# Patient Record
Sex: MALE | Race: BLACK OR AFRICAN AMERICAN | Employment: FULL TIME | URBAN - METROPOLITAN AREA
[De-identification: names, ages, dates, MRNs, and addresses within clinical notes are randomized per-mention and may not be internally consistent; named-entity substitution may affect disease eponyms.]

---

## 2017-02-08 ENCOUNTER — APPOINTMENT (OUTPATIENT)
Dept: GENERAL RADIOLOGY | Age: 62
DRG: 291 | End: 2017-02-08
Attending: EMERGENCY MEDICINE
Payer: COMMERCIAL

## 2017-02-08 ENCOUNTER — HOSPITAL ENCOUNTER (INPATIENT)
Age: 62
LOS: 6 days | Discharge: HOME | DRG: 291 | End: 2017-02-14
Attending: EMERGENCY MEDICINE | Admitting: HOSPITALIST
Payer: COMMERCIAL

## 2017-02-08 DIAGNOSIS — N17.9 AKI (ACUTE KIDNEY INJURY) (HCC): ICD-10-CM

## 2017-02-08 DIAGNOSIS — I50.43 ACUTE ON CHRONIC COMBINED SYSTOLIC AND DIASTOLIC CONGESTIVE HEART FAILURE (HCC): Primary | ICD-10-CM

## 2017-02-08 DIAGNOSIS — R77.8 ELEVATED TROPONIN: ICD-10-CM

## 2017-02-08 PROBLEM — I50.9 CONGESTIVE HEART FAILURE (CHF) (HCC): Status: ACTIVE | Noted: 2017-02-08

## 2017-02-08 LAB
ALBUMIN SERPL BCP-MCNC: 3.4 G/DL (ref 3.5–5)
ALBUMIN/GLOB SERPL: 0.8 {RATIO} (ref 1.1–2.2)
ALP SERPL-CCNC: 129 U/L (ref 45–117)
ALT SERPL-CCNC: 23 U/L (ref 12–78)
ANION GAP BLD CALC-SCNC: 7 MMOL/L (ref 5–15)
AST SERPL W P-5'-P-CCNC: 24 U/L (ref 15–37)
BASOPHILS # BLD AUTO: 0 K/UL (ref 0–0.1)
BASOPHILS # BLD: 1 % (ref 0–1)
BILIRUB SERPL-MCNC: 1.4 MG/DL (ref 0.2–1)
BNP SERPL-MCNC: 1466 PG/ML (ref 0–125)
BUN SERPL-MCNC: 38 MG/DL (ref 6–20)
BUN/CREAT SERPL: 22 (ref 12–20)
CALCIUM SERPL-MCNC: 8.7 MG/DL (ref 8.5–10.1)
CHLORIDE SERPL-SCNC: 104 MMOL/L (ref 97–108)
CK SERPL-CCNC: 124 U/L (ref 39–308)
CO2 SERPL-SCNC: 27 MMOL/L (ref 21–32)
CREAT SERPL-MCNC: 1.73 MG/DL (ref 0.7–1.3)
EOSINOPHIL # BLD: 0.1 K/UL (ref 0–0.4)
EOSINOPHIL NFR BLD: 2 % (ref 0–7)
ERYTHROCYTE [DISTWIDTH] IN BLOOD BY AUTOMATED COUNT: 18.1 % (ref 11.5–14.5)
GLOBULIN SER CALC-MCNC: 4.5 G/DL (ref 2–4)
GLUCOSE BLD STRIP.AUTO-MCNC: 238 MG/DL (ref 65–100)
GLUCOSE SERPL-MCNC: 217 MG/DL (ref 65–100)
HCT VFR BLD AUTO: 32.3 % (ref 36.6–50.3)
HGB BLD-MCNC: 10 G/DL (ref 12.1–17)
INR PPP: 3.7 (ref 0.9–1.1)
LYMPHOCYTES # BLD AUTO: 24 % (ref 12–49)
LYMPHOCYTES # BLD: 1.1 K/UL (ref 0.8–3.5)
MAGNESIUM SERPL-MCNC: 1.9 MG/DL (ref 1.6–2.4)
MCH RBC QN AUTO: 26 PG (ref 26–34)
MCHC RBC AUTO-ENTMCNC: 31 G/DL (ref 30–36.5)
MCV RBC AUTO: 84.1 FL (ref 80–99)
MONOCYTES # BLD: 0.3 K/UL (ref 0–1)
MONOCYTES NFR BLD AUTO: 8 % (ref 5–13)
NEUTS SEG # BLD: 2.9 K/UL (ref 1.8–8)
NEUTS SEG NFR BLD AUTO: 65 % (ref 32–75)
PLATELET # BLD AUTO: 192 K/UL (ref 150–400)
POTASSIUM SERPL-SCNC: 4.7 MMOL/L (ref 3.5–5.1)
PROT SERPL-MCNC: 7.9 G/DL (ref 6.4–8.2)
PROTHROMBIN TIME: 38.4 SEC (ref 9–11.1)
RBC # BLD AUTO: 3.84 M/UL (ref 4.1–5.7)
SERVICE CMNT-IMP: ABNORMAL
SODIUM SERPL-SCNC: 138 MMOL/L (ref 136–145)
TROPONIN I SERPL-MCNC: 0.32 NG/ML
WBC # BLD AUTO: 4.4 K/UL (ref 4.1–11.1)

## 2017-02-08 PROCEDURE — 74011250637 HC RX REV CODE- 250/637: Performed by: HOSPITALIST

## 2017-02-08 PROCEDURE — 93005 ELECTROCARDIOGRAM TRACING: CPT

## 2017-02-08 PROCEDURE — 71020 XR CHEST PA LAT: CPT

## 2017-02-08 PROCEDURE — 85025 COMPLETE CBC W/AUTO DIFF WBC: CPT | Performed by: EMERGENCY MEDICINE

## 2017-02-08 PROCEDURE — 65660000000 HC RM CCU STEPDOWN

## 2017-02-08 PROCEDURE — 82962 GLUCOSE BLOOD TEST: CPT

## 2017-02-08 PROCEDURE — 84484 ASSAY OF TROPONIN QUANT: CPT | Performed by: EMERGENCY MEDICINE

## 2017-02-08 PROCEDURE — 74011636637 HC RX REV CODE- 636/637: Performed by: HOSPITALIST

## 2017-02-08 PROCEDURE — 36415 COLL VENOUS BLD VENIPUNCTURE: CPT | Performed by: EMERGENCY MEDICINE

## 2017-02-08 PROCEDURE — 85610 PROTHROMBIN TIME: CPT | Performed by: EMERGENCY MEDICINE

## 2017-02-08 PROCEDURE — 99285 EMERGENCY DEPT VISIT HI MDM: CPT

## 2017-02-08 PROCEDURE — 80053 COMPREHEN METABOLIC PANEL: CPT | Performed by: EMERGENCY MEDICINE

## 2017-02-08 PROCEDURE — 83735 ASSAY OF MAGNESIUM: CPT | Performed by: EMERGENCY MEDICINE

## 2017-02-08 PROCEDURE — 74011250636 HC RX REV CODE- 250/636: Performed by: HOSPITALIST

## 2017-02-08 PROCEDURE — 82550 ASSAY OF CK (CPK): CPT | Performed by: EMERGENCY MEDICINE

## 2017-02-08 PROCEDURE — 83880 ASSAY OF NATRIURETIC PEPTIDE: CPT | Performed by: EMERGENCY MEDICINE

## 2017-02-08 RX ORDER — SODIUM CHLORIDE 0.9 % (FLUSH) 0.9 %
5-10 SYRINGE (ML) INJECTION AS NEEDED
Status: DISCONTINUED | OUTPATIENT
Start: 2017-02-08 | End: 2017-02-14 | Stop reason: HOSPADM

## 2017-02-08 RX ORDER — CALCIUM CARBONATE 200(500)MG
200 TABLET,CHEWABLE ORAL DAILY
Status: DISCONTINUED | OUTPATIENT
Start: 2017-02-09 | End: 2017-02-14 | Stop reason: HOSPADM

## 2017-02-08 RX ORDER — FUROSEMIDE 10 MG/ML
40 INJECTION INTRAMUSCULAR; INTRAVENOUS EVERY 12 HOURS
Status: DISCONTINUED | OUTPATIENT
Start: 2017-02-09 | End: 2017-02-13

## 2017-02-08 RX ORDER — ACETAMINOPHEN 325 MG/1
650 TABLET ORAL
Status: DISCONTINUED | OUTPATIENT
Start: 2017-02-08 | End: 2017-02-14 | Stop reason: HOSPADM

## 2017-02-08 RX ORDER — SODIUM CHLORIDE 0.9 % (FLUSH) 0.9 %
5-10 SYRINGE (ML) INJECTION EVERY 8 HOURS
Status: DISCONTINUED | OUTPATIENT
Start: 2017-02-08 | End: 2017-02-14 | Stop reason: HOSPADM

## 2017-02-08 RX ORDER — CARVEDILOL 12.5 MG/1
25 TABLET ORAL 2 TIMES DAILY WITH MEALS
Status: DISCONTINUED | OUTPATIENT
Start: 2017-02-09 | End: 2017-02-14 | Stop reason: HOSPADM

## 2017-02-08 RX ORDER — WARFARIN SODIUM 5 MG/1
5 TABLET ORAL
Status: DISCONTINUED | OUTPATIENT
Start: 2017-02-08 | End: 2017-02-08

## 2017-02-08 RX ORDER — FUROSEMIDE 10 MG/ML
40 INJECTION INTRAMUSCULAR; INTRAVENOUS ONCE
Status: COMPLETED | OUTPATIENT
Start: 2017-02-08 | End: 2017-02-08

## 2017-02-08 RX ORDER — DEXTROSE 50 % IN WATER (D50W) INTRAVENOUS SYRINGE
12.5-25 AS NEEDED
Status: DISCONTINUED | OUTPATIENT
Start: 2017-02-08 | End: 2017-02-14 | Stop reason: HOSPADM

## 2017-02-08 RX ORDER — GLIMEPIRIDE 1 MG/1
4 TABLET ORAL
Status: DISCONTINUED | OUTPATIENT
Start: 2017-02-09 | End: 2017-02-08 | Stop reason: SDUPTHER

## 2017-02-08 RX ORDER — ENOXAPARIN SODIUM 100 MG/ML
40 INJECTION SUBCUTANEOUS EVERY 24 HOURS
Status: DISCONTINUED | OUTPATIENT
Start: 2017-02-08 | End: 2017-02-09

## 2017-02-08 RX ORDER — ONDANSETRON 2 MG/ML
4 INJECTION INTRAMUSCULAR; INTRAVENOUS
Status: DISCONTINUED | OUTPATIENT
Start: 2017-02-08 | End: 2017-02-14 | Stop reason: HOSPADM

## 2017-02-08 RX ORDER — VALSARTAN 80 MG/1
80 TABLET ORAL DAILY
Status: DISCONTINUED | OUTPATIENT
Start: 2017-02-09 | End: 2017-02-08

## 2017-02-08 RX ORDER — POTASSIUM CHLORIDE 20 MEQ/1
20 TABLET, EXTENDED RELEASE ORAL 2 TIMES DAILY
Status: DISCONTINUED | OUTPATIENT
Start: 2017-02-09 | End: 2017-02-14 | Stop reason: HOSPADM

## 2017-02-08 RX ORDER — LANOLIN ALCOHOL/MO/W.PET/CERES
200 CREAM (GRAM) TOPICAL DAILY
Status: DISCONTINUED | OUTPATIENT
Start: 2017-02-09 | End: 2017-02-14 | Stop reason: HOSPADM

## 2017-02-08 RX ORDER — INSULIN LISPRO 100 [IU]/ML
INJECTION, SOLUTION INTRAVENOUS; SUBCUTANEOUS
Status: DISCONTINUED | OUTPATIENT
Start: 2017-02-08 | End: 2017-02-14 | Stop reason: HOSPADM

## 2017-02-08 RX ORDER — MAGNESIUM SULFATE 100 %
4 CRYSTALS MISCELLANEOUS AS NEEDED
Status: DISCONTINUED | OUTPATIENT
Start: 2017-02-08 | End: 2017-02-14 | Stop reason: HOSPADM

## 2017-02-08 RX ORDER — PRAVASTATIN SODIUM 40 MG/1
40 TABLET ORAL
Status: DISCONTINUED | OUTPATIENT
Start: 2017-02-08 | End: 2017-02-14 | Stop reason: HOSPADM

## 2017-02-08 RX ORDER — SPIRONOLACTONE 25 MG/1
25 TABLET ORAL DAILY
Status: DISCONTINUED | OUTPATIENT
Start: 2017-02-09 | End: 2017-02-09

## 2017-02-08 RX ORDER — GLIMEPIRIDE 4 MG/1
4 TABLET ORAL
Status: DISCONTINUED | OUTPATIENT
Start: 2017-02-09 | End: 2017-02-14 | Stop reason: HOSPADM

## 2017-02-08 RX ORDER — AMIODARONE HYDROCHLORIDE 200 MG/1
200 TABLET ORAL DAILY
Status: DISCONTINUED | OUTPATIENT
Start: 2017-02-09 | End: 2017-02-10 | Stop reason: ALTCHOICE

## 2017-02-08 RX ADMIN — FUROSEMIDE 40 MG: 10 INJECTION, SOLUTION INTRAMUSCULAR; INTRAVENOUS at 22:35

## 2017-02-08 RX ADMIN — INSULIN LISPRO 2 UNITS: 100 INJECTION, SOLUTION INTRAVENOUS; SUBCUTANEOUS at 22:45

## 2017-02-08 RX ADMIN — PRAVASTATIN SODIUM 40 MG: 40 TABLET ORAL at 22:36

## 2017-02-08 NOTE — IP AVS SNAPSHOT
Current Discharge Medication List  
  
Take these medications at their scheduled times Dose & Instructions Dispensing Information Comments Morning Noon Evening Bedtime  
 carvedilol 25 mg tablet Commonly known as:  Monda Lovings Your next dose is: Today, Tomorrow Other:  ____________ Dose:  25 mg Take 25 mg by mouth two (2) times daily (with meals). Refills:  0  
     
   
   
   
  
 furosemide 80 mg tablet Commonly known as:  LASIX Your next dose is: Today, Tomorrow Other:  ____________ Dose:  80 mg Take 1 Tab by mouth two (2) times a day. Quantity:  60 Tab Refills:  1  
     
   
   
   
  
 glimepiride 4 mg tablet Commonly known as:  AMARYL Your next dose is: Today, Tomorrow Other:  ____________ Dose:  4 mg Take 4 mg by mouth every morning. Refills:  0 HumuLIN N 100 unit/mL injection Generic drug:  insulin NPH Your next dose is: Today, Tomorrow Other:  ____________ Dose:  20 Units 20 Units by SubCUTAneous route once. Once in am  
 Refills:  0  
     
   
   
   
  
 * KLOR-CON M20 20 mEq tablet Generic drug:  potassium chloride Your next dose is: Today, Tomorrow Other:  ____________ Dose:  20 mEq Take 20 mEq by mouth two (2) times a day. Refills:  0  
     
   
   
   
  
 * potassium chloride SR 20 mEq tablet Commonly known as:  K-TAB Your next dose is: Today, Tomorrow Other:  ____________ Dose:  20 mEq Take 1 Tab by mouth daily. Quantity:  30 Tab Refills:  1 MAG-SR 64 mg tablet Generic drug:  magnesium chloride Your next dose is: Today, Tomorrow Other:  ____________ Dose:  64 mg Take 64 mg by mouth two (2) times a day. Refills:  0  
     
   
   
   
  
 sAXagliptin 2.5 mg tablet Commonly known as:  ONGLYZA  
   
 Your next dose is: Today, Tomorrow Other:  ____________ Dose:  2.5 mg Take 1 Tab by mouth daily. Quantity:  30 Tab Refills:  1  
     
   
   
   
  
 simvastatin 20 mg tablet Commonly known as:  ZOCOR Your next dose is: Today, Tomorrow Other:  ____________ Dose:  20 mg Take 20 mg by mouth nightly. Refills:  0  
     
   
   
   
  
 warfarin 5 mg tablet Commonly known as:  COUMADIN Your next dose is: Today, Tomorrow Other:  ____________ Dose:  7.5 mg Take 1.5 Tabs by mouth daily. Quantity:  45 Tab Refills:  0  
     
   
   
   
  
 * Notice: This list has 2 medication(s) that are the same as other medications prescribed for you. Read the directions carefully, and ask your doctor or other care provider to review them with you. Where to Get Your Medications Information about where to get these medications is not yet available ! Ask your nurse or doctor about these medications  
  furosemide 80 mg tablet  
 potassium chloride SR 20 mEq tablet sAXagliptin 2.5 mg tablet  
 warfarin 5 mg tablet

## 2017-02-08 NOTE — IP AVS SNAPSHOT
[address] [facility] 
[phone] Patient: Milagro Shaw MRN: [id number] You are allergic to the following No active allergies Recent Documentation Height Weight BMI Smoking Status 1.905 m 149.2 kg 41.11 kg/m2 Former Smoker Emergency Contacts Name Discharge Info Relation Home Work Mobile Poonam Myers DISCHARGE CAREGIVER [3] Spouse [3] [phone] About your hospitalization You were admitted on:  February 8, 2017 You last received care in the:  [facility] 2 CARDIOPULMONARY CARE You were discharged on:  February 14, 2017 Unit phone number:  [phone] Why you were hospitalized Your primary diagnosis was:  Not on File Your diagnoses also included:  Congestive Heart Failure (Chf) ([facility]) Providers Seen During Your Hospitalizations Provider Role Specialty Primary office phone Corrina Esparza MD Attending Provider Emergency Medicine [phone] Sue Coronado MD Attending Provider Internal Medicine [phone] Cristobal Mendiola MD Attending Provider Internal Medicine [phone] Kaushal Mckeon MD Attending Provider Internal Medicine [phone] Your Primary Care Physician (PCP) Primary Care Physician Office Phone Office Fax [facility], [address] [phone] Follow-up Information Follow up With Details Comments Contact Info Lyly Sorto MD   [address] [facility] [address] Lyly Sorto MD 
[facility] [phone] Current Discharge Medication List  
  
START taking these medications Dose & Instructions Dispensing Information Comments Morning Noon Evening Bedtime sAXagliptin 2.5 mg tablet Commonly known as:  ONGLYZA Your next dose is: Today, Tomorrow Other:  _________ Dose:  2.5 mg Take 1 Tab by mouth daily. Quantity:  30 Tab Refills:  1 CONTINUE these medications which have CHANGED Dose & Instructions Dispensing Information Comments Morning Noon Evening Bedtime  
 furosemide 80 mg tablet Commonly known as:  LASIX What changed:   
- medication strength 
- how much to take - when to take this Your next dose is: Today, Tomorrow Other:  _________ Dose:  80 mg Take 1 Tab by mouth two (2) times a day. Quantity:  60 Tab Refills:  1  
     
   
   
   
  
 * KLOR-CON M20 20 mEq tablet Generic drug:  potassium chloride What changed:  Another medication with the same name was added. Make sure you understand how and when to take each. Your next dose is: Today, Tomorrow Other:  _________ Dose:  20 mEq Take 20 mEq by mouth two (2) times a day. Refills:  0  
     
   
   
   
  
 * potassium chloride SR 20 mEq tablet Commonly known as:  K-TAB What changed: You were already taking a medication with the same name, and this prescription was added. Make sure you understand how and when to take each. Your next dose is: Today, Tomorrow Other:  _________ Dose:  20 mEq Take 1 Tab by mouth daily. Quantity:  30 Tab Refills:  1  
     
   
   
   
  
 warfarin 5 mg tablet Commonly known as:  COUMADIN What changed:  how much to take Your next dose is: Today, Tomorrow Other:  _________ Dose:  7.5 mg Take 1.5 Tabs by mouth daily. Quantity:  45 Tab Refills:  0  
     
   
   
   
  
 * Notice: This list has 2 medication(s) that are the same as other medications prescribed for you. Read the directions carefully, and ask your doctor or other care provider to review them with you. CONTINUE these medications which have NOT CHANGED Dose & Instructions Dispensing Information Comments Morning Noon Evening Bedtime  
 carvedilol 25 mg tablet Commonly known as:  Erika Locket Your next dose is: Today, Tomorrow Other:  _________ Dose:  25 mg Take 25 mg by mouth two (2) times daily (with meals). Refills:  0  
     
   
   
   
  
 glimepiride 4 mg tablet Commonly known as:  AMARYL Your next dose is: Today, Tomorrow Other:  _________ Dose:  4 mg Take 4 mg by mouth every morning. Refills:  0 HumuLIN N 100 unit/mL injection Generic drug:  insulin NPH Your next dose is: Today, Tomorrow Other:  _________ Dose:  20 Units 20 Units by SubCUTAneous route once. Once in am  
 Refills:  0 MAG-SR 64 mg tablet Generic drug:  magnesium chloride Your next dose is: Today, Tomorrow Other:  _________ Dose:  64 mg Take 64 mg by mouth two (2) times a day. Refills:  0  
     
   
   
   
  
 simvastatin 20 mg tablet Commonly known as:  ZOCOR Your next dose is: Today, Tomorrow Other:  _________ Dose:  20 mg Take 20 mg by mouth nightly. Refills:  0 STOP taking these medications   
 amiodarone 200 mg tablet Commonly known as:  CORDARONE  
   
  
 DIOVAN 80 mg tablet Generic drug:  valsartan sAXagliptin-metFORMIN 2.5-1,000 mg Tm24  
   
  
 spironolactone 25 mg tablet Commonly known as:  ALDACTONE  
   
  
 VIAGRA 50 mg tablet Generic drug:  sildenafil citrate Where to Get Your Medications Information on where to get these meds will be given to you by the nurse or doctor. ! Ask your nurse or doctor about these medications  
  furosemide 80 mg tablet  
 potassium chloride SR 20 mEq tablet sAXagliptin 2.5 mg tablet  
 warfarin 5 mg tablet Discharge Instructions [employer] Activation Thank you for requesting access to [employer]  Please follow the instructions below to securely access and download your online medical record. [employer] allows you to send messages to your doctor, view your test results, renew your prescriptions, schedule appointments, and more. How Do I Sign Up? 1. In your internet browser, go to [email or web address] 
2. Click on the First Time User? Click Here link in the Sign In box. You will be redirect to the New Member Sign Up page. 3. Enter your [employer] Access Code exactly as it appears below. You will not need to use this code after youve completed the sign-up process. If you do not sign up before the expiration date, you must request a new code. [employer]olar Access Code: [email or web address] Expires: 2017  6:14 PM (This is the date your [employer] access code will ) 4. Enter the last four digits of your Social Security Number (xxxx) and Date of Birth (mm/dd/yyyy) as indicated and click Submit. You will be taken to the next sign-up page. 5. Create a [employer]osolar ID. This will be your [employer]olar login ID and cannot be changed, so think of one that is secure and easy to remember. 6. Create a [employer] password. You can change your password at any time. 7. Enter your Password Reset Question and Answer. This can be used at a later time if you forget your password. 8. Enter your e-mail address. You will receive e-mail notification when new information is available in [address] 9. Click Sign Up. You can now view and download portions of your medical record. 10. Click the Download Summary menu link to download a portable copy of your medical information. Additional Information If you have questions, please visit the Frequently Asked Questions section of the [employer] website at [email or web address] Remember, [employer] is NOT to be used for urgent needs. For medical emergencies, dial 911. Discharge Instructions Attachments/References HEART FAILURE ZONES: GENERAL INFO (ENGLISH) HEART FAILURE: AVOIDING TRIGGERS (ENGLISH) WARFARIN: LONG-TERM USE (ENGLISH) Discharge Orders None [employer] [facility] [facility] Announcement We are excited to announce that we are making your provider's discharge notes available to you in Byliner. You will see these notes when they are completed and signed by the physician that discharged you from your recent hospital stay. If you have any questions or concerns about any information you see in Byliner, please call the Health Information Department where you were seen or reach out to your Primary Care Provider for more information about your plan of care. Introducing [facility]! Sadiq Law introduces Byliner patient portal. Now you can access parts of your medical record, email your doctor's office, and request medication refills online. 1. In your internet browser, go to [email or web address] 2. Click on the First Time User? Click Here link in the Sign In box. You will see the New Member Sign Up page. 3. Enter your Byliner Access Code exactly as it appears below. You will not need to use this code after youve completed the sign-up process. If you do not sign up before the expiration date, you must request a new code. · Byliner Access Code: [email or web address] Expires: 5/9/2017  6:14 PM 
 
4. Enter the last four digits of your Social Security Number (xxxx) and Date of Birth (mm/dd/yyyy) as indicated and click Submit. You will be taken to the next sign-up page. 5. Create a Byliner ID. This will be your Byliner login ID and cannot be changed, so think of one that is secure and easy to remember. 6. Create a Byliner password. You can change your password at any time. 7. Enter your Password Reset Question and Answer. This can be used at a later time if you forget your password. 8. Enter your e-mail address. You will receive e-mail notification when new information is available in [address] 9. Click Sign Up. You can now view and download portions of your medical record. 10. Click the Download Summary menu link to download a portable copy of your medical information. If you have questions, please visit the Frequently Asked Questions section of the [employer] website. Remember, [employer] is NOT to be used for urgent needs. For medical emergencies, dial 911. Now available from your iPhone and Android! General Information Please provide this summary of care documentation to your next provider. Patient Signature:  ____________________________________________________________ Date:  ____________________________________________________________  
  
Ammy Her Provider Signature:  ____________________________________________________________ Date:  ____________________________________________________________ More Information Learning About Heart Failure Zones What are heart failure zones? Heart failure zones give you an easy way to see changes in your heart failure symptoms. They also tell you when you need to get help. Check every day to see which zone you are in. Green zone. You are doing well. This is where you want to be. · Your weight is stable. This means it is not going up or down. · You breathe easily. · You are sleeping well. You are able to lie flat without shortness of breath. · You can do your usual activities. Yellow zone. Be careful. Your symptoms are changing. Call your doctor. · You have new or increased shortness of breath. · You are dizzy or lightheaded, or you feel like you may faint. · You have sudden weight gain, such as 3 pounds or more in 2 to 3 days. · You have increased swelling in your legs, ankles, or feet. · You are so tired or weak that you cannot do your usual activities. · You are not sleeping well. Shortness of breath wakes you up at night. You need extra pillows.  
Your doctor's name: ____________________________________________________________ Your doctor's contact information: _________________________________________________ Red zone. This is an emergency. Call 911. You have symptoms of sudden heart failure, such as: 
· You have severe trouble breathing. · You cough up pink, foamy mucus. · You have a new irregular or fast heartbeat. You have symptoms of a heart attack. These may include: · Chest pain or pressure, or a strange feeling in the chest. 
· Sweating. · Shortness of breath. · Nausea or vomiting. · Pain, pressure, or a strange feeling in the back, neck, jaw, or upper belly or in one or both shoulders or arms. · Lightheadedness or sudden weakness. · A fast or irregular heartbeat. If you have symptoms of a heart attack: After you call 911, the  may tell you to chew 1 adult-strength or 2 to 4 low-dose aspirin. Wait for an ambulance. Do not try to drive yourself. Follow-up care is a key part of your treatment and safety. Be sure to make and go to all appointments, and call your doctor if you are having problems. It's also a good idea to know your test results and keep a list of the medicines you take. Where can you learn more? Go to http://meaghan-julius.info/. Enter T174 in the search box to learn more about \"Learning About Heart Failure Zones. \" Current as of: January 27, 2016 Content Version: 11.1 © 7512-9296 AccurIC. Care instructions adapted under license by [employer] (which disclaims liability or warranty for this information). If you have questions about a medical condition or this instruction, always ask your healthcare professional. [place] [address] any warranty or liability for your use of this information. Avoiding Triggers With Heart Failure: Care Instructions Your Care Instructions Triggers are anything that make your heart failure flare up.  A flare-up is also called \"sudden heart failure\" or \"acute heart failure. \" When you have a flare-up, fluid builds up in your lungs, and you have problems breathing. You might need to go to the hospital. By watching for changes in your condition and avoiding triggers, you can prevent heart failure flare-ups. Follow-up care is a key part of your treatment and safety. Be sure to make and go to all appointments, and call your doctor if you are having problems. It's also a good idea to know your test results and keep a list of the medicines you take. How can you care for yourself at home? Watch for changes in your weight and condition · Weigh yourself without clothing at the same time each day. Record your weight. Call your doctor if you gain 3 pounds or more in 2 to 3 days. A sudden weight gain may mean that your heart failure is getting worse. · Keep a daily record of your symptoms. Write down any changes in how you feel, such as new shortness of breath, cough, or problems eating. Also record if your ankles are more swollen than usual and if you have to urinate in the night more often. Note anything that you ate or did that could have triggered these changes. Limit sodium Sodium causes your body to hold on to water, making it harder for your heart to pump. People get most of their sodium from processed foods. Fast food and restaurant meals also tend to be very high in sodium. · Your doctor may suggest that you limit sodium to 2,000 milligrams (mg) a day or less. That is less than 1 teaspoon of salt a day, including all the salt you eat in cooking or in packaged foods. · Read food labels on cans and food packages. They tell you how much sodium you get in one serving. Check the serving size. If you eat more than one serving, you are getting more sodium.  
· Be aware that sodium can come in forms other than salt, including monosodium glutamate (MSG), sodium citrate, and sodium bicarbonate (baking soda). MSG is often added to Asian food. You can sometimes ask for food without MSG or salt. · Slowly reducing salt will help you adjust to the taste. Take the salt shaker off the table. · Flavor your food with garlic, lemon juice, onion, vinegar, herbs, and spices instead of salt. Do not use soy sauce, steak sauce, onion salt, garlic salt, mustard, or ketchup on your food, unless it is labeled \"low-sodium\" or \"low-salt. \" 
· Make your own salad dressings, sauces, and ketchup without adding salt. · Use fresh or frozen ingredients, instead of canned ones, whenever you can. Choose low-sodium canned goods. · Eat less processed food and food from restaurants, including fast food. Exercise as directed Moderate, regular exercise is very good for your heart. It improves your blood flow and helps control your weight. But too much exercise can stress your heart and cause a heart failure flare-up. · Check with your doctor before you start an exercise program. 
· Walking is an easy way to get exercise. Start out slowly. Gradually increase the length and pace of your walk. Swimming, riding a bike, and using a treadmill are also good forms of exercise. · When you exercise, watch for signs that your heart is working too hard. You are pushing yourself too hard if you cannot talk while you are exercising. If you become short of breath or dizzy or have chest pain, stop, sit down, and rest. 
· Do not exercise when you do not feel well. Take medicines correctly · Take your medicines exactly as prescribed. Call your doctor if you think you are having a problem with your medicine. · Make a list of all the medicines you take. Include those prescribed to you by other doctors and any over-the-counter medicines, vitamins, or supplements you take. Take this list with you when you go to any doctor. · Take your medicines at the same time every day.  It may help you to post a list of all the medicines you take every day and what time of day you take them. · Make taking your medicine as simple as you can. Plan times to take your medicines when you are doing other things, such as eating a meal or getting ready for bed. This will make it easier to remember to take your medicines. · Get organized. Use helpful tools, such as daily or weekly pill containers. When should you call for help? Call 911 if you have symptoms of sudden heart failure such as: 
· You have severe trouble breathing. · You cough up pink, foamy mucus. · You have a new irregular or rapid heartbeat. Call your doctor now or seek immediate medical care if: 
· You have new or increased shortness of breath. · You are dizzy or lightheaded, or you feel like you may faint. · You have sudden weight gain, such as 3 pounds or more in 2 to 3 days. · You have increased swelling in your legs, ankles, or feet. · You are suddenly so tired or weak that you cannot do your usual activities. Watch closely for changes in your health, and be sure to contact your doctor if you develop new symptoms. Where can you learn more? Go to http://meaghan-julius.info/. Enter R106 in the search box to learn more about \"Avoiding Triggers With Heart Failure: Care Instructions. \" Current as of: April 27, 2016 Content Version: 11.1 © 8687-0123 Curiosityville. Care instructions adapted under license by [employer] (which disclaims liability or warranty for this information). If you have questions about a medical condition or this instruction, always ask your healthcare professional. [place] [address] any warranty or liability for your use of this information. Taking Warfarin Safely: Care Instructions Your Care Instructions Warfarin is a medicine that you take to prevent blood clots. It is often called a blood thinner.  Doctors give warfarin (such as Coumadin) to reduce the risk of blood clots. You may be at risk for blood clots if you have atrial fibrillation or deep vein thrombosis. Some other health problems may also put you at risk. Warfarin slows the amount of time it takes for your blood to clot. It can cause bleeding problems. Even if you've been taking warfarin for a while, it's important to know how to take it safely. Foods and other medicines can affect the way warfarin works. Some can make warfarin work too well. This can cause bleeding problems. And some can make it work poorly, so that it does not prevent blood clots very well. You will need regular blood tests to check how long it takes for your blood to form a clot. This test is called a PT or prothrombin time test. The result of the test is called an INR level. Depending on the test results, your doctor or anticoagulation clinic may adjust your dose of warfarin. Follow-up care is a key part of your treatment and safety. Be sure to make and go to all appointments, and call your doctor if you are having problems. It's also a good idea to know your test results and keep a list of the medicines you take. How can you care for yourself at home? Take warfarin safely · Take your warfarin at the same time each day. · If you miss a dose of warfarin, don't take an extra dose to make up for it. Your doctor can tell you exactly what to do so you don't take too much or too little. · Wear medical alert jewelry that lets others know that you take warfarin. You can buy this at most drugstores. · Don't take warfarin if you are pregnant or planning to get pregnant. Talk to your doctor about how you can prevent getting pregnant while you are taking it. · Don't change your dose or stop taking warfarin unless your doctor tells you to. Effects of medicines and food on warfarin · Don't start or stop taking any medicines, vitamins, or natural remedies unless you first talk to your doctor.  Many medicines can affect how warfarin works. These include aspirin and other pain relievers, over-the-counter medicines, multivitamins, dietary supplements, and herbal products. · Tell all of your doctors and pharmacists that you take warfarin. Some prescription medicines can affect how warfarin works. · Keep the amount of vitamin K in your diet about the same from day to day. Do not suddenly eat a lot more or a lot less food that is rich in vitamin K than you usually do. Vitamin K affects how warfarin works and how your blood clots. Talk with your doctor before making big changes in your diet. Vitamin K is in many foods, such as: 
¨ Leafy greens, such as kale, cabbage, spinach, Swiss chard, and lettuce. ¨ Canola and soybean oils. ¨ Green vegetables, such as asparagus, broccoli, and Kaplan sprouts. ¨ Vegetable drinks, green tea leaves, and some dietary supplement drinks. · Avoid cranberry juice and other cranberry products. They can increase the effects of warfarin. · Limit your use of alcohol. Avoid bleeding by preventing falls and injuries · Wear slippers or shoes with nonskid soles. · Remove throw rugs and clutter. · Rearrange furniture and electrical cords to keep them out of walking paths. · Keep stairways, porches, and outside walkways well lit. Use night-lights in hallways and bathrooms. · Be extra careful when you work with sharp tools or knives. When should you call for help? Call 911 anytime you think you may need emergency care. For example, call if: 
· You have a sudden, severe headache that is different from past headaches. Call your doctor now or seek immediate medical care if: 
· You have any abnormal bleeding, such as: 
¨ Nosebleeds. ¨ Vaginal bleeding that is different (heavier, more frequent, at a different time of the month) than what you are used to. ¨ Bloody or black stools, or rectal bleeding. ¨ Bloody or pink urine.  
Watch closely for changes in your health, and be sure to contact your doctor if you have any problems. Where can you learn more? Go to http://meaghan-julius.info/. Enter K967 in the search box to learn more about \"Taking Warfarin Safely: Care Instructions. \" Current as of: January 27, 2016 Content Version: 11.1 © 7223-1870 AddMyBest, JobSync. Care instructions adapted under license by [employer] (which disclaims liability or warranty for this information). If you have questions about a medical condition or this instruction, always ask your healthcare professional. [address] any warranty or liability for your use of this information.

## 2017-02-09 LAB
ANION GAP BLD CALC-SCNC: 8 MMOL/L (ref 5–15)
ATRIAL RATE: 70 BPM
BASOPHILS # BLD AUTO: 0 K/UL (ref 0–0.1)
BASOPHILS # BLD: 0 % (ref 0–1)
BUN SERPL-MCNC: 36 MG/DL (ref 6–20)
BUN/CREAT SERPL: 23 (ref 12–20)
CALCIUM SERPL-MCNC: 8.9 MG/DL (ref 8.5–10.1)
CALCULATED P AXIS, ECG09: -5 DEGREES
CALCULATED R AXIS, ECG10: -61 DEGREES
CALCULATED T AXIS, ECG11: 152 DEGREES
CHLORIDE SERPL-SCNC: 104 MMOL/L (ref 97–108)
CO2 SERPL-SCNC: 26 MMOL/L (ref 21–32)
CREAT SERPL-MCNC: 1.54 MG/DL (ref 0.7–1.3)
DIAGNOSIS, 93000: NORMAL
EOSINOPHIL # BLD: 0.1 K/UL (ref 0–0.4)
EOSINOPHIL NFR BLD: 2 % (ref 0–7)
ERYTHROCYTE [DISTWIDTH] IN BLOOD BY AUTOMATED COUNT: 17.8 % (ref 11.5–14.5)
EST. AVERAGE GLUCOSE BLD GHB EST-MCNC: 235 MG/DL
GLUCOSE BLD STRIP.AUTO-MCNC: 123 MG/DL (ref 65–100)
GLUCOSE BLD STRIP.AUTO-MCNC: 191 MG/DL (ref 65–100)
GLUCOSE BLD STRIP.AUTO-MCNC: 219 MG/DL (ref 65–100)
GLUCOSE BLD STRIP.AUTO-MCNC: 230 MG/DL (ref 65–100)
GLUCOSE SERPL-MCNC: 215 MG/DL (ref 65–100)
HBA1C MFR BLD: 9.8 % (ref 4.2–6.3)
HCT VFR BLD AUTO: 29.9 % (ref 36.6–50.3)
HGB BLD-MCNC: 9.4 G/DL (ref 12.1–17)
INR PPP: 3.4 (ref 0.9–1.1)
LYMPHOCYTES # BLD AUTO: 24 % (ref 12–49)
LYMPHOCYTES # BLD: 1 K/UL (ref 0.8–3.5)
MCH RBC QN AUTO: 26.3 PG (ref 26–34)
MCHC RBC AUTO-ENTMCNC: 31.4 G/DL (ref 30–36.5)
MCV RBC AUTO: 83.8 FL (ref 80–99)
MONOCYTES # BLD: 0.4 K/UL (ref 0–1)
MONOCYTES NFR BLD AUTO: 9 % (ref 5–13)
NEUTS SEG # BLD: 2.7 K/UL (ref 1.8–8)
NEUTS SEG NFR BLD AUTO: 65 % (ref 32–75)
P-R INTERVAL, ECG05: 80 MS
PLATELET # BLD AUTO: 164 K/UL (ref 150–400)
POTASSIUM SERPL-SCNC: 4.4 MMOL/L (ref 3.5–5.1)
PROTHROMBIN TIME: 36.1 SEC (ref 9–11.1)
Q-T INTERVAL, ECG07: 364 MS
QRS DURATION, ECG06: 84 MS
QTC CALCULATION (BEZET), ECG08: 393 MS
RBC # BLD AUTO: 3.57 M/UL (ref 4.1–5.7)
SERVICE CMNT-IMP: ABNORMAL
SODIUM SERPL-SCNC: 138 MMOL/L (ref 136–145)
TROPONIN I SERPL-MCNC: 0.34 NG/ML
VENTRICULAR RATE, ECG03: 70 BPM
WBC # BLD AUTO: 4.1 K/UL (ref 4.1–11.1)

## 2017-02-09 PROCEDURE — 65660000000 HC RM CCU STEPDOWN

## 2017-02-09 PROCEDURE — 74011250636 HC RX REV CODE- 250/636

## 2017-02-09 PROCEDURE — 74011250636 HC RX REV CODE- 250/636: Performed by: HOSPITALIST

## 2017-02-09 PROCEDURE — 36415 COLL VENOUS BLD VENIPUNCTURE: CPT | Performed by: HOSPITALIST

## 2017-02-09 PROCEDURE — 83036 HEMOGLOBIN GLYCOSYLATED A1C: CPT | Performed by: HOSPITALIST

## 2017-02-09 PROCEDURE — 74011636637 HC RX REV CODE- 636/637: Performed by: INTERNAL MEDICINE

## 2017-02-09 PROCEDURE — 74011636637 HC RX REV CODE- 636/637: Performed by: HOSPITALIST

## 2017-02-09 PROCEDURE — 74011250637 HC RX REV CODE- 250/637: Performed by: INTERNAL MEDICINE

## 2017-02-09 PROCEDURE — C8929 TTE W OR WO FOL WCON,DOPPLER: HCPCS

## 2017-02-09 PROCEDURE — 74011250637 HC RX REV CODE- 250/637: Performed by: EMERGENCY MEDICINE

## 2017-02-09 PROCEDURE — 74011250637 HC RX REV CODE- 250/637: Performed by: HOSPITALIST

## 2017-02-09 PROCEDURE — 85025 COMPLETE CBC W/AUTO DIFF WBC: CPT | Performed by: HOSPITALIST

## 2017-02-09 PROCEDURE — 80048 BASIC METABOLIC PNL TOTAL CA: CPT | Performed by: HOSPITALIST

## 2017-02-09 PROCEDURE — 85610 PROTHROMBIN TIME: CPT | Performed by: HOSPITALIST

## 2017-02-09 PROCEDURE — 84484 ASSAY OF TROPONIN QUANT: CPT | Performed by: HOSPITALIST

## 2017-02-09 PROCEDURE — 82962 GLUCOSE BLOOD TEST: CPT

## 2017-02-09 RX ORDER — INSULIN GLARGINE 100 [IU]/ML
10 INJECTION, SOLUTION SUBCUTANEOUS
Status: DISCONTINUED | OUTPATIENT
Start: 2017-02-09 | End: 2017-02-11

## 2017-02-09 RX ORDER — SODIUM CHLORIDE 0.9 % (FLUSH) 0.9 %
SYRINGE (ML) INJECTION
Status: DISCONTINUED
Start: 2017-02-09 | End: 2017-02-14 | Stop reason: HOSPADM

## 2017-02-09 RX ORDER — WARFARIN SODIUM 5 MG/1
5 TABLET ORAL
Status: COMPLETED | OUTPATIENT
Start: 2017-02-09 | End: 2017-02-09

## 2017-02-09 RX ADMIN — Medication 10 ML: at 21:56

## 2017-02-09 RX ADMIN — WARFARIN SODIUM 5 MG: 5 TABLET ORAL at 17:00

## 2017-02-09 RX ADMIN — INSULIN LISPRO 3 UNITS: 100 INJECTION, SOLUTION INTRAVENOUS; SUBCUTANEOUS at 13:25

## 2017-02-09 RX ADMIN — PRAVASTATIN SODIUM 40 MG: 40 TABLET ORAL at 21:55

## 2017-02-09 RX ADMIN — INSULIN LISPRO 2 UNITS: 100 INJECTION, SOLUTION INTRAVENOUS; SUBCUTANEOUS at 09:26

## 2017-02-09 RX ADMIN — POTASSIUM CHLORIDE 20 MEQ: 20 TABLET, EXTENDED RELEASE ORAL at 09:27

## 2017-02-09 RX ADMIN — Medication 10 ML: at 15:46

## 2017-02-09 RX ADMIN — FUROSEMIDE 40 MG: 10 INJECTION, SOLUTION INTRAMUSCULAR; INTRAVENOUS at 09:27

## 2017-02-09 RX ADMIN — GLIMEPIRIDE 4 MG: 4 TABLET ORAL at 11:01

## 2017-02-09 RX ADMIN — AMIODARONE HYDROCHLORIDE 200 MG: 200 TABLET ORAL at 09:27

## 2017-02-09 RX ADMIN — FUROSEMIDE 40 MG: 10 INJECTION, SOLUTION INTRAMUSCULAR; INTRAVENOUS at 20:26

## 2017-02-09 RX ADMIN — Medication 10 ML: at 06:01

## 2017-02-09 RX ADMIN — INSULIN GLARGINE 10 UNITS: 100 INJECTION, SOLUTION SUBCUTANEOUS at 23:37

## 2017-02-09 RX ADMIN — CALCIUM CARBONATE (ANTACID) CHEW TAB 500 MG 200 MG: 500 CHEW TAB at 09:27

## 2017-02-09 RX ADMIN — PERFLUTREN 2 ML: 6.52 INJECTION, SUSPENSION INTRAVENOUS at 09:14

## 2017-02-09 RX ADMIN — Medication 200 MG: at 09:27

## 2017-02-09 RX ADMIN — INSULIN LISPRO 2 UNITS: 100 INJECTION, SOLUTION INTRAVENOUS; SUBCUTANEOUS at 21:55

## 2017-02-09 RX ADMIN — CARVEDILOL 25 MG: 12.5 TABLET, FILM COATED ORAL at 16:59

## 2017-02-09 RX ADMIN — POTASSIUM CHLORIDE 20 MEQ: 20 TABLET, EXTENDED RELEASE ORAL at 17:00

## 2017-02-09 RX ADMIN — CARVEDILOL 25 MG: 12.5 TABLET, FILM COATED ORAL at 09:27

## 2017-02-09 NOTE — DIABETES MGMT
[facility]    Elevated A1C Visit Note    Recommendations/ Comments: If appropriate, please consider resuming pt home med Glimepiride 4mg qam.     Met with pt for A1c >9%, per pt he stated that he was previously on insulin but his PCP switched him to just oral DM meds about a year ago. He stated he recently began going to the gym with his wife. Pt checking BG approximately 3x/week, encouraged to check daily at rotating times. Discussed well balanced meals and using plate method as guide. Patient is a 57 y.o. male with known history of Type 2 Diabetes on Onglyza-Metformin 2.5-1000mg and Glimepiride 4mg qam at home. A1c:   Lab Results   Component Value Date/Time    Hemoglobin A1c 9.8 02/09/2017 05:42 AM    Hemoglobin A1c 9.2 03/03/2009 09:40 PM    Hemoglobin A1c, External 9.0 03/02/2016       Recent Glucose Results:   Lab Results   Component Value Date/Time     (H) 02/09/2017 05:43 AM     (H) 02/08/2017 07:28 PM    GLUCPOC 191 (H) 02/09/2017 07:43 AM    GLUCPOC 238 (H) 02/08/2017 10:34 PM        Lab Results   Component Value Date/Time    Creatinine 1.54 02/09/2017 05:43 AM       Active Orders   Diet    DIET CARDIAC Regular; 2 GM NA (House Low NA); Consistent Carb 1800kcal          Assessed and instructed patient on the following:   ·  interpretation of lab results, blood sugar goals, exercise, SMBG skills, nutrition and referred to Diabetes Educator    Provided patient with the following: [x]          Diabetes Self-Care Guide               []          Insulin education materials               []          Diabetes survival skills handout               []          BG guidelines for post-op patients               []          \"Decreasing  the Cost of Diabetes Care\"               [x]          Outpatient DTC contact number              Will continue to follow as needed. Thank you.     Sharri Rodriguez, [facility] [place]  Office: [phone]

## 2017-02-09 NOTE — ED NOTES
TRANSFER - OUT REPORT:    Verbal report given to LORI Wilder on Celi Harris  being transferred to [phone] for routine progression of care       Report consisted of patients Situation, Background, Assessment and   Recommendations(SBAR). Information from the following report(s) SBAR, ED Summary, [facility] and Recent Results was reviewed with the receiving nurse. Lines:   Peripheral IV 06/21/16 Left Hand (Active)       Peripheral IV 02/08/17 Right Antecubital (Active)   Site Assessment Clean, dry, & intact 2/8/2017 11:02 PM   Phlebitis Assessment 0 2/8/2017 11:02 PM   Infiltration Assessment 0 2/8/2017 11:02 PM   Dressing Status Clean, dry, & intact 2/8/2017 11:02 PM   Dressing Type Transparent 2/8/2017 11:02 PM   Hub Color/Line Status Pink 2/8/2017 11:02 PM        Opportunity for questions and clarification was provided.       Patient transported with:  Kwaab

## 2017-02-09 NOTE — H&P
[facility]  Admission History and Physical      NAME:  Edith Faulkner   :   1955   MRN:  [id number]     PCP:  Edson Gaston MD     Date/Time:  2017           Assessment/Plan:         My assessment of this patient's clinical condition and my plan of care is as follows:      Given the patient's current clinical presentation, I have a high level of concern for decompensation if discharged from the ED. Complex decision making was performed which includes reviewing the patient's available past medical records, laboratory results, and Xray films.  I have also directly communicated my plan and discussed this case with the involved ED physician.      Assessment / Plan:    Acute on chronic Congestive heart failure exacerbation: POA  Chronic nonischemic dilated cardiomyopathy EF 20%  Non compliance is concern  20 pound weight gain and generalized anasarca    Elevated troponin: has chronic elevation, likely leak from HF  -tele admit, cycle troponin  -IV lasix BID, BB, spironolactone, statin, hold ARB for acute change in renal function  -strict I and O  -daily weight  -cardiology consult  -updated echo  -monitor lytes  -po potassium supplementation    H/O Afib: POA  Currently paced rhythm  Resume coumadin and amiodarone  pharmacy to dose cooumadin  On BB    Acute renal failure: POA  Hold ARB, metformin, watch renal function closely on IV diuretics  If continue to get worse, might need nephrology consult    Diabetes Mellitus: POA  HbA1c, ISS, Hold metformin because of elevated creatinine  Resume Amaryl, will start loss dose Lantus, BS running high in 200's    HLD: POA  On statin    Morbid Obesity  Concern for sleep apnea: need sleep study as outpt      DVT Prophylaxis: lovenox  GI Prophylaxis: not indicated  Code status: Full  Surrogate Decision Maker: wife  Baseline: independent, lives with wife                Subjective:     CHIEF COMPLAINT: weight gain and SOB    HISTORY OF PRESENT ILLNESS:     Mr. Charline Jiang is a 57 y.o. morbidly obese male with Chronic nonischemic dilated cardiomyopathy EF 39%, Chronic systolic congestive heart failure class, H/o  atrial fibrillation, On chronic warfarin, Iatrogenic left bundle branch block with predominant RV pacing, 64-70%, Hypertension, Hyperlipidemia and diabetes presents to [address] [facility] ED C/O  Worsening exertional shortness of breath and around 20 pounds weight gain in last 3 weeks. Says he was seen by Dr. Elena Grossman two week ago and no medications were changed. Pt says he is complaint with his medications and diet , at same time admitted he might had missed 1-2 doses of diuretics. Says his ET has decreased to few steps and he has baseline orthopnea of 3 pillows that hs not changed. In ER work up shows elevated creatinine, slightly elevated troponin. Cardiology was consulted and asked us to admit patient for renal failure. Off note, pt's weight a year ago was 333 lbs and now 351 lbs. Past Medical History   Diagnosis Date    A-fib [facility])     Arrhythmia      atrial fibrillation 2013    Diabetes [facility])     Endocrine disease      diabetes    Hypertension     Irregular heart beat         Past Surgical History   Procedure Laterality Date    Pr cardiac surg procedure unlist       defib placed in left chest       Social History   Substance Use Topics    Smoking status: Former Smoker     Quit date: 8/2/1993    Smokeless tobacco: Not on file    Alcohol use No        Family/social hx: CAD and DM, works as youth counselor    No Known Allergies     Prior to Admission medications    Medication Sig Start Date End Date Taking? Authorizing Provider   saxagliptin-metFORMIN 2.5-1,000 mg TM24 Take  by mouth. Historical Provider   amiodarone (CORDARONE) 200 mg tablet Take 200 mg by mouth daily. Historical Provider   magnesium chloride (MAG-SR) 64 mg tablet Take 64 mg by mouth two (2) times a day.     Historical Provider   sildenafil citrate (VIAGRA) 50 mg tablet Take 50 mg by mouth as needed. Historical Provider   spironolactone (ALDACTONE) 25 mg tablet Take 25 mg by mouth daily. Leia Chris MD   potassium chloride (KLOR-CON M20) 20 mEq tablet Take 20 mEq by mouth two (2) times a day. Leia Chris MD   carvedilol (COREG) 25 mg tablet Take 25 mg by mouth two (2) times daily (with meals). Leia Chris MD   furosemide (LASIX) 40 mg tablet Take 40 mg by mouth daily. Leia Chris MD   warfarin (COUMADIN) 5 mg tablet Take 5 mg by mouth daily. Leia Chris MD   simvastatin (ZOCOR) 20 mg tablet Take 20 mg by mouth nightly. Leia Chris MD   glimepiride (AMARYL) 4 mg tablet Take 4 mg by mouth every morning. Leia Chris MD   valsartan (DIOVAN) 80 mg tablet Take 80 mg by mouth daily. Leia Chris MD   insulin NPH (HUMULIN N) 100 unit/mL injection 20 Units by SubCUTAneous route once.  Once in am     Leia Chris MD         Review of Systems:    Constitutional: weight gain  Eyes: negative for irritation, redness and icterus   Ears, nose, mouth, throat, and face: negative for ear drainage, earaches, nasal congestion, sore mouth and sore throat   Respiratory: negative for cough, sputum, hemoptysis, pleurisy/chest pain, asthma, wheezing or dyspnea on exertion   Cardiovascular: as per hpi  Gastrointestinal: negative for nausea, vomiting, diarrhea, constipation and abdominal pain   Genitourinary:negative for frequency and dysuria   Hematologic/lymphatic: negative for easy bruising, bleeding, lymphadenopathy, petechiae and coughing up blood   Musculoskeletal:negative for myalgias, arthralgias and muscle weakness   Neurological: negative for headaches and dizziness   Endocrine: Denies heat or cold intolerance       Objective:      VITALS:    Vital signs reviewed; most recent are:    Visit Vitals    /84    Pulse 76    Temp 98.2 °F (36.8 °C)    Resp 17    Ht 6' 3\" (1.905 m)    Wt (!) 159.5 kg (351 lb 10.1 oz)    SpO2 99%    BMI 43.95 kg/m2     SpO2 Readings from Last 6 Encounters:   02/08/17 99%   06/21/16 100%   08/02/13 97%   10/16/10 100%        No intake or output data in the 24 hours ending 02/08/17 2148         Exam:     Physical Exam:    Gen:  Morbidly obese, mild SOB while talking  HEENT:  Pink conjunctivae, PERRL, hearing intact to voice, moist mucous membranes  Neck:  Supple, without masses, thyroid non-tender  Resp:  No accessory muscle use, clear breath sounds without wheezes rales or rhonchi  Card:  No murmurs, normal S1, S2 without thrills, bruits or peripheral edema  Abd:  Obese distended abd, umbilical hernia, lower abdominal wall pitting edema, BS+  Lymph:  No cervical adenopathy  Musc:  No cyanosis or clubbing  Skin:  2+ Pitting edema  Neuro:  Cranial nerves 3-12 are grossly intact  Psych:  Alert with good insight. Oriented to person, place, and time       Labs:    Recent Labs      02/08/17 1928   WBC  4.4   HGB  10.0*   HCT  32.3*   PLT  192     Recent Labs      02/08/17 1928   NA  138   K  4.7   CL  104   CO2  27   GLU  217*   BUN  38*   CREA  1.73*   CA  8.7   MG  1.9   ALB  3.4*   SGOT  24   ALT  23     No components found for: GLPOC  No results for input(s): PH, PCO2, PO2, HCO3, FIO2 in the last 72 hours. No results for input(s): INR in the last 72 hours.     No lab exists for component: INREXT      Total time spent with patient: [phone] [facility] discussed with: Patient    Discussed:  Care Plan    Prophylaxis:  Lovenox    Probable Disposition:  Home w/Family           ___________________________________________________    Attending Physician: Bryson Douglass MD

## 2017-02-09 NOTE — PROGRESS NOTES
Interdisciplinary team rounds were held 2/9/2017 with the following team members:Care Management, Nursing, Nutrition, Pharmacy, Physical Therapy and Physician and the patient. Plan of care discussed. See clinical pathway and/or care plan for interventions and desired outcomes.     Cards consult; echo pending; PT/OT; diuresing

## 2017-02-09 NOTE — ED NOTES
Assumed care of pt from triage. Pt presents to ED with chief complaint of SOB upon exertion. Pt reports his stomach has been \"filled with fluid\" for the past month now. Pt presents to ED with distended abdomen that is non-tender upon palpation. Pt is A&O x 4. Pt denies any other symptoms at this time. Pt resting comfortably on the stretcher in a position of comfort. Pt in no acute distress at this time. Call bell within reach. Side rails x 2. Cardiac monitor x 3. Stretcher locked in the lowest position. Pt aware of plan to await for MD/PA-C/NP assessment, and pt/family verbalizes understanding. Will continue to monitor.

## 2017-02-09 NOTE — PROGRESS NOTES
Hospitalist Progress Note    NAME: Sang Garcia   :  1955   MRN:  [id number]     Hospitalist: Jonathan Arredondo MD       Assessment / Plan:  Acute on chronic non-ischemic systolic heart failure, poa, EF 20%, s/p BiV ICD  -20 pound weight gain and SOB, pro-BNP 1400  -cardiology following  -lasix 40 mg IV BID  -stopped aldactone for now, restart when IV diuresis stopped  -coreg 25 mg BID  -f/u echo  -I/O monitoring, daily weight  Wt Readings from Last 3 Encounters:   17 157.5 kg (347 lb 3.6 oz)   16 138.3 kg (305 lb)   16 151.4 kg (333 lb 11.2 oz)     Elevated troponin  -0.32 -->0.34  -no other signs of ischemia, likely from renal function    Atrial fibrillation  -betablocker  -warfarin per pharmamcy    LAQUITA, poa  -Cr 1.73 --> 1.54 today decreasing with diuresis  -hold ACE, hold spironolactone    DM2   -hba1c 9.8  -hold metformin  -continue amaryl  -started lantus 10 units today    HLD - statin  Morbid obesity, possible LEAH - needs sleep study    Code status: Full  Prophylaxis: Lovenox  Recommended Disposition: Home w/Family     Subjective:     Chief Complaint: weight gain, anasarca      Patient feeling well, denies any SOB, is urinating a lot since IV lasix started  Denies any chest pain currently    Review of Systems:  Symptom Y/N Comments  Symptom Y/N Comments   Fever/Chills n   Chest Pain n    Poor Appetite n   Edema y    Cough n   Abdominal Pain     Sputum    Joint Pain     SOB/GROVES n   Pruritis/Rash     Nausea/vomit n   Tolerating PT/OT     Diarrhea n   Tolerating Diet y    Constipation    Other       Could NOT obtain due to:      Objective:     VITALS:   Last 24hrs VS reviewed since prior progress note.  Most recent are:  Patient Vitals for the past 24 hrs:   Temp Pulse Resp BP SpO2   17 1544 97.5 °F (36.4 °C) 70 20 117/74 98 %   17 1102 97.5 °F (36.4 °C) 72 20 118/70 99 %   17 0724 97.6 °F (36.4 °C) 74 18 126/79 98 %   17 0328 97.7 °F (36.5 °C) 72 18 121/73 97 % 02/08/17 2322 97.3 °F (36.3 °C) 82 18 131/81 100 %   02/08/17 2300 - 74 18 135/85 96 %   02/08/17 2245 - 72 20 (!) 130/95 98 %   02/08/17 2235 - 74 - 118/79 -   02/08/17 2228 - 72 18 118/79 96 %   02/08/17 2200 - 71 16 - 100 %   02/08/17 2130 - 76 17 - 99 %   02/08/17 2115 - 74 17 137/84 98 %   02/08/17 2100 - 72 16 142/79 96 %   02/08/17 2045 - 72 18 132/78 96 %   02/08/17 2030 - 71 15 136/77 98 %   02/08/17 2015 - 72 19 141/79 95 %   02/08/17 2000 - 71 19 135/75 97 %   02/08/17 1945 - 70 16 126/76 98 %   02/08/17 1929 - 72 20 132/77 98 %   02/08/17 1758 98.2 °F (36.8 °C) 75 18 142/83 95 %       Intake/Output Summary (Last 24 hours) at 02/09/17 1621  Last data filed at 02/09/17 1434   Gross per 24 hour   Intake              600 ml   Output             2725 ml   Net            -2125 ml        PHYSICAL EXAM:  General: Alert, cooperative, no acute distress    EENT:  EOMI. Anicteric sclerae. Mucous membranes moist  Resp:  CTA bilaterally, no wheezing or rales. No accessory muscle use  CV:  Regular rhythm, pitting edema  GI:  Soft,+ distended, non tender. +Bowel sounds  Neurologic:  Alert and oriented X 3, normal speech  Psych:   Good insight, not anxious nor agitated  Skin:  No rashes, no jaundice    Reviewed most current lab test results and cultures  YES  Reviewed most current radiology test results   YES  Review and summation of old records today   YES  Reviewed patient's current orders and MAR    YES  PMH/SH reviewed - no change compared to H&P  ________________________________________________________________________  Care Plan discussed with:    Comments   Patient x    Family  x    RN     Care Manager     Consultant                        Multidiciplinary team rounds were held today with , nursing, pharmacist and clinical coordinator. Patient's plan of care was discussed; medications were reviewed and discharge planning was addressed. ________________________________________________________________________  Total NON critical care TIME:  15   Minutes  ________________________________________________________________________  Tristen Davila MD     Procedures: see electronic medical records for all procedures/Xrays and details which were not copied into this note but were reviewed prior to creation of Plan. LABS:  I reviewed today's most current labs and imaging studies.   Pertinent labs include:  Recent Labs      02/09/17 0543  02/08/17 1928   WBC  4.1  4.4   HGB  9.4*  10.0*   HCT  29.9*  32.3*   PLT  164  192     Recent Labs      02/09/17 0543  02/08/17 2231  02/08/17 1928   NA  138   --   138   K  4.4   --   4.7   CL  104   --   104   CO2  26   --   27   GLU  215*   --   217*   BUN  36*   --   38*   CREA  1.54*   --   1.73*   CA  8.9   --   8.7   MG   --    --   1.9   ALB   --    --   3.4*   TBILI   --    --   1.4*   SGOT   --    --   24   ALT   --    --   23   INR  3.4*  3.7*   --        Signed: Tristen Davila MD

## 2017-02-09 NOTE — PROGRESS NOTES
Pharmacy Initial Dosing of Warfarin    Pharmacy consulted to dose warfarin for this  57 y.o. male ordered warfarin for A.fib    Goal INR 2-3      INR (0.9-1.1) > 5 discuss with MD:   Recent Labs      02/08/17   1928   HGB  10.0*   PLT  192   ALB  3.4*   SGOT  24   ALT  23   TBILI  1.4*       Impression/Plan: on home therapy of warfarin for A.fib     Pharmacy will follow daily and adjust the dose as appropriate.     Thanks for the consult    GREGORY Grullon

## 2017-02-09 NOTE — PROGRESS NOTES
Physical Therapy Note    Orders acknowledged. Chart reviewed. Patient is currently off of the floor for testing. Will defer PT evaluation and follow back later as able and appropriate.     Thank you,  Tana Manning, PT, DPT

## 2017-02-09 NOTE — CARDIO/PULMONARY
C/P Rehab. Note:    Chitra Larson is a 57 y.o. male presents with dyspnea/anasarca.     As noted in H&P: \"57 y.o. morbidly obese male with Chronic nonischemic dilated cardiomyopathy EF 78%, Chronic systolic congestive heart failure class, H/o atrial fibrillation, On chronic warfarin, Iatrogenic left bundle branch block with predominant RV pacing, 64-70%, Hypertension, Hyperlipidemia and diabetes presents to [facility] ED C/O Worsening exertional shortness of breath and around 20 pounds weight gain in last 3 weeks    Met with patient. Reviewed role of Cardiac Rehab Nurse. Patient  given printed teaching materials on CHF and low NA diet. Instruction given on s/s of CHF, checking weight every am and calling MD if weight is up 2-3 lbs in a day or 5 lbs in a week, fluid/Na restrictions, s/s of worsening CHF and when to call MD. Reviewed activity as tolerated with frequent rest periods as needed, taking medications as prescribed, and the importance of follow up visits with physician. Pt verbalized understanding and had no further questions at this time. In addition patient given our contact number and told to call us if interested in attending Outpatient Cardiac rehab.  After discussing with his MD.

## 2017-02-09 NOTE — PROGRESS NOTES
Pharmacy Daily Dosing of Warfarin - Consult for pharmacy to dose    Indication: afib    Goal INR (2-3, 2.5-3.5, 3-4): 2-3    Average Daily Warfarin Dose: 7.5 mg daily, except 10 mg on thurs, sat. Concurrent Anticoagulants/Antiplatelets none yet  Major Interacting Medications (Dose/Frequency): none yet    INR (0.9-1.1) > 5 or Platelets (< 00A): discuss with MD:    Recent Labs      02/09/17   0543  02/08/17   2231  02/08/17   1928   INR  3.4*  3.7*   --    HGB  9.4*   --   10.0*   PLT  164   --   192       Impression/Plan: INR trending down, will order 50% reduced dose of 5 mg tonight as opposed to HOLDing dose, thus, avoiding sub therapeutic INR, he DID NOT receive a dose yesterday     Pharmacy will follow daily and adjust the dose as appropriate.     Thanks for the consult  Farrukh Parry PHARMD       [facility] Dosing and Monitoring Guidelines

## 2017-02-09 NOTE — PROGRESS NOTES
Primary Nurse Belinda Aj and Chris Sumner RN performed a dual skin assessment on this patient and skin was intact.   Gamal Scale:  23

## 2017-02-09 NOTE — PROGRESS NOTES
2322:  Admitted patient to room 2248 from the ED. Assessment completed and documented. Denies pain. No current needs. Wife at bedside. On RA and O2 Sats in the high 90s. Patient had an uneventful shift.

## 2017-02-09 NOTE — PROGRESS NOTES
Physical Therapy Note    Orders acknowledged. Chart reviewed. Spoke with nursing who reports that patient is up ad cirilo and likely without acute PT needs. Patient received sitting on EOB with VSS. Patient reporting baseline functional mobility and without concerns regarding discharging home with wife. Patient performed transfers and ambulated around room independently while PT in room. Patient was left in the bathroom with nursing informed. Patient is currently at his functional baseline and does not require acute PT at this time. Will complete orders. Please re-consult if patient with decline from baseline functional mobility.     Thank you,  Mehran Lua, PT, DPT

## 2017-02-09 NOTE — CONSULTS
Cardiology Consult Note    CC: Dyspnea   Rigoberto Galvez MD  Reason for consult:  CHF (acute on chronic sys/diast)  Requesting MD:  Dr. Posadas President. Admit Date: 2/8/2017   Today's Date: 2/8/2017   Cardiologist:  Dr Patricia Brown. Cardiac Assessment/Plan:   CM: Long h/o non-ischemic CM with severe LV dysfunction. Admitted with acute on chronic sys/diast CHF and worse renal fxn. Rec: cont diuresis; cont home doses coreg; D/Cd aldactone with increased Cr. He should get back on ARB when IV diuresis is done. Check sats with ambulation. Indeterminate troponin: Nl Ck; no angina; likely reflects renal insufficiency: follow. HTN: Stable    Chronic Afib: Cont rate control/anticoagulation with coumadin; INR sl supra-therapeutic. ICD: primary prevention BiV ICD in place; no shocks. Renal function: worse Cr POA (1.7, now 1.5). ____________________________________________________________________  Nataliya Grewal is a 57 y.o. male presents with dyspnea/anasarca. As noted in H&P: \"57 y.o. morbidly obese male with Chronic nonischemic dilated cardiomyopathy EF 15%, Chronic systolic congestive heart failure class, H/o  atrial fibrillation, On chronic warfarin, Iatrogenic left bundle branch block with predominant RV pacing, 64-70%, Hypertension, Hyperlipidemia and diabetes presents to ED [facility] C/O  Worsening exertional shortness of breath and around 20 pounds weight gain in last 3 weeks. Says he was seen by Dr. Patricia Brown two week ago and no medications were changed. Pt says he is complaint with his medications and diet , at same time admitted he might had missed 1-2 doses of diuretics. Says his ET has decreased to few steps and he has baseline orthopnea of 3 pillows that hs not changed. In ER work up shows elevated creatinine, slightly elevated troponin. Cardiology was consulted and asked us to admit patient for renal failure. Off note, pt's weight a year ago was 333 lbs and now 351 lbs. \"    ______________________________________________________________________    The patient reports no chest pain/pressure; No PND, orthopnea, palpitations, pre-syncope, syncope. No current complaints. Gradually worse GROVES/LE edema since last OV below, to class III+. ECG: afib with CVR; V pacing. CXR: Unremarkable. Labs: Nl CK; indeterminate troponin. Tele: afib with CVR; V pacing. Echo 2/9/17: Ef 20%; Decreased RV Fxn/RVE. Mild-mod MR; Mild TR; AoScl. No AS. With diuresis overnight, he feels dramatically better. __________________________________________________________________________________________________  Notable prior cardiac history:    @ OV 1/9/17:        The patient is a 57year old male followed by Dr David Green. 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 worse last 2 mos. 10# weight gain. intially felt good after BiV upgrade. has sleep apnea per the wife; has never had a formal sleep study. He has a nonischemic cardiomyopathy with chronic a fib and an AICD. No AICD shocks  July 2015 echo showed LVEF 20%. Much more sob. He sleeps on 3 pillows    He has morbid obesity, diabetes, hypertension, hyperlipidemia and ED. No bleeding. IMPRESSION AND PLAN  01. Dilated cardiomyopathy:  His ejection fraction is 35% or less. The patient has an ICD in place. On coreg, spironolactone and ARB  The patient is tolerating medical therapy but is more sob and is gaining edema. 120mg daily of lasix. Repeat echocardiogram to re-assess EF (last was 20%). Check TSH    Weight loss!!!! (wt was 351). Sleep study ordered due to concern for underlying untreated LEAH. ECG done today   02. Chronic atrial fibrillation:  The patient is therapeutic on chronic anticoagulation. Rate controlled. We will continue the current therapy. No bleeding. Continue Rx   03.  Chronic systolic (congestive) heart failure:  is tolerating the current beta-blocker dose. He appears to be volume overloaded. He has moderate to severe exertional symptoms. (NYHA III)   04. Presence of automatic (implantable) cardiac defibrillator:  He will continue to be followed in device clinic. 7/16 -- 91% AFib    11/16 -- > 99% AFib    May be cause of increased SOB as well; increased AF burden. 05. Personal history of nicotine dependence:  He remains abstinent from tobacco use. 06. Long term (current) use of anticoagulants: This condition is stable. No bleeding. Continue Rx   07. Body mass index (BMI) 40.0-44.9, adult:  The patient was instructed on AHA diet and regular exercise. 08. Fatigue   09.  Sleep apnea:  Pending sleep study     TSH was normal.    1/9/17 MEDICATION LIST  Medication Sig Description   Amaryl 4 mg Tab Take 1 tablet by mouth every morning   carvedilol 25 mg tablet take 1 tablet (25MG)  by oral route 2 times every day with food   Diovan 160 mg tablet take 1 tablet by oral route  every day   furosemide 40 mg tablet Take 3 tablet by mouth every morning   Kombiglyze XR 2.5 mg-1,000 mg tablet,extended release take 1 tablet by oral route  every day with the evening meal   Mag-Delay 64 mg tablet,extended release take 1 tablet bid   multivitamin tablet take 1 tablet by oral route  every day with food   simvastatin 20 mg tablet take 1 tablet (20MG)  by oral route  every day in the evening   spironolactone 25 mg tablet TAKE 1 TABLET BY MOUTH EVERY MORNING   Viagra 50 mg tablet take 1 tablet (50MG)  by oral route  every day as needed approximately 1 hour before sexual activity   warfarin 5 mg tablet take 1&1/2 tab qpm except take 2 tabs on Thur & Sat  OR AS DIRECTED       ______________________________________________________________________________  CARDIAC HISTORY  CHF/CM:  1 Nonischemic CM [EF initially 15% improved to 45%]     ARRHYTHMIA:  1 DDD ICD implant, [employer] - 11/9/2009   2 PAF RISK FACTORS:  1 Diabetes   2 Hypertension   3 Dyslipidemia   4 Family History of CAD [Less than 61years of age]       CARDIOVASCULAR PROCEDURES  ECHO/MUGA:  Echo (EF 0.45, RVSP 26mmHg, trace MR) - 8/18/2011   Echo (EF: .20, Dilated left ventricle with poor LV systolic function. Ejection fraction 20%  Dilated right ventricle with mildly reduced right ventricular systolic function and an estimated Right ventricular systolic pressure of 37 mm Hg. No significant valvular disease   No pericardial effusion Dilated left atrium - 7/21/2015   ELECTROPHYSIOLOGY:  EKG (Atrial Fib, w/ controlled VR; intraventricular conduction delay w/ some nonspecific ST-T changes) - 8/8/2011   Devices (Dual Chamber ICD STAINS. [id number]), ICD) - 11/9/2009   EKG (Sinus Rhythm, IVCD NSSTT changes) - 4/30/2012   EKG (Sinus [place], First Degree AVB, 58/min,left axis, nsstt changes) - 7/31/2012   Devices (Dual Chamber ICD STAINS. [id number]), Implanted in 2009. Afib recurrence in 9/2012, has been there since. RVR noted including in early May 2013 transiently in VF zone x3.) - 5/7/2013   EKG (Atrial Fib, nsstt changes   pvcs) - 5/24/2013   EKG (Atrial Fib, 68/min  poor r progression  NSSTT changes) - 7/15/2013   Cardioversion (Successful cardioversion of atrial fibrillation to sinus rhythm using implantable cardioverter-defibrillator with a 36 joule shock. ) - 8/2/2013   EKG (Sinus Gus, LAD, PRWP, NSSTTWC) - 4/29/2014   EKG (Atrial Fib, V paced at times) - 5/13/2015   EKG (Atrial Fib, V paced at times) - 7/14/2015   EKG (Atrial Fib, V paced) - 2/4/2016   EKG (Atrial Fib, BiV paced) - 1/9/2017   Devices (Bi-Ventricular ICD STAINS. [id number])) - 6/21/2016       ALLERGIES/INTOLERANCES:    Ingredient Reaction Medication Name Comment   NO KNOWN DRUG ALLERGIES  NO KNOWN DRUG ALLERGIES        PROBLEM LIST:  Problem Description Chronic   Diabetes mellitus without complication Y   Obesity Y   Benign essential HTN Y   A fib Y   Chronic systolic heart failure Y   Automatic implantable cardiac defibrillator in situ Y   CHF Y     Family History  (Detailed)  Relationship Family Member Name  Age at Death Condition Onset Age Cause of Death   Brother  N  Coronary Artery Bypass Graft  N   Mother  N  Pacemaker  N     SOCIAL HISTORY  (Detailed)  Tobacco use reviewed. Preferred language is [place]. EDUCATION/EMPLOYMENT/OCCUPATION  Employment History Status Retired Restrictions   [employer]IST full-time                                                 MARITAL STATUS/FAMILY/SOCIAL SUPPORT  Currently . Smoking status: Former smoker. SMOKING STATUS  Use Status Type Smoking Status Usage Per Day Years Used Total Pack Years   yes  Former smoker          ______________________________________________________________________  Patient Active Problem List    Diagnosis Date Noted    Ventral hernia without obstruction or gangrene 2016    Irregular heart beat     Dilated cardiomyopathy ([facility] 2013    Atrial fibrillation ([facility] 2013    S/P implantation of automatic cardioverter/defibrillator (AICD) 2013        Past Medical History   Diagnosis Date    [facility])     Arrhythmia      atrial fibrillation     Diabetes ([facility] [facility]     Endocrine disease      diabetes    Hypertension     Irregular heart beat       Past Surgical History   Procedure Laterality Date    Pr cardiac surg procedure unlist       defib placed in left chest     No Known Allergies   Family History   Problem Relation Age of Onset    Heart Disease Mother     Diabetes Father     Heart Disease Father       Social History     Social History    Marital status:      Spouse name: N/A    Number of children: N/A    Years of education: N/A     Occupational History    Not on file.      Social History Main Topics    Smoking status: Former Smoker     Quit date: 1993    Smokeless tobacco: Not on file    Alcohol use No    Drug use: No    Sexual activity: Not on file     Other Topics Concern    Not on file     Social History Narrative     No current facility-administered medications for this encounter. Current Outpatient Prescriptions   Medication Sig    saxagliptin-metFORMIN 2.5-1,000 mg TM24 Take  by mouth.  amiodarone (CORDARONE) 200 mg tablet Take 200 mg by mouth daily.  magnesium chloride (MAG-SR) 64 mg tablet Take 64 mg by mouth two (2) times a day.  sildenafil citrate (VIAGRA) 50 mg tablet Take 50 mg by mouth as needed.  spironolactone (ALDACTONE) 25 mg tablet Take 25 mg by mouth daily.  potassium chloride (KLOR-CON M20) 20 mEq tablet Take 20 mEq by mouth two (2) times a day.  carvedilol (COREG) 25 mg tablet Take 25 mg by mouth two (2) times daily (with meals).  furosemide (LASIX) 40 mg tablet Take 40 mg by mouth daily.  warfarin (COUMADIN) 5 mg tablet Take 5 mg by mouth daily.  simvastatin (ZOCOR) 20 mg tablet Take 20 mg by mouth nightly.  glimepiride (AMARYL) 4 mg tablet Take 4 mg by mouth every morning.  valsartan (DIOVAN) 80 mg tablet Take 80 mg by mouth daily.  insulin NPH (HUMULIN N) 100 unit/mL injection 20 Units by SubCUTAneous route once. Once in am           Prior to Admission Medications:  Prior to Admission medications    Medication Sig Start Date End Date Taking? Authorizing Provider   saxagliptin-metFORMIN 2.5-1,000 mg TM24 Take  by mouth. Historical Provider   amiodarone (CORDARONE) 200 mg tablet Take 200 mg by mouth daily. Historical Provider   magnesium chloride (MAG-SR) 64 mg tablet Take 64 mg by mouth two (2) times a day. Historical Provider   sildenafil citrate (VIAGRA) 50 mg tablet Take 50 mg by mouth as needed. Historical Provider   spironolactone (ALDACTONE) 25 mg tablet Take 25 mg by mouth daily. Leia Chris MD   potassium chloride (KLOR-CON M20) 20 mEq tablet Take 20 mEq by mouth two (2) times a day.     Leia Chris MD   carvedilol (COREG) 25 mg tablet Take 25 mg by mouth two (2) times daily (with meals). Leia Chris MD   furosemide (LASIX) 40 mg tablet Take 40 mg by mouth daily. Leia Chris MD   warfarin (COUMADIN) 5 mg tablet Take 5 mg by mouth daily. Leia Chris MD   simvastatin (ZOCOR) 20 mg tablet Take 20 mg by mouth nightly. Leia Chris MD   glimepiride (AMARYL) 4 mg tablet Take 4 mg by mouth every morning. Leia Chris MD   valsartan (DIOVAN) 80 mg tablet Take 80 mg by mouth daily. Leia Chris MD   insulin NPH (HUMULIN N) 100 unit/mL injection 20 Units by SubCUTAneous route once. Once in am     Leia Chris MD        Review of Symptoms: As noted in H&P:  \"Review of Systems:     Constitutional: weight gain  Eyes: negative for irritation, redness and icterus   Ears, nose, mouth, throat, and face: negative for ear drainage, earaches, nasal congestion, sore mouth and sore throat   Respiratory: negative for cough, sputum, hemoptysis, pleurisy/chest pain, asthma, wheezing or dyspnea on exertion   Cardiovascular: as per hpi  Gastrointestinal: negative for nausea, vomiting, diarrhea, constipation and abdominal pain   Genitourinary:negative for frequency and dysuria   Hematologic/lymphatic: negative for easy bruising, bleeding, lymphadenopathy, petechiae and coughing up blood   Musculoskeletal:negative for myalgias, arthralgias and muscle weakness   Neurological: negative for headaches and dizziness   Endocrine: Denies heat or cold intolerance\".      24 hr VS reviewed, overall VSSAF  Temp (24hrs), Av.2 °F (36.8 °C), Min:98.2 °F (36.8 °C), Max:98.2 °F (36.8 °C)    Patient Vitals for the past 8 hrs:   Pulse   17 1929 72   17 1758 75    Patient Vitals for the past 8 hrs:   Resp   17 192 20   17 1758 18    Patient Vitals for the past 8 hrs:   BP   17 132/77   17 1758 142/83        No intake or output data in the 24 hours ending 17 2041      Physical Exam (complete single organ system exam)    Cons: The patient is no distress. Appears stated age. HEENT: Normal conjunctivae and palate. No xanthelasma. Neck: Flat JVP without appreciable HJR. Resp: Normal respiratory effort with clear lungs bilaterally. CV: Regular rate and rhythm. PMI not palpated. Normal S1,S2  No gallop or rubs appreciated. 1/6 MR murmur appreciated. Intact carotid upstroke bilaterally without appreciated bruits. Abdominal aorta not palpated; no abdominal bruit noted. Decreased pedal pulses. 1-2+ peripheral edema. GI: No abd mass noted, soft; no organomegaly noted. Bowel sounds present. Muscular:  No significant kyphosis. Strength WNL for age. Ext: No cyanosis, clubbing, or stigmata of peripheral embolization. Derm: No ulcers or stasis dermatitis of lower extremities. Neuro: Alert and oriented x 3;  Grossly non-focal. Normal mood and affect. Labs:   Recent Results (from the past 24 hour(s))   EKG, 12 LEAD, INITIAL    Collection Time: 02/08/17  6:03 PM   Result Value Ref Range    Ventricular Rate 70 BPM    Atrial Rate 70 BPM    P-R Interval 80 ms    QRS Duration 84 ms    Q-T Interval 364 ms    QTC Calculation (Bezet) 393 ms    Calculated P Axis -5 degrees    Calculated R Axis -61 degrees    Calculated T Axis 152 degrees    Diagnosis       Electronic ventricular pacemaker  When compared with ECG of 02-AUG-2013 10:31,  Electronic ventricular pacemaker has replaced Electronic atrial pacemaker     CBC WITH AUTOMATED DIFF    Collection Time: 02/08/17  7:28 PM   Result Value Ref Range    WBC 4.4 4.1 - 11.1 K/uL    RBC 3.84 (L) 4.10 - 5.70 M/uL    HGB 10.0 (L) 12.1 - 17.0 g/dL    HCT 32.3 (L) 36.6 - 50.3 %    MCV 84.1 80.0 - 99.0 FL    MCH 26.0 26.0 - 34.0 PG    MCHC 31.0 30.0 - 36.5 g/dL    RDW 18.1 (H) 11.5 - 14.5 %    PLATELET 409 943 - 049 K/uL    NEUTROPHILS 65 32 - 75 %    LYMPHOCYTES 24 12 - 49 %    MONOCYTES 8 5 - 13 %    EOSINOPHILS 2 0 - 7 %    BASOPHILS 1 0 - 1 %    ABS.  NEUTROPHILS 2.9 1.8 - 8.0 K/UL    ABS. LYMPHOCYTES 1.1 0.8 - 3.5 K/UL    ABS. MONOCYTES 0.3 0.0 - 1.0 K/UL    ABS. EOSINOPHILS 0.1 0.0 - 0.4 K/UL    ABS. BASOPHILS 0.0 0.0 - 0.1 K/UL   METABOLIC PANEL, COMPREHENSIVE    Collection Time: 02/08/17  7:28 PM   Result Value Ref Range    Sodium 138 136 - 145 mmol/L    Potassium 4.7 3.5 - 5.1 mmol/L    Chloride 104 97 - 108 mmol/L    CO2 27 21 - 32 mmol/L    Anion gap 7 5 - 15 mmol/L    Glucose 217 (H) 65 - 100 mg/dL    BUN 38 (H) 6 - 20 MG/DL    Creatinine 1.73 (H) 0.70 - 1.30 MG/DL    BUN/Creatinine ratio 22 (H) 12 - 20      GFR est AA 49 (L) >60 ml/min/1.73m2    GFR est non-AA 40 (L) >60 ml/min/1.73m2    Calcium 8.7 8.5 - 10.1 MG/DL    Bilirubin, total 1.4 (H) 0.2 - 1.0 MG/DL    ALT (SGPT) 23 12 - 78 U/L    AST (SGOT) 24 15 - 37 U/L    Alk.  phosphatase 129 (H) 45 - 117 U/L    Protein, total 7.9 6.4 - 8.2 g/dL    Albumin 3.4 (L) 3.5 - 5.0 g/dL    Globulin 4.5 (H) 2.0 - 4.0 g/dL    A-G Ratio 0.8 (L) 1.1 - 2.2     CK W/ REFLX CKMB    Collection Time: 02/08/17  7:28 PM   Result Value Ref Range     39 - 308 U/L   TROPONIN I    Collection Time: 02/08/17  7:28 PM   Result Value Ref Range    Troponin-I, Qt. 0.32 (H) <0.05 ng/mL   MAGNESIUM    Collection Time: 02/08/17  7:28 PM   Result Value Ref Range    Magnesium 1.9 1.6 - 2.4 mg/dL   PRO-BNP    Collection Time: 02/08/17  7:28 PM   Result Value Ref Range    NT pro-BNP 1466 (H) 0 - 125 PG/ML     Recent Labs      02/08/17   1928   TROIQ  0.32*       Doris Redding, MD

## 2017-02-09 NOTE — PROGRESS NOTES
OT note:       Orders acknowledged. Chart reviewed. Spoke with nursing who reports that patient is up ad cirilo and likely without acute OT needs. Patient received sitting on EOB with VSS. Patient reporting baseline functional mobility and without concerns regarding discharging home with wife. Patient is currently at his functional baseline and does not require acute OT at this time. Will complete orders.  Please re-consult if patient with decline from baseline functional mobility.

## 2017-02-09 NOTE — ED PROVIDER NOTES
HPI Comments: Xavier Andersen is a 57 y.o. Male former tobacco user who has a h/o heart failure (EF 20%), a-fib, hypertension and diabetes presents to ED [facility] ED ambulatory with cc worsening shortness of breath and associated symptoms of a dry hacking cough and BLE swelling x 2 weeks. The patient further reports that his shortness of breath is exacerbated by ambulation. The patient reports that he is prescribed 60mg lasix daily for his h/o heart failure. He states that he is compliant with this medication and has only missed 1-2 doses within the past few weeks. He also reports that he regularly takes coumadin for a h/o chronic a-fib, the patient is compliant with this medication as well. He notes that he used to see Dr. Marcellus Gomez (cardiology) but recently began seeing Dr. Branson Goldberg. The patient denies all other symptoms including any chest pain, abd pain, nausea, vomiting or diarrhea. PCP: Toby Abarca MD    There are no other complaints changes or physical findings at this time  Written by DILMA Cotter as dictated by Ramses Lloyd MD.    The history is provided by the patient. Past Medical History:   Diagnosis Date    A-fib [facility])     Arrhythmia      atrial fibrillation 2013    Diabetes [facility])     Endocrine disease      diabetes    Hypertension     Irregular heart beat        Past Surgical History:   Procedure Laterality Date    Pr cardiac surg procedure unlist       defib placed in left chest         Family History:   Problem Relation Age of Onset    Heart Disease Mother     Diabetes Father     Heart Disease Father        Social History     Social History    Marital status:      Spouse name: N/A    Number of children: N/A    Years of education: N/A     Occupational History    Not on file.      Social History Main Topics    Smoking status: Former Smoker     Quit date: 8/2/1993    Smokeless tobacco: Not on file    Alcohol use No    Drug use: No    Sexual activity: Not on file     Other Topics Concern    Not on file     Social History Narrative         ALLERGIES: Review of patient's allergies indicates no known allergies. Review of Systems   Constitutional: Negative for chills, fatigue and fever. HENT: Negative for congestion, rhinorrhea and sore throat. Eyes: Negative for pain, discharge and visual disturbance. Respiratory: Positive for cough and shortness of breath. Negative for chest tightness and wheezing. Cardiovascular: Positive for leg swelling. Negative for chest pain and palpitations. Gastrointestinal: Negative for abdominal pain, constipation, diarrhea, nausea and vomiting. Genitourinary: Negative for dysuria, frequency and hematuria. Musculoskeletal: Negative for arthralgias, back pain and myalgias. Skin: Negative for rash. Neurological: Negative for dizziness, weakness, light-headedness and headaches. Psychiatric/Behavioral: Negative. All other systems reviewed and are negative. Patient Vitals for the past 12 hrs:   Temp Pulse Resp BP SpO2   02/08/17 2130 - 76 17 - 99 %   02/08/17 2115 - 74 17 137/84 98 %   02/08/17 2100 - 72 16 142/79 96 %   02/08/17 2045 - 72 18 132/78 96 %   02/08/17 2030 - 71 15 136/77 98 %   02/08/17 2015 - 72 19 141/79 95 %   02/08/17 2000 - 71 19 135/75 97 %   02/08/17 1945 - 70 16 126/76 98 %   02/08/17 1929 - 72 20 132/77 98 %   02/08/17 1758 98.2 °F (36.8 °C) 75 18 142/83 95 %       Physical Exam   Constitutional: He is oriented to person, place, and time. He appears well-developed and well-nourished. No distress. HENT:   Head: Normocephalic and atraumatic. Eyes: EOM are normal. Right eye exhibits no discharge. Left eye exhibits no discharge. No scleral icterus. Neck: Normal range of motion. Neck supple. No tracheal deviation present. Cardiovascular: Normal rate, regular rhythm, normal heart sounds and intact distal pulses. Exam reveals no gallop and no friction rub.     No murmur heard.  Pulmonary/Chest: Effort normal and breath sounds normal. No respiratory distress. He has no wheezes. He has no rales. Abdominal: Soft. He exhibits no distension. There is no tenderness. Musculoskeletal: Normal range of motion. He exhibits edema (2+ BLE). Lymphadenopathy:     He has no cervical adenopathy. Neurological: He is alert and oriented to person, place, and time. Skin: Skin is warm and dry. No rash noted. Psychiatric: He has a normal mood and affect. MDM  Number of Diagnoses or Management Options  Acute on chronic combined systolic and diastolic congestive heart failure ([address]):   LAQUITA (acute kidney injury) [facility]):   Elevated troponin:   Diagnosis management comments:     Differential includes heart failure, pulmonary edema, pleural effusion, ACS, LAQUITA/ARF         Amount and/or Complexity of Data Reviewed  Clinical lab tests: ordered and reviewed  Tests in the radiology section of CPT®: ordered and reviewed  Tests in the medicine section of CPT®: ordered and reviewed  Review and summarize past medical records: yes  Discuss the patient with other providers: yes (Cardiology, Hospitalist)  Independent visualization of images, tracings, or specimens: yes    Patient Progress  Patient progress: stable    ED Course       Procedures    EKG interpretation: (Preliminary) 1803  Rhythm: Paced rythm . Rate (approx.): 70; Axis: normal; P wave: normal; QRS interval: normal ; ST/T wave: non-specific changes; Consult Note:  8:15 PM  Breezy Starks MD spoke with Dr. Maikel Mosqueda  Specialty: Cardiology  Discussed pt's hx, disposition, and available diagnostic and imaging results. Reviewed care plans. Consultant agrees with plans as outlined. Recommends hospitalist admission, and he will evaluate the patient tomorrow.   Written by Angeles Diaz ED Scribe, as dictated by Breezy Starks MD.    CONSULT NOTE:   8:32 PM  Breezy Starks MD spoke with Dr. Ryan Rod,   Specialty: Hospitalist  Discussed pt's hx, disposition, and available diagnostic and imaging results. Reviewed care plans. Consultant will evaluate pt for admission. Written by DILMA Heard, as dictated by Gavino Bernal MD.    LABORATORY TESTS:  Recent Results (from the past 12 hour(s))   EKG, 12 LEAD, INITIAL    Collection Time: 02/08/17  6:03 PM   Result Value Ref Range    Ventricular Rate 70 BPM    Atrial Rate 70 BPM    P-R Interval 80 ms    QRS Duration 84 ms    Q-T Interval 364 ms    QTC Calculation (Bezet) 393 ms    Calculated P Axis -5 degrees    Calculated R Axis -61 degrees    Calculated T Axis 152 degrees    Diagnosis       Electronic ventricular pacemaker  When compared with ECG of 02-AUG-2013 10:31,  Electronic ventricular pacemaker has replaced Electronic atrial pacemaker     CBC WITH AUTOMATED DIFF    Collection Time: 02/08/17  7:28 PM   Result Value Ref Range    WBC 4.4 4.1 - 11.1 K/uL    RBC 3.84 (L) 4.10 - 5.70 M/uL    HGB 10.0 (L) 12.1 - 17.0 g/dL    HCT 32.3 (L) 36.6 - 50.3 %    MCV 84.1 80.0 - 99.0 FL    MCH 26.0 26.0 - 34.0 PG    MCHC 31.0 30.0 - 36.5 g/dL    RDW 18.1 (H) 11.5 - 14.5 %    PLATELET 838 340 - 415 K/uL    NEUTROPHILS 65 32 - 75 %    LYMPHOCYTES 24 12 - 49 %    MONOCYTES 8 5 - 13 %    EOSINOPHILS 2 0 - 7 %    BASOPHILS 1 0 - 1 %    ABS. NEUTROPHILS 2.9 1.8 - 8.0 K/UL    ABS. LYMPHOCYTES 1.1 0.8 - 3.5 K/UL    ABS. MONOCYTES 0.3 0.0 - 1.0 K/UL    ABS. EOSINOPHILS 0.1 0.0 - 0.4 K/UL    ABS.  BASOPHILS 0.0 0.0 - 0.1 K/UL   METABOLIC PANEL, COMPREHENSIVE    Collection Time: 02/08/17  7:28 PM   Result Value Ref Range    Sodium 138 136 - 145 mmol/L    Potassium 4.7 3.5 - 5.1 mmol/L    Chloride 104 97 - 108 mmol/L    CO2 27 21 - 32 mmol/L    Anion gap 7 5 - 15 mmol/L    Glucose 217 (H) 65 - 100 mg/dL    BUN 38 (H) 6 - 20 MG/DL    Creatinine 1.73 (H) 0.70 - 1.30 MG/DL    BUN/Creatinine ratio 22 (H) 12 - 20      GFR est AA 49 (L) >60 ml/min/1.73m2    GFR est non-AA 40 (L) >60 ml/min/1.73m2    Calcium 8.7 8.5 - 10.1 MG/DL    Bilirubin, total 1.4 (H) 0.2 - 1.0 MG/DL    ALT (SGPT) 23 12 - 78 U/L    AST (SGOT) 24 15 - 37 U/L    Alk. phosphatase 129 (H) 45 - 117 U/L    Protein, total 7.9 6.4 - 8.2 g/dL    Albumin 3.4 (L) 3.5 - 5.0 g/dL    Globulin 4.5 (H) 2.0 - 4.0 g/dL    A-G Ratio 0.8 (L) 1.1 - 2.2     CK W/ REFLX CKMB    Collection Time: 02/08/17  7:28 PM   Result Value Ref Range     39 - 308 U/L   TROPONIN I    Collection Time: 02/08/17  7:28 PM   Result Value Ref Range    Troponin-I, Qt. 0.32 (H) <0.05 ng/mL   MAGNESIUM    Collection Time: 02/08/17  7:28 PM   Result Value Ref Range    Magnesium 1.9 1.6 - 2.4 mg/dL   PRO-BNP    Collection Time: 02/08/17  7:28 PM   Result Value Ref Range    NT pro-BNP 1466 (H) 0 - 125 PG/ML       IMAGING RESULTS:  CXR Results  (Last 48 hours)               02/08/17 1917  XR CHEST PA LAT Final result    Impression:  IMPRESSION: No acute process           Narrative:  INDICATION:  sob        COMPARISON: 6/21/2016       FINDINGS: PA and lateral views of the chest demonstrate a stable   cardiomediastinal silhouette and clear lungs bilaterally. There is chronic   marked cardiomegaly. There is a left-sided AICD device. The visualized osseous   structures are unremarkable. MEDICATIONS GIVEN:  Medications   amiodarone (CORDARONE) tablet 200 mg (not administered)   carvedilol (COREG) tablet 25 mg (not administered)   glimepiride (AMARYL) tablet 4 mg (not administered)   magnesium chloride (SLOW MAG) tablet 71.5 mg (not administered)   potassium chloride (K-DUR, KLOR-CON) SR tablet 20 mEq (not administered)   . PHARMACY TO SUBSTITUTE PER PROTOCOL (not administered)   pravastatin (PRAVACHOL) tablet 40 mg (not administered)   spironolactone (ALDACTONE) tablet 25 mg (not administered)   valsartan (DIOVAN) tablet 80 mg (not administered)   warfarin (COUMADIN) tablet 5 mg (not administered)   sodium chloride (NS) flush 5-10 mL (not administered) sodium chloride (NS) flush 5-10 mL (not administered)   enoxaparin (LOVENOX) injection 40 mg (not administered)   insulin lispro (HUMALOG) injection (not administered)   glucose chewable tablet 16 g (not administered)   dextrose (D50W) injection syrg 12.5-25 g (not administered)   glucagon (GLUCAGEN) injection 1 mg (not administered)   furosemide (LASIX) injection 40 mg (not administered)   WARFARIN INFORMATION NOTE (COUMADIN) (not administered)       IMPRESSION:  1. Acute on chronic combined systolic and diastolic congestive heart failure ([facility] [address]    2. LAQUITA (acute kidney injury) ([facility] [address]    3. Elevated troponin        PLAN:  1. Admit    8:32 PM  Patient is being admitted to the hospital by Dr. Mireya Maya. The results of their tests and reasons for their admission have been discussed with the patient and/or available family. They convey agreement and understanding for the need to be admitted and for their admission diagnosis. This note is prepared by Betty Sims acting as Scribe for Devan Renee MD.    Devan Renee MD: The Scribe's documentation has been prepared under my direction and personally reviewed by me in its entirety. I confirm that the note above accurately reflects all work, treatment, procedures, and medical decision making performed by me.

## 2017-02-09 NOTE — ED NOTES
Pt being transported upstairs with ED tech. Hospitalist gave verbal orders that pt can be transported without cardiac monitor. Pt in stable condition. Pt's vital signs stable.

## 2017-02-10 LAB
ANION GAP BLD CALC-SCNC: 7 MMOL/L (ref 5–15)
BUN SERPL-MCNC: 35 MG/DL (ref 6–20)
BUN/CREAT SERPL: 24 (ref 12–20)
CALCIUM SERPL-MCNC: 8.8 MG/DL (ref 8.5–10.1)
CHLORIDE SERPL-SCNC: 103 MMOL/L (ref 97–108)
CO2 SERPL-SCNC: 28 MMOL/L (ref 21–32)
CREAT SERPL-MCNC: 1.45 MG/DL (ref 0.7–1.3)
GLUCOSE BLD STRIP.AUTO-MCNC: 125 MG/DL (ref 65–100)
GLUCOSE BLD STRIP.AUTO-MCNC: 173 MG/DL (ref 65–100)
GLUCOSE BLD STRIP.AUTO-MCNC: 204 MG/DL (ref 65–100)
GLUCOSE BLD STRIP.AUTO-MCNC: 223 MG/DL (ref 65–100)
GLUCOSE SERPL-MCNC: 149 MG/DL (ref 65–100)
INR PPP: 3 (ref 0.9–1.1)
POTASSIUM SERPL-SCNC: 4.3 MMOL/L (ref 3.5–5.1)
PROTHROMBIN TIME: 30.8 SEC (ref 9–11.1)
SERVICE CMNT-IMP: ABNORMAL
SODIUM SERPL-SCNC: 138 MMOL/L (ref 136–145)

## 2017-02-10 PROCEDURE — 74011250637 HC RX REV CODE- 250/637: Performed by: INTERNAL MEDICINE

## 2017-02-10 PROCEDURE — 74011250637 HC RX REV CODE- 250/637: Performed by: HOSPITALIST

## 2017-02-10 PROCEDURE — 74011636637 HC RX REV CODE- 636/637: Performed by: HOSPITALIST

## 2017-02-10 PROCEDURE — 74011250636 HC RX REV CODE- 250/636: Performed by: HOSPITALIST

## 2017-02-10 PROCEDURE — 80048 BASIC METABOLIC PNL TOTAL CA: CPT | Performed by: INTERNAL MEDICINE

## 2017-02-10 PROCEDURE — 36415 COLL VENOUS BLD VENIPUNCTURE: CPT | Performed by: INTERNAL MEDICINE

## 2017-02-10 PROCEDURE — 65660000000 HC RM CCU STEPDOWN

## 2017-02-10 PROCEDURE — 74011250637 HC RX REV CODE- 250/637: Performed by: EMERGENCY MEDICINE

## 2017-02-10 PROCEDURE — 74011636637 HC RX REV CODE- 636/637: Performed by: INTERNAL MEDICINE

## 2017-02-10 PROCEDURE — 82962 GLUCOSE BLOOD TEST: CPT

## 2017-02-10 PROCEDURE — 85610 PROTHROMBIN TIME: CPT | Performed by: INTERNAL MEDICINE

## 2017-02-10 RX ORDER — WARFARIN SODIUM 5 MG/1
5 TABLET ORAL
Status: COMPLETED | OUTPATIENT
Start: 2017-02-10 | End: 2017-02-10

## 2017-02-10 RX ADMIN — AMIODARONE HYDROCHLORIDE 200 MG: 200 TABLET ORAL at 10:30

## 2017-02-10 RX ADMIN — INSULIN LISPRO 2 UNITS: 100 INJECTION, SOLUTION INTRAVENOUS; SUBCUTANEOUS at 18:19

## 2017-02-10 RX ADMIN — CARVEDILOL 25 MG: 12.5 TABLET, FILM COATED ORAL at 18:19

## 2017-02-10 RX ADMIN — INSULIN LISPRO 3 UNITS: 100 INJECTION, SOLUTION INTRAVENOUS; SUBCUTANEOUS at 12:51

## 2017-02-10 RX ADMIN — FUROSEMIDE 40 MG: 10 INJECTION, SOLUTION INTRAMUSCULAR; INTRAVENOUS at 20:29

## 2017-02-10 RX ADMIN — FUROSEMIDE 40 MG: 10 INJECTION, SOLUTION INTRAMUSCULAR; INTRAVENOUS at 10:30

## 2017-02-10 RX ADMIN — PRAVASTATIN SODIUM 40 MG: 40 TABLET ORAL at 21:31

## 2017-02-10 RX ADMIN — CARVEDILOL 25 MG: 12.5 TABLET, FILM COATED ORAL at 10:30

## 2017-02-10 RX ADMIN — INSULIN LISPRO 2 UNITS: 100 INJECTION, SOLUTION INTRAVENOUS; SUBCUTANEOUS at 21:31

## 2017-02-10 RX ADMIN — WARFARIN SODIUM 5 MG: 5 TABLET ORAL at 18:19

## 2017-02-10 RX ADMIN — GLIMEPIRIDE 4 MG: 4 TABLET ORAL at 12:51

## 2017-02-10 RX ADMIN — Medication 10 ML: at 22:48

## 2017-02-10 RX ADMIN — INSULIN GLARGINE 10 UNITS: 100 INJECTION, SOLUTION SUBCUTANEOUS at 22:48

## 2017-02-10 RX ADMIN — POTASSIUM CHLORIDE 20 MEQ: 20 TABLET, EXTENDED RELEASE ORAL at 18:19

## 2017-02-10 RX ADMIN — Medication 200 MG: at 10:30

## 2017-02-10 RX ADMIN — Medication 10 ML: at 05:11

## 2017-02-10 RX ADMIN — Medication 10 ML: at 12:52

## 2017-02-10 RX ADMIN — CALCIUM CARBONATE (ANTACID) CHEW TAB 500 MG 200 MG: 500 CHEW TAB at 10:30

## 2017-02-10 RX ADMIN — POTASSIUM CHLORIDE 20 MEQ: 20 TABLET, EXTENDED RELEASE ORAL at 10:30

## 2017-02-10 NOTE — CARDIO/PULMONARY
C/P Rehab. Note:     Chart reviewed- 58 y.o. male presents with dyspnea/anasarca.      As noted in H&P: \"57 y.o. morbidly obese male with Chronic nonischemic dilated cardiomyopathy EF 84%, Chronic systolic congestive heart failure class, H/o atrial fibrillation, On chronic warfarin, Iatrogenic left bundle branch block with predominant RV pacing, 64-70%, Hypertension, Hyperlipidemia and diabetes presents to ED [facility] ED C/O Worsening exertional shortness of breath and around 20 pounds weight gain in last 3 weeks. Met with pt sitting up in chair talking on phone- put phone aside for a few minutes for teaching. States he and his wife grocery shop together,he has a scale and verbally told me he was to call MD if weight gain over 2 pounds- suggested over 3 pounds is the correct amount which to call physician. Reminded to elevate legs when sitting for periods of time     This was a follow-up visit to answer questions and reinforce prior teaching re: CHF, S&Ss, medication management, Low NA diet, daily weights, when to call the doctor and balancing rest/activity      All questions answered. Understanding verbalized.

## 2017-02-10 NOTE — PROGRESS NOTES
Hospitalist Progress Note    NAME: Matheus Portillo   :  1955   MRN:  [id number]     Hospitalist: Rolando Forde MD       Assessment / Plan:  Acute on chronic non-ischemic systolic heart failure, poa, EF 20%, s/p BiV ICD  -cardiology following  -negative 1.8 L in last day  -continue lasix 40 mg IV BID  -stopped aldactone for now, restart when IV diuresis stopped  -coreg 25 mg BID  -echo  showing EF 20% diffuse hypokinesis  -I/O monitoring, daily weight  -weight 155 today    Elevated troponin  -0.32 -->0.34  -no other signs of ischemia, likely from renal function    Atrial fibrillation  -betablocker  -warfarin per pharmamcy    LAQUITA, poa  -Cr 1.73 --> 1.54-->1.45 today decreasing with diuresis  -hold ACE, hold spironolactone    DM2   -hba1c 9.8  -hold metformin  -continue amaryl  -started lantus 10 units today, blood sugar improved 125 fasting    HLD - statin  Morbid obesity, possible LEAH - needs sleep study    Code status: Full  Prophylaxis: Lovenox  Recommended Disposition: Home w/Family     Subjective:     Chief Complaint: weight gain, anasarca      Sitting up in chair having blood drawn  Is feeling okay  Continues to have significant diuresis  Hip pain is not bothering him    Review of Systems:  Symptom Y/N Comments  Symptom Y/N Comments   Fever/Chills    Chest Pain     Poor Appetite    Edema     Cough    Abdominal Pain     Sputum    Joint Pain     SOB/GROVES    Pruritis/Rash     Nausea/vomit    Tolerating PT/OT     Diarrhea    Tolerating Diet     Constipation    Other       Could NOT obtain due to:      Objective:     VITALS:   Last 24hrs VS reviewed since prior progress note.  Most recent are:  Patient Vitals for the past 24 hrs:   Temp Pulse Resp BP SpO2   02/10/17 1147 97.5 °F (36.4 °C) 60 18 126/74 97 %   02/10/17 0751 97.3 °F (36.3 °C) 69 18 126/74 96 %   02/10/17 0407 97.4 °F (36.3 °C) 70 18 118/68 94 %   17 2342 97.7 °F (36.5 °C) 70 18 125/78 100 %   17 97.7 °F (36.5 °C) 81 20 106/50 97 %   02/09/17 1544 97.5 °F (36.4 °C) 70 20 117/74 98 %       Intake/Output Summary (Last 24 hours) at 02/10/17 1359  Last data filed at 02/10/17 1040   Gross per 24 hour   Intake              200 ml   Output             1600 ml   Net            -1400 ml        PHYSICAL EXAM:  General: Alert, cooperative, no acute distress    EENT:  EOMI. Anicteric sclerae. Mucous membranes moist  Resp:  CTA bilaterally, no wheezing or rales. No accessory muscle use  CV:  Regular rhythm, unchanged pitting edema in LE  GI:  Soft,+ distended, non tender. +Bowel sounds  Neurologic:  Alert and oriented X 3, normal speech  Psych:   Good insight, not anxious nor agitated  Skin:  No rashes, no jaundice    Reviewed most current lab test results and cultures  YES  Reviewed most current radiology test results   YES  Review and summation of old records today   NO  Reviewed patient's current orders and MAR    YES  PMH/ reviewed - no change compared to H&P  ________________________________________________________________________  Care Plan discussed with:    Comments   Patient x    Family      RN     Care Manager     Consultant                        Multidiciplinary team rounds were held today with , nursing, pharmacist and clinical coordinator. Patient's plan of care was discussed; medications were reviewed and discharge planning was addressed. ________________________________________________________________________  Total NON critical care TIME:  15   Minutes  ________________________________________________________________________  Quentin Pruitt MD     Procedures: see electronic medical records for all procedures/Xrays and details which were not copied into this note but were reviewed prior to creation of Plan. LABS:  I reviewed today's most current labs and imaging studies.   Pertinent labs include:  Recent Labs      02/09/17   0543  02/08/17 1928   WBC  4.1  4.4   HGB  9.4*  10.0*   HCT  29.9*  32.3*   [facility]      02/10/17   1236  02/10/17   0429  02/09/17   0543  02/08/17   2231  02/08/17 1928   NA   --   138  138   --   138   K   --   4.3  4.4   --   4.7   CL   --   103  104   --   104   CO2   --   28  26   --   27   GLU   --   149*  215*   --   217*   BUN   --   35*  36*   --   38*   CREA   --   1.45*  1.54*   --   1.73*   CA   --   8.8  8.9   --   8.7   MG   --    --    --    --   1.9   ALB   --    --    --    --   3.4*   TBILI   --    --    --    --   1.4*   SGOT   --    --    --    --   24   ALT   --    --    --    --   23   INR  3.0*   --   3.4*  3.7*   --        Signed: Hal Bo MD

## 2017-02-10 NOTE — PROGRESS NOTES
Progress Note      2/10/2017 11:08 AM  NAME: Augie Whitaker   MRN:  [id number]   Admit Diagnosis: Congestive heart failure (CHF) ([facility] [address]      Problem List:     1. 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 Acute kidney injury  3. Indeterminate troponin elevation  4. Hypertension  5. Diabetes   6. Hyperlipidemia  7. Nonischemic dilated cardiomyopathy w/ last EF 20%  8. Chronic atrial fibrillation  9. Status post BiV ICD implant  10. Sleep apnea, undiagnosed/untreated  11. Obesity with recent weight gain     Assessment/Plan:     Renal function slightly improved this morning.  -1800cc yesterday. 1. Continue with IV diuresis -- he has a significant amount of fluid to remove and should remain through the wknd for diuresis. If it slows, I would increase to BID IV lasix for a day or so this wknd. 2. Continue amiodarone 200mg daily  3. Continue coreg 25mg BID  4. Continue pravastatin  5. Holding aldactone and valsartan -- will need to resume ARB when renal fxn stable  6. Continue coumadin w/ daily INRs    Thank you for this consult and allowing me to take part in this patients care. Please call with questions. [x]       High complexity decision making was performed in this patient at high risk for decompensation with multiple organ involvement. Subjective:     Augie Whitaker denies chest pain, dyspnea. Feels better. Discussed with RN events overnight. Review of Systems:    Symptom Y/N Comments  Symptom Y/N Comments   Fever/Chills N   Chest Pain N    Poor Appetite N   Edema N    Cough N   Abdominal Pain N    Sputum N   Joint Pain N    SOB/GROVES N   Pruritis/Rash N    Nausea/vomit N   Tolerating PT/OT Y    Diarrhea N   Tolerating Diet Y    Constipation N   Other       Could NOT obtain due to:      Objective:      Physical Exam:    Last 24hrs VS reviewed since prior progress note.  Most recent are:    Visit Vitals    /74 (BP 1 Location: Left arm, BP Patient Position: Sitting)    Pulse 69    Temp 97.3 °F (36.3 °C)    Resp 18    Ht 6' 3\" (1.905 m)    Wt 155.2 kg (342 lb 2.5 oz)    SpO2 96%    BMI 42.77 kg/m2       Intake/Output Summary (Last 24 hours) at 02/10/17 1108  Last data filed at 02/10/17 1040   Gross per 24 hour   Intake              200 ml   Output             2725 ml   Net            -2525 ml        General Appearance: Well developed, well nourished, alert & oriented x 3,    no acute distress. Ears/Nose/Mouth/Throat: Hearing grossly normal.  Neck: Supple. Chest: Lungs clear to auscultation bilaterally. Cardiovascular: Irregular rate and rhythm, S1S2 normal, no murmur. Abdomen: Soft, non-tender, bowel sounds are active. Extremities: 2-3+ edema bilaterally. Skin: Warm and dry. []         Post-cath site without hematoma, bruit, tenderness, or thrill. Distal pulses intact. PMH/SH reviewed - no change compared to H&P    Data Review    Telemetry:  BiV paced    EKG:   []  No new EKG for review    Lab Data Personally Reviewed:    Recent Labs      02/09/17   0543  02/08/17 1928   WBC  4.1  4.4   HGB  9.4*  10.0*   HCT  29.9*  32.3*   PLT  164  192     Recent Labs      02/09/17   0543  02/08/17   2231   INR  3.4*  3.7*   PTP  36.1*  38.4*      Recent Labs      02/10/17   0429  02/09/17   0543  02/08/17 1928   NA  138  138  138   K  4.3  4.4  4.7   CL  103  104  104   CO2  28  26  27   BUN  35*  36*  38*   CREA  1.45*  1.54*  1.73*   GLU  149*  215*  217*   CA  8.8  8.9  8.7   MG   --    --   1.9     Recent Labs      02/09/17   0543  02/08/17 1928   TROIQ  0.34*  0.32*     Lab Results   Component Value Date/Time    Cholesterol, total 92 03/03/2009 09:40 PM    HDL Cholesterol 44 03/03/2009 09:40 PM    LDL, calculated 38.4 03/03/2009 09:40 PM    Triglyceride 48 03/03/2009 09:40 PM    CHOL/HDL Ratio 2.1 03/03/2009 09:40 PM       Recent Labs      02/08/17 1928   SGOT  24   AP  129*   TP  7.9   ALB  3.4*   GLOB  4.5*     No results for input(s): PH, PCO2, PO2 in the last 72 hours.     Medications Personally Reviewed:    Current Facility-Administered Medications   Medication Dose Route Frequency    sodium chloride (NS) 0.9 % flush        insulin glargine (LANTUS) injection 10 Units  10 Units SubCUTAneous QHS    amiodarone (CORDARONE) tablet 200 mg  200 mg Oral DAILY    carvedilol (COREG) tablet 25 mg  25 mg Oral BID WITH MEALS    potassium chloride (K-DUR, KLOR-CON) SR tablet 20 mEq  20 mEq Oral BID    pravastatin (PRAVACHOL) tablet 40 mg  40 mg Oral QHS    sodium chloride (NS) flush 5-10 mL  5-10 mL IntraVENous Q8H    sodium chloride (NS) flush 5-10 mL  5-10 mL IntraVENous PRN    insulin lispro (HUMALOG) injection   SubCUTAneous AC&HS    glucose chewable tablet 16 g  4 Tab Oral PRN    dextrose (D50W) injection syrg 12.5-25 g  12.5-25 g IntraVENous PRN    glucagon (GLUCAGEN) injection 1 mg  1 mg IntraMUSCular PRN    WARFARIN INFORMATION NOTE (COUMADIN)   Other QPM    glimepiride (AMARYL) tablet 4 mg  4 mg Oral 7am    calcium carbonate (TUMS) chewable tablet 200 mg [elemental]  200 mg Oral DAILY    And    magnesium oxide (MAG-OX) tablet 200 mg  200 mg Oral DAILY    acetaminophen (TYLENOL) tablet 650 mg  650 mg Oral Q6H PRN    ondansetron (ZOFRAN) injection 4 mg  4 mg IntraVENous Q6H PRN    furosemide (LASIX) injection 40 mg  40 mg IntraVENous Q12H         Francine Parra MD

## 2017-02-10 NOTE — PROGRESS NOTES
Pharmacy Daily Dosing of Warfarin - Consult for pharmacy to dose    Indication: afib    Goal INR (2-3, 2.5-3.5, 3-4): 2-3    Average Daily Warfarin Dose: 7.5 mg daily, except 10 mg on thurs, sat. Concurrent Anticoagulants/Antiplatelets none yet  Major Interacting Medications (Dose/Frequency): amiodarone 200 mg daily for a couple doses- now d/c'ed    INR (0.9-1.1) > 5 or Platelets (< 71N): discuss with MD:    Date:           INR:            dose:  2/8/17          3.7              HOLD  2/9/17          3.4              5 mg  2/10/17        3                 5 mg    Recent Labs      02/10/17   1236  02/09/17   0543  02/08/17   2231  02/08/17   1928   INR  3.0*  3.4*  3.7*   --    HGB   --   9.4*   --   10.0*   PLT   --   164   --   192     Impression/Plan: INR trending down, now safe and therapeutic after slightly supra therapeutic on admission, per patient he HAS NOT been taking amiodarone for about a year, the amiodarone was listed on PTA med list and re- ordered on admission, I have called Dr. Valeria Sanchez [phone] office to inform him, for now- I will treat amiodarone as a new start and dose warfarin on the conservative side     Pharmacy will follow daily and adjust the dose as appropriate.     Thanks for the consult  BERNIE Worthingtonfarin Dosing and Monitoring Guidelines

## 2017-02-10 NOTE — PROGRESS NOTES
[address] SHIFT NURSING NOTE    Bedside and Verbal shift change report given to  (oncoming nurse) by Steve Melissa (offgoing nurse). Report included the following information Kardex. SHIFT SUMMARY: 2015 Received from PCU awake A&O X4 oriented to PCU routine CM reads paced vital stable         Admission Date 2/8/2017   Admission Diagnosis Congestive heart failure (CHF) ([address]   Consults IP CONSULT TO CARDIOLOGY        Consults   [] PT   [] OT   [] Speech   [] Palliative      [] Hospice    [] Case Management   [x] None   Cardiac Monitoring   [x] Yes   [] No     Antibiotics   [] Yes   [x] No   GI Prophylaxis  (Ex: Protonix, Pepcid, etc,.)   [] Yes   [x] No          DVT Prophylaxis   SCDs:             Ajith stockings:         [] Medication (Ex: Lovenox, Eliquis,  Heparin, etc..)   [] Contraindicated   [x] None       Urinary Catheter             LDAs               Peripheral IV 06/21/16 Left Hand (Active)       Peripheral IV 02/08/17 Right Antecubital (Active)   Site Assessment Clean, dry, & intact 2/9/2017  8:37 PM   Phlebitis Assessment 0 2/9/2017  8:37 PM   Infiltration Assessment 0 2/9/2017  8:37 PM   Dressing Status Clean, dry, & intact 2/9/2017  8:37 PM   Dressing Type Tape;Transparent 2/9/2017  8:37 PM   Hub Color/Line Status Pink;Capped;Flushed;Patent 2/9/2017  8:37 PM                      I/Os   Intake/Output Summary (Last 24 hours) at 02/09/17 2229  Last data filed at 02/09/17 1434   Gross per 24 hour   Intake              600 ml   Output             2725 ml   Net            -2125 ml         Activity Level Activity Level: Up ad cirilo     Activity Assistance: No assistance needed   Diet Active Orders   Diet    DIET CARDIAC Regular; 2 GM NA (House Low NA); Consistent Carb 1800kcal      Purposeful Rounding every 1-2 hour?    [x] Yes    Gina Score  Total Score: 1   Bed Alarm (If score 3 or >)   [x] Yes    [] Refused (See signed refusal form in chart)   Gamal Score  Gamal Score: 23       Gamal Score (if score 14 or less) [] PMT consult   [] Nutrition consult   [] Wound Care consult      []  Specialty bed         Influenza Vaccine Received Flu Vaccine for Current Season (usually Sept-March): Yes               Needs prior to discharge:   Home O2 required:    [] Yes   [x] No     If yes, how much O2 required?     Other:    Last Bowel Movement Date: 02/06/17        POST-OP SURGICAL VATS   [] Yes   [] No     Incentive Spirometer:   [] Yes   [] Refused   Coughing and deep breathing:     [] Yes   [] Refused   Oral care:     [] Yes   [] Refused   Understanding (patient education):     [] Yes   [] Refused   Getting out of bed Number times ambulated in hallway past shift:    Number of times OOB to chair past shift:     Head of bed elevation:     [] Yes   [] Refused      Readmission Risk Assessment Tool Score Low Risk            12       Total Score        3 Relationship with PCP    2 Patient Living Status    4 More than 1 Admission in calendar year    3 Charlson Comorbidity Score        Criteria that do not apply:    Patient Length of Stay > 5    Patient Insurance is Medicare, Medicaid or Self Pay       Expected Length of Stay 4d 14h   Actual Length of Stay 1

## 2017-02-10 NOTE — PROGRESS NOTES
0715  Report received from Roger Aceves RN. SBAR, Kardex, Procedure Summary, Intake/Output, MAR, Accordion and Recent Results were discussed.     Edgar Montes     1900  Report given to Roger Aceves RN

## 2017-02-10 NOTE — PROGRESS NOTES
Cardiopulmonary Care Interdisciplinary rounds were held today to discuss patient plan of care and outcomes. The following members were present: PT, NP/Physician, Pharmacy, Nursing, Nutritionist and Case Management.       Plan of Care: Continue current treatment plan

## 2017-02-11 LAB
ANION GAP BLD CALC-SCNC: 9 MMOL/L (ref 5–15)
BUN SERPL-MCNC: 31 MG/DL (ref 6–20)
BUN/CREAT SERPL: 23 (ref 12–20)
CALCIUM SERPL-MCNC: 8.6 MG/DL (ref 8.5–10.1)
CHLORIDE SERPL-SCNC: 103 MMOL/L (ref 97–108)
CO2 SERPL-SCNC: 27 MMOL/L (ref 21–32)
CREAT SERPL-MCNC: 1.33 MG/DL (ref 0.7–1.3)
GLUCOSE BLD STRIP.AUTO-MCNC: 116 MG/DL (ref 65–100)
GLUCOSE BLD STRIP.AUTO-MCNC: 176 MG/DL (ref 65–100)
GLUCOSE BLD STRIP.AUTO-MCNC: 184 MG/DL (ref 65–100)
GLUCOSE BLD STRIP.AUTO-MCNC: 68 MG/DL (ref 65–100)
GLUCOSE BLD STRIP.AUTO-MCNC: 92 MG/DL (ref 65–100)
GLUCOSE SERPL-MCNC: 77 MG/DL (ref 65–100)
INR PPP: 3 (ref 0.9–1.1)
POTASSIUM SERPL-SCNC: 4.5 MMOL/L (ref 3.5–5.1)
PROTHROMBIN TIME: 31 SEC (ref 9–11.1)
SERVICE CMNT-IMP: ABNORMAL
SERVICE CMNT-IMP: NORMAL
SERVICE CMNT-IMP: NORMAL
SODIUM SERPL-SCNC: 139 MMOL/L (ref 136–145)

## 2017-02-11 PROCEDURE — 85610 PROTHROMBIN TIME: CPT | Performed by: INTERNAL MEDICINE

## 2017-02-11 PROCEDURE — 74011250637 HC RX REV CODE- 250/637: Performed by: EMERGENCY MEDICINE

## 2017-02-11 PROCEDURE — 74011250637 HC RX REV CODE- 250/637: Performed by: INTERNAL MEDICINE

## 2017-02-11 PROCEDURE — 74011250637 HC RX REV CODE- 250/637: Performed by: HOSPITALIST

## 2017-02-11 PROCEDURE — 36415 COLL VENOUS BLD VENIPUNCTURE: CPT | Performed by: INTERNAL MEDICINE

## 2017-02-11 PROCEDURE — 80048 BASIC METABOLIC PNL TOTAL CA: CPT | Performed by: INTERNAL MEDICINE

## 2017-02-11 PROCEDURE — 74011636637 HC RX REV CODE- 636/637: Performed by: HOSPITALIST

## 2017-02-11 PROCEDURE — 82962 GLUCOSE BLOOD TEST: CPT

## 2017-02-11 PROCEDURE — 74011250636 HC RX REV CODE- 250/636: Performed by: HOSPITALIST

## 2017-02-11 PROCEDURE — 65660000000 HC RM CCU STEPDOWN

## 2017-02-11 RX ADMIN — Medication 10 ML: at 20:48

## 2017-02-11 RX ADMIN — POTASSIUM CHLORIDE 20 MEQ: 20 TABLET, EXTENDED RELEASE ORAL at 10:14

## 2017-02-11 RX ADMIN — CARVEDILOL 25 MG: 12.5 TABLET, FILM COATED ORAL at 17:15

## 2017-02-11 RX ADMIN — CALCIUM CARBONATE (ANTACID) CHEW TAB 500 MG 200 MG: 500 CHEW TAB at 10:11

## 2017-02-11 RX ADMIN — FUROSEMIDE 40 MG: 10 INJECTION, SOLUTION INTRAMUSCULAR; INTRAVENOUS at 20:46

## 2017-02-11 RX ADMIN — Medication 10 ML: at 04:41

## 2017-02-11 RX ADMIN — Medication 10 ML: at 10:19

## 2017-02-11 RX ADMIN — Medication 200 MG: at 10:11

## 2017-02-11 RX ADMIN — GLIMEPIRIDE 4 MG: 4 TABLET ORAL at 10:13

## 2017-02-11 RX ADMIN — Medication 10 ML: at 16:07

## 2017-02-11 RX ADMIN — FUROSEMIDE 40 MG: 10 INJECTION, SOLUTION INTRAMUSCULAR; INTRAVENOUS at 10:13

## 2017-02-11 RX ADMIN — Medication 10 ML: at 10:14

## 2017-02-11 RX ADMIN — INSULIN LISPRO 1 UNITS: 100 INJECTION, SOLUTION INTRAVENOUS; SUBCUTANEOUS at 12:13

## 2017-02-11 RX ADMIN — PRAVASTATIN SODIUM 40 MG: 40 TABLET ORAL at 20:47

## 2017-02-11 RX ADMIN — WARFARIN SODIUM 3 MG: 2 TABLET ORAL at 17:15

## 2017-02-11 RX ADMIN — POTASSIUM CHLORIDE 20 MEQ: 20 TABLET, EXTENDED RELEASE ORAL at 17:15

## 2017-02-11 RX ADMIN — CARVEDILOL 25 MG: 12.5 TABLET, FILM COATED ORAL at 10:13

## 2017-02-11 NOTE — PROGRESS NOTES
Report received from [facility]. SBAR were discussed. Louis Vaca RN   7596 patient sitting up in chair. No complaints. Weights show steady weight loss. Patient still has edema in LE with evidence of old injuries to legs, noted scars.  Lungs diminished throughout  0931 patient was hypoglycemic 69 asymptomatic, treated with OJ

## 2017-02-11 NOTE — PROGRESS NOTES
Hospitalist Progress Note    NAME: Dary Rocha   :  1955   MRN:  [id number]     Hospitalist: Azeb Willams MD       Assessment / Plan:  Acute on chronic non-ischemic systolic heart failure, poa, EF 20%, s/p BiV ICD  -cardiology following  -negative 3.4 L in last day  -continue lasix 40 mg IV BID  -stopped aldactone for now, restart when IV diuresis stopped  -coreg 25 mg BID  -echo  showing EF 20% diffuse hypokinesis  -I/O monitoring, daily weight  -weight 155-->153 today    Elevated troponin  -0.32 -->0.34  -no other signs of ischemia, likely from renal function    Atrial fibrillation  -betablocker  -warfarin per pharmamcy    LAQUITA, poa  -Cr 1.73 --> 1.54-->1.45--1.33 today decreasing with diuresis  -hold ACE, hold spironolactone    DM2   -had hypoglycemic episodes, stopped lantus  -hba1c 9.8  -hold metformin  -continue amaryl      HLD - statin  Morbid obesity, possible LEAH - needs sleep study    Code status: Full  Prophylaxis: Lovenox  Recommended Disposition: Home w/Family     Subjective:     Chief Complaint: weight gain, anasarca      Sitting up in chair feeling well  Continues to have good UOP  Feel edema is less    Review of Systems:  Symptom Y/N Comments  Symptom Y/N Comments   Fever/Chills    Chest Pain     Poor Appetite    Edema     Cough    Abdominal Pain     Sputum    Joint Pain     SOB/GROVES    Pruritis/Rash     Nausea/vomit    Tolerating PT/OT     Diarrhea    Tolerating Diet     Constipation    Other       Could NOT obtain due to:      Objective:     VITALS:   Last 24hrs VS reviewed since prior progress note.  Most recent are:  Patient Vitals for the past 24 hrs:   Temp Pulse Resp BP SpO2   17 1537 97.4 °F (36.3 °C) 72 18 120/73 98 %   17 1105 97.3 °F (36.3 °C) 75 18 122/79 97 %   17 0835 97.6 °F (36.4 °C) 72 16 102/60 98 %   17 0422 97.4 °F (36.3 °C) 71 18 112/88 96 %   02/10/17 2227 98.5 °F (36.9 °C) 72 16 116/73 98 %   02/10/17 1854 96.1 °F (35.6 °C) 71 18 128/79 97 %       Intake/Output Summary (Last 24 hours) at 02/11/17 1545  Last data filed at 02/11/17 1318   Gross per 24 hour   Intake              680 ml   Output             2700 ml   Net            -2020 ml        PHYSICAL EXAM:  General: Alert, cooperative, no acute distress    EENT:  EOMI. Anicteric sclerae. Mucous membranes moist  Resp:  CTA bilaterally, no wheezing or rales. No accessory muscle use  CV:  Regular rhythm, pitting edema improved in lower ext  GI:  Soft,+ distended, non tender. +Bowel sounds  Neurologic:  Alert and oriented X 3, normal speech  Psych:   Good insight, not anxious nor agitated  Skin:  No rashes, no jaundice    Reviewed most current lab test results and cultures  YES  Reviewed most current radiology test results   YES  Review and summation of old records today   NO  Reviewed patient's current orders and MAR    YES  PMH/ reviewed - no change compared to H&P  ________________________________________________________________________  Care Plan discussed with:    Comments   Patient x    Family      RN x    Care Manager     Consultant                        Multidiciplinary team rounds were held today with , nursing, pharmacist and clinical coordinator. Patient's plan of care was discussed; medications were reviewed and discharge planning was addressed. ________________________________________________________________________  Total NON critical care TIME:  15   Minutes  ________________________________________________________________________  Jonathan Arredondo MD     Procedures: see electronic medical records for all procedures/Xrays and details which were not copied into this note but were reviewed prior to creation of Plan. LABS:  I reviewed today's most current labs and imaging studies.   Pertinent labs include:  Recent Labs      02/09/17   0543  02/08/17 1928   WBC  4.1  4.4   HGB  9.4*  10.0*   HCT  29.9*  32.3*   PLT  164  192     Recent Labs      02/11/17   0515 02/10/17   1236  02/10/17   0429  02/09/17   0543   02/08/17   1928   NA  139   --   138  138   --   138   K  4.5   --   4.3  4.4   --   4.7   CL  103   --   103  104   --   104   CO2  27   --   28  26   --   27   GLU  77   --   149*  215*   --   217*   BUN  31*   --   35*  36*   --   38*   CREA  1.33*   --   1.45*  1.54*   --   1.73*   CA  8.6   --   8.8  8.9   --   8.7   MG   --    --    --    --    --   1.9   ALB   --    --    --    --    --   3.4*   TBILI   --    --    --    --    --   1.4*   SGOT   --    --    --    --    --   24   ALT   --    --    --    --    --   23   INR  3.0*  3.0*   --   3.4*   < >   --     < > = values in this interval not displayed.        Signed: Tristen Davila MD

## 2017-02-11 NOTE — PROGRESS NOTES
[address] SHIFT NURSING NOTE    Bedside shift change report given to isela (oncoming nurse) by [address]  (offgoing nurse). Report included the following information SBAR. SHIFT SUMMARY: patient up in chair with friends most of the day. No complaints. Intake and output and weight done. 4:13 pm encouraged patient to elevate legs due to the edema. We discussed his HA1C and he understands it was elevated when he was admitted        Admission Date 2/8/2017   Admission Diagnosis Congestive heart failure (CHF) ([facility] [address]   Consults IP CONSULT TO CARDIOLOGY        Consults   [] PT   [] OT   [] Speech   [] Palliative      [] Hospice    [] Case Management   [] None   Cardiac Monitoring   [x] Yes   [] No     Antibiotics   [] Yes   [] No   GI Prophylaxis  (Ex: Protonix, Pepcid, etc,.)   [] Yes   [] No          DVT Prophylaxis   SCDs:             Ajith stockings:         [] Medication (Ex: Lovenox, Eliquis,  Heparin, etc..)   [] Contraindicated   [] None       Urinary Catheter             LDAs               Peripheral IV 06/21/16 Left Hand (Active)       Peripheral IV 02/08/17 Right Antecubital (Active)   Site Assessment Clean;Dry 2/11/2017  8:28 AM   Phlebitis Assessment 0 2/11/2017  8:28 AM   Infiltration Assessment 0 2/11/2017  8:28 AM   Dressing Status Clean, dry, & intact 2/11/2017  3:25 AM   Dressing Type Tape;Transparent 2/11/2017  3:25 AM   Hub Color/Line Status Pink 2/11/2017  8:28 AM                      I/Os   Intake/Output Summary (Last 24 hours) at 02/11/17 1509  Last data filed at 02/11/17 1318   Gross per 24 hour   Intake              680 ml   Output             3800 ml   Net            -3120 ml         Activity Level Activity Level: Up ad cirilo     Activity Assistance: No assistance needed   Diet Active Orders   Diet    DIET CARDIAC Regular; 2 GM NA (House Low NA); Consistent Carb 1800kcal      Purposeful Rounding every 1-2 hour?    [] Yes    Gina Score  Total Score: 1   Bed Alarm (If score 3 or >)   [] Yes    [] Refused (See signed refusal form in chart)   Gamal Score  Gamal Score: 21       Gamal Score (if score 14 or less)   [] PMT consult   [] Nutrition consult   [] Wound Care consult      []  Specialty bed         Influenza Vaccine Received Flu Vaccine for Current Season (usually Sept-March): Yes               Needs prior to discharge:   Home O2 required:    [] Yes   [] No     If yes, how much O2 required?     Other:    Last Bowel Movement Date: 02/10/17        POST-OP SURGICAL VATS   [] Yes   [] No     Incentive Spirometer:   [] Yes   [] Refused   Coughing and deep breathing:     [] Yes   [] Refused   Oral care:     [] Yes   [] Refused   Understanding (patient education):     [] Yes   [] Refused   Getting out of bed Number times ambulated in hallway past shift:    Number of times OOB to chair past shift:     Head of bed elevation:     [] Yes   [] Refused      Readmission Risk Assessment Tool Score Low Risk            12       Total Score        3 Relationship with PCP    2 Patient Living Status    4 More than 1 Admission in calendar year    3 Charlson Comorbidity Score        Criteria that do not apply:    Patient Length of Stay > 5    Patient Insurance is Medicare, Medicaid or Self Pay       Expected Length of Stay 4d 14h   Actual Length of Stay 3

## 2017-02-11 NOTE — PROGRESS NOTES
Pharmacy Daily Dosing of Warfarin - Consult for pharmacy to dose    Indication: afib    Goal INR (2-3, 2.5-3.5, 3-4): 2-3    Home Dose: 7.5 mg daily, except 10 mg on thurs, sat. Concurrent Anticoagulants/Antiplatelets none yet  Major Interacting Medications (Dose/Frequency): amiodarone d/lloyd 2/10  INR (0.9-1.1) > 5 or Platelets (< 28Q): discuss with MD:    Date:           INR:            dose:  2/8/17          3.7              HOLD  2/9/17          3.4              5 mg  2/10/17        3                 5 mg  2/11/17        3     Recent Labs      02/11/17   0515  02/10/17   1236  02/09/17   0543   02/08/17   1928   INR  3.0*  3.0*  3.4*   < >   --    HGB   --    --   9.4*   --   10.0*   PLT   --    --   164   --   192    < > = values in this interval not displayed. Impression/Plan: Will order warfarin 3 mg for this evening. Pharmacy will follow daily and adjust the dose as appropriate.     Thanks for the consult  GREGORY Ledesma Dosing and Monitoring Guidelines

## 2017-02-11 NOTE — PROGRESS NOTES
[address] SHIFT NURSING NOTE    Bedside and Verbal shift change report given to [place]  (oncoming nurse) by Michele Hart  (offgoing nurse). Report included the following information Kardex. SHIFT SUMMARY:         Admission Date 2/8/2017   Admission Diagnosis Congestive heart failure (CHF) ([address]   Consults IP CONSULT TO CARDIOLOGY        Consults   [] PT   [] OT   [] Speech   [] Palliative      [] Hospice    [] Case Management   [] None   Cardiac Monitoring   [x] Yes   [] No     Antibiotics   [] Yes   [x] No   GI Prophylaxis  (Ex: Protonix, Pepcid, etc,.)   [] Yes   [x] No          DVT Prophylaxis   SCDs:             Ajith stockings:         [x] Medication (Ex: Lovenox, Eliquis,  Heparin, etc..)   [] Contraindicated   [] None       Urinary Catheter             LDAs               Peripheral IV 06/21/16 Left Hand (Active)       Peripheral IV 02/08/17 Right Antecubital (Active)   Site Assessment Clean, dry, & intact 2/10/2017  7:27 PM   Phlebitis Assessment 0 2/10/2017  7:27 PM   Infiltration Assessment 0 2/10/2017  7:27 PM   Dressing Status Clean, dry, & intact 2/10/2017  7:27 PM   Dressing Type Tape;Transparent 2/10/2017  7:27 PM   Hub Color/Line Status Pink;Capped;Flushed;Patent 2/10/2017  7:27 PM                      I/Os   Intake/Output Summary (Last 24 hours) at 02/10/17 2050  Last data filed at 02/10/17 1822   Gross per 24 hour   Intake              200 ml   Output             2600 ml   Net            -2400 ml         Activity Level Activity Level: Up ad cirilo     Activity Assistance: No assistance needed   Diet Active Orders   Diet    DIET CARDIAC Regular; 2 GM NA (House Low NA); Consistent Carb 1800kcal      Purposeful Rounding every 1-2 hour?    [x] Yes    Gina Score  Total Score: 1   Bed Alarm (If score 3 or >)   [] Yes    [] Refused (See signed refusal form in chart)   Gamal Score  Gamal Score: 23       Gamal Score (if score 14 or less)   [] PMT consult   [] Nutrition consult   [] Wound Care consult      [] Specialty bed         Influenza Vaccine Received Flu Vaccine for Current Season (usually Sept-March): Yes               Needs prior to discharge:   Home O2 required:    [] Yes   [x] No     If yes, how much O2 required?     Other:    Last Bowel Movement Date: 02/10/17        POST-OP SURGICAL VATS   [] Yes   [] No     Incentive Spirometer:   [] Yes   [] Refused   Coughing and deep breathing:     [] Yes   [] Refused   Oral care:     [] Yes   [] Refused   Understanding (patient education):     [] Yes   [] Refused   Getting out of bed Number times ambulated in hallway past shift:    Number of times OOB to chair past shift:     Head of bed elevation:     [] Yes   [] Refused      Readmission Risk Assessment Tool Score Low Risk            12       Total Score        3 Relationship with PCP    2 Patient Living Status    4 More than 1 Admission in calendar year    3 Charlson Comorbidity Score        Criteria that do not apply:    Patient Length of Stay > 5    Patient Insurance is Medicare, Medicaid or Self Pay       Expected Length of Stay 4d 14h   Actual Length of Stay 2

## 2017-02-12 LAB
ANION GAP BLD CALC-SCNC: 10 MMOL/L (ref 5–15)
BUN SERPL-MCNC: 33 MG/DL (ref 6–20)
BUN/CREAT SERPL: 22 (ref 12–20)
CALCIUM SERPL-MCNC: 8.7 MG/DL (ref 8.5–10.1)
CHLORIDE SERPL-SCNC: 100 MMOL/L (ref 97–108)
CO2 SERPL-SCNC: 29 MMOL/L (ref 21–32)
CREAT SERPL-MCNC: 1.49 MG/DL (ref 0.7–1.3)
GLUCOSE BLD STRIP.AUTO-MCNC: 126 MG/DL (ref 65–100)
GLUCOSE BLD STRIP.AUTO-MCNC: 147 MG/DL (ref 65–100)
GLUCOSE BLD STRIP.AUTO-MCNC: 190 MG/DL (ref 65–100)
GLUCOSE BLD STRIP.AUTO-MCNC: 198 MG/DL (ref 65–100)
GLUCOSE SERPL-MCNC: 139 MG/DL (ref 65–100)
INR PPP: 2.4 (ref 0.9–1.1)
POTASSIUM SERPL-SCNC: 4.4 MMOL/L (ref 3.5–5.1)
PROTHROMBIN TIME: 25.2 SEC (ref 9–11.1)
SERVICE CMNT-IMP: ABNORMAL
SODIUM SERPL-SCNC: 139 MMOL/L (ref 136–145)

## 2017-02-12 PROCEDURE — 74011636637 HC RX REV CODE- 636/637: Performed by: HOSPITALIST

## 2017-02-12 PROCEDURE — 74011250637 HC RX REV CODE- 250/637: Performed by: HOSPITALIST

## 2017-02-12 PROCEDURE — 85610 PROTHROMBIN TIME: CPT | Performed by: INTERNAL MEDICINE

## 2017-02-12 PROCEDURE — 65660000000 HC RM CCU STEPDOWN

## 2017-02-12 PROCEDURE — 74011250636 HC RX REV CODE- 250/636: Performed by: HOSPITALIST

## 2017-02-12 PROCEDURE — 74011250637 HC RX REV CODE- 250/637: Performed by: INTERNAL MEDICINE

## 2017-02-12 PROCEDURE — 36415 COLL VENOUS BLD VENIPUNCTURE: CPT | Performed by: INTERNAL MEDICINE

## 2017-02-12 PROCEDURE — 74011250637 HC RX REV CODE- 250/637: Performed by: EMERGENCY MEDICINE

## 2017-02-12 PROCEDURE — 80048 BASIC METABOLIC PNL TOTAL CA: CPT | Performed by: INTERNAL MEDICINE

## 2017-02-12 PROCEDURE — 82962 GLUCOSE BLOOD TEST: CPT

## 2017-02-12 RX ADMIN — Medication 10 ML: at 18:17

## 2017-02-12 RX ADMIN — FUROSEMIDE 40 MG: 10 INJECTION, SOLUTION INTRAMUSCULAR; INTRAVENOUS at 08:44

## 2017-02-12 RX ADMIN — WARFARIN SODIUM 6 MG: 5 TABLET ORAL at 18:16

## 2017-02-12 RX ADMIN — POTASSIUM CHLORIDE 20 MEQ: 20 TABLET, EXTENDED RELEASE ORAL at 18:16

## 2017-02-12 RX ADMIN — Medication 200 MG: at 08:44

## 2017-02-12 RX ADMIN — Medication 10 ML: at 21:24

## 2017-02-12 RX ADMIN — INSULIN LISPRO 2 UNITS: 100 INJECTION, SOLUTION INTRAVENOUS; SUBCUTANEOUS at 12:44

## 2017-02-12 RX ADMIN — FUROSEMIDE 40 MG: 10 INJECTION, SOLUTION INTRAMUSCULAR; INTRAVENOUS at 21:23

## 2017-02-12 RX ADMIN — POTASSIUM CHLORIDE 20 MEQ: 20 TABLET, EXTENDED RELEASE ORAL at 08:44

## 2017-02-12 RX ADMIN — Medication 10 ML: at 06:22

## 2017-02-12 RX ADMIN — CALCIUM CARBONATE (ANTACID) CHEW TAB 500 MG 200 MG: 500 CHEW TAB at 08:44

## 2017-02-12 RX ADMIN — CARVEDILOL 25 MG: 12.5 TABLET, FILM COATED ORAL at 12:44

## 2017-02-12 RX ADMIN — PRAVASTATIN SODIUM 40 MG: 40 TABLET ORAL at 21:23

## 2017-02-12 RX ADMIN — CARVEDILOL 25 MG: 12.5 TABLET, FILM COATED ORAL at 18:16

## 2017-02-12 RX ADMIN — GLIMEPIRIDE 4 MG: 4 TABLET ORAL at 08:44

## 2017-02-12 RX ADMIN — INSULIN LISPRO 2 UNITS: 100 INJECTION, SOLUTION INTRAVENOUS; SUBCUTANEOUS at 18:17

## 2017-02-12 NOTE — PROGRESS NOTES
Progress Note      2/12/2017   NAME: Sourav Díaz   MRN:  [id number]   Admit Diagnosis: Congestive heart failure (CHF) ([facility] [address]      Problem List:     1. 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 Acute kidney injury, improving  3. Indeterminate troponin elevation consistent with heart failure  4. Hypertensive heart disease with CHF and CKD stage 1-4  5. Diabetes mellitus type 2 without mention of complication  6. Hyperlipidemia  7. Nonischemic dilated cardiomyopathy EF 20% with mild-mod MR on echo here  8. Chronic atrial fibrillation  9. Status post SJM BIV-ICD implant in 2016  10. Sleep apnea, undiagnosed/untreated  11. Obesity with recent weight gain       Assessment/Plan:     1. Continue with IV diuresis  2. He was taken off amiodarone in the past due to ineffectiveness--I reiterated the risk:benefit of using the drug, he remains off  3. Continue coreg 25mg BID  4. Continue pravastatin  5. Holding aldactone and valsartan -- will need to resume ARB when renal fxn stable  6. Continue coumadin w/ daily INRs  7. No BIV-ICD program changes today           [x]       High complexity decision making was performed in this patient at high risk for decompensation with multiple organ involvement. Subjective:     Sourav Díaz denies chest pain, dyspnea. Feels better. Says he probably hasn't been watching salt as closely as he should. Discussed with RN events overnight. Review of Systems:    Symptom Y/N Comments  Symptom Y/N Comments   Fever/Chills N   Chest Pain N    Poor Appetite N   Edema N    Cough N   Abdominal Pain N    Sputum N   Joint Pain N    SOB/GROVES N   Pruritis/Rash N    Nausea/vomit N   Tolerating PT/OT Y    Diarrhea N   Tolerating Diet Y    Constipation N   Other       Could NOT obtain due to:      Objective:      Physical Exam:    Last 24hrs VS reviewed since prior progress note.  Most recent are:    Visit Vitals    /60    Pulse 76    Temp 98.1 °F (36.7 °C)    Resp 18    Ht 6' 3\" (1.905 m)    Wt 151.4 kg (333 lb 12.4 oz)    SpO2 96%    BMI 41.72 kg/m2       Intake/Output Summary (Last 24 hours) at 02/12/17 1744  Last data filed at 02/12/17 1425   Gross per 24 hour   Intake              560 ml   Output             1700 ml   Net            -1140 ml        General Appearance: Well developed, well nourished, alert & oriented x 3,    no acute distress. Ears/Nose/Mouth/Throat: Hearing grossly normal.  Neck: Supple. Chest: Lungs clear to auscultation bilaterally. Cardiovascular: Irregular rate and rhythm, S1S2 normal, no murmur. Abdomen: Soft, non-tender, bowel sounds are active. Extremities: 2+ edema bilaterally. Skin: Warm and dry. []         Post-cath site without hematoma, bruit, tenderness, or thrill. Distal pulses intact. PMH/ reviewed - no change compared to H&P    Data Review    Telemetry:  Afib, BiV paced    EKG:   []  No new EKG for review    Lab Data Personally Reviewed:    No results for input(s): WBC, HGB, HCT, PLT, HGBEXT, HCTEXT, PLTEXT, HGBEXT, HCTEXT, PLTEXT in the last 72 hours. Recent Labs      02/12/17   0337  02/11/17   0515  02/10/17   1236   INR  2.4*  3.0*  3.0*   PTP  25.2*  31.0*  30.8*      Recent Labs      02/12/17   0337  02/11/17   0515  02/10/17   0429   NA  139  139  138   K  4.4  4.5  4.3   CL  100  103  103   CO2  29  27  28   BUN  33*  31*  35*   CREA  1.49*  1.33*  1.45*   GLU  139*  77  149*   CA  8.7  8.6  8.8     No results for input(s): CPK, CKNDX, TROIQ in the last 72 hours. No lab exists for component: CPKMB  Lab Results   Component Value Date/Time    Cholesterol, total 92 03/03/2009 09:40 PM    HDL Cholesterol 44 03/03/2009 09:40 PM    LDL, calculated 38.4 03/03/2009 09:40 PM    Triglyceride 48 03/03/2009 09:40 PM    CHOL/HDL Ratio 2.1 03/03/2009 09:40 PM       No results for input(s): SGOT, GPT, AP, TBIL, TP, ALB, GLOB, GGT, AML, LPSE in the last 72 hours.     No lab exists for component: AMYP, HLPSE  No results for input(s): PH, PCO2, PO2 in the last 72 hours.     Medications Personally Reviewed:    Current Facility-Administered Medications   Medication Dose Route Frequency    warfarin (COUMADIN) tablet 6 mg  6 mg Oral ONCE    sodium chloride (NS) 0.9 % flush        carvedilol (COREG) tablet 25 mg  25 mg Oral BID WITH MEALS    potassium chloride (K-DUR, KLOR-CON) SR tablet 20 mEq  20 mEq Oral BID    pravastatin (PRAVACHOL) tablet 40 mg  40 mg Oral QHS    sodium chloride (NS) flush 5-10 mL  5-10 mL IntraVENous Q8H    sodium chloride (NS) flush 5-10 mL  5-10 mL IntraVENous PRN    insulin lispro (HUMALOG) injection   SubCUTAneous AC&HS    glucose chewable tablet 16 g  4 Tab Oral PRN    dextrose (D50W) injection syrg 12.5-25 g  12.5-25 g IntraVENous PRN    glucagon (GLUCAGEN) injection 1 mg  1 mg IntraMUSCular PRN    WARFARIN INFORMATION NOTE (COUMADIN)   Other QPM    glimepiride (AMARYL) tablet 4 mg  4 mg Oral 7am    calcium carbonate (TUMS) chewable tablet 200 mg [elemental]  200 mg Oral DAILY    And    magnesium oxide (MAG-OX) tablet 200 mg  200 mg Oral DAILY    acetaminophen (TYLENOL) tablet 650 mg  650 mg Oral Q6H PRN    ondansetron (ZOFRAN) injection 4 mg  4 mg IntraVENous Q6H PRN    furosemide (LASIX) injection 40 mg  40 mg IntraVENous Q12H         Elsa Sherman MD

## 2017-02-12 NOTE — PROGRESS NOTES
Pharmacy Daily Dosing of Warfarin - Consult for pharmacy to dose    Indication: afib    Goal INR (2-3, 2.5-3.5, 3-4): 2-3    Home Dose: 7.5 mg daily, except 10 mg on thurs, sat. Concurrent Anticoagulants/Antiplatelets none   Major Interacting Medications (Dose/Frequency): amiodarone d/lloyd 2/10  INR (0.9-1.1) > 5 or Platelets (< 02A): discuss with MD: n/a    Date:           INR:            dose:  2/8/17          3.7              HOLD  2/9/17          3.4              5 mg  2/10/17        3                 5 mg  2/11/17        3                 3 mg  2/12/17        2.4              6 mg    Recent Labs      02/12/17   0337  02/11/17   0515  02/10/17   1236   INR  2.4*  3.0*  3.0*     Impression/Plan: With amiodarone discontinued, will increase dose to 6 mg for this evenings dose. Pharmacy will follow daily and adjust the dose as appropriate.     Thanks for the consult  GREGORY Suarez Dosing and Monitoring Guidelines

## 2017-02-12 NOTE — PROGRESS NOTES
Progress Note      2/11/2017   NAME: Carlee Young   MRN:  [id number]   Admit Diagnosis: Congestive heart failure (CHF) ([facility] [address]      Problem List:     1. 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 Acute kidney injury, improving  3. Indeterminate troponin elevation consistent with heart failure  4. Hypertensive heart disease with CHF and CKD stage 1-4  5. Diabetes mellitus type 2 without mention of complication  6. Hyperlipidemia  7. Nonischemic dilated cardiomyopathy EF 20% with mild-mod MR on echo here  8. Chronic atrial fibrillation  9. Status post [place] BIV-ICD implant in 2016  10. Sleep apnea, undiagnosed/untreated  11. Obesity with recent weight gain       Assessment/Plan:     1. Continue with IV diuresis  2. Continue amiodarone 200mg daily  3. Continue coreg 25mg BID  4. Continue pravastatin  5. Holding aldactone and valsartan -- will need to resume ARB when renal fxn stable  6. Continue coumadin w/ daily INRs  7. No BIV-ICD program changes today           [x]       High complexity decision making was performed in this patient at high risk for decompensation with multiple organ involvement. Subjective:     Carlee Young denies chest pain, dyspnea. Feels better. Says he probably hasn't been watching salt as closely as he should. Discussed with RN events overnight. Review of Systems:    Symptom Y/N Comments  Symptom Y/N Comments   Fever/Chills N   Chest Pain N    Poor Appetite N   Edema N    Cough N   Abdominal Pain N    Sputum N   Joint Pain N    SOB/GROVES N   Pruritis/Rash N    Nausea/vomit N   Tolerating PT/OT Y    Diarrhea N   Tolerating Diet Y    Constipation N   Other       Could NOT obtain due to:      Objective:      Physical Exam:    Last 24hrs VS reviewed since prior progress note.  Most recent are:    Visit Vitals    /71 (BP 1 Location: Left arm, BP Patient Position: Sitting)    Pulse 69    Temp 96.2 °F (35.7 °C)    Resp 18    Ht 6' 3\" (1.905 m)    Wt 153.1 kg (337 lb 8.4 oz)    SpO2 93%    BMI 42.19 kg/m2       Intake/Output Summary (Last 24 hours) at 02/11/17 2033  Last data filed at 02/11/17 1717   Gross per 24 hour   Intake              680 ml   Output             3400 ml   Net            -2720 ml        General Appearance: Well developed, well nourished, alert & oriented x 3,    no acute distress. Ears/Nose/Mouth/Throat: Hearing grossly normal.  Neck: Supple. Chest: Lungs clear to auscultation bilaterally. Cardiovascular: Irregular rate and rhythm, S1S2 normal, no murmur. Abdomen: Soft, non-tender, bowel sounds are active. Extremities: 2+ edema bilaterally. Skin: Warm and dry. []         Post-cath site without hematoma, bruit, tenderness, or thrill. Distal pulses intact. PMH/SH reviewed - no change compared to H&P    Data Review    Telemetry: BiV paced    EKG:   []  No new EKG for review    Lab Data Personally Reviewed:    Recent Labs      02/09/17   0543   WBC  4.1   HGB  9.4*   HCT  29.9*   PLT  164     Recent Labs      02/11/17   0515  02/10/17   1236  02/09/17   0543   INR  3.0*  3.0*  3.4*   PTP  31.0*  30.8*  36.1*      Recent Labs      02/11/17   0515  02/10/17   0429  02/09/17   0543   NA  139  138  138   K  4.5  4.3  4.4   CL  103  103  104   CO2  27  28  26   BUN  31*  35*  36*   CREA  1.33*  1.45*  1.54*   GLU  77  149*  215*   CA  8.6  8.8  8.9     Recent Labs      02/09/17   0543   TROIQ  0.34*     Lab Results   Component Value Date/Time    Cholesterol, total 92 03/03/2009 09:40 PM    HDL Cholesterol 44 03/03/2009 09:40 PM    LDL, calculated 38.4 03/03/2009 09:40 PM    Triglyceride 48 03/03/2009 09:40 PM    CHOL/HDL Ratio 2.1 03/03/2009 09:40 PM       No results for input(s): SGOT, GPT, AP, TBIL, TP, ALB, GLOB, GGT, AML, LPSE in the last 72 hours. No lab exists for component: AMYP, HLPSE  No results for input(s): PH, PCO2, PO2 in the last 72 hours.     Medications Personally Reviewed:    Current Facility-Administered Medications Medication Dose Route Frequency    sodium chloride (NS) 0.9 % flush        carvedilol (COREG) tablet 25 mg  25 mg Oral BID WITH MEALS    potassium chloride (K-DUR, KLOR-CON) SR tablet 20 mEq  20 mEq Oral BID    pravastatin (PRAVACHOL) tablet 40 mg  40 mg Oral QHS    sodium chloride (NS) flush 5-10 mL  5-10 mL IntraVENous Q8H    sodium chloride (NS) flush 5-10 mL  5-10 mL IntraVENous PRN    insulin lispro (HUMALOG) injection   SubCUTAneous AC&HS    glucose chewable tablet 16 g  4 Tab Oral PRN    dextrose (D50W) injection syrg 12.5-25 g  12.5-25 g IntraVENous PRN    glucagon (GLUCAGEN) injection 1 mg  1 mg IntraMUSCular PRN    WARFARIN INFORMATION NOTE (COUMADIN)   Other QPM    glimepiride (AMARYL) tablet 4 mg  4 mg Oral 7am    calcium carbonate (TUMS) chewable tablet 200 mg [elemental]  200 mg Oral DAILY    And    magnesium oxide (MAG-OX) tablet 200 mg  200 mg Oral DAILY    acetaminophen (TYLENOL) tablet 650 mg  650 mg Oral Q6H PRN    ondansetron (ZOFRAN) injection 4 mg  4 mg IntraVENous Q6H PRN    furosemide (LASIX) injection 40 mg  40 mg IntraVENous Q12H         Emelina Baker MD

## 2017-02-12 NOTE — PROGRESS NOTES
0720    Report received from Chantal Mohamud, [facility]. SBAR, Kardex, ED Summary, Procedure Summary, Intake/Output, MAR, Accordion, Recent Results, Med Rec Status and Cardiac Rhythm paced were discussed. [facility]SING [place]    Bedside shift change report given to Radha Zheng (oncoming nurse) by Dimitri Velazquez (offgoing nurse). Report included the following information SBAR, Kardex, ED Summary, Procedure Summary, Intake/Output, MAR, Accordion, Recent Results, Med Rec Status and Cardiac Rhythm paced. SHIFT SUMMARY:         Admission Date 2/8/2017   Admission Diagnosis Congestive heart failure (CHF) ([address]   Consults IP CONSULT TO CARDIOLOGY        Consults   [x] PT   [x] OT   [] Speech   [] Palliative      [] Hospice    [] Case Management   [] None   Cardiac Monitoring   [x] Yes   [] No     Antibiotics   [] Yes   [x] No   GI Prophylaxis  (Ex: Protonix, Pepcid, etc,.)   [x] Yes   [x] No          DVT Prophylaxis   SCDs:             Ajith stockings:         [x] Medication (Ex: Lovenox, Eliquis,  Heparin, etc..)   [] Contraindicated   [] None       Urinary Catheter             LDAs               Peripheral IV 06/21/16 Left Hand (Active)       Peripheral IV 02/08/17 Right Antecubital (Active)   Site Assessment Clean, dry, & intact 2/12/2017  4:00 PM   Phlebitis Assessment 0 2/12/2017  4:00 PM   Infiltration Assessment 0 2/12/2017  4:00 PM   Dressing Status Clean, dry, & intact 2/12/2017  4:00 PM   Dressing Type Tape;Transparent 2/12/2017  4:00 PM   Hub Color/Line Status Pink;Flushed 2/12/2017  4:00 PM                      I/Os   Intake/Output Summary (Last 24 hours) at 02/12/17 1825  Last data filed at 02/12/17 1823   Gross per 24 hour   Intake              560 ml   Output             4175 ml   Net            -3615 ml         Activity Level Activity Level: Up ad cirilo     Activity Assistance: No assistance needed   Diet Active Orders   Diet    DIET CARDIAC Regular; 2 GM NA (House Low NA);  Consistent Carb 1800kcal Purposeful Rounding every 1-2 hour? [x] Yes    Gina Score  Total Score: 1   Bed Alarm (If score 3 or >)   [] Yes    [] Refused (See signed refusal form in chart)   Gamal Score  Gamal Score: 21       Gamal Score (if score 14 or less)   [] PMT consult   [] Nutrition consult   [] Wound Care consult      []  Specialty bed         Influenza Vaccine Received Flu Vaccine for Current Season (usually Sept-March): Yes               Needs prior to discharge:   Home O2 required:    [] Yes   [x] No     If yes, how much O2 required?     Other:    Last Bowel Movement Date: 02/11/17        POST-OP SURGICAL VATS   [] Yes   [x] No     Incentive Spirometer:   [] Yes   [] Refused   Coughing and deep breathing:     [] Yes   [] Refused   Oral care:     [] Yes   [] Refused   Understanding (patient education):     [] Yes   [] Refused   Getting out of bed Number times ambulated in hallway past shift:    Number of times OOB to chair past shift:     Head of bed elevation:     [] Yes   [] Refused      Readmission Risk Assessment Tool Score Low Risk            12       Total Score        3 Relationship with PCP    2 Patient Living Status    4 More than 1 Admission in calendar year    3 Charlson Comorbidity Score        Criteria that do not apply:    Patient Length of Stay > 5    Patient Insurance is Medicare, Medicaid or Self Pay       Expected Length of Stay 4d 14h   Actual Length of Stay 4

## 2017-02-12 NOTE — PROGRESS NOTES
Hospitalist Progress Note    NAME: Xavier Andersen   :  1955   MRN:  [id number]     Hospitalist: Di Belcher MD       Assessment / Plan:  Acute on chronic non-ischemic systolic heart failure, poa, EF 20%, s/p BiV ICD  -cardiology following  -I/O negative again 3.3 L in last day  -lasix per cardiology, may consider switching to high dose PO  -stopped aldactone for now, restart when IV diuresis stopped  -coreg 25 mg BID  -echo  showing EF 20% diffuse hypokinesis  -I/O monitoring, daily weight  -weight 155-->153-->151 today    Elevated troponin  -0.32 -->0.34  -no other signs of ischemia, likely from renal function    Atrial fibrillation  -betablocker  -warfarin per pharmamcy    LAQUITA, poa  -Cr 1.73 --> 1.54-->1.45--1.33-->1.49, stable today  -hold ACE, hold spironolactone    DM2   -had hypoglycemic episodes, stopped lantus  -hba1c 9.8  -hold metformin  -continue amaryl      HLD - statin  Morbid obesity, possible LEAH - needs sleep study    Code status: Full  Prophylaxis: Lovenox  Recommended Disposition: Home w/Family     Subjective:     Chief Complaint: weight gain, anasarca    Feeling good today, continues to have good UOP  Legs feel lighter    Review of Systems:  Symptom Y/N Comments  Symptom Y/N Comments   Fever/Chills    Chest Pain     Poor Appetite    Edema     Cough    Abdominal Pain     Sputum    Joint Pain     SOB/GROVES    Pruritis/Rash     Nausea/vomit    Tolerating PT/OT     Diarrhea    Tolerating Diet     Constipation    Other       Could NOT obtain due to:      Objective:     VITALS:   Last 24hrs VS reviewed since prior progress note.  Most recent are:  Patient Vitals for the past 24 hrs:   Temp Pulse Resp BP SpO2   17 1151 97.8 °F (36.6 °C) 70 18 124/69 96 %   17 0715 97.7 °F (36.5 °C) 70 16 98/79 93 %   17 0322 97.5 °F (36.4 °C) 70 18 119/67 95 %   17 2222 97.7 °F (36.5 °C) 70 18 126/87 97 %   17 1851 96.2 °F (35.7 °C) 69 18 120/71 93 %   17 1537 97.4 °F (36.3 °C) 72 18 120/73 98 %       Intake/Output Summary (Last 24 hours) at 02/12/17 1440  Last data filed at 02/12/17 1425   Gross per 24 hour   Intake              560 ml   Output             2700 ml   Net            -2140 ml        PHYSICAL EXAM:  General: Alert, cooperative, no acute distress    EENT:  EOMI. Anicteric sclerae. Mucous membranes moist  Resp:  CTA bilaterally, no wheezing or rales. No accessory muscle use  CV:  Regular rhythm, pitting edema, showing more wrinkling of skin improving  GI:  Soft,+ distended, non tender. +Bowel sounds  Neurologic:  Alert and oriented X 3, normal speech  Psych:   Good insight, not anxious nor agitated  Skin:  No rashes, no jaundice    Reviewed most current lab test results and cultures  YES  Reviewed most current radiology test results   YES  Review and summation of old records today   NO  Reviewed patient's current orders and MAR    YES  PMH/ reviewed - no change compared to H&P  ________________________________________________________________________  Care Plan discussed with:    Comments   Patient x    Family      RN     Care Manager     Consultant                        Multidiciplinary team rounds were held today with , nursing, pharmacist and clinical coordinator. Patient's plan of care was discussed; medications were reviewed and discharge planning was addressed. ________________________________________________________________________  Total NON critical care TIME:  15   Minutes  ________________________________________________________________________  Nomi Orozco MD     Procedures: see electronic medical records for all procedures/Xrays and details which were not copied into this note but were reviewed prior to creation of Plan. LABS:  I reviewed today's most current labs and imaging studies. Pertinent labs include:  No results for input(s): WBC, HGB, HCT, PLT, HGBEXT, HCTEXT, PLTEXT, HGBEXT, HCTEXT, PLTEXT in the last 72 hours.   Recent Labs      02/12/17   0337  02/11/17   0515  02/10/17   1236  02/10/17   0429   NA  139  139   --   138   K  4.4  4.5   --   4.3   CL  100  103   --   103   CO2  29  27   --   28   GLU  139*  77   --   149*   BUN  33*  31*   --   35*   CREA  1.49*  1.33*   --   1.45*   CA  8.7  8.6   --   8.8   INR  2.4*  3.0*  3.0*   --        Signed: Azeb Willams MD

## 2017-02-12 NOTE — PROGRESS NOTES
[facility] SHIFT NURSING NOTE    Bedside and Verbal shift change report given to Radha Yancey  (oncoming nurse) by Aleksandra Rosario  (offgoing nurse). Report included the following information Kardex. SHIFT SUMMARY:         Admission Date 2/8/2017   Admission Diagnosis Congestive heart failure (CHF) ([address]   Consults IP CONSULT TO CARDIOLOGY        Consults   [] PT   [] OT   [] Speech   [] Palliative      [] Hospice    [] Case Management   [] None   Cardiac Monitoring   [x] Yes   [] No     Antibiotics   [] Yes   [x] No   GI Prophylaxis  (Ex: Protonix, Pepcid, etc,.)   [] Yes   [] No          DVT Prophylaxis   SCDs:             Ajith stockings:         [x] Medication (Ex: Lovenox, Eliquis,  Heparin, etc..)   [] Contraindicated   [] None       Urinary Catheter             LDAs               Peripheral IV 06/21/16 Left Hand (Active)       Peripheral IV 02/08/17 Right Antecubital (Active)   Site Assessment Clean, dry, & intact 2/12/2017  3:00 AM   Phlebitis Assessment 0 2/12/2017  3:00 AM   Infiltration Assessment 0 2/12/2017  3:00 AM   Dressing Status Clean, dry, & intact 2/12/2017  3:00 AM   Dressing Type Tape;Transparent 2/12/2017  3:00 AM   Hub Color/Line Status Pink;Capped;Flushed;Patent 2/12/2017  3:00 AM                      I/Os   Intake/Output Summary (Last 24 hours) at 02/12/17 5789  Last data filed at 02/12/17 0325   Gross per 24 hour   Intake              480 ml   Output             3300 ml   Net            -2820 ml         Activity Level Activity Level: Up ad cirilo     Activity Assistance: No assistance needed   Diet Active Orders   Diet    DIET CARDIAC Regular; 2 GM NA (House Low NA); Consistent Carb 1800kcal      Purposeful Rounding every 1-2 hour?    [] Yes    Gina Score  Total Score: 1   Bed Alarm (If score 3 or >)   [] Yes    [] Refused (See signed refusal form in chart)   Gamal Score  Gamal Score: 21       Gamal Score (if score 14 or less)   [] PMT consult   [] Nutrition consult   [] Wound Care consult      [] Specialty bed         Influenza Vaccine Received Flu Vaccine for Current Season (usually Sept-March): Yes               Needs prior to discharge:   Home O2 required:    [] Yes   [x] No     If yes, how much O2 required?     Other:    Last Bowel Movement Date: 02/10/17        POST-OP SURGICAL VATS   [] Yes   [] No     Incentive Spirometer:   [] Yes   [] Refused   Coughing and deep breathing:     [] Yes   [] Refused   Oral care:     [] Yes   [] Refused   Understanding (patient education):     [] Yes   [] Refused   Getting out of bed Number times ambulated in hallway past shift:    Number of times OOB to chair past shift:     Head of bed elevation:     [] Yes   [] Refused      Readmission Risk Assessment Tool Score Low Risk            12       Total Score        3 Relationship with PCP    2 Patient Living Status    4 More than 1 Admission in calendar year    3 Charlson Comorbidity Score        Criteria that do not apply:    Patient Length of Stay > 5    Patient Insurance is Medicare, Medicaid or Self Pay       Expected Length of Stay 4d 14h   Actual Length of Stay 4

## 2017-02-13 LAB
ANION GAP BLD CALC-SCNC: 8 MMOL/L (ref 5–15)
BUN SERPL-MCNC: 33 MG/DL (ref 6–20)
BUN/CREAT SERPL: 22 (ref 12–20)
CALCIUM SERPL-MCNC: 9 MG/DL (ref 8.5–10.1)
CHLORIDE SERPL-SCNC: 100 MMOL/L (ref 97–108)
CO2 SERPL-SCNC: 29 MMOL/L (ref 21–32)
CREAT SERPL-MCNC: 1.52 MG/DL (ref 0.7–1.3)
GLUCOSE BLD STRIP.AUTO-MCNC: 170 MG/DL (ref 65–100)
GLUCOSE BLD STRIP.AUTO-MCNC: 174 MG/DL (ref 65–100)
GLUCOSE BLD STRIP.AUTO-MCNC: 188 MG/DL (ref 65–100)
GLUCOSE BLD STRIP.AUTO-MCNC: 92 MG/DL (ref 65–100)
GLUCOSE SERPL-MCNC: 111 MG/DL (ref 65–100)
INR PPP: 2 (ref 0.9–1.1)
POTASSIUM SERPL-SCNC: 4.4 MMOL/L (ref 3.5–5.1)
PROTHROMBIN TIME: 21.1 SEC (ref 9–11.1)
SERVICE CMNT-IMP: ABNORMAL
SERVICE CMNT-IMP: NORMAL
SODIUM SERPL-SCNC: 137 MMOL/L (ref 136–145)

## 2017-02-13 PROCEDURE — 80048 BASIC METABOLIC PNL TOTAL CA: CPT | Performed by: INTERNAL MEDICINE

## 2017-02-13 PROCEDURE — 74011250637 HC RX REV CODE- 250/637: Performed by: HOSPITALIST

## 2017-02-13 PROCEDURE — 36415 COLL VENOUS BLD VENIPUNCTURE: CPT | Performed by: INTERNAL MEDICINE

## 2017-02-13 PROCEDURE — 85610 PROTHROMBIN TIME: CPT | Performed by: INTERNAL MEDICINE

## 2017-02-13 PROCEDURE — 74011636637 HC RX REV CODE- 636/637: Performed by: HOSPITALIST

## 2017-02-13 PROCEDURE — 74011250637 HC RX REV CODE- 250/637: Performed by: INTERNAL MEDICINE

## 2017-02-13 PROCEDURE — 74011250637 HC RX REV CODE- 250/637: Performed by: EMERGENCY MEDICINE

## 2017-02-13 PROCEDURE — 82962 GLUCOSE BLOOD TEST: CPT

## 2017-02-13 PROCEDURE — 65660000000 HC RM CCU STEPDOWN

## 2017-02-13 RX ORDER — FUROSEMIDE 80 MG/1
80 TABLET ORAL 2 TIMES DAILY
Status: DISCONTINUED | OUTPATIENT
Start: 2017-02-13 | End: 2017-02-14 | Stop reason: HOSPADM

## 2017-02-13 RX ADMIN — INSULIN LISPRO 2 UNITS: 100 INJECTION, SOLUTION INTRAVENOUS; SUBCUTANEOUS at 17:27

## 2017-02-13 RX ADMIN — FUROSEMIDE 80 MG: 80 TABLET ORAL at 12:42

## 2017-02-13 RX ADMIN — POTASSIUM CHLORIDE 20 MEQ: 20 TABLET, EXTENDED RELEASE ORAL at 10:58

## 2017-02-13 RX ADMIN — Medication 200 MG: at 10:58

## 2017-02-13 RX ADMIN — FUROSEMIDE 80 MG: 80 TABLET ORAL at 17:28

## 2017-02-13 RX ADMIN — WARFARIN SODIUM 7 MG: 5 TABLET ORAL at 17:27

## 2017-02-13 RX ADMIN — CALCIUM CARBONATE (ANTACID) CHEW TAB 500 MG 200 MG: 500 CHEW TAB at 10:58

## 2017-02-13 RX ADMIN — CARVEDILOL 25 MG: 12.5 TABLET, FILM COATED ORAL at 17:28

## 2017-02-13 RX ADMIN — GLIMEPIRIDE 4 MG: 4 TABLET ORAL at 10:59

## 2017-02-13 RX ADMIN — Medication 10 ML: at 03:57

## 2017-02-13 RX ADMIN — PRAVASTATIN SODIUM 40 MG: 40 TABLET ORAL at 21:19

## 2017-02-13 RX ADMIN — Medication 10 ML: at 17:28

## 2017-02-13 RX ADMIN — POTASSIUM CHLORIDE 20 MEQ: 20 TABLET, EXTENDED RELEASE ORAL at 17:28

## 2017-02-13 RX ADMIN — Medication 10 ML: at 21:20

## 2017-02-13 RX ADMIN — CARVEDILOL 25 MG: 12.5 TABLET, FILM COATED ORAL at 10:58

## 2017-02-13 RX ADMIN — INSULIN LISPRO 2 UNITS: 100 INJECTION, SOLUTION INTRAVENOUS; SUBCUTANEOUS at 12:41

## 2017-02-13 NOTE — PROGRESS NOTES
Cardiopulmonary Care Interdisciplinary rounds were held today to discuss patient plan of care and outcomes. The following members were present: PT, NP/Physician, Pharmacy, Nursing, Nutritionist and Case Management.       Plan of Care: Continue current treatment plan  Poss d/c today

## 2017-02-13 NOTE — PROGRESS NOTES
[address] SHIFT NURSING NOTE    Bedside and Verbal shift change report given to Benjamin Chandra (oncoming nurse) by Laina Hancock (offgoing nurse). Report included the following information SBAR, Kardex, ED Summary, Procedure Summary, Intake/Output, MAR, Recent Results and Cardiac Rhythm Paced. SHIFT SUMMARY:   Uneventful shift.

## 2017-02-13 NOTE — PROGRESS NOTES
Report received from Dimitri Velazquez, [address] [place] [address]. SBAR, Kardex, Procedure Summary, Intake/Output, MAR and Recent Results were discussed.     Mahogany Givens

## 2017-02-13 NOTE — PROGRESS NOTES
Hospitalist Progress Note    NAME: Donna Miller   :  1955   MRN:  [id number]     Hospitalist: Quentin Pruitt MD       Assessment / Plan:  Acute on chronic non-ischemic systolic heart failure, poa, EF 20%, s/p BiV ICD  -cardiology following  -I/O negative again 3.2 L in last day  -switch to PO lasix 80 mg BID and monitor response  -restart aldactone when ok with cardiology  -coreg 25 mg BID  -echo  showing EF 20% diffuse hypokinesis  -I/O monitoring, daily weight  -weight down again to 149 kg    Elevated troponin  -0.32 -->0.34  -no other signs of ischemia, likely from renal function    Atrial fibrillation  -betablocker  -warfarin per pharmamcy    LAQUITA, poa  -Cr seems to be stable at 1.5 today  -hold ACE, hold spironolactone    DM2   -had hypoglycemic episodes, stopped lantus, fast BS 92 today  -hba1c 9.8  -hold metformin  -continue amaryl      HLD - statin  Morbid obesity, possible LEAH - needs sleep study    Code status: Full  Prophylaxis: Lovenox  Recommended Disposition: Home w/Family     Subjective:     Chief Complaint: weight gain, anasarca    Doing well, still with good diuresis  Feels much lighter  Denies any chest pain  Wants to go home soon    Review of Systems:  Symptom Y/N Comments  Symptom Y/N Comments   Fever/Chills    Chest Pain     Poor Appetite    Edema     Cough    Abdominal Pain     Sputum    Joint Pain     SOB/GROVES    Pruritis/Rash     Nausea/vomit    Tolerating PT/OT     Diarrhea    Tolerating Diet     Constipation    Other       Could NOT obtain due to:      Objective:     VITALS:   Last 24hrs VS reviewed since prior progress note.  Most recent are:  Patient Vitals for the past 24 hrs:   Temp Pulse Resp BP SpO2   17 1118 98.1 °F (36.7 °C) 70 19 126/72 98 %   17 0838 97.9 °F (36.6 °C) 70 19 122/69 97 %   17 0358 97.9 °F (36.6 °C) 72 19 127/66 95 %   17 2321 97.6 °F (36.4 °C) 70 18 116/68 97 %   17 1953 98.1 °F (36.7 °C) 70 18 119/70 100 %   17 1510 98.1 °F (36.7 °C) 76 18 111/60 96 %       Intake/Output Summary (Last 24 hours) at 02/13/17 1357  Last data filed at 02/13/17 0358   Gross per 24 hour   Intake              560 ml   Output             3175 ml   Net            -2615 ml        PHYSICAL EXAM:  General: Alert, cooperative, no acute distress    EENT:  EOMI. Anicteric sclerae. Mucous membranes moist  Resp:  CTA bilaterally, no wheezing or rales. No accessory muscle use  CV:  Regular rhythm, pitting edema much improved in LE  GI:  Soft,+ distended, non tender. +Bowel sounds  Neurologic:  Alert and oriented X 3, normal speech  Psych:   Good insight, not anxious nor agitated  Skin:  No rashes, no jaundice    Reviewed most current lab test results and cultures  YES  Reviewed most current radiology test results   YES  Review and summation of old records today   NO  Reviewed patient's current orders and MAR    YES  PMH/ reviewed - no change compared to H&P  ________________________________________________________________________  Care Plan discussed with:    Comments   Patient x    Family      RN     Care Manager     Consultant                        Multidiciplinary team rounds were held today with , nursing, pharmacist and clinical coordinator. Patient's plan of care was discussed; medications were reviewed and discharge planning was addressed. ________________________________________________________________________  Total NON critical care TIME:  15   Minutes  ________________________________________________________________________  Cristobal Mendiola MD     Procedures: see electronic medical records for all procedures/Xrays and details which were not copied into this note but were reviewed prior to creation of Plan. LABS:  I reviewed today's most current labs and imaging studies. Pertinent labs include:  No results for input(s): WBC, HGB, HCT, PLT, HGBEXT, HCTEXT, PLTEXT, HGBEXT, HCTEXT, PLTEXT in the last 72 hours.   Recent Labs 02/13/17   0403  02/12/17   0337  02/11/17   0515   NA  137  139  139   K  4.4  4.4  4.5   CL  100  100  103   CO2  29  29  27   GLU  111*  139*  77   BUN  33*  33*  31*   CREA  1.52*  1.49*  1.33*   CA  9.0  8.7  8.6   INR  2.0*  2.4*  3.0*       Signed: Debbie Curtis MD

## 2017-02-13 NOTE — PROGRESS NOTES
Progress Note      2/13/2017   NAME: Alfredo White   MRN:  [id number]   Admit Diagnosis: Congestive heart failure (CHF) ([facility] [address]      Problem List:     1. 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 Acute kidney injury, improving  3. Indeterminate troponin elevation consistent with heart failure  4. Hypertensive heart disease with CHF and CKD stage 1-4  5. Diabetes mellitus type 2 without mention of complication  6. Hyperlipidemia  7. Nonischemic dilated cardiomyopathy EF 20% with mild-mod MR on echo here  8. Chronic atrial fibrillation  9. Status post SJM BIV-ICD implant in 2016  10. Sleep apnea, undiagnosed/untreated  11. Obesity with recent weight gain       Assessment/Plan:     1. Has done well with IV diuresis -- down 21 lbs!!! Start oral lasix today at a dose of 80mg BID. 2. He was taken off amiodarone in the past due to ineffectiveness--we reiterated the risk:benefit of using the drug, he remains off  3. Continue coreg 25mg BID  4. Continue pravastatin  5. Holding aldactone and valsartan -- will need to resume ARB when renal fxn stable  6. Continue coumadin w/ daily INRs  7. No BIV-ICD program changes today           [x]       High complexity decision making was performed in this patient at high risk for decompensation with multiple organ involvement. Subjective:     Alfredo White denies chest pain, dyspnea. Feels good. Says he probably hasn't been watching salt as closely as he should. Discussed with RN events overnight. Review of Systems:    Symptom Y/N Comments  Symptom Y/N Comments   Fever/Chills N   Chest Pain N    Poor Appetite N   Edema N    Cough N   Abdominal Pain N    Sputum N   Joint Pain N    SOB/GROVES N   Pruritis/Rash N    Nausea/vomit N   Tolerating PT/OT Y    Diarrhea N   Tolerating Diet Y    Constipation N   Other       Could NOT obtain due to:      Objective:      Physical Exam:    Last 24hrs VS reviewed since prior progress note.  Most recent are:    Visit Vitals    /66 (BP 1 Location: Left arm, BP Patient Position: At rest)    Pulse 72    Temp 97.9 °F (36.6 °C)    Resp 19    Ht 6' 3\" (1.905 m)    Wt 149.4 kg (329 lb 4.8 oz)    SpO2 95%    BMI 41.16 kg/m2       Intake/Output Summary (Last 24 hours) at 02/13/17 0825  Last data filed at 02/13/17 0358   Gross per 24 hour   Intake              800 ml   Output             4075 ml   Net            -3275 ml        General Appearance: Well developed, well nourished, alert & oriented x 3,    no acute distress. Ears/Nose/Mouth/Throat: Hearing grossly normal.  Neck: Supple. Chest: Lungs clear to auscultation bilaterally. Cardiovascular: Irregular rate and rhythm, S1S2 normal, no murmur. Abdomen: Soft, non-tender, bowel sounds are active. Extremities: 2+ edema bilaterally. Skin: Warm and dry. []         Post-cath site without hematoma, bruit, tenderness, or thrill. Distal pulses intact. PMH/SH reviewed - no change compared to H&P    Data Review    Telemetry:  Afib, BiV paced    EKG:   []  No new EKG for review    Lab Data Personally Reviewed:    No results for input(s): WBC, HGB, HCT, PLT, HGBEXT, HCTEXT, PLTEXT, HGBEXT, HCTEXT, PLTEXT in the last 72 hours. Recent Labs      02/13/17   0403  02/12/17   0337  02/11/17   0515   INR  2.0*  2.4*  3.0*   PTP  21.1*  25.2*  31.0*      Recent Labs      02/13/17   0403  02/12/17   0337  02/11/17   0515   NA  137  139  139   K  4.4  4.4  4.5   CL  100  100  103   CO2  29  29  27   BUN  33*  33*  31*   CREA  1.52*  1.49*  1.33*   GLU  111*  139*  77   CA  9.0  8.7  8.6     No results for input(s): CPK, CKNDX, TROIQ in the last 72 hours.     No lab exists for component: CPKMB  Lab Results   Component Value Date/Time    Cholesterol, total 92 03/03/2009 09:40 PM    HDL Cholesterol 44 03/03/2009 09:40 PM    LDL, calculated 38.4 03/03/2009 09:40 PM    Triglyceride 48 03/03/2009 09:40 PM    CHOL/HDL Ratio 2.1 03/03/2009 09:40 PM       No results for input(s): SGOT, GPT, AP, TBIL, TP, ALB, GLOB, GGT, AML, LPSE in the last 72 hours. No lab exists for component: AMYP, HLPSE  No results for input(s): PH, PCO2, PO2 in the last 72 hours.     Medications Personally Reviewed:    Current Facility-Administered Medications   Medication Dose Route Frequency    furosemide (LASIX) tablet 80 mg  80 mg Oral BID    sodium chloride (NS) 0.9 % flush        carvedilol (COREG) tablet 25 mg  25 mg Oral BID WITH MEALS    potassium chloride (K-DUR, KLOR-CON) SR tablet 20 mEq  20 mEq Oral BID    pravastatin (PRAVACHOL) tablet 40 mg  40 mg Oral QHS    sodium chloride (NS) flush 5-10 mL  5-10 mL IntraVENous Q8H    sodium chloride (NS) flush 5-10 mL  5-10 mL IntraVENous PRN    insulin lispro (HUMALOG) injection   SubCUTAneous AC&HS    glucose chewable tablet 16 g  4 Tab Oral PRN    dextrose (D50W) injection syrg 12.5-25 g  12.5-25 g IntraVENous PRN    glucagon (GLUCAGEN) injection 1 mg  1 mg IntraMUSCular PRN    WARFARIN INFORMATION NOTE (COUMADIN)   Other QPM    glimepiride (AMARYL) tablet 4 mg  4 mg Oral 7am    calcium carbonate (TUMS) chewable tablet 200 mg [elemental]  200 mg Oral DAILY    And    magnesium oxide (MAG-OX) tablet 200 mg  200 mg Oral DAILY    acetaminophen (TYLENOL) tablet 650 mg  650 mg Oral Q6H PRN    ondansetron (ZOFRAN) injection 4 mg  4 mg IntraVENous Q6H PRN         Charles Avila MD

## 2017-02-13 NOTE — PROGRESS NOTES
Pharmacy Daily Dosing of Warfarin - Consult for pharmacy to dose     Indication: afib     Goal INR: 2-3     Home Dose: 7.5 mg daily, except 10 mg on th, sat. Concurrent Anticoagulants/Antiplatelets none   Major Interacting Medications (Dose/Frequency): amiodarone d/lloyd 2/10     Date     INR      dose  17    3.7      HOLD  17    3.4      5 mg  2/10/17  3.0      5 mg  17  3.0      3 mg  17  2.4      6 mg    Average Daily Dose since  admission:  3.8   Estimated Creatinine Clearance: 78.8 mL/min (based on Cr of 1.52). Estimated Creatinine Clearance (using IBW): 60.2 mL/min  Recent Labs      17   0403  17   0337  17   0515   CREA  1.52*  1.49*  1.33*   BUN  33*  33*  31*   NA  137  139  139   K  4.4  4.4  4.5   CA  9.0  8.7  8.6   INR  2.0*  2.4*  3.0*     Temp (24hrs), Av °F (36.7 °C), Min:97.6 °F (36.4 °C), Max:98.1 °F (36.7 °C)    Impression/Plan: Amiodarone discontinued 2/10. Due to long half life of Amiodarone, pt may still have residual increased Warfarin sensitivity. However, considering 0.4 fall in INR, will give reduce PTA dosing slightly. Dose:  7mg      Pharmacy will follow daily and adjust the dose as appropriate.     Thank you,  Abdirashid Rutledge, [facility]

## 2017-02-13 NOTE — PROGRESS NOTES
Patient ID:  Patient: Zoey Roger  MRN: [id number]  Age: 57 y.o.  : 1955  Gender: male    Device interrogation/reprogramming:  Acute on chronic systolic CHF, BIV pacing optimization    The SJM model 3365-40Q implanted 2016 was interrogated revealing adequate battery, stable sensing, capture thresholds and lead impedances. This is a BIV-ICD. He is currently in atrial fibrillation which is chronic. RA  Afib 1.4, -, 350  (implanted )  RV  4.7, 0.875, 310, 34 (implanted )  LV -, 0.75 (D1-M2), 680 (implanted  with current generator)    Programmed to DDD , there is 62% true BIV pacing. There is some triggered pacing otherwise. He is in chronic mode switch (100% of the time in Afib). Rare tachycardia episodes were noted, longest 12 sec, most recent 1/15/2017, consistent with nonsustained VT. No therapies given. Impression:  Normal device function as programmed, but should be programmed to ventricular only mode. Will try to maximize true BIV pacing via reprogramming. Recommendations and Programming changes:  1. Base rate increased from 60 to 70 bpm.  2.  DDD mode changed to VVIR mode. 3.  V pace refractory period shortened to 220 from 250 ms.  4.  BIV pacing optimization performed with QuickOpt and LV programmed first 35 ms ahead of RV.

## 2017-02-13 NOTE — CARDIO/PULMONARY
C/P Rehab. Note:     Emerson Cooney is a 57 y.o. male presents with dyspnea/anasarca.      As noted in H&P: \"57 y.o. morbidly obese male with Chronic nonischemic dilated cardiomyopathy EF 17%, Chronic systolic congestive heart failure class, H/o atrial fibrillation, On chronic warfarin, Iatrogenic left bundle branch block with predominant RV pacing, 64-70%, Hypertension, Hyperlipidemia and diabetes presents to [facility] ED C/O Worsening exertional shortness of breath and around 20 pounds weight gain in last 3 weeks     Follow up with patient on printed teaching materials provided earlier in admission on CHF and low NA diet. Instruction given on s/s of CHF, checking weight every am and calling MD if weight is up 2-3 lbs in a day or 5 lbs in a week, fluid/Na restrictions, s/s of worsening CHF and when to call MD. Reviewed activity as tolerated with frequent rest periods as needed, taking medications as prescribed, and the importance of follow up visits with physician. Provided patient with print materials on \"salty six\" and \"spice your life\" to support low sodium diet teaching. Spoke at length on strategies to reduce sodium and practice [facility] food prep and seasoning.     Pt verbalized understanding and had no further questions at this time.      Patient has CP Rehab information, he expects to be discharged in next day or so.

## 2017-02-14 VITALS
BODY MASS INDEX: 39.17 KG/M2 | RESPIRATION RATE: 16 BRPM | OXYGEN SATURATION: 96 % | TEMPERATURE: 97.6 F | HEIGHT: 75 IN | WEIGHT: 315 LBS | HEART RATE: 70 BPM | SYSTOLIC BLOOD PRESSURE: 114 MMHG | DIASTOLIC BLOOD PRESSURE: 64 MMHG

## 2017-02-14 LAB
ANION GAP BLD CALC-SCNC: 8 MMOL/L (ref 5–15)
BUN SERPL-MCNC: 33 MG/DL (ref 6–20)
BUN/CREAT SERPL: 21 (ref 12–20)
CALCIUM SERPL-MCNC: 8.7 MG/DL (ref 8.5–10.1)
CHLORIDE SERPL-SCNC: 100 MMOL/L (ref 97–108)
CO2 SERPL-SCNC: 30 MMOL/L (ref 21–32)
CREAT SERPL-MCNC: 1.56 MG/DL (ref 0.7–1.3)
GLUCOSE BLD STRIP.AUTO-MCNC: 166 MG/DL (ref 65–100)
GLUCOSE BLD STRIP.AUTO-MCNC: 95 MG/DL (ref 65–100)
GLUCOSE SERPL-MCNC: 118 MG/DL (ref 65–100)
INR PPP: 2 (ref 0.9–1.1)
POTASSIUM SERPL-SCNC: 4.2 MMOL/L (ref 3.5–5.1)
PROTHROMBIN TIME: 20.8 SEC (ref 9–11.1)
SERVICE CMNT-IMP: ABNORMAL
SERVICE CMNT-IMP: NORMAL
SODIUM SERPL-SCNC: 138 MMOL/L (ref 136–145)

## 2017-02-14 PROCEDURE — 74011250637 HC RX REV CODE- 250/637: Performed by: HOSPITALIST

## 2017-02-14 PROCEDURE — 80048 BASIC METABOLIC PNL TOTAL CA: CPT | Performed by: INTERNAL MEDICINE

## 2017-02-14 PROCEDURE — 85610 PROTHROMBIN TIME: CPT | Performed by: INTERNAL MEDICINE

## 2017-02-14 PROCEDURE — 74011250637 HC RX REV CODE- 250/637: Performed by: INTERNAL MEDICINE

## 2017-02-14 PROCEDURE — 74011250637 HC RX REV CODE- 250/637: Performed by: EMERGENCY MEDICINE

## 2017-02-14 PROCEDURE — 36415 COLL VENOUS BLD VENIPUNCTURE: CPT | Performed by: INTERNAL MEDICINE

## 2017-02-14 PROCEDURE — 74011636637 HC RX REV CODE- 636/637: Performed by: HOSPITALIST

## 2017-02-14 PROCEDURE — 82962 GLUCOSE BLOOD TEST: CPT

## 2017-02-14 RX ORDER — FUROSEMIDE 80 MG/1
80 TABLET ORAL 2 TIMES DAILY
Qty: 60 TAB | Refills: 1 | Status: SHIPPED | OUTPATIENT
Start: 2017-02-14

## 2017-02-14 RX ORDER — WARFARIN 7.5 MG/1
7.5 TABLET ORAL ONCE
Status: DISCONTINUED | OUTPATIENT
Start: 2017-02-14 | End: 2017-02-14 | Stop reason: HOSPADM

## 2017-02-14 RX ORDER — WARFARIN SODIUM 5 MG/1
7.5 TABLET ORAL DAILY
Qty: 45 TAB | Refills: 0 | Status: SHIPPED | OUTPATIENT
Start: 2017-02-14 | End: 2019-01-19

## 2017-02-14 RX ORDER — POTASSIUM CHLORIDE 1500 MG/1
20 TABLET, FILM COATED, EXTENDED RELEASE ORAL DAILY
Qty: 30 TAB | Refills: 1 | Status: SHIPPED | OUTPATIENT
Start: 2017-02-14

## 2017-02-14 RX ADMIN — Medication 200 MG: at 09:27

## 2017-02-14 RX ADMIN — GLIMEPIRIDE 4 MG: 4 TABLET ORAL at 09:43

## 2017-02-14 RX ADMIN — CALCIUM CARBONATE (ANTACID) CHEW TAB 500 MG 200 MG: 500 CHEW TAB at 09:28

## 2017-02-14 RX ADMIN — INSULIN LISPRO 2 UNITS: 100 INJECTION, SOLUTION INTRAVENOUS; SUBCUTANEOUS at 13:12

## 2017-02-14 RX ADMIN — FUROSEMIDE 80 MG: 80 TABLET ORAL at 09:28

## 2017-02-14 RX ADMIN — Medication 10 ML: at 13:12

## 2017-02-14 RX ADMIN — Medication 10 ML: at 02:53

## 2017-02-14 RX ADMIN — CARVEDILOL 25 MG: 12.5 TABLET, FILM COATED ORAL at 09:27

## 2017-02-14 RX ADMIN — POTASSIUM CHLORIDE 20 MEQ: 20 TABLET, EXTENDED RELEASE ORAL at 09:27

## 2017-02-14 NOTE — PROGRESS NOTES
Report received from Aneesh Jacobs [facility]. SBAR were discussed.     Anam Miguel RN

## 2017-02-14 NOTE — PROGRESS NOTES
[facility] SHIFT NURSING NOTE    Bedside shift change report given to Georgia (oncoming nurse) by Selena Oliva (offgoing nurse). Report included the following information SBAR, Kardex, Intake/Output, MAR and Recent Results. SHIFT SUMMARY:         Admission Date 2/8/2017   Admission Diagnosis Congestive heart failure (CHF) ([facility] [address]   Consults IP CONSULT TO CARDIOLOGY        Consults   [] PT   [] OT   [] Speech   [] Palliative      [] Hospice    [x] Case Management   [] None   Cardiac Monitoring   [x] Yes   [] No     Antibiotics   [] Yes   [x] No   GI Prophylaxis  (Ex: Protonix, Pepcid, etc,.)   [] Yes   [x] No          DVT Prophylaxis   SCDs:             Ajith stockings:         [x] Medication (Ex: Lovenox, Eliquis,  Heparin, etc..)   [] Contraindicated   [] None       Urinary Catheter             LDAs               Peripheral IV 06/21/16 Left Hand (Active)       Peripheral IV 02/08/17 Right Antecubital (Active)   Site Assessment Clean, dry, & intact 2/13/2017  7:48 PM   Phlebitis Assessment 0 2/13/2017  7:48 PM   Infiltration Assessment 0 2/13/2017  7:48 PM   Dressing Status Clean, dry, & intact 2/13/2017  7:48 PM   Dressing Type Transparent 2/13/2017  7:48 PM   Hub Color/Line Status Pink;Capped 2/13/2017  7:48 PM                      I/Os   Intake/Output Summary (Last 24 hours) at 02/13/17 1951  Last data filed at 02/13/17 1948   Gross per 24 hour   Intake              240 ml   Output             1600 ml   Net            -1360 ml         Activity Level Activity Level: Up ad cirilo     Activity Assistance: No assistance needed   Diet Active Orders   Diet    DIET CARDIAC Regular; 2 GM NA (House Low NA); Consistent Carb 1800kcal      Purposeful Rounding every 1-2 hour?    [x] Yes    Gina Score  Total Score: 1   Bed Alarm (If score 3 or >)   [] Yes    [] Refused (See signed refusal form in chart)   Gamal Score  Gamal Score: 22       Gamal Score (if score 14 or less)   [] PMT consult   [] Nutrition consult   [] Wound Care consult      []  Specialty bed         Influenza Vaccine Received Flu Vaccine for Current Season (usually Sept-March): Yes               Needs prior to discharge:   Home O2 required:    [] Yes   [x] No     If yes, how much O2 required?     Other:    Last Bowel Movement Date: 02/12/17        POST-OP SURGICAL VATS   [] Yes   [x] No     Incentive Spirometer:   [] Yes   [] Refused   Coughing and deep breathing:     [] Yes   [] Refused   Oral care:     [] Yes   [] Refused   Understanding (patient education):     [] Yes   [] Refused   Getting out of bed Number times ambulated in hallway past shift:    Number of times OOB to chair past shift:     Head of bed elevation:     [] Yes   [] Refused      Readmission Risk Assessment Tool Score Medium Risk            15       Total Score        3 Relationship with PCP    2 Patient Living Status    3 Patient Length of Stay > 5    4 More than 1 Admission in calendar year    3 Charlson Comorbidity Score        Criteria that do not apply:    Patient Insurance is Medicare, Medicaid or Self Pay       Expected Length of Stay 4d 14h   Actual Length of Stay 5

## 2017-02-14 NOTE — CARDIO/PULMONARY
C/P Rehab. Note:      Nataliya Grewal is a 57 y.o. male presents with dyspnea/anasarca.      As noted in H&P: \"57 y.o. morbidly obese male with Chronic nonischemic dilated cardiomyopathy EF 97%, Chronic systolic congestive heart failure class, H/o atrial fibrillation, On chronic warfarin, Iatrogenic left bundle branch block with predominant RV pacing, 64-70%, Hypertension, Hyperlipidemia and diabetes presents to [facility] ED C/O Worsening exertional shortness of breath and around 20 pounds weight gain in last 3 weeks      Follow up with patient on printed teaching materials provided earlier in admission on CHF and low NA diet. Instruction given on s/s of CHF, checking weight every am and calling MD if weight is up 2-3 lbs in a day or 5 lbs in a week, fluid/Na restrictions, s/s of worsening CHF and when to call MD. Reviewed activity as tolerated with frequent rest periods as needed, taking medications as prescribed, and the importance of follow up visits with physician. Also discussed on \"salty six\" and \"spice your life\" to support low sodium diet teaching. Reviewed cooking strategies and food prep utilizing sam/limes and herbs.      Pt verbalized understanding and had no further questions at this time. Patient expects to be discharged today.

## 2017-02-14 NOTE — DISCHARGE SUMMARY
Hospitalist Discharge Summary     Patient ID:  Da Yi  [id number]  57 y.o.  1955    PCP on record: Bolivar [place] MD    Admit date: 2/8/2017  Discharge date and time: 2/14/2017      DISCHARGE DIAGNOSIS:  Acute on chronic systolic heart failure  Diabetes  ckd  Hyperlipidemia  Morbid obesity        CONSULTATIONS:  IP CONSULT TO CARDIOLOGY    Excerpted HPI from H&P of Ny Clinton MD:  Mr. Jeannie Barragan is a 57 y.o. morbidly obese male with Chronic nonischemic dilated cardiomyopathy EF 62%, Chronic systolic congestive heart failure class, H/o  atrial fibrillation, On chronic warfarin, Iatrogenic left bundle branch block with predominant RV pacing, 64-70%, Hypertension, Hyperlipidemia and diabetes presents to ED [facility] ED C/O  Worsening exertional shortness of breath and around 20 pounds weight gain in last 3 weeks. Says he was seen by Dr. Viridiana Lundberg two week ago and no medications were changed. Pt says he is complaint with his medications and diet , at same time admitted he might had missed 1-2 doses of diuretics. Says his ET has decreased to few steps and he has baseline orthopnea of 3 pillows that hs not changed. In ER work up shows elevated creatinine, slightly elevated troponin. Cardiology was consulted and asked us to admit patient for renal failure. Off note, pt's weight a year ago was 333 lbs and now 351 lbs.       ______________________________________________________________________  DISCHARGE SUMMARY/HOSPITAL COURSE:  for full details see H&P, daily progress notes, labs, consult notes.      Acute on chronic non-ischemic systolic heart failure, poa, EF 20%, s/p BiV ICD  Elevated troponin  afib      -cardiology foseen   improved with iv diuresis, down 21 lbs  -switch to PO lasix 80 mg BID , plan to discharge, with close follow up on cr,   -restart aldactone and valsartan as out patient, once cr stable  Follow up with cardiology in one week  -coreg 25 mg BID  -echo 2/9 showing EF 20% diffuse hypokinesis  Cont coumadin  He was taken off the amiodarone in the past due to ineffectiveness--- after discussions with cardiology  Plan to be off the amiodaone    With high dose lasix, given prescription for potassium chloride 20mg daily ( he was getting in hospital)  Needs to check BMP in one week  Explained to watch for dehydration as well       Probable CKD  Monitor on diuresis     DM2   PTA on saxagliptin- metformin, and amaryl  Not on insulin at home  With kidney function-- we will stop metformin, will cont low dose saxagliptin, and cont amaryl       HLD - statin  Morbid obesity, possible LEAH - needs sleep study        _______________________________________________________________________  Patient seen and examined by me on discharge day. Pertinent Findings:  Gen:    Not in distress  Chest: Clear lungs  CVS:   Regular rhythm. No edema  Abd:  Soft, not distended, not tender  Neuro:  Alert, and oriented  _______________________________________________________________________  DISCHARGE MEDICATIONS:   Current Discharge Medication List      START taking these medications    Details   sAXagliptin (ONGLYZA) 2.5 mg tablet Take 1 Tab by mouth daily. Qty: 30 Tab, Refills: 1         CONTINUE these medications which have CHANGED    Details   furosemide (LASIX) 80 mg tablet Take 1 Tab by mouth two (2) times a day. Qty: 60 Tab, Refills: 1      warfarin (COUMADIN) 5 mg tablet Take 1.5 Tabs by mouth daily. Qty: 45 Tab, Refills: 0         CONTINUE these medications which have NOT CHANGED    Details   magnesium chloride (MAG-SR) 64 mg tablet Take 64 mg by mouth two (2) times a day. potassium chloride (KLOR-CON M20) 20 mEq tablet Take 20 mEq by mouth two (2) times a day. carvedilol (COREG) 25 mg tablet Take 25 mg by mouth two (2) times daily (with meals). simvastatin (ZOCOR) 20 mg tablet Take 20 mg by mouth nightly. insulin NPH (HUMULIN N) 100 unit/mL injection 20 Units by SubCUTAneous route once. Once in am       glimepiride (AMARYL) 4 mg tablet Take 4 mg by mouth every morning. STOP taking these medications       saxagliptin-metFORMIN 2.5-1,000 mg TM24 Comments:   Reason for Stopping:         amiodarone (CORDARONE) 200 mg tablet Comments:   Reason for Stopping:         sildenafil citrate (VIAGRA) 50 mg tablet Comments:   Reason for Stopping:         spironolactone (ALDACTONE) 25 mg tablet Comments:   Reason for Stopping:         valsartan (DIOVAN) 80 mg tablet Comments:   Reason for Stopping:               My Recommended Diet, Activity, Wound Care, and follow-up labs are listed in the patient's Discharge Insturctions which I have personally completed and reviewed.     _______________________________________________________________________  DISPOSITION:    Home with Family: y   Home with HH/PT/OT/RN:    SNF/LTC:    BANDAR:    OTHER:        Condition at Discharge:  Stable  _______________________________________________________________________  Follow up with:   PCP : Edson Gaston MD  Follow-up Information     Follow up With Details Comments Abbi Newman MD   [address] [place] [address]  [phone]                Total time in minutes spent coordinating this discharge (includes going over instructions, follow-up, prescriptions, and preparing report for sign off to her PCP) :  35 minutes    Signed:  Rose Marie Spear MD

## 2017-02-14 NOTE — ROUTINE PROCESS
Patient to make appt with primary care doctor. He has an appt with Dr. Akosua Garcia. He understands medications for home and follow up.  Patient is discharged

## 2017-02-14 NOTE — PROGRESS NOTES
Pharmacist Discharge Medication Reconciliation    Discharging Provider: Dr. Mayo Roger PMH:   Past Medical History   Diagnosis Date    A-fib [facility])     Arrhythmia      atrial fibrillation 2013    Diabetes [facility])     Endocrine disease      diabetes    Hypertension     Irregular heart beat      Chief Complaint for this Admission:   Chief Complaint   Patient presents with    Shortness of Breath     x 1 week    Ankle swelling     bilateral leg swelling x 2 weeks     Allergies: Review of patient's allergies indicates no known allergies. Discharge Medications:   Current Discharge Medication List        CONTINUE these medications which have CHANGED    Details   furosemide (LASIX) 80 mg tablet Take 1 Tab by mouth two (2) times a day. Qty: 60 Tab, Refills: 1           CONTINUE these medications which have NOT CHANGED    Details   saxagliptin-metFORMIN 2.5-1,000 mg TM24 Take  by mouth.      magnesium chloride (MAG-SR) 64 mg tablet Take 64 mg by mouth two (2) times a day. potassium chloride (KLOR-CON M20) 20 mEq tablet Take 20 mEq by mouth two (2) times a day. carvedilol (COREG) 25 mg tablet Take 25 mg by mouth two (2) times daily (with meals). warfarin (COUMADIN) 5 mg tablet Take 5 mg by mouth daily. simvastatin (ZOCOR) 20 mg tablet Take 20 mg by mouth nightly. glimepiride (AMARYL) 4 mg tablet Take 4 mg by mouth every morning. STOP taking these medications       amiodarone (CORDARONE) 200 mg tablet Comments:   Reason for Stopping:         sildenafil citrate (VIAGRA) 50 mg tablet Comments:   Reason for Stopping:         spironolactone (ALDACTONE) 25 mg tablet Comments:   Reason for Stopping:         valsartan (DIOVAN) 80 mg tablet Comments:   Reason for Stopping:         insulin NPH (HUMULIN N) 100 unit/mL injection Comments:   Reason for Stopping:               The patient's chart, MAR and AVS were reviewed by Keila Brumfield RPH.     (Remove the following comments before filing to note)  Recommendations/Clarifications: Recommended discontinue Metformin and continue only Saxagliptin 2.5mg from PTA med list due to SCr 1.56. Recommended continue Warfarin 7.5mg daily (rather than 7.5mg daily and 10mg QThSa as PTA) until pt returns to PCP since Amiodarone was just discontinued 2/10.     Reviewed recommendations with Dr. Elizabeth Gomez who amended AVS.  Notified nurse, English, of amendments to discharge med list.    Time spent: 25 min    Thank you,  Jesus Lezama, [facility]

## 2017-02-14 NOTE — PROGRESS NOTES
Pharmacy Daily Dosing of Warfarin - Consult for pharmacy to dose     Indication: afib     Goal INR: 2-3     Home Dose: 7.5 mg daily, except 10 mg on thurs + sat. Concurrent Anticoagulants/Antiplatelets none   Major Interacting Medications (Dose/Frequency): amiodarone d/lloyd 2/10     Date INR dose          3.7    HOLD          3.4     5 mg  2/10      3.0     5 mg        3.0     3 mg         2.4    6 mg         2.0    7mg     Average Daily Dose since  admission: 4.3  Estimated Creatinine Clearance: 76.7 mL/min (based on Cr of 1.56). Estimated Creatinine Clearance (using IBW): 58.7 mL/min  Recent Labs      17   0304  17   0403  17   0337   CREA  1.56*  1.52*  1.49*   BUN  33*  33*  33*   NA  138  137  139   K  4.2  4.4  4.4   CA  8.7  9.0  8.7   INR  2.0*  2.0*  2.4*     Temp (24hrs), Av.9 °F (36.6 °C), Min:97.7 °F (36.5 °C), Max:98.2 °F (36.8 °C)    Impression/Plan: Amiodarone discontinued 2/10. INR stable. Will resume PTA dose for today. Dose: 7.5mg      Pharmacy will follow daily and adjust the dose as appropriate.   Thank you,  Celia Garcia, [facility]

## 2017-02-15 NOTE — PROGRESS NOTES
Progress Note      2/14/2017   NAME: Chiki Pichardo   MRN:  [id number]   Admit Diagnosis: Congestive heart failure (CHF) ([facility] [address]      Problem List:     1. 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 Acute kidney injury, improving  3. Indeterminate troponin elevation consistent with heart failure  4. Hypertensive heart disease with CHF and CKD stage 1-4  5. Diabetes mellitus type 2 without mention of complication  6. Hyperlipidemia  7. Nonischemic dilated cardiomyopathy EF 20% with mild-mod MR on echo here  8. Chronic atrial fibrillation  9. Status post SJM BIV-ICD implant in 2016  10. Sleep apnea, undiagnosed/untreated  11. Obesity with recent weight gain       Assessment/Plan:     1. Continue with lasix 80mg BID  2. He was taken off amiodarone in the past due to ineffectiveness--we reiterated the risk:benefit of using the drug, he remains off  3. Continue coreg 25mg BID  4. Continue pravastatin  5. Holding aldactone and valsartan -- will need to resume ARB when renal fxn stable -- currently renal function still down, but improved. sCr 1.5. Will hold off on resuming these meds until seen in the office. He has an appt with me on 2/22 at 1500. He will need labs checked that day as well. 6. Continue coumadin w/ daily INRs  7. BiV-ICD changed to VVIR.    8. I think it is reasonable to go home today -- we discussed the options given his renal function (may be new baseline?) -- his preference is to go home. [x]       High complexity decision making was performed in this patient at high risk for decompensation with multiple organ involvement. Subjective:     Chiki Pichardo denies chest pain, dyspnea. Feels much better. Says he probably hasn't been watching salt as closely as he should. Discussed with RN events overnight.      Review of Systems:    Symptom Y/N Comments  Symptom Y/N Comments   Fever/Chills N   Chest Pain N    Poor Appetite N   Edema N    Cough N   Abdominal Pain N    Sputum N   Joint Pain N    SOB/GROVES N   Pruritis/Rash N    Nausea/vomit N   Tolerating PT/OT Y    Diarrhea N   Tolerating Diet Y    Constipation N   Other       Could NOT obtain due to:      Objective:      Physical Exam:    Last 24hrs VS reviewed since prior progress note. Most recent are:    Visit Vitals    /64 (BP 1 Location: Left arm, BP Patient Position: At rest;Sitting)    Pulse 70    Temp 97.6 °F (36.4 °C)    Resp 16    Ht 6' 3\" (1.905 m)    Wt 149.2 kg (328 lb 14.8 oz)    SpO2 96%    BMI 41.11 kg/m2       Intake/Output Summary (Last 24 hours) at 02/14/17 2017  Last data filed at 02/14/17 7866   Gross per 24 hour   Intake              940 ml   Output             2050 ml   Net            -1110 ml        General Appearance: Well developed, well nourished, alert & oriented x 3,    no acute distress. Ears/Nose/Mouth/Throat: Hearing grossly normal.  Neck: Supple. Chest: Lungs clear to auscultation bilaterally. Cardiovascular: Irregular rate and rhythm, S1S2 normal, no murmur. Abdomen: Soft, non-tender, bowel sounds are active. Extremities: 2+ edema bilaterally. Skin: Warm and dry. []         Post-cath site without hematoma, bruit, tenderness, or thrill. Distal pulses intact. PMH/SH reviewed - no change compared to H&P    Data Review    Telemetry:  Afib, BiV paced    EKG:   []  No new EKG for review    Lab Data Personally Reviewed:    No results for input(s): WBC, HGB, HCT, PLT, HGBEXT, HCTEXT, PLTEXT, HGBEXT, HCTEXT, PLTEXT in the last 72 hours. Recent Labs      02/14/17 0304 02/13/17 0403  02/12/17 0337   INR  2.0*  2.0*  2.4*   PTP  20.8*  21.1*  25.2*      Recent Labs      02/14/17 0304 02/13/17 0403 02/12/17 0337   NA  138  137  139   K  4.2  4.4  4.4   CL  100  100  100   CO2  30  29  29   BUN  33*  33*  33*   CREA  1.56*  1.52*  1.49*   GLU  118*  111*  139*   CA  8.7  9.0  8.7     No results for input(s): CPK, CKNDX, TROIQ in the last 72 hours.     No lab exists for component: CPKMB  Lab Results   Component Value Date/Time    Cholesterol, total 92 03/03/2009 09:40 PM    HDL Cholesterol 44 03/03/2009 09:40 PM    LDL, calculated 38.4 03/03/2009 09:40 PM    Triglyceride 48 03/03/2009 09:40 PM    CHOL/HDL Ratio 2.1 03/03/2009 09:40 PM       No results for input(s): SGOT, GPT, AP, TBIL, TP, ALB, GLOB, GGT, AML, LPSE in the last 72 hours. No lab exists for component: AMYP, HLPSE  No results for input(s): PH, PCO2, PO2 in the last 72 hours. Medications Personally Reviewed:    No current facility-administered medications for this encounter. Current Outpatient Prescriptions   Medication Sig    furosemide (LASIX) 80 mg tablet Take 1 Tab by mouth two (2) times a day.  warfarin (COUMADIN) 5 mg tablet Take 1.5 Tabs by mouth daily.  sAXagliptin (ONGLYZA) 2.5 mg tablet Take 1 Tab by mouth daily.  potassium chloride SR (K-TAB) 20 mEq tablet Take 1 Tab by mouth daily.  magnesium chloride (MAG-SR) 64 mg tablet Take 64 mg by mouth two (2) times a day.  potassium chloride (KLOR-CON M20) 20 mEq tablet Take 20 mEq by mouth two (2) times a day.  carvedilol (COREG) 25 mg tablet Take 25 mg by mouth two (2) times daily (with meals).  simvastatin (ZOCOR) 20 mg tablet Take 20 mg by mouth nightly.  insulin NPH (HUMULIN N) 100 unit/mL injection 20 Units by SubCUTAneous route once. Once in am     glimepiride (AMARYL) 4 mg tablet Take 4 mg by mouth every morning.          Narinder Srivastava MD

## 2017-03-20 ENCOUNTER — HOSPITAL ENCOUNTER (EMERGENCY)
Age: 62
Discharge: HOME | End: 2017-03-20
Attending: EMERGENCY MEDICINE | Admitting: EMERGENCY MEDICINE
Payer: COMMERCIAL

## 2017-03-20 ENCOUNTER — APPOINTMENT (OUTPATIENT)
Dept: GENERAL RADIOLOGY | Age: 62
End: 2017-03-20
Attending: PHYSICIAN ASSISTANT
Payer: COMMERCIAL

## 2017-03-20 VITALS
BODY MASS INDEX: 29.47 KG/M2 | DIASTOLIC BLOOD PRESSURE: 76 MMHG | OXYGEN SATURATION: 97 % | RESPIRATION RATE: 20 BRPM | HEART RATE: 70 BPM | WEIGHT: 237 LBS | SYSTOLIC BLOOD PRESSURE: 151 MMHG | HEIGHT: 75 IN | TEMPERATURE: 97.5 F

## 2017-03-20 DIAGNOSIS — M54.41 ACUTE RIGHT-SIDED LOW BACK PAIN WITH RIGHT-SIDED SCIATICA: Primary | ICD-10-CM

## 2017-03-20 PROCEDURE — 96372 THER/PROPH/DIAG INJ SC/IM: CPT

## 2017-03-20 PROCEDURE — 74011250636 HC RX REV CODE- 250/636: Performed by: PHYSICIAN ASSISTANT

## 2017-03-20 PROCEDURE — 99283 EMERGENCY DEPT VISIT LOW MDM: CPT

## 2017-03-20 PROCEDURE — 72100 X-RAY EXAM L-S SPINE 2/3 VWS: CPT

## 2017-03-20 PROCEDURE — 74011250637 HC RX REV CODE- 250/637: Performed by: PHYSICIAN ASSISTANT

## 2017-03-20 RX ORDER — DIAZEPAM 5 MG/1
10 TABLET ORAL
Status: COMPLETED | OUTPATIENT
Start: 2017-03-20 | End: 2017-03-20

## 2017-03-20 RX ORDER — CYCLOBENZAPRINE HCL 10 MG
10 TABLET ORAL
Qty: 20 TAB | Refills: 0 | Status: SHIPPED | OUTPATIENT
Start: 2017-03-20 | End: 2019-01-19

## 2017-03-20 RX ORDER — OXYCODONE AND ACETAMINOPHEN 7.5; 325 MG/1; MG/1
1 TABLET ORAL
Qty: 20 TAB | Refills: 0 | Status: SHIPPED | OUTPATIENT
Start: 2017-03-20 | End: 2019-01-19

## 2017-03-20 RX ORDER — HYDROMORPHONE HYDROCHLORIDE 1 MG/ML
1 INJECTION, SOLUTION INTRAMUSCULAR; INTRAVENOUS; SUBCUTANEOUS
Status: COMPLETED | OUTPATIENT
Start: 2017-03-20 | End: 2017-03-20

## 2017-03-20 RX ADMIN — DIAZEPAM 10 MG: 5 TABLET ORAL at 15:30

## 2017-03-20 RX ADMIN — HYDROMORPHONE HYDROCHLORIDE 1 MG: 1 INJECTION, SOLUTION INTRAMUSCULAR; INTRAVENOUS; SUBCUTANEOUS at 15:31

## 2017-03-20 NOTE — ED PROVIDER NOTES
HPI Comments:   Isacc Avila is a 57 y.o. male with a hx of DM, A-fib, defibrillator, and HTN presenting to the ED C/O right lower back pain which started 2 days ago. He denies any known injury and has been unable to ambulate due to the pain. Pt has been attempting to use a cane to ambulate but the pain is too severe to stand. He has been taking Tylenol with no relief. Pt is currently on Warfarin. Patient denies any other symptoms or complaints. PCP: Jeff Francois MD    There are no other complaints, changes or physical findings at this time. Written by DILMA Yarbrough, as dictated by Shaina Brooks      The history is provided by the patient. No  was used. Past Medical History:   Diagnosis Date    A-fib [facility])     Arrhythmia     atrial fibrillation 2013    Diabetes [facility])     Endocrine disease     diabetes    Hypertension     Irregular heart beat        Past Surgical History:   Procedure Laterality Date    CARDIAC SURG PROCEDURE UNLIST      defib placed in left chest         Family History:   Problem Relation Age of Onset    Heart Disease Mother     Diabetes Father     Heart Disease Father        Social History     Social History    Marital status:      Spouse name: N/A    Number of children: N/A    Years of education: N/A     Occupational History    Not on file. Social History Main Topics    Smoking status: Former Smoker     Quit date: 8/2/1993    Smokeless tobacco: Not on file    Alcohol use No    Drug use: No    Sexual activity: Not on file     Other Topics Concern    Not on file     Social History Narrative         ALLERGIES: Review of patient's allergies indicates no known allergies. Review of Systems   Constitutional: Negative for chills and fever. HENT: Negative for ear pain and sore throat. Eyes: Negative for pain, redness and visual disturbance. Respiratory: Negative for chest tightness, shortness of breath and wheezing. Cardiovascular: Negative for chest pain and palpitations. Gastrointestinal: Negative for abdominal pain, diarrhea, nausea and vomiting. Genitourinary: Negative for dysuria, frequency and urgency. Musculoskeletal: Positive for back pain (right lower). Negative for arthralgias, myalgias and neck pain. Skin: Negative for rash and wound. Neurological: Negative for dizziness, weakness, numbness and headaches. Hematological: Negative for adenopathy. Psychiatric/Behavioral: Negative for dysphoric mood. The patient is not nervous/anxious. Vitals:    03/20/17 1227   BP: 151/76   Pulse: 70   Resp: 20   Temp: 97.5 °F (36.4 °C)   SpO2: 97%   Weight: 107.5 kg (237 lb)   Height: 6' 3\" (1.905 m)            Physical Exam   Constitutional: He is oriented to person, place, and time. He appears well-developed and well-nourished. No distress. HENT:   Head: Normocephalic and atraumatic. Right Ear: External ear normal.   Left Ear: External ear normal.   Nose: Nose normal.   Mouth/Throat: Uvula is midline. No trismus in the jaw. Eyes: Conjunctivae and EOM are normal. Pupils are equal, round, and reactive to light. Neck: Normal range of motion and full passive range of motion without pain. Cardiovascular: Normal rate, regular rhythm and normal heart sounds. Pulmonary/Chest: Effort normal and breath sounds normal. No respiratory distress. Abdominal: Soft. There is no tenderness. Soft, reducible ventral hernia   Musculoskeletal:   RIGHT LOWER BACK:  Independently moves from laying to sitting to standing. No bruising, redness or swelling. No step off. Diffuse muscular discomfort. No CVA tenderness    Neurological: He is alert and oriented to person, place, and time. Negative seated SLR  Patellar DTR are 2+ and symmetric. Normal sensation along all LE dermatomes. Symmetric bulk and tone of LE muscle groups. Strength is full and symmetric of all LE muscle groups. Skin: No rash noted. Psychiatric: He has a normal mood and affect. His speech is normal.   Nursing note and vitals reviewed. MDM  Number of Diagnoses or Management Options  Diagnosis management comments: DDx: compression fx, strain, lumbar radiculopathy       Amount and/or Complexity of Data Reviewed  Tests in the radiology section of CPT®: ordered and reviewed    Patient Progress  Patient progress: stable    Procedures    IMAGING RESULTS:  XR SPINE LUMB 2 OR 3 V   Final Result   INDICATION: Right lower back pain radiating into right leg. No injury      EXAM: 3 views lumbar spine. No comparisons.     FINDINGS: Alignment is normal. There is degenerative spurring at multiple disc  levels. Mild disc height loss L4-L5. . No bony destructive lesions or fractures.     IMPRESSION  IMPRESSION:  1. Mild degenerative changes most significant at L4-L5  Signed by      Signed Date/Time    Phone Pager     Charito Ruiz 3/20/2017 15:59 [phone]           MEDICATIONS GIVEN:  Medications   HYDROmorphone (PF) (DILAUDID) injection 1 mg (1 mg IntraMUSCular Given 3/20/17 1531)   diazePAM (VALIUM) tablet 10 mg (10 mg Oral Given 3/20/17 1530)       IMPRESSION:  1. Acute right-sided low back pain with right-sided sciatica        PLAN:  1. Current Discharge Medication List      START taking these medications    Details   oxyCODONE-acetaminophen (PERCOCET) 7.5-325 mg per tablet Take 1 Tab by mouth every four (4) hours as needed for Pain. Max Daily Amount: 6 Tabs. Qty: 20 Tab, Refills: 0      cyclobenzaprine (FLEXERIL) 10 mg tablet Take 1 Tab by mouth three (3) times daily as needed for Muscle Spasm(s). Qty: 20 Tab, Refills: 0           2.    Follow-up Information     Follow up With Details Comments 7304 Higinio Graves MD Schedule an appointment as soon as possible for a visit As needed [address] Trish Sport MD [address] [facility]      Emily Horta MD Schedule an appointment as soon as possible for a visit ORTHO/SPINE: as needed if symptoms persist [facility]  [address]  [place]  [phone]          Return to ED if worse     Discharge Note:  4:10 PM  The patient is ready for discharge. The patient's signs, symptoms, diagnosis, and discharge instruction have been discussed and the patient has conveyed their understanding. The patient is to follow up as recommended or return to the ER should their symptoms worsen. Plan has been discussed and the patient is in agreement. Written by Remy Sepulveda, ED Scribe, as dictated by Roxanne Jackson. Attestation: This note is prepared by Remy Sepulveda, acting as Scribe for Roxanne Jackson. XIN Avitia: The scribe's documentation has been prepared under my direction and personally reviewed by me in its entirety. I confirm that the note above accurately reflects all work, treatment, procedures, and medical decision making performed by me.

## 2017-03-20 NOTE — ED NOTES
ALEX Cerda has reviewed discharge instructions with the patient. The patient verbalized understanding. Patient taken to car in wheelchair, appears in NAD.

## 2017-03-20 NOTE — LETTER
[address] 
[facility] EMERGENCY DEPT 
[address] [place] [address] [address] [id number] [phone] Work/School Note Date: 3/20/2017 To Whom It May concern: 
 
Wilner Luna was seen and treated today in the emergency room by the following provider(s): 
Attending Provider: Herminio Hoover. Doris Mane MD 
Physician Assistant: ALEX Malik. Wilner Luna may return to work on [id number]. Sincerely, ALEX Malik

## 2017-03-20 NOTE — DISCHARGE INSTRUCTIONS
Thank you for allowing us to provide you with care today. We hope we addressed all of your concerns and needs. We strive to provide excellent quality care in the Emergency Department. Please rate us as excellent, as anything less than excellent does not meet our expectations. If you feel that you have not received excellent quality care or timely care, please ask to speak to the nurse manager. Please choose us in the future for your continued health care needs. The exam and treatment you received in the Emergency Department were for an urgent problem and are not intended as complete care. It is important that you follow-up with a doctor, nurse practitioner, or  [id number] assistant to: (1) confirm your diagnosis, (2) re-evaluation of changes in your illness and treatment, and (3) for ongoing care. If your symptoms become worse or you do not improve as expected and you are unable to reach your usual health care provider, you should return to the Emergency Department. We are available 24 hours a day. Take this sheet with you when you go to your follow-up visit. If you have any problem arranging the follow-up visit, contact the Emergency Department immediately. Make an appointment with your Primary Care doctor for follow up of this visit. Return to the ER if you are unable to be seen in the time recommended on your discharge instructions.

## 2017-03-20 NOTE — ED TRIAGE NOTES
Pt. Presents to ED today for complaints of back pain that radiated down his leg. Pt. States \" I think it is my sciatic nerve. \" Pt. Placed in argueta bed at this time.

## 2017-08-01 ENCOUNTER — HOSPITAL ENCOUNTER (OUTPATIENT)
Dept: GENERAL RADIOLOGY | Age: 62
Discharge: HOME | End: 2017-08-01
Payer: COMMERCIAL

## 2017-08-01 DIAGNOSIS — I50.22 CHRONIC SYSTOLIC HEART FAILURE (HCC): ICD-10-CM

## 2017-08-01 PROCEDURE — 71020 XR CHEST PA LAT: CPT

## 2017-08-04 ENCOUNTER — HOSPITAL ENCOUNTER (OUTPATIENT)
Dept: CARDIAC CATH/INVASIVE PROCEDURES | Age: 62
Discharge: HOME | End: 2017-08-04
Attending: INTERNAL MEDICINE | Admitting: INTERNAL MEDICINE
Payer: COMMERCIAL

## 2017-08-04 VITALS
HEART RATE: 71 BPM | OXYGEN SATURATION: 96 % | TEMPERATURE: 97.8 F | WEIGHT: 285 LBS | DIASTOLIC BLOOD PRESSURE: 77 MMHG | RESPIRATION RATE: 16 BRPM | SYSTOLIC BLOOD PRESSURE: 141 MMHG | HEIGHT: 75 IN | BODY MASS INDEX: 35.43 KG/M2

## 2017-08-04 PROBLEM — I47.20 VENTRICULAR TACHYCARDIA: Status: ACTIVE | Noted: 2017-08-04

## 2017-08-04 LAB
GLUCOSE BLD STRIP.AUTO-MCNC: 330 MG/DL (ref 65–100)
GLUCOSE BLD STRIP.AUTO-MCNC: 332 MG/DL (ref 65–100)
SERVICE CMNT-IMP: ABNORMAL
SERVICE CMNT-IMP: ABNORMAL

## 2017-08-04 PROCEDURE — C1894 INTRO/SHEATH, NON-LASER: HCPCS

## 2017-08-04 PROCEDURE — 77030008543 HC TBNG MON PRSS MRTM -A

## 2017-08-04 PROCEDURE — 77030019698 HC SYR ANGI MDLON MRTM -A

## 2017-08-04 PROCEDURE — 74011250637 HC RX REV CODE- 250/637: Performed by: INTERNAL MEDICINE

## 2017-08-04 PROCEDURE — 74011636320 HC RX REV CODE- 636/320

## 2017-08-04 PROCEDURE — 74011250636 HC RX REV CODE- 250/636: Performed by: INTERNAL MEDICINE

## 2017-08-04 PROCEDURE — 77030010221 HC SPLNT WR POS TELE -B

## 2017-08-04 PROCEDURE — 74011000250 HC RX REV CODE- 250

## 2017-08-04 PROCEDURE — 74011250636 HC RX REV CODE- 250/636

## 2017-08-04 PROCEDURE — 77030004549 HC CATH ANGI DX PRF MRTM -A

## 2017-08-04 PROCEDURE — 99153 MOD SED SAME PHYS/QHP EA: CPT

## 2017-08-04 PROCEDURE — C1769 GUIDE WIRE: HCPCS

## 2017-08-04 PROCEDURE — 82962 GLUCOSE BLOOD TEST: CPT

## 2017-08-04 PROCEDURE — 77030019569 HC BND COMPR RAD TERU -B

## 2017-08-04 RX ORDER — MIDAZOLAM HYDROCHLORIDE 1 MG/ML
.5-2 INJECTION, SOLUTION INTRAMUSCULAR; INTRAVENOUS
Status: DISCONTINUED | OUTPATIENT
Start: 2017-08-04 | End: 2017-08-04

## 2017-08-04 RX ORDER — IODIXANOL 320 MG/ML
0-200 INJECTION, SOLUTION INTRAVASCULAR
Status: DISCONTINUED | OUTPATIENT
Start: 2017-08-04 | End: 2017-08-04

## 2017-08-04 RX ORDER — HEPARIN SODIUM 200 [USP'U]/100ML
500 INJECTION, SOLUTION INTRAVENOUS ONCE
Status: COMPLETED | OUTPATIENT
Start: 2017-08-04 | End: 2017-08-04

## 2017-08-04 RX ORDER — HEPARIN SODIUM 200 [USP'U]/100ML
INJECTION, SOLUTION INTRAVENOUS
Status: COMPLETED
Start: 2017-08-04 | End: 2017-08-04

## 2017-08-04 RX ORDER — HEPARIN SODIUM 1000 [USP'U]/ML
INJECTION, SOLUTION INTRAVENOUS; SUBCUTANEOUS
Status: COMPLETED
Start: 2017-08-04 | End: 2017-08-04

## 2017-08-04 RX ORDER — VERAPAMIL HYDROCHLORIDE 2.5 MG/ML
2.5 INJECTION, SOLUTION INTRAVENOUS ONCE
Status: COMPLETED | OUTPATIENT
Start: 2017-08-04 | End: 2017-08-04

## 2017-08-04 RX ORDER — FENTANYL CITRATE 50 UG/ML
INJECTION, SOLUTION INTRAMUSCULAR; INTRAVENOUS
Status: COMPLETED
Start: 2017-08-04 | End: 2017-08-04

## 2017-08-04 RX ORDER — VERAPAMIL HYDROCHLORIDE 2.5 MG/ML
INJECTION, SOLUTION INTRAVENOUS
Status: COMPLETED
Start: 2017-08-04 | End: 2017-08-04

## 2017-08-04 RX ORDER — HEPARIN SODIUM 1000 [USP'U]/ML
1000-10000 INJECTION, SOLUTION INTRAVENOUS; SUBCUTANEOUS ONCE
Status: COMPLETED | OUTPATIENT
Start: 2017-08-04 | End: 2017-08-04

## 2017-08-04 RX ORDER — CARVEDILOL 12.5 MG/1
25 TABLET ORAL 2 TIMES DAILY WITH MEALS
Status: DISCONTINUED | OUTPATIENT
Start: 2017-08-04 | End: 2017-08-04 | Stop reason: HOSPADM

## 2017-08-04 RX ORDER — CARVEDILOL 25 MG/1
25 TABLET ORAL 2 TIMES DAILY WITH MEALS
Qty: 30 TAB | Refills: 5 | Status: SHIPPED | OUTPATIENT
Start: 2017-08-04 | End: 2019-01-19

## 2017-08-04 RX ORDER — FENTANYL CITRATE 50 UG/ML
25-50 INJECTION, SOLUTION INTRAMUSCULAR; INTRAVENOUS
Status: DISCONTINUED | OUTPATIENT
Start: 2017-08-04 | End: 2017-08-04

## 2017-08-04 RX ORDER — IODIXANOL 320 MG/ML
INJECTION, SOLUTION INTRAVASCULAR
Status: COMPLETED
Start: 2017-08-04 | End: 2017-08-04

## 2017-08-04 RX ORDER — MIDAZOLAM HYDROCHLORIDE 1 MG/ML
INJECTION, SOLUTION INTRAMUSCULAR; INTRAVENOUS
Status: COMPLETED
Start: 2017-08-04 | End: 2017-08-04

## 2017-08-04 RX ORDER — LIDOCAINE HYDROCHLORIDE 10 MG/ML
1-30 INJECTION, SOLUTION EPIDURAL; INFILTRATION; INTRACAUDAL; PERINEURAL
Status: DISCONTINUED | OUTPATIENT
Start: 2017-08-04 | End: 2017-08-04

## 2017-08-04 RX ORDER — LIDOCAINE HYDROCHLORIDE 10 MG/ML
INJECTION, SOLUTION EPIDURAL; INFILTRATION; INTRACAUDAL; PERINEURAL
Status: COMPLETED
Start: 2017-08-04 | End: 2017-08-04

## 2017-08-04 RX ORDER — GUAIFENESIN 100 MG/5ML
81 LIQUID (ML) ORAL
Status: COMPLETED | OUTPATIENT
Start: 2017-08-04 | End: 2017-08-04

## 2017-08-04 RX ADMIN — FENTANYL CITRATE 25 MCG: 50 INJECTION, SOLUTION INTRAMUSCULAR; INTRAVENOUS at 08:41

## 2017-08-04 RX ADMIN — IODIXANOL 20 ML: 320 INJECTION, SOLUTION INTRAVASCULAR at 09:05

## 2017-08-04 RX ADMIN — HEPARIN SODIUM 5000 UNITS: 1000 INJECTION, SOLUTION INTRAVENOUS; SUBCUTANEOUS at 09:00

## 2017-08-04 RX ADMIN — VERAPAMIL HYDROCHLORIDE 2.5 MG: 2.5 INJECTION, SOLUTION INTRAVENOUS at 09:01

## 2017-08-04 RX ADMIN — HEPARIN SODIUM 1000 UNITS: 200 INJECTION, SOLUTION INTRAVENOUS at 08:56

## 2017-08-04 RX ADMIN — LIDOCAINE HYDROCHLORIDE 1 ML: 10 INJECTION, SOLUTION EPIDURAL; INFILTRATION; INTRACAUDAL; PERINEURAL at 08:59

## 2017-08-04 RX ADMIN — ASPIRIN 81 MG 81 MG: 81 TABLET ORAL at 07:42

## 2017-08-04 RX ADMIN — IODIXANOL 15 ML: 320 INJECTION, SOLUTION INTRAVASCULAR at 09:10

## 2017-08-04 RX ADMIN — MIDAZOLAM HYDROCHLORIDE 1 MG: 1 INJECTION, SOLUTION INTRAMUSCULAR; INTRAVENOUS at 08:56

## 2017-08-04 RX ADMIN — NITROGLYCERIN 200 MCG: 5 INJECTION, SOLUTION INTRAVENOUS at 09:01

## 2017-08-04 RX ADMIN — HEPARIN SODIUM 1000 UNITS: 200 INJECTION, SOLUTION INTRAVENOUS at 08:57

## 2017-08-04 RX ADMIN — MIDAZOLAM HYDROCHLORIDE 2 MG: 1 INJECTION, SOLUTION INTRAMUSCULAR; INTRAVENOUS at 08:41

## 2017-08-04 RX ADMIN — VERAPAMIL HYDROCHLORIDE 2.5 MG: 2.5 INJECTION INTRAVENOUS at 09:01

## 2017-08-04 RX ADMIN — CARVEDILOL 25 MG: 12.5 TABLET, FILM COATED ORAL at 07:42

## 2017-08-04 RX ADMIN — MIDAZOLAM HYDROCHLORIDE 2 MG: 1 INJECTION INTRAMUSCULAR; INTRAVENOUS at 08:41

## 2017-08-04 NOTE — PROCEDURES
Cardiac Cathetherization Note     PreOp Diagnosis:    []       Chest Pain   []       STEMI   []       Unstable Angina   []       NSTEMI   [x]       Cardiomyopathy/Heart Failure   [x]       Abnormal Stress Test   []       Valve Disease   []       Pre-Operative Evaluation   [x]       Other:  Ventricular Tachycardia    Findings/PostOp Diagnosis:   1. No angiographic epicardial coronary disease  2. Borderline elevated LVEDP     Recommendations:   1. Continue DAPT uninterrupted for 12 months and ASA 81mg indefinitely thereafter   2. Routine post procedure & access site care   3. Continue aggressive medical management and risk factor modification     I have explained the nature of cardiac catheterization and possible percutaneous coronary intervention including risks and benefits of the procedure with the patient which include at least a 1:1000 risk for diagnostic procedure and 1/100 risk for percutaneous intervention. Risks include but are not limited to risk of heart attack, stroke, vascular trauma requiring surgical repair or transfusion, abnormal heart rhythm requiring defibrillation or pacemaker, need for intraaortic balloon pump support, renal dysfunction requiring dialysis, exacerbated gastrointestinal bleeding, allergic response to medications requiring ventilatory support, emergent cardiac surgery and even death. They also understand the need for medical compliance - particularly if stenting is required - mandating continued daily consumption of aspirin and plavix or other antiplatelet therapy. Differences between medicated stent versus bare metal stent reviewed with patient. The patient expresses an understanding and verbally consents. They also understand plans for either radial or femoral access - with unique risks to both vascular beds including arterial occlusion, vascular trauma, hematoma and need for vascular surgery.  I have answered all of their questions regarding the procedure and they are willing to proceed. Procedures: LHC, Cors, Cineflouroscopy     Indication:  As above    Procedure status: [x]  Elective  [] Urgent  [] Emergent    Operators:  Abdelrahman Kruger DO    Assistants:     Access:   [x]  RIGHT Radial  []  LEFT Radial  [] RCFA  []  LCFA  []  RCFV  []  LCFV    Catheters: 6Fr JR4, JL3.5     Closure: [x]  TR Band  []  Angioseal  []  Perclose  []  Manual Compression    Tubes/Drains:  [x] No tubes or drains remain from this procedure  [] Other:     Estimated Blood Loss: Minimal     Specimens: None     Sedation: Moderate conscious sedation with IV fentany & versed, local anesthesia with 1% lidocaine. This was performed by non-anesthesia personnel and I provided direct supervision to a trained independent observer. Time under moderate sedation: 22  Min    Patient age:  57 y.o. Contrast:   35  cc  []  Isovue   [x]  Visipaque    Complications:  [x] None  [] Other:     Patient Condition at the end of the procedure:  [x] Stable  [] Other:      Hemodynamics: Ao: 120/77/94  LV: 120/15    Cors:     Dominance: [x] Right  [] Left  [] Mixed    LM: Large caliber vessel without significant stenosis    LAD: Large caliber vessel that wraps around the apex without significant stenosis. D1: Moderate caliber vessel without significant stenosis. D2: Moderate caliber vessel without significant stenosis. LCX: Large caliber vessel without significant stenosis. OM1: Moderate caliber vessel without significant stenosis. OM2: Large caliber vessel without significant stenosis. RCA: Large caliber, dominant vessel without significant stenosis. PDA: Moderate caliber vessel without significant stenosis. PLB: Moderate caliber vessel without significant stenosis.        LV angiography: N/A  EF:   Wall motion:   MR:    Regino Roque III, DO

## 2017-08-04 NOTE — DISCHARGE INSTRUCTIONS
[address] [facility], [address]   [phone]  [address]    [email or web address]    Patient Discharge Instructions    Katarzyna Parker / [id number] : 1955    Admitted 2017 Discharged: 2017       · It is important that you take the medication exactly as they are prescribed. · Keep your medication in the bottles provided by the pharmacist and keep a list of the medication names, dosages, and times to be taken in your wallet. · Do not take other medications without consulting your doctor. BRING ALL OF YOUR MEDICINES TO YOUR OFFICE VISIT with Tsering Jones III, DO. Follow-up with Tsering Jones III, DO in 2 weeks. Cardiac Catheterization  Discharge Instructions    Transradial Catheterization Discharge Instructions (WRIST)    Discharge instructions: Your radial artery in your wrist was used for your cardiac catheterization. This site may be slightly bruised and sore following your procedure. Expect mild tingling or the hand and tenderness at the puncture site for up to 3 days. Excess movement of the wrist used should be avoided for the next 24-48 hours. 1. No lifting over 2 pounds (approximately a ½ gallon of milk) with this arm for 24 hours. 2. Keep the site of the procedure covered with a bandage for 24 hours. 3. You may shower the day after your procedure. Do not take a tub bath or submerge the puncture site in water for 48 hours. 4. No heavy impact activity/lifting > 30 pounds for 1 week. If bleeding of the wrist occurs at home:   If the site on your wrist where you had the catheterization procedure begins to bleed, do not panic. 1. Place 1 or 2 fingers over the puncture site and hold pressure to stop the bleeding. You may be able to feel your pulse as you hold pressure. 2. Lift your fingers after 5 minutes to see if the bleeding has stopped. 3. Once the bleeding has stopped, gently wipe the wrist area clean and cover with a bandage. If the bleeding from your wrist does not stop after 15 minutes, or if there is a large amount of bleeding or spurting, call 911 immediately (do not drive yourself to the hospital). Other concerns: The site may be slightly bruised and sore following your procedure. Should any of the following occur, contact your physician immediately:   1. Any cool or coldness of the arm, discoloration over a large area, ongoing numbness or any abnormal sensations , moderate to severe pain or swelling in the arm. 2. Redness, soreness, swelling, chills or fever, or colored drainage at the procedure site within 3-7 days after your procedure. If you have any further questions or concerns regarding your procedure please call the Cardiac Cath Lab office at [phone]. During regular business hours ask to speak to Dr. Dinora Laek. During non-business hours the answering service will answer. Ask to speak to the physician on call for [facility].     Transfemoral Catheterization Discharge Instructions Familia Howard)     Do not drive, operate any machinery, or sign any legal documents for 24 hours after your procedure. You must have someone to drive you home.  You may take a shower 24 hours after your cardiac catheterization. Be sure to get the dressing wet and then remove it; gently wash the area with warm soapy water. Pat dry and leave open to air. To help prevent infections, be sure to keep the cath site clean and dry. No lotions, creams, powders, ointments, etc. in the cath site for approximately 1 week.  Do not take a tub bath, get in a hot tub or swimming pool for approximately 5 days or until the cath site is completely healed.  No strenuous activity or heavy lifting over 10 lbs. for 7 days.  Drink plenty of fluids for 24-48 hours after your cath to flush the contrast dye from your kidneys. No alcoholic beverages for 24 hours.   You may resume your previous diet (low fat, low cholesterol) after your cath.  After your cath, some bruising or discomfort is common during the healing process. Tylenol, 1-2 tablets every 6 hours as needed, is recommended if you experience any discomfort. If you experience any signs or symptoms of infection such as fever, chills, or poorly healing incision, persistent tenderness or swelling in the groin, redness and/or warmth to the touch, numbness, significant tingling or pain at the groin site or affected extremity, rash, drainage from the cath site, or if the leg feels tight or swollen, call your physician right away.  If bleeding at the cath site occurs, take a clean gauze pad and apply direct pressure to the groin just above the puncture site. Call 911 immediately, and continue to apply direct pressure until an ambulance gets to your location.  You may return to work  2  days after your cardiac cath if no groin bleeding. Information obtained by :  I understand that if any problems occur once I am at home I am to contact my physician. I understand and acknowledge receipt of the instructions indicated above. R.N.'s Signature                                                                  Date/Time                                                                                                                                              Patient or Representative Signature                                                          Date/Time      Yovani England III, DO             [address] [facility] [address] [facility]    [phone]  [facility] [address]    [email or web address]

## 2017-08-04 NOTE — IP AVS SNAPSHOT
[address] [facility] 
[phone] Patient: Baron Graff MRN: [id number] DD/3/2340 You are allergic to the following No active allergies Recent Documentation Height Weight BMI Smoking Status 1.905 m 129.3 kg 35.62 kg/m2 Former Smoker Emergency Contacts Name Discharge Info Relation Home Work Mobile Poonam Myers DISCHARGE CAREGIVER [3] Spouse [3] [phone] About your hospitalization You were admitted on:  2017 You last received care in the:  hospitals 2 [facility] You were discharged on:  2017 Unit phone number:  [phone] Why you were hospitalized Your primary diagnosis was:  Not on File Your diagnoses also included:  Ventricular Tachycardia (Hcc) Providers Seen During Your Hospitalizations Provider Role Specialty Primary office phone Aris Sharma DO Attending Provider Cardiology [phone] Your Primary Care Physician (PCP) Primary Care Physician Office Phone Office Fax Celestina Postl, [address] [place] [address] Drive [phone] Follow-up Information Follow up With Details Comments Contact Info Jasmin Scott NP Schedule an appointment as soon as possible for a visit  [address] [facility] 
[phone] Adamaris Lucas MD   [address] [facility] [address] Adamaris Lucas MD 
[facility] 7 [address] [phone] Current Discharge Medication List  
  
CONTINUE these medications which have CHANGED Dose & Instructions Dispensing Information Comments Morning Noon Evening Bedtime * carvedilol 25 mg tablet Commonly known as:  Osito Loyola What changed:  Another medication with the same name was added. Make sure you understand how and when to take each. Your last dose was: Your next dose is: Dose:  25 mg Take 25 mg by mouth two (2) times daily (with meals). Refills:  0  
     
   
   
   
  
 * carvedilol 25 mg tablet Commonly known as:  Cesar Thompson What changed: You were already taking a medication with the same name, and this prescription was added. Make sure you understand how and when to take each. Your last dose was: Your next dose is:    
   
   
 Dose:  25 mg Take 1 Tab by mouth two (2) times daily (with meals). Quantity:  30 Tab Refills:  5  
     
   
   
   
  
 potassium chloride SR 20 mEq tablet Commonly known as:  K-TAB What changed:  Another medication with the same name was removed. Continue taking this medication, and follow the directions you see here. Your last dose was: Your next dose is:    
   
   
 Dose:  20 mEq Take 1 Tab by mouth daily. Quantity:  30 Tab Refills:  1  
     
   
   
   
  
 * Notice: This list has 2 medication(s) that are the same as other medications prescribed for you. Read the directions carefully, and ask your doctor or other care provider to review them with you. CONTINUE these medications which have NOT CHANGED Dose & Instructions Dispensing Information Comments Morning Noon Evening Bedtime  
 cyclobenzaprine 10 mg tablet Commonly known as:  FLEXERIL Your last dose was: Your next dose is:    
   
   
 Dose:  10 mg Take 1 Tab by mouth three (3) times daily as needed for Muscle Spasm(s). Quantity:  20 Tab Refills:  0  
     
   
   
   
  
 furosemide 80 mg tablet Commonly known as:  LASIX Your last dose was: Your next dose is:    
   
   
 Dose:  80 mg Take 1 Tab by mouth two (2) times a day. Quantity:  60 Tab Refills:  1  
     
   
   
   
  
 glimepiride 4 mg tablet Commonly known as:  AMARYL Your last dose was: Your next dose is:    
   
   
 Dose:  4 mg Take 4 mg by mouth every morning. Refills:  0 HumuLIN N 100 unit/mL injection Generic drug:  insulin NPH Your last dose was: Your next dose is:    
   
   
 Dose:  20 Units 20 Units by SubCUTAneous route once. Once in am  
 Refills:  0 MAG-SR 64 mg tablet Generic drug:  magnesium chloride Your last dose was: Your next dose is:    
   
   
 Dose:  64 mg Take 64 mg by mouth two (2) times a day. Refills:  0  
     
   
   
   
  
 oxyCODONE-acetaminophen 7.5-325 mg per tablet Commonly known as:  PERCOCET Your last dose was: Your next dose is:    
   
   
 Dose:  1 Tab Take 1 Tab by mouth every four (4) hours as needed for Pain. Max Daily Amount: 6 Tabs. Quantity:  20 Tab Refills:  0  
     
   
   
   
  
 sAXagliptin 2.5 mg tablet Commonly known as:  ONGLYZA Your last dose was: Your next dose is:    
   
   
 Dose:  2.5 mg Take 1 Tab by mouth daily. Quantity:  30 Tab Refills:  1  
     
   
   
   
  
 simvastatin 20 mg tablet Commonly known as:  ZOCOR Your last dose was: Your next dose is:    
   
   
 Dose:  20 mg Take 20 mg by mouth nightly. Refills:  0  
     
   
   
   
  
 warfarin 5 mg tablet Commonly known as:  COUMADIN Your last dose was: Your next dose is:    
   
   
 Dose:  7.5 mg Take 1.5 Tabs by mouth daily. Quantity:  45 Tab Refills:  0 Where to Get Your Medications Information on where to get these meds will be given to you by the nurse or doctor. ! Ask your nurse or doctor about these medications  
  carvedilol 25 mg tablet Discharge Instructions [address], [address]   [phone] [address]    [email or web address] Patient Discharge Instructions Kasi Guerrero / [id number] : 1955 Admitted 2017 Discharged: 2017 · It is important that you take the medication exactly as they are prescribed. · Keep your medication in the bottles provided by the pharmacist and keep a list of the medication names, dosages, and times to be taken in your wallet. · Do not take other medications without consulting your doctor. BRING ALL OF YOUR MEDICINES TO YOUR OFFICE VISIT with Rose Marie Friedman III, DO. Follow-up with Rose Marie Friedman III, DO in 2 weeks. Cardiac Catheterization  Discharge Instructions Transradial Catheterization Discharge Instructions (WRIST) Discharge instructions: Your radial artery in your wrist was used for your cardiac catheterization. This site may be slightly bruised and sore following your procedure. Expect mild tingling or the hand and tenderness at the puncture site for up to 3 days. Excess movement of the wrist used should be avoided for the next 24-48 hours. 1. No lifting over 2 pounds (approximately a ½ gallon of milk) with this arm for 24 hours. 2. Keep the site of the procedure covered with a bandage for 24 hours. 3. You may shower the day after your procedure. Do not take a tub bath or submerge the puncture site in water for 48 hours. 4. No heavy impact activity/lifting > 30 pounds for 1 week. If bleeding of the wrist occurs at home: If the site on your wrist where you had the catheterization procedure begins to bleed, do not panic. 1. Place 1 or 2 fingers over the puncture site and hold pressure to stop the bleeding. You may be able to feel your pulse as you hold pressure. 2. Lift your fingers after 5 minutes to see if the bleeding has stopped. 3. Once the bleeding has stopped, gently wipe the wrist area clean and cover with a bandage. If the bleeding from your wrist does not stop after 15 minutes, or if there is a large amount of bleeding or spurting, call 911 immediately (do not drive yourself to the hospital). Other concerns: The site may be slightly bruised and sore following your procedure. Should any of the following occur, contact your physician immediately: 1. Any cool or coldness of the arm, discoloration over a large area, ongoing numbness or any abnormal sensations , moderate to severe pain or swelling in the arm. 2. Redness, soreness, swelling, chills or fever, or colored drainage at the procedure site within 3-7 days after your procedure. If you have any further questions or concerns regarding your procedure please call the Cardiac Cath Lab office at [phone]. During regular business hours ask to speak to Dr. Spike Dang. During non-business hours the answering service will answer. Ask to speak to the physician on call for [facility].  
 
Transfemoral Catheterization Discharge Instructions (GROIN) ? Do not drive, operate any machinery, or sign any legal documents for 24 hours after your procedure. You must have someone to drive you home. ? You may take a shower 24 hours after your cardiac catheterization. Be sure to get the dressing wet and then remove it; gently wash the area with warm soapy water. Pat dry and leave open to air. To help prevent infections, be sure to keep the cath site clean and dry. No lotions, creams, powders, ointments, etc. in the cath site for approximately 1 week. ? Do not take a tub bath, get in a hot tub or swimming pool for approximately 5 days or until the cath site is completely healed. ? No strenuous activity or heavy lifting over 10 lbs. for 7 days. ? Drink plenty of fluids for 24-48 hours after your cath to flush the contrast dye from your kidneys. No alcoholic beverages for 24 hours. You may resume your previous diet (low fat, low cholesterol) after your cath. ? After your cath, some bruising or discomfort is common during the healing process.   Tylenol, 1-2 tablets every 6 hours as needed, is recommended if you experience any discomfort. If you experience any signs or symptoms of infection such as fever, chills, or poorly healing incision, persistent tenderness or swelling in the groin, redness and/or warmth to the touch, numbness, significant tingling or pain at the groin site or affected extremity, rash, drainage from the cath site, or if the leg feels tight or swollen, call your physician right away. ? If bleeding at the cath site occurs, take a clean gauze pad and apply direct pressure to the groin just above the puncture site. Call 911 immediately, and continue to apply direct pressure until an ambulance gets to your location. ? You may return to work  2  days after your cardiac cath if no groin bleeding. Information obtained by : 
I understand that if any problems occur once I am at home I am to contact my physician. I understand and acknowledge receipt of the instructions indicated above. R.N.'s Signature                                                                  Date/Time Patient or Representative Signature                                                          Date/Time [facility], [address] [facility] [address] [place] [address] [address] [phone] [address]    [email or web address] Discharge Orders None Introducing hospitals & HEALTH SERVICES! Julian Zurita introduces [employer] patient portal. Now you can access parts of your medical record, email your doctor's office, and request medication refills online. 1. In your internet browser, go to [email or web address] 2. Click on the First Time User? Click Here link in the Sign In box.  You will see the New Member Sign Up page. 3. Enter your [employer] Access Code exactly as it appears below. You will not need to use this code after youve completed the sign-up process. If you do not sign up before the expiration date, you must request a new code. · [employer] Access Code: [email or web address] Expires: 10/30/2017  2:50 PM 
 
4. Enter the last four digits of your Social Security Number (xxxx) and Date of Birth (mm/dd/yyyy) as indicated and click Submit. You will be taken to the next sign-up page. 5. Create a [employer] ID. This will be your [employer] login ID and cannot be changed, so think of one that is secure and easy to remember. 6. Create a [employer] password. You can change your password at any time. 7. Enter your Password Reset Question and Answer. This can be used at a later time if you forget your password. 8. Enter your e-mail address. You will receive e-mail notification when new information is available in [address] 9. Click Sign Up. You can now view and download portions of your medical record. 10. Click the Download Summary menu link to download a portable copy of your medical information. If you have questions, please visit the Frequently Asked Questions section of the [employer] website. Remember, [employer] is NOT to be used for urgent needs. For medical emergencies, dial 911. Now available from your iPhone and Android! General Information Please provide this summary of care documentation to your next provider. Patient Signature:  ____________________________________________________________ Date:  ____________________________________________________________  
  
Arline Morales Provider Signature:  ____________________________________________________________ Date:  ____________________________________________________________

## 2019-01-19 ENCOUNTER — HOSPITAL ENCOUNTER (EMERGENCY)
Age: 64
Discharge: HOME | End: 2019-01-19
Attending: EMERGENCY MEDICINE
Payer: COMMERCIAL

## 2019-01-19 VITALS
WEIGHT: 273 LBS | HEART RATE: 74 BPM | TEMPERATURE: 98.3 F | OXYGEN SATURATION: 98 % | RESPIRATION RATE: 20 BRPM | DIASTOLIC BLOOD PRESSURE: 61 MMHG | SYSTOLIC BLOOD PRESSURE: 122 MMHG | BODY MASS INDEX: 33.94 KG/M2 | HEIGHT: 75 IN

## 2019-01-19 DIAGNOSIS — Z45.02 AICD DISCHARGE: Primary | ICD-10-CM

## 2019-01-19 DIAGNOSIS — I48.91 ATRIAL FIBRILLATION WITH RVR (HCC): ICD-10-CM

## 2019-01-19 LAB
ALBUMIN SERPL-MCNC: 2.9 G/DL (ref 3.5–5)
ALBUMIN/GLOB SERPL: 0.6 {RATIO} (ref 1.1–2.2)
ALP SERPL-CCNC: 70 U/L (ref 45–117)
ALT SERPL-CCNC: 14 U/L (ref 12–78)
ANION GAP SERPL CALC-SCNC: 7 MMOL/L (ref 5–15)
AST SERPL-CCNC: 31 U/L (ref 15–37)
ATRIAL RATE: 61 BPM
BASOPHILS # BLD: 0 K/UL (ref 0–0.1)
BASOPHILS NFR BLD: 0 % (ref 0–1)
BILIRUB SERPL-MCNC: 0.9 MG/DL (ref 0.2–1)
BUN SERPL-MCNC: 16 MG/DL (ref 6–20)
BUN/CREAT SERPL: 13 (ref 12–20)
CALCIUM SERPL-MCNC: 8.5 MG/DL (ref 8.5–10.1)
CALCULATED R AXIS, ECG10: -44 DEGREES
CALCULATED T AXIS, ECG11: 60 DEGREES
CHLORIDE SERPL-SCNC: 99 MMOL/L (ref 97–108)
CK SERPL-CCNC: 103 U/L (ref 39–308)
CO2 SERPL-SCNC: 29 MMOL/L (ref 21–32)
COMMENT, HOLDF: NORMAL
CREAT SERPL-MCNC: 1.28 MG/DL (ref 0.7–1.3)
DIAGNOSIS, 93000: NORMAL
DIFFERENTIAL METHOD BLD: ABNORMAL
EOSINOPHIL # BLD: 0.1 K/UL (ref 0–0.4)
EOSINOPHIL NFR BLD: 2 % (ref 0–7)
ERYTHROCYTE [DISTWIDTH] IN BLOOD BY AUTOMATED COUNT: 16.1 % (ref 11.5–14.5)
GLOBULIN SER CALC-MCNC: 4.5 G/DL (ref 2–4)
GLUCOSE SERPL-MCNC: 132 MG/DL (ref 65–100)
HCT VFR BLD AUTO: 35 % (ref 36.6–50.3)
HGB BLD-MCNC: 10.9 G/DL (ref 12.1–17)
IMM GRANULOCYTES # BLD AUTO: 0 K/UL (ref 0–0.04)
IMM GRANULOCYTES NFR BLD AUTO: 0 % (ref 0–0.5)
LYMPHOCYTES # BLD: 1.5 K/UL (ref 0.8–3.5)
LYMPHOCYTES NFR BLD: 22 % (ref 12–49)
MCH RBC QN AUTO: 28.7 PG (ref 26–34)
MCHC RBC AUTO-ENTMCNC: 31.1 G/DL (ref 30–36.5)
MCV RBC AUTO: 92.1 FL (ref 80–99)
MONOCYTES # BLD: 0.8 K/UL (ref 0–1)
MONOCYTES NFR BLD: 11 % (ref 5–13)
NEUTS SEG # BLD: 4.5 K/UL (ref 1.8–8)
NEUTS SEG NFR BLD: 65 % (ref 32–75)
NRBC # BLD: 0 K/UL (ref 0–0.01)
NRBC BLD-RTO: 0 PER 100 WBC
PLATELET # BLD AUTO: 247 K/UL (ref 150–400)
PMV BLD AUTO: 10.5 FL (ref 8.9–12.9)
POTASSIUM SERPL-SCNC: 4.5 MMOL/L (ref 3.5–5.1)
PROT SERPL-MCNC: 7.4 G/DL (ref 6.4–8.2)
Q-T INTERVAL, ECG07: 392 MS
QRS DURATION, ECG06: 110 MS
QTC CALCULATION (BEZET), ECG08: 455 MS
RBC # BLD AUTO: 3.8 M/UL (ref 4.1–5.7)
SAMPLES BEING HELD,HOLD: NORMAL
SODIUM SERPL-SCNC: 135 MMOL/L (ref 136–145)
TROPONIN I SERPL-MCNC: 0.51 NG/ML
VENTRICULAR RATE, ECG03: 81 BPM
WBC # BLD AUTO: 6.9 K/UL (ref 4.1–11.1)

## 2019-01-19 PROCEDURE — 80053 COMPREHEN METABOLIC PANEL: CPT

## 2019-01-19 PROCEDURE — 36415 COLL VENOUS BLD VENIPUNCTURE: CPT

## 2019-01-19 PROCEDURE — 93005 ELECTROCARDIOGRAM TRACING: CPT

## 2019-01-19 PROCEDURE — 99285 EMERGENCY DEPT VISIT HI MDM: CPT

## 2019-01-19 PROCEDURE — 84484 ASSAY OF TROPONIN QUANT: CPT

## 2019-01-19 PROCEDURE — 85025 COMPLETE CBC W/AUTO DIFF WBC: CPT

## 2019-01-19 PROCEDURE — 82550 ASSAY OF CK (CPK): CPT

## 2019-01-19 NOTE — ED PROVIDER NOTES
EMERGENCY DEPARTMENT HISTORY AND PHYSICAL EXAM 
 
 
Date: 1/19/2019 Patient Name: Erick Edmonds History of Presenting Illness Chief Complaint Patient presents with  AICD problem Arrived by EMS, pt reports going to bathroom, and upon return. ICD fired x3. Has [facility] Per EMS HR  in Afib. Placed 20g RAC. On blood thinner-eliquis. Cards- [place]. History Provided By: Patient and EMS 
 
HPI: Erick Edmonds, 58 y.o. male with PMHx significant for CHF, DM, HTN, combined sys and diastolic heart failure, non-ischemic dilated cardiomyopathy, presents via EMS to the ED for evaluation after his defibrillator had delivered 3 shocks PTA today. He denies any associated symptoms. Pt states he had gotten up to use the restroom then went to lay down, turn on his R side, and experienced the shocks. He denied any prodromal symptoms of palpitations, CP, or SOB. He reports his [facility] device was placed by Dr. Rm Dunbar but follows Hakeem Clarke. Denise Slade DO, [facility]. His initial device was placed in x 9 years ago, but had it replaced x 4 years ago. Pt denies any SOB, nausea, or vomiting. There are no other complaints, changes, or physical findings at this time. PCP: Ru Mccollum MD  
Cardiology: Hakeem Clarke. Denise Slade DO, [facility] No current facility-administered medications on file prior to encounter. Current Outpatient Medications on File Prior to Encounter Medication Sig Dispense Refill  nebivolol HCl (BYSTOLIC PO) Take  by mouth daily.  dulaglutide (TRULICITY) 1.5 YT/2.7 mL sub-q pen 1.5 mg by SubCUTAneous route every seven (7) days.  apixaban (ELIQUIS PO) Take  by mouth.  furosemide (LASIX) 80 mg tablet Take 1 Tab by mouth two (2) times a day. 60 Tab 1  potassium chloride SR (K-TAB) 20 mEq tablet Take 1 Tab by mouth daily. 30 Tab 1  
 magnesium chloride (MAG-SR) 64 mg tablet Take 64 mg by mouth two (2) times a day.  simvastatin (ZOCOR) 20 mg tablet Take 20 mg by mouth nightly.  glimepiride (AMARYL) 4 mg tablet Take 4 mg by mouth every morning.  sAXagliptin (ONGLYZA) 2.5 mg tablet Take 1 Tab by mouth daily. 30 Tab 1 Past History Past Medical History: 
Past Medical History:  
Diagnosis Date  A-fib ([facility]  Arrhythmia   
 atrial fibrillation 2013  Diabetes ([facility]  Endocrine disease   
 diabetes  Hypertension  Irregular heart beat Past Surgical History: 
Past Surgical History:  
Procedure Laterality Date  CARDIAC SURG PROCEDURE UNLIST    
 defib placed in left chest  
 
 
Family History: 
Family History Problem Relation Age of Onset  Heart Disease Mother  Diabetes Father  Heart Disease Father Social History: 
Social History Tobacco Use  Smoking status: Former Smoker Last attempt to quit: 1993 Years since quittin.4 Substance Use Topics  Alcohol use: No  
 Drug use: No  
 
 
Allergies: 
No Known Allergies Review of Systems Review of Systems Constitutional: Negative for chills, fatigue and fever. HENT: Negative for congestion and rhinorrhea. Eyes: Negative for visual disturbance. Respiratory: Negative for cough, shortness of breath and wheezing. Cardiovascular: Negative for chest pain and palpitations. Gastrointestinal: Negative for abdominal distention, abdominal pain, constipation, diarrhea, nausea and vomiting. Endocrine: Negative. Genitourinary: Negative for difficulty urinating and dysuria. Musculoskeletal: Negative. Skin: Negative for rash. Neurological: Negative for dizziness, weakness and light-headedness. Psychiatric/Behavioral: Negative for suicidal ideas. Physical Exam  
Physical Exam  
Constitutional: He is oriented to person, place, and time. He appears well-developed and well-nourished. No distress. HENT:  
Head: Normocephalic and atraumatic. Mouth/Throat: Oropharynx is clear and moist.  
Eyes: Conjunctivae and EOM are normal.  
Neck: Neck supple. No JVD present. No tracheal deviation present. Cardiovascular: Normal rate, regular rhythm and intact distal pulses. Exam reveals no gallop and no friction rub. No murmur heard. Pulmonary/Chest: Effort normal and breath sounds normal. No stridor. No respiratory distress. He has no wheezes. Abdominal: Soft. Bowel sounds are normal. He exhibits no distension and no mass. There is no tenderness. There is no guarding. Musculoskeletal: Normal range of motion. He exhibits no edema or tenderness. No deformity Neurological: He is alert and oriented to person, place, and time. He has normal strength. No focal deficits Skin: Skin is warm, dry and intact. No rash noted. Psychiatric: He has a normal mood and affect. His behavior is normal. Judgment and thought content normal.  
Nursing note and vitals reviewed. Diagnostic Study Results Labs - Recent Results (from the past 12 hour(s)) EKG, 12 LEAD, INITIAL Collection Time: 01/19/19  6:28 AM  
Result Value Ref Range Ventricular Rate 81 BPM  
 Atrial Rate 61 BPM  
 QRS Duration 110 ms  
 Q-T Interval 392 ms QTC Calculation (Bezet) 455 ms Calculated R Axis -44 degrees Calculated T Axis 60 degrees Diagnosis Ventricular-paced rhythm with premature ventricular or aberrantly conducted  
complexes Biventricular pacemaker detected When compared with ECG of 08-FEB-2017 18:03, 
Vent. rate has increased BY  11 BPM 
  
CBC WITH AUTOMATED DIFF Collection Time: 01/19/19  6:40 AM  
Result Value Ref Range WBC 6.9 4.1 - 11.1 K/uL  
 RBC 3.80 (L) 4.10 - 5.70 M/uL  
 HGB 10.9 (L) 12.1 - 17.0 g/dL HCT 35.0 (L) 36.6 - 50.3 % MCV 92.1 80.0 - 99.0 FL  
 MCH 28.7 26.0 - 34.0 PG  
 MCHC 31.1 30.0 - 36.5 g/dL  
 RDW 16.1 (H) 11.5 - 14.5 % PLATELET 324 540 - 614 K/uL MPV 10.5 8.9 - 12.9 FL  
 NRBC 0.0 0  WBC ABSOLUTE NRBC 0.00 0.00 - 0.01 K/uL NEUTROPHILS 65 32 - 75 % LYMPHOCYTES 22 12 - 49 % MONOCYTES 11 5 - 13 % EOSINOPHILS 2 0 - 7 % BASOPHILS 0 0 - 1 % IMMATURE GRANULOCYTES 0 0.0 - 0.5 % ABS. NEUTROPHILS 4.5 1.8 - 8.0 K/UL  
 ABS. LYMPHOCYTES 1.5 0.8 - 3.5 K/UL  
 ABS. MONOCYTES 0.8 0.0 - 1.0 K/UL  
 ABS. EOSINOPHILS 0.1 0.0 - 0.4 K/UL  
 ABS. BASOPHILS 0.0 0.0 - 0.1 K/UL  
 ABS. IMM. GRANS. 0.0 0.00 - 0.04 K/UL  
 DF AUTOMATED METABOLIC PANEL, COMPREHENSIVE Collection Time: 01/19/19  6:40 AM  
Result Value Ref Range Sodium 135 (L) 136 - 145 mmol/L Potassium 4.5 3.5 - 5.1 mmol/L Chloride 99 97 - 108 mmol/L  
 CO2 29 21 - 32 mmol/L Anion gap 7 5 - 15 mmol/L Glucose 132 (H) 65 - 100 mg/dL BUN 16 6 - 20 MG/DL Creatinine 1.28 0.70 - 1.30 MG/DL  
 BUN/Creatinine ratio 13 12 - 20 GFR est AA >60 >60 ml/min/1.73m2 GFR est non-AA 57 (L) >60 ml/min/1.73m2 Calcium 8.5 8.5 - 10.1 MG/DL Bilirubin, total 0.9 0.2 - 1.0 MG/DL  
 ALT (SGPT) 14 12 - 78 U/L  
 AST (SGOT) 31 15 - 37 U/L Alk. phosphatase 70 45 - 117 U/L Protein, total 7.4 6.4 - 8.2 g/dL Albumin 2.9 (L) 3.5 - 5.0 g/dL Globulin 4.5 (H) 2.0 - 4.0 g/dL A-G Ratio 0.6 (L) 1.1 - 2.2    
TROPONIN I Collection Time: 01/19/19  6:40 AM  
Result Value Ref Range Troponin-I, Qt. 0.51 (H) <0.05 ng/mL CK Collection Time: 01/19/19  6:40 AM  
Result Value Ref Range  39 - 308 U/L  
SAMPLES BEING HELD Collection Time: 01/19/19  6:40 AM  
Result Value Ref Range SAMPLES BEING HELD BLUE RED COMMENT Add-on orders for these samples will be processed based on acceptable specimen integrity and analyte stability, which may vary by analyte. Radiologic Studies - No orders to display Medical Decision Making I am the first provider for this patient.  
 
I reviewed the vital signs, available nursing notes, past medical history, past surgical history, family history and social history. Vital Signs-Reviewed the patient's vital signs. Patient Vitals for the past 12 hrs: 
 Temp Pulse Resp BP SpO2  
01/19/19 0854  74 20  98 % 01/19/19 0845  75 17 122/61 97 % 01/19/19 0830  76 18 119/62 97 % 01/19/19 0827  73 19  97 % 01/19/19 0815  74 17 119/60 97 % 01/19/19 0811  75 25 115/75 97 % 01/19/19 0800  74 20 124/69 96 % 01/19/19 0754  76 17  94 % 01/19/19 0745  77 18 133/83 95 % 01/19/19 0730  81 20 125/68 96 % 01/19/19 0715  76 17 126/68 97 % 01/19/19 0633 98.3 °F (36.8 °C) 78 16 148/72 97 % 01/19/19 0632  82 17  96 % Pulse Oximetry Analysis - 99% on RA Cardiac Monitor:  
Rate: 81 bpm 
Rhythm: Normal Sinus Rhythm EKG interpretation: (Preliminary): 8074 Rhythm: paced; and regular . Rate (approx.): 81. 
Written by Elie Fletcher ED Scribe, as dictated by Benjamin Padilla DO. Records Reviewed: Nursing Notes, Old Medical Records, Previous electrocardiograms, Ambulance Run Sheet, Previous Radiology Studies and Previous Laboratory Studies Provider Notes (Medical Decision Making):  
Pt s/p AICD firing. Is now asymptomatic and in no acute distress. Will check labs to evaluate for electrolyte abnormality. Will get AICD interrogated then consult cardiology. ED Course:  
Initial assessment performed. The patients presenting problems have been discussed, and they are in agreement with the care plan formulated and outlined with them. I have encouraged them to ask questions as they arise throughout their visit. PROGRESS NOTE: 
8:01 AM 
[facility] had called. They state the pt had 4 episodes of afib with RVR and needed to be rate controlled, to which he is now. PROGRESS NOTE: 
8:07 AM 
RN informs that the pt's troponin is 0.51. CONSULT NOTE: 
8:24 AM 
Benjamin Padilla DO spoke with Ruchi Palafox. Olga Parsons DO, [facility], Specialty: Cardiology Discussed patient's hx, disposition, and available diagnostic and imaging results. Reviewed care plans. Consultant agrees with plans as outlined. Consultant states he will come evaluate the pt. PROGRESS NOTE: 
8:47 AM 
Rahel Mccarthy. Devin Card[facility]za DO, [facility] He recommends increasing the pt's nebivolol to 20 mg daily and have the pt call the office Monday to schedule an appointment preferably with Edenilson Christina MD. Critical Care Time:  
0 Disposition: 
DISCHARGE NOTE 
8:55 AM 
The patient has been re-evaluated and is ready for discharge. Reviewed available results with patient. Counseled patient on diagnosis and care plan. Patient has expressed understanding, and all questions have been answered. Patient agrees with plan and agrees to follow up as recommended, or return to the ED if their symptoms worsen. Discharge instructions have been provided and explained to the patient, along with reasons to return to the ED. PLAN: 
1. Discharge Discharge Medication List as of 1/19/2019  8:51 AM  
  
 
2. Follow-up Information Follow up With Specialties Details Why Contact Info Danna Burrell DO Cardiology Schedule an appointment as soon as possible for a visit in 2 days  [address] [facility] 
[phone] Donovan Desir MD Cardiology Schedule an appointment as soon as possible for a visit in 2 days  [address] [facility] 
[phone] [facility] EMERGENCY DEPT Emergency Medicine  As needed, If symptoms worsen [address] [facility] 
[phone] Return to ED if worse Diagnosis Clinical Impression: 1. AICD discharge 2. Atrial fibrillation with RVR ([address] Attestations: This note is prepared by Pricila King, acting as Scribe for Bibi Hidalgo DO.  
 
Bibi Hidalgo DO: The scribe's documentation has been prepared under my direction and personally reviewed by me in its entirety. I confirm that the note above accurately reflects all work, treatment, procedures, and medical decision making performed by me. This note will not be viewable in [address]

## 2019-01-19 NOTE — ED NOTES
I have reviewed discharge instructions with the patient. The patient verbalized understanding. Pt leaving alert oriented and ambulatory with family.

## 2019-01-19 NOTE — DISCHARGE INSTRUCTIONS
Patient Education        Atrial Fibrillation: Care Instructions    INCREASE YOUR BYSTOLIC TO 20 MG DAILY. Your Care Instructions    Atrial fibrillation is an irregular and often fast heartbeat. Treating this condition is important for several reasons. It can cause blood clots, which can travel from your heart to your brain and cause a stroke. If you have a fast heartbeat, you may feel lightheaded, dizzy, and weak. An irregular heartbeat can also increase your risk for heart failure. Atrial fibrillation is often the result of another heart condition, such as high blood pressure or coronary artery disease. Making changes to improve your heart condition will help you stay healthy and active. Follow-up care is a key part of your treatment and safety. Be sure to make and go to all appointments, and call your doctor if you are having problems. It's also a good idea to know your test results and keep a list of the medicines you take. How can you care for yourself at home? Medicines    · Take your medicines exactly as prescribed. Call your doctor if you think you are having a problem with your medicine. You will get more details on the specific medicines your doctor prescribes.     · If your doctor has given you a blood thinner to prevent a stroke, be sure you get instructions about how to take your medicine safely. Blood thinners can cause serious bleeding problems.     · Do not take any vitamins, over-the-counter drugs, or herbal products without talking to your doctor first.    Lifestyle changes    · Do not smoke. Smoking can increase your chance of a stroke and heart attack. If you need help quitting, talk to your doctor about stop-smoking programs and medicines. These can increase your chances of quitting for good.     · Eat a heart-healthy diet.     · Stay at a healthy weight. Lose weight if you need to.     · Limit alcohol to 2 drinks a day for men and 1 drink a day for women.  Too much alcohol can cause health problems.     · Avoid colds and flu. Get a pneumococcal vaccine shot. If you have had one before, ask your doctor whether you need another dose. Get a flu shot every year. If you must be around people with colds or flu, wash your hands often. Activity    · If your doctor recommends it, get more exercise. Walking is a good choice. Bit by bit, increase the amount you walk every day. Try for at least 30 minutes on most days of the week. You also may want to swim, bike, or do other activities. Your doctor may suggest that you join a cardiac rehabilitation program so that you can have help increasing your physical activity safely.     · Start light exercise if your doctor says it is okay. Even a small amount will help you get stronger, have more energy, and manage stress. Walking is an easy way to get exercise. Start out by walking a little more than you did in the hospital. Gradually increase the amount you walk.     · When you exercise, watch for signs that your heart is working too hard. You are pushing too hard if you cannot talk while you are exercising. If you become short of breath or dizzy or have chest pain, sit down and rest immediately.     · Check your pulse regularly. Place two fingers on the artery at the palm side of your wrist, in line with your thumb. If your heartbeat seems uneven or fast, talk to your doctor. When should you call for help? Call 911 anytime you think you may need emergency care. For example, call if:    · You have symptoms of a heart attack. These may include:  ? Chest pain or pressure, or a strange feeling in the chest.  ? Sweating. ? Shortness of breath. ? Nausea or vomiting. ? Pain, pressure, or a strange feeling in the back, neck, jaw, or upper belly or in one or both shoulders or arms. ? Lightheadedness or sudden weakness. ? A fast or irregular heartbeat. After you call 911, the  may tell you to chew 1 adult-strength or 2 to 4 low-dose aspirin.  Wait for an ambulance. Do not try to drive yourself.     · You have symptoms of a stroke. These may include:  ? Sudden numbness, tingling, weakness, or loss of movement in your face, arm, or leg, especially on only one side of your body. ? Sudden vision changes. ? Sudden trouble speaking. ? Sudden confusion or trouble understanding simple statements. ? Sudden problems with walking or balance. ? A sudden, severe headache that is different from past headaches.     · You passed out (lost consciousness).    Call your doctor now or seek immediate medical care if:    · You have new or increased shortness of breath.     · You feel dizzy or lightheaded, or you feel like you may faint.     · Your heart rate becomes irregular.     · You can feel your heart flutter in your chest or skip heartbeats. Tell your doctor if these symptoms are new or worse.    Watch closely for changes in your health, and be sure to contact your doctor if you have any problems. Where can you learn more? Go to http://meaghan-julius.info/. Enter U020 in the search box to learn more about \"Atrial Fibrillation: Care Instructions. \"  Current as of: July 22, 2018  Content Version: 11.9  © 7489-9965 [employer]. Care instructions adapted under license by [employer] (which disclaims liability or warranty for this information). If you have questions about a medical condition or this instruction, always ask your healthcare professional. [place] [address] any warranty or liability for your use of this information.

## 2020-01-01 ENCOUNTER — APPOINTMENT (OUTPATIENT)
Dept: GENERAL RADIOLOGY | Age: 65
DRG: 870 | End: 2020-01-01
Attending: INTERNAL MEDICINE
Payer: MEDICARE

## 2020-01-01 ENCOUNTER — APPOINTMENT (OUTPATIENT)
Dept: GENERAL RADIOLOGY | Age: 65
DRG: 870 | End: 2020-01-01
Attending: EMERGENCY MEDICINE
Payer: MEDICARE

## 2020-01-01 ENCOUNTER — APPOINTMENT (OUTPATIENT)
Dept: GENERAL RADIOLOGY | Age: 65
DRG: 870 | End: 2020-01-01
Attending: ANESTHESIOLOGY
Payer: MEDICARE

## 2020-01-01 ENCOUNTER — APPOINTMENT (OUTPATIENT)
Dept: ULTRASOUND IMAGING | Age: 65
DRG: 870 | End: 2020-01-01
Attending: INTERNAL MEDICINE
Payer: MEDICARE

## 2020-01-01 ENCOUNTER — APPOINTMENT (OUTPATIENT)
Dept: NON INVASIVE DIAGNOSTICS | Age: 65
DRG: 870 | End: 2020-01-01
Attending: INTERNAL MEDICINE
Payer: MEDICARE

## 2020-01-01 ENCOUNTER — HOSPITAL ENCOUNTER (INPATIENT)
Age: 65
LOS: 24 days | DRG: 870 | End: 2020-06-08
Attending: EMERGENCY MEDICINE | Admitting: INTERNAL MEDICINE
Payer: MEDICARE

## 2020-01-01 ENCOUNTER — APPOINTMENT (OUTPATIENT)
Dept: CT IMAGING | Age: 65
DRG: 870 | End: 2020-01-01
Attending: EMERGENCY MEDICINE
Payer: MEDICARE

## 2020-01-01 ENCOUNTER — APPOINTMENT (OUTPATIENT)
Dept: INTERVENTIONAL RADIOLOGY/VASCULAR | Age: 65
DRG: 870 | End: 2020-01-01
Attending: INTERNAL MEDICINE
Payer: MEDICARE

## 2020-01-01 VITALS
WEIGHT: 233.25 LBS | BODY MASS INDEX: 29 KG/M2 | DIASTOLIC BLOOD PRESSURE: 26 MMHG | OXYGEN SATURATION: 65 % | SYSTOLIC BLOOD PRESSURE: 52 MMHG | HEIGHT: 75 IN | TEMPERATURE: 97.6 F

## 2020-01-01 DIAGNOSIS — A41.9 SEPSIS, DUE TO UNSPECIFIED ORGANISM, UNSPECIFIED WHETHER ACUTE ORGAN DYSFUNCTION PRESENT (HCC): Primary | ICD-10-CM

## 2020-01-01 DIAGNOSIS — R65.21 SEPTIC SHOCK (HCC): ICD-10-CM

## 2020-01-01 DIAGNOSIS — R53.1 WEAKNESS GENERALIZED: ICD-10-CM

## 2020-01-01 DIAGNOSIS — A41.9 SEPTIC SHOCK (HCC): ICD-10-CM

## 2020-01-01 DIAGNOSIS — U07.1 COVID-19: ICD-10-CM

## 2020-01-01 DIAGNOSIS — N17.9 ACUTE RENAL FAILURE, UNSPECIFIED ACUTE RENAL FAILURE TYPE (HCC): ICD-10-CM

## 2020-01-01 LAB
ABO + RH BLD: NORMAL
ACTIN IGG SERPL-ACNC: 25 UNITS (ref 0–19)
ALBUMIN SERPL-MCNC: 1.6 G/DL (ref 3.5–5)
ALBUMIN SERPL-MCNC: 1.7 G/DL (ref 3.5–5)
ALBUMIN SERPL-MCNC: 1.8 G/DL (ref 3.5–5)
ALBUMIN SERPL-MCNC: 1.9 G/DL (ref 3.5–5)
ALBUMIN SERPL-MCNC: 2 G/DL (ref 3.5–5)
ALBUMIN SERPL-MCNC: 2.1 G/DL (ref 3.5–5)
ALBUMIN SERPL-MCNC: 2.2 G/DL (ref 3.5–5)
ALBUMIN SERPL-MCNC: 2.4 G/DL (ref 3.5–5)
ALBUMIN SERPL-MCNC: 2.5 G/DL (ref 3.5–5)
ALBUMIN SERPL-MCNC: 2.6 G/DL (ref 3.5–5)
ALBUMIN SERPL-MCNC: 2.6 G/DL (ref 3.5–5)
ALBUMIN SERPL-MCNC: 2.7 G/DL (ref 3.5–5)
ALBUMIN SERPL-MCNC: 2.7 G/DL (ref 3.5–5)
ALBUMIN SERPL-MCNC: 2.8 G/DL (ref 3.5–5)
ALBUMIN SERPL-MCNC: 3 G/DL (ref 3.5–5)
ALBUMIN SERPL-MCNC: 3.1 G/DL (ref 3.5–5)
ALBUMIN/GLOB SERPL: 0.4 {RATIO} (ref 1.1–2.2)
ALBUMIN/GLOB SERPL: 0.5 {RATIO} (ref 1.1–2.2)
ALBUMIN/GLOB SERPL: 0.6 {RATIO} (ref 1.1–2.2)
ALBUMIN/GLOB SERPL: 0.6 {RATIO} (ref 1.1–2.2)
ALBUMIN/GLOB SERPL: 0.7 {RATIO} (ref 1.1–2.2)
ALBUMIN/GLOB SERPL: 0.8 {RATIO} (ref 1.1–2.2)
ALBUMIN/GLOB SERPL: 0.8 {RATIO} (ref 1.1–2.2)
ALP SERPL-CCNC: 104 U/L (ref 45–117)
ALP SERPL-CCNC: 115 U/L (ref 45–117)
ALP SERPL-CCNC: 120 U/L (ref 45–117)
ALP SERPL-CCNC: 121 U/L (ref 45–117)
ALP SERPL-CCNC: 144 U/L (ref 45–117)
ALP SERPL-CCNC: 160 U/L (ref 45–117)
ALP SERPL-CCNC: 219 U/L (ref 45–117)
ALP SERPL-CCNC: 219 U/L (ref 45–117)
ALP SERPL-CCNC: 246 U/L (ref 45–117)
ALP SERPL-CCNC: 247 U/L (ref 45–117)
ALP SERPL-CCNC: 271 U/L (ref 45–117)
ALP SERPL-CCNC: 282 U/L (ref 45–117)
ALP SERPL-CCNC: 286 U/L (ref 45–117)
ALP SERPL-CCNC: 293 U/L (ref 45–117)
ALP SERPL-CCNC: 293 U/L (ref 45–117)
ALP SERPL-CCNC: 311 U/L (ref 45–117)
ALP SERPL-CCNC: 81 U/L (ref 45–117)
ALP SERPL-CCNC: 92 U/L (ref 45–117)
ALP SERPL-CCNC: 93 U/L (ref 45–117)
ALT SERPL-CCNC: 103 U/L (ref 12–78)
ALT SERPL-CCNC: 1049 U/L (ref 12–78)
ALT SERPL-CCNC: 120 U/L (ref 12–78)
ALT SERPL-CCNC: 126 U/L (ref 12–78)
ALT SERPL-CCNC: 128 U/L (ref 12–78)
ALT SERPL-CCNC: 144 U/L (ref 12–78)
ALT SERPL-CCNC: 16 U/L (ref 12–78)
ALT SERPL-CCNC: 166 U/L (ref 12–78)
ALT SERPL-CCNC: 18 U/L (ref 12–78)
ALT SERPL-CCNC: 1823 U/L (ref 12–78)
ALT SERPL-CCNC: 1905 U/L (ref 12–78)
ALT SERPL-CCNC: 20 U/L (ref 12–78)
ALT SERPL-CCNC: 201 U/L (ref 12–78)
ALT SERPL-CCNC: 238 U/L (ref 12–78)
ALT SERPL-CCNC: 358 U/L (ref 12–78)
ALT SERPL-CCNC: 496 U/L (ref 12–78)
ALT SERPL-CCNC: 63 U/L (ref 12–78)
ALT SERPL-CCNC: 631 U/L (ref 12–78)
ALT SERPL-CCNC: 643 U/L (ref 12–78)
AMMONIA PLAS-SCNC: <10 UMOL/L
AMORPH CRY URNS QL MICRO: ABNORMAL
ANA SER QL: NEGATIVE
ANION GAP SERPL CALC-SCNC: 10 MMOL/L (ref 5–15)
ANION GAP SERPL CALC-SCNC: 11 MMOL/L (ref 5–15)
ANION GAP SERPL CALC-SCNC: 11 MMOL/L (ref 5–15)
ANION GAP SERPL CALC-SCNC: 12 MMOL/L (ref 5–15)
ANION GAP SERPL CALC-SCNC: 12 MMOL/L (ref 5–15)
ANION GAP SERPL CALC-SCNC: 13 MMOL/L (ref 5–15)
ANION GAP SERPL CALC-SCNC: 15 MMOL/L (ref 5–15)
ANION GAP SERPL CALC-SCNC: 15 MMOL/L (ref 5–15)
ANION GAP SERPL CALC-SCNC: 17 MMOL/L (ref 5–15)
ANION GAP SERPL CALC-SCNC: 19 MMOL/L (ref 5–15)
ANION GAP SERPL CALC-SCNC: 20 MMOL/L (ref 5–15)
ANION GAP SERPL CALC-SCNC: 20 MMOL/L (ref 5–15)
ANION GAP SERPL CALC-SCNC: 21 MMOL/L (ref 5–15)
ANION GAP SERPL CALC-SCNC: 23 MMOL/L (ref 5–15)
ANION GAP SERPL CALC-SCNC: 24 MMOL/L (ref 5–15)
ANION GAP SERPL CALC-SCNC: 24 MMOL/L (ref 5–15)
ANION GAP SERPL CALC-SCNC: 3 MMOL/L (ref 5–15)
ANION GAP SERPL CALC-SCNC: 3 MMOL/L (ref 5–15)
ANION GAP SERPL CALC-SCNC: 4 MMOL/L (ref 5–15)
ANION GAP SERPL CALC-SCNC: 4 MMOL/L (ref 5–15)
ANION GAP SERPL CALC-SCNC: 5 MMOL/L (ref 5–15)
ANION GAP SERPL CALC-SCNC: 6 MMOL/L (ref 5–15)
ANION GAP SERPL CALC-SCNC: 7 MMOL/L (ref 5–15)
ANION GAP SERPL CALC-SCNC: 8 MMOL/L (ref 5–15)
ANION GAP SERPL CALC-SCNC: 8 MMOL/L (ref 5–15)
ANION GAP SERPL CALC-SCNC: 9 MMOL/L (ref 5–15)
ANION GAP SERPL CALC-SCNC: 9 MMOL/L (ref 5–15)
APPEARANCE UR: ABNORMAL
APTT PPP: 123.5 SEC (ref 22.1–32)
APTT PPP: 39 SEC (ref 22.1–32)
APTT PPP: 39.5 SEC (ref 22.1–32)
APTT PPP: 40.7 SEC (ref 22.1–32)
APTT PPP: 41.6 SEC (ref 22.1–32)
APTT PPP: 42 SEC (ref 22.1–32)
APTT PPP: 44.7 SEC (ref 22.1–32)
APTT PPP: 47.1 SEC (ref 22.1–32)
APTT PPP: 47.8 SEC (ref 22.1–32)
APTT PPP: 48.7 SEC (ref 22.1–32)
APTT PPP: 49.5 SEC (ref 22.1–32)
APTT PPP: 50.4 SEC (ref 22.1–32)
APTT PPP: 50.8 SEC (ref 22.1–32)
APTT PPP: 53.8 SEC (ref 22.1–32)
APTT PPP: 53.8 SEC (ref 22.1–32)
APTT PPP: 55 SEC (ref 22.1–32)
APTT PPP: 55.9 SEC (ref 22.1–32)
APTT PPP: 56.3 SEC (ref 22.1–32)
APTT PPP: 57.2 SEC (ref 22.1–32)
APTT PPP: 57.5 SEC (ref 22.1–32)
APTT PPP: 57.5 SEC (ref 22.1–32)
APTT PPP: 57.8 SEC (ref 22.1–32)
APTT PPP: 57.8 SEC (ref 22.1–32)
APTT PPP: 59 SEC (ref 22.1–32)
APTT PPP: 59 SEC (ref 22.1–32)
APTT PPP: 59.8 SEC (ref 22.1–32)
APTT PPP: 59.9 SEC (ref 22.1–32)
APTT PPP: 60.3 SEC (ref 22.1–32)
APTT PPP: 60.4 SEC (ref 22.1–32)
APTT PPP: 60.6 SEC (ref 22.1–32)
APTT PPP: 61.2 SEC (ref 22.1–32)
APTT PPP: 61.2 SEC (ref 22.1–32)
APTT PPP: 62.5 SEC (ref 22.1–32)
APTT PPP: 62.6 SEC (ref 22.1–32)
APTT PPP: 63 SEC (ref 22.1–32)
APTT PPP: 63.5 SEC (ref 22.1–32)
APTT PPP: 63.5 SEC (ref 22.1–32)
APTT PPP: 64 SEC (ref 22.1–32)
APTT PPP: 64.8 SEC (ref 22.1–32)
APTT PPP: 64.9 SEC (ref 22.1–32)
APTT PPP: 65.2 SEC (ref 22.1–32)
APTT PPP: 65.3 SEC (ref 22.1–32)
APTT PPP: 66.2 SEC (ref 22.1–32)
APTT PPP: 66.8 SEC (ref 22.1–32)
APTT PPP: 67.2 SEC (ref 22.1–32)
APTT PPP: 70 SEC (ref 22.1–32)
APTT PPP: 70 SEC (ref 22.1–32)
APTT PPP: 70.1 SEC (ref 22.1–32)
APTT PPP: 72.8 SEC (ref 22.1–32)
APTT PPP: 73.1 SEC (ref 22.1–32)
APTT PPP: 74.7 SEC (ref 22.1–32)
APTT PPP: 76.7 SEC (ref 22.1–32)
APTT PPP: 79.7 SEC (ref 22.1–32)
APTT PPP: 79.8 SEC (ref 22.1–32)
APTT PPP: 81 SEC (ref 22.1–32)
APTT PPP: 81.4 SEC (ref 22.1–32)
APTT PPP: 82.6 SEC (ref 22.1–32)
APTT PPP: 82.6 SEC (ref 22.1–32)
APTT PPP: 83.9 SEC (ref 22.1–32)
APTT PPP: 84.9 SEC (ref 22.1–32)
APTT PPP: 87.9 SEC (ref 22.1–32)
APTT PPP: >130 SEC (ref 22.1–32)
ARTERIAL PATENCY WRIST A: ABNORMAL
ARTERIAL PATENCY WRIST A: YES
AST SERPL-CCNC: 101 U/L (ref 15–37)
AST SERPL-CCNC: 102 U/L (ref 15–37)
AST SERPL-CCNC: 104 U/L (ref 15–37)
AST SERPL-CCNC: 111 U/L (ref 15–37)
AST SERPL-CCNC: 1121 U/L (ref 15–37)
AST SERPL-CCNC: 162 U/L (ref 15–37)
AST SERPL-CCNC: 178 U/L (ref 15–37)
AST SERPL-CCNC: 208 U/L (ref 15–37)
AST SERPL-CCNC: 278 U/L (ref 15–37)
AST SERPL-CCNC: 37 U/L (ref 15–37)
AST SERPL-CCNC: 37 U/L (ref 15–37)
AST SERPL-CCNC: 41 U/L (ref 15–37)
AST SERPL-CCNC: 74 U/L (ref 15–37)
AST SERPL-CCNC: 84 U/L (ref 15–37)
AST SERPL-CCNC: 84 U/L (ref 15–37)
AST SERPL-CCNC: 88 U/L (ref 15–37)
AST SERPL-CCNC: >2000 U/L (ref 15–37)
ATRIAL RATE: 227 BPM
AV VELOCITY RATIO: 0.57
B PERT DNA SPEC QL NAA+PROBE: NOT DETECTED
BACTERIA SPEC CULT: ABNORMAL
BACTERIA SPEC CULT: NORMAL
BACTERIA URNS QL MICRO: NEGATIVE /HPF
BASE DEFICIT BLD-SCNC: 1 MMOL/L
BASE DEFICIT BLD-SCNC: 1 MMOL/L
BASE DEFICIT BLD-SCNC: 11 MMOL/L
BASE DEFICIT BLD-SCNC: 18 MMOL/L
BASE DEFICIT BLD-SCNC: 19 MMOL/L
BASE DEFICIT BLD-SCNC: 19 MMOL/L
BASE DEFICIT BLD-SCNC: 2 MMOL/L
BASE DEFICIT BLD-SCNC: 2 MMOL/L
BASE DEFICIT BLD-SCNC: 3 MMOL/L
BASE DEFICIT BLD-SCNC: 4 MMOL/L
BASE DEFICIT BLD-SCNC: 4 MMOL/L
BASE DEFICIT BLD-SCNC: 6 MMOL/L
BASE EXCESS BLD CALC-SCNC: 0 MMOL/L
BASE EXCESS BLD CALC-SCNC: 0 MMOL/L
BASE EXCESS BLD CALC-SCNC: 1 MMOL/L
BASOPHILS # BLD: 0 K/UL (ref 0–0.1)
BASOPHILS # BLD: 0.2 K/UL (ref 0–0.1)
BASOPHILS # BLD: 0.3 K/UL (ref 0–0.1)
BASOPHILS NFR BLD: 0 % (ref 0–1)
BASOPHILS NFR BLD: 1 % (ref 0–1)
BASOPHILS NFR BLD: 1 % (ref 0–1)
BDY SITE: ABNORMAL
BILIRUB DIRECT SERPL-MCNC: 11 MG/DL (ref 0–0.2)
BILIRUB DIRECT SERPL-MCNC: 6.5 MG/DL (ref 0–0.2)
BILIRUB DIRECT SERPL-MCNC: 7.9 MG/DL (ref 0–0.2)
BILIRUB DIRECT SERPL-MCNC: 9 MG/DL (ref 0–0.2)
BILIRUB INDIRECT SERPL-MCNC: 2.8 MG/DL (ref 0–1.1)
BILIRUB SERPL-MCNC: 0.5 MG/DL (ref 0.2–1)
BILIRUB SERPL-MCNC: 0.9 MG/DL (ref 0.2–1)
BILIRUB SERPL-MCNC: 0.9 MG/DL (ref 0.2–1)
BILIRUB SERPL-MCNC: 1 MG/DL (ref 0.2–1)
BILIRUB SERPL-MCNC: 1.1 MG/DL (ref 0.2–1)
BILIRUB SERPL-MCNC: 1.1 MG/DL (ref 0.2–1)
BILIRUB SERPL-MCNC: 1.8 MG/DL (ref 0.2–1)
BILIRUB SERPL-MCNC: 10.2 MG/DL (ref 0.2–1)
BILIRUB SERPL-MCNC: 11.4 MG/DL (ref 0.2–1)
BILIRUB SERPL-MCNC: 12.3 MG/DL (ref 0.2–1)
BILIRUB SERPL-MCNC: 12.3 MG/DL (ref 0.2–1)
BILIRUB SERPL-MCNC: 12.8 MG/DL (ref 0.2–1)
BILIRUB SERPL-MCNC: 13 MG/DL (ref 0.2–1)
BILIRUB SERPL-MCNC: 13.8 MG/DL (ref 0.2–1)
BILIRUB SERPL-MCNC: 13.8 MG/DL (ref 0.2–1)
BILIRUB SERPL-MCNC: 14 MG/DL (ref 0.2–1)
BILIRUB SERPL-MCNC: 2.5 MG/DL (ref 0.2–1)
BILIRUB SERPL-MCNC: 6.8 MG/DL (ref 0.2–1)
BILIRUB SERPL-MCNC: 8.1 MG/DL (ref 0.2–1)
BILIRUB SERPL-MCNC: 9.2 MG/DL (ref 0.2–1)
BILIRUB UR QL: NEGATIVE
BLOOD GROUP ANTIBODIES SERPL: NORMAL
BNP SERPL-MCNC: ABNORMAL PG/ML
BORDETELLA PARAPERTUSSIS PCR, BORPAR: NOT DETECTED
BUN SERPL-MCNC: 34 MG/DL (ref 6–20)
BUN SERPL-MCNC: 35 MG/DL (ref 6–20)
BUN SERPL-MCNC: 36 MG/DL (ref 6–20)
BUN SERPL-MCNC: 37 MG/DL (ref 6–20)
BUN SERPL-MCNC: 38 MG/DL (ref 6–20)
BUN SERPL-MCNC: 38 MG/DL (ref 6–20)
BUN SERPL-MCNC: 39 MG/DL (ref 6–20)
BUN SERPL-MCNC: 40 MG/DL (ref 6–20)
BUN SERPL-MCNC: 40 MG/DL (ref 6–20)
BUN SERPL-MCNC: 41 MG/DL (ref 6–20)
BUN SERPL-MCNC: 42 MG/DL (ref 6–20)
BUN SERPL-MCNC: 43 MG/DL (ref 6–20)
BUN SERPL-MCNC: 43 MG/DL (ref 6–20)
BUN SERPL-MCNC: 46 MG/DL (ref 6–20)
BUN SERPL-MCNC: 49 MG/DL (ref 6–20)
BUN SERPL-MCNC: 50 MG/DL (ref 6–20)
BUN SERPL-MCNC: 51 MG/DL (ref 6–20)
BUN SERPL-MCNC: 54 MG/DL (ref 6–20)
BUN SERPL-MCNC: 56 MG/DL (ref 6–20)
BUN SERPL-MCNC: 56 MG/DL (ref 6–20)
BUN SERPL-MCNC: 58 MG/DL (ref 6–20)
BUN SERPL-MCNC: 58 MG/DL (ref 6–20)
BUN SERPL-MCNC: 60 MG/DL (ref 6–20)
BUN SERPL-MCNC: 67 MG/DL (ref 6–20)
BUN SERPL-MCNC: 69 MG/DL (ref 6–20)
BUN SERPL-MCNC: 73 MG/DL (ref 6–20)
BUN SERPL-MCNC: 82 MG/DL (ref 6–20)
BUN SERPL-MCNC: 84 MG/DL (ref 6–20)
BUN SERPL-MCNC: 87 MG/DL (ref 6–20)
BUN SERPL-MCNC: 88 MG/DL (ref 6–20)
BUN SERPL-MCNC: 90 MG/DL (ref 6–20)
BUN SERPL-MCNC: 90 MG/DL (ref 6–20)
BUN SERPL-MCNC: 92 MG/DL (ref 6–20)
BUN SERPL-MCNC: 98 MG/DL (ref 6–20)
BUN SERPL-MCNC: 99 MG/DL (ref 6–20)
BUN/CREAT SERPL: 10 (ref 12–20)
BUN/CREAT SERPL: 11 (ref 12–20)
BUN/CREAT SERPL: 12 (ref 12–20)
BUN/CREAT SERPL: 13 (ref 12–20)
BUN/CREAT SERPL: 14 (ref 12–20)
BUN/CREAT SERPL: 15 (ref 12–20)
BUN/CREAT SERPL: 15 (ref 12–20)
BUN/CREAT SERPL: 17 (ref 12–20)
BUN/CREAT SERPL: 18 (ref 12–20)
BUN/CREAT SERPL: 19 (ref 12–20)
BUN/CREAT SERPL: 19 (ref 12–20)
BUN/CREAT SERPL: 20 (ref 12–20)
BUN/CREAT SERPL: 21 (ref 12–20)
BUN/CREAT SERPL: 22 (ref 12–20)
BUN/CREAT SERPL: 22 (ref 12–20)
BUN/CREAT SERPL: 23 (ref 12–20)
BUN/CREAT SERPL: 23 (ref 12–20)
BUN/CREAT SERPL: 24 (ref 12–20)
BUN/CREAT SERPL: 25 (ref 12–20)
BUN/CREAT SERPL: 25 (ref 12–20)
BUN/CREAT SERPL: 26 (ref 12–20)
BUN/CREAT SERPL: 26 (ref 12–20)
BUN/CREAT SERPL: 27 (ref 12–20)
BUN/CREAT SERPL: 27 (ref 12–20)
BUN/CREAT SERPL: 28 (ref 12–20)
BUN/CREAT SERPL: 28 (ref 12–20)
C PNEUM DNA SPEC QL NAA+PROBE: NOT DETECTED
CA-I BLD-SCNC: 0.62 MMOL/L (ref 1.12–1.32)
CA-I BLD-SCNC: 0.64 MMOL/L (ref 1.12–1.32)
CA-I BLD-SCNC: 0.71 MMOL/L (ref 1.12–1.32)
CA-I BLD-SCNC: 0.9 MMOL/L (ref 1.12–1.32)
CA-I BLD-SCNC: 0.91 MMOL/L (ref 1.12–1.32)
CA-I BLD-SCNC: 0.98 MMOL/L (ref 1.12–1.32)
CA-I BLD-SCNC: 1.01 MMOL/L (ref 1.12–1.32)
CA-I BLD-SCNC: 1.09 MMOL/L (ref 1.12–1.32)
CA-I BLD-SCNC: 1.09 MMOL/L (ref 1.12–1.32)
CA-I BLD-SCNC: 1.1 MMOL/L (ref 1.12–1.32)
CA-I BLD-SCNC: 1.11 MMOL/L (ref 1.12–1.32)
CA-I BLD-SCNC: 1.11 MMOL/L (ref 1.12–1.32)
CA-I BLD-SCNC: 1.14 MMOL/L (ref 1.12–1.32)
CA-I BLD-SCNC: 1.15 MMOL/L (ref 1.12–1.32)
CA-I BLD-SCNC: 1.16 MMOL/L (ref 1.12–1.32)
CALCIUM SERPL-MCNC: 5.4 MG/DL (ref 8.5–10.1)
CALCIUM SERPL-MCNC: 5.5 MG/DL (ref 8.5–10.1)
CALCIUM SERPL-MCNC: 6.1 MG/DL (ref 8.5–10.1)
CALCIUM SERPL-MCNC: 6.5 MG/DL (ref 8.5–10.1)
CALCIUM SERPL-MCNC: 6.8 MG/DL (ref 8.5–10.1)
CALCIUM SERPL-MCNC: 7 MG/DL (ref 8.5–10.1)
CALCIUM SERPL-MCNC: 7 MG/DL (ref 8.5–10.1)
CALCIUM SERPL-MCNC: 7.2 MG/DL (ref 8.5–10.1)
CALCIUM SERPL-MCNC: 7.2 MG/DL (ref 8.5–10.1)
CALCIUM SERPL-MCNC: 7.4 MG/DL (ref 8.5–10.1)
CALCIUM SERPL-MCNC: 7.4 MG/DL (ref 8.5–10.1)
CALCIUM SERPL-MCNC: 7.5 MG/DL (ref 8.5–10.1)
CALCIUM SERPL-MCNC: 7.6 MG/DL (ref 8.5–10.1)
CALCIUM SERPL-MCNC: 7.7 MG/DL (ref 8.5–10.1)
CALCIUM SERPL-MCNC: 7.8 MG/DL (ref 8.5–10.1)
CALCIUM SERPL-MCNC: 7.9 MG/DL (ref 8.5–10.1)
CALCIUM SERPL-MCNC: 7.9 MG/DL (ref 8.5–10.1)
CALCIUM SERPL-MCNC: 8 MG/DL (ref 8.5–10.1)
CALCIUM SERPL-MCNC: 8.2 MG/DL (ref 8.5–10.1)
CALCIUM SERPL-MCNC: 8.2 MG/DL (ref 8.5–10.1)
CALCIUM SERPL-MCNC: 8.4 MG/DL (ref 8.5–10.1)
CALCIUM SERPL-MCNC: 8.7 MG/DL (ref 8.5–10.1)
CALCIUM SERPL-MCNC: 8.8 MG/DL (ref 8.5–10.1)
CALCIUM SERPL-MCNC: 8.8 MG/DL (ref 8.5–10.1)
CALCIUM SERPL-MCNC: 8.9 MG/DL (ref 8.5–10.1)
CALCIUM SERPL-MCNC: 8.9 MG/DL (ref 8.5–10.1)
CALCIUM SERPL-MCNC: 9 MG/DL (ref 8.5–10.1)
CALCIUM SERPL-MCNC: 9.1 MG/DL (ref 8.5–10.1)
CALCIUM SERPL-MCNC: 9.4 MG/DL (ref 8.5–10.1)
CALCULATED R AXIS, ECG10: -48 DEGREES
CALCULATED T AXIS, ECG11: 77 DEGREES
CHLORIDE SERPL-SCNC: 100 MMOL/L (ref 97–108)
CHLORIDE SERPL-SCNC: 101 MMOL/L (ref 97–108)
CHLORIDE SERPL-SCNC: 102 MMOL/L (ref 97–108)
CHLORIDE SERPL-SCNC: 103 MMOL/L (ref 97–108)
CHLORIDE SERPL-SCNC: 103 MMOL/L (ref 97–108)
CHLORIDE SERPL-SCNC: 104 MMOL/L (ref 97–108)
CHLORIDE SERPL-SCNC: 105 MMOL/L (ref 97–108)
CHLORIDE SERPL-SCNC: 106 MMOL/L (ref 97–108)
CHLORIDE SERPL-SCNC: 106 MMOL/L (ref 97–108)
CHLORIDE SERPL-SCNC: 89 MMOL/L (ref 97–108)
CHLORIDE SERPL-SCNC: 90 MMOL/L (ref 97–108)
CHLORIDE SERPL-SCNC: 91 MMOL/L (ref 97–108)
CHLORIDE SERPL-SCNC: 92 MMOL/L (ref 97–108)
CHLORIDE SERPL-SCNC: 93 MMOL/L (ref 97–108)
CHLORIDE SERPL-SCNC: 94 MMOL/L (ref 97–108)
CHLORIDE SERPL-SCNC: 95 MMOL/L (ref 97–108)
CHLORIDE SERPL-SCNC: 95 MMOL/L (ref 97–108)
CHLORIDE SERPL-SCNC: 96 MMOL/L (ref 97–108)
CHLORIDE SERPL-SCNC: 98 MMOL/L (ref 97–108)
CHLORIDE SERPL-SCNC: 98 MMOL/L (ref 97–108)
CHLORIDE SERPL-SCNC: 99 MMOL/L (ref 97–108)
CK SERPL-CCNC: 351 U/L (ref 39–308)
CO2 SERPL-SCNC: 17 MMOL/L (ref 21–32)
CO2 SERPL-SCNC: 17 MMOL/L (ref 21–32)
CO2 SERPL-SCNC: 21 MMOL/L (ref 21–32)
CO2 SERPL-SCNC: 21 MMOL/L (ref 21–32)
CO2 SERPL-SCNC: 22 MMOL/L (ref 21–32)
CO2 SERPL-SCNC: 23 MMOL/L (ref 21–32)
CO2 SERPL-SCNC: 24 MMOL/L (ref 21–32)
CO2 SERPL-SCNC: 25 MMOL/L (ref 21–32)
CO2 SERPL-SCNC: 26 MMOL/L (ref 21–32)
CO2 SERPL-SCNC: 27 MMOL/L (ref 21–32)
CO2 SERPL-SCNC: 28 MMOL/L (ref 21–32)
CO2 SERPL-SCNC: 28 MMOL/L (ref 21–32)
CO2 SERPL-SCNC: 29 MMOL/L (ref 21–32)
CO2 SERPL-SCNC: 32 MMOL/L (ref 21–32)
COLOR UR: ABNORMAL
COMMENT, HOLDF: NORMAL
CORTIS AM PEAK SERPL-MCNC: 65.4 UG/DL (ref 4.3–22.45)
CREAT SERPL-MCNC: 1.43 MG/DL (ref 0.7–1.3)
CREAT SERPL-MCNC: 1.46 MG/DL (ref 0.7–1.3)
CREAT SERPL-MCNC: 1.47 MG/DL (ref 0.7–1.3)
CREAT SERPL-MCNC: 1.51 MG/DL (ref 0.7–1.3)
CREAT SERPL-MCNC: 1.53 MG/DL (ref 0.7–1.3)
CREAT SERPL-MCNC: 1.53 MG/DL (ref 0.7–1.3)
CREAT SERPL-MCNC: 1.55 MG/DL (ref 0.7–1.3)
CREAT SERPL-MCNC: 1.55 MG/DL (ref 0.7–1.3)
CREAT SERPL-MCNC: 1.89 MG/DL (ref 0.7–1.3)
CREAT SERPL-MCNC: 1.91 MG/DL (ref 0.7–1.3)
CREAT SERPL-MCNC: 1.92 MG/DL (ref 0.7–1.3)
CREAT SERPL-MCNC: 1.95 MG/DL (ref 0.7–1.3)
CREAT SERPL-MCNC: 1.97 MG/DL (ref 0.7–1.3)
CREAT SERPL-MCNC: 1.99 MG/DL (ref 0.7–1.3)
CREAT SERPL-MCNC: 2.04 MG/DL (ref 0.7–1.3)
CREAT SERPL-MCNC: 2.07 MG/DL (ref 0.7–1.3)
CREAT SERPL-MCNC: 2.2 MG/DL (ref 0.7–1.3)
CREAT SERPL-MCNC: 2.27 MG/DL (ref 0.7–1.3)
CREAT SERPL-MCNC: 2.28 MG/DL (ref 0.7–1.3)
CREAT SERPL-MCNC: 2.36 MG/DL (ref 0.7–1.3)
CREAT SERPL-MCNC: 2.39 MG/DL (ref 0.7–1.3)
CREAT SERPL-MCNC: 2.44 MG/DL (ref 0.7–1.3)
CREAT SERPL-MCNC: 2.58 MG/DL (ref 0.7–1.3)
CREAT SERPL-MCNC: 2.76 MG/DL (ref 0.7–1.3)
CREAT SERPL-MCNC: 2.78 MG/DL (ref 0.7–1.3)
CREAT SERPL-MCNC: 3.14 MG/DL (ref 0.7–1.3)
CREAT SERPL-MCNC: 3.24 MG/DL (ref 0.7–1.3)
CREAT SERPL-MCNC: 3.38 MG/DL (ref 0.7–1.3)
CREAT SERPL-MCNC: 3.76 MG/DL (ref 0.7–1.3)
CREAT SERPL-MCNC: 3.8 MG/DL (ref 0.7–1.3)
CREAT SERPL-MCNC: 4.21 MG/DL (ref 0.7–1.3)
CREAT SERPL-MCNC: 4.23 MG/DL (ref 0.7–1.3)
CREAT SERPL-MCNC: 4.47 MG/DL (ref 0.7–1.3)
CREAT SERPL-MCNC: 4.73 MG/DL (ref 0.7–1.3)
CREAT SERPL-MCNC: 4.87 MG/DL (ref 0.7–1.3)
CREAT SERPL-MCNC: 5.71 MG/DL (ref 0.7–1.3)
CREAT SERPL-MCNC: 5.77 MG/DL (ref 0.7–1.3)
CREAT SERPL-MCNC: 7.4 MG/DL (ref 0.7–1.3)
CREAT SERPL-MCNC: 7.64 MG/DL (ref 0.7–1.3)
CREAT SERPL-MCNC: 7.81 MG/DL (ref 0.7–1.3)
CREAT SERPL-MCNC: 7.89 MG/DL (ref 0.7–1.3)
CREAT SERPL-MCNC: 7.91 MG/DL (ref 0.7–1.3)
CREAT SERPL-MCNC: 7.94 MG/DL (ref 0.7–1.3)
CREAT SERPL-MCNC: 7.99 MG/DL (ref 0.7–1.3)
CREAT SERPL-MCNC: 8.32 MG/DL (ref 0.7–1.3)
CREAT SERPL-MCNC: 8.58 MG/DL (ref 0.7–1.3)
CREAT UR-MCNC: 295 MG/DL
CRP SERPL-MCNC: 12.1 MG/DL (ref 0–0.6)
CRP SERPL-MCNC: 18 MG/DL (ref 0–0.6)
CRP SERPL-MCNC: 18.5 MG/DL (ref 0–0.6)
CRP SERPL-MCNC: 3.38 MG/DL (ref 0–0.6)
CRP SERPL-MCNC: 3.72 MG/DL (ref 0–0.6)
CRP SERPL-MCNC: 3.86 MG/DL (ref 0–0.6)
CRP SERPL-MCNC: 3.97 MG/DL (ref 0–0.6)
CRP SERPL-MCNC: 4.23 MG/DL (ref 0–0.6)
CRP SERPL-MCNC: 4.36 MG/DL (ref 0–0.6)
CRP SERPL-MCNC: 4.79 MG/DL (ref 0–0.6)
CRP SERPL-MCNC: 4.81 MG/DL (ref 0–0.6)
CRP SERPL-MCNC: 42.3 MG/DL (ref 0–0.6)
CRP SERPL-MCNC: 46.3 MG/DL (ref 0–0.6)
CRP SERPL-MCNC: 5.09 MG/DL (ref 0–0.6)
CRP SERPL-MCNC: 6.18 MG/DL (ref 0–0.6)
D DIMER PPP FEU-MCNC: 1.31 MG/L FEU (ref 0–0.65)
D DIMER PPP FEU-MCNC: 1.62 MG/L FEU (ref 0–0.65)
D DIMER PPP FEU-MCNC: 1.99 MG/L FEU (ref 0–0.65)
D DIMER PPP FEU-MCNC: 10.04 MG/L FEU (ref 0–0.65)
D DIMER PPP FEU-MCNC: 10.75 MG/L FEU (ref 0–0.65)
D DIMER PPP FEU-MCNC: 16.16 MG/L FEU (ref 0–0.65)
D DIMER PPP FEU-MCNC: 16.19 MG/L FEU (ref 0–0.65)
D DIMER PPP FEU-MCNC: 16.35 MG/L FEU (ref 0–0.65)
D DIMER PPP FEU-MCNC: 17.8 MG/L FEU (ref 0–0.65)
D DIMER PPP FEU-MCNC: 18.8 MG/L FEU (ref 0–0.65)
D DIMER PPP FEU-MCNC: 2.56 MG/L FEU (ref 0–0.65)
D DIMER PPP FEU-MCNC: 2.77 MG/L FEU (ref 0–0.65)
D DIMER PPP FEU-MCNC: 2.85 MG/L FEU (ref 0–0.65)
D DIMER PPP FEU-MCNC: 20.57 MG/L FEU (ref 0–0.65)
D DIMER PPP FEU-MCNC: 3.32 MG/L FEU (ref 0–0.65)
D DIMER PPP FEU-MCNC: 3.62 MG/L FEU (ref 0–0.65)
D DIMER PPP FEU-MCNC: 3.74 MG/L FEU (ref 0–0.65)
D DIMER PPP FEU-MCNC: 3.94 MG/L FEU (ref 0–0.65)
D DIMER PPP FEU-MCNC: 3.97 MG/L FEU (ref 0–0.65)
D DIMER PPP FEU-MCNC: 4.13 MG/L FEU (ref 0–0.65)
D DIMER PPP FEU-MCNC: 4.4 MG/L FEU (ref 0–0.65)
D DIMER PPP FEU-MCNC: 4.92 MG/L FEU (ref 0–0.65)
D DIMER PPP FEU-MCNC: 5.13 MG/L FEU (ref 0–0.65)
D DIMER PPP FEU-MCNC: 5.97 MG/L FEU (ref 0–0.65)
D DIMER PPP FEU-MCNC: 9.91 MG/L FEU (ref 0–0.65)
DATE LAST DOSE: ABNORMAL
DIAGNOSIS, 93000: NORMAL
DIFFERENTIAL METHOD BLD: ABNORMAL
ECHO AO ROOT DIAM: 3.01 CM
ECHO AO ROOT DIAM: 3.35 CM
ECHO AV AREA PEAK VELOCITY: 1.9 CM2
ECHO AV AREA/BSA PEAK VELOCITY: 0.8 CM2/M2
ECHO AV CUSP MM: 2.01 CM
ECHO AV PEAK GRADIENT: 8.1 MMHG
ECHO AV PEAK VELOCITY: 142.3 CM/S
ECHO LA AREA 4C: 20.1 CM2
ECHO LA MAJOR AXIS: 3.95 CM
ECHO LA TO AORTIC ROOT RATIO: 1.31
ECHO LA VOL 2C: 92.53 ML (ref 18–58)
ECHO LA VOL 4C: 48.41 ML (ref 18–58)
ECHO LA VOL BP: 70.85 ML (ref 18–58)
ECHO LA VOL/BSA BIPLANE: 28.51 ML/M2 (ref 16–28)
ECHO LA VOLUME INDEX A2C: 37.23 ML/M2 (ref 16–28)
ECHO LA VOLUME INDEX A4C: 19.48 ML/M2 (ref 16–28)
ECHO LV EDV TEICHHOLZ: 0.63 ML
ECHO LV ESV TEICHHOLZ: 0.44 ML
ECHO LV INTERNAL DIMENSION DIASTOLIC: 5.03 CM (ref 4.2–5.9)
ECHO LV INTERNAL DIMENSION SYSTOLIC: 4.33 CM
ECHO LV IVSD: 1.44 CM (ref 0.6–1)
ECHO LV IVSS: 2.3 CM
ECHO LV MASS 2D: 364.3 G (ref 88–224)
ECHO LV MASS INDEX 2D: 146.6 G/M2 (ref 49–115)
ECHO LV POSTERIOR WALL DIASTOLIC: 1.41 CM (ref 0.6–1)
ECHO LV POSTERIOR WALL SYSTOLIC: 1.11 CM
ECHO LVOT DIAM: 2.07 CM
ECHO LVOT PEAK GRADIENT: 2.6 MMHG
ECHO LVOT PEAK VELOCITY: 81 CM/S
ECHO MV AREA PHT: 5 CM2
ECHO MV E DECELERATION TIME (DT): 166.3 MS
ECHO MV E VELOCITY: 73.45 CM/S
ECHO MV PRESSURE HALF TIME (PHT): 44.3 MS
ECHO MV REGURGITANT PEAK GRADIENT: 86.4 MMHG
ECHO MV REGURGITANT PEAK VELOCITY: 464.87 CM/S
ECHO PV MAX VELOCITY: 91.35 CM/S
ECHO PV PEAK GRADIENT: 3.3 MMHG
ECHO RA AREA 4C: 25.2 CM2
ECHO RV INTERNAL DIMENSION: 4 CM
ECHO TV REGURGITANT MAX VELOCITY: 299.65 CM/S
ECHO TV REGURGITANT PEAK GRADIENT: 35.9 MMHG
EOSINOPHIL # BLD: 0 K/UL (ref 0–0.4)
EOSINOPHIL # BLD: 0.2 K/UL (ref 0–0.4)
EOSINOPHIL # BLD: 0.3 K/UL (ref 0–0.4)
EOSINOPHIL NFR BLD: 0 % (ref 0–7)
EOSINOPHIL NFR BLD: 1 % (ref 0–7)
EPITH CASTS URNS QL MICRO: ABNORMAL /LPF
ERYTHROCYTE [DISTWIDTH] IN BLOOD BY AUTOMATED COUNT: 15.7 % (ref 11.5–14.5)
ERYTHROCYTE [DISTWIDTH] IN BLOOD BY AUTOMATED COUNT: 16 % (ref 11.5–14.5)
ERYTHROCYTE [DISTWIDTH] IN BLOOD BY AUTOMATED COUNT: 16.1 % (ref 11.5–14.5)
ERYTHROCYTE [DISTWIDTH] IN BLOOD BY AUTOMATED COUNT: 16.4 % (ref 11.5–14.5)
ERYTHROCYTE [DISTWIDTH] IN BLOOD BY AUTOMATED COUNT: 16.9 % (ref 11.5–14.5)
ERYTHROCYTE [DISTWIDTH] IN BLOOD BY AUTOMATED COUNT: 17.2 % (ref 11.5–14.5)
ERYTHROCYTE [DISTWIDTH] IN BLOOD BY AUTOMATED COUNT: 17.5 % (ref 11.5–14.5)
ERYTHROCYTE [DISTWIDTH] IN BLOOD BY AUTOMATED COUNT: 18.2 % (ref 11.5–14.5)
ERYTHROCYTE [DISTWIDTH] IN BLOOD BY AUTOMATED COUNT: 19.9 % (ref 11.5–14.5)
ERYTHROCYTE [DISTWIDTH] IN BLOOD BY AUTOMATED COUNT: 21.3 % (ref 11.5–14.5)
ERYTHROCYTE [DISTWIDTH] IN BLOOD BY AUTOMATED COUNT: 22.1 % (ref 11.5–14.5)
ERYTHROCYTE [DISTWIDTH] IN BLOOD BY AUTOMATED COUNT: 23.7 % (ref 11.5–14.5)
ERYTHROCYTE [DISTWIDTH] IN BLOOD BY AUTOMATED COUNT: 24.9 % (ref 11.5–14.5)
ERYTHROCYTE [DISTWIDTH] IN BLOOD BY AUTOMATED COUNT: 25.4 % (ref 11.5–14.5)
ERYTHROCYTE [DISTWIDTH] IN BLOOD BY AUTOMATED COUNT: 28 % (ref 11.5–14.5)
ERYTHROCYTE [DISTWIDTH] IN BLOOD BY AUTOMATED COUNT: 29 % (ref 11.5–14.5)
ERYTHROCYTE [DISTWIDTH] IN BLOOD BY AUTOMATED COUNT: 30.2 % (ref 11.5–14.5)
ERYTHROCYTE [DISTWIDTH] IN BLOOD BY AUTOMATED COUNT: 30.8 % (ref 11.5–14.5)
ERYTHROCYTE [DISTWIDTH] IN BLOOD BY AUTOMATED COUNT: 32.3 % (ref 11.5–14.5)
ERYTHROCYTE [DISTWIDTH] IN BLOOD BY AUTOMATED COUNT: ABNORMAL % (ref 11.5–14.5)
ERYTHROCYTE [DISTWIDTH] IN BLOOD BY AUTOMATED COUNT: ABNORMAL % (ref 11.5–14.5)
FERRITIN SERPL-MCNC: 1050 NG/ML (ref 26–388)
FERRITIN SERPL-MCNC: 1095 NG/ML (ref 26–388)
FERRITIN SERPL-MCNC: 1139 NG/ML (ref 26–388)
FERRITIN SERPL-MCNC: 1331 NG/ML (ref 26–388)
FERRITIN SERPL-MCNC: 1337 NG/ML (ref 26–388)
FERRITIN SERPL-MCNC: 1381 NG/ML (ref 26–388)
FERRITIN SERPL-MCNC: 1509 NG/ML (ref 26–388)
FERRITIN SERPL-MCNC: 1531 NG/ML (ref 26–388)
FERRITIN SERPL-MCNC: 1606 NG/ML (ref 26–388)
FERRITIN SERPL-MCNC: 1682 NG/ML (ref 26–388)
FERRITIN SERPL-MCNC: 1751 NG/ML (ref 26–388)
FERRITIN SERPL-MCNC: 577 NG/ML (ref 26–388)
FERRITIN SERPL-MCNC: 909 NG/ML (ref 26–388)
FERRITIN SERPL-MCNC: 975 NG/ML (ref 26–388)
FERRITIN SERPL-MCNC: 988 NG/ML (ref 26–388)
FERRITIN SERPL-MCNC: ABNORMAL NG/ML (ref 26–388)
FERRITIN SERPL-MCNC: ABNORMAL NG/ML (ref 26–388)
FIBRINOGEN PPP-MCNC: 172 MG/DL (ref 200–475)
FIBRINOGEN PPP-MCNC: 184 MG/DL (ref 200–475)
FIBRINOGEN PPP-MCNC: 189 MG/DL (ref 200–475)
FIBRINOGEN PPP-MCNC: 212 MG/DL (ref 200–475)
FIBRINOGEN PPP-MCNC: 224 MG/DL (ref 200–475)
FIBRINOGEN PPP-MCNC: 233 MG/DL (ref 200–475)
FIBRINOGEN PPP-MCNC: 238 MG/DL (ref 200–475)
FIBRINOGEN PPP-MCNC: 286 MG/DL (ref 200–475)
FIBRINOGEN PPP-MCNC: 334 MG/DL (ref 200–475)
FIBRINOGEN PPP-MCNC: 476 MG/DL (ref 200–475)
FIBRINOGEN PPP-MCNC: 582 MG/DL (ref 200–475)
FIBRINOGEN PPP-MCNC: >800 MG/DL (ref 200–475)
FLUAV H1 2009 PAND RNA SPEC QL NAA+PROBE: NOT DETECTED
FLUAV H1 RNA SPEC QL NAA+PROBE: NOT DETECTED
FLUAV H3 RNA SPEC QL NAA+PROBE: NOT DETECTED
FLUAV SUBTYP SPEC NAA+PROBE: NOT DETECTED
FLUBV RNA SPEC QL NAA+PROBE: NOT DETECTED
GAS FLOW.O2 O2 DELIVERY SYS: ABNORMAL L/MIN
GAS FLOW.O2 SETTING OXYMISER: 14 BPM
GAS FLOW.O2 SETTING OXYMISER: 14 BPM
GAS FLOW.O2 SETTING OXYMISER: 15 L/M
GAS FLOW.O2 SETTING OXYMISER: 16 BPM
GLOBULIN SER CALC-MCNC: 3.2 G/DL (ref 2–4)
GLOBULIN SER CALC-MCNC: 3.3 G/DL (ref 2–4)
GLOBULIN SER CALC-MCNC: 3.4 G/DL (ref 2–4)
GLOBULIN SER CALC-MCNC: 3.8 G/DL (ref 2–4)
GLOBULIN SER CALC-MCNC: 3.8 G/DL (ref 2–4)
GLOBULIN SER CALC-MCNC: 4.1 G/DL (ref 2–4)
GLOBULIN SER CALC-MCNC: 4.2 G/DL (ref 2–4)
GLOBULIN SER CALC-MCNC: 4.3 G/DL (ref 2–4)
GLOBULIN SER CALC-MCNC: 4.4 G/DL (ref 2–4)
GLOBULIN SER CALC-MCNC: 4.6 G/DL (ref 2–4)
GLOBULIN SER CALC-MCNC: 4.6 G/DL (ref 2–4)
GLOBULIN SER CALC-MCNC: 4.7 G/DL (ref 2–4)
GLOBULIN SER CALC-MCNC: 4.7 G/DL (ref 2–4)
GLOBULIN SER CALC-MCNC: 4.9 G/DL (ref 2–4)
GLOBULIN SER CALC-MCNC: 5 G/DL (ref 2–4)
GLOBULIN SER CALC-MCNC: 5 G/DL (ref 2–4)
GLOBULIN SER CALC-MCNC: 5.2 G/DL (ref 2–4)
GLOBULIN SER CALC-MCNC: 5.3 G/DL (ref 2–4)
GLOBULIN SER CALC-MCNC: 5.3 G/DL (ref 2–4)
GLUCOSE BLD STRIP.AUTO-MCNC: 110 MG/DL (ref 65–100)
GLUCOSE BLD STRIP.AUTO-MCNC: 112 MG/DL (ref 65–100)
GLUCOSE BLD STRIP.AUTO-MCNC: 113 MG/DL (ref 65–100)
GLUCOSE BLD STRIP.AUTO-MCNC: 116 MG/DL (ref 65–100)
GLUCOSE BLD STRIP.AUTO-MCNC: 116 MG/DL (ref 65–100)
GLUCOSE BLD STRIP.AUTO-MCNC: 120 MG/DL (ref 65–100)
GLUCOSE BLD STRIP.AUTO-MCNC: 132 MG/DL (ref 65–100)
GLUCOSE BLD STRIP.AUTO-MCNC: 137 MG/DL (ref 65–100)
GLUCOSE BLD STRIP.AUTO-MCNC: 138 MG/DL (ref 65–100)
GLUCOSE BLD STRIP.AUTO-MCNC: 139 MG/DL (ref 65–100)
GLUCOSE BLD STRIP.AUTO-MCNC: 139 MG/DL (ref 65–100)
GLUCOSE BLD STRIP.AUTO-MCNC: 140 MG/DL (ref 65–100)
GLUCOSE BLD STRIP.AUTO-MCNC: 141 MG/DL (ref 65–100)
GLUCOSE BLD STRIP.AUTO-MCNC: 141 MG/DL (ref 65–100)
GLUCOSE BLD STRIP.AUTO-MCNC: 142 MG/DL (ref 65–100)
GLUCOSE BLD STRIP.AUTO-MCNC: 149 MG/DL (ref 65–100)
GLUCOSE BLD STRIP.AUTO-MCNC: 152 MG/DL (ref 65–100)
GLUCOSE BLD STRIP.AUTO-MCNC: 152 MG/DL (ref 65–100)
GLUCOSE BLD STRIP.AUTO-MCNC: 158 MG/DL (ref 65–100)
GLUCOSE BLD STRIP.AUTO-MCNC: 158 MG/DL (ref 65–100)
GLUCOSE BLD STRIP.AUTO-MCNC: 160 MG/DL (ref 65–100)
GLUCOSE BLD STRIP.AUTO-MCNC: 163 MG/DL (ref 65–100)
GLUCOSE BLD STRIP.AUTO-MCNC: 165 MG/DL (ref 65–100)
GLUCOSE BLD STRIP.AUTO-MCNC: 167 MG/DL (ref 65–100)
GLUCOSE BLD STRIP.AUTO-MCNC: 167 MG/DL (ref 65–100)
GLUCOSE BLD STRIP.AUTO-MCNC: 168 MG/DL (ref 65–100)
GLUCOSE BLD STRIP.AUTO-MCNC: 168 MG/DL (ref 65–100)
GLUCOSE BLD STRIP.AUTO-MCNC: 170 MG/DL (ref 65–100)
GLUCOSE BLD STRIP.AUTO-MCNC: 170 MG/DL (ref 65–100)
GLUCOSE BLD STRIP.AUTO-MCNC: 171 MG/DL (ref 65–100)
GLUCOSE BLD STRIP.AUTO-MCNC: 173 MG/DL (ref 65–100)
GLUCOSE BLD STRIP.AUTO-MCNC: 173 MG/DL (ref 65–100)
GLUCOSE BLD STRIP.AUTO-MCNC: 176 MG/DL (ref 65–100)
GLUCOSE BLD STRIP.AUTO-MCNC: 177 MG/DL (ref 65–100)
GLUCOSE BLD STRIP.AUTO-MCNC: 178 MG/DL (ref 65–100)
GLUCOSE BLD STRIP.AUTO-MCNC: 181 MG/DL (ref 65–100)
GLUCOSE BLD STRIP.AUTO-MCNC: 184 MG/DL (ref 65–100)
GLUCOSE BLD STRIP.AUTO-MCNC: 185 MG/DL (ref 65–100)
GLUCOSE BLD STRIP.AUTO-MCNC: 189 MG/DL (ref 65–100)
GLUCOSE BLD STRIP.AUTO-MCNC: 189 MG/DL (ref 65–100)
GLUCOSE BLD STRIP.AUTO-MCNC: 194 MG/DL (ref 65–100)
GLUCOSE BLD STRIP.AUTO-MCNC: 194 MG/DL (ref 65–100)
GLUCOSE BLD STRIP.AUTO-MCNC: 195 MG/DL (ref 65–100)
GLUCOSE BLD STRIP.AUTO-MCNC: 195 MG/DL (ref 65–100)
GLUCOSE BLD STRIP.AUTO-MCNC: 196 MG/DL (ref 65–100)
GLUCOSE BLD STRIP.AUTO-MCNC: 198 MG/DL (ref 65–100)
GLUCOSE BLD STRIP.AUTO-MCNC: 204 MG/DL (ref 65–100)
GLUCOSE BLD STRIP.AUTO-MCNC: 209 MG/DL (ref 65–100)
GLUCOSE BLD STRIP.AUTO-MCNC: 213 MG/DL (ref 65–100)
GLUCOSE BLD STRIP.AUTO-MCNC: 217 MG/DL (ref 65–100)
GLUCOSE BLD STRIP.AUTO-MCNC: 224 MG/DL (ref 65–100)
GLUCOSE BLD STRIP.AUTO-MCNC: 224 MG/DL (ref 65–100)
GLUCOSE BLD STRIP.AUTO-MCNC: 226 MG/DL (ref 65–100)
GLUCOSE BLD STRIP.AUTO-MCNC: 227 MG/DL (ref 65–100)
GLUCOSE BLD STRIP.AUTO-MCNC: 228 MG/DL (ref 65–100)
GLUCOSE BLD STRIP.AUTO-MCNC: 230 MG/DL (ref 65–100)
GLUCOSE BLD STRIP.AUTO-MCNC: 233 MG/DL (ref 65–100)
GLUCOSE BLD STRIP.AUTO-MCNC: 235 MG/DL (ref 65–100)
GLUCOSE BLD STRIP.AUTO-MCNC: 237 MG/DL (ref 65–100)
GLUCOSE BLD STRIP.AUTO-MCNC: 240 MG/DL (ref 65–100)
GLUCOSE BLD STRIP.AUTO-MCNC: 244 MG/DL (ref 65–100)
GLUCOSE BLD STRIP.AUTO-MCNC: 245 MG/DL (ref 65–100)
GLUCOSE BLD STRIP.AUTO-MCNC: 246 MG/DL (ref 65–100)
GLUCOSE BLD STRIP.AUTO-MCNC: 247 MG/DL (ref 65–100)
GLUCOSE BLD STRIP.AUTO-MCNC: 250 MG/DL (ref 65–100)
GLUCOSE BLD STRIP.AUTO-MCNC: 254 MG/DL (ref 65–100)
GLUCOSE BLD STRIP.AUTO-MCNC: 254 MG/DL (ref 65–100)
GLUCOSE BLD STRIP.AUTO-MCNC: 258 MG/DL (ref 65–100)
GLUCOSE BLD STRIP.AUTO-MCNC: 267 MG/DL (ref 65–100)
GLUCOSE BLD STRIP.AUTO-MCNC: 269 MG/DL (ref 65–100)
GLUCOSE BLD STRIP.AUTO-MCNC: 276 MG/DL (ref 65–100)
GLUCOSE BLD STRIP.AUTO-MCNC: 287 MG/DL (ref 65–100)
GLUCOSE BLD STRIP.AUTO-MCNC: 301 MG/DL (ref 65–100)
GLUCOSE BLD STRIP.AUTO-MCNC: 301 MG/DL (ref 65–100)
GLUCOSE BLD STRIP.AUTO-MCNC: 304 MG/DL (ref 65–100)
GLUCOSE BLD STRIP.AUTO-MCNC: 309 MG/DL (ref 65–100)
GLUCOSE BLD STRIP.AUTO-MCNC: 311 MG/DL (ref 65–100)
GLUCOSE BLD STRIP.AUTO-MCNC: 313 MG/DL (ref 65–100)
GLUCOSE BLD STRIP.AUTO-MCNC: 313 MG/DL (ref 65–100)
GLUCOSE BLD STRIP.AUTO-MCNC: 350 MG/DL (ref 65–100)
GLUCOSE BLD STRIP.AUTO-MCNC: 364 MG/DL (ref 65–100)
GLUCOSE BLD STRIP.AUTO-MCNC: 63 MG/DL (ref 65–100)
GLUCOSE BLD STRIP.AUTO-MCNC: 89 MG/DL (ref 65–100)
GLUCOSE BLD STRIP.AUTO-MCNC: 97 MG/DL (ref 65–100)
GLUCOSE BLD STRIP.AUTO-MCNC: 98 MG/DL (ref 65–100)
GLUCOSE BLD STRIP.AUTO-MCNC: 99 MG/DL (ref 65–100)
GLUCOSE BLD STRIP.AUTO-MCNC: NORMAL MG/DL (ref 65–100)
GLUCOSE SERPL-MCNC: 107 MG/DL (ref 65–100)
GLUCOSE SERPL-MCNC: 120 MG/DL (ref 65–100)
GLUCOSE SERPL-MCNC: 120 MG/DL (ref 65–100)
GLUCOSE SERPL-MCNC: 121 MG/DL (ref 65–100)
GLUCOSE SERPL-MCNC: 125 MG/DL (ref 65–100)
GLUCOSE SERPL-MCNC: 126 MG/DL (ref 65–100)
GLUCOSE SERPL-MCNC: 132 MG/DL (ref 65–100)
GLUCOSE SERPL-MCNC: 144 MG/DL (ref 65–100)
GLUCOSE SERPL-MCNC: 154 MG/DL (ref 65–100)
GLUCOSE SERPL-MCNC: 155 MG/DL (ref 65–100)
GLUCOSE SERPL-MCNC: 156 MG/DL (ref 65–100)
GLUCOSE SERPL-MCNC: 158 MG/DL (ref 65–100)
GLUCOSE SERPL-MCNC: 159 MG/DL (ref 65–100)
GLUCOSE SERPL-MCNC: 159 MG/DL (ref 65–100)
GLUCOSE SERPL-MCNC: 160 MG/DL (ref 65–100)
GLUCOSE SERPL-MCNC: 161 MG/DL (ref 65–100)
GLUCOSE SERPL-MCNC: 165 MG/DL (ref 65–100)
GLUCOSE SERPL-MCNC: 166 MG/DL (ref 65–100)
GLUCOSE SERPL-MCNC: 169 MG/DL (ref 65–100)
GLUCOSE SERPL-MCNC: 178 MG/DL (ref 65–100)
GLUCOSE SERPL-MCNC: 186 MG/DL (ref 65–100)
GLUCOSE SERPL-MCNC: 188 MG/DL (ref 65–100)
GLUCOSE SERPL-MCNC: 189 MG/DL (ref 65–100)
GLUCOSE SERPL-MCNC: 192 MG/DL (ref 65–100)
GLUCOSE SERPL-MCNC: 197 MG/DL (ref 65–100)
GLUCOSE SERPL-MCNC: 198 MG/DL (ref 65–100)
GLUCOSE SERPL-MCNC: 200 MG/DL (ref 65–100)
GLUCOSE SERPL-MCNC: 206 MG/DL (ref 65–100)
GLUCOSE SERPL-MCNC: 209 MG/DL (ref 65–100)
GLUCOSE SERPL-MCNC: 212 MG/DL (ref 65–100)
GLUCOSE SERPL-MCNC: 217 MG/DL (ref 65–100)
GLUCOSE SERPL-MCNC: 220 MG/DL (ref 65–100)
GLUCOSE SERPL-MCNC: 221 MG/DL (ref 65–100)
GLUCOSE SERPL-MCNC: 241 MG/DL (ref 65–100)
GLUCOSE SERPL-MCNC: 244 MG/DL (ref 65–100)
GLUCOSE SERPL-MCNC: 247 MG/DL (ref 65–100)
GLUCOSE SERPL-MCNC: 248 MG/DL (ref 65–100)
GLUCOSE SERPL-MCNC: 251 MG/DL (ref 65–100)
GLUCOSE SERPL-MCNC: 252 MG/DL (ref 65–100)
GLUCOSE SERPL-MCNC: 256 MG/DL (ref 65–100)
GLUCOSE SERPL-MCNC: 269 MG/DL (ref 65–100)
GLUCOSE SERPL-MCNC: 57 MG/DL (ref 65–100)
GLUCOSE SERPL-MCNC: 58 MG/DL (ref 65–100)
GLUCOSE SERPL-MCNC: 65 MG/DL (ref 65–100)
GLUCOSE SERPL-MCNC: 76 MG/DL (ref 65–100)
GLUCOSE SERPL-MCNC: 87 MG/DL (ref 65–100)
GLUCOSE SERPL-MCNC: 90 MG/DL (ref 65–100)
GLUCOSE SERPL-MCNC: 98 MG/DL (ref 65–100)
GLUCOSE UR STRIP.AUTO-MCNC: >1000 MG/DL
HADV DNA SPEC QL NAA+PROBE: NOT DETECTED
HAV IGM SER QL: NONREACTIVE
HBV CORE IGM SER QL: NONREACTIVE
HBV SURFACE AB SER QL: REACTIVE
HBV SURFACE AB SER-ACNC: 24.25 MIU/ML
HBV SURFACE AG SER QL: <0.1 INDEX
HBV SURFACE AG SER QL: <0.1 INDEX
HBV SURFACE AG SER QL: NEGATIVE
HBV SURFACE AG SER QL: NEGATIVE
HCO3 BLD-SCNC: 10.3 MMOL/L (ref 22–26)
HCO3 BLD-SCNC: 12 MMOL/L (ref 22–26)
HCO3 BLD-SCNC: 12.7 MMOL/L (ref 22–26)
HCO3 BLD-SCNC: 14.7 MMOL/L (ref 22–26)
HCO3 BLD-SCNC: 20.7 MMOL/L (ref 22–26)
HCO3 BLD-SCNC: 20.8 MMOL/L (ref 22–26)
HCO3 BLD-SCNC: 21.5 MMOL/L (ref 22–26)
HCO3 BLD-SCNC: 21.6 MMOL/L (ref 22–26)
HCO3 BLD-SCNC: 21.9 MMOL/L (ref 22–26)
HCO3 BLD-SCNC: 22.5 MMOL/L (ref 22–26)
HCO3 BLD-SCNC: 22.9 MMOL/L (ref 22–26)
HCO3 BLD-SCNC: 23.3 MMOL/L (ref 22–26)
HCO3 BLD-SCNC: 23.7 MMOL/L (ref 22–26)
HCO3 BLD-SCNC: 24.4 MMOL/L (ref 22–26)
HCO3 BLD-SCNC: 24.5 MMOL/L (ref 22–26)
HCO3 BLD-SCNC: 25.1 MMOL/L (ref 22–26)
HCO3 BLD-SCNC: 25.5 MMOL/L (ref 22–26)
HCOV 229E RNA SPEC QL NAA+PROBE: NOT DETECTED
HCOV HKU1 RNA SPEC QL NAA+PROBE: NOT DETECTED
HCOV NL63 RNA SPEC QL NAA+PROBE: NOT DETECTED
HCOV OC43 RNA SPEC QL NAA+PROBE: NOT DETECTED
HCT VFR BLD AUTO: 22.4 % (ref 36.6–50.3)
HCT VFR BLD AUTO: 24.4 % (ref 36.6–50.3)
HCT VFR BLD AUTO: 25.1 % (ref 36.6–50.3)
HCT VFR BLD AUTO: 25.6 % (ref 36.6–50.3)
HCT VFR BLD AUTO: 27.6 % (ref 36.6–50.3)
HCT VFR BLD AUTO: 27.8 % (ref 36.6–50.3)
HCT VFR BLD AUTO: 28.4 % (ref 36.6–50.3)
HCT VFR BLD AUTO: 28.5 % (ref 36.6–50.3)
HCT VFR BLD AUTO: 28.8 % (ref 36.6–50.3)
HCT VFR BLD AUTO: 29.2 % (ref 36.6–50.3)
HCT VFR BLD AUTO: 29.4 % (ref 36.6–50.3)
HCT VFR BLD AUTO: 29.6 % (ref 36.6–50.3)
HCT VFR BLD AUTO: 29.9 % (ref 36.6–50.3)
HCT VFR BLD AUTO: 30.1 % (ref 36.6–50.3)
HCT VFR BLD AUTO: 30.6 % (ref 36.6–50.3)
HCT VFR BLD AUTO: 30.8 % (ref 36.6–50.3)
HCT VFR BLD AUTO: 31.5 % (ref 36.6–50.3)
HCT VFR BLD AUTO: 31.7 % (ref 36.6–50.3)
HCT VFR BLD AUTO: 35.7 % (ref 36.6–50.3)
HCT VFR BLD AUTO: 36.4 % (ref 36.6–50.3)
HCT VFR BLD AUTO: 36.6 % (ref 36.6–50.3)
HCT VFR BLD AUTO: 37 % (ref 36.6–50.3)
HCT VFR BLD AUTO: 38.1 % (ref 36.6–50.3)
HCT VFR BLD AUTO: 38.4 % (ref 36.6–50.3)
HCV AB SERPL QL IA: NONREACTIVE
HCV COMMENT,HCGAC: NORMAL
HGB BLD-MCNC: 10 G/DL (ref 12.1–17)
HGB BLD-MCNC: 10.1 G/DL (ref 12.1–17)
HGB BLD-MCNC: 10.4 G/DL (ref 12.1–17)
HGB BLD-MCNC: 10.8 G/DL (ref 12.1–17)
HGB BLD-MCNC: 11.3 G/DL (ref 12.1–17)
HGB BLD-MCNC: 11.9 G/DL (ref 12.1–17)
HGB BLD-MCNC: 11.9 G/DL (ref 12.1–17)
HGB BLD-MCNC: 12.1 G/DL (ref 12.1–17)
HGB BLD-MCNC: 12.3 G/DL (ref 12.1–17)
HGB BLD-MCNC: 12.3 G/DL (ref 12.1–17)
HGB BLD-MCNC: 7.6 G/DL (ref 12.1–17)
HGB BLD-MCNC: 8.1 G/DL (ref 12.1–17)
HGB BLD-MCNC: 8.5 G/DL (ref 12.1–17)
HGB BLD-MCNC: 8.9 G/DL (ref 12.1–17)
HGB BLD-MCNC: 9.2 G/DL (ref 12.1–17)
HGB BLD-MCNC: 9.3 G/DL (ref 12.1–17)
HGB BLD-MCNC: 9.4 G/DL (ref 12.1–17)
HGB BLD-MCNC: 9.5 G/DL (ref 12.1–17)
HGB BLD-MCNC: 9.5 G/DL (ref 12.1–17)
HGB BLD-MCNC: 9.6 G/DL (ref 12.1–17)
HGB BLD-MCNC: 9.7 G/DL (ref 12.1–17)
HGB BLD-MCNC: 9.7 G/DL (ref 12.1–17)
HGB BLD-MCNC: 9.9 G/DL (ref 12.1–17)
HGB BLD-MCNC: 9.9 G/DL (ref 12.1–17)
HGB UR QL STRIP: NEGATIVE
HMPV RNA SPEC QL NAA+PROBE: NOT DETECTED
HPIV1 RNA SPEC QL NAA+PROBE: NOT DETECTED
HPIV2 RNA SPEC QL NAA+PROBE: NOT DETECTED
HPIV3 RNA SPEC QL NAA+PROBE: NOT DETECTED
HPIV4 RNA SPEC QL NAA+PROBE: NOT DETECTED
IGG SERPL-MCNC: 1410 MG/DL (ref 700–1600)
IL6 SERPL-MCNC: 2226 PG/ML (ref 0–15.5)
IMM GRANULOCYTES # BLD AUTO: 0 K/UL (ref 0–0.04)
IMM GRANULOCYTES # BLD AUTO: 0.2 K/UL (ref 0–0.04)
IMM GRANULOCYTES # BLD AUTO: 0.3 K/UL (ref 0–0.04)
IMM GRANULOCYTES # BLD AUTO: 0.4 K/UL (ref 0–0.04)
IMM GRANULOCYTES # BLD AUTO: 1.4 K/UL (ref 0–0.04)
IMM GRANULOCYTES # BLD AUTO: 1.7 K/UL (ref 0–0.04)
IMM GRANULOCYTES NFR BLD AUTO: 0 % (ref 0–0.5)
IMM GRANULOCYTES NFR BLD AUTO: 2 % (ref 0–0.5)
IMM GRANULOCYTES NFR BLD AUTO: 6 % (ref 0–0.5)
IMM GRANULOCYTES NFR BLD AUTO: 7 % (ref 0–0.5)
INR PPP: 1.2 (ref 0.9–1.1)
INR PPP: 1.3 (ref 0.9–1.1)
INR PPP: 1.4 (ref 0.9–1.1)
INR PPP: 1.5 (ref 0.9–1.1)
INR PPP: 1.6 (ref 0.9–1.1)
INR PPP: 1.7 (ref 0.9–1.1)
INR PPP: 1.7 (ref 0.9–1.1)
INR PPP: 1.8 (ref 0.9–1.1)
INR PPP: 1.9 (ref 0.9–1.1)
INR PPP: 2.4 (ref 0.9–1.1)
INR PPP: 2.4 (ref 0.9–1.1)
INR PPP: 3.1 (ref 0.9–1.1)
INR PPP: 4.2 (ref 0.9–1.1)
KETONES UR QL STRIP.AUTO: ABNORMAL MG/DL
LACTATE SERPL-SCNC: 2.1 MMOL/L (ref 0.4–2)
LACTATE SERPL-SCNC: 2.2 MMOL/L (ref 0.4–2)
LACTATE SERPL-SCNC: 2.2 MMOL/L (ref 0.4–2)
LACTATE SERPL-SCNC: 2.3 MMOL/L (ref 0.4–2)
LACTATE SERPL-SCNC: 2.6 MMOL/L (ref 0.4–2)
LACTATE SERPL-SCNC: 5 MMOL/L (ref 0.4–2)
LACTATE SERPL-SCNC: 6.1 MMOL/L (ref 0.4–2)
LACTATE SERPL-SCNC: 8.6 MMOL/L (ref 0.4–2)
LACTATE SERPL-SCNC: 8.9 MMOL/L (ref 0.4–2)
LDH SERPL L TO P-CCNC: 310 U/L (ref 85–241)
LDH SERPL L TO P-CCNC: 355 U/L (ref 85–241)
LEUKOCYTE ESTERASE UR QL STRIP.AUTO: NEGATIVE
LVFS 2D: 13.88 %
LVSV (TEICH): 13.95 ML
LYMPHOCYTES # BLD: 0.2 K/UL (ref 0.8–3.5)
LYMPHOCYTES # BLD: 0.2 K/UL (ref 0.8–3.5)
LYMPHOCYTES # BLD: 0.3 K/UL (ref 0.8–3.5)
LYMPHOCYTES # BLD: 0.3 K/UL (ref 0.8–3.5)
LYMPHOCYTES # BLD: 0.4 K/UL (ref 0.8–3.5)
LYMPHOCYTES # BLD: 0.5 K/UL (ref 0.8–3.5)
LYMPHOCYTES # BLD: 0.6 K/UL (ref 0.8–3.5)
LYMPHOCYTES # BLD: 0.7 K/UL (ref 0.8–3.5)
LYMPHOCYTES # BLD: 0.7 K/UL (ref 0.8–3.5)
LYMPHOCYTES # BLD: 0.8 K/UL (ref 0.8–3.5)
LYMPHOCYTES # BLD: 0.9 K/UL (ref 0.8–3.5)
LYMPHOCYTES # BLD: 1 K/UL (ref 0.8–3.5)
LYMPHOCYTES # BLD: 1 K/UL (ref 0.8–3.5)
LYMPHOCYTES # BLD: 1.1 K/UL (ref 0.8–3.5)
LYMPHOCYTES # BLD: 2.5 K/UL (ref 0.8–3.5)
LYMPHOCYTES NFR BLD: 1 % (ref 12–49)
LYMPHOCYTES NFR BLD: 1 % (ref 12–49)
LYMPHOCYTES NFR BLD: 17 % (ref 12–49)
LYMPHOCYTES NFR BLD: 2 % (ref 12–49)
LYMPHOCYTES NFR BLD: 3 % (ref 12–49)
LYMPHOCYTES NFR BLD: 4 % (ref 12–49)
LYMPHOCYTES NFR BLD: 5 % (ref 12–49)
LYMPHOCYTES NFR BLD: 9 % (ref 12–49)
M PNEUMO DNA SPEC QL NAA+PROBE: NOT DETECTED
MAGNESIUM SERPL-MCNC: 1.6 MG/DL (ref 1.6–2.4)
MAGNESIUM SERPL-MCNC: 1.7 MG/DL (ref 1.6–2.4)
MAGNESIUM SERPL-MCNC: 1.7 MG/DL (ref 1.6–2.4)
MAGNESIUM SERPL-MCNC: 2 MG/DL (ref 1.6–2.4)
MAGNESIUM SERPL-MCNC: 2 MG/DL (ref 1.6–2.4)
MAGNESIUM SERPL-MCNC: 2.1 MG/DL (ref 1.6–2.4)
MAGNESIUM SERPL-MCNC: 2.2 MG/DL (ref 1.6–2.4)
MAGNESIUM SERPL-MCNC: 2.3 MG/DL (ref 1.6–2.4)
MAGNESIUM SERPL-MCNC: 2.4 MG/DL (ref 1.6–2.4)
MAGNESIUM SERPL-MCNC: 2.5 MG/DL (ref 1.6–2.4)
MAGNESIUM SERPL-MCNC: 2.6 MG/DL (ref 1.6–2.4)
MAGNESIUM SERPL-MCNC: 2.7 MG/DL (ref 1.6–2.4)
MAGNESIUM SERPL-MCNC: 2.8 MG/DL (ref 1.6–2.4)
MAGNESIUM SERPL-MCNC: 2.9 MG/DL (ref 1.6–2.4)
MAGNESIUM SERPL-MCNC: 3.2 MG/DL (ref 1.6–2.4)
MCH RBC QN AUTO: 27.5 PG (ref 26–34)
MCH RBC QN AUTO: 27.6 PG (ref 26–34)
MCH RBC QN AUTO: 27.6 PG (ref 26–34)
MCH RBC QN AUTO: 27.9 PG (ref 26–34)
MCH RBC QN AUTO: 28 PG (ref 26–34)
MCH RBC QN AUTO: 28.1 PG (ref 26–34)
MCH RBC QN AUTO: 28.5 PG (ref 26–34)
MCH RBC QN AUTO: 28.6 PG (ref 26–34)
MCH RBC QN AUTO: 28.9 PG (ref 26–34)
MCH RBC QN AUTO: 29.1 PG (ref 26–34)
MCH RBC QN AUTO: 29.1 PG (ref 26–34)
MCH RBC QN AUTO: 29.2 PG (ref 26–34)
MCH RBC QN AUTO: 29.4 PG (ref 26–34)
MCH RBC QN AUTO: 29.7 PG (ref 26–34)
MCHC RBC AUTO-ENTMCNC: 30.8 G/DL (ref 30–36.5)
MCHC RBC AUTO-ENTMCNC: 31.7 G/DL (ref 30–36.5)
MCHC RBC AUTO-ENTMCNC: 31.9 G/DL (ref 30–36.5)
MCHC RBC AUTO-ENTMCNC: 32 G/DL (ref 30–36.5)
MCHC RBC AUTO-ENTMCNC: 32.1 G/DL (ref 30–36.5)
MCHC RBC AUTO-ENTMCNC: 32.3 G/DL (ref 30–36.5)
MCHC RBC AUTO-ENTMCNC: 32.3 G/DL (ref 30–36.5)
MCHC RBC AUTO-ENTMCNC: 32.5 G/DL (ref 30–36.5)
MCHC RBC AUTO-ENTMCNC: 32.7 G/DL (ref 30–36.5)
MCHC RBC AUTO-ENTMCNC: 32.9 G/DL (ref 30–36.5)
MCHC RBC AUTO-ENTMCNC: 32.9 G/DL (ref 30–36.5)
MCHC RBC AUTO-ENTMCNC: 33 G/DL (ref 30–36.5)
MCHC RBC AUTO-ENTMCNC: 33.1 G/DL (ref 30–36.5)
MCHC RBC AUTO-ENTMCNC: 33.2 G/DL (ref 30–36.5)
MCHC RBC AUTO-ENTMCNC: 33.9 G/DL (ref 30–36.5)
MCHC RBC AUTO-ENTMCNC: 33.9 G/DL (ref 30–36.5)
MCHC RBC AUTO-ENTMCNC: 34.1 G/DL (ref 30–36.5)
MCHC RBC AUTO-ENTMCNC: 34.2 G/DL (ref 30–36.5)
MCHC RBC AUTO-ENTMCNC: 34.3 G/DL (ref 30–36.5)
MCHC RBC AUTO-ENTMCNC: 34.7 G/DL (ref 30–36.5)
MCHC RBC AUTO-ENTMCNC: 34.8 G/DL (ref 30–36.5)
MCHC RBC AUTO-ENTMCNC: 34.8 G/DL (ref 30–36.5)
MCV RBC AUTO: 80.8 FL (ref 80–99)
MCV RBC AUTO: 81.6 FL (ref 80–99)
MCV RBC AUTO: 82.3 FL (ref 80–99)
MCV RBC AUTO: 82.4 FL (ref 80–99)
MCV RBC AUTO: 82.6 FL (ref 80–99)
MCV RBC AUTO: 83.5 FL (ref 80–99)
MCV RBC AUTO: 84.3 FL (ref 80–99)
MCV RBC AUTO: 84.5 FL (ref 80–99)
MCV RBC AUTO: 84.5 FL (ref 80–99)
MCV RBC AUTO: 84.8 FL (ref 80–99)
MCV RBC AUTO: 85.2 FL (ref 80–99)
MCV RBC AUTO: 85.2 FL (ref 80–99)
MCV RBC AUTO: 85.6 FL (ref 80–99)
MCV RBC AUTO: 85.6 FL (ref 80–99)
MCV RBC AUTO: 86.3 FL (ref 80–99)
MCV RBC AUTO: 86.6 FL (ref 80–99)
MCV RBC AUTO: 87.3 FL (ref 80–99)
MCV RBC AUTO: 87.9 FL (ref 80–99)
MCV RBC AUTO: 88.1 FL (ref 80–99)
MCV RBC AUTO: 88.4 FL (ref 80–99)
MCV RBC AUTO: 89.3 FL (ref 80–99)
MCV RBC AUTO: 89.7 FL (ref 80–99)
MCV RBC AUTO: 90 FL (ref 80–99)
MCV RBC AUTO: 94.5 FL (ref 80–99)
METAMYELOCYTES NFR BLD MANUAL: 1 %
METAMYELOCYTES NFR BLD MANUAL: 3 %
METAMYELOCYTES NFR BLD MANUAL: 4 %
METAMYELOCYTES NFR BLD MANUAL: 5 %
MITOCHONDRIA M2 IGG SER-ACNC: <20 UNITS (ref 0–20)
MONOCYTES # BLD: 0 K/UL (ref 0–1)
MONOCYTES # BLD: 0.2 K/UL (ref 0–1)
MONOCYTES # BLD: 0.3 K/UL (ref 0–1)
MONOCYTES # BLD: 0.3 K/UL (ref 0–1)
MONOCYTES # BLD: 0.4 K/UL (ref 0–1)
MONOCYTES # BLD: 0.4 K/UL (ref 0–1)
MONOCYTES # BLD: 0.5 K/UL (ref 0–1)
MONOCYTES # BLD: 0.6 K/UL (ref 0–1)
MONOCYTES # BLD: 0.7 K/UL (ref 0–1)
MONOCYTES # BLD: 0.7 K/UL (ref 0–1)
MONOCYTES # BLD: 0.8 K/UL (ref 0–1)
MONOCYTES # BLD: 0.8 K/UL (ref 0–1)
MONOCYTES # BLD: 0.9 K/UL (ref 0–1)
MONOCYTES # BLD: 0.9 K/UL (ref 0–1)
MONOCYTES # BLD: 1 K/UL (ref 0–1)
MONOCYTES # BLD: 1.1 K/UL (ref 0–1)
MONOCYTES # BLD: 1.1 K/UL (ref 0–1)
MONOCYTES # BLD: 1.2 K/UL (ref 0–1)
MONOCYTES # BLD: 1.8 K/UL (ref 0–1)
MONOCYTES NFR BLD: 0 % (ref 5–13)
MONOCYTES NFR BLD: 1 % (ref 5–13)
MONOCYTES NFR BLD: 19 % (ref 5–13)
MONOCYTES NFR BLD: 2 % (ref 5–13)
MONOCYTES NFR BLD: 3 % (ref 5–13)
MONOCYTES NFR BLD: 4 % (ref 5–13)
MONOCYTES NFR BLD: 5 % (ref 5–13)
MONOCYTES NFR BLD: 6 % (ref 5–13)
MONOCYTES NFR BLD: 7 % (ref 5–13)
MONOCYTES NFR BLD: 7 % (ref 5–13)
MV DEC SLOPE: 4.42
MYELOCYTES NFR BLD MANUAL: 2 %
MYELOCYTES NFR BLD MANUAL: 3 %
NEUTS BAND NFR BLD MANUAL: 1 %
NEUTS BAND NFR BLD MANUAL: 11 %
NEUTS BAND NFR BLD MANUAL: 12 %
NEUTS BAND NFR BLD MANUAL: 19 %
NEUTS BAND NFR BLD MANUAL: 2 %
NEUTS BAND NFR BLD MANUAL: 3 %
NEUTS BAND NFR BLD MANUAL: 4 %
NEUTS BAND NFR BLD MANUAL: 5 %
NEUTS BAND NFR BLD MANUAL: 7 %
NEUTS BAND NFR BLD MANUAL: 9 %
NEUTS SEG # BLD: 1.9 K/UL (ref 1.8–8)
NEUTS SEG # BLD: 10.3 K/UL (ref 1.8–8)
NEUTS SEG # BLD: 13.4 K/UL (ref 1.8–8)
NEUTS SEG # BLD: 13.4 K/UL (ref 1.8–8)
NEUTS SEG # BLD: 15.5 K/UL (ref 1.8–8)
NEUTS SEG # BLD: 18.6 K/UL (ref 1.8–8)
NEUTS SEG # BLD: 19.2 K/UL (ref 1.8–8)
NEUTS SEG # BLD: 19.5 K/UL (ref 1.8–8)
NEUTS SEG # BLD: 19.6 K/UL (ref 1.8–8)
NEUTS SEG # BLD: 19.7 K/UL (ref 1.8–8)
NEUTS SEG # BLD: 21 K/UL (ref 1.8–8)
NEUTS SEG # BLD: 22.1 K/UL (ref 1.8–8)
NEUTS SEG # BLD: 23.4 K/UL (ref 1.8–8)
NEUTS SEG # BLD: 24.3 K/UL (ref 1.8–8)
NEUTS SEG # BLD: 24.5 K/UL (ref 1.8–8)
NEUTS SEG # BLD: 24.5 K/UL (ref 1.8–8)
NEUTS SEG # BLD: 24.7 K/UL (ref 1.8–8)
NEUTS SEG # BLD: 26.8 K/UL (ref 1.8–8)
NEUTS SEG # BLD: 28.2 K/UL (ref 1.8–8)
NEUTS SEG # BLD: 28.7 K/UL (ref 1.8–8)
NEUTS SEG # BLD: 7.9 K/UL (ref 1.8–8)
NEUTS SEG # BLD: 9 K/UL (ref 1.8–8)
NEUTS SEG NFR BLD: 46 % (ref 32–75)
NEUTS SEG NFR BLD: 74 % (ref 32–75)
NEUTS SEG NFR BLD: 82 % (ref 32–75)
NEUTS SEG NFR BLD: 83 % (ref 32–75)
NEUTS SEG NFR BLD: 84 % (ref 32–75)
NEUTS SEG NFR BLD: 86 % (ref 32–75)
NEUTS SEG NFR BLD: 88 % (ref 32–75)
NEUTS SEG NFR BLD: 89 % (ref 32–75)
NEUTS SEG NFR BLD: 90 % (ref 32–75)
NEUTS SEG NFR BLD: 91 % (ref 32–75)
NEUTS SEG NFR BLD: 92 % (ref 32–75)
NEUTS SEG NFR BLD: 93 % (ref 32–75)
NEUTS SEG NFR BLD: 93 % (ref 32–75)
NEUTS SEG NFR BLD: 94 % (ref 32–75)
NEUTS SEG NFR BLD: 94 % (ref 32–75)
NEUTS SEG NFR BLD: 95 % (ref 32–75)
NITRITE UR QL STRIP.AUTO: NEGATIVE
NRBC # BLD: 0 K/UL (ref 0–0.01)
NRBC # BLD: 0.03 K/UL (ref 0–0.01)
NRBC # BLD: 0.04 K/UL (ref 0–0.01)
NRBC # BLD: 0.04 K/UL (ref 0–0.01)
NRBC # BLD: 0.05 K/UL (ref 0–0.01)
NRBC # BLD: 0.06 K/UL (ref 0–0.01)
NRBC # BLD: 0.08 K/UL (ref 0–0.01)
NRBC # BLD: 0.1 K/UL (ref 0–0.01)
NRBC # BLD: 0.21 K/UL (ref 0–0.01)
NRBC # BLD: 0.21 K/UL (ref 0–0.01)
NRBC # BLD: 0.26 K/UL (ref 0–0.01)
NRBC # BLD: 0.48 K/UL (ref 0–0.01)
NRBC # BLD: 0.48 K/UL (ref 0–0.01)
NRBC # BLD: 0.87 K/UL (ref 0–0.01)
NRBC # BLD: 1.1 K/UL (ref 0–0.01)
NRBC # BLD: 1.55 K/UL (ref 0–0.01)
NRBC # BLD: 1.58 K/UL (ref 0–0.01)
NRBC # BLD: 2.54 K/UL (ref 0–0.01)
NRBC # BLD: 3.4 K/UL (ref 0–0.01)
NRBC # BLD: 4 K/UL (ref 0–0.01)
NRBC BLD-RTO: 0 PER 100 WBC
NRBC BLD-RTO: 0.1 PER 100 WBC
NRBC BLD-RTO: 0.2 PER 100 WBC
NRBC BLD-RTO: 0.3 PER 100 WBC
NRBC BLD-RTO: 0.4 PER 100 WBC
NRBC BLD-RTO: 0.4 PER 100 WBC
NRBC BLD-RTO: 0.5 PER 100 WBC
NRBC BLD-RTO: 0.6 PER 100 WBC
NRBC BLD-RTO: 0.9 PER 100 WBC
NRBC BLD-RTO: 1 PER 100 WBC
NRBC BLD-RTO: 11.2 PER 100 WBC
NRBC BLD-RTO: 122.7 PER 100 WBC
NRBC BLD-RTO: 16.1 PER 100 WBC
NRBC BLD-RTO: 2.1 PER 100 WBC
NRBC BLD-RTO: 2.4 PER 100 WBC
NRBC BLD-RTO: 2.7 PER 100 WBC
NRBC BLD-RTO: 3.6 PER 100 WBC
NRBC BLD-RTO: 31.3 PER 100 WBC
NRBC BLD-RTO: 4 PER 100 WBC
NRBC BLD-RTO: 5.6 PER 100 WBC
O2/TOTAL GAS SETTING VFR VENT: 0.3 %
O2/TOTAL GAS SETTING VFR VENT: 100 %
O2/TOTAL GAS SETTING VFR VENT: 30 %
O2/TOTAL GAS SETTING VFR VENT: 40 %
O2/TOTAL GAS SETTING VFR VENT: 50 %
O2/TOTAL GAS SETTING VFR VENT: 50 %
PCO2 BLD: 28.6 MMHG (ref 35–45)
PCO2 BLD: 30.5 MMHG (ref 35–45)
PCO2 BLD: 33.8 MMHG (ref 35–45)
PCO2 BLD: 35.1 MMHG (ref 35–45)
PCO2 BLD: 36.6 MMHG (ref 35–45)
PCO2 BLD: 36.8 MMHG (ref 35–45)
PCO2 BLD: 38.6 MMHG (ref 35–45)
PCO2 BLD: 38.7 MMHG (ref 35–45)
PCO2 BLD: 38.9 MMHG (ref 35–45)
PCO2 BLD: 40 MMHG (ref 35–45)
PCO2 BLD: 40.1 MMHG (ref 35–45)
PCO2 BLD: 40.4 MMHG (ref 35–45)
PCO2 BLD: 43.3 MMHG (ref 35–45)
PCO2 BLD: 45.8 MMHG (ref 35–45)
PCO2 BLD: 51.4 MMHG (ref 35–45)
PEEP RESPIRATORY: 6 CMH2O
PH BLD: 7 [PH] (ref 7.35–7.45)
PH BLD: 7.1 [PH] (ref 7.35–7.45)
PH BLD: 7.13 [PH] (ref 7.35–7.45)
PH BLD: 7.26 [PH] (ref 7.35–7.45)
PH BLD: 7.32 [PH] (ref 7.35–7.45)
PH BLD: 7.34 [PH] (ref 7.35–7.45)
PH BLD: 7.36 [PH] (ref 7.35–7.45)
PH BLD: 7.36 [PH] (ref 7.35–7.45)
PH BLD: 7.38 [PH] (ref 7.35–7.45)
PH BLD: 7.38 [PH] (ref 7.35–7.45)
PH BLD: 7.39 [PH] (ref 7.35–7.45)
PH BLD: 7.39 [PH] (ref 7.35–7.45)
PH BLD: 7.4 [PH] (ref 7.35–7.45)
PH BLD: 7.4 [PH] (ref 7.35–7.45)
PH BLD: 7.41 [PH] (ref 7.35–7.45)
PH BLD: 7.41 [PH] (ref 7.35–7.45)
PH BLD: 7.42 [PH] (ref 7.35–7.45)
PH UR STRIP: 5 [PH] (ref 5–8)
PHOSPHATE SERPL-MCNC: 2.1 MG/DL (ref 2.6–4.7)
PHOSPHATE SERPL-MCNC: 2.6 MG/DL (ref 2.6–4.7)
PHOSPHATE SERPL-MCNC: 2.7 MG/DL (ref 2.6–4.7)
PHOSPHATE SERPL-MCNC: 2.8 MG/DL (ref 2.6–4.7)
PHOSPHATE SERPL-MCNC: 2.8 MG/DL (ref 2.6–4.7)
PHOSPHATE SERPL-MCNC: 2.9 MG/DL (ref 2.6–4.7)
PHOSPHATE SERPL-MCNC: 2.9 MG/DL (ref 2.6–4.7)
PHOSPHATE SERPL-MCNC: 3 MG/DL (ref 2.6–4.7)
PHOSPHATE SERPL-MCNC: 3 MG/DL (ref 2.6–4.7)
PHOSPHATE SERPL-MCNC: 3.1 MG/DL (ref 2.6–4.7)
PHOSPHATE SERPL-MCNC: 3.2 MG/DL (ref 2.6–4.7)
PHOSPHATE SERPL-MCNC: 3.3 MG/DL (ref 2.6–4.7)
PHOSPHATE SERPL-MCNC: 3.4 MG/DL (ref 2.6–4.7)
PHOSPHATE SERPL-MCNC: 3.6 MG/DL (ref 2.6–4.7)
PHOSPHATE SERPL-MCNC: 3.9 MG/DL (ref 2.6–4.7)
PHOSPHATE SERPL-MCNC: 3.9 MG/DL (ref 2.6–4.7)
PHOSPHATE SERPL-MCNC: 4.1 MG/DL (ref 2.6–4.7)
PHOSPHATE SERPL-MCNC: 4.2 MG/DL (ref 2.6–4.7)
PHOSPHATE SERPL-MCNC: 4.2 MG/DL (ref 2.6–4.7)
PHOSPHATE SERPL-MCNC: 4.4 MG/DL (ref 2.6–4.7)
PHOSPHATE SERPL-MCNC: 4.7 MG/DL (ref 2.6–4.7)
PHOSPHATE SERPL-MCNC: 4.8 MG/DL (ref 2.6–4.7)
PHOSPHATE SERPL-MCNC: 4.8 MG/DL (ref 2.6–4.7)
PHOSPHATE SERPL-MCNC: 5 MG/DL (ref 2.6–4.7)
PHOSPHATE SERPL-MCNC: 6.6 MG/DL (ref 2.6–4.7)
PHOSPHATE SERPL-MCNC: 6.7 MG/DL (ref 2.6–4.7)
PHOSPHATE SERPL-MCNC: 6.9 MG/DL (ref 2.6–4.7)
PHOSPHATE SERPL-MCNC: 7.3 MG/DL (ref 2.6–4.7)
PHOSPHATE SERPL-MCNC: 7.4 MG/DL (ref 2.6–4.7)
PIP ISTAT,IPIP: 28
PLATELET # BLD AUTO: 106 K/UL (ref 150–400)
PLATELET # BLD AUTO: 110 K/UL (ref 150–400)
PLATELET # BLD AUTO: 114 K/UL (ref 150–400)
PLATELET # BLD AUTO: 120 K/UL (ref 150–400)
PLATELET # BLD AUTO: 133 K/UL (ref 150–400)
PLATELET # BLD AUTO: 140 K/UL (ref 150–400)
PLATELET # BLD AUTO: 143 K/UL (ref 150–400)
PLATELET # BLD AUTO: 148 K/UL (ref 150–400)
PLATELET # BLD AUTO: 161 K/UL (ref 150–400)
PLATELET # BLD AUTO: 161 K/UL (ref 150–400)
PLATELET # BLD AUTO: 163 K/UL (ref 150–400)
PLATELET # BLD AUTO: 165 K/UL (ref 150–400)
PLATELET # BLD AUTO: 165 K/UL (ref 150–400)
PLATELET # BLD AUTO: 180 K/UL (ref 150–400)
PLATELET # BLD AUTO: 181 K/UL (ref 150–400)
PLATELET # BLD AUTO: 183 K/UL (ref 150–400)
PLATELET # BLD AUTO: 185 K/UL (ref 150–400)
PLATELET # BLD AUTO: 190 K/UL (ref 150–400)
PLATELET # BLD AUTO: 192 K/UL (ref 150–400)
PLATELET # BLD AUTO: 195 K/UL (ref 150–400)
PLATELET # BLD AUTO: 196 K/UL (ref 150–400)
PLATELET # BLD AUTO: 210 K/UL (ref 150–400)
PLATELET # BLD AUTO: 230 K/UL (ref 150–400)
PLATELET # BLD AUTO: 98 K/UL (ref 150–400)
PLATELET COMMENTS,PCOM: ABNORMAL
PMV BLD AUTO: 10.6 FL (ref 8.9–12.9)
PMV BLD AUTO: 10.7 FL (ref 8.9–12.9)
PMV BLD AUTO: 10.9 FL (ref 8.9–12.9)
PMV BLD AUTO: 11 FL (ref 8.9–12.9)
PMV BLD AUTO: 11.1 FL (ref 8.9–12.9)
PMV BLD AUTO: 11.5 FL (ref 8.9–12.9)
PMV BLD AUTO: 11.5 FL (ref 8.9–12.9)
PMV BLD AUTO: 11.6 FL (ref 8.9–12.9)
PMV BLD AUTO: 11.9 FL (ref 8.9–12.9)
PMV BLD AUTO: 11.9 FL (ref 8.9–12.9)
PMV BLD AUTO: 12 FL (ref 8.9–12.9)
PMV BLD AUTO: 12.1 FL (ref 8.9–12.9)
PMV BLD AUTO: 12.1 FL (ref 8.9–12.9)
PMV BLD AUTO: 12.2 FL (ref 8.9–12.9)
PMV BLD AUTO: 12.3 FL (ref 8.9–12.9)
PMV BLD AUTO: 12.4 FL (ref 8.9–12.9)
PMV BLD AUTO: 12.5 FL (ref 8.9–12.9)
PMV BLD AUTO: 12.7 FL (ref 8.9–12.9)
PMV BLD AUTO: 12.7 FL (ref 8.9–12.9)
PMV BLD AUTO: 12.9 FL (ref 8.9–12.9)
PMV BLD AUTO: 13 FL (ref 8.9–12.9)
PMV BLD AUTO: 13.2 FL (ref 8.9–12.9)
PO2 BLD: 102 MMHG (ref 80–100)
PO2 BLD: 106 MMHG (ref 80–100)
PO2 BLD: 116 MMHG (ref 80–100)
PO2 BLD: 120 MMHG (ref 80–100)
PO2 BLD: 128 MMHG (ref 80–100)
PO2 BLD: 133 MMHG (ref 80–100)
PO2 BLD: 136 MMHG (ref 80–100)
PO2 BLD: 148 MMHG (ref 80–100)
PO2 BLD: 155 MMHG (ref 80–100)
PO2 BLD: 160 MMHG (ref 80–100)
PO2 BLD: 166 MMHG (ref 80–100)
PO2 BLD: 171 MMHG (ref 80–100)
PO2 BLD: 355 MMHG (ref 80–100)
PO2 BLD: 391 MMHG (ref 80–100)
PO2 BLD: 56 MMHG (ref 80–100)
PO2 BLD: 84 MMHG (ref 80–100)
PO2 BLD: 87 MMHG (ref 80–100)
POTASSIUM SERPL-SCNC: 3.3 MMOL/L (ref 3.5–5.1)
POTASSIUM SERPL-SCNC: 3.3 MMOL/L (ref 3.5–5.1)
POTASSIUM SERPL-SCNC: 3.4 MMOL/L (ref 3.5–5.1)
POTASSIUM SERPL-SCNC: 3.5 MMOL/L (ref 3.5–5.1)
POTASSIUM SERPL-SCNC: 3.6 MMOL/L (ref 3.5–5.1)
POTASSIUM SERPL-SCNC: 3.7 MMOL/L (ref 3.5–5.1)
POTASSIUM SERPL-SCNC: 3.8 MMOL/L (ref 3.5–5.1)
POTASSIUM SERPL-SCNC: 3.9 MMOL/L (ref 3.5–5.1)
POTASSIUM SERPL-SCNC: 4 MMOL/L (ref 3.5–5.1)
POTASSIUM SERPL-SCNC: 4.1 MMOL/L (ref 3.5–5.1)
POTASSIUM SERPL-SCNC: 4.2 MMOL/L (ref 3.5–5.1)
POTASSIUM SERPL-SCNC: 4.2 MMOL/L (ref 3.5–5.1)
POTASSIUM SERPL-SCNC: 4.4 MMOL/L (ref 3.5–5.1)
POTASSIUM SERPL-SCNC: 4.5 MMOL/L (ref 3.5–5.1)
POTASSIUM SERPL-SCNC: 4.6 MMOL/L (ref 3.5–5.1)
POTASSIUM SERPL-SCNC: 4.7 MMOL/L (ref 3.5–5.1)
POTASSIUM SERPL-SCNC: 4.8 MMOL/L (ref 3.5–5.1)
POTASSIUM SERPL-SCNC: 5.3 MMOL/L (ref 3.5–5.1)
PROCALCITONIN SERPL-MCNC: 203.42 NG/ML
PROCALCITONIN SERPL-MCNC: 27.15 NG/ML
PROCALCITONIN SERPL-MCNC: 32.01 NG/ML
PROCALCITONIN SERPL-MCNC: 55.12 NG/ML
PROCALCITONIN SERPL-MCNC: 57.01 NG/ML
PROCALCITONIN SERPL-MCNC: 83.73 NG/ML
PROMYELOCYTES NFR BLD MANUAL: 1 %
PROT SERPL-MCNC: 5.6 G/DL (ref 6.4–8.2)
PROT SERPL-MCNC: 5.6 G/DL (ref 6.4–8.2)
PROT SERPL-MCNC: 5.7 G/DL (ref 6.4–8.2)
PROT SERPL-MCNC: 5.9 G/DL (ref 6.4–8.2)
PROT SERPL-MCNC: 5.9 G/DL (ref 6.4–8.2)
PROT SERPL-MCNC: 6 G/DL (ref 6.4–8.2)
PROT SERPL-MCNC: 6.2 G/DL (ref 6.4–8.2)
PROT SERPL-MCNC: 6.4 G/DL (ref 6.4–8.2)
PROT SERPL-MCNC: 6.4 G/DL (ref 6.4–8.2)
PROT SERPL-MCNC: 6.5 G/DL (ref 6.4–8.2)
PROT SERPL-MCNC: 6.5 G/DL (ref 6.4–8.2)
PROT SERPL-MCNC: 6.6 G/DL (ref 6.4–8.2)
PROT SERPL-MCNC: 6.8 G/DL (ref 6.4–8.2)
PROT SERPL-MCNC: 6.9 G/DL (ref 6.4–8.2)
PROT SERPL-MCNC: 7.1 G/DL (ref 6.4–8.2)
PROT SERPL-MCNC: 7.2 G/DL (ref 6.4–8.2)
PROT SERPL-MCNC: 7.7 G/DL (ref 6.4–8.2)
PROT SERPL-MCNC: 7.7 G/DL (ref 6.4–8.2)
PROT SERPL-MCNC: 8.1 G/DL (ref 6.4–8.2)
PROT UR STRIP-MCNC: 30 MG/DL
PROTHROMBIN TIME: 12.6 SEC (ref 9–11.1)
PROTHROMBIN TIME: 13.5 SEC (ref 9–11.1)
PROTHROMBIN TIME: 14 SEC (ref 9–11.1)
PROTHROMBIN TIME: 15.3 SEC (ref 9–11.1)
PROTHROMBIN TIME: 15.7 SEC (ref 9–11.1)
PROTHROMBIN TIME: 15.9 SEC (ref 9–11.1)
PROTHROMBIN TIME: 16 SEC (ref 9–11.1)
PROTHROMBIN TIME: 16.6 SEC (ref 9–11.1)
PROTHROMBIN TIME: 17.1 SEC (ref 9–11.1)
PROTHROMBIN TIME: 18.2 SEC (ref 9–11.1)
PROTHROMBIN TIME: 18.6 SEC (ref 9–11.1)
PROTHROMBIN TIME: 23.3 SEC (ref 9–11.1)
PROTHROMBIN TIME: 23.6 SEC (ref 9–11.1)
PROTHROMBIN TIME: 29.3 SEC (ref 9–11.1)
PROTHROMBIN TIME: 39.4 SEC (ref 9–11.1)
Q-T INTERVAL, ECG07: 328 MS
QRS DURATION, ECG06: 112 MS
QTC CALCULATION (BEZET), ECG08: 423 MS
RBC # BLD AUTO: 2.63 M/UL (ref 4.1–5.7)
RBC # BLD AUTO: 2.77 M/UL (ref 4.1–5.7)
RBC # BLD AUTO: 2.97 M/UL (ref 4.1–5.7)
RBC # BLD AUTO: 3.1 M/UL (ref 4.1–5.7)
RBC # BLD AUTO: 3.11 M/UL (ref 4.1–5.7)
RBC # BLD AUTO: 3.2 M/UL (ref 4.1–5.7)
RBC # BLD AUTO: 3.26 M/UL (ref 4.1–5.7)
RBC # BLD AUTO: 3.27 M/UL (ref 4.1–5.7)
RBC # BLD AUTO: 3.34 M/UL (ref 4.1–5.7)
RBC # BLD AUTO: 3.39 M/UL (ref 4.1–5.7)
RBC # BLD AUTO: 3.4 M/UL (ref 4.1–5.7)
RBC # BLD AUTO: 3.46 M/UL (ref 4.1–5.7)
RBC # BLD AUTO: 3.48 M/UL (ref 4.1–5.7)
RBC # BLD AUTO: 3.55 M/UL (ref 4.1–5.7)
RBC # BLD AUTO: 3.63 M/UL (ref 4.1–5.7)
RBC # BLD AUTO: 3.66 M/UL (ref 4.1–5.7)
RBC # BLD AUTO: 3.7 M/UL (ref 4.1–5.7)
RBC # BLD AUTO: 3.86 M/UL (ref 4.1–5.7)
RBC # BLD AUTO: 4.04 M/UL (ref 4.1–5.7)
RBC # BLD AUTO: 4.24 M/UL (ref 4.1–5.7)
RBC # BLD AUTO: 4.27 M/UL (ref 4.1–5.7)
RBC # BLD AUTO: 4.32 M/UL (ref 4.1–5.7)
RBC # BLD AUTO: 4.37 M/UL (ref 4.1–5.7)
RBC # BLD AUTO: 4.45 M/UL (ref 4.1–5.7)
RBC #/AREA URNS HPF: ABNORMAL /HPF (ref 0–5)
RBC MORPH BLD: ABNORMAL
REPORTED DOSE,DOSE: ABNORMAL UNITS
REPORTED DOSE/TIME,TMG: ABNORMAL
RSV RNA SPEC QL NAA+PROBE: NOT DETECTED
RV+EV RNA SPEC QL NAA+PROBE: NOT DETECTED
SAMPLES BEING HELD,HOLD: NORMAL
SAO2 % BLD: 100 % (ref 92–97)
SAO2 % BLD: 100 % (ref 92–97)
SAO2 % BLD: 71 % (ref 92–97)
SAO2 % BLD: 92 % (ref 92–97)
SAO2 % BLD: 96 % (ref 92–97)
SAO2 % BLD: 98 % (ref 92–97)
SAO2 % BLD: 98 % (ref 92–97)
SAO2 % BLD: 99 % (ref 92–97)
SARS-COV-2, COV2: DETECTED
SARS-COV-2, COV2: NOT DETECTED
SARS-COV-2, COV2: NOT DETECTED
SERVICE CMNT-IMP: ABNORMAL
SERVICE CMNT-IMP: NORMAL
SODIUM SERPL-SCNC: 129 MMOL/L (ref 136–145)
SODIUM SERPL-SCNC: 130 MMOL/L (ref 136–145)
SODIUM SERPL-SCNC: 132 MMOL/L (ref 136–145)
SODIUM SERPL-SCNC: 133 MMOL/L (ref 136–145)
SODIUM SERPL-SCNC: 134 MMOL/L (ref 136–145)
SODIUM SERPL-SCNC: 135 MMOL/L (ref 136–145)
SODIUM SERPL-SCNC: 136 MMOL/L (ref 136–145)
SODIUM SERPL-SCNC: 137 MMOL/L (ref 136–145)
SODIUM SERPL-SCNC: 138 MMOL/L (ref 136–145)
SODIUM SERPL-SCNC: 138 MMOL/L (ref 136–145)
SODIUM SERPL-SCNC: 140 MMOL/L (ref 136–145)
SODIUM SERPL-SCNC: 140 MMOL/L (ref 136–145)
SODIUM UR-SCNC: 15 MMOL/L
SOURCE, COVRS: ABNORMAL
SP GR UR REFRACTOMETRY: 1.02 (ref 1–1.03)
SP1: NORMAL
SP2: NORMAL
SP3: NORMAL
SPECIMEN EXP DATE BLD: NORMAL
SPECIMEN SOURCE, FCOV2M: ABNORMAL
SPECIMEN SOURCE, FCOV2M: NORMAL
SPECIMEN SOURCE, FCOV2M: NORMAL
SPECIMEN TYPE: ABNORMAL
THERAPEUTIC RANGE,PTTT: ABNORMAL SECS (ref 58–77)
TOTAL RESP. RATE, ITRR: 16
TOTAL RESP. RATE, ITRR: 19
TOTAL RESP. RATE, ITRR: 20
TOTAL RESP. RATE, ITRR: 21
TOTAL RESP. RATE, ITRR: 22
TOTAL RESP. RATE, ITRR: 24
TOTAL RESP. RATE, ITRR: 28
TRIGL SERPL-MCNC: <15 MG/DL (ref ?–150)
TROPONIN I SERPL-MCNC: 1.25 NG/ML
TROPONIN I SERPL-MCNC: 1.3 NG/ML
TROPONIN I SERPL-MCNC: 1.5 NG/ML
TROPONIN I SERPL-MCNC: 2.24 NG/ML
TROPONIN I SERPL-MCNC: 2.36 NG/ML
UROBILINOGEN UR QL STRIP.AUTO: 0.2 EU/DL (ref 0.2–1)
VANCOMYCIN SERPL-MCNC: 15.6 UG/ML
VANCOMYCIN TROUGH SERPL-MCNC: 19.9 UG/ML (ref 5–10)
VANCOMYCIN TROUGH SERPL-MCNC: 21.3 UG/ML (ref 5–10)
VANCOMYCIN TROUGH SERPL-MCNC: 22.7 UG/ML (ref 5–10)
VENTILATION MODE VENT: ABNORMAL
VENTRICULAR RATE, ECG03: 100 BPM
VOLUME CONTROL IVLC: YES
VOLUME CONTROL IVLC: YES
VT SETTING VENT: 450 ML
VT SETTING VENT: 450 ML
VT SETTING VENT: 500 ML
WBC # BLD AUTO: 10.8 K/UL (ref 4.1–11.1)
WBC # BLD AUTO: 14.2 K/UL (ref 4.1–11.1)
WBC # BLD AUTO: 15 K/UL (ref 4.1–11.1)
WBC # BLD AUTO: 17.2 K/UL (ref 4.1–11.1)
WBC # BLD AUTO: 20 K/UL (ref 4.1–11.1)
WBC # BLD AUTO: 20.4 K/UL (ref 4.1–11.1)
WBC # BLD AUTO: 20.5 K/UL (ref 4.1–11.1)
WBC # BLD AUTO: 20.6 K/UL (ref 4.1–11.1)
WBC # BLD AUTO: 22.9 K/UL (ref 4.1–11.1)
WBC # BLD AUTO: 23.5 K/UL (ref 4.1–11.1)
WBC # BLD AUTO: 24.4 K/UL (ref 4.1–11.1)
WBC # BLD AUTO: 26.1 K/UL (ref 4.1–11.1)
WBC # BLD AUTO: 26.1 K/UL (ref 4.1–11.1)
WBC # BLD AUTO: 27.2 K/UL (ref 4.1–11.1)
WBC # BLD AUTO: 27.5 K/UL (ref 4.1–11.1)
WBC # BLD AUTO: 27.9 K/UL (ref 4.1–11.1)
WBC # BLD AUTO: 29.2 K/UL (ref 4.1–11.1)
WBC # BLD AUTO: 3.3 K/UL (ref 4.1–11.1)
WBC # BLD AUTO: 30 K/UL (ref 4.1–11.1)
WBC # BLD AUTO: 30.5 K/UL (ref 4.1–11.1)
WBC # BLD AUTO: 8.1 K/UL (ref 4.1–11.1)
WBC # BLD AUTO: 9.3 K/UL (ref 4.1–11.1)
WBC # BLD AUTO: 9.7 K/UL (ref 4.1–11.1)
WBC # BLD AUTO: 9.8 K/UL (ref 4.1–11.1)
WBC MORPH BLD: ABNORMAL
WBC URNS QL MICRO: ABNORMAL /HPF (ref 0–4)

## 2020-01-01 PROCEDURE — 94003 VENT MGMT INPAT SUBQ DAY: CPT

## 2020-01-01 PROCEDURE — 82728 ASSAY OF FERRITIN: CPT

## 2020-01-01 PROCEDURE — 74011000258 HC RX REV CODE- 258: Performed by: INTERNAL MEDICINE

## 2020-01-01 PROCEDURE — 71045 X-RAY EXAM CHEST 1 VIEW: CPT

## 2020-01-01 PROCEDURE — 80069 RENAL FUNCTION PANEL: CPT

## 2020-01-01 PROCEDURE — 85379 FIBRIN DEGRADATION QUANT: CPT

## 2020-01-01 PROCEDURE — 82803 BLOOD GASES ANY COMBINATION: CPT

## 2020-01-01 PROCEDURE — 90945 DIALYSIS ONE EVALUATION: CPT

## 2020-01-01 PROCEDURE — 74011250636 HC RX REV CODE- 250/636: Performed by: INTERNAL MEDICINE

## 2020-01-01 PROCEDURE — 74011250637 HC RX REV CODE- 250/637: Performed by: INTERNAL MEDICINE

## 2020-01-01 PROCEDURE — 85025 COMPLETE CBC W/AUTO DIFF WBC: CPT

## 2020-01-01 PROCEDURE — 83735 ASSAY OF MAGNESIUM: CPT

## 2020-01-01 PROCEDURE — 85730 THROMBOPLASTIN TIME PARTIAL: CPT

## 2020-01-01 PROCEDURE — 77030013797 HC KT TRNSDUC PRSSR EDWD -A

## 2020-01-01 PROCEDURE — P9047 ALBUMIN (HUMAN), 25%, 50ML: HCPCS | Performed by: INTERNAL MEDICINE

## 2020-01-01 PROCEDURE — 86140 C-REACTIVE PROTEIN: CPT

## 2020-01-01 PROCEDURE — 82962 GLUCOSE BLOOD TEST: CPT

## 2020-01-01 PROCEDURE — 80053 COMPREHEN METABOLIC PANEL: CPT

## 2020-01-01 PROCEDURE — 87635 SARS-COV-2 COVID-19 AMP PRB: CPT

## 2020-01-01 PROCEDURE — 74011000250 HC RX REV CODE- 250: Performed by: INTERNAL MEDICINE

## 2020-01-01 PROCEDURE — 85610 PROTHROMBIN TIME: CPT

## 2020-01-01 PROCEDURE — 87077 CULTURE AEROBIC IDENTIFY: CPT

## 2020-01-01 PROCEDURE — 74011636637 HC RX REV CODE- 636/637: Performed by: INTERNAL MEDICINE

## 2020-01-01 PROCEDURE — 36600 WITHDRAWAL OF ARTERIAL BLOOD: CPT

## 2020-01-01 PROCEDURE — 76937 US GUIDE VASCULAR ACCESS: CPT

## 2020-01-01 PROCEDURE — 65610000006 HC RM INTENSIVE CARE

## 2020-01-01 PROCEDURE — 84100 ASSAY OF PHOSPHORUS: CPT

## 2020-01-01 PROCEDURE — 87040 BLOOD CULTURE FOR BACTERIA: CPT

## 2020-01-01 PROCEDURE — 83605 ASSAY OF LACTIC ACID: CPT

## 2020-01-01 PROCEDURE — 51798 US URINE CAPACITY MEASURE: CPT

## 2020-01-01 PROCEDURE — 77010033678 HC OXYGEN DAILY

## 2020-01-01 PROCEDURE — 93308 TTE F-UP OR LMTD: CPT

## 2020-01-01 PROCEDURE — 83516 IMMUNOASSAY NONANTIBODY: CPT

## 2020-01-01 PROCEDURE — 83615 LACTATE (LD) (LDH) ENZYME: CPT

## 2020-01-01 PROCEDURE — 36415 COLL VENOUS BLD VENIPUNCTURE: CPT

## 2020-01-01 PROCEDURE — 74011000250 HC RX REV CODE- 250

## 2020-01-01 PROCEDURE — 74011000250 HC RX REV CODE- 250: Performed by: RADIOLOGY

## 2020-01-01 PROCEDURE — 85384 FIBRINOGEN ACTIVITY: CPT

## 2020-01-01 PROCEDURE — 76705 ECHO EXAM OF ABDOMEN: CPT

## 2020-01-01 PROCEDURE — 86900 BLOOD TYPING SEROLOGIC ABO: CPT

## 2020-01-01 PROCEDURE — 36592 COLLECT BLOOD FROM PICC: CPT

## 2020-01-01 PROCEDURE — 93306 TTE W/DOPPLER COMPLETE: CPT

## 2020-01-01 PROCEDURE — 84145 PROCALCITONIN (PCT): CPT

## 2020-01-01 PROCEDURE — 74011250636 HC RX REV CODE- 250/636: Performed by: EMERGENCY MEDICINE

## 2020-01-01 PROCEDURE — 80074 ACUTE HEPATITIS PANEL: CPT

## 2020-01-01 PROCEDURE — 85027 COMPLETE CBC AUTOMATED: CPT

## 2020-01-01 PROCEDURE — 74011636637 HC RX REV CODE- 636/637

## 2020-01-01 PROCEDURE — 82550 ASSAY OF CK (CPK): CPT

## 2020-01-01 PROCEDURE — 74018 RADEX ABDOMEN 1 VIEW: CPT

## 2020-01-01 PROCEDURE — 75810000455 HC PLCMT CENT VENOUS CATH LVL 2 5182

## 2020-01-01 PROCEDURE — 87340 HEPATITIS B SURFACE AG IA: CPT

## 2020-01-01 PROCEDURE — C9113 INJ PANTOPRAZOLE SODIUM, VIA: HCPCS | Performed by: INTERNAL MEDICINE

## 2020-01-01 PROCEDURE — 81001 URINALYSIS AUTO W/SCOPE: CPT

## 2020-01-01 PROCEDURE — 84484 ASSAY OF TROPONIN QUANT: CPT

## 2020-01-01 PROCEDURE — 84300 ASSAY OF URINE SODIUM: CPT

## 2020-01-01 PROCEDURE — 74011000258 HC RX REV CODE- 258: Performed by: EMERGENCY MEDICINE

## 2020-01-01 PROCEDURE — 87186 SC STD MICRODIL/AGAR DIL: CPT

## 2020-01-01 PROCEDURE — 80076 HEPATIC FUNCTION PANEL: CPT

## 2020-01-01 PROCEDURE — 76700 US EXAM ABDOM COMPLETE: CPT

## 2020-01-01 PROCEDURE — 84478 ASSAY OF TRIGLYCERIDES: CPT

## 2020-01-01 PROCEDURE — 70450 CT HEAD/BRAIN W/O DYE: CPT

## 2020-01-01 PROCEDURE — 82140 ASSAY OF AMMONIA: CPT

## 2020-01-01 PROCEDURE — 82784 ASSAY IGA/IGD/IGG/IGM EACH: CPT

## 2020-01-01 PROCEDURE — 65270000029 HC RM PRIVATE

## 2020-01-01 PROCEDURE — 0BH17EZ INSERTION OF ENDOTRACHEAL AIRWAY INTO TRACHEA, VIA NATURAL OR ARTIFICIAL OPENING: ICD-10-PCS | Performed by: ANESTHESIOLOGY

## 2020-01-01 PROCEDURE — 77030019563 HC DEV ATTCH FEED HOLL -A

## 2020-01-01 PROCEDURE — 86038 ANTINUCLEAR ANTIBODIES: CPT

## 2020-01-01 PROCEDURE — 82570 ASSAY OF URINE CREATININE: CPT

## 2020-01-01 PROCEDURE — 74011250636 HC RX REV CODE- 250/636: Performed by: HOSPITALIST

## 2020-01-01 PROCEDURE — 80202 ASSAY OF VANCOMYCIN: CPT

## 2020-01-01 PROCEDURE — 31500 INSERT EMERGENCY AIRWAY: CPT

## 2020-01-01 PROCEDURE — 74011250637 HC RX REV CODE- 250/637: Performed by: EMERGENCY MEDICINE

## 2020-01-01 PROCEDURE — 74176 CT ABD & PELVIS W/O CONTRAST: CPT

## 2020-01-01 PROCEDURE — 87070 CULTURE OTHR SPECIMN AEROBIC: CPT

## 2020-01-01 PROCEDURE — 74011250637 HC RX REV CODE- 250/637: Performed by: HOSPITALIST

## 2020-01-01 PROCEDURE — 77030008683 HC TU ET CUF COVD -A

## 2020-01-01 PROCEDURE — 77030021678 HC GLIDESCP STAT DISP VERT -B

## 2020-01-01 PROCEDURE — 0100U RESPIRATORY PANEL,PCR,NASOPHARYNGEAL: CPT

## 2020-01-01 PROCEDURE — 86706 HEP B SURFACE ANTIBODY: CPT

## 2020-01-01 PROCEDURE — 74011250636 HC RX REV CODE- 250/636

## 2020-01-01 PROCEDURE — 51701 INSERT BLADDER CATHETER: CPT

## 2020-01-01 PROCEDURE — 82248 BILIRUBIN DIRECT: CPT

## 2020-01-01 PROCEDURE — 83880 ASSAY OF NATRIURETIC PEPTIDE: CPT

## 2020-01-01 PROCEDURE — 99285 EMERGENCY DEPT VISIT HI MDM: CPT

## 2020-01-01 PROCEDURE — 94002 VENT MGMT INPAT INIT DAY: CPT

## 2020-01-01 PROCEDURE — 82533 TOTAL CORTISOL: CPT

## 2020-01-01 PROCEDURE — 0BH17EZ INSERTION OF ENDOTRACHEAL AIRWAY INTO TRACHEA, VIA NATURAL OR ARTIFICIAL OPENING: ICD-10-PCS | Performed by: INTERNAL MEDICINE

## 2020-01-01 PROCEDURE — 5A1955Z RESPIRATORY VENTILATION, GREATER THAN 96 CONSECUTIVE HOURS: ICD-10-PCS | Performed by: INTERNAL MEDICINE

## 2020-01-01 PROCEDURE — 96360 HYDRATION IV INFUSION INIT: CPT

## 2020-01-01 PROCEDURE — 96361 HYDRATE IV INFUSION ADD-ON: CPT

## 2020-01-01 PROCEDURE — 93005 ELECTROCARDIOGRAM TRACING: CPT

## 2020-01-01 PROCEDURE — 82947 ASSAY GLUCOSE BLOOD QUANT: CPT

## 2020-01-01 PROCEDURE — 77030008771 HC TU NG SALEM SUMP -A

## 2020-01-01 PROCEDURE — C1752 CATH,HEMODIALYSIS,SHORT-TERM: HCPCS

## 2020-01-01 PROCEDURE — 80048 BASIC METABOLIC PNL TOTAL CA: CPT

## 2020-01-01 PROCEDURE — 74011250636 HC RX REV CODE- 250/636: Performed by: RADIOLOGY

## 2020-01-01 PROCEDURE — 87086 URINE CULTURE/COLONY COUNT: CPT

## 2020-01-01 PROCEDURE — 83520 IMMUNOASSAY QUANT NOS NONAB: CPT

## 2020-01-01 PROCEDURE — 77030018798 HC PMP KT ENTRL FED COVD -A

## 2020-01-01 PROCEDURE — C1892 INTRO/SHEATH,FIXED,PEEL-AWAY: HCPCS

## 2020-01-01 PROCEDURE — C1769 GUIDE WIRE: HCPCS

## 2020-01-01 PROCEDURE — 74011000250 HC RX REV CODE- 250: Performed by: EMERGENCY MEDICINE

## 2020-01-01 PROCEDURE — 77030040361 HC SLV COMPR DVT MDII -B

## 2020-01-01 RX ORDER — HYDROCORTISONE SODIUM SUCCINATE 100 MG/2ML
100 INJECTION, POWDER, FOR SOLUTION INTRAMUSCULAR; INTRAVENOUS EVERY 6 HOURS
Status: DISCONTINUED | OUTPATIENT
Start: 2020-01-01 | End: 2020-01-01

## 2020-01-01 RX ORDER — SODIUM BICARBONATE 1 MEQ/ML
150 SYRINGE (ML) INTRAVENOUS ONCE
Status: DISCONTINUED | OUTPATIENT
Start: 2020-01-01 | End: 2020-01-01

## 2020-01-01 RX ORDER — HEPARIN SODIUM 200 [USP'U]/100ML
200 INJECTION, SOLUTION INTRAVENOUS ONCE
Status: COMPLETED | OUTPATIENT
Start: 2020-01-01 | End: 2020-01-01

## 2020-01-01 RX ORDER — MIDODRINE HYDROCHLORIDE 5 MG/1
10 TABLET ORAL
Status: DISCONTINUED | OUTPATIENT
Start: 2020-01-01 | End: 2020-06-09 | Stop reason: HOSPADM

## 2020-01-01 RX ORDER — PROPOFOL 10 MG/ML
0-50 VIAL (ML) INTRAVENOUS
Status: DISCONTINUED | OUTPATIENT
Start: 2020-01-01 | End: 2020-06-09 | Stop reason: HOSPADM

## 2020-01-01 RX ORDER — INSULIN GLARGINE 100 [IU]/ML
40 INJECTION, SOLUTION SUBCUTANEOUS DAILY
Status: DISCONTINUED | OUTPATIENT
Start: 2020-01-01 | End: 2020-01-01

## 2020-01-01 RX ORDER — POLYETHYLENE GLYCOL 3350 17 G/17G
17 POWDER, FOR SOLUTION ORAL DAILY
Status: DISCONTINUED | OUTPATIENT
Start: 2020-01-01 | End: 2020-01-01

## 2020-01-01 RX ORDER — SODIUM CHLORIDE 9 MG/ML
250 INJECTION, SOLUTION INTRAVENOUS AS NEEDED
Status: ACTIVE | OUTPATIENT
Start: 2020-01-01 | End: 2020-01-01

## 2020-01-01 RX ORDER — ONDANSETRON 2 MG/ML
4 INJECTION INTRAMUSCULAR; INTRAVENOUS
Status: DISCONTINUED | OUTPATIENT
Start: 2020-01-01 | End: 2020-06-09 | Stop reason: HOSPADM

## 2020-01-01 RX ORDER — HEPARIN SODIUM 10000 [USP'U]/100ML
8-25 INJECTION, SOLUTION INTRAVENOUS
Status: DISCONTINUED | OUTPATIENT
Start: 2020-01-01 | End: 2020-06-09 | Stop reason: HOSPADM

## 2020-01-01 RX ORDER — MAGNESIUM SULFATE 100 %
4 CRYSTALS MISCELLANEOUS AS NEEDED
Status: DISCONTINUED | OUTPATIENT
Start: 2020-01-01 | End: 2020-06-09 | Stop reason: HOSPADM

## 2020-01-01 RX ORDER — CEFEPIME HYDROCHLORIDE 1 G/50ML
1 INJECTION, SOLUTION INTRAVENOUS EVERY 24 HOURS
Status: DISCONTINUED | OUTPATIENT
Start: 2020-01-01 | End: 2020-01-01

## 2020-01-01 RX ORDER — DOBUTAMINE HYDROCHLORIDE 200 MG/100ML
INJECTION INTRAVENOUS
Status: COMPLETED
Start: 2020-01-01 | End: 2020-01-01

## 2020-01-01 RX ORDER — SODIUM BICARBONATE 84 MG/ML
INJECTION, SOLUTION INTRAVENOUS
Status: COMPLETED
Start: 2020-01-01 | End: 2020-01-01

## 2020-01-01 RX ORDER — SODIUM BICARBONATE 1 MEQ/ML
100 SYRINGE (ML) INTRAVENOUS ONCE
Status: COMPLETED | OUTPATIENT
Start: 2020-01-01 | End: 2020-01-01

## 2020-01-01 RX ORDER — LANOLIN ALCOHOL/MO/W.PET/CERES
6 CREAM (GRAM) TOPICAL
Status: DISCONTINUED | OUTPATIENT
Start: 2020-01-01 | End: 2020-06-09 | Stop reason: HOSPADM

## 2020-01-01 RX ORDER — SODIUM BICARBONATE 84 MG/ML
100 INJECTION, SOLUTION INTRAVENOUS ONCE
Status: COMPLETED | OUTPATIENT
Start: 2020-01-01 | End: 2020-01-01

## 2020-01-01 RX ORDER — DOBUTAMINE HYDROCHLORIDE 200 MG/100ML
0-15 INJECTION INTRAVENOUS
Status: DISCONTINUED | OUTPATIENT
Start: 2020-01-01 | End: 2020-06-09 | Stop reason: HOSPADM

## 2020-01-01 RX ORDER — ACETAMINOPHEN 500 MG
1000 TABLET ORAL ONCE
Status: COMPLETED | OUTPATIENT
Start: 2020-01-01 | End: 2020-01-01

## 2020-01-01 RX ORDER — ONDANSETRON 2 MG/ML
2 INJECTION INTRAMUSCULAR; INTRAVENOUS ONCE
Status: COMPLETED | OUTPATIENT
Start: 2020-01-01 | End: 2020-01-01

## 2020-01-01 RX ORDER — SODIUM BICARBONATE 1 MEQ/ML
SYRINGE (ML) INTRAVENOUS
Status: COMPLETED
Start: 2020-01-01 | End: 2020-01-01

## 2020-01-01 RX ORDER — CALCIUM GLUCONATE 94 MG/ML
1 INJECTION, SOLUTION INTRAVENOUS ONCE
Status: COMPLETED | OUTPATIENT
Start: 2020-01-01 | End: 2020-01-01

## 2020-01-01 RX ORDER — ONDANSETRON 2 MG/ML
1 INJECTION INTRAMUSCULAR; INTRAVENOUS ONCE
Status: COMPLETED | OUTPATIENT
Start: 2020-01-01 | End: 2020-01-01

## 2020-01-01 RX ORDER — VANCOMYCIN 2 GRAM/500 ML IN 0.9 % SODIUM CHLORIDE INTRAVENOUS
2000 ONCE
Status: COMPLETED | OUTPATIENT
Start: 2020-01-01 | End: 2020-01-01

## 2020-01-01 RX ORDER — HYDROCORTISONE SODIUM SUCCINATE 100 MG/2ML
50 INJECTION, POWDER, FOR SOLUTION INTRAMUSCULAR; INTRAVENOUS EVERY 6 HOURS
Status: DISCONTINUED | OUTPATIENT
Start: 2020-01-01 | End: 2020-01-01

## 2020-01-01 RX ORDER — DOBUTAMINE HYDROCHLORIDE 200 MG/100ML
0-10 INJECTION INTRAVENOUS
Status: DISCONTINUED | OUTPATIENT
Start: 2020-01-01 | End: 2020-01-01

## 2020-01-01 RX ORDER — DEXTROSE MONOHYDRATE AND SODIUM CHLORIDE 5; .9 G/100ML; G/100ML
50 INJECTION, SOLUTION INTRAVENOUS CONTINUOUS
Status: DISCONTINUED | OUTPATIENT
Start: 2020-01-01 | End: 2020-01-01

## 2020-01-01 RX ORDER — HEPARIN SODIUM 1000 [USP'U]/ML
3000 INJECTION, SOLUTION INTRAVENOUS; SUBCUTANEOUS ONCE
Status: COMPLETED | OUTPATIENT
Start: 2020-01-01 | End: 2020-01-01

## 2020-01-01 RX ORDER — ZINC SULFATE 50(220)MG
1 CAPSULE ORAL DAILY
Status: COMPLETED | OUTPATIENT
Start: 2020-01-01 | End: 2020-01-01

## 2020-01-01 RX ORDER — HYDROCORTISONE SODIUM SUCCINATE 100 MG/2ML
50 INJECTION, POWDER, FOR SOLUTION INTRAMUSCULAR; INTRAVENOUS EVERY 8 HOURS
Status: DISCONTINUED | OUTPATIENT
Start: 2020-01-01 | End: 2020-01-01

## 2020-01-01 RX ORDER — ENOXAPARIN SODIUM 100 MG/ML
100 INJECTION SUBCUTANEOUS ONCE
Status: DISPENSED | OUTPATIENT
Start: 2020-01-01 | End: 2020-01-01

## 2020-01-01 RX ORDER — ALBUMIN HUMAN 250 G/1000ML
12.5 SOLUTION INTRAVENOUS EVERY 6 HOURS
Status: DISCONTINUED | OUTPATIENT
Start: 2020-01-01 | End: 2020-01-01

## 2020-01-01 RX ORDER — INSULIN LISPRO 100 [IU]/ML
10 INJECTION, SOLUTION INTRAVENOUS; SUBCUTANEOUS ONCE
Status: COMPLETED | OUTPATIENT
Start: 2020-01-01 | End: 2020-01-01

## 2020-01-01 RX ORDER — POTASSIUM CHLORIDE 14.9 MG/ML
10 INJECTION INTRAVENOUS ONCE
Status: COMPLETED | OUTPATIENT
Start: 2020-01-01 | End: 2020-01-01

## 2020-01-01 RX ORDER — PANTOPRAZOLE SODIUM 40 MG/1
40 TABLET, DELAYED RELEASE ORAL
Status: DISCONTINUED | OUTPATIENT
Start: 2020-01-01 | End: 2020-01-01

## 2020-01-01 RX ORDER — ACETAMINOPHEN 325 MG/1
650 TABLET ORAL
Status: DISCONTINUED | OUTPATIENT
Start: 2020-01-01 | End: 2020-06-09 | Stop reason: HOSPADM

## 2020-01-01 RX ORDER — POTASSIUM CHLORIDE 29.8 MG/ML
20 INJECTION INTRAVENOUS AS NEEDED
Status: DISCONTINUED | OUTPATIENT
Start: 2020-01-01 | End: 2020-06-09 | Stop reason: HOSPADM

## 2020-01-01 RX ORDER — PHENYLEPHRINE HCL IN 0.9% NACL 100MG/250
10-300 PLASTIC BAG, INJECTION (ML) INTRAVENOUS
Status: DISCONTINUED | OUTPATIENT
Start: 2020-01-01 | End: 2020-06-09 | Stop reason: HOSPADM

## 2020-01-01 RX ORDER — INSULIN GLARGINE 100 [IU]/ML
30 INJECTION, SOLUTION SUBCUTANEOUS DAILY
Status: DISCONTINUED | OUTPATIENT
Start: 2020-01-01 | End: 2020-01-01

## 2020-01-01 RX ORDER — ENOXAPARIN SODIUM 100 MG/ML
100 INJECTION SUBCUTANEOUS EVERY 24 HOURS
Status: DISCONTINUED | OUTPATIENT
Start: 2020-01-01 | End: 2020-01-01

## 2020-01-01 RX ORDER — SODIUM BICARBONATE 84 MG/ML
150 INJECTION, SOLUTION INTRAVENOUS ONCE
Status: COMPLETED | OUTPATIENT
Start: 2020-01-01 | End: 2020-01-01

## 2020-01-01 RX ORDER — PROPOFOL 10 MG/ML
0-50 VIAL (ML) INTRAVENOUS
Status: DISCONTINUED | OUTPATIENT
Start: 2020-01-01 | End: 2020-01-01

## 2020-01-01 RX ORDER — BALSAM PERU/CASTOR OIL
OINTMENT (GRAM) TOPICAL 3 TIMES DAILY
Status: DISCONTINUED | OUTPATIENT
Start: 2020-01-01 | End: 2020-06-09 | Stop reason: HOSPADM

## 2020-01-01 RX ORDER — SODIUM BICARBONATE 1 MEQ/ML
50 SYRINGE (ML) INTRAVENOUS ONCE
Status: COMPLETED | OUTPATIENT
Start: 2020-01-01 | End: 2020-01-01

## 2020-01-01 RX ORDER — MIDODRINE HYDROCHLORIDE 5 MG/1
10 TABLET ORAL
Status: DISCONTINUED | OUTPATIENT
Start: 2020-01-01 | End: 2020-01-01

## 2020-01-01 RX ORDER — PHENYLEPHRINE HCL IN 0.9% NACL 100MG/250
10-100 PLASTIC BAG, INJECTION (ML) INTRAVENOUS
Status: DISCONTINUED | OUTPATIENT
Start: 2020-01-01 | End: 2020-01-01

## 2020-01-01 RX ORDER — INSULIN GLARGINE 100 [IU]/ML
25 INJECTION, SOLUTION SUBCUTANEOUS DAILY
Status: DISCONTINUED | OUTPATIENT
Start: 2020-01-01 | End: 2020-01-01

## 2020-01-01 RX ORDER — SODIUM BICARBONATE 84 MG/ML
INJECTION, SOLUTION INTRAVENOUS
Status: DISPENSED
Start: 2020-01-01 | End: 2020-01-01

## 2020-01-01 RX ORDER — VANCOMYCIN/0.9 % SOD CHLORIDE 1.5G/250ML
1500 PLASTIC BAG, INJECTION (ML) INTRAVENOUS EVERY 24 HOURS
Status: DISCONTINUED | OUTPATIENT
Start: 2020-01-01 | End: 2020-01-01

## 2020-01-01 RX ORDER — ALBUMIN HUMAN 250 G/1000ML
25 SOLUTION INTRAVENOUS EVERY 6 HOURS
Status: DISCONTINUED | OUTPATIENT
Start: 2020-01-01 | End: 2020-06-09 | Stop reason: HOSPADM

## 2020-01-01 RX ORDER — MIDODRINE HYDROCHLORIDE 5 MG/1
5 TABLET ORAL
Status: DISCONTINUED | OUTPATIENT
Start: 2020-01-01 | End: 2020-01-01

## 2020-01-01 RX ORDER — SODIUM CHLORIDE 9 MG/ML
125 INJECTION, SOLUTION INTRAVENOUS CONTINUOUS
Status: DISCONTINUED | OUTPATIENT
Start: 2020-01-01 | End: 2020-01-01

## 2020-01-01 RX ORDER — SODIUM CHLORIDE 0.9 % (FLUSH) 0.9 %
5-10 SYRINGE (ML) INJECTION AS NEEDED
Status: DISCONTINUED | OUTPATIENT
Start: 2020-01-01 | End: 2020-06-09 | Stop reason: HOSPADM

## 2020-01-01 RX ORDER — LORAZEPAM 2 MG/ML
2 INJECTION INTRAMUSCULAR
Status: DISCONTINUED | OUTPATIENT
Start: 2020-01-01 | End: 2020-06-09 | Stop reason: HOSPADM

## 2020-01-01 RX ORDER — SODIUM CHLORIDE 9 MG/ML
100 INJECTION, SOLUTION INTRAVENOUS CONTINUOUS
Status: DISCONTINUED | OUTPATIENT
Start: 2020-01-01 | End: 2020-01-01

## 2020-01-01 RX ORDER — SODIUM BICARBONATE IN D5W 150/1000ML
PLASTIC BAG, INJECTION (ML) INTRAVENOUS CONTINUOUS
Status: DISCONTINUED | OUTPATIENT
Start: 2020-01-01 | End: 2020-01-01

## 2020-01-01 RX ORDER — DEXTROSE 50 % IN WATER (D50W) INTRAVENOUS SYRINGE
12.5-25 AS NEEDED
Status: DISCONTINUED | OUTPATIENT
Start: 2020-01-01 | End: 2020-06-09 | Stop reason: HOSPADM

## 2020-01-01 RX ORDER — CLINDAMYCIN PHOSPHATE 900 MG/50ML
900 INJECTION INTRAVENOUS EVERY 8 HOURS
Status: COMPLETED | OUTPATIENT
Start: 2020-01-01 | End: 2020-01-01

## 2020-01-01 RX ORDER — INSULIN GLARGINE 100 [IU]/ML
46 INJECTION, SOLUTION SUBCUTANEOUS DAILY
Status: DISCONTINUED | OUTPATIENT
Start: 2020-01-01 | End: 2020-01-01

## 2020-01-01 RX ORDER — PHENYLEPHRINE HCL IN 0.9% NACL 100MG/250
10-300 PLASTIC BAG, INJECTION (ML) INTRAVENOUS
Status: DISCONTINUED | OUTPATIENT
Start: 2020-01-01 | End: 2020-01-01

## 2020-01-01 RX ORDER — SODIUM BICARBONATE 1 MEQ/ML
100 SYRINGE (ML) INTRAVENOUS ONCE
Status: DISCONTINUED | OUTPATIENT
Start: 2020-01-01 | End: 2020-01-01

## 2020-01-01 RX ORDER — LIDOCAINE HYDROCHLORIDE 20 MG/ML
20 INJECTION, SOLUTION INFILTRATION; PERINEURAL ONCE
Status: COMPLETED | OUTPATIENT
Start: 2020-01-01 | End: 2020-01-01

## 2020-01-01 RX ORDER — NOREPINEPHRINE BITARTRATE/D5W 8 MG/250ML
2-100 PLASTIC BAG, INJECTION (ML) INTRAVENOUS
Status: DISCONTINUED | OUTPATIENT
Start: 2020-01-01 | End: 2020-01-01

## 2020-01-01 RX ORDER — ALBUMIN HUMAN 250 G/1000ML
25 SOLUTION INTRAVENOUS ONCE
Status: COMPLETED | OUTPATIENT
Start: 2020-01-01 | End: 2020-01-01

## 2020-01-01 RX ORDER — HYDROCORTISONE SODIUM SUCCINATE 100 MG/2ML
50 INJECTION, POWDER, FOR SOLUTION INTRAMUSCULAR; INTRAVENOUS EVERY 6 HOURS
Status: DISCONTINUED | OUTPATIENT
Start: 2020-01-01 | End: 2020-06-09 | Stop reason: HOSPADM

## 2020-01-01 RX ORDER — PREDNISONE 10 MG/1
10 TABLET ORAL
Status: DISCONTINUED | OUTPATIENT
Start: 2020-01-01 | End: 2020-01-01

## 2020-01-01 RX ORDER — SCOLOPAMINE TRANSDERMAL SYSTEM 1 MG/1
1 PATCH, EXTENDED RELEASE TRANSDERMAL
Status: DISCONTINUED | OUTPATIENT
Start: 2020-01-01 | End: 2020-01-01

## 2020-01-01 RX ORDER — ALBUMIN HUMAN 250 G/1000ML
25 SOLUTION INTRAVENOUS EVERY 6 HOURS
Status: DISCONTINUED | OUTPATIENT
Start: 2020-01-01 | End: 2020-01-01

## 2020-01-01 RX ORDER — VANCOMYCIN HYDROCHLORIDE
1250 EVERY 24 HOURS
Status: DISCONTINUED | OUTPATIENT
Start: 2020-01-01 | End: 2020-01-01

## 2020-01-01 RX ORDER — DOBUTAMINE HYDROCHLORIDE 200 MG/100ML
3 INJECTION INTRAVENOUS CONTINUOUS
Status: DISCONTINUED | OUTPATIENT
Start: 2020-01-01 | End: 2020-01-01 | Stop reason: ALTCHOICE

## 2020-01-01 RX ORDER — VANCOMYCIN/0.9 % SOD CHLORIDE 1.5G/250ML
1500 PLASTIC BAG, INJECTION (ML) INTRAVENOUS EVERY 24 HOURS
Status: COMPLETED | OUTPATIENT
Start: 2020-01-01 | End: 2020-01-01

## 2020-01-01 RX ORDER — HEPARIN SODIUM 5000 [USP'U]/ML
4000 INJECTION, SOLUTION INTRAVENOUS; SUBCUTANEOUS AS NEEDED
Status: DISCONTINUED | OUTPATIENT
Start: 2020-01-01 | End: 2020-01-01 | Stop reason: ALTCHOICE

## 2020-01-01 RX ORDER — GUAIFENESIN 100 MG/5ML
162 LIQUID (ML) ORAL
Status: COMPLETED | OUTPATIENT
Start: 2020-01-01 | End: 2020-01-01

## 2020-01-01 RX ORDER — ACETAMINOPHEN 325 MG/1
650 TABLET ORAL
Status: DISCONTINUED | OUTPATIENT
Start: 2020-01-01 | End: 2020-01-01 | Stop reason: SDUPTHER

## 2020-01-01 RX ORDER — SODIUM CHLORIDE 0.9 % (FLUSH) 0.9 %
5-40 SYRINGE (ML) INJECTION EVERY 8 HOURS
Status: DISCONTINUED | OUTPATIENT
Start: 2020-01-01 | End: 2020-06-09 | Stop reason: HOSPADM

## 2020-01-01 RX ORDER — SODIUM BICARBONATE IN D5W 150/1000ML
PLASTIC BAG, INJECTION (ML) INTRAVENOUS CONTINUOUS
Status: DISCONTINUED | OUTPATIENT
Start: 2020-01-01 | End: 2020-06-09 | Stop reason: HOSPADM

## 2020-01-01 RX ORDER — SODIUM CHLORIDE 450 MG/100ML
125 INJECTION, SOLUTION INTRAVENOUS CONTINUOUS
Status: DISCONTINUED | OUTPATIENT
Start: 2020-01-01 | End: 2020-01-01

## 2020-01-01 RX ORDER — INSULIN GLARGINE 100 [IU]/ML
INJECTION, SOLUTION SUBCUTANEOUS
Status: COMPLETED
Start: 2020-01-01 | End: 2020-01-01

## 2020-01-01 RX ORDER — ACETAMINOPHEN 650 MG/1
650 SUPPOSITORY RECTAL
Status: DISCONTINUED | OUTPATIENT
Start: 2020-01-01 | End: 2020-06-09 | Stop reason: HOSPADM

## 2020-01-01 RX ORDER — MIDODRINE HYDROCHLORIDE 5 MG/1
10 TABLET ORAL EVERY 6 HOURS
Status: DISCONTINUED | OUTPATIENT
Start: 2020-01-01 | End: 2020-01-01

## 2020-01-01 RX ORDER — INSULIN LISPRO 100 [IU]/ML
INJECTION, SOLUTION INTRAVENOUS; SUBCUTANEOUS EVERY 6 HOURS
Status: DISCONTINUED | OUTPATIENT
Start: 2020-01-01 | End: 2020-06-09 | Stop reason: HOSPADM

## 2020-01-01 RX ORDER — HEPARIN SODIUM 10000 [USP'U]/100ML
18-36 INJECTION, SOLUTION INTRAVENOUS
Status: DISCONTINUED | OUTPATIENT
Start: 2020-01-01 | End: 2020-01-01

## 2020-01-01 RX ORDER — ENOXAPARIN SODIUM 100 MG/ML
40 INJECTION SUBCUTANEOUS EVERY 24 HOURS
Status: DISCONTINUED | OUTPATIENT
Start: 2020-01-01 | End: 2020-01-01

## 2020-01-01 RX ORDER — INSULIN LISPRO 100 [IU]/ML
10 INJECTION, SOLUTION INTRAVENOUS; SUBCUTANEOUS ONCE
Status: CANCELLED | OUTPATIENT
Start: 2020-01-01 | End: 2020-01-01

## 2020-01-01 RX ORDER — HYDROCORTISONE SODIUM SUCCINATE 100 MG/2ML
25 INJECTION, POWDER, FOR SOLUTION INTRAMUSCULAR; INTRAVENOUS EVERY 6 HOURS
Status: DISCONTINUED | OUTPATIENT
Start: 2020-01-01 | End: 2020-01-01

## 2020-01-01 RX ORDER — HYDROCORTISONE SODIUM SUCCINATE 100 MG/2ML
25 INJECTION, POWDER, FOR SOLUTION INTRAMUSCULAR; INTRAVENOUS EVERY 8 HOURS
Status: DISCONTINUED | OUTPATIENT
Start: 2020-01-01 | End: 2020-01-01

## 2020-01-01 RX ORDER — HEPARIN 100 UNIT/ML
500 SYRINGE INTRAVENOUS ONCE
Status: COMPLETED | OUTPATIENT
Start: 2020-01-01 | End: 2020-01-01

## 2020-01-01 RX ORDER — ATORVASTATIN CALCIUM 10 MG/1
10 TABLET, FILM COATED ORAL
Status: CANCELLED | OUTPATIENT
Start: 2020-01-01

## 2020-01-01 RX ORDER — CALCIUM CHLORIDE INJECTION 100 MG/ML
2 INJECTION, SOLUTION INTRAVENOUS AS NEEDED
Status: DISCONTINUED | OUTPATIENT
Start: 2020-01-01 | End: 2020-06-09 | Stop reason: HOSPADM

## 2020-01-01 RX ORDER — POLYETHYLENE GLYCOL 3350 17 G/17G
17 POWDER, FOR SOLUTION ORAL DAILY PRN
Status: DISCONTINUED | OUTPATIENT
Start: 2020-01-01 | End: 2020-06-09 | Stop reason: HOSPADM

## 2020-01-01 RX ORDER — HEPARIN SODIUM 5000 [USP'U]/ML
2000 INJECTION, SOLUTION INTRAVENOUS; SUBCUTANEOUS AS NEEDED
Status: DISCONTINUED | OUTPATIENT
Start: 2020-01-01 | End: 2020-01-01 | Stop reason: ALTCHOICE

## 2020-01-01 RX ORDER — SODIUM CHLORIDE 0.9 % (FLUSH) 0.9 %
5-40 SYRINGE (ML) INJECTION AS NEEDED
Status: DISCONTINUED | OUTPATIENT
Start: 2020-01-01 | End: 2020-06-09 | Stop reason: HOSPADM

## 2020-01-01 RX ORDER — EPINEPHRINE 0.1 MG/ML
INJECTION INTRACARDIAC; INTRAVENOUS
Status: COMPLETED | OUTPATIENT
Start: 2020-01-01 | End: 2020-01-01

## 2020-01-01 RX ORDER — CHLORHEXIDINE GLUCONATE 0.12 MG/ML
15 RINSE ORAL EVERY 12 HOURS
Status: DISCONTINUED | OUTPATIENT
Start: 2020-01-01 | End: 2020-06-09 | Stop reason: HOSPADM

## 2020-01-01 RX ADMIN — CEFEPIME 1 G: 1 INJECTION, POWDER, FOR SOLUTION INTRAMUSCULAR; INTRAVENOUS at 00:18

## 2020-01-01 RX ADMIN — SODIUM BICARBONATE 50 MEQ: 84 INJECTION, SOLUTION INTRAVENOUS at 13:44

## 2020-01-01 RX ADMIN — VANCOMYCIN HYDROCHLORIDE 1000 MG: 1 INJECTION, POWDER, LYOPHILIZED, FOR SOLUTION INTRAVENOUS at 18:34

## 2020-01-01 RX ADMIN — MINERAL OIL AND WHITE PETROLATUM: 150; 830 OINTMENT OPHTHALMIC at 20:36

## 2020-01-01 RX ADMIN — INSULIN LISPRO 7 UNITS: 100 INJECTION, SOLUTION INTRAVENOUS; SUBCUTANEOUS at 23:31

## 2020-01-01 RX ADMIN — AZITHROMYCIN MONOHYDRATE 500 MG: 500 INJECTION, POWDER, LYOPHILIZED, FOR SOLUTION INTRAVENOUS at 01:01

## 2020-01-01 RX ADMIN — SODIUM CHLORIDE 500 ML: 900 INJECTION, SOLUTION INTRAVENOUS at 12:00

## 2020-01-01 RX ADMIN — CALCIUM CHLORIDE, MAGNESIUM CHLORIDE, DEXTROSE MONOHYDRATE, LACTIC ACID, SODIUM CHLORIDE, SODIUM BICARBONATE AND POTASSIUM CHLORIDE 2000 ML/HR: 5.15; 2.03; 22; 5.4; 6.46; 3.09; .157 INJECTION INTRAVENOUS at 18:21

## 2020-01-01 RX ADMIN — DOBUTAMINE IN DEXTROSE 10 MCG/KG/MIN: 200 INJECTION, SOLUTION INTRAVENOUS at 13:50

## 2020-01-01 RX ADMIN — HEPARIN SODIUM 2000 UNITS: 5000 INJECTION INTRAVENOUS; SUBCUTANEOUS at 01:30

## 2020-01-01 RX ADMIN — NOREPINEPHRINE BITARTRATE 20 MCG/MIN: 1 INJECTION, SOLUTION, CONCENTRATE INTRAVENOUS at 04:00

## 2020-01-01 RX ADMIN — ASCORBIC ACID 5 G: 500 INJECTION, SOLUTION INTRAMUSCULAR; INTRAVENOUS; SUBCUTANEOUS at 05:36

## 2020-01-01 RX ADMIN — FAMOTIDINE 20 MG: 10 INJECTION INTRAVENOUS at 08:44

## 2020-01-01 RX ADMIN — Medication 10 ML: at 20:42

## 2020-01-01 RX ADMIN — CHLORHEXIDINE GLUCONATE 15 ML: 0.12 RINSE ORAL at 22:13

## 2020-01-01 RX ADMIN — PHENYLEPHRINE HYDROCHLORIDE 300 MCG/MIN: 10 INJECTION INTRAVENOUS at 21:00

## 2020-01-01 RX ADMIN — ZINC SULFATE 220 MG (50 MG) CAPSULE 1 CAPSULE: CAPSULE at 08:23

## 2020-01-01 RX ADMIN — CALCIUM CHLORIDE, MAGNESIUM CHLORIDE, DEXTROSE MONOHYDRATE, LACTIC ACID, SODIUM CHLORIDE, SODIUM BICARBONATE AND POTASSIUM CHLORIDE 2000 ML/HR: 3.68; 3.05; 22; 5.4; 6.46; 3.09; .314 INJECTION INTRAVENOUS at 09:04

## 2020-01-01 RX ADMIN — SODIUM CHLORIDE 40 MG: 9 INJECTION, SOLUTION INTRAMUSCULAR; INTRAVENOUS; SUBCUTANEOUS at 08:27

## 2020-01-01 RX ADMIN — DOBUTAMINE IN DEXTROSE 5 MCG/KG/MIN: 200 INJECTION, SOLUTION INTRAVENOUS at 16:54

## 2020-01-01 RX ADMIN — PROPOFOL 20 MCG/KG/MIN: 10 INJECTION, EMULSION INTRAVENOUS at 05:51

## 2020-01-01 RX ADMIN — CLINDAMYCIN IN 5 PERCENT DEXTROSE 900 MG: 18 INJECTION, SOLUTION INTRAVENOUS at 19:08

## 2020-01-01 RX ADMIN — NOREPINEPHRINE BITARTRATE 60 MCG/MIN: 1 INJECTION, SOLUTION, CONCENTRATE INTRAVENOUS at 01:38

## 2020-01-01 RX ADMIN — INSULIN LISPRO 10 UNITS: 100 INJECTION, SOLUTION INTRAVENOUS; SUBCUTANEOUS at 12:23

## 2020-01-01 RX ADMIN — ASCORBIC ACID 5 G: 500 INJECTION, SOLUTION INTRAMUSCULAR; INTRAVENOUS; SUBCUTANEOUS at 23:16

## 2020-01-01 RX ADMIN — PREDNISONE 10 MG: 10 TABLET ORAL at 08:24

## 2020-01-01 RX ADMIN — Medication 30 ML: at 15:00

## 2020-01-01 RX ADMIN — CHLORHEXIDINE GLUCONATE 15 ML: 0.12 RINSE ORAL at 08:51

## 2020-01-01 RX ADMIN — CASTOR OIL AND BALSAM, PERU: 788; 87 OINTMENT TOPICAL at 12:00

## 2020-01-01 RX ADMIN — INSULIN GLARGINE 25 UNITS: 100 INJECTION, SOLUTION SUBCUTANEOUS at 08:23

## 2020-01-01 RX ADMIN — Medication 10 ML: at 14:25

## 2020-01-01 RX ADMIN — HYDROCORTISONE SODIUM SUCCINATE 100 MG: 100 INJECTION, POWDER, FOR SOLUTION INTRAMUSCULAR; INTRAVENOUS at 05:31

## 2020-01-01 RX ADMIN — Medication 10 ML: at 05:54

## 2020-01-01 RX ADMIN — MIDODRINE HYDROCHLORIDE 10 MG: 5 TABLET ORAL at 20:07

## 2020-01-01 RX ADMIN — INSULIN LISPRO 2 UNITS: 100 INJECTION, SOLUTION INTRAVENOUS; SUBCUTANEOUS at 23:22

## 2020-01-01 RX ADMIN — ASCORBIC ACID 5 G: 500 INJECTION, SOLUTION INTRAMUSCULAR; INTRAVENOUS; SUBCUTANEOUS at 05:06

## 2020-01-01 RX ADMIN — PREDNISONE 10 MG: 10 TABLET ORAL at 12:08

## 2020-01-01 RX ADMIN — CHLORHEXIDINE GLUCONATE 15 ML: 0.12 RINSE ORAL at 20:26

## 2020-01-01 RX ADMIN — AZITHROMYCIN MONOHYDRATE 500 MG: 500 INJECTION, POWDER, LYOPHILIZED, FOR SOLUTION INTRAVENOUS at 01:36

## 2020-01-01 RX ADMIN — Medication 1 AMPULE: at 08:27

## 2020-01-01 RX ADMIN — NOREPINEPHRINE BITARTRATE 200 MCG/MIN: 1 INJECTION, SOLUTION, CONCENTRATE INTRAVENOUS at 01:36

## 2020-01-01 RX ADMIN — PHENYLEPHRINE HYDROCHLORIDE 300 MCG/MIN: 10 INJECTION INTRAVENOUS at 05:46

## 2020-01-01 RX ADMIN — CALCIUM CHLORIDE, MAGNESIUM CHLORIDE, DEXTROSE MONOHYDRATE, LACTIC ACID, SODIUM CHLORIDE, SODIUM BICARBONATE AND POTASSIUM CHLORIDE 2000 ML/HR: 5.15; 2.03; 22; 5.4; 6.46; 3.09; .157 INJECTION INTRAVENOUS at 10:25

## 2020-01-01 RX ADMIN — CALCIUM CHLORIDE, MAGNESIUM CHLORIDE, DEXTROSE MONOHYDRATE, LACTIC ACID, SODIUM CHLORIDE, SODIUM BICARBONATE AND POTASSIUM CHLORIDE 2000 ML/HR: 3.68; 3.05; 22; 5.4; 6.46; 3.09; .314 INJECTION INTRAVENOUS at 00:53

## 2020-01-01 RX ADMIN — HYDROCORTISONE SODIUM SUCCINATE 100 MG: 100 INJECTION, POWDER, FOR SOLUTION INTRAMUSCULAR; INTRAVENOUS at 18:00

## 2020-01-01 RX ADMIN — Medication 1 AMPULE: at 09:45

## 2020-01-01 RX ADMIN — NOREPINEPHRINE BITARTRATE 24 MCG/MIN: 1 INJECTION, SOLUTION, CONCENTRATE INTRAVENOUS at 21:59

## 2020-01-01 RX ADMIN — CHLORHEXIDINE GLUCONATE 15 ML: 0.12 RINSE ORAL at 20:28

## 2020-01-01 RX ADMIN — Medication 10 ML: at 22:00

## 2020-01-01 RX ADMIN — MINERAL OIL AND WHITE PETROLATUM: 150; 830 OINTMENT OPHTHALMIC at 08:14

## 2020-01-01 RX ADMIN — CALCIUM CHLORIDE, MAGNESIUM CHLORIDE, DEXTROSE MONOHYDRATE, LACTIC ACID, SODIUM CHLORIDE, SODIUM BICARBONATE AND POTASSIUM CHLORIDE 2000 ML/HR: 3.68; 3.05; 22; 5.4; 6.46; 3.09; .314 INJECTION INTRAVENOUS at 21:04

## 2020-01-01 RX ADMIN — SODIUM CHLORIDE 0.04 UNITS/MIN: 9 INJECTION, SOLUTION INTRAVENOUS at 19:39

## 2020-01-01 RX ADMIN — INSULIN GLARGINE 46 UNITS: 100 INJECTION, SOLUTION SUBCUTANEOUS at 10:07

## 2020-01-01 RX ADMIN — CALCIUM CHLORIDE, MAGNESIUM CHLORIDE, DEXTROSE MONOHYDRATE, LACTIC ACID, SODIUM CHLORIDE, SODIUM BICARBONATE AND POTASSIUM CHLORIDE 2000 ML/HR: 3.68; 3.05; 22; 5.4; 6.46; 3.09; .314 INJECTION INTRAVENOUS at 08:27

## 2020-01-01 RX ADMIN — CEFEPIME 1 G: 1 INJECTION, POWDER, FOR SOLUTION INTRAMUSCULAR; INTRAVENOUS at 05:06

## 2020-01-01 RX ADMIN — Medication 10 ML: at 13:08

## 2020-01-01 RX ADMIN — SODIUM CHLORIDE 125 ML/HR: 450 INJECTION, SOLUTION INTRAVENOUS at 20:25

## 2020-01-01 RX ADMIN — Medication 10 ML: at 14:27

## 2020-01-01 RX ADMIN — PHENYLEPHRINE HYDROCHLORIDE 300 MCG/MIN: 10 INJECTION INTRAVENOUS at 18:59

## 2020-01-01 RX ADMIN — HYDROCORTISONE SODIUM SUCCINATE 50 MG: 100 INJECTION, POWDER, FOR SOLUTION INTRAMUSCULAR; INTRAVENOUS at 06:00

## 2020-01-01 RX ADMIN — Medication 10 ML: at 15:11

## 2020-01-01 RX ADMIN — MIDODRINE HYDROCHLORIDE 10 MG: 5 TABLET ORAL at 11:35

## 2020-01-01 RX ADMIN — DEXTROSE MONOHYDRATE AND SODIUM CHLORIDE 100 ML/HR: 5; .9 INJECTION, SOLUTION INTRAVENOUS at 06:35

## 2020-01-01 RX ADMIN — POTASSIUM CHLORIDE 20 MEQ: 29.8 INJECTION, SOLUTION INTRAVENOUS at 07:02

## 2020-01-01 RX ADMIN — PHENYLEPHRINE HYDROCHLORIDE 160 MCG/MIN: 10 INJECTION INTRAVENOUS at 23:21

## 2020-01-01 RX ADMIN — EPOETIN ALFA-EPBX 10000 UNITS: 10000 INJECTION, SOLUTION INTRAVENOUS; SUBCUTANEOUS at 22:03

## 2020-01-01 RX ADMIN — DEXTROSE MONOHYDRATE AND SODIUM CHLORIDE 100 ML/HR: 5; .9 INJECTION, SOLUTION INTRAVENOUS at 02:21

## 2020-01-01 RX ADMIN — CASTOR OIL AND BALSAM, PERU: 788; 87 OINTMENT TOPICAL at 08:28

## 2020-01-01 RX ADMIN — CALCIUM CHLORIDE, MAGNESIUM CHLORIDE, DEXTROSE MONOHYDRATE, LACTIC ACID, SODIUM CHLORIDE, SODIUM BICARBONATE AND POTASSIUM CHLORIDE 2000 ML/HR: 3.68; 3.05; 22; 5.4; 6.46; 3.09; .314 INJECTION INTRAVENOUS at 16:37

## 2020-01-01 RX ADMIN — INSULIN LISPRO 2 UNITS: 100 INJECTION, SOLUTION INTRAVENOUS; SUBCUTANEOUS at 12:00

## 2020-01-01 RX ADMIN — HYDROCORTISONE SODIUM SUCCINATE 100 MG: 100 INJECTION, POWDER, FOR SOLUTION INTRAMUSCULAR; INTRAVENOUS at 23:27

## 2020-01-01 RX ADMIN — INSULIN LISPRO 3 UNITS: 100 INJECTION, SOLUTION INTRAVENOUS; SUBCUTANEOUS at 05:28

## 2020-01-01 RX ADMIN — HYDROCORTISONE SODIUM SUCCINATE 50 MG: 100 INJECTION, POWDER, FOR SOLUTION INTRAMUSCULAR; INTRAVENOUS at 21:35

## 2020-01-01 RX ADMIN — HEPARIN SODIUM IN SODIUM CHLORIDE: 200 INJECTION INTRAVENOUS at 17:04

## 2020-01-01 RX ADMIN — PHENYLEPHRINE HYDROCHLORIDE 300 MCG/MIN: 10 INJECTION INTRAVENOUS at 00:22

## 2020-01-01 RX ADMIN — CEFEPIME HYDROCHLORIDE 1 G: 1 INJECTION, POWDER, FOR SOLUTION INTRAMUSCULAR; INTRAVENOUS at 11:40

## 2020-01-01 RX ADMIN — CEFEPIME 1 G: 1 INJECTION, POWDER, FOR SOLUTION INTRAMUSCULAR; INTRAVENOUS at 13:11

## 2020-01-01 RX ADMIN — NOREPINEPHRINE BITARTRATE 200 MCG/MIN: 1 INJECTION, SOLUTION, CONCENTRATE INTRAVENOUS at 07:31

## 2020-01-01 RX ADMIN — HYDROCORTISONE SODIUM SUCCINATE 50 MG: 100 INJECTION, POWDER, FOR SOLUTION INTRAMUSCULAR; INTRAVENOUS at 05:25

## 2020-01-01 RX ADMIN — CEFEPIME 1 G: 1 INJECTION, POWDER, FOR SOLUTION INTRAMUSCULAR; INTRAVENOUS at 20:26

## 2020-01-01 RX ADMIN — CASTOR OIL AND BALSAM, PERU: 788; 87 OINTMENT TOPICAL at 18:18

## 2020-01-01 RX ADMIN — PHENYLEPHRINE HYDROCHLORIDE 300 MCG/MIN: 10 INJECTION INTRAVENOUS at 15:46

## 2020-01-01 RX ADMIN — INSULIN GLARGINE 25 UNITS: 100 INJECTION, SOLUTION SUBCUTANEOUS at 09:12

## 2020-01-01 RX ADMIN — INSULIN LISPRO 2 UNITS: 100 INJECTION, SOLUTION INTRAVENOUS; SUBCUTANEOUS at 18:35

## 2020-01-01 RX ADMIN — SODIUM CHLORIDE 500 ML: 900 INJECTION, SOLUTION INTRAVENOUS at 09:15

## 2020-01-01 RX ADMIN — SODIUM CHLORIDE 500 ML: 900 INJECTION, SOLUTION INTRAVENOUS at 11:00

## 2020-01-01 RX ADMIN — Medication 1 AMPULE: at 20:55

## 2020-01-01 RX ADMIN — HYDROCORTISONE SODIUM SUCCINATE 50 MG: 100 INJECTION, POWDER, FOR SOLUTION INTRAMUSCULAR; INTRAVENOUS at 00:00

## 2020-01-01 RX ADMIN — MINERAL OIL AND WHITE PETROLATUM: 150; 830 OINTMENT OPHTHALMIC at 08:19

## 2020-01-01 RX ADMIN — ZINC SULFATE 220 MG (50 MG) CAPSULE 1 CAPSULE: CAPSULE at 08:43

## 2020-01-01 RX ADMIN — ASCORBIC ACID 5 G: 500 INJECTION, SOLUTION INTRAMUSCULAR; INTRAVENOUS; SUBCUTANEOUS at 17:10

## 2020-01-01 RX ADMIN — MIDODRINE HYDROCHLORIDE 5 MG: 5 TABLET ORAL at 08:04

## 2020-01-01 RX ADMIN — NOREPINEPHRINE BITARTRATE 200 MCG/MIN: 1 INJECTION, SOLUTION, CONCENTRATE INTRAVENOUS at 18:23

## 2020-01-01 RX ADMIN — CLINDAMYCIN IN 5 PERCENT DEXTROSE 900 MG: 18 INJECTION, SOLUTION INTRAVENOUS at 02:05

## 2020-01-01 RX ADMIN — PHENYLEPHRINE HYDROCHLORIDE 300 MCG/MIN: 10 INJECTION INTRAVENOUS at 10:51

## 2020-01-01 RX ADMIN — INSULIN GLARGINE 46 UNITS: 100 INJECTION, SOLUTION SUBCUTANEOUS at 11:06

## 2020-01-01 RX ADMIN — SODIUM CHLORIDE 2 G: 9 INJECTION, SOLUTION INTRAVENOUS at 08:23

## 2020-01-01 RX ADMIN — DOBUTAMINE IN DEXTROSE 10 MCG/KG/MIN: 200 INJECTION, SOLUTION INTRAVENOUS at 20:58

## 2020-01-01 RX ADMIN — Medication 10 ML: at 05:37

## 2020-01-01 RX ADMIN — CHLORHEXIDINE GLUCONATE 15 ML: 0.12 RINSE ORAL at 08:05

## 2020-01-01 RX ADMIN — PROPOFOL 10 MCG/KG/MIN: 10 INJECTION, EMULSION INTRAVENOUS at 06:23

## 2020-01-01 RX ADMIN — DEXTROSE MONOHYDRATE AND SODIUM CHLORIDE 100 ML/HR: 5; .9 INJECTION, SOLUTION INTRAVENOUS at 14:12

## 2020-01-01 RX ADMIN — ALBUMIN (HUMAN) 25 G: 0.25 INJECTION, SOLUTION INTRAVENOUS at 23:26

## 2020-01-01 RX ADMIN — PROPOFOL 20 MCG/KG/MIN: 10 INJECTION, EMULSION INTRAVENOUS at 18:18

## 2020-01-01 RX ADMIN — PHENYLEPHRINE HYDROCHLORIDE 300 MCG/MIN: 10 INJECTION INTRAVENOUS at 23:15

## 2020-01-01 RX ADMIN — NOREPINEPHRINE BITARTRATE 100 MCG/MIN: 1 INJECTION, SOLUTION, CONCENTRATE INTRAVENOUS at 01:13

## 2020-01-01 RX ADMIN — CALCIUM CHLORIDE, MAGNESIUM CHLORIDE, DEXTROSE MONOHYDRATE, LACTIC ACID, SODIUM CHLORIDE, SODIUM BICARBONATE AND POTASSIUM CHLORIDE 2000 ML/HR: 3.68; 3.05; 22; 5.4; 6.46; 3.09; .314 INJECTION INTRAVENOUS at 16:43

## 2020-01-01 RX ADMIN — CALCIUM CHLORIDE, MAGNESIUM CHLORIDE, DEXTROSE MONOHYDRATE, LACTIC ACID, SODIUM CHLORIDE, SODIUM BICARBONATE AND POTASSIUM CHLORIDE 2000 ML/HR: 3.68; 3.05; 22; 5.4; 6.46; 3.09; .314 INJECTION INTRAVENOUS at 14:28

## 2020-01-01 RX ADMIN — Medication 20 ML: at 21:26

## 2020-01-01 RX ADMIN — VANCOMYCIN HYDROCHLORIDE 1000 MG: 1 INJECTION, POWDER, LYOPHILIZED, FOR SOLUTION INTRAVENOUS at 18:22

## 2020-01-01 RX ADMIN — CALCIUM CHLORIDE, MAGNESIUM CHLORIDE, DEXTROSE MONOHYDRATE, LACTIC ACID, SODIUM CHLORIDE, SODIUM BICARBONATE AND POTASSIUM CHLORIDE 2000 ML/HR: 5.15; 2.03; 22; 5.4; 6.46; 3.09; .157 INJECTION INTRAVENOUS at 13:20

## 2020-01-01 RX ADMIN — HYDROCORTISONE SODIUM SUCCINATE 25 MG: 100 INJECTION, POWDER, FOR SOLUTION INTRAMUSCULAR; INTRAVENOUS at 05:55

## 2020-01-01 RX ADMIN — SODIUM CHLORIDE 500 ML: 900 INJECTION, SOLUTION INTRAVENOUS at 18:28

## 2020-01-01 RX ADMIN — NOREPINEPHRINE BITARTRATE 20 MCG/MIN: 1 INJECTION, SOLUTION, CONCENTRATE INTRAVENOUS at 09:42

## 2020-01-01 RX ADMIN — VANCOMYCIN HYDROCHLORIDE 2000 MG: 10 INJECTION, POWDER, LYOPHILIZED, FOR SOLUTION INTRAVENOUS at 12:33

## 2020-01-01 RX ADMIN — CALCIUM CHLORIDE, MAGNESIUM CHLORIDE, DEXTROSE MONOHYDRATE, LACTIC ACID, SODIUM CHLORIDE, SODIUM BICARBONATE AND POTASSIUM CHLORIDE 2000 ML/HR: 3.68; 3.05; 22; 5.4; 6.46; 3.09; .314 INJECTION INTRAVENOUS at 22:13

## 2020-01-01 RX ADMIN — CALCIUM CHLORIDE, MAGNESIUM CHLORIDE, DEXTROSE MONOHYDRATE, LACTIC ACID, SODIUM CHLORIDE, SODIUM BICARBONATE AND POTASSIUM CHLORIDE 2000 ML/HR: 3.68; 3.05; 22; 5.4; 6.46; 3.09; .314 INJECTION INTRAVENOUS at 14:39

## 2020-01-01 RX ADMIN — POLYETHYLENE GLYCOL 3350 17 G: 17 POWDER, FOR SOLUTION ORAL at 17:03

## 2020-01-01 RX ADMIN — SODIUM CHLORIDE 40 MG: 9 INJECTION, SOLUTION INTRAMUSCULAR; INTRAVENOUS; SUBCUTANEOUS at 20:29

## 2020-01-01 RX ADMIN — CALCIUM CHLORIDE, MAGNESIUM CHLORIDE, DEXTROSE MONOHYDRATE, LACTIC ACID, SODIUM CHLORIDE, SODIUM BICARBONATE AND POTASSIUM CHLORIDE 2000 ML/HR: 3.68; 3.05; 22; 5.4; 6.46; 3.09; .314 INJECTION INTRAVENOUS at 13:54

## 2020-01-01 RX ADMIN — Medication 10 ML: at 05:26

## 2020-01-01 RX ADMIN — CHLORHEXIDINE GLUCONATE 15 ML: 0.12 RINSE ORAL at 20:41

## 2020-01-01 RX ADMIN — CHLORHEXIDINE GLUCONATE 15 ML: 0.12 RINSE ORAL at 08:13

## 2020-01-01 RX ADMIN — INSULIN LISPRO 5 UNITS: 100 INJECTION, SOLUTION INTRAVENOUS; SUBCUTANEOUS at 00:25

## 2020-01-01 RX ADMIN — CALCIUM CHLORIDE, MAGNESIUM CHLORIDE, DEXTROSE MONOHYDRATE, LACTIC ACID, SODIUM CHLORIDE, SODIUM BICARBONATE AND POTASSIUM CHLORIDE 2000 ML/HR: 5.15; 2.03; 22; 5.4; 6.46; 3.09; .157 INJECTION INTRAVENOUS at 14:27

## 2020-01-01 RX ADMIN — DEXTROSE MONOHYDRATE AND SODIUM CHLORIDE 100 ML/HR: 5; .9 INJECTION, SOLUTION INTRAVENOUS at 08:33

## 2020-01-01 RX ADMIN — CALCIUM CHLORIDE, MAGNESIUM CHLORIDE, DEXTROSE MONOHYDRATE, LACTIC ACID, SODIUM CHLORIDE, SODIUM BICARBONATE AND POTASSIUM CHLORIDE 2000 ML/HR: 3.68; 3.05; 22; 5.4; 6.46; 3.09; .314 INJECTION INTRAVENOUS at 10:25

## 2020-01-01 RX ADMIN — CALCIUM CHLORIDE, MAGNESIUM CHLORIDE, DEXTROSE MONOHYDRATE, LACTIC ACID, SODIUM CHLORIDE, SODIUM BICARBONATE AND POTASSIUM CHLORIDE 2000 ML/HR: 3.68; 3.05; 22; 5.4; 6.46; 3.09; .314 INJECTION INTRAVENOUS at 22:14

## 2020-01-01 RX ADMIN — SODIUM CHLORIDE 0.04 UNITS/MIN: 9 INJECTION, SOLUTION INTRAVENOUS at 09:23

## 2020-01-01 RX ADMIN — CALCIUM CHLORIDE, MAGNESIUM CHLORIDE, DEXTROSE MONOHYDRATE, LACTIC ACID, SODIUM CHLORIDE, SODIUM BICARBONATE AND POTASSIUM CHLORIDE 2000 ML/HR: 3.68; 3.05; 22; 5.4; 6.46; 3.09; .314 INJECTION INTRAVENOUS at 14:31

## 2020-01-01 RX ADMIN — HEPARIN SODIUM 9 UNITS/KG/HR: 10000 INJECTION, SOLUTION INTRAVENOUS at 15:45

## 2020-01-01 RX ADMIN — HEPARIN SODIUM 9 UNITS/KG/HR: 10000 INJECTION, SOLUTION INTRAVENOUS at 19:22

## 2020-01-01 RX ADMIN — Medication 1 AMPULE: at 21:04

## 2020-01-01 RX ADMIN — LANSOPRAZOLE 30 MG: KIT at 09:28

## 2020-01-01 RX ADMIN — INSULIN LISPRO 3 UNITS: 100 INJECTION, SOLUTION INTRAVENOUS; SUBCUTANEOUS at 05:54

## 2020-01-01 RX ADMIN — INSULIN LISPRO 2 UNITS: 100 INJECTION, SOLUTION INTRAVENOUS; SUBCUTANEOUS at 23:47

## 2020-01-01 RX ADMIN — CALCIUM CHLORIDE, MAGNESIUM CHLORIDE, DEXTROSE MONOHYDRATE, LACTIC ACID, SODIUM CHLORIDE, SODIUM BICARBONATE AND POTASSIUM CHLORIDE 2000 ML/HR: 5.15; 2.03; 22; 5.4; 6.46; 3.09; .157 INJECTION INTRAVENOUS at 15:10

## 2020-01-01 RX ADMIN — Medication 10 ML: at 05:36

## 2020-01-01 RX ADMIN — INSULIN LISPRO 3 UNITS: 100 INJECTION, SOLUTION INTRAVENOUS; SUBCUTANEOUS at 18:22

## 2020-01-01 RX ADMIN — CALCIUM CHLORIDE, MAGNESIUM CHLORIDE, DEXTROSE MONOHYDRATE, LACTIC ACID, SODIUM CHLORIDE, SODIUM BICARBONATE AND POTASSIUM CHLORIDE 2000 ML/HR: 3.68; 3.05; 22; 5.4; 6.46; 3.09; .314 INJECTION INTRAVENOUS at 21:57

## 2020-01-01 RX ADMIN — CEFEPIME HYDROCHLORIDE 1 G: 1 INJECTION, POWDER, FOR SOLUTION INTRAMUSCULAR; INTRAVENOUS at 12:14

## 2020-01-01 RX ADMIN — INSULIN LISPRO 3 UNITS: 100 INJECTION, SOLUTION INTRAVENOUS; SUBCUTANEOUS at 05:37

## 2020-01-01 RX ADMIN — DOBUTAMINE IN DEXTROSE 5 MCG/KG/MIN: 200 INJECTION, SOLUTION INTRAVENOUS at 05:23

## 2020-01-01 RX ADMIN — CEFEPIME 1 G: 1 INJECTION, POWDER, FOR SOLUTION INTRAMUSCULAR; INTRAVENOUS at 21:24

## 2020-01-01 RX ADMIN — CALCIUM CHLORIDE, MAGNESIUM CHLORIDE, DEXTROSE MONOHYDRATE, LACTIC ACID, SODIUM CHLORIDE, SODIUM BICARBONATE AND POTASSIUM CHLORIDE 2000 ML/HR: 3.68; 3.05; 22; 5.4; 6.46; 3.09; .314 INJECTION INTRAVENOUS at 11:38

## 2020-01-01 RX ADMIN — Medication 10 ML: at 13:45

## 2020-01-01 RX ADMIN — INSULIN LISPRO 7 UNITS: 100 INJECTION, SOLUTION INTRAVENOUS; SUBCUTANEOUS at 05:33

## 2020-01-01 RX ADMIN — CALCIUM CHLORIDE, MAGNESIUM CHLORIDE, DEXTROSE MONOHYDRATE, LACTIC ACID, SODIUM CHLORIDE, SODIUM BICARBONATE AND POTASSIUM CHLORIDE 2000 ML/HR: 3.68; 3.05; 22; 5.4; 6.46; 3.09; .314 INJECTION INTRAVENOUS at 23:08

## 2020-01-01 RX ADMIN — Medication: at 09:45

## 2020-01-01 RX ADMIN — CALCIUM CHLORIDE, MAGNESIUM CHLORIDE, DEXTROSE MONOHYDRATE, LACTIC ACID, SODIUM CHLORIDE, SODIUM BICARBONATE AND POTASSIUM CHLORIDE 2000 ML/HR: 3.68; 3.05; 22; 5.4; 6.46; 3.09; .314 INJECTION INTRAVENOUS at 04:00

## 2020-01-01 RX ADMIN — CEFEPIME 1 G: 1 INJECTION, POWDER, FOR SOLUTION INTRAMUSCULAR; INTRAVENOUS at 13:02

## 2020-01-01 RX ADMIN — CALCIUM CHLORIDE, MAGNESIUM CHLORIDE, DEXTROSE MONOHYDRATE, LACTIC ACID, SODIUM CHLORIDE, SODIUM BICARBONATE AND POTASSIUM CHLORIDE 2000 ML/HR: 3.68; 3.05; 22; 5.4; 6.46; 3.09; .314 INJECTION INTRAVENOUS at 00:22

## 2020-01-01 RX ADMIN — INSULIN GLARGINE 30 UNITS: 100 INJECTION, SOLUTION SUBCUTANEOUS at 11:24

## 2020-01-01 RX ADMIN — Medication 1 AMPULE: at 08:51

## 2020-01-01 RX ADMIN — NOREPINEPHRINE BITARTRATE 28 MCG/MIN: 1 INJECTION, SOLUTION, CONCENTRATE INTRAVENOUS at 09:22

## 2020-01-01 RX ADMIN — CHLORHEXIDINE GLUCONATE 15 ML: 0.12 RINSE ORAL at 08:18

## 2020-01-01 RX ADMIN — HEPARIN SODIUM 8 UNITS/KG/HR: 10000 INJECTION, SOLUTION INTRAVENOUS at 00:00

## 2020-01-01 RX ADMIN — DEXTROSE MONOHYDRATE AND SODIUM CHLORIDE 100 ML/HR: 5; .9 INJECTION, SOLUTION INTRAVENOUS at 08:47

## 2020-01-01 RX ADMIN — SODIUM BICARBONATE 100 MEQ: 84 INJECTION, SOLUTION INTRAVENOUS at 14:00

## 2020-01-01 RX ADMIN — SODIUM BICARBONATE: 84 INJECTION, SOLUTION INTRAVENOUS at 14:07

## 2020-01-01 RX ADMIN — CALCIUM CHLORIDE, MAGNESIUM CHLORIDE, DEXTROSE MONOHYDRATE, LACTIC ACID, SODIUM CHLORIDE, SODIUM BICARBONATE AND POTASSIUM CHLORIDE 2000 ML/HR: 3.68; 3.05; 22; 5.4; 6.46; 3.09; .314 INJECTION INTRAVENOUS at 02:44

## 2020-01-01 RX ADMIN — EPINEPHRINE 10 MCG/MIN: 1 INJECTION INTRAMUSCULAR; INTRAVENOUS; SUBCUTANEOUS at 10:58

## 2020-01-01 RX ADMIN — SODIUM CHLORIDE 40 MG: 9 INJECTION, SOLUTION INTRAMUSCULAR; INTRAVENOUS; SUBCUTANEOUS at 08:01

## 2020-01-01 RX ADMIN — INSULIN LISPRO 2 UNITS: 100 INJECTION, SOLUTION INTRAVENOUS; SUBCUTANEOUS at 00:00

## 2020-01-01 RX ADMIN — CASTOR OIL AND BALSAM, PERU: 788; 87 OINTMENT TOPICAL at 00:19

## 2020-01-01 RX ADMIN — Medication 1 AMPULE: at 08:06

## 2020-01-01 RX ADMIN — CASTOR OIL AND BALSAM, PERU: 788; 87 OINTMENT TOPICAL at 08:13

## 2020-01-01 RX ADMIN — SODIUM CHLORIDE, SODIUM LACTATE, POTASSIUM CHLORIDE, AND CALCIUM CHLORIDE 1000 ML: 600; 310; 30; 20 INJECTION, SOLUTION INTRAVENOUS at 16:27

## 2020-01-01 RX ADMIN — HYDROCORTISONE SODIUM SUCCINATE 50 MG: 100 INJECTION, POWDER, FOR SOLUTION INTRAMUSCULAR; INTRAVENOUS at 11:38

## 2020-01-01 RX ADMIN — CEFEPIME 1 G: 1 INJECTION, POWDER, FOR SOLUTION INTRAMUSCULAR; INTRAVENOUS at 20:47

## 2020-01-01 RX ADMIN — SODIUM CHLORIDE 0.04 UNITS/MIN: 9 INJECTION, SOLUTION INTRAVENOUS at 03:00

## 2020-01-01 RX ADMIN — CLINDAMYCIN IN 5 PERCENT DEXTROSE 900 MG: 18 INJECTION, SOLUTION INTRAVENOUS at 19:33

## 2020-01-01 RX ADMIN — NOREPINEPHRINE BITARTRATE 100 MCG/MIN: 1 INJECTION, SOLUTION, CONCENTRATE INTRAVENOUS at 03:13

## 2020-01-01 RX ADMIN — SODIUM CHLORIDE 125 ML/HR: 450 INJECTION, SOLUTION INTRAVENOUS at 06:23

## 2020-01-01 RX ADMIN — Medication 1 AMPULE: at 08:05

## 2020-01-01 RX ADMIN — Medication 1 AMPULE: at 20:11

## 2020-01-01 RX ADMIN — CALCIUM CHLORIDE, MAGNESIUM CHLORIDE, DEXTROSE MONOHYDRATE, LACTIC ACID, SODIUM CHLORIDE, SODIUM BICARBONATE AND POTASSIUM CHLORIDE 2000 ML/HR: 3.68; 3.05; 22; 5.4; 6.46; 3.09; .314 INJECTION INTRAVENOUS at 11:07

## 2020-01-01 RX ADMIN — DEXTROSE MONOHYDRATE 25 G: 500 INJECTION PARENTERAL at 18:01

## 2020-01-01 RX ADMIN — SODIUM CHLORIDE 0.04 UNITS/MIN: 9 INJECTION, SOLUTION INTRAVENOUS at 00:31

## 2020-01-01 RX ADMIN — MIDODRINE HYDROCHLORIDE 10 MG: 5 TABLET ORAL at 05:57

## 2020-01-01 RX ADMIN — PROPOFOL 20 MCG/KG/MIN: 10 INJECTION, EMULSION INTRAVENOUS at 18:22

## 2020-01-01 RX ADMIN — CHLORHEXIDINE GLUCONATE 15 ML: 0.12 RINSE ORAL at 20:21

## 2020-01-01 RX ADMIN — PROPOFOL 20 MCG/KG/MIN: 10 INJECTION, EMULSION INTRAVENOUS at 23:29

## 2020-01-01 RX ADMIN — NOREPINEPHRINE BITARTRATE 95 MCG/MIN: 1 INJECTION, SOLUTION, CONCENTRATE INTRAVENOUS at 09:24

## 2020-01-01 RX ADMIN — CASTOR OIL AND BALSAM, PERU: 788; 87 OINTMENT TOPICAL at 23:06

## 2020-01-01 RX ADMIN — CALCIUM CHLORIDE, MAGNESIUM CHLORIDE, DEXTROSE MONOHYDRATE, LACTIC ACID, SODIUM CHLORIDE, SODIUM BICARBONATE AND POTASSIUM CHLORIDE 2000 ML/HR: 3.68; 3.05; 22; 5.4; 6.46; 3.09; .314 INJECTION INTRAVENOUS at 15:48

## 2020-01-01 RX ADMIN — CASTOR OIL AND BALSAM, PERU: 788; 87 OINTMENT TOPICAL at 15:13

## 2020-01-01 RX ADMIN — Medication 1 AMPULE: at 20:36

## 2020-01-01 RX ADMIN — HYDROCORTISONE SODIUM SUCCINATE 100 MG: 100 INJECTION, POWDER, FOR SOLUTION INTRAMUSCULAR; INTRAVENOUS at 12:00

## 2020-01-01 RX ADMIN — CLINDAMYCIN IN 5 PERCENT DEXTROSE 900 MG: 18 INJECTION, SOLUTION INTRAVENOUS at 18:51

## 2020-01-01 RX ADMIN — CEFEPIME HYDROCHLORIDE 1 G: 1 INJECTION, POWDER, FOR SOLUTION INTRAMUSCULAR; INTRAVENOUS at 11:28

## 2020-01-01 RX ADMIN — SODIUM BICARBONATE: 84 INJECTION, SOLUTION INTRAVENOUS at 13:03

## 2020-01-01 RX ADMIN — Medication 1 AMPULE: at 09:00

## 2020-01-01 RX ADMIN — CALCIUM CHLORIDE, MAGNESIUM CHLORIDE, DEXTROSE MONOHYDRATE, LACTIC ACID, SODIUM CHLORIDE, SODIUM BICARBONATE AND POTASSIUM CHLORIDE 2000 ML/HR: 5.15; 2.03; 22; 5.4; 6.46; 3.09; .157 INJECTION INTRAVENOUS at 04:04

## 2020-01-01 RX ADMIN — CALCIUM CHLORIDE 1 G: 100 INJECTION, SOLUTION INTRAVENOUS at 13:00

## 2020-01-01 RX ADMIN — DOBUTAMINE IN DEXTROSE 3 MCG/KG/MIN: 200 INJECTION, SOLUTION INTRAVENOUS at 10:24

## 2020-01-01 RX ADMIN — NOREPINEPHRINE BITARTRATE 50 MCG/MIN: 1 INJECTION, SOLUTION, CONCENTRATE INTRAVENOUS at 20:33

## 2020-01-01 RX ADMIN — SODIUM CHLORIDE 40 MG: 9 INJECTION, SOLUTION INTRAMUSCULAR; INTRAVENOUS; SUBCUTANEOUS at 20:36

## 2020-01-01 RX ADMIN — HYDROCORTISONE SODIUM SUCCINATE 100 MG: 100 INJECTION, POWDER, FOR SOLUTION INTRAMUSCULAR; INTRAVENOUS at 18:02

## 2020-01-01 RX ADMIN — Medication 1 AMPULE: at 20:46

## 2020-01-01 RX ADMIN — SODIUM CHLORIDE 0.04 UNITS/MIN: 9 INJECTION, SOLUTION INTRAVENOUS at 18:17

## 2020-01-01 RX ADMIN — CHLORHEXIDINE GLUCONATE 15 ML: 0.12 RINSE ORAL at 08:23

## 2020-01-01 RX ADMIN — CALCIUM CHLORIDE, MAGNESIUM CHLORIDE, DEXTROSE MONOHYDRATE, LACTIC ACID, SODIUM CHLORIDE, SODIUM BICARBONATE AND POTASSIUM CHLORIDE 2000 ML/HR: 3.68; 3.05; 22; 5.4; 6.46; 3.09; .314 INJECTION INTRAVENOUS at 00:09

## 2020-01-01 RX ADMIN — HYDROCORTISONE SODIUM SUCCINATE 25 MG: 100 INJECTION, POWDER, FOR SOLUTION INTRAMUSCULAR; INTRAVENOUS at 13:09

## 2020-01-01 RX ADMIN — HYDROCORTISONE SODIUM SUCCINATE 25 MG: 100 INJECTION, POWDER, FOR SOLUTION INTRAMUSCULAR; INTRAVENOUS at 13:03

## 2020-01-01 RX ADMIN — CEFTRIAXONE 1 G: 1 INJECTION, POWDER, FOR SOLUTION INTRAMUSCULAR; INTRAVENOUS at 00:16

## 2020-01-01 RX ADMIN — PROPOFOL 15 MCG/KG/MIN: 10 INJECTION, EMULSION INTRAVENOUS at 11:07

## 2020-01-01 RX ADMIN — EPOETIN ALFA-EPBX 10000 UNITS: 10000 INJECTION, SOLUTION INTRAVENOUS; SUBCUTANEOUS at 22:02

## 2020-01-01 RX ADMIN — NOREPINEPHRINE BITARTRATE 31 MCG/MIN: 1 INJECTION, SOLUTION, CONCENTRATE INTRAVENOUS at 05:09

## 2020-01-01 RX ADMIN — CEFTRIAXONE 1 G: 1 INJECTION, POWDER, FOR SOLUTION INTRAMUSCULAR; INTRAVENOUS at 01:30

## 2020-01-01 RX ADMIN — MIDODRINE HYDROCHLORIDE 10 MG: 5 TABLET ORAL at 17:26

## 2020-01-01 RX ADMIN — CALCIUM CHLORIDE, MAGNESIUM CHLORIDE, DEXTROSE MONOHYDRATE, LACTIC ACID, SODIUM CHLORIDE, SODIUM BICARBONATE AND POTASSIUM CHLORIDE 2000 ML/HR: 3.68; 3.05; 22; 5.4; 6.46; 3.09; .314 INJECTION INTRAVENOUS at 15:11

## 2020-01-01 RX ADMIN — CALCIUM CHLORIDE, MAGNESIUM CHLORIDE, DEXTROSE MONOHYDRATE, LACTIC ACID, SODIUM CHLORIDE, SODIUM BICARBONATE AND POTASSIUM CHLORIDE 2000 ML/HR: 3.68; 3.05; 22; 5.4; 6.46; 3.09; .314 INJECTION INTRAVENOUS at 16:39

## 2020-01-01 RX ADMIN — CHLORHEXIDINE GLUCONATE 15 ML: 0.12 RINSE ORAL at 20:45

## 2020-01-01 RX ADMIN — CALCIUM CHLORIDE, MAGNESIUM CHLORIDE, DEXTROSE MONOHYDRATE, LACTIC ACID, SODIUM CHLORIDE, SODIUM BICARBONATE AND POTASSIUM CHLORIDE 2000 ML/HR: 3.68; 3.05; 22; 5.4; 6.46; 3.09; .314 INJECTION INTRAVENOUS at 20:28

## 2020-01-01 RX ADMIN — CHLORHEXIDINE GLUCONATE 15 ML: 0.12 RINSE ORAL at 20:00

## 2020-01-01 RX ADMIN — CALCIUM GLUCONATE 1 G: 98 INJECTION, SOLUTION INTRAVENOUS at 06:52

## 2020-01-01 RX ADMIN — CALCIUM CHLORIDE, MAGNESIUM CHLORIDE, DEXTROSE MONOHYDRATE, LACTIC ACID, SODIUM CHLORIDE, SODIUM BICARBONATE AND POTASSIUM CHLORIDE 2000 ML/HR: 3.68; 3.05; 22; 5.4; 6.46; 3.09; .314 INJECTION INTRAVENOUS at 01:03

## 2020-01-01 RX ADMIN — CASTOR OIL AND BALSAM, PERU: 788; 87 OINTMENT TOPICAL at 10:03

## 2020-01-01 RX ADMIN — CLINDAMYCIN IN 5 PERCENT DEXTROSE 900 MG: 18 INJECTION, SOLUTION INTRAVENOUS at 11:16

## 2020-01-01 RX ADMIN — INSULIN LISPRO 3 UNITS: 100 INJECTION, SOLUTION INTRAVENOUS; SUBCUTANEOUS at 06:47

## 2020-01-01 RX ADMIN — SODIUM BICARBONATE 150 MEQ/1,000 ML IN DEXTROSE 5 % INTRAVENOUS: SOLUTION at 15:02

## 2020-01-01 RX ADMIN — INSULIN LISPRO 2 UNITS: 100 INJECTION, SOLUTION INTRAVENOUS; SUBCUTANEOUS at 13:32

## 2020-01-01 RX ADMIN — CALCIUM CHLORIDE, MAGNESIUM CHLORIDE, DEXTROSE MONOHYDRATE, LACTIC ACID, SODIUM CHLORIDE, SODIUM BICARBONATE AND POTASSIUM CHLORIDE 2000 ML/HR: 5.15; 2.03; 22; 5.4; 6.46; 3.09; .157 INJECTION INTRAVENOUS at 19:00

## 2020-01-01 RX ADMIN — NOREPINEPHRINE BITARTRATE 200 MCG/MIN: 1 INJECTION, SOLUTION, CONCENTRATE INTRAVENOUS at 04:43

## 2020-01-01 RX ADMIN — DEXTROSE MONOHYDRATE 25 G: 500 INJECTION PARENTERAL at 04:38

## 2020-01-01 RX ADMIN — PHENYLEPHRINE HYDROCHLORIDE 290 MCG/MIN: 10 INJECTION INTRAVENOUS at 11:35

## 2020-01-01 RX ADMIN — Medication: at 03:37

## 2020-01-01 RX ADMIN — CALCIUM CHLORIDE, MAGNESIUM CHLORIDE, DEXTROSE MONOHYDRATE, LACTIC ACID, SODIUM CHLORIDE, SODIUM BICARBONATE AND POTASSIUM CHLORIDE 2000 ML/HR: 3.68; 3.05; 22; 5.4; 6.46; 3.09; .314 INJECTION INTRAVENOUS at 17:03

## 2020-01-01 RX ADMIN — PHENYLEPHRINE HYDROCHLORIDE 300 MCG/MIN: 10 INJECTION INTRAVENOUS at 16:34

## 2020-01-01 RX ADMIN — ALBUMIN (HUMAN) 12.5 G: 0.25 INJECTION, SOLUTION INTRAVENOUS at 13:15

## 2020-01-01 RX ADMIN — ACETAMINOPHEN 650 MG: 325 TABLET, FILM COATED ORAL at 08:04

## 2020-01-01 RX ADMIN — Medication 1 AMPULE: at 22:10

## 2020-01-01 RX ADMIN — CALCIUM CHLORIDE, MAGNESIUM CHLORIDE, DEXTROSE MONOHYDRATE, LACTIC ACID, SODIUM CHLORIDE, SODIUM BICARBONATE AND POTASSIUM CHLORIDE 2000 ML/HR: 3.68; 3.05; 22; 5.4; 6.46; 3.09; .314 INJECTION INTRAVENOUS at 01:19

## 2020-01-01 RX ADMIN — HYDROCORTISONE SODIUM SUCCINATE 50 MG: 100 INJECTION, POWDER, FOR SOLUTION INTRAMUSCULAR; INTRAVENOUS at 05:54

## 2020-01-01 RX ADMIN — Medication 1 AMPULE: at 08:10

## 2020-01-01 RX ADMIN — HEPARIN SODIUM 9 UNITS/KG/HR: 10000 INJECTION, SOLUTION INTRAVENOUS at 09:00

## 2020-01-01 RX ADMIN — Medication 10 ML: at 05:07

## 2020-01-01 RX ADMIN — CALCIUM CHLORIDE, MAGNESIUM CHLORIDE, DEXTROSE MONOHYDRATE, LACTIC ACID, SODIUM CHLORIDE, SODIUM BICARBONATE AND POTASSIUM CHLORIDE 2000 ML/HR: 3.68; 3.05; 22; 5.4; 6.46; 3.09; .314 INJECTION INTRAVENOUS at 12:05

## 2020-01-01 RX ADMIN — Medication 10 ML: at 05:09

## 2020-01-01 RX ADMIN — PROPOFOL 15 MCG/KG/MIN: 10 INJECTION, EMULSION INTRAVENOUS at 06:59

## 2020-01-01 RX ADMIN — INSULIN LISPRO 2 UNITS: 100 INJECTION, SOLUTION INTRAVENOUS; SUBCUTANEOUS at 06:43

## 2020-01-01 RX ADMIN — Medication: at 03:00

## 2020-01-01 RX ADMIN — HYDROCORTISONE SODIUM SUCCINATE 100 MG: 100 INJECTION, POWDER, FOR SOLUTION INTRAMUSCULAR; INTRAVENOUS at 11:39

## 2020-01-01 RX ADMIN — ASCORBIC ACID 5 G: 500 INJECTION, SOLUTION INTRAMUSCULAR; INTRAVENOUS; SUBCUTANEOUS at 06:00

## 2020-01-01 RX ADMIN — FAMOTIDINE 20 MG: 10 INJECTION INTRAVENOUS at 09:36

## 2020-01-01 RX ADMIN — CALCIUM CHLORIDE, MAGNESIUM CHLORIDE, DEXTROSE MONOHYDRATE, LACTIC ACID, SODIUM CHLORIDE, SODIUM BICARBONATE AND POTASSIUM CHLORIDE 2000 ML/HR: 5.15; 2.03; 22; 5.4; 6.46; 3.09; .157 INJECTION INTRAVENOUS at 04:32

## 2020-01-01 RX ADMIN — DOBUTAMINE IN DEXTROSE 10 MCG/KG/MIN: 200 INJECTION, SOLUTION INTRAVENOUS at 08:53

## 2020-01-01 RX ADMIN — INSULIN LISPRO 2 UNITS: 100 INJECTION, SOLUTION INTRAVENOUS; SUBCUTANEOUS at 11:21

## 2020-01-01 RX ADMIN — NOREPINEPHRINE BITARTRATE 140 MCG/MIN: 1 INJECTION, SOLUTION, CONCENTRATE INTRAVENOUS at 02:00

## 2020-01-01 RX ADMIN — INSULIN GLARGINE 25 UNITS: 100 INJECTION, SOLUTION SUBCUTANEOUS at 15:27

## 2020-01-01 RX ADMIN — CALCIUM CHLORIDE, MAGNESIUM CHLORIDE, DEXTROSE MONOHYDRATE, LACTIC ACID, SODIUM CHLORIDE, SODIUM BICARBONATE AND POTASSIUM CHLORIDE 2000 ML/HR: 3.68; 3.05; 22; 5.4; 6.46; 3.09; .314 INJECTION INTRAVENOUS at 04:24

## 2020-01-01 RX ADMIN — HYDROCORTISONE SODIUM SUCCINATE 50 MG: 100 INJECTION, POWDER, FOR SOLUTION INTRAMUSCULAR; INTRAVENOUS at 05:56

## 2020-01-01 RX ADMIN — CALCIUM CHLORIDE, MAGNESIUM CHLORIDE, DEXTROSE MONOHYDRATE, LACTIC ACID, SODIUM CHLORIDE, SODIUM BICARBONATE AND POTASSIUM CHLORIDE 2000 ML/HR: 3.68; 3.05; 22; 5.4; 6.46; 3.09; .314 INJECTION INTRAVENOUS at 10:49

## 2020-01-01 RX ADMIN — HYDROCORTISONE SODIUM SUCCINATE 100 MG: 100 INJECTION, POWDER, FOR SOLUTION INTRAMUSCULAR; INTRAVENOUS at 18:06

## 2020-01-01 RX ADMIN — Medication 10 ML: at 06:00

## 2020-01-01 RX ADMIN — Medication 1 AMPULE: at 20:21

## 2020-01-01 RX ADMIN — Medication 10 ML: at 05:22

## 2020-01-01 RX ADMIN — Medication 1 AMPULE: at 08:14

## 2020-01-01 RX ADMIN — SODIUM CHLORIDE 100 ML/HR: 900 INJECTION, SOLUTION INTRAVENOUS at 02:57

## 2020-01-01 RX ADMIN — HYDROCORTISONE SODIUM SUCCINATE 100 MG: 100 INJECTION, POWDER, FOR SOLUTION INTRAMUSCULAR; INTRAVENOUS at 23:31

## 2020-01-01 RX ADMIN — ASCORBIC ACID 5 G: 500 INJECTION, SOLUTION INTRAMUSCULAR; INTRAVENOUS; SUBCUTANEOUS at 05:10

## 2020-01-01 RX ADMIN — PHENYLEPHRINE HYDROCHLORIDE 300 MCG/MIN: 10 INJECTION INTRAVENOUS at 17:36

## 2020-01-01 RX ADMIN — CALCIUM CHLORIDE, MAGNESIUM CHLORIDE, DEXTROSE MONOHYDRATE, LACTIC ACID, SODIUM CHLORIDE, SODIUM BICARBONATE AND POTASSIUM CHLORIDE 2000 ML/HR: 5.15; 2.03; 22; 5.4; 6.46; 3.09; .157 INJECTION INTRAVENOUS at 18:02

## 2020-01-01 RX ADMIN — DEXTROSE MONOHYDRATE 25 G: 500 INJECTION PARENTERAL at 13:10

## 2020-01-01 RX ADMIN — INSULIN LISPRO 7 UNITS: 100 INJECTION, SOLUTION INTRAVENOUS; SUBCUTANEOUS at 18:06

## 2020-01-01 RX ADMIN — ALBUMIN (HUMAN) 25 G: 0.25 INJECTION, SOLUTION INTRAVENOUS at 05:12

## 2020-01-01 RX ADMIN — Medication 10 ML: at 05:01

## 2020-01-01 RX ADMIN — HEPARIN SODIUM 11 UNITS/KG/HR: 10000 INJECTION, SOLUTION INTRAVENOUS at 18:39

## 2020-01-01 RX ADMIN — SODIUM BICARBONATE 100 MEQ: 84 INJECTION INTRAVENOUS at 13:31

## 2020-01-01 RX ADMIN — CASTOR OIL AND BALSAM, PERU: 788; 87 OINTMENT TOPICAL at 21:27

## 2020-01-01 RX ADMIN — CALCIUM CHLORIDE, MAGNESIUM CHLORIDE, DEXTROSE MONOHYDRATE, LACTIC ACID, SODIUM CHLORIDE, SODIUM BICARBONATE AND POTASSIUM CHLORIDE 2000 ML/HR: 3.68; 3.05; 22; 5.4; 6.46; 3.09; .314 INJECTION INTRAVENOUS at 17:52

## 2020-01-01 RX ADMIN — ASCORBIC ACID 5 G: 500 INJECTION, SOLUTION INTRAMUSCULAR; INTRAVENOUS; SUBCUTANEOUS at 18:42

## 2020-01-01 RX ADMIN — HYDROCORTISONE SODIUM SUCCINATE 100 MG: 100 INJECTION, POWDER, FOR SOLUTION INTRAMUSCULAR; INTRAVENOUS at 05:01

## 2020-01-01 RX ADMIN — INSULIN LISPRO 5 UNITS: 100 INJECTION, SOLUTION INTRAVENOUS; SUBCUTANEOUS at 18:09

## 2020-01-01 RX ADMIN — CALCIUM CHLORIDE, MAGNESIUM CHLORIDE, DEXTROSE MONOHYDRATE, LACTIC ACID, SODIUM CHLORIDE, SODIUM BICARBONATE AND POTASSIUM CHLORIDE 2000 ML/HR: 3.68; 3.05; 22; 5.4; 6.46; 3.09; .314 INJECTION INTRAVENOUS at 21:10

## 2020-01-01 RX ADMIN — VANCOMYCIN HYDROCHLORIDE 1250 MG: 10 INJECTION, POWDER, LYOPHILIZED, FOR SOLUTION INTRAVENOUS at 16:50

## 2020-01-01 RX ADMIN — HEPARIN SODIUM 11 UNITS/KG/HR: 10000 INJECTION, SOLUTION INTRAVENOUS at 18:02

## 2020-01-01 RX ADMIN — HEPARIN SODIUM 11 UNITS/KG/HR: 10000 INJECTION, SOLUTION INTRAVENOUS at 21:34

## 2020-01-01 RX ADMIN — SODIUM CHLORIDE 0.04 UNITS/MIN: 9 INJECTION, SOLUTION INTRAVENOUS at 14:26

## 2020-01-01 RX ADMIN — CASTOR OIL AND BALSAM, PERU: 788; 87 OINTMENT TOPICAL at 18:20

## 2020-01-01 RX ADMIN — EPINEPHRINE 10 MCG/MIN: 1 INJECTION INTRAMUSCULAR; INTRAVENOUS; SUBCUTANEOUS at 03:00

## 2020-01-01 RX ADMIN — CALCIUM CHLORIDE, MAGNESIUM CHLORIDE, DEXTROSE MONOHYDRATE, LACTIC ACID, SODIUM CHLORIDE, SODIUM BICARBONATE AND POTASSIUM CHLORIDE 2000 ML/HR: 3.68; 3.05; 22; 5.4; 6.46; 3.09; .314 INJECTION INTRAVENOUS at 18:28

## 2020-01-01 RX ADMIN — HYDROCORTISONE SODIUM SUCCINATE 50 MG: 100 INJECTION, POWDER, FOR SOLUTION INTRAMUSCULAR; INTRAVENOUS at 23:39

## 2020-01-01 RX ADMIN — CALCIUM CHLORIDE, MAGNESIUM CHLORIDE, DEXTROSE MONOHYDRATE, LACTIC ACID, SODIUM CHLORIDE, SODIUM BICARBONATE AND POTASSIUM CHLORIDE 2000 ML/HR: 5.15; 2.03; 22; 5.4; 6.46; 3.09; .157 INJECTION INTRAVENOUS at 05:11

## 2020-01-01 RX ADMIN — SODIUM CHLORIDE 40 MG: 9 INJECTION, SOLUTION INTRAMUSCULAR; INTRAVENOUS; SUBCUTANEOUS at 20:21

## 2020-01-01 RX ADMIN — Medication 10 ML: at 05:38

## 2020-01-01 RX ADMIN — EPINEPHRINE 1 MG: 0.1 INJECTION, SOLUTION ENDOTRACHEAL; INTRACARDIAC; INTRAVENOUS at 16:51

## 2020-01-01 RX ADMIN — ACETAMINOPHEN 650 MG: 325 TABLET, FILM COATED ORAL at 14:36

## 2020-01-01 RX ADMIN — CALCIUM CHLORIDE, MAGNESIUM CHLORIDE, DEXTROSE MONOHYDRATE, LACTIC ACID, SODIUM CHLORIDE, SODIUM BICARBONATE AND POTASSIUM CHLORIDE 2000 ML/HR: 3.68; 3.05; 22; 5.4; 6.46; 3.09; .314 INJECTION INTRAVENOUS at 15:31

## 2020-01-01 RX ADMIN — ALBUMIN (HUMAN) 12.5 G: 0.25 INJECTION, SOLUTION INTRAVENOUS at 23:32

## 2020-01-01 RX ADMIN — VANCOMYCIN HYDROCHLORIDE 1000 MG: 1 INJECTION, POWDER, LYOPHILIZED, FOR SOLUTION INTRAVENOUS at 17:37

## 2020-01-01 RX ADMIN — NOREPINEPHRINE BITARTRATE 24 MCG/MIN: 1 INJECTION, SOLUTION, CONCENTRATE INTRAVENOUS at 13:14

## 2020-01-01 RX ADMIN — INSULIN GLARGINE 40 UNITS: 100 INJECTION, SOLUTION SUBCUTANEOUS at 13:32

## 2020-01-01 RX ADMIN — CHLORHEXIDINE GLUCONATE 15 ML: 0.12 RINSE ORAL at 21:30

## 2020-01-01 RX ADMIN — MIDODRINE HYDROCHLORIDE 5 MG: 5 TABLET ORAL at 23:29

## 2020-01-01 RX ADMIN — SODIUM CHLORIDE, PRESERVATIVE FREE 500 UNITS: 5 INJECTION INTRAVENOUS at 17:04

## 2020-01-01 RX ADMIN — INSULIN LISPRO 2 UNITS: 100 INJECTION, SOLUTION INTRAVENOUS; SUBCUTANEOUS at 13:12

## 2020-01-01 RX ADMIN — NOREPINEPHRINE BITARTRATE 57 MCG/MIN: 1 INJECTION, SOLUTION, CONCENTRATE INTRAVENOUS at 09:03

## 2020-01-01 RX ADMIN — PHENYLEPHRINE HYDROCHLORIDE 300 MCG/MIN: 10 INJECTION INTRAVENOUS at 05:04

## 2020-01-01 RX ADMIN — CALCIUM CHLORIDE, MAGNESIUM CHLORIDE, DEXTROSE MONOHYDRATE, LACTIC ACID, SODIUM CHLORIDE, SODIUM BICARBONATE AND POTASSIUM CHLORIDE 2000 ML/HR: 3.68; 3.05; 22; 5.4; 6.46; 3.09; .314 INJECTION INTRAVENOUS at 10:53

## 2020-01-01 RX ADMIN — CHLORHEXIDINE GLUCONATE 15 ML: 0.12 RINSE ORAL at 21:11

## 2020-01-01 RX ADMIN — Medication 10 ML: at 13:12

## 2020-01-01 RX ADMIN — SODIUM CHLORIDE 500 ML: 900 INJECTION, SOLUTION INTRAVENOUS at 12:15

## 2020-01-01 RX ADMIN — Medication 1 AMPULE: at 09:22

## 2020-01-01 RX ADMIN — ALBUMIN (HUMAN) 12.5 G: 0.25 INJECTION, SOLUTION INTRAVENOUS at 11:39

## 2020-01-01 RX ADMIN — CALCIUM CHLORIDE, MAGNESIUM CHLORIDE, DEXTROSE MONOHYDRATE, LACTIC ACID, SODIUM CHLORIDE, SODIUM BICARBONATE AND POTASSIUM CHLORIDE 2000 ML/HR: 3.68; 3.05; 22; 5.4; 6.46; 3.09; .314 INJECTION INTRAVENOUS at 04:40

## 2020-01-01 RX ADMIN — CLINDAMYCIN IN 5 PERCENT DEXTROSE 900 MG: 18 INJECTION, SOLUTION INTRAVENOUS at 02:51

## 2020-01-01 RX ADMIN — ACETAMINOPHEN 650 MG: 325 TABLET, FILM COATED ORAL at 20:37

## 2020-01-01 RX ADMIN — CLINDAMYCIN IN 5 PERCENT DEXTROSE 900 MG: 18 INJECTION, SOLUTION INTRAVENOUS at 11:45

## 2020-01-01 RX ADMIN — CALCIUM CHLORIDE, MAGNESIUM CHLORIDE, DEXTROSE MONOHYDRATE, LACTIC ACID, SODIUM CHLORIDE, SODIUM BICARBONATE AND POTASSIUM CHLORIDE 2000 ML/HR: 3.68; 3.05; 22; 5.4; 6.46; 3.09; .314 INJECTION INTRAVENOUS at 07:01

## 2020-01-01 RX ADMIN — SODIUM CHLORIDE 40 MG: 9 INJECTION, SOLUTION INTRAMUSCULAR; INTRAVENOUS; SUBCUTANEOUS at 22:02

## 2020-01-01 RX ADMIN — Medication 10 ML: at 13:06

## 2020-01-01 RX ADMIN — CHLORHEXIDINE GLUCONATE 15 ML: 0.12 RINSE ORAL at 20:09

## 2020-01-01 RX ADMIN — SODIUM CHLORIDE 125 ML/HR: 450 INJECTION, SOLUTION INTRAVENOUS at 14:26

## 2020-01-01 RX ADMIN — CLINDAMYCIN IN 5 PERCENT DEXTROSE 900 MG: 18 INJECTION, SOLUTION INTRAVENOUS at 12:11

## 2020-01-01 RX ADMIN — SODIUM CHLORIDE 0.04 UNITS/MIN: 9 INJECTION, SOLUTION INTRAVENOUS at 00:54

## 2020-01-01 RX ADMIN — ALBUMIN (HUMAN) 25 G: 0.25 INJECTION, SOLUTION INTRAVENOUS at 16:21

## 2020-01-01 RX ADMIN — SODIUM CHLORIDE 0.04 UNITS/MIN: 9 INJECTION, SOLUTION INTRAVENOUS at 03:05

## 2020-01-01 RX ADMIN — CHLORHEXIDINE GLUCONATE 15 ML: 0.12 RINSE ORAL at 08:09

## 2020-01-01 RX ADMIN — PHENYLEPHRINE HYDROCHLORIDE 170 MCG/MIN: 10 INJECTION INTRAVENOUS at 23:10

## 2020-01-01 RX ADMIN — HYDROCORTISONE SODIUM SUCCINATE 25 MG: 100 INJECTION, POWDER, FOR SOLUTION INTRAMUSCULAR; INTRAVENOUS at 05:46

## 2020-01-01 RX ADMIN — HEPARIN SODIUM 12 UNITS/KG/HR: 10000 INJECTION, SOLUTION INTRAVENOUS at 06:58

## 2020-01-01 RX ADMIN — INSULIN LISPRO 2 UNITS: 100 INJECTION, SOLUTION INTRAVENOUS; SUBCUTANEOUS at 12:36

## 2020-01-01 RX ADMIN — CALCIUM CHLORIDE, MAGNESIUM CHLORIDE, DEXTROSE MONOHYDRATE, LACTIC ACID, SODIUM CHLORIDE, SODIUM BICARBONATE AND POTASSIUM CHLORIDE 2000 ML/HR: 5.15; 2.03; 22; 5.4; 6.46; 3.09; .157 INJECTION INTRAVENOUS at 00:28

## 2020-01-01 RX ADMIN — CEFEPIME 1 G: 1 INJECTION, POWDER, FOR SOLUTION INTRAMUSCULAR; INTRAVENOUS at 04:20

## 2020-01-01 RX ADMIN — Medication 10 ML: at 14:03

## 2020-01-01 RX ADMIN — CASTOR OIL AND BALSAM, PERU: 788; 87 OINTMENT TOPICAL at 21:36

## 2020-01-01 RX ADMIN — PROPOFOL 20 MCG/KG/MIN: 10 INJECTION, EMULSION INTRAVENOUS at 12:15

## 2020-01-01 RX ADMIN — CEFEPIME 1 G: 1 INJECTION, POWDER, FOR SOLUTION INTRAMUSCULAR; INTRAVENOUS at 20:55

## 2020-01-01 RX ADMIN — SODIUM CHLORIDE 0.04 UNITS/MIN: 9 INJECTION, SOLUTION INTRAVENOUS at 11:12

## 2020-01-01 RX ADMIN — INSULIN LISPRO 7 UNITS: 100 INJECTION, SOLUTION INTRAVENOUS; SUBCUTANEOUS at 00:24

## 2020-01-01 RX ADMIN — CLINDAMYCIN IN 5 PERCENT DEXTROSE 900 MG: 18 INJECTION, SOLUTION INTRAVENOUS at 11:10

## 2020-01-01 RX ADMIN — SODIUM BICARBONATE 150 MEQ: 84 INJECTION, SOLUTION INTRAVENOUS at 04:20

## 2020-01-01 RX ADMIN — SODIUM CHLORIDE 500 ML: 900 INJECTION, SOLUTION INTRAVENOUS at 20:20

## 2020-01-01 RX ADMIN — CALCIUM CHLORIDE, MAGNESIUM CHLORIDE, DEXTROSE MONOHYDRATE, LACTIC ACID, SODIUM CHLORIDE, SODIUM BICARBONATE AND POTASSIUM CHLORIDE 2000 ML/HR: 3.68; 3.05; 22; 5.4; 6.46; 3.09; .314 INJECTION INTRAVENOUS at 08:10

## 2020-01-01 RX ADMIN — CALCIUM CHLORIDE, MAGNESIUM CHLORIDE, DEXTROSE MONOHYDRATE, LACTIC ACID, SODIUM CHLORIDE, SODIUM BICARBONATE AND POTASSIUM CHLORIDE 2000 ML/HR: 3.68; 3.05; 22; 5.4; 6.46; 3.09; .314 INJECTION INTRAVENOUS at 13:03

## 2020-01-01 RX ADMIN — HYDROCORTISONE SODIUM SUCCINATE 25 MG: 100 INJECTION, POWDER, FOR SOLUTION INTRAMUSCULAR; INTRAVENOUS at 22:03

## 2020-01-01 RX ADMIN — INSULIN LISPRO 3 UNITS: 100 INJECTION, SOLUTION INTRAVENOUS; SUBCUTANEOUS at 11:32

## 2020-01-01 RX ADMIN — FAMOTIDINE 20 MG: 10 INJECTION INTRAVENOUS at 08:10

## 2020-01-01 RX ADMIN — INSULIN GLARGINE 25 UNITS: 100 INJECTION, SOLUTION SUBCUTANEOUS at 11:20

## 2020-01-01 RX ADMIN — CEFEPIME 1 G: 1 INJECTION, POWDER, FOR SOLUTION INTRAMUSCULAR; INTRAVENOUS at 12:27

## 2020-01-01 RX ADMIN — INSULIN LISPRO 5 UNITS: 100 INJECTION, SOLUTION INTRAVENOUS; SUBCUTANEOUS at 11:58

## 2020-01-01 RX ADMIN — CALCIUM CHLORIDE, MAGNESIUM CHLORIDE, DEXTROSE MONOHYDRATE, LACTIC ACID, SODIUM CHLORIDE, SODIUM BICARBONATE AND POTASSIUM CHLORIDE 2000 ML/HR: 3.68; 3.05; 22; 5.4; 6.46; 3.09; .314 INJECTION INTRAVENOUS at 08:05

## 2020-01-01 RX ADMIN — NOREPINEPHRINE BITARTRATE 200 MCG/MIN: 1 INJECTION, SOLUTION, CONCENTRATE INTRAVENOUS at 15:01

## 2020-01-01 RX ADMIN — INSULIN LISPRO 2 UNITS: 100 INJECTION, SOLUTION INTRAVENOUS; SUBCUTANEOUS at 18:00

## 2020-01-01 RX ADMIN — CALCIUM CHLORIDE, MAGNESIUM CHLORIDE, DEXTROSE MONOHYDRATE, LACTIC ACID, SODIUM CHLORIDE, SODIUM BICARBONATE AND POTASSIUM CHLORIDE 2000 ML/HR: 3.68; 3.05; 22; 5.4; 6.46; 3.09; .314 INJECTION INTRAVENOUS at 05:09

## 2020-01-01 RX ADMIN — Medication 10 ML: at 05:13

## 2020-01-01 RX ADMIN — ASCORBIC ACID 5 G: 500 INJECTION, SOLUTION INTRAMUSCULAR; INTRAVENOUS; SUBCUTANEOUS at 17:38

## 2020-01-01 RX ADMIN — Medication 1 AMPULE: at 20:28

## 2020-01-01 RX ADMIN — SODIUM BICARBONATE 50 MEQ: 84 INJECTION, SOLUTION INTRAVENOUS at 11:00

## 2020-01-01 RX ADMIN — ASCORBIC ACID 5 G: 500 INJECTION, SOLUTION INTRAMUSCULAR; INTRAVENOUS; SUBCUTANEOUS at 12:21

## 2020-01-01 RX ADMIN — MINERAL OIL AND WHITE PETROLATUM: 150; 830 OINTMENT OPHTHALMIC at 09:01

## 2020-01-01 RX ADMIN — EPINEPHRINE 1 MG: 0.1 INJECTION, SOLUTION ENDOTRACHEAL; INTRACARDIAC; INTRAVENOUS at 16:48

## 2020-01-01 RX ADMIN — HYDROCORTISONE SODIUM SUCCINATE 50 MG: 100 INJECTION, POWDER, FOR SOLUTION INTRAMUSCULAR; INTRAVENOUS at 18:20

## 2020-01-01 RX ADMIN — SODIUM CHLORIDE 0.04 UNITS/MIN: 9 INJECTION, SOLUTION INTRAVENOUS at 03:35

## 2020-01-01 RX ADMIN — ACETAMINOPHEN 650 MG: 650 SUPPOSITORY RECTAL at 14:49

## 2020-01-01 RX ADMIN — ASCORBIC ACID 5 G: 500 INJECTION, SOLUTION INTRAMUSCULAR; INTRAVENOUS; SUBCUTANEOUS at 00:50

## 2020-01-01 RX ADMIN — HEPARIN SODIUM 10 UNITS/KG/HR: 10000 INJECTION, SOLUTION INTRAVENOUS at 07:05

## 2020-01-01 RX ADMIN — FAMOTIDINE 20 MG: 10 INJECTION INTRAVENOUS at 08:07

## 2020-01-01 RX ADMIN — CALCIUM CHLORIDE, MAGNESIUM CHLORIDE, DEXTROSE MONOHYDRATE, LACTIC ACID, SODIUM CHLORIDE, SODIUM BICARBONATE AND POTASSIUM CHLORIDE 2000 ML/HR: 5.15; 2.03; 22; 5.4; 6.46; 3.09; .157 INJECTION INTRAVENOUS at 05:53

## 2020-01-01 RX ADMIN — DOBUTAMINE IN DEXTROSE 10 MCG/KG/MIN: 200 INJECTION, SOLUTION INTRAVENOUS at 17:57

## 2020-01-01 RX ADMIN — INSULIN LISPRO 2 UNITS: 100 INJECTION, SOLUTION INTRAVENOUS; SUBCUTANEOUS at 18:16

## 2020-01-01 RX ADMIN — NOREPINEPHRINE BITARTRATE 20 MCG/MIN: 1 INJECTION, SOLUTION, CONCENTRATE INTRAVENOUS at 01:00

## 2020-01-01 RX ADMIN — INSULIN GLARGINE 25 UNITS: 100 INJECTION, SOLUTION SUBCUTANEOUS at 08:06

## 2020-01-01 RX ADMIN — Medication 10 ML: at 06:44

## 2020-01-01 RX ADMIN — CEFEPIME 1 G: 1 INJECTION, POWDER, FOR SOLUTION INTRAMUSCULAR; INTRAVENOUS at 12:02

## 2020-01-01 RX ADMIN — NOREPINEPHRINE BITARTRATE 30 MCG/MIN: 1 INJECTION, SOLUTION, CONCENTRATE INTRAVENOUS at 18:57

## 2020-01-01 RX ADMIN — Medication 10 ML: at 00:19

## 2020-01-01 RX ADMIN — HYDROCORTISONE SODIUM SUCCINATE 25 MG: 100 INJECTION, POWDER, FOR SOLUTION INTRAMUSCULAR; INTRAVENOUS at 22:05

## 2020-01-01 RX ADMIN — INSULIN GLARGINE 40 UNITS: 100 INJECTION, SOLUTION SUBCUTANEOUS at 11:00

## 2020-01-01 RX ADMIN — NOREPINEPHRINE BITARTRATE 200 MCG/MIN: 1 INJECTION, SOLUTION, CONCENTRATE INTRAVENOUS at 22:55

## 2020-01-01 RX ADMIN — NOREPINEPHRINE BITARTRATE 35 MCG/MIN: 1 INJECTION, SOLUTION, CONCENTRATE INTRAVENOUS at 15:16

## 2020-01-01 RX ADMIN — CALCIUM CHLORIDE, MAGNESIUM CHLORIDE, DEXTROSE MONOHYDRATE, LACTIC ACID, SODIUM CHLORIDE, SODIUM BICARBONATE AND POTASSIUM CHLORIDE 2000 ML/HR: 5.15; 2.03; 22; 5.4; 6.46; 3.09; .157 INJECTION INTRAVENOUS at 13:22

## 2020-01-01 RX ADMIN — Medication 10 ML: at 21:37

## 2020-01-01 RX ADMIN — INSULIN LISPRO 2 UNITS: 100 INJECTION, SOLUTION INTRAVENOUS; SUBCUTANEOUS at 06:38

## 2020-01-01 RX ADMIN — ASPIRIN 81 MG 162 MG: 81 TABLET ORAL at 02:34

## 2020-01-01 RX ADMIN — INSULIN GLARGINE 40 UNITS: 100 INJECTION, SOLUTION SUBCUTANEOUS at 12:00

## 2020-01-01 RX ADMIN — CHLORHEXIDINE GLUCONATE 15 ML: 0.12 RINSE ORAL at 08:14

## 2020-01-01 RX ADMIN — EPINEPHRINE 10 MCG/MIN: 1 INJECTION INTRAMUSCULAR; INTRAVENOUS; SUBCUTANEOUS at 18:25

## 2020-01-01 RX ADMIN — LANSOPRAZOLE 30 MG: KIT at 06:59

## 2020-01-01 RX ADMIN — SODIUM CHLORIDE 0.04 UNITS/MIN: 9 INJECTION, SOLUTION INTRAVENOUS at 18:43

## 2020-01-01 RX ADMIN — Medication 40 ML: at 05:12

## 2020-01-01 RX ADMIN — POTASSIUM CHLORIDE 20 MEQ: 29.8 INJECTION, SOLUTION INTRAVENOUS at 07:00

## 2020-01-01 RX ADMIN — HYDROCORTISONE SODIUM SUCCINATE 100 MG: 100 INJECTION, POWDER, FOR SOLUTION INTRAMUSCULAR; INTRAVENOUS at 23:39

## 2020-01-01 RX ADMIN — SODIUM CHLORIDE 0.04 UNITS/MIN: 9 INJECTION, SOLUTION INTRAVENOUS at 08:41

## 2020-01-01 RX ADMIN — CALCIUM CHLORIDE, MAGNESIUM CHLORIDE, DEXTROSE MONOHYDRATE, LACTIC ACID, SODIUM CHLORIDE, SODIUM BICARBONATE AND POTASSIUM CHLORIDE 2000 ML/HR: 3.68; 3.05; 22; 5.4; 6.46; 3.09; .314 INJECTION INTRAVENOUS at 22:06

## 2020-01-01 RX ADMIN — CALCIUM CHLORIDE, MAGNESIUM CHLORIDE, DEXTROSE MONOHYDRATE, LACTIC ACID, SODIUM CHLORIDE, SODIUM BICARBONATE AND POTASSIUM CHLORIDE 2000 ML/HR: 3.68; 3.05; 22; 5.4; 6.46; 3.09; .314 INJECTION INTRAVENOUS at 00:31

## 2020-01-01 RX ADMIN — Medication 1 AMPULE: at 08:11

## 2020-01-01 RX ADMIN — CALCIUM CHLORIDE, MAGNESIUM CHLORIDE, DEXTROSE MONOHYDRATE, LACTIC ACID, SODIUM CHLORIDE, SODIUM BICARBONATE AND POTASSIUM CHLORIDE 2000 ML/HR: 5.15; 2.03; 22; 5.4; 6.46; 3.09; .157 INJECTION INTRAVENOUS at 00:09

## 2020-01-01 RX ADMIN — HEPARIN SODIUM 12 UNITS/KG/HR: 10000 INJECTION, SOLUTION INTRAVENOUS at 13:48

## 2020-01-01 RX ADMIN — SODIUM CHLORIDE 40 MG: 9 INJECTION, SOLUTION INTRAMUSCULAR; INTRAVENOUS; SUBCUTANEOUS at 20:47

## 2020-01-01 RX ADMIN — SODIUM CHLORIDE 0.04 UNITS/MIN: 9 INJECTION, SOLUTION INTRAVENOUS at 12:45

## 2020-01-01 RX ADMIN — HEPARIN SODIUM 8 UNITS/KG/HR: 10000 INJECTION, SOLUTION INTRAVENOUS at 17:47

## 2020-01-01 RX ADMIN — SODIUM BICARBONATE 150 MEQ/1,000 ML IN DEXTROSE 5 % INTRAVENOUS: SOLUTION at 14:26

## 2020-01-01 RX ADMIN — HYDROCORTISONE SODIUM SUCCINATE 50 MG: 100 INJECTION, POWDER, FOR SOLUTION INTRAMUSCULAR; INTRAVENOUS at 11:11

## 2020-01-01 RX ADMIN — INSULIN LISPRO 2 UNITS: 100 INJECTION, SOLUTION INTRAVENOUS; SUBCUTANEOUS at 12:23

## 2020-01-01 RX ADMIN — HYDROCORTISONE SODIUM SUCCINATE 25 MG: 100 INJECTION, POWDER, FOR SOLUTION INTRAMUSCULAR; INTRAVENOUS at 00:04

## 2020-01-01 RX ADMIN — CALCIUM CHLORIDE, MAGNESIUM CHLORIDE, DEXTROSE MONOHYDRATE, LACTIC ACID, SODIUM CHLORIDE, SODIUM BICARBONATE AND POTASSIUM CHLORIDE 2000 ML/HR: 3.68; 3.05; 22; 5.4; 6.46; 3.09; .314 INJECTION INTRAVENOUS at 06:35

## 2020-01-01 RX ADMIN — CALCIUM CHLORIDE, MAGNESIUM CHLORIDE, DEXTROSE MONOHYDRATE, LACTIC ACID, SODIUM CHLORIDE, SODIUM BICARBONATE AND POTASSIUM CHLORIDE 2000 ML/HR: 3.68; 3.05; 22; 5.4; 6.46; 3.09; .314 INJECTION INTRAVENOUS at 10:55

## 2020-01-01 RX ADMIN — INSULIN LISPRO 5 UNITS: 100 INJECTION, SOLUTION INTRAVENOUS; SUBCUTANEOUS at 08:45

## 2020-01-01 RX ADMIN — CALCIUM CHLORIDE, MAGNESIUM CHLORIDE, DEXTROSE MONOHYDRATE, LACTIC ACID, SODIUM CHLORIDE, SODIUM BICARBONATE AND POTASSIUM CHLORIDE 2000 ML/HR: 3.68; 3.05; 22; 5.4; 6.46; 3.09; .314 INJECTION INTRAVENOUS at 23:00

## 2020-01-01 RX ADMIN — CALCIUM CHLORIDE, MAGNESIUM CHLORIDE, DEXTROSE MONOHYDRATE, LACTIC ACID, SODIUM CHLORIDE, SODIUM BICARBONATE AND POTASSIUM CHLORIDE 2000 ML/HR: 3.68; 3.05; 22; 5.4; 6.46; 3.09; .314 INJECTION INTRAVENOUS at 12:34

## 2020-01-01 RX ADMIN — ASCORBIC ACID 5 G: 500 INJECTION, SOLUTION INTRAMUSCULAR; INTRAVENOUS; SUBCUTANEOUS at 00:23

## 2020-01-01 RX ADMIN — NOREPINEPHRINE BITARTRATE 60 MCG/MIN: 1 INJECTION, SOLUTION, CONCENTRATE INTRAVENOUS at 03:59

## 2020-01-01 RX ADMIN — CALCIUM CHLORIDE, MAGNESIUM CHLORIDE, DEXTROSE MONOHYDRATE, LACTIC ACID, SODIUM CHLORIDE, SODIUM BICARBONATE AND POTASSIUM CHLORIDE 2000 ML/HR: 5.15; 2.03; 22; 5.4; 6.46; 3.09; .157 INJECTION INTRAVENOUS at 15:54

## 2020-01-01 RX ADMIN — LANSOPRAZOLE 30 MG: KIT at 05:55

## 2020-01-01 RX ADMIN — Medication 1 AMPULE: at 08:09

## 2020-01-01 RX ADMIN — VASOPRESSIN 0.04 UNITS/MIN: 20 INJECTION INTRAVENOUS at 00:17

## 2020-01-01 RX ADMIN — SODIUM CHLORIDE 40 MG: 9 INJECTION, SOLUTION INTRAMUSCULAR; INTRAVENOUS; SUBCUTANEOUS at 10:24

## 2020-01-01 RX ADMIN — PROPOFOL 15 MCG/KG/MIN: 10 INJECTION, EMULSION INTRAVENOUS at 22:32

## 2020-01-01 RX ADMIN — NOREPINEPHRINE BITARTRATE 200 MCG/MIN: 1 INJECTION, SOLUTION, CONCENTRATE INTRAVENOUS at 15:22

## 2020-01-01 RX ADMIN — NOREPINEPHRINE BITARTRATE 27 MCG/MIN: 1 INJECTION, SOLUTION, CONCENTRATE INTRAVENOUS at 20:00

## 2020-01-01 RX ADMIN — NOREPINEPHRINE BITARTRATE 110 MCG/MIN: 1 INJECTION, SOLUTION, CONCENTRATE INTRAVENOUS at 09:16

## 2020-01-01 RX ADMIN — CALCIUM CHLORIDE, MAGNESIUM CHLORIDE, DEXTROSE MONOHYDRATE, LACTIC ACID, SODIUM CHLORIDE, SODIUM BICARBONATE AND POTASSIUM CHLORIDE 2000 ML/HR: 5.15; 2.03; 22; 5.4; 6.46; 3.09; .157 INJECTION INTRAVENOUS at 09:59

## 2020-01-01 RX ADMIN — INSULIN LISPRO 194 UNITS: 100 INJECTION, SOLUTION INTRAVENOUS; SUBCUTANEOUS at 23:39

## 2020-01-01 RX ADMIN — HUMAN INSULIN 10 UNITS: 100 INJECTION, SOLUTION SUBCUTANEOUS at 12:10

## 2020-01-01 RX ADMIN — VANCOMYCIN HYDROCHLORIDE 1000 MG: 1 INJECTION, POWDER, LYOPHILIZED, FOR SOLUTION INTRAVENOUS at 18:25

## 2020-01-01 RX ADMIN — NOREPINEPHRINE BITARTRATE 200 MCG/MIN: 1 INJECTION, SOLUTION, CONCENTRATE INTRAVENOUS at 20:28

## 2020-01-01 RX ADMIN — SODIUM CHLORIDE, SODIUM LACTATE, POTASSIUM CHLORIDE, AND CALCIUM CHLORIDE 1000 ML: 600; 310; 30; 20 INJECTION, SOLUTION INTRAVENOUS at 16:08

## 2020-01-01 RX ADMIN — Medication 1 AMPULE: at 09:37

## 2020-01-01 RX ADMIN — Medication 30 ML: at 06:59

## 2020-01-01 RX ADMIN — CHLORHEXIDINE GLUCONATE 15 ML: 0.12 RINSE ORAL at 21:13

## 2020-01-01 RX ADMIN — HEPARIN SODIUM 10 UNITS/KG/HR: 10000 INJECTION, SOLUTION INTRAVENOUS at 21:22

## 2020-01-01 RX ADMIN — Medication 1 AMPULE: at 09:28

## 2020-01-01 RX ADMIN — VANCOMYCIN HYDROCHLORIDE 1500 MG: 10 INJECTION, POWDER, LYOPHILIZED, FOR SOLUTION INTRAVENOUS at 12:58

## 2020-01-01 RX ADMIN — HYDROCORTISONE SODIUM SUCCINATE 50 MG: 100 INJECTION, POWDER, FOR SOLUTION INTRAMUSCULAR; INTRAVENOUS at 17:24

## 2020-01-01 RX ADMIN — Medication 10 ML: at 21:00

## 2020-01-01 RX ADMIN — Medication 1 AMPULE: at 08:18

## 2020-01-01 RX ADMIN — SODIUM CHLORIDE 0.04 UNITS/MIN: 9 INJECTION, SOLUTION INTRAVENOUS at 09:44

## 2020-01-01 RX ADMIN — INSULIN LISPRO 3 UNITS: 100 INJECTION, SOLUTION INTRAVENOUS; SUBCUTANEOUS at 00:15

## 2020-01-01 RX ADMIN — INSULIN LISPRO 2 UNITS: 100 INJECTION, SOLUTION INTRAVENOUS; SUBCUTANEOUS at 05:57

## 2020-01-01 RX ADMIN — CASTOR OIL AND BALSAM, PERU: 788; 87 OINTMENT TOPICAL at 21:20

## 2020-01-01 RX ADMIN — Medication 1 AMPULE: at 21:23

## 2020-01-01 RX ADMIN — CALCIUM CHLORIDE, MAGNESIUM CHLORIDE, DEXTROSE MONOHYDRATE, LACTIC ACID, SODIUM CHLORIDE, SODIUM BICARBONATE AND POTASSIUM CHLORIDE 2000 ML/HR: 3.68; 3.05; 22; 5.4; 6.46; 3.09; .314 INJECTION INTRAVENOUS at 11:08

## 2020-01-01 RX ADMIN — SODIUM CHLORIDE 125 ML/HR: 450 INJECTION, SOLUTION INTRAVENOUS at 04:37

## 2020-01-01 RX ADMIN — ONDANSETRON HYDROCHLORIDE 4 MG: 2 INJECTION, SOLUTION INTRAMUSCULAR; INTRAVENOUS at 01:37

## 2020-01-01 RX ADMIN — Medication 1 AMPULE: at 08:23

## 2020-01-01 RX ADMIN — SODIUM CHLORIDE 0.04 UNITS/MIN: 9 INJECTION, SOLUTION INTRAVENOUS at 15:55

## 2020-01-01 RX ADMIN — PROPOFOL 20 MCG/KG/MIN: 10 INJECTION, EMULSION INTRAVENOUS at 12:03

## 2020-01-01 RX ADMIN — HYDROCORTISONE SODIUM SUCCINATE 25 MG: 100 INJECTION, POWDER, FOR SOLUTION INTRAMUSCULAR; INTRAVENOUS at 13:32

## 2020-01-01 RX ADMIN — CALCIUM CHLORIDE, MAGNESIUM CHLORIDE, DEXTROSE MONOHYDRATE, LACTIC ACID, SODIUM CHLORIDE, SODIUM BICARBONATE AND POTASSIUM CHLORIDE 2000 ML/HR: 3.68; 3.05; 22; 5.4; 6.46; 3.09; .314 INJECTION INTRAVENOUS at 20:45

## 2020-01-01 RX ADMIN — CALCIUM CHLORIDE, MAGNESIUM CHLORIDE, DEXTROSE MONOHYDRATE, LACTIC ACID, SODIUM CHLORIDE, SODIUM BICARBONATE AND POTASSIUM CHLORIDE 2000 ML/HR: 3.68; 3.05; 22; 5.4; 6.46; 3.09; .314 INJECTION INTRAVENOUS at 12:25

## 2020-01-01 RX ADMIN — CALCIUM CHLORIDE, MAGNESIUM CHLORIDE, DEXTROSE MONOHYDRATE, LACTIC ACID, SODIUM CHLORIDE, SODIUM BICARBONATE AND POTASSIUM CHLORIDE 2000 ML/HR: 3.68; 3.05; 22; 5.4; 6.46; 3.09; .314 INJECTION INTRAVENOUS at 19:07

## 2020-01-01 RX ADMIN — CALCIUM CHLORIDE, MAGNESIUM CHLORIDE, DEXTROSE MONOHYDRATE, LACTIC ACID, SODIUM CHLORIDE, SODIUM BICARBONATE AND POTASSIUM CHLORIDE 2000 ML/HR: 3.68; 3.05; 22; 5.4; 6.46; 3.09; .314 INJECTION INTRAVENOUS at 16:26

## 2020-01-01 RX ADMIN — VANCOMYCIN HYDROCHLORIDE 1500 MG: 10 INJECTION, POWDER, LYOPHILIZED, FOR SOLUTION INTRAVENOUS at 15:04

## 2020-01-01 RX ADMIN — NOREPINEPHRINE BITARTRATE 25 MCG/MIN: 1 INJECTION, SOLUTION, CONCENTRATE INTRAVENOUS at 00:12

## 2020-01-01 RX ADMIN — ACETAMINOPHEN ORAL SOLUTION 650 MG: 650 SOLUTION ORAL at 21:32

## 2020-01-01 RX ADMIN — SODIUM CHLORIDE 1000 ML: 900 INJECTION, SOLUTION INTRAVENOUS at 22:13

## 2020-01-01 RX ADMIN — CLINDAMYCIN IN 5 PERCENT DEXTROSE 900 MG: 18 INJECTION, SOLUTION INTRAVENOUS at 03:02

## 2020-01-01 RX ADMIN — HYDROCORTISONE SODIUM SUCCINATE 50 MG: 100 INJECTION, POWDER, FOR SOLUTION INTRAMUSCULAR; INTRAVENOUS at 11:24

## 2020-01-01 RX ADMIN — Medication 40 ML: at 13:37

## 2020-01-01 RX ADMIN — CALCIUM CHLORIDE, MAGNESIUM CHLORIDE, DEXTROSE MONOHYDRATE, LACTIC ACID, SODIUM CHLORIDE, SODIUM BICARBONATE AND POTASSIUM CHLORIDE 2000 ML/HR: 3.68; 3.05; 22; 5.4; 6.46; 3.09; .314 INJECTION INTRAVENOUS at 12:01

## 2020-01-01 RX ADMIN — NOREPINEPHRINE BITARTRATE 100 MCG/MIN: 1 INJECTION, SOLUTION, CONCENTRATE INTRAVENOUS at 05:53

## 2020-01-01 RX ADMIN — Medication 1 AMPULE: at 08:13

## 2020-01-01 RX ADMIN — SODIUM CHLORIDE 0.04 UNITS/MIN: 9 INJECTION, SOLUTION INTRAVENOUS at 06:35

## 2020-01-01 RX ADMIN — DOBUTAMINE IN DEXTROSE 10 MCG/KG/MIN: 200 INJECTION, SOLUTION INTRAVENOUS at 02:00

## 2020-01-01 RX ADMIN — VASOPRESSIN 0.04 UNITS/MIN: 20 INJECTION INTRAVENOUS at 15:01

## 2020-01-01 RX ADMIN — INSULIN GLARGINE 25 UNITS: 100 INJECTION, SOLUTION SUBCUTANEOUS at 10:05

## 2020-01-01 RX ADMIN — HYDROCORTISONE SODIUM SUCCINATE 100 MG: 100 INJECTION, POWDER, FOR SOLUTION INTRAMUSCULAR; INTRAVENOUS at 11:46

## 2020-01-01 RX ADMIN — CALCIUM CHLORIDE, MAGNESIUM CHLORIDE, DEXTROSE MONOHYDRATE, LACTIC ACID, SODIUM CHLORIDE, SODIUM BICARBONATE AND POTASSIUM CHLORIDE 2000 ML/HR: 3.68; 3.05; 22; 5.4; 6.46; 3.09; .314 INJECTION INTRAVENOUS at 11:03

## 2020-01-01 RX ADMIN — HYDROCORTISONE SODIUM SUCCINATE 100 MG: 100 INJECTION, POWDER, FOR SOLUTION INTRAMUSCULAR; INTRAVENOUS at 11:33

## 2020-01-01 RX ADMIN — CASTOR OIL AND BALSAM, PERU: 788; 87 OINTMENT TOPICAL at 21:03

## 2020-01-01 RX ADMIN — INSULIN LISPRO 10 UNITS: 100 INJECTION, SOLUTION INTRAVENOUS; SUBCUTANEOUS at 01:15

## 2020-01-01 RX ADMIN — NOREPINEPHRINE BITARTRATE 95 MCG/MIN: 1 INJECTION, SOLUTION, CONCENTRATE INTRAVENOUS at 20:58

## 2020-01-01 RX ADMIN — MIDODRINE HYDROCHLORIDE 10 MG: 5 TABLET ORAL at 00:25

## 2020-01-01 RX ADMIN — CALCIUM CHLORIDE, MAGNESIUM CHLORIDE, DEXTROSE MONOHYDRATE, LACTIC ACID, SODIUM CHLORIDE, SODIUM BICARBONATE AND POTASSIUM CHLORIDE 2000 ML/HR: 3.68; 3.05; 22; 5.4; 6.46; 3.09; .314 INJECTION INTRAVENOUS at 08:08

## 2020-01-01 RX ADMIN — CALCIUM CHLORIDE, MAGNESIUM CHLORIDE, DEXTROSE MONOHYDRATE, LACTIC ACID, SODIUM CHLORIDE, SODIUM BICARBONATE AND POTASSIUM CHLORIDE 2000 ML/HR: 3.68; 3.05; 22; 5.4; 6.46; 3.09; .314 INJECTION INTRAVENOUS at 20:30

## 2020-01-01 RX ADMIN — Medication 1 AMPULE: at 21:10

## 2020-01-01 RX ADMIN — SODIUM BICARBONATE 100 MEQ: 84 INJECTION, SOLUTION INTRAVENOUS at 20:57

## 2020-01-01 RX ADMIN — Medication 30 ML: at 21:14

## 2020-01-01 RX ADMIN — CALCIUM CHLORIDE, MAGNESIUM CHLORIDE, DEXTROSE MONOHYDRATE, LACTIC ACID, SODIUM CHLORIDE, SODIUM BICARBONATE AND POTASSIUM CHLORIDE 2000 ML/HR: 5.15; 2.03; 22; 5.4; 6.46; 3.09; .157 INJECTION INTRAVENOUS at 19:35

## 2020-01-01 RX ADMIN — PROPOFOL 20 MCG/KG/MIN: 10 INJECTION, EMULSION INTRAVENOUS at 18:53

## 2020-01-01 RX ADMIN — DEXTROSE MONOHYDRATE AND SODIUM CHLORIDE 100 ML/HR: 5; .9 INJECTION, SOLUTION INTRAVENOUS at 19:22

## 2020-01-01 RX ADMIN — CHLORHEXIDINE GLUCONATE 15 ML: 0.12 RINSE ORAL at 08:28

## 2020-01-01 RX ADMIN — AZITHROMYCIN MONOHYDRATE 500 MG: 500 INJECTION, POWDER, LYOPHILIZED, FOR SOLUTION INTRAVENOUS at 02:00

## 2020-01-01 RX ADMIN — DOBUTAMINE IN DEXTROSE 10 MCG/KG/MIN: 200 INJECTION, SOLUTION INTRAVENOUS at 08:48

## 2020-01-01 RX ADMIN — Medication 10 ML: at 05:27

## 2020-01-01 RX ADMIN — Medication 1 AMPULE: at 21:27

## 2020-01-01 RX ADMIN — HYDROCORTISONE SODIUM SUCCINATE 25 MG: 100 INJECTION, POWDER, FOR SOLUTION INTRAMUSCULAR; INTRAVENOUS at 17:37

## 2020-01-01 RX ADMIN — NOREPINEPHRINE BITARTRATE 200 MCG/MIN: 1 INJECTION, SOLUTION, CONCENTRATE INTRAVENOUS at 11:00

## 2020-01-01 RX ADMIN — Medication 40 ML: at 14:07

## 2020-01-01 RX ADMIN — CEFEPIME 1 G: 1 INJECTION, POWDER, FOR SOLUTION INTRAMUSCULAR; INTRAVENOUS at 05:45

## 2020-01-01 RX ADMIN — Medication 10 ML: at 21:32

## 2020-01-01 RX ADMIN — SODIUM CHLORIDE 0.04 UNITS/MIN: 9 INJECTION, SOLUTION INTRAVENOUS at 03:45

## 2020-01-01 RX ADMIN — Medication 1 AMPULE: at 21:32

## 2020-01-01 RX ADMIN — SODIUM BICARBONATE 100 MEQ: 84 INJECTION INTRAVENOUS at 12:52

## 2020-01-01 RX ADMIN — INSULIN GLARGINE 25 UNITS: 100 INJECTION, SOLUTION SUBCUTANEOUS at 12:20

## 2020-01-01 RX ADMIN — INSULIN LISPRO 2 UNITS: 100 INJECTION, SOLUTION INTRAVENOUS; SUBCUTANEOUS at 06:00

## 2020-01-01 RX ADMIN — NOREPINEPHRINE BITARTRATE 100 MCG/MIN: 1 INJECTION, SOLUTION, CONCENTRATE INTRAVENOUS at 07:52

## 2020-01-01 RX ADMIN — FAMOTIDINE 20 MG: 10 INJECTION INTRAVENOUS at 09:12

## 2020-01-01 RX ADMIN — NOREPINEPHRINE BITARTRATE 70 MCG/MIN: 1 INJECTION, SOLUTION, CONCENTRATE INTRAVENOUS at 03:50

## 2020-01-01 RX ADMIN — CALCIUM CHLORIDE, MAGNESIUM CHLORIDE, DEXTROSE MONOHYDRATE, LACTIC ACID, SODIUM CHLORIDE, SODIUM BICARBONATE AND POTASSIUM CHLORIDE 2000 ML/HR: 5.15; 2.03; 22; 5.4; 6.46; 3.09; .157 INJECTION INTRAVENOUS at 09:20

## 2020-01-01 RX ADMIN — CEFEPIME 1 G: 1 INJECTION, POWDER, FOR SOLUTION INTRAMUSCULAR; INTRAVENOUS at 21:21

## 2020-01-01 RX ADMIN — SODIUM CHLORIDE 100 ML/HR: 900 INJECTION, SOLUTION INTRAVENOUS at 11:32

## 2020-01-01 RX ADMIN — PHENYLEPHRINE HYDROCHLORIDE 300 MCG/MIN: 10 INJECTION INTRAVENOUS at 05:11

## 2020-01-01 RX ADMIN — INSULIN LISPRO 3 UNITS: 100 INJECTION, SOLUTION INTRAVENOUS; SUBCUTANEOUS at 12:18

## 2020-01-01 RX ADMIN — PROPOFOL 20 MCG/KG/MIN: 10 INJECTION, EMULSION INTRAVENOUS at 06:24

## 2020-01-01 RX ADMIN — CEFEPIME 1 G: 1 INJECTION, POWDER, FOR SOLUTION INTRAMUSCULAR; INTRAVENOUS at 20:10

## 2020-01-01 RX ADMIN — CALCIUM CHLORIDE, MAGNESIUM CHLORIDE, DEXTROSE MONOHYDRATE, LACTIC ACID, SODIUM CHLORIDE, SODIUM BICARBONATE AND POTASSIUM CHLORIDE 2000 ML/HR: 3.68; 3.05; 22; 5.4; 6.46; 3.09; .314 INJECTION INTRAVENOUS at 17:20

## 2020-01-01 RX ADMIN — INSULIN LISPRO 3 UNITS: 100 INJECTION, SOLUTION INTRAVENOUS; SUBCUTANEOUS at 17:29

## 2020-01-01 RX ADMIN — SODIUM CHLORIDE 40 MG: 9 INJECTION, SOLUTION INTRAMUSCULAR; INTRAVENOUS; SUBCUTANEOUS at 08:04

## 2020-01-01 RX ADMIN — HYDROCORTISONE SODIUM SUCCINATE 50 MG: 100 INJECTION, POWDER, FOR SOLUTION INTRAMUSCULAR; INTRAVENOUS at 05:06

## 2020-01-01 RX ADMIN — ALBUMIN (HUMAN) 12.5 G: 0.25 INJECTION, SOLUTION INTRAVENOUS at 05:01

## 2020-01-01 RX ADMIN — CASTOR OIL AND BALSAM, PERU: 788; 87 OINTMENT TOPICAL at 16:21

## 2020-01-01 RX ADMIN — VANCOMYCIN HYDROCHLORIDE 2000 MG: 10 INJECTION, POWDER, LYOPHILIZED, FOR SOLUTION INTRAVENOUS at 12:08

## 2020-01-01 RX ADMIN — NOREPINEPHRINE BITARTRATE 200 MCG/MIN: 1 INJECTION, SOLUTION, CONCENTRATE INTRAVENOUS at 17:37

## 2020-01-01 RX ADMIN — INSULIN LISPRO 5 UNITS: 100 INJECTION, SOLUTION INTRAVENOUS; SUBCUTANEOUS at 05:36

## 2020-01-01 RX ADMIN — CALCIUM CHLORIDE, MAGNESIUM CHLORIDE, DEXTROSE MONOHYDRATE, LACTIC ACID, SODIUM CHLORIDE, SODIUM BICARBONATE AND POTASSIUM CHLORIDE 2000 ML/HR: 5.15; 2.03; 22; 5.4; 6.46; 3.09; .157 INJECTION INTRAVENOUS at 23:23

## 2020-01-01 RX ADMIN — VANCOMYCIN HYDROCHLORIDE 1250 MG: 10 INJECTION, POWDER, LYOPHILIZED, FOR SOLUTION INTRAVENOUS at 16:29

## 2020-01-01 RX ADMIN — CALCIUM CHLORIDE, MAGNESIUM CHLORIDE, DEXTROSE MONOHYDRATE, LACTIC ACID, SODIUM CHLORIDE, SODIUM BICARBONATE AND POTASSIUM CHLORIDE 2000 ML/HR: 3.68; 3.05; 22; 5.4; 6.46; 3.09; .314 INJECTION INTRAVENOUS at 16:55

## 2020-01-01 RX ADMIN — DOBUTAMINE IN DEXTROSE 5 MCG/KG/MIN: 200 INJECTION, SOLUTION INTRAVENOUS at 18:23

## 2020-01-01 RX ADMIN — Medication 10 ML: at 05:16

## 2020-01-01 RX ADMIN — Medication 30 ML: at 06:00

## 2020-01-01 RX ADMIN — DOBUTAMINE IN DEXTROSE 10 MCG/KG/MIN: 200 INJECTION, SOLUTION INTRAVENOUS at 05:51

## 2020-01-01 RX ADMIN — HYDROCORTISONE SODIUM SUCCINATE 50 MG: 100 INJECTION, POWDER, FOR SOLUTION INTRAMUSCULAR; INTRAVENOUS at 15:36

## 2020-01-01 RX ADMIN — CALCIUM CHLORIDE, MAGNESIUM CHLORIDE, DEXTROSE MONOHYDRATE, LACTIC ACID, SODIUM CHLORIDE, SODIUM BICARBONATE AND POTASSIUM CHLORIDE 2000 ML/HR: 3.68; 3.05; 22; 5.4; 6.46; 3.09; .314 INJECTION INTRAVENOUS at 16:58

## 2020-01-01 RX ADMIN — CALCIUM CHLORIDE, MAGNESIUM CHLORIDE, DEXTROSE MONOHYDRATE, LACTIC ACID, SODIUM CHLORIDE, SODIUM BICARBONATE AND POTASSIUM CHLORIDE 2000 ML/HR: 3.68; 3.05; 22; 5.4; 6.46; 3.09; .314 INJECTION INTRAVENOUS at 19:57

## 2020-01-01 RX ADMIN — HEPARIN SODIUM 9 UNITS/KG/HR: 10000 INJECTION, SOLUTION INTRAVENOUS at 15:46

## 2020-01-01 RX ADMIN — NOREPINEPHRINE BITARTRATE 200 MCG/MIN: 1 INJECTION, SOLUTION, CONCENTRATE INTRAVENOUS at 02:07

## 2020-01-01 RX ADMIN — INSULIN LISPRO 3 UNITS: 100 INJECTION, SOLUTION INTRAVENOUS; SUBCUTANEOUS at 13:04

## 2020-01-01 RX ADMIN — MINERAL OIL AND WHITE PETROLATUM: 150; 830 OINTMENT OPHTHALMIC at 20:28

## 2020-01-01 RX ADMIN — NOREPINEPHRINE BITARTRATE 130 MCG/MIN: 1 INJECTION, SOLUTION, CONCENTRATE INTRAVENOUS at 09:38

## 2020-01-01 RX ADMIN — HYDROCORTISONE SODIUM SUCCINATE 50 MG: 100 INJECTION, POWDER, FOR SOLUTION INTRAMUSCULAR; INTRAVENOUS at 23:05

## 2020-01-01 RX ADMIN — NOREPINEPHRINE BITARTRATE 60 MCG/MIN: 1 INJECTION, SOLUTION, CONCENTRATE INTRAVENOUS at 16:06

## 2020-01-01 RX ADMIN — Medication 10 ML: at 13:19

## 2020-01-01 RX ADMIN — CALCIUM CHLORIDE, MAGNESIUM CHLORIDE, DEXTROSE MONOHYDRATE, LACTIC ACID, SODIUM CHLORIDE, SODIUM BICARBONATE AND POTASSIUM CHLORIDE 2000 ML/HR: 3.68; 3.05; 22; 5.4; 6.46; 3.09; .314 INJECTION INTRAVENOUS at 21:48

## 2020-01-01 RX ADMIN — CLINDAMYCIN IN 5 PERCENT DEXTROSE 900 MG: 18 INJECTION, SOLUTION INTRAVENOUS at 11:30

## 2020-01-01 RX ADMIN — CALCIUM CHLORIDE, MAGNESIUM CHLORIDE, DEXTROSE MONOHYDRATE, LACTIC ACID, SODIUM CHLORIDE, SODIUM BICARBONATE AND POTASSIUM CHLORIDE 2000 ML/HR: 3.68; 3.05; 22; 5.4; 6.46; 3.09; .314 INJECTION INTRAVENOUS at 03:31

## 2020-01-01 RX ADMIN — SODIUM CHLORIDE 0.04 UNITS/MIN: 9 INJECTION, SOLUTION INTRAVENOUS at 21:50

## 2020-01-01 RX ADMIN — Medication 40 ML: at 13:04

## 2020-01-01 RX ADMIN — CLINDAMYCIN IN 5 PERCENT DEXTROSE 900 MG: 18 INJECTION, SOLUTION INTRAVENOUS at 18:56

## 2020-01-01 RX ADMIN — SODIUM CHLORIDE 500 ML: 900 INJECTION, SOLUTION INTRAVENOUS at 11:45

## 2020-01-01 RX ADMIN — SODIUM CHLORIDE 500 ML: 900 INJECTION, SOLUTION INTRAVENOUS at 10:25

## 2020-01-01 RX ADMIN — Medication 10 ML: at 05:06

## 2020-01-01 RX ADMIN — ZINC SULFATE 220 MG (50 MG) CAPSULE 1 CAPSULE: CAPSULE at 09:14

## 2020-01-01 RX ADMIN — CASTOR OIL AND BALSAM, PERU: 788; 87 OINTMENT TOPICAL at 16:59

## 2020-01-01 RX ADMIN — Medication 1 AMPULE: at 08:44

## 2020-01-01 RX ADMIN — Medication 10 ML: at 22:13

## 2020-01-01 RX ADMIN — CLINDAMYCIN IN 5 PERCENT DEXTROSE 900 MG: 18 INJECTION, SOLUTION INTRAVENOUS at 03:00

## 2020-01-01 RX ADMIN — HYDROCORTISONE SODIUM SUCCINATE 100 MG: 100 INJECTION, POWDER, FOR SOLUTION INTRAMUSCULAR; INTRAVENOUS at 00:25

## 2020-01-01 RX ADMIN — CALCIUM CHLORIDE, MAGNESIUM CHLORIDE, DEXTROSE MONOHYDRATE, LACTIC ACID, SODIUM CHLORIDE, SODIUM BICARBONATE AND POTASSIUM CHLORIDE 2000 ML/HR: 3.68; 3.05; 22; 5.4; 6.46; 3.09; .314 INJECTION INTRAVENOUS at 03:27

## 2020-01-01 RX ADMIN — CALCIUM CHLORIDE, MAGNESIUM CHLORIDE, DEXTROSE MONOHYDRATE, LACTIC ACID, SODIUM CHLORIDE, SODIUM BICARBONATE AND POTASSIUM CHLORIDE 2000 ML/HR: 5.15; 2.03; 22; 5.4; 6.46; 3.09; .157 INJECTION INTRAVENOUS at 23:29

## 2020-01-01 RX ADMIN — Medication 1 AMPULE: at 20:09

## 2020-01-01 RX ADMIN — INSULIN LISPRO 5 UNITS: 100 INJECTION, SOLUTION INTRAVENOUS; SUBCUTANEOUS at 11:26

## 2020-01-01 RX ADMIN — CALCIUM CHLORIDE, MAGNESIUM CHLORIDE, DEXTROSE MONOHYDRATE, LACTIC ACID, SODIUM CHLORIDE, SODIUM BICARBONATE AND POTASSIUM CHLORIDE 2000 ML/HR: 5.15; 2.03; 22; 5.4; 6.46; 3.09; .157 INJECTION INTRAVENOUS at 19:12

## 2020-01-01 RX ADMIN — NOREPINEPHRINE BITARTRATE 200 MCG/MIN: 1 INJECTION, SOLUTION, CONCENTRATE INTRAVENOUS at 04:16

## 2020-01-01 RX ADMIN — SODIUM CHLORIDE 0.04 UNITS/MIN: 9 INJECTION, SOLUTION INTRAVENOUS at 20:02

## 2020-01-01 RX ADMIN — CEFEPIME 1 G: 1 INJECTION, POWDER, FOR SOLUTION INTRAMUSCULAR; INTRAVENOUS at 21:04

## 2020-01-01 RX ADMIN — Medication 40 ML: at 13:16

## 2020-01-01 RX ADMIN — INSULIN LISPRO 2 UNITS: 100 INJECTION, SOLUTION INTRAVENOUS; SUBCUTANEOUS at 23:26

## 2020-01-01 RX ADMIN — PROPOFOL 20 MCG/KG/MIN: 10 INJECTION, EMULSION INTRAVENOUS at 13:07

## 2020-01-01 RX ADMIN — Medication 40 ML: at 12:03

## 2020-01-01 RX ADMIN — CALCIUM CHLORIDE, MAGNESIUM CHLORIDE, DEXTROSE MONOHYDRATE, LACTIC ACID, SODIUM CHLORIDE, SODIUM BICARBONATE AND POTASSIUM CHLORIDE 2000 ML/HR: 3.68; 3.05; 22; 5.4; 6.46; 3.09; .314 INJECTION INTRAVENOUS at 23:20

## 2020-01-01 RX ADMIN — CEFEPIME 1 G: 1 INJECTION, POWDER, FOR SOLUTION INTRAMUSCULAR; INTRAVENOUS at 04:12

## 2020-01-01 RX ADMIN — SODIUM CHLORIDE 0.04 UNITS/MIN: 9 INJECTION, SOLUTION INTRAVENOUS at 10:14

## 2020-01-01 RX ADMIN — CALCIUM CHLORIDE, MAGNESIUM CHLORIDE, DEXTROSE MONOHYDRATE, LACTIC ACID, SODIUM CHLORIDE, SODIUM BICARBONATE AND POTASSIUM CHLORIDE 2000 ML/HR: 3.68; 3.05; 22; 5.4; 6.46; 3.09; .314 INJECTION INTRAVENOUS at 21:47

## 2020-01-01 RX ADMIN — Medication 1 AMPULE: at 20:03

## 2020-01-01 RX ADMIN — MIDODRINE HYDROCHLORIDE 10 MG: 5 TABLET ORAL at 18:26

## 2020-01-01 RX ADMIN — EPINEPHRINE 10 MCG/MIN: 1 INJECTION INTRAMUSCULAR; INTRAVENOUS; SUBCUTANEOUS at 02:08

## 2020-01-01 RX ADMIN — NOREPINEPHRINE BITARTRATE 200 MCG/MIN: 1 INJECTION, SOLUTION, CONCENTRATE INTRAVENOUS at 13:31

## 2020-01-01 RX ADMIN — ENOXAPARIN SODIUM 100 MG: 100 INJECTION SUBCUTANEOUS at 12:29

## 2020-01-01 RX ADMIN — Medication 10 ML: at 21:02

## 2020-01-01 RX ADMIN — HYDROCORTISONE SODIUM SUCCINATE 50 MG: 100 INJECTION, POWDER, FOR SOLUTION INTRAMUSCULAR; INTRAVENOUS at 00:19

## 2020-01-01 RX ADMIN — CHLORHEXIDINE GLUCONATE 15 ML: 0.12 RINSE ORAL at 21:07

## 2020-01-01 RX ADMIN — Medication 1 AMPULE: at 03:43

## 2020-01-01 RX ADMIN — CALCIUM CHLORIDE, MAGNESIUM CHLORIDE, DEXTROSE MONOHYDRATE, LACTIC ACID, SODIUM CHLORIDE, SODIUM BICARBONATE AND POTASSIUM CHLORIDE 2000 ML/HR: 3.68; 3.05; 22; 5.4; 6.46; 3.09; .314 INJECTION INTRAVENOUS at 02:57

## 2020-01-01 RX ADMIN — MINERAL OIL AND WHITE PETROLATUM: 150; 830 OINTMENT OPHTHALMIC at 08:34

## 2020-01-01 RX ADMIN — SODIUM CHLORIDE 5 MCG/MIN: 9 INJECTION, SOLUTION INTRAVENOUS at 01:39

## 2020-01-01 RX ADMIN — CALCIUM CHLORIDE, MAGNESIUM CHLORIDE, DEXTROSE MONOHYDRATE, LACTIC ACID, SODIUM CHLORIDE, SODIUM BICARBONATE AND POTASSIUM CHLORIDE 2000 ML/HR: 3.68; 3.05; 22; 5.4; 6.46; 3.09; .314 INJECTION INTRAVENOUS at 11:24

## 2020-01-01 RX ADMIN — FAMOTIDINE 20 MG: 10 INJECTION INTRAVENOUS at 08:32

## 2020-01-01 RX ADMIN — Medication 1 AMPULE: at 08:08

## 2020-01-01 RX ADMIN — HYDROCORTISONE SODIUM SUCCINATE 100 MG: 100 INJECTION, POWDER, FOR SOLUTION INTRAMUSCULAR; INTRAVENOUS at 17:02

## 2020-01-01 RX ADMIN — Medication 1 AMPULE: at 20:26

## 2020-01-01 RX ADMIN — NOREPINEPHRINE BITARTRATE 95 MCG/MIN: 1 INJECTION, SOLUTION, CONCENTRATE INTRAVENOUS at 15:54

## 2020-01-01 RX ADMIN — EPINEPHRINE 10 MCG/MIN: 1 INJECTION INTRAMUSCULAR; INTRAVENOUS; SUBCUTANEOUS at 19:36

## 2020-01-01 RX ADMIN — ASCORBIC ACID 5 G: 500 INJECTION, SOLUTION INTRAMUSCULAR; INTRAVENOUS; SUBCUTANEOUS at 13:13

## 2020-01-01 RX ADMIN — HYDROCORTISONE SODIUM SUCCINATE 50 MG: 100 INJECTION, POWDER, FOR SOLUTION INTRAMUSCULAR; INTRAVENOUS at 23:08

## 2020-01-01 RX ADMIN — CALCIUM CHLORIDE, MAGNESIUM CHLORIDE, DEXTROSE MONOHYDRATE, LACTIC ACID, SODIUM CHLORIDE, SODIUM BICARBONATE AND POTASSIUM CHLORIDE 2000 ML/HR: 3.68; 3.05; 22; 5.4; 6.46; 3.09; .314 INJECTION INTRAVENOUS at 13:18

## 2020-01-01 RX ADMIN — SODIUM BICARBONATE 100 MEQ: 84 INJECTION, SOLUTION INTRAVENOUS at 08:36

## 2020-01-01 RX ADMIN — CALCIUM CHLORIDE, MAGNESIUM CHLORIDE, DEXTROSE MONOHYDRATE, LACTIC ACID, SODIUM CHLORIDE, SODIUM BICARBONATE AND POTASSIUM CHLORIDE 2000 ML/HR: 3.68; 3.05; 22; 5.4; 6.46; 3.09; .314 INJECTION INTRAVENOUS at 21:35

## 2020-01-01 RX ADMIN — ALBUMIN (HUMAN) 12.5 G: 0.25 INJECTION, SOLUTION INTRAVENOUS at 06:23

## 2020-01-01 RX ADMIN — PROPOFOL 20 MCG/KG/MIN: 10 INJECTION, EMULSION INTRAVENOUS at 06:45

## 2020-01-01 RX ADMIN — Medication 30 ML: at 22:07

## 2020-01-01 RX ADMIN — CLINDAMYCIN IN 5 PERCENT DEXTROSE 900 MG: 18 INJECTION, SOLUTION INTRAVENOUS at 11:00

## 2020-01-01 RX ADMIN — Medication 1 AMPULE: at 21:11

## 2020-01-01 RX ADMIN — CHLORHEXIDINE GLUCONATE 15 ML: 0.12 RINSE ORAL at 08:02

## 2020-01-01 RX ADMIN — SODIUM BICARBONATE 50 MEQ: 84 INJECTION, SOLUTION INTRAVENOUS at 10:41

## 2020-01-01 RX ADMIN — CALCIUM CHLORIDE, MAGNESIUM CHLORIDE, DEXTROSE MONOHYDRATE, LACTIC ACID, SODIUM CHLORIDE, SODIUM BICARBONATE AND POTASSIUM CHLORIDE 2000 ML/HR: 3.68; 3.05; 22; 5.4; 6.46; 3.09; .314 INJECTION INTRAVENOUS at 01:57

## 2020-01-01 RX ADMIN — NOREPINEPHRINE BITARTRATE 200 MCG/MIN: 1 INJECTION, SOLUTION, CONCENTRATE INTRAVENOUS at 12:31

## 2020-01-01 RX ADMIN — CASTOR OIL AND BALSAM, PERU: 788; 87 OINTMENT TOPICAL at 08:51

## 2020-01-01 RX ADMIN — CALCIUM CHLORIDE, MAGNESIUM CHLORIDE, DEXTROSE MONOHYDRATE, LACTIC ACID, SODIUM CHLORIDE, SODIUM BICARBONATE AND POTASSIUM CHLORIDE 2000 ML/HR: 3.68; 3.05; 22; 5.4; 6.46; 3.09; .314 INJECTION INTRAVENOUS at 07:00

## 2020-01-01 RX ADMIN — HEPARIN SODIUM 11 UNITS/KG/HR: 10000 INJECTION, SOLUTION INTRAVENOUS at 14:39

## 2020-01-01 RX ADMIN — HYDROCORTISONE SODIUM SUCCINATE 100 MG: 100 INJECTION, POWDER, FOR SOLUTION INTRAMUSCULAR; INTRAVENOUS at 06:46

## 2020-01-01 RX ADMIN — INSULIN LISPRO 7 UNITS: 100 INJECTION, SOLUTION INTRAVENOUS; SUBCUTANEOUS at 11:37

## 2020-01-01 RX ADMIN — CALCIUM CHLORIDE, MAGNESIUM CHLORIDE, DEXTROSE MONOHYDRATE, LACTIC ACID, SODIUM CHLORIDE, SODIUM BICARBONATE AND POTASSIUM CHLORIDE 2000 ML/HR: 3.68; 3.05; 22; 5.4; 6.46; 3.09; .314 INJECTION INTRAVENOUS at 18:11

## 2020-01-01 RX ADMIN — HYDROCORTISONE SODIUM SUCCINATE 25 MG: 100 INJECTION, POWDER, FOR SOLUTION INTRAMUSCULAR; INTRAVENOUS at 23:01

## 2020-01-01 RX ADMIN — Medication 10 ML: at 05:45

## 2020-01-01 RX ADMIN — Medication 10 ML: at 21:44

## 2020-01-01 RX ADMIN — CEFEPIME 1 G: 1 INJECTION, POWDER, FOR SOLUTION INTRAMUSCULAR; INTRAVENOUS at 05:00

## 2020-01-01 RX ADMIN — FAMOTIDINE 20 MG: 10 INJECTION INTRAVENOUS at 08:11

## 2020-01-01 RX ADMIN — HYDROCORTISONE SODIUM SUCCINATE 100 MG: 100 INJECTION, POWDER, FOR SOLUTION INTRAMUSCULAR; INTRAVENOUS at 18:11

## 2020-01-01 RX ADMIN — INSULIN GLARGINE 25 UNITS: 100 INJECTION, SOLUTION SUBCUTANEOUS at 13:12

## 2020-01-01 RX ADMIN — CALCIUM CHLORIDE, MAGNESIUM CHLORIDE, DEXTROSE MONOHYDRATE, LACTIC ACID, SODIUM CHLORIDE, SODIUM BICARBONATE AND POTASSIUM CHLORIDE 2000 ML/HR: 3.68; 3.05; 22; 5.4; 6.46; 3.09; .314 INJECTION INTRAVENOUS at 06:52

## 2020-01-01 RX ADMIN — CASTOR OIL AND BALSAM, PERU: 788; 87 OINTMENT TOPICAL at 12:02

## 2020-01-01 RX ADMIN — Medication 50 MCG/HR: at 23:34

## 2020-01-01 RX ADMIN — NOREPINEPHRINE BITARTRATE 50 MCG/MIN: 1 INJECTION, SOLUTION, CONCENTRATE INTRAVENOUS at 11:08

## 2020-01-01 RX ADMIN — SODIUM BICARBONATE 150 MEQ/1,000 ML IN DEXTROSE 5 % INTRAVENOUS: SOLUTION at 04:40

## 2020-01-01 RX ADMIN — ASCORBIC ACID 5 G: 500 INJECTION, SOLUTION INTRAMUSCULAR; INTRAVENOUS; SUBCUTANEOUS at 18:00

## 2020-01-01 RX ADMIN — EPINEPHRINE 2 MCG/MIN: 1 INJECTION INTRAMUSCULAR; INTRAVENOUS; SUBCUTANEOUS at 10:50

## 2020-01-01 RX ADMIN — SODIUM BICARBONATE 100 MEQ: 84 INJECTION, SOLUTION INTRAVENOUS at 10:00

## 2020-01-01 RX ADMIN — CEFEPIME HYDROCHLORIDE 1 G: 1 INJECTION, POWDER, FOR SOLUTION INTRAMUSCULAR; INTRAVENOUS at 12:02

## 2020-01-01 RX ADMIN — INSULIN LISPRO 2 UNITS: 100 INJECTION, SOLUTION INTRAVENOUS; SUBCUTANEOUS at 00:30

## 2020-01-01 RX ADMIN — CALCIUM CHLORIDE 1 G: 100 INJECTION INTRAVENOUS; INTRAVENTRICULAR at 09:49

## 2020-01-01 RX ADMIN — CALCIUM CHLORIDE, MAGNESIUM CHLORIDE, DEXTROSE MONOHYDRATE, LACTIC ACID, SODIUM CHLORIDE, SODIUM BICARBONATE AND POTASSIUM CHLORIDE 2000 ML/HR: 5.15; 2.03; 22; 5.4; 6.46; 3.09; .157 INJECTION INTRAVENOUS at 03:02

## 2020-01-01 RX ADMIN — PIPERACILLIN AND TAZOBACTAM 3.38 G: 3; .375 INJECTION, POWDER, LYOPHILIZED, FOR SOLUTION INTRAVENOUS at 11:31

## 2020-01-01 RX ADMIN — INSULIN GLARGINE 40 UNITS: 100 INJECTION, SOLUTION SUBCUTANEOUS at 11:03

## 2020-01-01 RX ADMIN — Medication 1 AMPULE: at 20:41

## 2020-01-01 RX ADMIN — EPINEPHRINE 10 MCG/MIN: 1 INJECTION INTRAMUSCULAR; INTRAVENOUS; SUBCUTANEOUS at 02:00

## 2020-01-01 RX ADMIN — SODIUM CHLORIDE 100 ML/HR: 900 INJECTION, SOLUTION INTRAVENOUS at 18:24

## 2020-01-01 RX ADMIN — MELATONIN 6 MG: at 21:36

## 2020-01-01 RX ADMIN — CALCIUM CHLORIDE, MAGNESIUM CHLORIDE, DEXTROSE MONOHYDRATE, LACTIC ACID, SODIUM CHLORIDE, SODIUM BICARBONATE AND POTASSIUM CHLORIDE 2000 ML/HR: 3.68; 3.05; 22; 5.4; 6.46; 3.09; .314 INJECTION INTRAVENOUS at 01:20

## 2020-01-01 RX ADMIN — CALCIUM CHLORIDE, MAGNESIUM CHLORIDE, DEXTROSE MONOHYDRATE, LACTIC ACID, SODIUM CHLORIDE, SODIUM BICARBONATE AND POTASSIUM CHLORIDE 2000 ML/HR: 3.68; 3.05; 22; 5.4; 6.46; 3.09; .314 INJECTION INTRAVENOUS at 02:21

## 2020-01-01 RX ADMIN — INSULIN GLARGINE 40 UNITS: 100 INJECTION, SOLUTION SUBCUTANEOUS at 12:36

## 2020-01-01 RX ADMIN — CALCIUM CHLORIDE, MAGNESIUM CHLORIDE, DEXTROSE MONOHYDRATE, LACTIC ACID, SODIUM CHLORIDE, SODIUM BICARBONATE AND POTASSIUM CHLORIDE 2000 ML/HR: 3.68; 3.05; 22; 5.4; 6.46; 3.09; .314 INJECTION INTRAVENOUS at 05:53

## 2020-01-01 RX ADMIN — CHLORHEXIDINE GLUCONATE 15 ML: 0.12 RINSE ORAL at 09:02

## 2020-01-01 RX ADMIN — CALCIUM CHLORIDE, MAGNESIUM CHLORIDE, DEXTROSE MONOHYDRATE, LACTIC ACID, SODIUM CHLORIDE, SODIUM BICARBONATE AND POTASSIUM CHLORIDE 2000 ML/HR: 3.68; 3.05; 22; 5.4; 6.46; 3.09; .314 INJECTION INTRAVENOUS at 05:40

## 2020-01-01 RX ADMIN — ACETAMINOPHEN 650 MG: 325 TABLET, FILM COATED ORAL at 08:08

## 2020-01-01 RX ADMIN — CALCIUM CHLORIDE, MAGNESIUM CHLORIDE, DEXTROSE MONOHYDRATE, LACTIC ACID, SODIUM CHLORIDE, SODIUM BICARBONATE AND POTASSIUM CHLORIDE 2000 ML/HR: 5.15; 2.03; 22; 5.4; 6.46; 3.09; .157 INJECTION INTRAVENOUS at 09:03

## 2020-01-01 RX ADMIN — HEPARIN SODIUM 9 UNITS/KG/HR: 10000 INJECTION, SOLUTION INTRAVENOUS at 14:31

## 2020-01-01 RX ADMIN — SODIUM CHLORIDE 125 ML/HR: 450 INJECTION, SOLUTION INTRAVENOUS at 21:44

## 2020-01-01 RX ADMIN — CALCIUM CHLORIDE, MAGNESIUM CHLORIDE, DEXTROSE MONOHYDRATE, LACTIC ACID, SODIUM CHLORIDE, SODIUM BICARBONATE AND POTASSIUM CHLORIDE 2000 ML/HR: 3.68; 3.05; 22; 5.4; 6.46; 3.09; .314 INJECTION INTRAVENOUS at 03:00

## 2020-01-01 RX ADMIN — PROPOFOL 5 MCG/KG/MIN: 10 INJECTION, EMULSION INTRAVENOUS at 17:07

## 2020-01-01 RX ADMIN — HYDROCORTISONE SODIUM SUCCINATE 50 MG: 100 INJECTION, POWDER, FOR SOLUTION INTRAMUSCULAR; INTRAVENOUS at 11:46

## 2020-01-01 RX ADMIN — ALBUMIN (HUMAN) 12.5 G: 0.25 INJECTION, SOLUTION INTRAVENOUS at 23:30

## 2020-01-01 RX ADMIN — MIDODRINE HYDROCHLORIDE 10 MG: 5 TABLET ORAL at 00:57

## 2020-01-01 RX ADMIN — DOBUTAMINE IN DEXTROSE 10 MCG/KG/MIN: 200 INJECTION, SOLUTION INTRAVENOUS at 04:37

## 2020-01-01 RX ADMIN — CLINDAMYCIN IN 5 PERCENT DEXTROSE 900 MG: 18 INJECTION, SOLUTION INTRAVENOUS at 20:00

## 2020-01-01 RX ADMIN — PROPOFOL 10 MCG/KG/MIN: 10 INJECTION, EMULSION INTRAVENOUS at 16:00

## 2020-01-01 RX ADMIN — LIDOCAINE HYDROCHLORIDE 10 ML: 20 INJECTION, SOLUTION INFILTRATION; PERINEURAL at 17:04

## 2020-01-01 RX ADMIN — CLINDAMYCIN IN 5 PERCENT DEXTROSE 900 MG: 18 INJECTION, SOLUTION INTRAVENOUS at 18:12

## 2020-01-01 RX ADMIN — CALCIUM CHLORIDE, MAGNESIUM CHLORIDE, DEXTROSE MONOHYDRATE, LACTIC ACID, SODIUM CHLORIDE, SODIUM BICARBONATE AND POTASSIUM CHLORIDE 2000 ML/HR: 3.68; 3.05; 22; 5.4; 6.46; 3.09; .314 INJECTION INTRAVENOUS at 03:03

## 2020-01-01 RX ADMIN — CALCIUM CHLORIDE 1 G: 100 INJECTION INTRAVENOUS; INTRAVENTRICULAR at 09:31

## 2020-01-01 RX ADMIN — CALCIUM CHLORIDE, MAGNESIUM CHLORIDE, DEXTROSE MONOHYDRATE, LACTIC ACID, SODIUM CHLORIDE, SODIUM BICARBONATE AND POTASSIUM CHLORIDE 2000 ML/HR: 3.68; 3.05; 22; 5.4; 6.46; 3.09; .314 INJECTION INTRAVENOUS at 13:29

## 2020-01-01 RX ADMIN — NOREPINEPHRINE BITARTRATE 40 MCG/MIN: 1 INJECTION, SOLUTION, CONCENTRATE INTRAVENOUS at 20:15

## 2020-01-01 RX ADMIN — Medication 10 ML: at 05:40

## 2020-01-01 RX ADMIN — HEPARIN SODIUM 11 UNITS/KG/HR: 10000 INJECTION, SOLUTION INTRAVENOUS at 19:16

## 2020-01-01 RX ADMIN — INSULIN LISPRO 2 UNITS: 100 INJECTION, SOLUTION INTRAVENOUS; SUBCUTANEOUS at 00:03

## 2020-01-01 RX ADMIN — SODIUM CHLORIDE 1000 ML: 900 INJECTION, SOLUTION INTRAVENOUS at 12:51

## 2020-01-01 RX ADMIN — CEFEPIME 1 G: 1 INJECTION, POWDER, FOR SOLUTION INTRAMUSCULAR; INTRAVENOUS at 04:01

## 2020-01-01 RX ADMIN — SODIUM CHLORIDE 0.04 UNITS/MIN: 9 INJECTION, SOLUTION INTRAVENOUS at 17:37

## 2020-01-01 RX ADMIN — POTASSIUM CHLORIDE 10 MEQ: 200 INJECTION, SOLUTION INTRAVENOUS at 11:07

## 2020-01-01 RX ADMIN — NOREPINEPHRINE BITARTRATE 100 MCG/MIN: 1 INJECTION, SOLUTION, CONCENTRATE INTRAVENOUS at 19:36

## 2020-01-01 RX ADMIN — CALCIUM CHLORIDE, MAGNESIUM CHLORIDE, DEXTROSE MONOHYDRATE, LACTIC ACID, SODIUM CHLORIDE, SODIUM BICARBONATE AND POTASSIUM CHLORIDE 2000 ML/HR: 5.15; 2.03; 22; 5.4; 6.46; 3.09; .157 INJECTION INTRAVENOUS at 08:13

## 2020-01-01 RX ADMIN — EPINEPHRINE 1 MG: 0.1 INJECTION, SOLUTION ENDOTRACHEAL; INTRACARDIAC; INTRAVENOUS at 16:54

## 2020-01-01 RX ADMIN — SODIUM CHLORIDE 500 ML: 900 INJECTION, SOLUTION INTRAVENOUS at 10:00

## 2020-01-01 RX ADMIN — INSULIN GLARGINE 25 UNITS: 100 INJECTION, SOLUTION SUBCUTANEOUS at 08:22

## 2020-01-01 RX ADMIN — NOREPINEPHRINE BITARTRATE 140 MCG/MIN: 1 INJECTION, SOLUTION, CONCENTRATE INTRAVENOUS at 21:42

## 2020-01-01 RX ADMIN — AMIODARONE HYDROCHLORIDE 1 MG/MIN: 50 INJECTION, SOLUTION INTRAVENOUS at 20:19

## 2020-01-01 RX ADMIN — Medication 40 ML: at 14:00

## 2020-01-01 RX ADMIN — SODIUM CHLORIDE 0.04 UNITS/MIN: 9 INJECTION, SOLUTION INTRAVENOUS at 12:12

## 2020-01-01 RX ADMIN — INSULIN LISPRO 2 UNITS: 100 INJECTION, SOLUTION INTRAVENOUS; SUBCUTANEOUS at 18:33

## 2020-01-01 RX ADMIN — SODIUM BICARBONATE 100 MEQ: 84 INJECTION, SOLUTION INTRAVENOUS at 20:26

## 2020-01-01 RX ADMIN — INSULIN LISPRO 10 UNITS: 100 INJECTION, SOLUTION INTRAVENOUS; SUBCUTANEOUS at 00:00

## 2020-01-01 RX ADMIN — Medication 10 ML: at 22:12

## 2020-01-01 RX ADMIN — Medication 10 ML: at 21:38

## 2020-01-01 RX ADMIN — HEPARIN SODIUM 10 UNITS/KG/HR: 10000 INJECTION, SOLUTION INTRAVENOUS at 06:08

## 2020-01-01 RX ADMIN — INSULIN GLARGINE 46 UNITS: 100 INJECTION, SOLUTION SUBCUTANEOUS at 13:01

## 2020-01-01 RX ADMIN — CALCIUM CHLORIDE 2 G: 100 INJECTION, SOLUTION INTRAVENOUS at 18:50

## 2020-01-01 RX ADMIN — SODIUM CHLORIDE 0.04 UNITS/MIN: 9 INJECTION, SOLUTION INTRAVENOUS at 05:21

## 2020-01-01 RX ADMIN — Medication: at 18:20

## 2020-01-01 RX ADMIN — SODIUM CHLORIDE 0.04 UNITS/MIN: 9 INJECTION, SOLUTION INTRAVENOUS at 21:28

## 2020-01-01 RX ADMIN — CALCIUM CHLORIDE, MAGNESIUM CHLORIDE, DEXTROSE MONOHYDRATE, LACTIC ACID, SODIUM CHLORIDE, SODIUM BICARBONATE AND POTASSIUM CHLORIDE 2000 ML/HR: 3.68; 3.05; 22; 5.4; 6.46; 3.09; .314 INJECTION INTRAVENOUS at 01:22

## 2020-01-01 RX ADMIN — MELATONIN 6 MG: at 21:13

## 2020-01-01 RX ADMIN — CALCIUM CHLORIDE 2 G: 100 INJECTION, SOLUTION INTRAVENOUS at 07:55

## 2020-01-01 RX ADMIN — HEPARIN SODIUM 7 UNITS/KG/HR: 10000 INJECTION, SOLUTION INTRAVENOUS at 18:35

## 2020-01-01 RX ADMIN — CEFEPIME 1 G: 1 INJECTION, POWDER, FOR SOLUTION INTRAMUSCULAR; INTRAVENOUS at 12:54

## 2020-01-01 RX ADMIN — NOREPINEPHRINE BITARTRATE 40 MCG/MIN: 1 INJECTION, SOLUTION, CONCENTRATE INTRAVENOUS at 06:46

## 2020-01-01 RX ADMIN — CEFEPIME 1 G: 1 INJECTION, POWDER, FOR SOLUTION INTRAMUSCULAR; INTRAVENOUS at 13:12

## 2020-01-01 RX ADMIN — Medication 40 ML: at 13:13

## 2020-01-01 RX ADMIN — Medication 100 MEQ: at 12:52

## 2020-01-01 RX ADMIN — INSULIN LISPRO 2 UNITS: 100 INJECTION, SOLUTION INTRAVENOUS; SUBCUTANEOUS at 11:53

## 2020-01-01 RX ADMIN — ASCORBIC ACID 5 G: 500 INJECTION, SOLUTION INTRAMUSCULAR; INTRAVENOUS; SUBCUTANEOUS at 11:51

## 2020-01-01 RX ADMIN — INSULIN LISPRO 5 UNITS: 100 INJECTION, SOLUTION INTRAVENOUS; SUBCUTANEOUS at 17:36

## 2020-01-01 RX ADMIN — DOBUTAMINE IN DEXTROSE 1.5 MCG/KG/MIN: 200 INJECTION, SOLUTION INTRAVENOUS at 13:32

## 2020-01-01 RX ADMIN — INSULIN LISPRO 2 UNITS: 100 INJECTION, SOLUTION INTRAVENOUS; SUBCUTANEOUS at 18:24

## 2020-01-01 RX ADMIN — Medication 10 ML: at 13:26

## 2020-01-01 RX ADMIN — ALBUMIN (HUMAN) 25 G: 0.25 INJECTION, SOLUTION INTRAVENOUS at 11:46

## 2020-01-01 RX ADMIN — CASTOR OIL AND BALSAM, PERU: 788; 87 OINTMENT TOPICAL at 22:00

## 2020-01-01 RX ADMIN — VANCOMYCIN HYDROCHLORIDE 1500 MG: 10 INJECTION, POWDER, LYOPHILIZED, FOR SOLUTION INTRAVENOUS at 15:28

## 2020-01-01 RX ADMIN — NOREPINEPHRINE BITARTRATE 200 MCG/MIN: 1 INJECTION, SOLUTION, CONCENTRATE INTRAVENOUS at 15:52

## 2020-01-01 RX ADMIN — CHLORHEXIDINE GLUCONATE 15 ML: 0.12 RINSE ORAL at 09:45

## 2020-01-01 RX ADMIN — INSULIN LISPRO 2 UNITS: 100 INJECTION, SOLUTION INTRAVENOUS; SUBCUTANEOUS at 05:24

## 2020-01-01 RX ADMIN — MIDODRINE HYDROCHLORIDE 10 MG: 5 TABLET ORAL at 12:08

## 2020-01-01 RX ADMIN — DOBUTAMINE IN DEXTROSE 10 MCG/KG/MIN: 200 INJECTION, SOLUTION INTRAVENOUS at 10:53

## 2020-01-01 RX ADMIN — Medication 40 ML: at 15:58

## 2020-01-01 RX ADMIN — Medication 10 ML: at 14:20

## 2020-01-01 RX ADMIN — NOREPINEPHRINE BITARTRATE 4 MCG/MIN: 1 INJECTION, SOLUTION, CONCENTRATE INTRAVENOUS at 00:42

## 2020-01-01 RX ADMIN — Medication 100 MEQ: at 13:31

## 2020-01-01 RX ADMIN — CALCIUM CHLORIDE, MAGNESIUM CHLORIDE, DEXTROSE MONOHYDRATE, LACTIC ACID, SODIUM CHLORIDE, SODIUM BICARBONATE AND POTASSIUM CHLORIDE 2000 ML/HR: 3.68; 3.05; 22; 5.4; 6.46; 3.09; .314 INJECTION INTRAVENOUS at 02:45

## 2020-01-01 RX ADMIN — NOREPINEPHRINE BITARTRATE 16 MCG/MIN: 1 INJECTION, SOLUTION, CONCENTRATE INTRAVENOUS at 05:48

## 2020-01-01 RX ADMIN — INSULIN GLARGINE 46 UNITS: 100 INJECTION, SOLUTION SUBCUTANEOUS at 11:59

## 2020-01-01 RX ADMIN — FAMOTIDINE 20 MG: 10 INJECTION INTRAVENOUS at 08:12

## 2020-01-01 RX ADMIN — MIDODRINE HYDROCHLORIDE 5 MG: 5 TABLET ORAL at 13:08

## 2020-01-01 RX ADMIN — INSULIN LISPRO 2 UNITS: 100 INJECTION, SOLUTION INTRAVENOUS; SUBCUTANEOUS at 18:57

## 2020-01-01 RX ADMIN — INSULIN LISPRO 7 UNITS: 100 INJECTION, SOLUTION INTRAVENOUS; SUBCUTANEOUS at 12:05

## 2020-01-01 RX ADMIN — PHENYLEPHRINE HYDROCHLORIDE 300 MCG/MIN: 10 INJECTION INTRAVENOUS at 14:39

## 2020-01-01 RX ADMIN — HYDROCORTISONE SODIUM SUCCINATE 25 MG: 100 INJECTION, POWDER, FOR SOLUTION INTRAMUSCULAR; INTRAVENOUS at 13:14

## 2020-01-01 RX ADMIN — Medication 1 AMPULE: at 08:50

## 2020-01-01 RX ADMIN — ZINC SULFATE 220 MG (50 MG) CAPSULE 1 CAPSULE: CAPSULE at 09:09

## 2020-01-01 RX ADMIN — INSULIN LISPRO 2 UNITS: 100 INJECTION, SOLUTION INTRAVENOUS; SUBCUTANEOUS at 18:22

## 2020-01-01 RX ADMIN — INSULIN LISPRO 2 UNITS: 100 INJECTION, SOLUTION INTRAVENOUS; SUBCUTANEOUS at 05:47

## 2020-01-01 RX ADMIN — CLINDAMYCIN IN 5 PERCENT DEXTROSE 900 MG: 18 INJECTION, SOLUTION INTRAVENOUS at 02:49

## 2020-01-01 RX ADMIN — NOREPINEPHRINE BITARTRATE 200 MCG/MIN: 1 INJECTION, SOLUTION, CONCENTRATE INTRAVENOUS at 12:59

## 2020-01-01 RX ADMIN — DEXTROSE MONOHYDRATE 25 G: 500 INJECTION PARENTERAL at 03:35

## 2020-01-01 RX ADMIN — NOREPINEPHRINE BITARTRATE 10 MCG/MIN: 1 INJECTION, SOLUTION, CONCENTRATE INTRAVENOUS at 17:32

## 2020-01-01 RX ADMIN — SODIUM CHLORIDE 40 MG: 9 INJECTION, SOLUTION INTRAMUSCULAR; INTRAVENOUS; SUBCUTANEOUS at 09:07

## 2020-01-01 RX ADMIN — CALCIUM CHLORIDE, MAGNESIUM CHLORIDE, DEXTROSE MONOHYDRATE, LACTIC ACID, SODIUM CHLORIDE, SODIUM BICARBONATE AND POTASSIUM CHLORIDE 2000 ML/HR: 3.68; 3.05; 22; 5.4; 6.46; 3.09; .314 INJECTION INTRAVENOUS at 05:41

## 2020-01-01 RX ADMIN — CASTOR OIL AND BALSAM, PERU: 788; 87 OINTMENT TOPICAL at 16:37

## 2020-01-01 RX ADMIN — Medication 10 ML: at 11:48

## 2020-01-01 RX ADMIN — DOBUTAMINE IN DEXTROSE 5 MCG/KG/MIN: 200 INJECTION, SOLUTION INTRAVENOUS at 09:10

## 2020-01-01 RX ADMIN — NOREPINEPHRINE BITARTRATE 200 MCG/MIN: 1 INJECTION, SOLUTION, CONCENTRATE INTRAVENOUS at 09:43

## 2020-01-01 RX ADMIN — CEFTRIAXONE 1 G: 1 INJECTION, POWDER, FOR SOLUTION INTRAMUSCULAR; INTRAVENOUS at 00:50

## 2020-01-01 RX ADMIN — NOREPINEPHRINE BITARTRATE 200 MCG/MIN: 1 INJECTION, SOLUTION, CONCENTRATE INTRAVENOUS at 22:57

## 2020-01-01 RX ADMIN — DOBUTAMINE IN DEXTROSE 10 MCG/KG/MIN: 200 INJECTION, SOLUTION INTRAVENOUS at 23:20

## 2020-01-01 RX ADMIN — CALCIUM CHLORIDE, MAGNESIUM CHLORIDE, DEXTROSE MONOHYDRATE, LACTIC ACID, SODIUM CHLORIDE, SODIUM BICARBONATE AND POTASSIUM CHLORIDE 2000 ML/HR: 3.68; 3.05; 22; 5.4; 6.46; 3.09; .314 INJECTION INTRAVENOUS at 07:55

## 2020-01-01 RX ADMIN — AZITHROMYCIN MONOHYDRATE 500 MG: 500 INJECTION, POWDER, LYOPHILIZED, FOR SOLUTION INTRAVENOUS at 01:50

## 2020-01-01 RX ADMIN — CEFEPIME 1 G: 1 INJECTION, POWDER, FOR SOLUTION INTRAMUSCULAR; INTRAVENOUS at 04:26

## 2020-01-01 RX ADMIN — INSULIN GLARGINE 40 UNITS: 100 INJECTION, SOLUTION SUBCUTANEOUS at 11:11

## 2020-01-01 RX ADMIN — NOREPINEPHRINE BITARTRATE 100 MCG/MIN: 1 INJECTION, SOLUTION, CONCENTRATE INTRAVENOUS at 14:12

## 2020-01-01 RX ADMIN — CALCIUM CHLORIDE, MAGNESIUM CHLORIDE, DEXTROSE MONOHYDRATE, LACTIC ACID, SODIUM CHLORIDE, SODIUM BICARBONATE AND POTASSIUM CHLORIDE 2000 ML/HR: 3.68; 3.05; 22; 5.4; 6.46; 3.09; .314 INJECTION INTRAVENOUS at 06:13

## 2020-01-01 RX ADMIN — CALCIUM CHLORIDE, MAGNESIUM CHLORIDE, DEXTROSE MONOHYDRATE, LACTIC ACID, SODIUM CHLORIDE, SODIUM BICARBONATE AND POTASSIUM CHLORIDE 2000 ML/HR: 3.68; 3.05; 22; 5.4; 6.46; 3.09; .314 INJECTION INTRAVENOUS at 07:42

## 2020-01-01 RX ADMIN — ALBUMIN (HUMAN) 12.5 G: 0.25 INJECTION, SOLUTION INTRAVENOUS at 17:22

## 2020-01-01 RX ADMIN — CEFEPIME 1 G: 1 INJECTION, POWDER, FOR SOLUTION INTRAMUSCULAR; INTRAVENOUS at 20:28

## 2020-01-01 RX ADMIN — ALBUMIN (HUMAN) 12.5 G: 0.25 INJECTION, SOLUTION INTRAVENOUS at 17:58

## 2020-01-01 RX ADMIN — INSULIN LISPRO 5 UNITS: 100 INJECTION, SOLUTION INTRAVENOUS; SUBCUTANEOUS at 23:32

## 2020-01-01 RX ADMIN — ALBUMIN (HUMAN) 12.5 G: 0.25 INJECTION, SOLUTION INTRAVENOUS at 18:00

## 2020-01-01 RX ADMIN — PROPOFOL 15 MCG/KG/MIN: 10 INJECTION, EMULSION INTRAVENOUS at 14:04

## 2020-01-01 RX ADMIN — MIDODRINE HYDROCHLORIDE 10 MG: 5 TABLET ORAL at 06:14

## 2020-01-01 RX ADMIN — INSULIN LISPRO 5 UNITS: 100 INJECTION, SOLUTION INTRAVENOUS; SUBCUTANEOUS at 11:07

## 2020-01-01 RX ADMIN — INSULIN LISPRO 3 UNITS: 100 INJECTION, SOLUTION INTRAVENOUS; SUBCUTANEOUS at 18:41

## 2020-01-01 RX ADMIN — CEFEPIME 1 G: 1 INJECTION, POWDER, FOR SOLUTION INTRAMUSCULAR; INTRAVENOUS at 04:16

## 2020-01-01 RX ADMIN — HEPARIN SODIUM 2000 UNITS: 5000 INJECTION, SOLUTION INTRAVENOUS; SUBCUTANEOUS at 00:48

## 2020-01-01 RX ADMIN — Medication 1 AMPULE: at 20:00

## 2020-01-01 RX ADMIN — CASTOR OIL AND BALSAM, PERU: 788; 87 OINTMENT TOPICAL at 21:12

## 2020-01-01 RX ADMIN — CALCIUM CHLORIDE, MAGNESIUM CHLORIDE, DEXTROSE MONOHYDRATE, LACTIC ACID, SODIUM CHLORIDE, SODIUM BICARBONATE AND POTASSIUM CHLORIDE 2000 ML/HR: 5.15; 2.03; 22; 5.4; 6.46; 3.09; .157 INJECTION INTRAVENOUS at 14:51

## 2020-01-01 RX ADMIN — CALCIUM CHLORIDE, MAGNESIUM CHLORIDE, DEXTROSE MONOHYDRATE, LACTIC ACID, SODIUM CHLORIDE, SODIUM BICARBONATE AND POTASSIUM CHLORIDE 2000 ML/HR: 3.68; 3.05; 22; 5.4; 6.46; 3.09; .314 INJECTION INTRAVENOUS at 18:58

## 2020-01-01 RX ADMIN — CHLORHEXIDINE GLUCONATE 15 ML: 0.12 RINSE ORAL at 20:11

## 2020-01-01 RX ADMIN — INSULIN GLARGINE 46 UNITS: 100 INJECTION, SOLUTION SUBCUTANEOUS at 11:33

## 2020-01-01 RX ADMIN — CEFEPIME 1 G: 1 INJECTION, POWDER, FOR SOLUTION INTRAMUSCULAR; INTRAVENOUS at 20:00

## 2020-01-01 RX ADMIN — ACETAMINOPHEN 1000 MG: 500 TABLET ORAL at 17:13

## 2020-01-01 RX ADMIN — MINERAL OIL AND WHITE PETROLATUM: 150; 830 OINTMENT OPHTHALMIC at 20:00

## 2020-01-01 RX ADMIN — Medication 10 ML: at 20:56

## 2020-01-01 RX ADMIN — CALCIUM CHLORIDE, MAGNESIUM CHLORIDE, DEXTROSE MONOHYDRATE, LACTIC ACID, SODIUM CHLORIDE, SODIUM BICARBONATE AND POTASSIUM CHLORIDE 2000 ML/HR: 3.68; 3.05; 22; 5.4; 6.46; 3.09; .314 INJECTION INTRAVENOUS at 11:47

## 2020-01-01 RX ADMIN — HYDROCORTISONE SODIUM SUCCINATE 50 MG: 100 INJECTION, POWDER, FOR SOLUTION INTRAMUSCULAR; INTRAVENOUS at 02:12

## 2020-01-01 RX ADMIN — DOBUTAMINE IN DEXTROSE 10 MCG/KG/MIN: 200 INJECTION, SOLUTION INTRAVENOUS at 16:26

## 2020-01-01 RX ADMIN — CEFEPIME 1 G: 1 INJECTION, POWDER, FOR SOLUTION INTRAMUSCULAR; INTRAVENOUS at 05:54

## 2020-01-01 RX ADMIN — CLINDAMYCIN IN 5 PERCENT DEXTROSE 900 MG: 18 INJECTION, SOLUTION INTRAVENOUS at 19:59

## 2020-01-01 RX ADMIN — SODIUM CHLORIDE 30 MCG/MIN: 9 INJECTION, SOLUTION INTRAVENOUS at 12:08

## 2020-01-01 RX ADMIN — HYDROCORTISONE SODIUM SUCCINATE 25 MG: 100 INJECTION, POWDER, FOR SOLUTION INTRAMUSCULAR; INTRAVENOUS at 18:35

## 2020-01-01 RX ADMIN — CLINDAMYCIN IN 5 PERCENT DEXTROSE 900 MG: 18 INJECTION, SOLUTION INTRAVENOUS at 11:11

## 2020-01-01 RX ADMIN — CALCIUM CHLORIDE, MAGNESIUM CHLORIDE, DEXTROSE MONOHYDRATE, LACTIC ACID, SODIUM CHLORIDE, SODIUM BICARBONATE AND POTASSIUM CHLORIDE 2000 ML/HR: 3.68; 3.05; 22; 5.4; 6.46; 3.09; .314 INJECTION INTRAVENOUS at 03:12

## 2020-01-01 RX ADMIN — PROPOFOL 20 MCG/KG/MIN: 10 INJECTION, EMULSION INTRAVENOUS at 01:31

## 2020-01-01 RX ADMIN — HEPARIN SODIUM 6 UNITS/KG/HR: 10000 INJECTION, SOLUTION INTRAVENOUS at 20:11

## 2020-01-01 RX ADMIN — Medication 10 ML: at 22:53

## 2020-01-01 RX ADMIN — Medication 10 ML: at 20:46

## 2020-01-01 RX ADMIN — HYDROCORTISONE SODIUM SUCCINATE 50 MG: 100 INJECTION, POWDER, FOR SOLUTION INTRAMUSCULAR; INTRAVENOUS at 05:36

## 2020-01-01 RX ADMIN — CLINDAMYCIN IN 5 PERCENT DEXTROSE 900 MG: 18 INJECTION, SOLUTION INTRAVENOUS at 02:13

## 2020-01-01 RX ADMIN — CALCIUM CHLORIDE, MAGNESIUM CHLORIDE, DEXTROSE MONOHYDRATE, LACTIC ACID, SODIUM CHLORIDE, SODIUM BICARBONATE AND POTASSIUM CHLORIDE 2000 ML/HR: 3.68; 3.05; 22; 5.4; 6.46; 3.09; .314 INJECTION INTRAVENOUS at 16:00

## 2020-01-01 RX ADMIN — EPINEPHRINE 1 MG: 0.1 INJECTION, SOLUTION ENDOTRACHEAL; INTRACARDIAC; INTRAVENOUS at 14:48

## 2020-01-01 RX ADMIN — MINERAL OIL AND WHITE PETROLATUM: 150; 830 OINTMENT OPHTHALMIC at 20:33

## 2020-01-01 RX ADMIN — CASTOR OIL AND BALSAM, PERU: 788; 87 OINTMENT TOPICAL at 08:35

## 2020-01-01 RX ADMIN — CEFEPIME 1 G: 1 INJECTION, POWDER, FOR SOLUTION INTRAMUSCULAR; INTRAVENOUS at 12:08

## 2020-01-01 RX ADMIN — HYDROCORTISONE SODIUM SUCCINATE 100 MG: 100 INJECTION, POWDER, FOR SOLUTION INTRAMUSCULAR; INTRAVENOUS at 13:00

## 2020-01-01 RX ADMIN — DOBUTAMINE IN DEXTROSE 3 MCG/KG/MIN: 200 INJECTION, SOLUTION INTRAVENOUS at 10:42

## 2020-01-01 RX ADMIN — HYDROCORTISONE SODIUM SUCCINATE 25 MG: 100 INJECTION, POWDER, FOR SOLUTION INTRAMUSCULAR; INTRAVENOUS at 05:06

## 2020-01-01 RX ADMIN — PHENYLEPHRINE HYDROCHLORIDE 300 MCG/MIN: 10 INJECTION INTRAVENOUS at 11:25

## 2020-01-01 RX ADMIN — NOREPINEPHRINE BITARTRATE 100 MCG/MIN: 1 INJECTION, SOLUTION, CONCENTRATE INTRAVENOUS at 11:32

## 2020-01-01 RX ADMIN — CALCIUM CHLORIDE, MAGNESIUM CHLORIDE, DEXTROSE MONOHYDRATE, LACTIC ACID, SODIUM CHLORIDE, SODIUM BICARBONATE AND POTASSIUM CHLORIDE 2000 ML/HR: 3.68; 3.05; 22; 5.4; 6.46; 3.09; .314 INJECTION INTRAVENOUS at 18:20

## 2020-01-01 RX ADMIN — CALCIUM CHLORIDE, MAGNESIUM CHLORIDE, DEXTROSE MONOHYDRATE, LACTIC ACID, SODIUM CHLORIDE, SODIUM BICARBONATE AND POTASSIUM CHLORIDE 2000 ML/HR: 5.15; 2.03; 22; 5.4; 6.46; 3.09; .157 INJECTION INTRAVENOUS at 14:04

## 2020-01-01 RX ADMIN — INSULIN GLARGINE 40 UNITS: 100 INJECTION, SOLUTION SUBCUTANEOUS at 10:46

## 2020-01-01 RX ADMIN — HYDROCORTISONE SODIUM SUCCINATE 100 MG: 100 INJECTION, POWDER, FOR SOLUTION INTRAMUSCULAR; INTRAVENOUS at 05:33

## 2020-01-01 RX ADMIN — HYDROCORTISONE SODIUM SUCCINATE 50 MG: 100 INJECTION, POWDER, FOR SOLUTION INTRAMUSCULAR; INTRAVENOUS at 14:07

## 2020-01-01 RX ADMIN — CALCIUM CHLORIDE, MAGNESIUM CHLORIDE, DEXTROSE MONOHYDRATE, LACTIC ACID, SODIUM CHLORIDE, SODIUM BICARBONATE AND POTASSIUM CHLORIDE 2000 ML/HR: 3.68; 3.05; 22; 5.4; 6.46; 3.09; .314 INJECTION INTRAVENOUS at 10:24

## 2020-01-01 RX ADMIN — ALBUMIN (HUMAN) 12.5 G: 0.25 INJECTION, SOLUTION INTRAVENOUS at 12:19

## 2020-01-01 RX ADMIN — INSULIN LISPRO 3 UNITS: 100 INJECTION, SOLUTION INTRAVENOUS; SUBCUTANEOUS at 18:50

## 2020-01-01 RX ADMIN — INSULIN LISPRO 7 UNITS: 100 INJECTION, SOLUTION INTRAVENOUS; SUBCUTANEOUS at 17:02

## 2020-01-01 RX ADMIN — CALCIUM CHLORIDE, MAGNESIUM CHLORIDE, DEXTROSE MONOHYDRATE, LACTIC ACID, SODIUM CHLORIDE, SODIUM BICARBONATE AND POTASSIUM CHLORIDE 2000 ML/HR: 3.68; 3.05; 22; 5.4; 6.46; 3.09; .314 INJECTION INTRAVENOUS at 19:59

## 2020-01-01 RX ADMIN — INSULIN LISPRO 3 UNITS: 100 INJECTION, SOLUTION INTRAVENOUS; SUBCUTANEOUS at 00:26

## 2020-01-01 RX ADMIN — ALBUMIN (HUMAN) 12.5 G: 0.25 INJECTION, SOLUTION INTRAVENOUS at 23:26

## 2020-01-01 RX ADMIN — VANCOMYCIN HYDROCHLORIDE 1250 MG: 10 INJECTION, POWDER, LYOPHILIZED, FOR SOLUTION INTRAVENOUS at 18:00

## 2020-01-01 RX ADMIN — MINERAL OIL AND WHITE PETROLATUM: 150; 830 OINTMENT OPHTHALMIC at 08:10

## 2020-01-01 RX ADMIN — CALCIUM CHLORIDE, MAGNESIUM CHLORIDE, DEXTROSE MONOHYDRATE, LACTIC ACID, SODIUM CHLORIDE, SODIUM BICARBONATE AND POTASSIUM CHLORIDE 2000 ML/HR: 3.68; 3.05; 22; 5.4; 6.46; 3.09; .314 INJECTION INTRAVENOUS at 16:01

## 2020-01-01 RX ADMIN — CASTOR OIL AND BALSAM, PERU: 788; 87 OINTMENT TOPICAL at 15:22

## 2020-01-01 RX ADMIN — NOREPINEPHRINE BITARTRATE 40 MCG/MIN: 1 INJECTION, SOLUTION, CONCENTRATE INTRAVENOUS at 11:14

## 2020-01-01 RX ADMIN — HYDROCORTISONE SODIUM SUCCINATE 100 MG: 100 INJECTION, POWDER, FOR SOLUTION INTRAMUSCULAR; INTRAVENOUS at 11:19

## 2020-01-01 RX ADMIN — PROPOFOL 15 MCG/KG/MIN: 10 INJECTION, EMULSION INTRAVENOUS at 17:54

## 2020-01-01 RX ADMIN — NOREPINEPHRINE BITARTRATE 65 MCG/MIN: 1 INJECTION, SOLUTION, CONCENTRATE INTRAVENOUS at 08:23

## 2020-01-01 RX ADMIN — HYDROCORTISONE SODIUM SUCCINATE 50 MG: 100 INJECTION, POWDER, FOR SOLUTION INTRAMUSCULAR; INTRAVENOUS at 14:09

## 2020-01-01 RX ADMIN — Medication 1 AMPULE: at 21:26

## 2020-01-01 RX ADMIN — HYDROCORTISONE SODIUM SUCCINATE 100 MG: 100 INJECTION, POWDER, FOR SOLUTION INTRAMUSCULAR; INTRAVENOUS at 06:23

## 2020-01-01 RX ADMIN — HYDROCORTISONE SODIUM SUCCINATE 100 MG: 100 INJECTION, POWDER, FOR SOLUTION INTRAMUSCULAR; INTRAVENOUS at 23:30

## 2020-01-01 RX ADMIN — FAMOTIDINE 20 MG: 10 INJECTION INTRAVENOUS at 08:09

## 2020-01-01 RX ADMIN — Medication 10 ML: at 21:26

## 2020-01-01 RX ADMIN — Medication 10 ML: at 20:33

## 2020-01-01 RX ADMIN — PHENYLEPHRINE HYDROCHLORIDE 30 MCG/MIN: 10 INJECTION INTRAVENOUS at 13:18

## 2020-01-01 RX ADMIN — ALBUMIN (HUMAN) 12.5 G: 0.25 INJECTION, SOLUTION INTRAVENOUS at 12:05

## 2020-01-01 RX ADMIN — CALCIUM CHLORIDE, MAGNESIUM CHLORIDE, DEXTROSE MONOHYDRATE, LACTIC ACID, SODIUM CHLORIDE, SODIUM BICARBONATE AND POTASSIUM CHLORIDE 2000 ML/HR: 3.68; 3.05; 22; 5.4; 6.46; 3.09; .314 INJECTION INTRAVENOUS at 21:58

## 2020-01-01 RX ADMIN — Medication 10 ML: at 21:39

## 2020-01-01 RX ADMIN — EPINEPHRINE 10 MCG/MIN: 1 INJECTION INTRAMUSCULAR; INTRAVENOUS; SUBCUTANEOUS at 17:36

## 2020-01-01 RX ADMIN — AMIODARONE HYDROCHLORIDE 0.5 MG/MIN: 50 INJECTION, SOLUTION INTRAVENOUS at 02:06

## 2020-01-01 RX ADMIN — INSULIN LISPRO 2 UNITS: 100 INJECTION, SOLUTION INTRAVENOUS; SUBCUTANEOUS at 05:58

## 2020-01-01 RX ADMIN — HYDROCORTISONE SODIUM SUCCINATE 50 MG: 100 INJECTION, POWDER, FOR SOLUTION INTRAMUSCULAR; INTRAVENOUS at 18:07

## 2020-01-01 RX ADMIN — PROPOFOL 10 MCG/KG/MIN: 10 INJECTION, EMULSION INTRAVENOUS at 23:00

## 2020-01-01 RX ADMIN — Medication 1 AMPULE: at 20:15

## 2020-01-01 RX ADMIN — HEPARIN SODIUM 12 UNITS/KG/HR: 10000 INJECTION, SOLUTION INTRAVENOUS at 01:21

## 2020-01-01 RX ADMIN — CALCIUM CHLORIDE, MAGNESIUM CHLORIDE, DEXTROSE MONOHYDRATE, LACTIC ACID, SODIUM CHLORIDE, SODIUM BICARBONATE AND POTASSIUM CHLORIDE 2000 ML/HR: 3.68; 3.05; 22; 5.4; 6.46; 3.09; .314 INJECTION INTRAVENOUS at 21:38

## 2020-01-01 RX ADMIN — CEFEPIME 1 G: 1 INJECTION, POWDER, FOR SOLUTION INTRAMUSCULAR; INTRAVENOUS at 14:09

## 2020-01-01 RX ADMIN — Medication 1 AMPULE: at 08:01

## 2020-01-01 RX ADMIN — INSULIN LISPRO 2 UNITS: 100 INJECTION, SOLUTION INTRAVENOUS; SUBCUTANEOUS at 23:01

## 2020-01-01 RX ADMIN — CHLORHEXIDINE GLUCONATE 15 ML: 0.12 RINSE ORAL at 08:37

## 2020-01-01 RX ADMIN — CASTOR OIL AND BALSAM, PERU: 788; 87 OINTMENT TOPICAL at 15:59

## 2020-01-01 RX ADMIN — EPINEPHRINE 10 MCG/MIN: 1 INJECTION INTRAMUSCULAR; INTRAVENOUS; SUBCUTANEOUS at 10:12

## 2020-01-01 RX ADMIN — CALCIUM CHLORIDE, MAGNESIUM CHLORIDE, DEXTROSE MONOHYDRATE, LACTIC ACID, SODIUM CHLORIDE, SODIUM BICARBONATE AND POTASSIUM CHLORIDE 2000 ML/HR: 3.68; 3.05; 22; 5.4; 6.46; 3.09; .314 INJECTION INTRAVENOUS at 01:52

## 2020-01-01 RX ADMIN — PHENYLEPHRINE HYDROCHLORIDE 100 MCG/MIN: 10 INJECTION INTRAVENOUS at 23:00

## 2020-01-01 RX ADMIN — CALCIUM CHLORIDE, MAGNESIUM CHLORIDE, DEXTROSE MONOHYDRATE, LACTIC ACID, SODIUM CHLORIDE, SODIUM BICARBONATE AND POTASSIUM CHLORIDE 2000 ML/HR: 3.68; 3.05; 22; 5.4; 6.46; 3.09; .314 INJECTION INTRAVENOUS at 21:51

## 2020-01-01 RX ADMIN — HEPARIN SODIUM 3000 UNITS: 1000 INJECTION INTRAVENOUS; SUBCUTANEOUS at 15:35

## 2020-01-01 RX ADMIN — Medication 1 AMPULE: at 08:22

## 2020-01-01 RX ADMIN — ENOXAPARIN SODIUM 40 MG: 40 INJECTION SUBCUTANEOUS at 12:06

## 2020-01-01 RX ADMIN — Medication 30 ML: at 05:57

## 2020-01-01 RX ADMIN — CHLORHEXIDINE GLUCONATE 15 ML: 0.12 RINSE ORAL at 21:26

## 2020-01-01 RX ADMIN — NOREPINEPHRINE BITARTRATE 40 MCG/MIN: 1 INJECTION, SOLUTION, CONCENTRATE INTRAVENOUS at 15:19

## 2020-01-01 RX ADMIN — CHLORHEXIDINE GLUCONATE 15 ML: 0.12 RINSE ORAL at 09:28

## 2020-01-01 RX ADMIN — CHLORHEXIDINE GLUCONATE 15 ML: 0.12 RINSE ORAL at 20:55

## 2020-01-01 RX ADMIN — INSULIN LISPRO 2 UNITS: 100 INJECTION, SOLUTION INTRAVENOUS; SUBCUTANEOUS at 17:25

## 2020-01-01 RX ADMIN — SODIUM BICARBONATE 100 MEQ: 84 INJECTION, SOLUTION INTRAVENOUS at 18:11

## 2020-01-01 RX ADMIN — CALCIUM CHLORIDE, MAGNESIUM CHLORIDE, DEXTROSE MONOHYDRATE, LACTIC ACID, SODIUM CHLORIDE, SODIUM BICARBONATE AND POTASSIUM CHLORIDE 2000 ML/HR: 3.68; 3.05; 22; 5.4; 6.46; 3.09; .314 INJECTION INTRAVENOUS at 17:04

## 2020-01-01 RX ADMIN — Medication 10 ML: at 13:55

## 2020-01-01 RX ADMIN — Medication 30 ML: at 22:14

## 2020-01-01 RX ADMIN — MIDODRINE HYDROCHLORIDE 10 MG: 5 TABLET ORAL at 11:34

## 2020-01-01 RX ADMIN — ASCORBIC ACID 5 G: 500 INJECTION, SOLUTION INTRAMUSCULAR; INTRAVENOUS; SUBCUTANEOUS at 00:16

## 2020-01-01 RX ADMIN — HYDROCORTISONE SODIUM SUCCINATE 25 MG: 100 INJECTION, POWDER, FOR SOLUTION INTRAMUSCULAR; INTRAVENOUS at 05:27

## 2020-01-01 RX ADMIN — CALCIUM CHLORIDE, MAGNESIUM CHLORIDE, DEXTROSE MONOHYDRATE, LACTIC ACID, SODIUM CHLORIDE, SODIUM BICARBONATE AND POTASSIUM CHLORIDE 2000 ML/HR: 3.68; 3.05; 22; 5.4; 6.46; 3.09; .314 INJECTION INTRAVENOUS at 19:44

## 2020-01-01 RX ADMIN — INSULIN LISPRO 3 UNITS: 100 INJECTION, SOLUTION INTRAVENOUS; SUBCUTANEOUS at 05:18

## 2020-01-01 RX ADMIN — VASOPRESSIN 0.04 UNITS/MIN: 20 INJECTION INTRAVENOUS at 06:11

## 2020-01-01 RX ADMIN — CALCIUM CHLORIDE, MAGNESIUM CHLORIDE, DEXTROSE MONOHYDRATE, LACTIC ACID, SODIUM CHLORIDE, SODIUM BICARBONATE AND POTASSIUM CHLORIDE 2000 ML/HR: 5.15; 2.03; 22; 5.4; 6.46; 3.09; .157 INJECTION INTRAVENOUS at 12:34

## 2020-01-01 RX ADMIN — CHLORHEXIDINE GLUCONATE 15 ML: 0.12 RINSE ORAL at 09:00

## 2020-01-01 RX ADMIN — SODIUM CHLORIDE 0.04 UNITS/MIN: 9 INJECTION, SOLUTION INTRAVENOUS at 15:53

## 2020-01-01 RX ADMIN — HYDROCORTISONE SODIUM SUCCINATE 50 MG: 100 INJECTION, POWDER, FOR SOLUTION INTRAMUSCULAR; INTRAVENOUS at 21:02

## 2020-01-01 RX ADMIN — HYDROCORTISONE SODIUM SUCCINATE 100 MG: 100 INJECTION, POWDER, FOR SOLUTION INTRAMUSCULAR; INTRAVENOUS at 00:23

## 2020-01-01 RX ADMIN — NOREPINEPHRINE BITARTRATE 200 MCG/MIN: 1 INJECTION, SOLUTION, CONCENTRATE INTRAVENOUS at 10:06

## 2020-01-01 RX ADMIN — Medication 10 ML: at 21:24

## 2020-01-01 RX ADMIN — CALCIUM CHLORIDE, MAGNESIUM CHLORIDE, DEXTROSE MONOHYDRATE, LACTIC ACID, SODIUM CHLORIDE, SODIUM BICARBONATE AND POTASSIUM CHLORIDE 2000 ML/HR: 3.68; 3.05; 22; 5.4; 6.46; 3.09; .314 INJECTION INTRAVENOUS at 22:55

## 2020-01-01 RX ADMIN — NOREPINEPHRINE BITARTRATE 200 MCG/MIN: 1 INJECTION, SOLUTION, CONCENTRATE INTRAVENOUS at 15:46

## 2020-01-01 RX ADMIN — HYDROCORTISONE SODIUM SUCCINATE 100 MG: 100 INJECTION, POWDER, FOR SOLUTION INTRAMUSCULAR; INTRAVENOUS at 05:16

## 2020-01-01 RX ADMIN — CHLORHEXIDINE GLUCONATE 15 ML: 0.12 RINSE ORAL at 08:11

## 2020-01-01 RX ADMIN — PREDNISONE 10 MG: 10 TABLET ORAL at 09:02

## 2020-01-01 RX ADMIN — FAMOTIDINE 20 MG: 10 INJECTION INTRAVENOUS at 12:07

## 2020-01-01 RX ADMIN — Medication 10 ML: at 05:19

## 2020-01-01 RX ADMIN — CALCIUM CHLORIDE, MAGNESIUM CHLORIDE, DEXTROSE MONOHYDRATE, LACTIC ACID, SODIUM CHLORIDE, SODIUM BICARBONATE AND POTASSIUM CHLORIDE 2000 ML/HR: 3.68; 3.05; 22; 5.4; 6.46; 3.09; .314 INJECTION INTRAVENOUS at 16:27

## 2020-01-01 RX ADMIN — CALCIUM CHLORIDE, MAGNESIUM CHLORIDE, DEXTROSE MONOHYDRATE, LACTIC ACID, SODIUM CHLORIDE, SODIUM BICARBONATE AND POTASSIUM CHLORIDE 2000 ML/HR: 3.68; 3.05; 22; 5.4; 6.46; 3.09; .314 INJECTION INTRAVENOUS at 09:26

## 2020-01-01 RX ADMIN — INSULIN LISPRO 3 UNITS: 100 INJECTION, SOLUTION INTRAVENOUS; SUBCUTANEOUS at 17:59

## 2020-01-01 RX ADMIN — CASTOR OIL AND BALSAM, PERU: 788; 87 OINTMENT TOPICAL at 09:03

## 2020-01-01 RX ADMIN — Medication: at 05:24

## 2020-01-01 RX ADMIN — CHLORHEXIDINE GLUCONATE 15 ML: 0.12 RINSE ORAL at 08:10

## 2020-01-01 RX ADMIN — SODIUM BICARBONATE: 84 INJECTION, SOLUTION INTRAVENOUS at 02:51

## 2020-01-01 RX ADMIN — Medication 10 ML: at 05:21

## 2020-01-01 RX ADMIN — ALBUMIN (HUMAN) 25 G: 0.25 INJECTION, SOLUTION INTRAVENOUS at 18:22

## 2020-01-01 RX ADMIN — CALCIUM CHLORIDE, MAGNESIUM CHLORIDE, DEXTROSE MONOHYDRATE, LACTIC ACID, SODIUM CHLORIDE, SODIUM BICARBONATE AND POTASSIUM CHLORIDE 2000 ML/HR: 3.68; 3.05; 22; 5.4; 6.46; 3.09; .314 INJECTION INTRAVENOUS at 13:53

## 2020-01-01 RX ADMIN — MINERAL OIL AND WHITE PETROLATUM: 150; 830 OINTMENT OPHTHALMIC at 20:50

## 2020-01-01 RX ADMIN — HYDROCORTISONE SODIUM SUCCINATE 50 MG: 100 INJECTION, POWDER, FOR SOLUTION INTRAMUSCULAR; INTRAVENOUS at 14:27

## 2020-01-01 RX ADMIN — INSULIN LISPRO 3 UNITS: 100 INJECTION, SOLUTION INTRAVENOUS; SUBCUTANEOUS at 01:17

## 2020-01-01 RX ADMIN — CALCIUM CHLORIDE, MAGNESIUM CHLORIDE, DEXTROSE MONOHYDRATE, LACTIC ACID, SODIUM CHLORIDE, SODIUM BICARBONATE AND POTASSIUM CHLORIDE 2000 ML/HR: 3.68; 3.05; 22; 5.4; 6.46; 3.09; .314 INJECTION INTRAVENOUS at 06:32

## 2020-01-01 RX ADMIN — HYDROCORTISONE SODIUM SUCCINATE 50 MG: 100 INJECTION, POWDER, FOR SOLUTION INTRAMUSCULAR; INTRAVENOUS at 05:12

## 2020-01-01 RX ADMIN — FAMOTIDINE 20 MG: 10 INJECTION INTRAVENOUS at 08:48

## 2020-01-01 RX ADMIN — ZINC SULFATE 220 MG (50 MG) CAPSULE 1 CAPSULE: CAPSULE at 12:08

## 2020-01-01 RX ADMIN — SODIUM CHLORIDE 0.04 UNITS/MIN: 9 INJECTION, SOLUTION INTRAVENOUS at 02:10

## 2020-01-01 RX ADMIN — SODIUM BICARBONATE: 84 INJECTION, SOLUTION INTRAVENOUS at 13:42

## 2020-01-01 RX ADMIN — HYDROCORTISONE SODIUM SUCCINATE 50 MG: 100 INJECTION, POWDER, FOR SOLUTION INTRAMUSCULAR; INTRAVENOUS at 05:19

## 2020-01-01 RX ADMIN — HUMAN INSULIN 10 UNITS: 100 INJECTION, SOLUTION SUBCUTANEOUS at 23:32

## 2020-01-01 RX ADMIN — PHENYLEPHRINE HYDROCHLORIDE 230 MCG/MIN: 10 INJECTION INTRAVENOUS at 18:49

## 2020-01-01 RX ADMIN — DEXTROSE MONOHYDRATE 12.5 G: 500 INJECTION PARENTERAL at 01:12

## 2020-01-01 RX ADMIN — VANCOMYCIN HYDROCHLORIDE 1000 MG: 1 INJECTION, POWDER, LYOPHILIZED, FOR SOLUTION INTRAVENOUS at 18:07

## 2020-01-01 RX ADMIN — HUMAN INSULIN 10 UNITS: 100 INJECTION, SOLUTION SUBCUTANEOUS at 18:09

## 2020-01-01 RX ADMIN — HYDROCORTISONE SODIUM SUCCINATE 100 MG: 100 INJECTION, POWDER, FOR SOLUTION INTRAMUSCULAR; INTRAVENOUS at 18:01

## 2020-01-01 RX ADMIN — Medication 10 ML: at 03:48

## 2020-01-01 RX ADMIN — HYDROCORTISONE SODIUM SUCCINATE 100 MG: 100 INJECTION, POWDER, FOR SOLUTION INTRAMUSCULAR; INTRAVENOUS at 00:27

## 2020-01-01 RX ADMIN — ACETAMINOPHEN 650 MG: 325 TABLET, FILM COATED ORAL at 07:55

## 2020-01-01 RX ADMIN — HUMAN INSULIN 10 UNITS: 100 INJECTION, SOLUTION SUBCUTANEOUS at 05:31

## 2020-01-01 RX ADMIN — Medication 10 ML: at 13:18

## 2020-01-01 RX ADMIN — HYDROCORTISONE SODIUM SUCCINATE 100 MG: 100 INJECTION, POWDER, FOR SOLUTION INTRAMUSCULAR; INTRAVENOUS at 13:57

## 2020-01-01 RX ADMIN — PROPOFOL 20 MCG/KG/MIN: 10 INJECTION, EMULSION INTRAVENOUS at 01:21

## 2020-01-01 RX ADMIN — CHLORHEXIDINE GLUCONATE 15 ML: 0.12 RINSE ORAL at 20:35

## 2020-01-01 RX ADMIN — INSULIN LISPRO 3 UNITS: 100 INJECTION, SOLUTION INTRAVENOUS; SUBCUTANEOUS at 12:22

## 2020-01-01 RX ADMIN — ALBUMIN (HUMAN) 25 G: 0.25 INJECTION, SOLUTION INTRAVENOUS at 11:31

## 2020-01-01 RX ADMIN — NOREPINEPHRINE BITARTRATE 12 MCG/MIN: 1 INJECTION, SOLUTION, CONCENTRATE INTRAVENOUS at 20:06

## 2020-01-01 RX ADMIN — NOREPINEPHRINE BITARTRATE 57 MCG/MIN: 1 INJECTION, SOLUTION, CONCENTRATE INTRAVENOUS at 01:49

## 2020-01-01 RX ADMIN — CALCIUM CHLORIDE, MAGNESIUM CHLORIDE, DEXTROSE MONOHYDRATE, LACTIC ACID, SODIUM CHLORIDE, SODIUM BICARBONATE AND POTASSIUM CHLORIDE 2000 ML/HR: 3.68; 3.05; 22; 5.4; 6.46; 3.09; .314 INJECTION INTRAVENOUS at 05:18

## 2020-01-01 RX ADMIN — VANCOMYCIN HYDROCHLORIDE 1000 MG: 1 INJECTION, POWDER, LYOPHILIZED, FOR SOLUTION INTRAVENOUS at 18:21

## 2020-01-01 RX ADMIN — CHLORHEXIDINE GLUCONATE 15 ML: 0.12 RINSE ORAL at 21:24

## 2020-01-01 RX ADMIN — MIDODRINE HYDROCHLORIDE 10 MG: 5 TABLET ORAL at 17:34

## 2020-01-01 RX ADMIN — CALCIUM CHLORIDE, MAGNESIUM CHLORIDE, DEXTROSE MONOHYDRATE, LACTIC ACID, SODIUM CHLORIDE, SODIUM BICARBONATE AND POTASSIUM CHLORIDE 2000 ML/HR: 3.68; 3.05; 22; 5.4; 6.46; 3.09; .314 INJECTION INTRAVENOUS at 00:43

## 2020-01-01 RX ADMIN — PROPOFOL 15 MCG/KG/MIN: 10 INJECTION, EMULSION INTRAVENOUS at 15:18

## 2020-01-01 RX ADMIN — CASTOR OIL AND BALSAM, PERU: 788; 87 OINTMENT TOPICAL at 08:05

## 2020-01-01 RX ADMIN — CEFEPIME 1 G: 1 INJECTION, POWDER, FOR SOLUTION INTRAMUSCULAR; INTRAVENOUS at 13:00

## 2020-01-01 RX ADMIN — NOREPINEPHRINE BITARTRATE 75 MCG/MIN: 1 INJECTION, SOLUTION, CONCENTRATE INTRAVENOUS at 17:59

## 2020-01-01 RX ADMIN — Medication: at 08:11

## 2020-01-01 RX ADMIN — INSULIN LISPRO 3 UNITS: 100 INJECTION, SOLUTION INTRAVENOUS; SUBCUTANEOUS at 12:08

## 2020-01-01 RX ADMIN — ALBUMIN (HUMAN) 12.5 G: 0.25 INJECTION, SOLUTION INTRAVENOUS at 05:32

## 2020-01-01 RX ADMIN — CALCIUM CHLORIDE, MAGNESIUM CHLORIDE, DEXTROSE MONOHYDRATE, LACTIC ACID, SODIUM CHLORIDE, SODIUM BICARBONATE AND POTASSIUM CHLORIDE 2000 ML/HR: 3.68; 3.05; 22; 5.4; 6.46; 3.09; .314 INJECTION INTRAVENOUS at 09:12

## 2020-01-01 RX ADMIN — DOBUTAMINE IN DEXTROSE 2.5 MCG/KG/MIN: 200 INJECTION, SOLUTION INTRAVENOUS at 06:52

## 2020-01-01 RX ADMIN — CALCIUM CHLORIDE, MAGNESIUM CHLORIDE, DEXTROSE MONOHYDRATE, LACTIC ACID, SODIUM CHLORIDE, SODIUM BICARBONATE AND POTASSIUM CHLORIDE 2000 ML/HR: 3.68; 3.05; 22; 5.4; 6.46; 3.09; .314 INJECTION INTRAVENOUS at 07:57

## 2020-01-01 RX ADMIN — ASCORBIC ACID 5 G: 500 INJECTION, SOLUTION INTRAMUSCULAR; INTRAVENOUS; SUBCUTANEOUS at 12:16

## 2020-01-01 RX ADMIN — CEFEPIME 1 G: 1 INJECTION, POWDER, FOR SOLUTION INTRAMUSCULAR; INTRAVENOUS at 13:14

## 2020-01-01 RX ADMIN — CALCIUM CHLORIDE, MAGNESIUM CHLORIDE, DEXTROSE MONOHYDRATE, LACTIC ACID, SODIUM CHLORIDE, SODIUM BICARBONATE AND POTASSIUM CHLORIDE 2000 ML/HR: 3.68; 3.05; 22; 5.4; 6.46; 3.09; .314 INJECTION INTRAVENOUS at 20:25

## 2020-05-15 PROBLEM — A41.9 SEPSIS (HCC): Status: ACTIVE | Noted: 2020-01-01

## 2020-05-15 NOTE — ED PROVIDER NOTES
EMERGENCY DEPARTMENT HISTORY AND PHYSICAL EXAM 
 
 
Date: 5/15/2020 Patient Name: Diane House History of Presenting Illness Chief Complaint Patient presents with  Fever  
  oral temp of 103.2; reports intermittent chills; denies any exposure to covid-19; denies SOB/cough/sore throat  Abnormal Lab Results  
  pt seen here yesterday and diagnosed with low magnesium; pt began having intermittent tremors today; states that he was unable to fill his magnesium prescription yesterday History Provided By: Patient and Patient's Wife HPI: Diane House, 71 y.o. male with PMHx significant for A. fib, CHF, diabetes, hypertension, status post pacemaker placement, who presents with a chief complaint of feeling weak since yesterday. Patient apparently was seen at some facility yesterday and diagnosed with low magnesium. Was discharged home but today was still feeling weak and generally unwell therefore came back to the emergency department for evaluation. Patient denies any cough or cold symptoms, abdominal pain, nausea, vomiting, diarrhea, urinary symptoms, neck pain or stiffness. Denies fever at home. Denies any known sick contacts. PCP: Libertad Whittaker MD 
 
There are no other complaints, changes, or physical findings at this time. Current Facility-Administered Medications Medication Dose Route Frequency Provider Last Rate Last Dose  sodium chloride (NS) flush 5-10 mL  5-10 mL IntraVENous PRN Rosendo March MD      
 NOREPINephrine (LEVOPHED) 8 mg in 5% dextrose 250mL (32 mcg/mL) infusion  2-100 mcg/min IntraVENous TITRATE Rosendo March MD 7.5 mL/hr at 05/16/20 0042 4 mcg/min at 05/16/20 1428 Current Outpatient Medications Medication Sig Dispense Refill  nebivolol HCl (BYSTOLIC PO) Take  by mouth daily.  dulaglutide (TRULICITY) 1.5 OQ/2.3 mL sub-q pen 1.5 mg by SubCUTAneous route every seven (7) days.  apixaban (ELIQUIS PO) Take  by mouth.  furosemide (LASIX) 80 mg tablet Take 1 Tab by mouth two (2) times a day. 60 Tab 1  
 sAXagliptin (ONGLYZA) 2.5 mg tablet Take 1 Tab by mouth daily. 30 Tab 1  potassium chloride SR (K-TAB) 20 mEq tablet Take 1 Tab by mouth daily. 30 Tab 1  
 magnesium chloride (MAG-SR) 64 mg tablet Take 64 mg by mouth two (2) times a day.  simvastatin (ZOCOR) 20 mg tablet Take 20 mg by mouth nightly.  glimepiride (AMARYL) 4 mg tablet Take 4 mg by mouth every morning. Past History Past Medical History: 
Past Medical History:  
Diagnosis Date  A-fib ([facility] [address]  Arrhythmia   
 atrial fibrillation   Diabetes ([facility] [address]  Endocrine disease   
 diabetes  Hypertension  Irregular heart beat Past Surgical History: 
Past Surgical History:  
Procedure Laterality Date  CARDIAC SURG PROCEDURE UNLIST    
 defib placed in left chest  
 
Family History: 
Family History Problem Relation Age of Onset  Heart Disease Mother  Diabetes Father  Heart Disease Father Social History: 
Social History Tobacco Use  Smoking status: Former Smoker Last attempt to quit: 1993 Years since quittin.8  Smokeless tobacco: Never Used Substance Use Topics  Alcohol use: No  
 Drug use: No  
 
Allergies: 
No Known Allergies Review of Systems Review of Systems Constitutional: Positive for fatigue. Negative for chills and fever. HENT: Negative for congestion, rhinorrhea and sore throat. Respiratory: Negative for cough and shortness of breath. Cardiovascular: Negative for chest pain. Gastrointestinal: Negative for abdominal pain, nausea and vomiting. Genitourinary: Negative for dysuria and urgency. Skin: Negative for rash. Neurological: Negative for dizziness, light-headedness and headaches. All other systems reviewed and are negative. Physical Exam  
Physical Exam 
Vitals signs and nursing note reviewed. Constitutional: General: He is not in acute distress. Appearance: He is well-developed. He is ill-appearing. HENT:  
   Head: Normocephalic and atraumatic. Eyes:  
   Conjunctiva/sclera: Conjunctivae normal.  
   Pupils: Pupils are equal, round, and reactive to light. Neck: Musculoskeletal: Normal range of motion. No neck rigidity. Cardiovascular:  
   Rate and Rhythm: Normal rate and regular rhythm. Pulmonary:  
   Effort: Pulmonary effort is normal. No respiratory distress. Breath sounds: Normal breath sounds. No stridor. Abdominal:  
   General: There is no distension. Palpations: Abdomen is soft. Tenderness: There is no abdominal tenderness. Musculoskeletal: Normal range of motion. Skin: 
   General: Skin is warm and dry. Neurological:  
   Mental Status: He is alert and oriented to person, place, and time. Diagnostic Study Results Labs - Recent Results (from the past 12 hour(s)) EKG, 12 LEAD, INITIAL Collection Time: 05/15/20  5:01 PM  
Result Value Ref Range Ventricular Rate 100 BPM  
 Atrial Rate 227 BPM  
 QRS Duration 112 ms  
 Q-T Interval 328 ms QTC Calculation (Bezet) 423 ms Calculated R Axis -48 degrees Calculated T Axis 77 degrees Diagnosis Ventricular-paced rhythm Biventricular pacemaker detected When compared with ECG of 19-JAN-2019 06:28, 
Vent. rate has increased BY  19 BPM 
  
LACTIC ACID Collection Time: 05/15/20  5:34 PM  
Result Value Ref Range Lactic acid 2.3 (HH) 0.4 - 2.0 MMOL/L  
URINALYSIS W/ RFLX MICROSCOPIC Collection Time: 05/15/20  5:34 PM  
Result Value Ref Range Color YELLOW/STRAW Appearance CLOUDY (A) CLEAR Specific gravity 1.025 1.003 - 1.030    
 pH (UA) 5.0 5.0 - 8.0 Protein 30 (A) NEG mg/dL Glucose >1,000 (A) NEG mg/dL Ketone TRACE (A) NEG mg/dL Bilirubin Negative NEG Blood Negative NEG Urobilinogen 0.2 0.2 - 1.0 EU/dL  Nitrites Negative NEG    
 Leukocyte Esterase Negative NEG    
 WBC 0-4 0 - 4 /hpf  
 RBC 0-5 0 - 5 /hpf Epithelial cells FEW FEW /lpf Bacteria Negative NEG /hpf Amorphous Crystals 2+ (A) NEG  
SAMPLES BEING HELD Collection Time: 05/15/20  5:34 PM  
Result Value Ref Range SAMPLES BEING HELD 1BL,1SST,1PST,2RD COMMENT Add-on orders for these samples will be processed based on acceptable specimen integrity and analyte stability, which may vary by analyte. MAGNESIUM Collection Time: 05/15/20  5:34 PM  
Result Value Ref Range Magnesium 1.7 1.6 - 2.4 mg/dL LD Collection Time: 05/15/20  5:34 PM  
Result Value Ref Range  (H) 85 - 241 U/L  
PROCALCITONIN Collection Time: 05/15/20  5:34 PM  
Result Value Ref Range Procalcitonin 27.15 ng/mL METABOLIC PANEL, COMPREHENSIVE Collection Time: 05/15/20  5:39 PM  
Result Value Ref Range Sodium 130 (L) 136 - 145 mmol/L Potassium 4.0 3.5 - 5.1 mmol/L Chloride 93 (L) 97 - 108 mmol/L  
 CO2 32 21 - 32 mmol/L Anion gap 5 5 - 15 mmol/L Glucose 189 (H) 65 - 100 mg/dL BUN 46 (H) 6 - 20 MG/DL Creatinine 3.24 (H) 0.70 - 1.30 MG/DL  
 BUN/Creatinine ratio 14 12 - 20 GFR est AA 23 (L) >60 ml/min/1.73m2 GFR est non-AA 19 (L) >60 ml/min/1.73m2 Calcium 9.4 8.5 - 10.1 MG/DL Bilirubin, total 1.0 0.2 - 1.0 MG/DL  
 ALT (SGPT) 16 12 - 78 U/L  
 AST (SGOT) 37 15 - 37 U/L Alk. phosphatase 81 45 - 117 U/L Protein, total 8.1 6.4 - 8.2 g/dL Albumin 2.8 (L) 3.5 - 5.0 g/dL Globulin 5.3 (H) 2.0 - 4.0 g/dL A-G Ratio 0.5 (L) 1.1 - 2.2    
CBC WITH AUTOMATED DIFF Collection Time: 05/15/20  5:39 PM  
Result Value Ref Range WBC 14.2 (H) 4.1 - 11.1 K/uL  
 RBC 4.27 4. 10 - 5.70 M/uL  
 HGB 11.9 (L) 12.1 - 17.0 g/dL HCT 36.4 (L) 36.6 - 50.3 % MCV 85.2 80.0 - 99.0 FL  
 MCH 27.9 26.0 - 34.0 PG  
 MCHC 32.7 30.0 - 36.5 g/dL  
 RDW 15.7 (H) 11.5 - 14.5 % PLATELET 710 749 - 881 K/uL  MPV 10.6 8.9 - 12.9 FL  
 NRBC 0.0 0  WBC ABSOLUTE NRBC 0.00 0.00 - 0.01 K/uL NEUTROPHILS 82 (H) 32 - 75 % BAND NEUTROPHILS 12 % LYMPHOCYTES 3 (L) 12 - 49 % MONOCYTES 3 (L) 5 - 13 % EOSINOPHILS 0 0 - 7 % BASOPHILS 0 0 - 1 % IMMATURE GRANULOCYTES 0 0.0 - 0.5 % ABS. NEUTROPHILS 13.4 (H) 1.8 - 8.0 K/UL  
 ABS. LYMPHOCYTES 0.4 (L) 0.8 - 3.5 K/UL  
 ABS. MONOCYTES 0.4 0.0 - 1.0 K/UL  
 ABS. EOSINOPHILS 0.0 0.0 - 0.4 K/UL  
 ABS. BASOPHILS 0.0 0.0 - 0.1 K/UL  
 ABS. IMM. GRANS. 0.0 0.00 - 0.04 K/UL  
 DF MANUAL    
 RBC COMMENTS ANISOCYTOSIS 
1+ WBC COMMENTS VACUOLATED POLYS    
LACTIC ACID Collection Time: 05/15/20  9:36 PM  
Result Value Ref Range Lactic acid 2.1 (HH) 0.4 - 2.0 MMOL/L  
SARS-COV-2 Collection Time: 05/15/20 10:14 PM  
Result Value Ref Range Specimen source Nasopharyngeal    
 SARS-CoV-2 PENDING   
 SARS-CoV-2 PENDING Specimen source Nasopharyngeal    
 COVID-19 rapid test PENDING   
 COVID-19 PENDING NEG  
D DIMER Collection Time: 05/15/20 11:55 PM  
Result Value Ref Range D-dimer 1.31 (H) 0.00 - 0.65 mg/L FEU Radiologic Studies -  
CT HEAD WO CONT Final Result IMPRESSION:   
No acute abnormality CT ABD PELV WO CONT Final Result IMPRESSION: No Acute Disease. XR CHEST PORT Final Result IMPRESSION: No Acute Disease. XR CHEST PORT    (Results Pending) Ct Head Wo Cont Result Date: 5/15/2020 IMPRESSION: No acute abnormality Ct Abd Pelv Wo Cont Result Date: 5/15/2020 IMPRESSION: No Acute Disease. Xr Chest [facility] Result Date: 5/15/2020 IMPRESSION: No Acute Disease. Medical Decision Making I am the first provider for this patient. I reviewed the vital signs, available nursing notes, past medical history, past surgical history, family history and social history. Vital Signs-Reviewed the patient's vital signs. Patient Vitals for the past 12 hrs: 
 Temp Pulse Resp BP SpO2 05/16/20 0036  84 27  96 % 05/16/20 0030  83 25 94/56   
05/15/20 2349  87 20 91/56 97 % 05/15/20 2345  90 23 121/63 90 % 05/15/20 2330  85 26 (!) 85/51 95 % 05/15/20 2318  91 25 96/55   
05/15/20 2245  86 27 99/60 96 % 05/15/20 2200  91 19 100/45 93 % 05/15/20 2139 99 °F (37.2 °C) 86 27  94 % 05/15/20 2136    95/50   
05/15/20 2030  90 20 (!) 87/51 96 % 05/15/20 2000  89 23 (!) 85/52 96 % 05/15/20 1957 99.2 °F (37.3 °C)      
05/15/20 1955  84 24 90/54 91 % 05/15/20 1800  97 (!) 32 104/53 94 % 05/15/20 1745 (!) 103.2 °F (39.6 °C) 100 (!) 32 102/51 95 % 05/15/20 1730  100 (!) 32 97/61 96 % 05/15/20 1645 (!) 103.2 °F (39.6 °C) 93 (!) 34 (!) 138/115 94 % Pulse Oximetry Analysis - 96% on ra Cardiac Monitor:  
Rate: 93 bpm 
Rhythm: Paced ED EKG interpretation: 
Rhythm: paced; and regular . Rate (approx.): 100; Other findings: borderline ekg. This EKG was interpreted by LINDA Shin MD,ED Provider. Records Reviewed: Nursing Notes and Old Medical Records Provider Notes (Medical Decision Making):  
Patient presents with weakness. Noted to be febrile on arrival and ill-appearing. Differential is broad and includes pneumonia, UTI, COVID-19. Will check basic lab work, chest x-ray, urinalysis. Will give gentle IV fluids given history of CHF with an EF of about 20%. ED Course:  
Initial assessment performed. The patients presenting problems have been discussed, and they are in agreement with the care plan formulated and outlined with them. I have encouraged them to ask questions as they arise throughout their visit. ED Course as of May 16 0051 Fri May 15, 2020  
5219 Per wife: weak and dizzy yesterday, seen at urgent care, low Mg. Seemed confused today. Lawyer Maki ED Course User Index Gracia Ariza MD  
 
 
Patient's blood pressure remained low despite IV fluids.   Patient not given full 30 cc/kg bolus at his request due to history of heart failure. Patient got CT scans of the head as well as abdomen given new renal failure the CTs of the abdomen were performed without contrast.  No source of infection apparent on imaging or lab work. Will swab for COVID-19 and send inflammatory markers. Patient feeling much better and appears much improved after fever broke. Procedure Note - Central Line Placement:  
12:42 AM 
Performed by: Dr. Raghu Rodas, supervised by Dr. Yesi Shelley Immediately prior to the procedure, the patient was reevaluated and found suitable for the planned procedure and any planned medications. Immediately prior to the procedure a time out was called to verify the correct patient, procedure, equipment, staff, and marking as appropriate. Area was cleansed with Chlorprep and anesthetized with 3mLs of 1% lidocaine. Prepped and draped in sterile fashion. Landmarks identified. 18 gauge needle with triple lumen catheter was inserted into pt's Right, Internal Jugular Vein with ultrasound guidance. Line sutured in place; sterile dressing applied. Position: Trendelenburg Number of attempts: 2 Estimated blood loss: minimal 
The procedure took 16-30 minutes, and pt tolerated well. Critical Care: CRITICAL CARE NOTE : 
12:47 AM 
IMPENDING DETERIORATION -Airway, Respiratory, Cardiovascular, CNS and Metabolic ASSOCIATED RISK FACTORS - Hypotension and Shock MANAGEMENT- Bedside Assessment INTERPRETATION -  Xrays, ECG and Blood Pressure INTERVENTIONS - hemodynamic mngmt and vascular control CASE REVIEW - Hospitalist 
TREATMENT RESPONSE -Improved PERFORMED BY - Self NOTES   : 
 
I have spent 40 minutes of critical care time involved in lab review, consultations with specialist, family decision- making, bedside attention and documentation. During this entire length of time I was immediately available to the patient . Luis De La Paz MD 
 
 
Disposition: Admission Note: 
Patient is being admitted to the hospital by Dr. Abdiel Espinoza, Service: Hospitalist.  The results of their tests and reasons for their admission have been discussed with them and available family. They convey agreement and understanding for the need to be admitted and for their admission diagnosis. Diagnosis Clinical Impression: 1. Sepsis, due to unspecified organism, unspecified whether acute organ dysfunction present ([facility] [address] This note will not be viewable in [address] Please note that this dictation was completed with [employer], the [employer] voice recognition software. Quite often unanticipated grammatical, syntax, homophones, and other interpretive errors are inadvertently transcribed by the computer software. Please disregard these errors. Please excuse any errors that have escaped final proofreading

## 2020-05-16 NOTE — ED NOTES
Central line in place, waiting for verification by xray. Started levo in peripheral IV until verification. Will switch levo to central line once verification is confirmed.

## 2020-05-16 NOTE — ED NOTES
Pt assisted back to bed. Bedside report given to Pati Ortiz RN. All questions welcomed and answered. Relinquished care of pt at this time.

## 2020-05-16 NOTE — ED NOTES
Pt assisted to bedside commode. Call bell within reach. Pt states he will push janiya bell when finished and will not stand without assistance.

## 2020-05-16 NOTE — H&P
Hospitalist Admission Note NAME: Mike Alejandra :  1955 MRN:  [id number] Date/Time:  5/15/2020 11:00 PM 
 
Patient PCP: Liliane Ibrahim MD 
______________________________________________________________________ Given the patient's current clinical presentation, I have a high level of concern for decompensation if discharged from the emergency department. Complex decision making was performed, which includes reviewing the patient's available past medical records, laboratory results, and x-ray films. My assessment of this patient's clinical condition and my plan of care is as follows. Assessment / Plan: 
Septic shock source unknown  Admit patient to ICU start patient on broad-spectrum antibiotic Rocephin and azithromycin  start patient on Levophed 
 gentle hydration with IV fluid Monitoring lactic acid Acute renal failure  IV fluid nephrology consultation Avoid nephrotoxic medication Elevated troponin rule out non-STEMI start patient on aspirin Cardiology consultation DMhold oral hypoglycemic Start patient/scale with insulin Atrial fibrillation continue Eliquis Hypertension hold antihypertensive medication Code Status: Full Surrogate Decision Maker: DVT Prophylaxis: Eliquis GI Prophylaxis: not indicated Subjective: CHIEF COMPLAINT: fever HISTORY OF PRESENT ILLNESS:    
71years old male from home with past medical history significant for hypertension: CHF, diabetes, hypertension presented to the hospital for evaluation of generalized weakness associated with fever started 2 days ago, patient denies any chest pain denies any productive cough denies any nausea vomiting any abdominal pain any sick contact, patient was recently diagnosed with low magnesium at another facility's and was prescribed oral magnesium and discharged home, blood work in ED was significant for elevated white blood cell count 14.2, chest x-ray show no acute abnormality. We were asked to admit for work up and evaluation of the above problems. Past Medical History:  
Diagnosis Date  A-fib ([facility] [address]  Arrhythmia   
 atrial fibrillation   Diabetes ([facility]  Endocrine disease   
 diabetes  Hypertension  Irregular heart beat Past Surgical History:  
Procedure Laterality Date  CARDIAC SURG PROCEDURE UNLIST    
 defib placed in left chest  
 
 
Social History Tobacco Use  Smoking status: Former Smoker Last attempt to quit: 1993 Years since quittin.8  Smokeless tobacco: Never Used Substance Use Topics  Alcohol use: No  
  
 
Family History Problem Relation Age of Onset  Heart Disease Mother  Diabetes Father  Heart Disease Father No Known Allergies Prior to Admission medications Medication Sig Start Date End Date Taking? Authorizing Provider  
nebivolol HCl (BYSTOLIC PO) Take  by mouth daily. Leia Chris MD  
dulaglutide (TRULICITY) 1.5 QI/1.8 mL sub-q pen 1.5 mg by SubCUTAneous route every seven (7) days. Leia Chris MD  
apixaban (ELIQUIS PO) Take  by mouth. Leia Chris MD  
furosemide (LASIX) 80 mg tablet Take 1 Tab by mouth two (2) times a day. 17   Mortimer Daubs, MD  
sAXagliptin (ONGLYZA) 2.5 mg tablet Take 1 Tab by mouth daily. 17   Mortimer Daubs, MD  
potassium chloride SR (K-TAB) 20 mEq tablet Take 1 Tab by mouth daily. 17   Mortimer Daubs, MD  
magnesium chloride (MAG-SR) 64 mg tablet Take 64 mg by mouth two (2) times a day. Provider, Nicola  
simvastatin (ZOCOR) 20 mg tablet Take 20 mg by mouth nightly. Leia Chris MD  
glimepiride (AMARYL) 4 mg tablet Take 4 mg by mouth every morning. Leia Chris MD  
 
 
REVIEW OF SYSTEMS:    
I am not able to complete the review of systems because: The patient is intubated and sedated The patient has altered mental status due to his acute medical problems The patient has baseline aphasia from prior stroke(s) The patient has baseline dementia and is not reliable historian The patient is in acute medical distress and unable to provide information Total of 12 systems reviewed as follows:   
   POSITIVE= underlined text  Negative = text not underlined General:  fever, chills, sweats, generalized weakness, weight loss/gain,  
   loss of appetite Eyes:    blurred vision, eye pain, loss of vision, double vision ENT:    rhinorrhea, pharyngitis Respiratory:   cough, sputum production, SOB, GROVES, wheezing, pleuritic pain  
Cardiology:   chest pain, palpitations, orthopnea, PND, edema, syncope Gastrointestinal:  abdominal pain , N/V, diarrhea, dysphagia, constipation, bleeding Genitourinary:  frequency, urgency, dysuria, hematuria, incontinence Muskuloskeletal :  arthralgia, myalgia, back pain Hematology:  easy bruising, nose or gum bleeding, lymphadenopathy Dermatological: rash, ulceration, pruritis, color change / jaundice Endocrine:   hot flashes or polydipsia Neurological:  headache, dizziness, confusion, focal weakness, paresthesia, Speech difficulties, memory loss, gait difficulty Psychological: Feelings of anxiety, depression, agitation Objective: VITALS:   
Visit Vitals /45 Pulse 91 Temp 99 °F (37.2 °C) Resp 19 Ht 6' 3\" (1.905 m) Wt 112.6 kg (248 lb 3.8 oz) SpO2 (!) 85% BMI 31.03 kg/m² PHYSICAL EXAM: 
 
 
_______________________________________________________________________ Care Plan discussed with: 
  Comments Patient y Family RN y   
Care Manager Consultant:     
_______________________________________________________________________ Expected  Disposition:  
Home with Family y HH/PT/OT/RN   
SNF/LTC   
BANDAR   
________________________________________________________________________ TOTAL TIME:  60  Minutes Critical Care Provided   35  Minutes non procedure based Comments  
 y Reviewed previous records  
>50% of visit spent in counseling and coordination of care y Discussion with patient and/or family and questions answered 
  
 
________________________________________________________________________ Signed: Radha Greer MD 
 
Procedures: see electronic medical records for all procedures/Xrays and details which were not copied into this note but were reviewed prior to creation of Plan. LAB DATA REVIEWED:   
Recent Results (from the past 24 hour(s)) EKG, 12 LEAD, INITIAL Collection Time: 05/15/20  5:01 PM  
Result Value Ref Range Ventricular Rate 100 BPM  
 Atrial Rate 227 BPM  
 QRS Duration 112 ms  
 Q-T Interval 328 ms QTC Calculation (Bezet) 423 ms Calculated R Axis -48 degrees Calculated T Axis 77 degrees Diagnosis Ventricular-paced rhythm Biventricular pacemaker detected When compared with ECG of 19-JAN-2019 06:28, 
Vent.  rate has increased BY  19 BPM 
  
LACTIC ACID  
 Collection Time: 05/15/20  5:34 PM  
Result Value Ref Range Lactic acid 2.3 (HH) 0.4 - 2.0 MMOL/L  
URINALYSIS W/ RFLX MICROSCOPIC Collection Time: 05/15/20  5:34 PM  
Result Value Ref Range Color YELLOW/STRAW Appearance CLOUDY (A) CLEAR Specific gravity 1.025 1.003 - 1.030    
 pH (UA) 5.0 5.0 - 8.0 Protein 30 (A) NEG mg/dL Glucose >1,000 (A) NEG mg/dL Ketone TRACE (A) NEG mg/dL Bilirubin Negative NEG Blood Negative NEG Urobilinogen 0.2 0.2 - 1.0 EU/dL Nitrites Negative NEG Leukocyte Esterase Negative NEG    
 WBC 0-4 0 - 4 /hpf  
 RBC 0-5 0 - 5 /hpf Epithelial cells FEW FEW /lpf Bacteria Negative NEG /hpf Amorphous Crystals 2+ (A) NEG  
SAMPLES BEING HELD Collection Time: 05/15/20  5:34 PM  
Result Value Ref Range SAMPLES BEING HELD 1BL,1SST,1PST,2RD COMMENT Add-on orders for these samples will be processed based on acceptable specimen integrity and analyte stability, which may vary by analyte. MAGNESIUM Collection Time: 05/15/20  5:34 PM  
Result Value Ref Range Magnesium 1.7 1.6 - 2.4 mg/dL LD Collection Time: 05/15/20  5:34 PM  
Result Value Ref Range  (H) 85 - 241 U/L  
PROCALCITONIN Collection Time: 05/15/20  5:34 PM  
Result Value Ref Range Procalcitonin 27.15 ng/mL METABOLIC PANEL, COMPREHENSIVE Collection Time: 05/15/20  5:39 PM  
Result Value Ref Range Sodium 130 (L) 136 - 145 mmol/L Potassium 4.0 3.5 - 5.1 mmol/L Chloride 93 (L) 97 - 108 mmol/L  
 CO2 32 21 - 32 mmol/L Anion gap 5 5 - 15 mmol/L Glucose 189 (H) 65 - 100 mg/dL BUN 46 (H) 6 - 20 MG/DL Creatinine 3.24 (H) 0.70 - 1.30 MG/DL  
 BUN/Creatinine ratio 14 12 - 20 GFR est AA 23 (L) >60 ml/min/1.73m2 GFR est non-AA 19 (L) >60 ml/min/1.73m2 Calcium 9.4 8.5 - 10.1 MG/DL Bilirubin, total 1.0 0.2 - 1.0 MG/DL  
 ALT (SGPT) 16 12 - 78 U/L  
 AST (SGOT) 37 15 - 37 U/L Alk.  phosphatase 81 45 - 117 U/L  
 Protein, total 8.1 6.4 - 8.2 g/dL Albumin 2.8 (L) 3.5 - 5.0 g/dL Globulin 5.3 (H) 2.0 - 4.0 g/dL A-G Ratio 0.5 (L) 1.1 - 2.2    
CBC WITH AUTOMATED DIFF Collection Time: 05/15/20  5:39 PM  
Result Value Ref Range WBC 14.2 (H) 4.1 - 11.1 K/uL  
 RBC 4.27 4. 10 - 5.70 M/uL  
 HGB 11.9 (L) 12.1 - 17.0 g/dL HCT 36.4 (L) 36.6 - 50.3 % MCV 85.2 80.0 - 99.0 FL  
 MCH 27.9 26.0 - 34.0 PG  
 MCHC 32.7 30.0 - 36.5 g/dL  
 RDW 15.7 (H) 11.5 - 14.5 % PLATELET 380 621 - 236 K/uL MPV 10.6 8.9 - 12.9 FL  
 NRBC 0.0 0  WBC ABSOLUTE NRBC 0.00 0.00 - 0.01 K/uL NEUTROPHILS 82 (H) 32 - 75 % BAND NEUTROPHILS 12 % LYMPHOCYTES 3 (L) 12 - 49 % MONOCYTES 3 (L) 5 - 13 % EOSINOPHILS 0 0 - 7 % BASOPHILS 0 0 - 1 % IMMATURE GRANULOCYTES 0 0.0 - 0.5 % ABS. NEUTROPHILS 13.4 (H) 1.8 - 8.0 K/UL  
 ABS. LYMPHOCYTES 0.4 (L) 0.8 - 3.5 K/UL  
 ABS. MONOCYTES 0.4 0.0 - 1.0 K/UL  
 ABS. EOSINOPHILS 0.0 0.0 - 0.4 K/UL  
 ABS. BASOPHILS 0.0 0.0 - 0.1 K/UL  
 ABS. IMM. GRANS. 0.0 0.00 - 0.04 K/UL  
 DF MANUAL    
 RBC COMMENTS ANISOCYTOSIS 
1+ WBC COMMENTS VACUOLATED POLYS    
LACTIC ACID Collection Time: 05/15/20  9:36 PM  
Result Value Ref Range Lactic acid 2.1 (HH) 0.4 - 2.0 MMOL/L  
SARS-COV-2 Collection Time: 05/15/20 10:14 PM  
Result Value Ref Range Specimen source Nasopharyngeal    
 SARS-CoV-2 PENDING   
 SARS-CoV-2 PENDING  Specimen source Nasopharyngeal    
 COVID-19 rapid test PENDING   
 COVID-19 PENDING NEG

## 2020-05-16 NOTE — ED NOTES
MD Sneha Dowling made aware of patient low BP readings. MD stated to continue to monitor and check BP again and after fluids are finished and keep him updated d/t patient might needing a central line.

## 2020-05-16 NOTE — CONSULTS
PULMONARY ASSOCIATES [facility] INTENSIVIST Consult Service Note Pulmonary, Critical Care, and Sleep Medicine Name: Rosanna Mendoza MRN: [id number] : 1955 Hospital: [facility] Date: 2020  Admission date: 5/15/2020 Hospital Day: 2 Subjective/Interval History:  
Seen earlier today on rounds. Pt is unstable and acutely ill in the CCU. Patient was re-evaluated multiple times with repeated discussions with CCU team throughout the day Patient Active Problem List  
Diagnosis Code  Dilated cardiomyopathy (HCC) I42.0  Atrial fibrillation (HCC) I48.91  
 S/P implantation of automatic cardioverter/defibrillator (AICD) Z95.810  
 Irregular heart beat I49.9  Ventral hernia without obstruction or gangrene K43.9  Congestive heart failure (CHF) ([facility]) I50.9  Ventricular tachycardia (HCC) I47.2  Sepsis ([facility] [address] A41.9 IMPRESSION:  
1. Acute Shock - Septic and or cardiogenic- source? WBC now > 20 K 2. COVID 19 suspect- labs just returned- POSITIVE 3. Hyponatremia 4. Chronic combined systolic/diastolic heart failure 5. Hypertensive heart disease with CHF and CKD 6. CKD 7. Chronic anticoagulation 8. Diabetes mellitus type 2  
9. Hyperlipidemia 10. Nonischemic dilated cardiomyopathy EF 20% with mild-mod MR on echo here 11. Chronic atrial fibrillation 12. Status post SJ BIV-ICD implant in 2016 
13. Sleep apnea, undiagnosed/untreated 14. Obesity with recent weight gain 15. Body mass index is 31.03 kg/m². - not a good prone candidate 16. Additional workup outlined below 17. Multiorgan dysfunction as outlined above: Pt has one or more acute or chronic illnesses with severe exacerbation with progression or side effects of treatment that poses a threat to life or bodily function 18.  Pt is unstable, unpredictable needing more CCU monitoring; at high risk of sudden decline and decompensation with life threatening consequenses and continued end organ dysfunction and failure 19. Pt is critically ill. Time spent with pt and staff actively rendering care, managing pt and coordinating care as stated below;  50 minutes, exclusive of any procedures RECOMMENDATIONS/PLAN:  
1. CCU monitoring 2. Droplet plus Isolation 3. Supplemental O2 prn for refractory hypoxia to keep sats > 93% 4. Cardiology and nephrology to see 5. Empiric abx 6. procalcitonin 7. IV vasopressors for circulatory shock refractory to fluids to maintain MAP > 65 8. CXR in AM 
9. Incentive spriometer 10. High flow nasal Cannula oxygen as salvage oxygen delivery device to provide high concentration of oxygen to overcome refractory hypoxia;  
11. NPO except meds while on high dose pressors- at risk for ischemic bowel if fed too quickly 12. Glucose monitoring and SSI 13. Replete electrolytes 14. Labs to follow inflammatory markers electrolytes, renal function and and blood counts 15. Bronchial hygiene with respiratory therapy techniques, bronchodilators 16. Pt needs IV fluids with additives and Drug therapy requiring intensive monitoring for toxicity 17. Prescription drug management with home med reconciliation reviewed 18. DVT, SUP prophylaxis 19. Will be available to assist in medical management while in the CCU pending disposition Subjective/Initial History:  
I have reviewed the flowsheet and previous days notes. Seen earlier today on rounds. I was asked by David Anne MD to see Bharat Mcintosh a 71 y.o.  male  in consultation for a chief complaint of shock. Excerpts from admission 5/15/2020 and consult notes reviewed as follows:  
 
\"Mr. Sharri Wick is a 57 y.o. morbidly obese male with Chronic nonischemic dilated cardiomyopathy EF 17%, Chronic systolic congestive heart failure class, H/o atrial fibrillation, On chronic warfarin, Iatrogenic left bundle branch block with predominant RV pacing, 64-70%, Hypertension, Hyperlipidemia and diabetes presents to [address] [facility] ED C/O Worsening exertional shortness of breath and around 20 pounds weight gain in last 3 weeks. \" 
 
Pt now in CCU. Poor historian. On room air. No fever. No cough. No diarrhea. Not very active. Saw PCP three weeks ago. Some edema. Chart notes [id number] [facility] admission in 2017 with decompensation. Patient PCP: Lety Malik MD 
PMH:  has a past medical history of A-fib ([facility] [address] Arrhythmia, Diabetes ([facility] [address] Endocrine disease, Hypertension, and Irregular heart beat. He also has no past medical history of Difficult intubation, Malignant hyperthermia due to anesthesia, Nausea & vomiting, or Pseudocholinesterase deficiency. PSH:   has a past surgical history that includes pr cardiac surg procedure unlist. FHX: family history includes Diabetes in his father; Heart Disease in his father and mother. SHX:  reports that he quit smoking about 26 years ago. He has never used smokeless tobacco. He reports that he does not drink alcohol or use drugs. ROS:A comprehensive review of systems was negative except for that written in the HPI. No Known Allergies MEDS:  
Current Facility-Administered Medications Medication  sodium chloride (NS) flush 5-40 mL  sodium chloride (NS) flush 5-40 mL  acetaminophen (TYLENOL) tablet 650 mg  
 cefTRIAXone (ROCEPHIN) 1 g in 0.9% sodium chloride (MBP/ADV) 50 mL  azithromycin (ZITHROMAX) 500 mg in 0.9% sodium chloride (MBP/ADV) 250 mL  ondansetron (ZOFRAN) injection 4 mg  alcohol 62% (NOZIN) nasal  1 Ampule  sodium chloride (NS) flush 5-10 mL  NOREPINephrine (LEVOPHED) 8 mg in 5% dextrose 250mL (32 mcg/mL) infusion Current Facility-Administered Medications:  
  sodium chloride (NS) flush 5-40 mL, 5-40 mL, IntraVENous, Q8H, Michelle Covington MD, 10 mL at 05/16/20 1015   sodium chloride (NS) flush 5-40 mL, 5-40 mL, IntraVENous, PRN, Sarah Dowling MD 
  acetaminophen (TYLENOL) tablet 650 mg, 650 mg, Oral, Q6H PRN, Sarah Dowling MD 
  cefTRIAXone (ROCEPHIN) 1 g in 0.9% sodium chloride (MBP/ADV) 50 mL, 1 g, IntraVENous, Q24H, Sarah Dowling MD, Stopped at 20 0200 
  azithromycin (ZITHROMAX) 500 mg in 0.9% sodium chloride (MBP/ADV) 250 mL, 500 mg, IntraVENous, Q24H, Sarah Dowling MD, Stopped at 20 9573   ondansetron (ZOFRAN) injection 4 mg, 4 mg, IntraVENous, Q8H PRN, Sarah Dowling MD, 4 mg at 20 0137 
  alcohol 62% (NOZIN) nasal  1 Ampule, 1 Ampule, Topical, Q12H, Sarah Dowling MD, 1 Ampule at 20 7640   sodium chloride (NS) flush 5-10 mL, 5-10 mL, IntraVENous, PRN, Ye March MD 
  NOREPINephrine (LEVOPHED) 8 mg in 5% dextrose 250mL (32 mcg/mL) infusion, 2-100 mcg/min, IntraVENous, TITRATE, Ye March MD, Last Rate: 75 mL/hr at 20 0646, 40 mcg/min at 20 6097 Objective:  
 
Vital Signs: Telemetry:    AFIB Intake/Output:  
Visit Vitals /59 Pulse 95 Temp 98.3 °F (36.8 °C) Resp 29 Ht 6' 3\" (1.905 m) Wt 112.6 kg (248 lb 3.8 oz) SpO2 92% BMI 31.03 kg/m² Temp (24hrs), Av.2 °F (37.9 °C), Min:98.1 °F (36.7 °C), Max:103.2 °F (39.6 °C) O2 Device: Room air Body mass index is 31.03 kg/m². Wt Readings from Last 4 Encounters:  
05/15/20 112.6 kg (248 lb 3.8 oz) 19 123.8 kg (273 lb) 17 129.3 kg (285 lb)  
17 107.5 kg (237 lb) Intake/Output Summary (Last 24 hours) at 2020 4323 Last data filed at 2020 0600 Gross per 24 hour Intake 1242.43 ml Output  Net 1242.43 ml Last shift:      No intake/output data recorded. Last 3 shifts:  1901 -  0700 In: 1242.4 [I.V.:1242.4] Out: - Hemodynamics:   
CO:   
CI:   
CVP:   
SVR:   PAP Systolic:   
PAP Diastolic:   
PVR:   
AK13:    
 
 Ventilator Settings:     
Mode Rate TV Press PEEP FiO2 PIP Min. Vent Physical Exam: 
 
General:  male; cooperative;  Room air HEENT: NCAT, poor dentition, lips and mucosa dry Eyes: anicteric; conjunctiva clear Neck: no nodes, no cuff leak, no accessory MM use. Chest: no deformity,  
Cardiac: IR regular; no murmur Lungs: distant breath sounds; No wheezes Abd: soft, NT, hypoactive BS Ext: no edema; no joint swelling; No clubbing : NO bright, Neuro: conversatn Psych- no agitation, oriented to person; unable to assess Skin: warm, dry, no cyanosis;  
Pulses: 1-2+ Bilateral pedal, radial 
Capillary: brisk; pale Labs: 
 
Recent Labs 05/16/20 
8507 05/15/20 
1739 WBC 20.6* 14.2* HGB 11.9* 11.9*  
 165 Recent Labs 05/16/20 
3113 05/16/20 
0120 05/15/20 
2136 05/15/20 
1739 05/15/20 
1734 *  --   --  130*  --   
K 4.6  --   --  4.0  --   
CL 94*  --   --  93*  --   
CO2 26  --   --  32  --   
*  --   --  189*  --   
BUN 54*  --   --  46*  --   
CREA 3.76*  --   --  3.24*  --   
CA 8.8  --   --  9.4  --   
MG  --   --   --   --  1.7 LAC 2.2* 2.6* 2.1*  --  2.3* ALB 2.5*  --   --  2.8*  --   
SGOT 37  --   --  37  --   
ALT 18  --   --  16  -- No results for input(s): PH, PCO2, PO2, HCO3, FIO2 in the last 72 hours. Recent Labs 05/16/20 
8386 05/16/20 
0120 TROIQ 1.30* 1.25* Lab Results Component Value Date/Time BNP 93 02/08/2010 03:55 AM  
  
Lab Results Component Value Date/Time Culture result:  02/07/2010 12:23 PM  
  MRSA NOT PRESENT Screening of patient nares for MRSA is for surveillance purposes and, if positive, to facilitate isolation considerations in high risk settings. It is not intended for automatic decolonization interventions per se as regimens are not sufficiently effective to warrant routine use.   
 Culture result: NO GROWTH 2 DAYS 02/06/2010 03:45 PM  
 Culture result:  02/06/2010 12:15 PM  
  STREPTOCOCCUS PNEUMONIAE GROWING IN ALL 4 BOTTLES DRAWN (SITES= LEFT AND RIGHT) )PENICILLIN SENSITIVE) GRAM NEGATIVE RODS GROWING IN 2 OF 4 BOTTLES DRAWN (ANAEROBIC BOTTLES) 
(THE GRAM NEGATIVE RODS SEEN UPON GRAM STAIN ARE MOST LIKELY LYSED STREPTOCOCCUS PNEUMONIAE. NO GRAM NEGATIVE RODS WERE ISOLATED.) Lab Results Component Value Date/Time  01/19/2019 06:40 AM  
  (H) 02/08/2010 03:55 AM  
 
 
Imaging: 
I have personally reviewed the patients radiographs and have reviewed the reports: CXR Results  (Last 48 hours) 05/16/20 0057  XR CHEST PORT Final result Impression:  IMPRESSION: Appropriate central line placement. Narrative:  EXAM: XR CHEST PORT INDICATION: verify central line placement COMPARISON: May 15 FINDINGS: A portable AP radiograph of the chest was obtained at 0049 hours. The  
right jugular line tip is in the region of the right atrium. Left subclavian  
leads are stable. The patient is on a cardiac monitor. The lungs are clear. The  
cardiac and mediastinal contours and pulmonary vascularity are normal.  The  
bones and soft tissues are grossly within normal limits. 05/15/20 1753  XR CHEST PORT Final result Impression:  IMPRESSION: No Acute Disease. Narrative:  EXAM: Portable CXR. 1745 hours. COMPARISON: 8/1/2017. INDICATION: Sepsis FINDINGS:  
The lungs are hypoexpanded without apparent infiltrate or edema. Heart is large,  
stable. There is unchanged biventricular AICD. There is no evident pneumothorax  
or pleural effusion. Results from [facility] Encounter encounter on 05/15/20 XR CHEST PORT Narrative EXAM: XR CHEST PORT INDICATION: verify central line placement COMPARISON: May 15 FINDINGS: A portable AP radiograph of the chest was obtained at 0049 hours.  The 
 right jugular line tip is in the region of the right atrium. Left subclavian 
leads are stable. The patient is on a cardiac monitor. The lungs are clear. The 
cardiac and mediastinal contours and pulmonary vascularity are normal.  The 
bones and soft tissues are grossly within normal limits. Impression IMPRESSION: Appropriate central line placement. XR CHEST PORT Narrative EXAM: Portable CXR. 1745 hours. COMPARISON: 8/1/2017. INDICATION: Sepsis FINDINGS: 
The lungs are hypoexpanded without apparent infiltrate or edema. Heart is large, 
stable. There is unchanged biventricular AICD. There is no evident pneumothorax 
or pleural effusion. Impression IMPRESSION: No Acute Disease. Results from Hospital Encounter encounter on 08/01/17 XR CHEST PA LAT Narrative INDICATION:  Congestive heart failure Frontal and lateral views of the chest show clear lungs. There is mild 
cardiomegaly. Pulmonary vessels are normal. The bony thorax is unremarkable. Cardiac pacer is present. No significant change from February 8, 2017 Impression IMPRESSION: 
Mild cardiomegaly. No acute abnormality. [facility] Results from [facility] Encounter encounter on 05/15/20 CT ABD PELV WO CONT Narrative INDICATION: renal failure EXAM: CT Abdomen and Pelvis without IV contrast. No oral contrast. 
CT dose reduction was achieved through use of a standardized protocol tailored 
for this examination and automatic exposure control for dose modulation. FINDINGS:  
No urinary tract stones are seen. There is no hydroureteronephrosis. The kidneys 
are normal in size. There is no perirenal fluid or ascites. Liver shows no apparent significant finding without contrast. Pancreas, adrenal 
glands, spleen and aorta show no significant enlargement. No inflammation is 
seen. There is no pneumoperitoneum or significant adenopathy. The bladder is unremarkable. The distal ureters are not dilated. There is no apparent pelvic mass. The appendix is normal. Bowels are not dilated. There is a 
fat-containing umbilical hernia. Impression IMPRESSION: No Acute Disease. During this entire length of time the patient's condition was unstable, unpredictable and critically ill in the CCU/ ICU. I was immediately available to the patient whose care required several interactions with nursing, multidisciplinary team members leading to multiple interventions with fluid resuscitation and medication adjustments to optimize respiratory support, hemodynamic treatment, medication changes based on repeat labs results, reviews, exams and assessments. The reason for providing this level of medical care was due to a critical illness that impaired one or more vital organ systems, such that there was a high probability of sudden or life threatening deterioration in the patient's condition. This care involved high complexity medical decision making to treat acute and unstable vital organ system failure, and to prevent further life threatening deterioration of the patients condition. I personally: · Reviewed the flowsheet and previous days notes · Reviewed and summarized records or history from previous days note or discussions with staff, family · Parenteral controlled substances - Reviewed/ Adjusted / Weaned / Started · High Risk Drug therapy requiring intensive monitoring for toxicity: eg steroids, pressors, antibiotics · Reviewed and/or ordered Clinical lab tests · Reviewed and/or ordered Radiology tests · Reviewed and/or ordered of Medicine tests · Independently visualized radiologic Images · Reviewed the patients ECG / Telemetry · discussed my assessment/management with : Consultants, Nursing for coordination of care Thank you for allowing us to participate in the care of this patient. We will follow along with you until they no longer require CCU services.  
 
Delphine Monroe MD

## 2020-05-16 NOTE — ED NOTES
MD Sena Parra made aware that patient BP is 85/55 at this time. MD to place transfer order to put patient on CCU. MD to talk with ED doctor about central line placement. Pt in no apparent distress at this time, resting comfortably in bed, with no complaints. Pt alert and oriented.

## 2020-05-16 NOTE — ED NOTES
TRANSFER - OUT REPORT: 
 
Verbal report given to Cary VARGAS RN(name) on Belinda Ortiz  being transferred to 2510 CCU(unit) for routine progression of care Report consisted of patients Situation, Background, Assessment and  
Recommendations(SBAR). Information from the following report(s) SBAR, Kardex, ED Summary, MAR, Recent Results and Cardiac Rhythm Paced- afib was reviewed with the receiving nurse. Lines:  
Triple Lumen 05/16/20 Right Internal jugular (Active) Site Assessment Clean, dry, & intact 5/16/2020 12:41 AM  
Dressing Status Clean, dry, & intact 5/16/2020 12:41 AM  
   
Peripheral IV 05/15/20 Left;Posterior; Lower Forearm (Active) Site Assessment Clean, dry, & intact 5/15/2020  6:11 PM  
Phlebitis Assessment 0 5/15/2020  6:11 PM  
Infiltration Assessment 0 5/15/2020  6:11 PM  
Dressing Status Clean, dry, & intact 5/15/2020  6:11 PM  
Dressing Type Transparent 5/15/2020  6:11 PM  
Hub Color/Line Status Flushed;Patent 5/15/2020  6:11 PM  
   
Peripheral IV 05/15/20 Left; Anterior; Upper Forearm (Active) Site Assessment Clean, dry, & intact 5/15/2020  6:22 PM  
Phlebitis Assessment 0 5/15/2020  6:22 PM  
Infiltration Assessment 0 5/15/2020  6:22 PM  
Dressing Status Clean, dry, & intact 5/15/2020  6:22 PM  
Dressing Type Transparent 5/15/2020  6:22 PM  
Hub Color/Line Status Flushed;Patent 5/15/2020  6:22 PM  
  
 
Opportunity for questions and clarification was provided. Patient transported with: 
 Monitor Registered Nurse

## 2020-05-16 NOTE — CONSULTS
Consultation Note NAME: Ander Hagan :  1955 MRN:  [id number] Date/Time:  2020 8:38 AM 
 
I have been asked to see this patient by Dr. Wily Smyth  for advice/opinion re: LAQUITA. Assessment :    Plan: 
LAQUITA Hypotension Sepsis DM Urine with 1+ protein. No blood. CT shows no renal lesion. Fair to poor UO. On pressor to maintain BP. I suspect LAQUITA is due to hypotension/sepsis. Continue with pressor for now. No need for Lebron. Will check FENA. No acute need for RRT but we'll need to watch his creatinine. Subjective: CHIEF COMPLAINT:  dypsnea HISTORY OF PRESENT ILLNESS:    
Akbar Jose is a 71 y.o.   male who has a history of DM admitted with weakness. Apparently he has had a fever several days PTA. No cough/CP. He came to the ER and his creatinine was elevated at 3.24. It is up to 3.76 today. Past Medical History:  
Diagnosis Date  A-fib ([facility] [address]  Arrhythmia   
 atrial fibrillation   Diabetes ([facility] [address]  Endocrine disease   
 diabetes  Hypertension  Irregular heart beat Past Surgical History:  
Procedure Laterality Date  CARDIAC SURG PROCEDURE UNLIST    
 defib placed in left chest  
 
Social History Tobacco Use  Smoking status: Former Smoker Last attempt to quit: 1993 Years since quittin.8  Smokeless tobacco: Never Used Substance Use Topics  Alcohol use: No  
  
Family History Problem Relation Age of Onset  Heart Disease Mother  Diabetes Father  Heart Disease Father No Known Allergies Prior to Admission medications Medication Sig Start Date End Date Taking? Authorizing Provider  
nebivolol HCl (BYSTOLIC PO) Take  by mouth daily. Leia Chris MD  
dulaglutide (TRULICITY) 1.5 AG/2.9 mL sub-q pen 1.5 mg by SubCUTAneous route every seven (7) days. Leia Chris MD  
apixaban (ELIQUIS PO) Take  by mouth.     Leia Chris MD  
 furosemide (LASIX) 80 mg tablet Take 1 Tab by mouth two (2) times a day. 2/14/17   Dina Ansari MD  
sAXagliptin (ONGLYZA) 2.5 mg tablet Take 1 Tab by mouth daily. 2/14/17   Dina Ansari MD  
potassium chloride SR (K-TAB) 20 mEq tablet Take 1 Tab by mouth daily. 2/14/17   Dina Ansari MD  
magnesium chloride (MAG-SR) 64 mg tablet Take 64 mg by mouth two (2) times a day. Provider, Nicola  
simvastatin (ZOCOR) 20 mg tablet Take 20 mg by mouth nightly. Leia Chris MD  
glimepiride (AMARYL) 4 mg tablet Take 4 mg by mouth every morning. Leia Chris MD  
 
REVIEW OF SYSTEMS:   
 []  Unable to obtain reliable ROS due to  [] mental status  [] sedated   [] intubated 
 [x] Total of 12 systems reviewed as follows: 
Constitutional: pos fever, negative chills, negative weight loss Eyes:   negative visual changes ENT:   negative sore throat, tongue or lip swelling Respiratory:  negative cough, negative dyspnea Cards:  negative for chest pain, palpitations, lower extremity edema GI:   negative for nausea, vomiting, diarrhea, and abdominal pain :  negative for frequency, dysuria Integument:  negative for rash and pruritus Heme:  negative for easy bruising and gum/nose bleeding Musculoskel: negative for myalgias,  back pain and muscle weakness Neuro:  negative for headaches, dizziness, vertigo Psych:  negative for feelings of anxiety, depression Travel?: none Objective: VITALS:   
Visit Vitals /59 Pulse 95 Temp 98.3 °F (36.8 °C) Resp 29 Ht 6' 3\" (1.905 m) Wt 112.6 kg (248 lb 3.8 oz) SpO2 92% BMI 31.03 kg/m² PHYSICAL EXAM: 
Gen:  []  WD []  WN  [] cachectic []  thin []  obese []  disheveled 
           []  ill apearing  []   Critical  [x]   Chronic    [x]  No acute distress HEENT:   [] NC/AT/PERRLA/EOMI 
  [] pink conjunctivae      [] pale conjunctivae PERRL  [] yes  [] no      [] moist mucosa    [] dry mucosa hearing intact to voice [] yes  [] No 
              
NECK:   supple [] yes  [] no        masses [] yes  [] No 
             thyroid  []  non tender  []  tender RESP:   [] CTA bilaterally/no wheezing/rhonchi/rales/crackles [] rhonchi bilaterally - no dullness  [] wheezing   [] rhonchi   [] crackles  
  use of accessory muscles [] yes [] no CARD:   []  regular rate and rhythm/No murmurs/rubs/gallops 
  murmur  [] yes ()  [] no      Rubs  [] yes  [] no       Gallops [] yes  [] no 
  Rate []  regular  []  irregular        carotid bruits  [] Right  []  Left LE edema [] yes  [] no           JVP  []  yes   []  no 
 
ABD:    [] soft/non distended/non tender/+bowel sounds/no HSM []  Rigid    tenderness [] yes [] no   Liver enlargement  []  yes []  no  
             Spleen enlargement  []  yes []  no     distended []  yes [] no  
  bowel sound  [] hypoactive   [] hyperactive LYMPH:    Neck []  yes []  no       Axillae []  yes []  no SKIN:   Rashes []  yes   []  no    Ulcers []  yes   []  no 
             [] tight to palpitation    skin turgor []  good  [] poor  [] decreased Cyanosis/clubbing []  yes []  no 
 
NEUR:   [] cranial nerves II-XII grossly intact    
  [] Cranial nerves deficit 
               []  facial droop    []  slurred speech   [] aphasic  
  [] Strength normal     []  weakness  []  LUE  []   RUE/ []  LLE  []   RLE 
  follows commands  []  yes []  no        
 
PSYCH:   insight [] poor [] good   Alert and Oriented to  [] person  [] place  []  time  
                 [] depressed [] anxious [] agitated  [] lethargic [] stuporous  [] sedated LAB DATA REVIEWED:   
Recent Labs 05/16/20 
2187 05/15/20 
1739 WBC 20.6* 14.2* HGB 11.9* 11.9*  
HCT 36.6 36.4*  
 165 Recent Labs 05/16/20 
3254 05/15/20 
1739 05/15/20 
1734 * 130*  --   
K 4.6 4.0  --   
CL 94* 93*  --   
CO2 26 32  --   
BUN 54* 46*  --   
CREA 3.76* 3.24*  --   
 * 189*  --   
CA 8.8 9.4  --   
MG  --   --  1.7 Recent Labs 05/16/20 
5934 05/15/20 
1739 SGOT 37 37 ALT 18 16 AP 92 81 TBILI 1.1* 1.0 ALB 2.5* 2.8*  
GLOB 5.2* 5.3* No results for input(s): INR, PTP, APTT, INREXT in the last 72 hours. Recent Labs 05/15/20 
1734 FERR 577* No results for input(s): PH, PCO2, PO2 in the last 72 hours. No results for input(s): CPK, CKMB in the last 72 hours. No lab exists for component: TROPONINI Lab Results Component Value Date/Time Glucose (POC) 332 (H) 08/04/2017 10:11 AM  
 Glucose (POC) 330 (H) 08/04/2017 07:35 AM  
 Glucose (POC) 166 (H) 02/14/2017 12:30 PM  
 Glucose (POC) 95 02/14/2017 07:58 AM  
 Glucose (POC) 170 (H) 02/13/2017 09:10 PM  
 
 
Procedures: see electronic medical records for all procedures/Xrays and details which were not copied into this note but were reviewed prior to creation of Plan.   
________________________________________________________________________ 
  
 
___________________________________________________ Consulting Physician: Otilio Hernández MD

## 2020-05-16 NOTE — PROGRESS NOTES
0750:  Bedside and Verbal shift change report given to FAYE Maza RN (oncoming nurse) by Jhon Quiroga RN (offgoing nurse). Report included the following information SBAR, Kardex, Intake/Output, MAR, Recent Results, Med Rec Status and Cardiac Rhythm Afib with occasional Aflutter and PVCs. 2155:  Nephrologist consult, Dr. Suzy Jacobson, is on the unit for this patient, as well as Dr. Betty White. 
 
1030:  Lab: Matteolaura Forrest called to advise this patient is COVID19 Positive. Signs on the door have been updated. Dr. Betty White notified face to face on the unit. 1125: The dressing on the R IJ was coming loose, so it was changed along with a new Biopatch. [id number]:  Placed a bright catheter in the patient, to monitor I/O more closely. 1400:  Patient made a BM. It is very soft and almost looks like Cdiff. 
 
1609: The patient's wife called to inquire about the status of this patient, as he was no longer answering the phone. I explained that he is very tired and needs rest, but that she can call any time day or night for updates. She was pleased about that. 1830:  CVP installed on this patient. Also the SCDs are on the patient. 1942:  Bedside and Verbal shift change report given to Adriana Mccloud (oncoming nurse) by Kalpesh Myers. Quinten Maza RN (offgoing nurse). Report included the following information SBAR, Kardex, Intake/Output, MAR, Med Rec Status and Cardiac Rhythm Afib.

## 2020-05-16 NOTE — ED NOTES
Bedside and Verbal shift change report given to LORI Downing (oncoming nurse) by Lisandro Chavarria RN (offgoing nurse). Report included the following information SBAR, ED Summary, MAR and Recent Results.

## 2020-05-16 NOTE — ED NOTES
[facility] Advised of patient's chief complaint, orders that are completed, any outstanding orders that still need to be completed, and the current treatment plan. All questions addressed prior to assumption of patient care at this time.

## 2020-05-17 NOTE — CONSULTS
Pt seen and examined. Full consult dictated Agree with supportive Rx as you are doing Could add dobutamine or milrinone to see if that helps hemodynamics Dr Patrice Sheikh will f/u tomm. I spoke to his wife.

## 2020-05-17 NOTE — CONSULTS
[facility] INTENSIVIST Consult Service Note Pulmonary, Critical Care, and Sleep Medicine Name: Ander Hagan MRN: [id number] : 1955 Hospital: [place] [address] Date: 2020  Admission date: 5/15/2020 Hospital Day: 3 Subjective/Interval History:  
Seen earlier today on rounds. Pt is unstable and acutely ill in the CCU. Patient was re-evaluated multiple times with repeated discussions with CCU team throughout the day  high fever, sweats overniht. IV levophed 95 mcg, IV vasopressin. D dimer 2.56; Cr 5.77; Ferritin yesterday 909. lactic acid higher, WBC now 59806. CXR reviewed and amazingly clear. . Too weak to verbalize. Called wife at home [phone] , Daughter Sunni. They are both well, they were tested at [facility] yesterday. Results pending. Explained medical management for COVID 19. He is critically ill. He is unpredictable. Family wants to defer to his heart doctor any decisions about code status. We dis discuss possiblerole of Convalescent plasma since Remdesivir is not available and would be reserved for respiratory failure pts on vent. Wife, daughter, son will review [email or web address] org and we will discuss process if they want to proceed. No guarantees offered, as it will take takes to get plasma and the transfusion is not risk free, 11:32 AM 
 
3:38 PM Called wife back. Wife and family has had a chance to review forms and handouts. · [facility]: Expanded Access to Convalescent Plasma for Treatment of Pts with COVID-19. ABO#[id number] Clinical Staff: Dr. Trey Shankar MD. Consent for blood and investigation has been obtained. Discussed the use of Convalescent Plasma collected from COVID 19 patients. These individuals have recovered from the illness and donated blood to study. Use does not guarantee that patients will improve or recover after transfusion.  They may actually get worse or have side effects. They agree to proceed with requesting convalescent plasma. Sent order for type and screen. Nursing aware and will get phone consent. I called Blood bank supervisor who will request matched unit from [employer]. Family aware that it may take days to acquire. IMPRESSION:  
1. Acute Shock - Septic and cardiogenic- source? On multiple pressors; near max IV levophed and IV vasopressin 2. Severe sepsis- high fever, sweats 3. COVID 23 POSITIVE with flu like illness- at very high risk for complications 4. Proinflammatory/ Prothrombotic 5. Alpha strep septicemia- elevated Procalcitonin > 80 
6. Hyponatremia 7. Chronic combined systolic/diastolic heart failure 8. Hypertensive heart disease with CHF and CKD 9. LAQUITA/CKD 10. Chronic anticoagulation at home- eliquis- stopped 11. Diabetes mellitus type 2 uncontrolled 12. Hyperlipidemia 13. 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 Chronic atrial fibrillation 15. Status post SJM BIV-ICD implant in 2016 
16. Sleep apnea, undiagnosed/untreated 17. Obesity with recent weight gain 18. Body mass index is 33.15 kg/m². - not a good prone candidate 19. Additional workup outlined below 20. Multiorgan dysfunction as outlined above: Pt has one or more acute or chronic illnesses with severe exacerbation with progression or side effects of treatment that poses a threat to life or bodily function 21. Pt is unstable, unpredictable needing more CCU monitoring; at high risk of sudden decline and decompensation with life threatening consequenses and continued end organ dysfunction and failure 22. Pt is critically ill. Time spent with pt and staff actively rendering care, managing pt and coordinating care as stated below;  80 minutes, exclusive of any procedures RECOMMENDATIONS/PLAN:  
1. CCU monitoring 2. Droplet plus Isolation 3. Supplemental O2 prn for refractory hypoxia to keep sats > 93% 4. Cardiology - family wants Dr. Adrien Zaldivar to call them. 5. Nephrology following 6. Hold eliquis but give LMWH now to minimize blood draws and exposure buthold if he needs procedures 7. Empiric abx 8. We will check on availablitiy of other treatments 9. Add lantus 10. Check A1c 
11. Follow cultures 12. IV vasopressors for circulatory shock refractory to fluids to maintain MAP > 65 
13. CXR in AM 
14. Incentive spriometer 15. High flow nasal Cannula oxygen as salvage oxygen delivery device to provide high concentration of oxygen to overcome refractory hypoxia;  
16. NPO except meds while on high dose pressors- at risk for ischemic bowel if fed too quickly 17. Glucose monitoring and SSI 18. Replete electrolytes 19. Labs to follow inflammatory markers electrolytes, renal function and and blood counts 20. Bronchial hygiene with respiratory therapy techniques, bronchodilators 21. Pt needs IV fluids with additives and Drug therapy requiring intensive monitoring for toxicity 22. Prescription drug management with home med reconciliation reviewed 23. DVT, SUP prophylaxis 24. Will be available to assist in medical management while in the CCU pending disposition Subjective/Initial History:  
I have reviewed the flowsheet and previous days notes. Seen earlier today on rounds. I was asked by Shirley Kenny MD to see Pelon Soriano a 71 y.o.  male  in consultation for a chief complaint of shock. Excerpts from admission 5/15/2020 and consult notes reviewed as follows:  
 
\"Mr. Shanice Hobbs is a 57 y.o. morbidly obese male with Chronic nonischemic dilated cardiomyopathy EF 10%, Chronic systolic congestive heart failure class, H/o atrial fibrillation, On chronic warfarin, Iatrogenic left bundle branch block with predominant RV pacing, 64-70%, Hypertension, Hyperlipidemia and diabetes presents to ED [facility] ED C/O Worsening exertional shortness of breath and around 20 pounds weight gain in last 3 weeks. \" 
 
Pt now in CCU. Poor historian. On room air. No fever. No cough. No diarrhea. Not very active. Saw PCP three weeks ago. Some edema. Chart notes ED [facility] admission in 2017 with decompensation. Patient PCP: Anju Alvarez MD 
PMH:  has a past medical history of A-fib ([facility] [address] Arrhythmia, Diabetes ([address] Endocrine disease, Hypertension, and Irregular heart beat. He also has no past medical history of Difficult intubation, Malignant hyperthermia due to anesthesia, Nausea & vomiting, or Pseudocholinesterase deficiency. PSH:   has a past surgical history that includes pr cardiac surg procedure unlist. FHX: family history includes Diabetes in his father; Heart Disease in his father and mother. SHX:  reports that he quit smoking about 26 years ago. He has never used smokeless tobacco. He reports that he does not drink alcohol or use drugs. ROS:A comprehensive review of systems was negative except for that written in the HPI. No Known Allergies MEDS:  
Current Facility-Administered Medications Medication  vasopressin (VASOSTRICT) 20 Units in 0.9% sodium chloride 100 mL infusion  sodium chloride (NS) flush 5-40 mL  sodium chloride (NS) flush 5-40 mL  acetaminophen (TYLENOL) tablet 650 mg  
 cefTRIAXone (ROCEPHIN) 1 g in 0.9% sodium chloride (MBP/ADV) 50 mL  azithromycin (ZITHROMAX) 500 mg in 0.9% sodium chloride (MBP/ADV) 250 mL  ondansetron (ZOFRAN) injection 4 mg  alcohol 62% (NOZIN) nasal  1 Ampule  insulin lispro (HUMALOG) injection  glucose chewable tablet 16 g  
 dextrose (D50W) injection syrg 12.5-25 g  
 glucagon (GLUCAGEN) injection 1 mg  enoxaparin (LOVENOX) injection 40 mg  
 acetaminophen (TYLENOL) tablet 650 mg Or  acetaminophen (TYLENOL) suppository 650 mg  
  zinc sulfate (ZINCATE) 220 (50) mg capsule 1 Cap  ascorbic acid (vitamin C) 5 g in dextrose 5% 50 mL IVPB  NOREPINephrine (LEVOPHED) 32 mg in 5% dextrose 250 mL infusion  influenza vaccine 2019-20 (6 mos+)(PF) (FLUARIX/FLULAVAL/FLUZONE QUAD) injection 0.5 mL  sodium chloride (NS) flush 5-10 mL Current Facility-Administered Medications:  
  vasopressin (VASOSTRICT) 20 Units in 0.9% sodium chloride 100 mL infusion, 0.04 Units/min, IntraVENous, CONTINUOUS, Rosa Elena HILLS MD, Last Rate: 12 mL/hr at 05/17/20 0923, 0.04 Units/min at 05/17/20 0108   sodium chloride (NS) flush 5-40 mL, 5-40 mL, IntraVENous, Q8H, Adam Watkins MD, 10 mL at 05/17/20 0537 
  sodium chloride (NS) flush 5-40 mL, 5-40 mL, IntraVENous, PRN, Adam Watkins MD 
  acetaminophen (TYLENOL) tablet 650 mg, 650 mg, Oral, Q6H PRN, Adam Watkins MD 
  cefTRIAXone (ROCEPHIN) 1 g in 0.9% sodium chloride (MBP/ADV) 50 mL, 1 g, IntraVENous, Q24H, Adam Watkins MD, Last Rate: 100 mL/hr at 05/17/20 0016, 1 g at 05/17/20 0016 
  azithromycin (ZITHROMAX) 500 mg in 0.9% sodium chloride (MBP/ADV) 250 mL, 500 mg, IntraVENous, Q24H, Adam Watkins MD, Last Rate: 250 mL/hr at 05/17/20 0101, 500 mg at 05/17/20 0101 
  ondansetron (ZOFRAN) injection 4 mg, 4 mg, IntraVENous, Q8H PRN, Adam Watkins MD, 4 mg at 05/16/20 0137 
  alcohol 62% (NOZIN) nasal  1 Ampule, 1 Ampule, Topical, Q12H, Adam Watkins MD, 1 Ampule at 05/17/20 0522 
  insulin lispro (HUMALOG) injection, , SubCUTAneous, Q6H, Rosa Elena HILLS MD, 5 Units at 05/17/20 0536 
  glucose chewable tablet 16 g, 4 Tab, Oral, PRN, Maria Guadalupe Wheeler MD 
  dextrose (D50W) injection syrg 12.5-25 g, 12.5-25 g, IntraVENous, PRN, Maria Guadalupe Wheeler MD 
  glucagon (GLUCAGEN) injection 1 mg, 1 mg, IntraMUSCular, PRN, Maria Guadalupe Wheeler MD 
  enoxaparin (LOVENOX) injection 40 mg, 40 mg, SubCUTAneous, Q24H, Rosa Elena HILLS MD, 40 mg at 05/16/20 1206   acetaminophen (TYLENOL) tablet 650 mg, 650 mg, Oral, Q6H PRN **OR** acetaminophen (TYLENOL) suppository 650 mg, 650 mg, Rectal, Q6H PRN, Erna Holguin MD 
  zinc sulfate (ZINCATE) 220 (50) mg capsule 1 Cap, 1 Cap, Oral, DAILY, Erna Holguin MD, 1 Cap at 20 2186   ascorbic acid (vitamin C) 5 g in dextrose 5% 50 mL IVPB, 5 g, IntraVENous, Q6H, Fidel HILLS MD, 5 g at 20 0536 
  NOREPINephrine (LEVOPHED) 32 mg in 5% dextrose 250 mL infusion, 2-100 mcg/min, IntraVENous, TITRATE, Amber Quiñonez DO, Last Rate: 44.5 mL/hr at 20 0924, 95 mcg/min at 20 3346   influenza vaccine - (6 mos+)(PF) (FLUARIX/FLULAVAL/FLUZONE QUAD) injection 0.5 mL, 0.5 mL, IntraMUSCular, PRIOR TO DISCHARGE, José Quiñonez DO 
  sodium chloride (NS) flush 5-10 mL, 5-10 mL, IntraVENous, PRN, Scooter March MD 
 
 
Objective:  
 
Vital Signs: Telemetry:    AFIB Intake/Output:  
Visit Vitals BP 97/69 Pulse 87 Temp 98.3 °F (36.8 °C) Resp 26 Ht 6' 3\" (1.905 m) Wt 120.3 kg (265 lb 3.4 oz) SpO2 97% BMI 33.15 kg/m² Temp (24hrs), Av.8 °F (37.1 °C), Min:98.2 °F (36.8 °C), Max:99.9 °F (37.7 °C) O2 Device: Nasal cannula O2 Flow Rate (L/min): 2 l/min Body mass index is 33.15 kg/m². Wt Readings from Last 4 Encounters:  
20 120.3 kg (265 lb 3.4 oz) 19 123.8 kg (273 lb) 17 129.3 kg (285 lb)  
17 107.5 kg (237 lb) Intake/Output Summary (Last 24 hours) at 2020 1045 Last data filed at 2020 7394 Gross per 24 hour Intake 2875.08 ml Output 185 ml Net 2690.08 ml Last shift:      No intake/output data recorded. Last 3 shifts: 05/15 1901 -  0700 In: 4117.5 [P.O.:100; I.V.:4017.5] Out: 185 [Urine:185] Hemodynamics:   
CO:   
CI:   
CVP: CVP (mmHg): 16 mmHg (20 1000) SVR:   PAP Systolic:   
PAP Diastolic:   
PVR:   
IJ62:    
 
Ventilator Settings: Mode Rate TV Press PEEP FiO2 PIP Min. Vent Physical Exam: 
 
General:  male; cooperative;  Room air HEENT: NCAT, poor dentition, lips and mucosa dry Eyes: anicteric; conjunctiva clear Neck: no nodes, no cuff leak, no accessory MM use. Chest: no deformity,  
Cardiac: IR regular; no murmur Lungs: distant breath sounds; No wheezes Abd: soft, NT, hypoactive BS Ext: no edema; no joint swelling; No clubbing : NO bright, Neuro: conversatn Psych- no agitation, oriented to person; unable to assess Skin: warm, dry, no cyanosis;  
Pulses: 1-2+ Bilateral pedal, radial 
Capillary: brisk; pale Labs: 
 
Recent Labs 05/17/20 
0406 05/16/20 
1212 05/16/20 
7573 WBC 29.2* 26.1* 20.6* HGB 12.3 12.1 11.9*  
 196 185 INR 1.3* 1.2*  --   
APTT 39.0* 40.7*  --   
 
Recent Labs 05/17/20 
0410 05/17/20 
0406 05/16/20 
2140 05/16/20 
1212 05/16/20 
7015  05/15/20 
1734 NA  --  130*  --  129* 130*   < >  --   
K  --  4.7  --  4.5 4.6   < >  --   
CL  --  93*  --  92* 94*   < >  --   
CO2  --  22  --  26 26   < >  --   
GLU  --  251*  --  269* 247*   < >  --   
BUN  --  69*  --  56* 54*   < >  --   
CREA  --  5.77*  --  4.47* 3.76*   < >  --   
CA  --  8.7  --  8.7 8.8   < >  --   
MG  --   --   --   --   --   --  1.7 LAC 5.0*  --  2.2*  --  2.2*   < > 2.3* ALB  --  2.2*  --  2.4* 2.5*   < >  --   
SGOT  --  178*  --  41* 37   < >  --   
ALT  --  63  --  20 18   < >  --   
 < > = values in this interval not displayed. No results for input(s): PH, PCO2, PO2, HCO3, FIO2 in the last 72 hours. Recent Labs 05/17/20 
0410 05/16/20 
2140 05/16/20 
1212 TROIQ 2.36* 2.24* 1.50* Lab Results Component Value Date/Time BNP 93 02/08/2010 03:55 AM  
  
Lab Results Component Value Date/Time  Culture result: NO GROWTH 2 DAYS 05/15/2020 06:11 PM  
 Culture result: No growth (<1,000 CFU/ML) 05/15/2020 05:45 PM  
 Culture result: (A) 05/15/2020 05:34 PM  
  ALPHA STREPTOCOCCUS, NOT S. PNEUMONIAE GROWING IN 1 OF 2 BOTTLES DRAWN (SITE = R ARM) Culture result: (A) 05/15/2020 05:34 PM  
  PRELIMINARY REPORT OF GRAM POSITIVE COCCI IN CHAINS GROWING IN 1 OF 2 BOTTLES DRAWN CALLED TO AND READ BACK BY Tim Villanueva RN [facility] AT 5661 ON 5/16/20. Pac[place] 1850 Culture result: REMAINING BOTTLE(S) HAS/HAVE NO GROWTH SO FAR 05/15/2020 05:34 PM  
  
Lab Results Component Value Date/Time  01/19/2019 06:40 AM  
  (H) 02/08/2010 03:55 AM  
 
 
Imaging: 
I have personally reviewed the patients radiographs and have reviewed the reports: CXR Results  (Last 48 hours) 05/17/20 0526  XR CHEST PORT Final result Impression:  IMPRESSION:  
No significant change. Narrative:  INDICATION: respiratory failure EXAMINATION:  AP CHEST, PORTABLE  
   
COMPARISON: 5/16/20 FINDINGS: Single AP portable view of the chest demonstrates no change in  
position of the lines and tubes. Cardiomegaly, unchanged. There is no new  
airspace disease. No pneumothorax or pulmonary edema. 05/16/20 0057  XR CHEST PORT Final result Impression:  IMPRESSION: Appropriate central line placement. Narrative:  EXAM: XR CHEST PORT INDICATION: verify central line placement COMPARISON: May 15 FINDINGS: A portable AP radiograph of the chest was obtained at 0049 hours. The  
right jugular line tip is in the region of the right atrium. Left subclavian  
leads are stable. The patient is on a cardiac monitor. The lungs are clear. The  
cardiac and mediastinal contours and pulmonary vascularity are normal.  The  
bones and soft tissues are grossly within normal limits. 05/15/20 1753  XR CHEST PORT Final result Impression:  IMPRESSION: No Acute Disease. Narrative:  EXAM: Portable CXR. 1745 hours. COMPARISON: 8/1/2017. INDICATION: Sepsis FINDINGS:  
 The lungs are hypoexpanded without apparent infiltrate or edema. Heart is large,  
stable. There is unchanged biventricular AICD. There is no evident pneumothorax  
or pleural effusion. Results from [facility] Encounter encounter on 05/15/20 XR CHEST PORT Narrative INDICATION: respiratory failure EXAMINATION:  AP CHEST, PORTABLE 
 
COMPARISON: 5/16/20 FINDINGS: Single AP portable view of the chest demonstrates no change in 
position of the lines and tubes. Cardiomegaly, unchanged. There is no new 
airspace disease. No pneumothorax or pulmonary edema. Impression IMPRESSION: 
No significant change. XR CHEST PORT Narrative EXAM: XR CHEST PORT INDICATION: verify central line placement COMPARISON: May 15 FINDINGS: A portable AP radiograph of the chest was obtained at 0049 hours. The 
right jugular line tip is in the region of the right atrium. Left subclavian 
leads are stable. The patient is on a cardiac monitor. The lungs are clear. The 
cardiac and mediastinal contours and pulmonary vascularity are normal.  The 
bones and soft tissues are grossly within normal limits. Impression IMPRESSION: Appropriate central line placement. XR CHEST PORT Narrative EXAM: Portable CXR. 1745 hours. COMPARISON: 8/1/2017. INDICATION: Sepsis FINDINGS: 
The lungs are hypoexpanded without apparent infiltrate or edema. Heart is large, 
stable. There is unchanged biventricular AICD. There is no evident pneumothorax 
or pleural effusion. Impression IMPRESSION: No Acute Disease. Results from [facility] Encounter encounter on 05/15/20 CT ABD PELV WO CONT Narrative INDICATION: renal failure EXAM: CT Abdomen and Pelvis without IV contrast. No oral contrast. 
CT dose reduction was achieved through use of a standardized protocol tailored 
for this examination and automatic exposure control for dose modulation.  
 
FINDINGS:  
 No urinary tract stones are seen. There is no hydroureteronephrosis. The kidneys 
are normal in size. There is no perirenal fluid or ascites. Liver shows no apparent significant finding without contrast. Pancreas, adrenal 
glands, spleen and aorta show no significant enlargement. No inflammation is 
seen. There is no pneumoperitoneum or significant adenopathy. The bladder is unremarkable. The distal ureters are not dilated. There is no 
apparent pelvic mass. The appendix is normal. Bowels are not dilated. There is a 
fat-containing umbilical hernia. Impression IMPRESSION: No Acute Disease. During this entire length of time the patient's condition was unstable, unpredictable and critically ill in the CCU/ ICU. I was immediately available to the patient whose care required several interactions with nursing, multidisciplinary team members leading to multiple interventions with fluid resuscitation and medication adjustments to optimize respiratory support, hemodynamic treatment, medication changes based on repeat labs results, reviews, exams and assessments. The reason for providing this level of medical care was due to a critical illness that impaired one or more vital organ systems, such that there was a high probability of sudden or life threatening deterioration in the patient's condition. This care involved high complexity medical decision making to treat acute and unstable vital organ system failure, and to prevent further life threatening deterioration of the patients condition. I personally: · Reviewed the flowsheet and previous days notes · Reviewed and summarized records or history from previous days note or discussions with staff, family · Parenteral controlled substances - Reviewed/ Adjusted / Weaned / Started · High Risk Drug therapy requiring intensive monitoring for toxicity: eg steroids, pressors, antibiotics · Reviewed and/or ordered Clinical lab tests · Reviewed and/or ordered Radiology tests · Reviewed and/or ordered of Medicine tests · Independently visualized radiologic Images · Reviewed the patients ECG / Telemetry · discussed my assessment/management with : Consultants, Nursing for coordination of care Thank you for allowing us to participate in the care of this patient. We will follow along with you until they no longer require CCU services.  
 
Humberto Mejias MD

## 2020-05-17 NOTE — PROGRESS NOTES
NAME: Diane House :  1955 MRN:  [id number] Assessment :    Plan: 
--LAQUITA Shock Sepsis DM 
COVID + 
 --SBP in the 90's on pressors. Poor UO. There is no acute need for RRT today but he is close. If his numbers continue to worsen, I suspect he will need CRRT in AM.  
 
 
Subjective: Chief Complaint:  Nurse reports he is doing OK. Eating OK. No N/V. Dyspnea about the same. No pain. Review of Systems: 10 point ROS negative except as above. Symptom Y/N Comments  Symptom Y/N Comments Fever/Chills    Chest Pain Poor Appetite    Edema Cough    Abdominal Pain Sputum    Joint Pain SOB/GROVES    Pruritis/Rash Nausea/vomit    Tolerating PT/OT Diarrhea    Tolerating Diet Constipation    Other Could not obtain due to:   
 
Objective: VITALS:  
Last 24hrs VS reviewed since prior progress note. Most recent are: 
Visit Vitals BP (!) 89/70 Pulse 85 Temp 98.3 °F (36.8 °C) Resp (!) 33 Ht 6' 3\" (1.905 m) Wt 120.3 kg (265 lb 3.4 oz) SpO2 94% BMI 33.15 kg/m² Intake/Output Summary (Last 24 hours) at 2020 0845 Last data filed at 2020 0700 Gross per 24 hour Intake 2875.08 ml Output 185 ml Net 2690.08 ml Telemetry Reviewed: PHYSICAL EXAM: 
General: NAD No edema Lab Data Reviewed: (see below) Medications Reviewed: (see below) PMH/SH reviewed - no change compared to H&P 
________________________________________________________________________ Care Plan discussed with: 
Patient Family RN Care Manager Consultant:     
 
  Comments >50% of visit spent in counseling and coordination of care    
 
________________________________________________________________________ Alva Perry MD  
 
Procedures: see electronic medical records for all procedures/Xrays and details which 
 were not copied into this note but were reviewed prior to creation of Plan. LABS: 
Recent Labs 05/17/20 
0406 05/16/20 
1212 WBC 29.2* 26.1* HGB 12.3 12.1 HCT 38.1 37.0  196 Recent Labs 05/17/20 
0406 05/16/20 
1212 05/16/20 
0578  05/15/20 
1734 * 129* 130*   < >  --   
K 4.7 4.5 4.6   < >  --   
CL 93* 92* 94*   < >  --   
CO2 22 26 26   < >  --   
BUN 69* 56* 54*   < >  --   
CREA 5.77* 4.47* 3.76*   < >  --   
* 269* 247*   < >  --   
CA 8.7 8.7 8.8   < >  --   
MG  --   --   --   --  1.7  
 < > = values in this interval not displayed. Recent Labs 05/17/20 
0406 05/16/20 1212 05/16/20 
8965 SGOT 178* 41* 37  93 92  
TP 7.2 7.7 7.7 ALB 2.2* 2.4* 2.5*  
GLOB 5.0* 5.3* 5.2* Recent Labs 05/17/20 
0406 05/16/20 
1212 INR 1.3* 1.2* PTP 13.5* 12.6* APTT 39.0* 40.7* Recent Labs 05/16/20 
1212 05/15/20 
1734 FERR 909* 577* No results found for: FOL, RBCF No results for input(s): PH, PCO2, PO2 in the last 72 hours. No results for input(s): CPK, CKMB in the last 72 hours. No lab exists for component: TROPONINI No components found for: [place] Lab Results Component Value Date/Time Color YELLOW/STRAW 05/15/2020 05:34 PM  
 Appearance CLOUDY (A) 05/15/2020 05:34 PM  
 Specific gravity 1.025 05/15/2020 05:34 PM  
 pH (UA) 5.0 05/15/2020 05:34 PM  
 Protein 30 (A) 05/15/2020 05:34 PM  
 Glucose >1,000 (A) 05/15/2020 05:34 PM  
 Ketone TRACE (A) 05/15/2020 05:34 PM  
 Bilirubin Negative 05/15/2020 05:34 PM  
 Urobilinogen 0.2 05/15/2020 05:34 PM  
 Nitrites Negative 05/15/2020 05:34 PM  
 Leukocyte Esterase Negative 05/15/2020 05:34 PM  
 Epithelial cells FEW 05/15/2020 05:34 PM  
 Bacteria Negative 05/15/2020 05:34 PM  
 WBC 0-4 05/15/2020 05:34 PM  
 RBC 0-5 05/15/2020 05:34 PM  
 
 
MEDICATIONS: 
Current Facility-Administered Medications Medication Dose Route Frequency  vasopressin (VASOSTRICT) 20 Units in 0.9% sodium chloride 100 mL infusion  0.04 Units/min IntraVENous CONTINUOUS  
 sodium chloride (NS) flush 5-40 mL  5-40 mL IntraVENous Q8H  
 sodium chloride (NS) flush 5-40 mL  5-40 mL IntraVENous PRN  
 acetaminophen (TYLENOL) tablet 650 mg  650 mg Oral Q6H PRN  
 cefTRIAXone (ROCEPHIN) 1 g in 0.9% sodium chloride (MBP/ADV) 50 mL  1 g IntraVENous Q24H  
 azithromycin (ZITHROMAX) 500 mg in 0.9% sodium chloride (MBP/ADV) 250 mL  500 mg IntraVENous Q24H  
 ondansetron (ZOFRAN) injection 4 mg  4 mg IntraVENous Q8H PRN  
 alcohol 62% (NOZIN) nasal  1 Ampule  1 Ampule Topical Q12H  
 insulin lispro (HUMALOG) injection   SubCUTAneous Q6H  
 glucose chewable tablet 16 g  4 Tab Oral PRN  
 dextrose (D50W) injection syrg 12.5-25 g  12.5-25 g IntraVENous PRN  
 glucagon (GLUCAGEN) injection 1 mg  1 mg IntraMUSCular PRN  
 enoxaparin (LOVENOX) injection 40 mg  40 mg SubCUTAneous Q24H  
 acetaminophen (TYLENOL) tablet 650 mg  650 mg Oral Q6H PRN Or  
 acetaminophen (TYLENOL) suppository 650 mg  650 mg Rectal Q6H PRN  zinc sulfate (ZINCATE) 220 (50) mg capsule 1 Cap  1 Cap Oral DAILY  ascorbic acid (vitamin C) 5 g in dextrose 5% 50 mL IVPB  5 g IntraVENous Q6H  
 NOREPINephrine (LEVOPHED) 32 mg in 5% dextrose 250 mL infusion  2-100 mcg/min IntraVENous TITRATE  influenza vaccine 2019-20 (6 mos+)(PF) (FLUARIX/FLULAVAL/FLUZONE QUAD) injection 0.5 mL  0.5 mL IntraMUSCular PRIOR TO DISCHARGE  sodium chloride (NS) flush 5-10 mL  5-10 mL IntraVENous PRN

## 2020-05-17 NOTE — PROGRESS NOTES
Hospitalist Progress Note NAME: Edi Mahmood :  1955 MRN:  [id number] Assessment / Plan: 
Septic shock 2/2 COVID? POA Hyponatremia start patient on broad-spectrum antibiotic Rocephin and azithromycin  cont pressor support and empiric abx  gentle hydration with IV fluid Monitoring lactic acid 
-s/p BIV-ICD 
-cont droplet + precautions 
  
Acute renal failure  IV fluid nephrology consultation appreciated Avoid nephrotoxic medication 
--likey 2/2 sepsis. Monitor Cr 
  
Elevated troponin rule out non-STEMI start patient on aspirin Cardiology consultation 
  
DMhold oral hypoglycemic Start patient/scale with insulin 
  
Atrial fibrillation continue Eliquis 
  
Hypertension hold antihypertensive medication 
  
Code Status: Full Surrogate Decision Maker: 
  
DVT Prophylaxis: Eliquis GI Prophylaxis: not indicated Subjective: Chief Complaint / Reason for Physician Visit 
unreponsive on pressor support Discussed with RN events overnight. Review of Systems: 
Symptom Y/N Comments  Symptom Y/N Comments Fever/Chills    Chest Pain Poor Appetite    Edema Cough    Abdominal Pain Sputum    Joint Pain SOB/GROVES    Pruritis/Rash Nausea/vomit    Tolerating PT/OT Diarrhea    Tolerating Diet Constipation    Other Could NOT obtain due to: AMS Objective: VITALS:  
Last 24hrs VS reviewed since prior progress note. Most recent are: 
Patient Vitals for the past 24 hrs: 
 Temp Pulse Resp BP SpO2  
20 0715  84 (!) 32 114/59 96 % 20 0700  89 20 98/63 98 % 20 0630  87 (!) 35 111/48   
20 0600  84 (!) 34 103/61   
20 0530  92 (!) 35 103/71   
20 0515  88 (!) 33 (!) 88/63 97 % 20 0500  90 (!) 33 (!) 88/64 95 % 20 0445  87 (!) 36 (!) 88/58 91 % 20 0435  87 18 (!) 78/47 91 % 20 0430  86 23 (!) 73/56 94 % 20 0427  89 27 (!) 78/57 93 % 05/17/20 0425  86 28 (!) 82/51 94 % 05/17/20 0420  88 20 (!) 81/52 93 % 05/17/20 0415  86 21 165/52 94 % 05/17/20 0400 98.3 °F (36.8 °C) 88 25 (!) 127/97 94 % 05/17/20 0330  85 (!) 34 (!) 81/53 94 % 05/17/20 0300  82 19 91/58   
05/17/20 0200  81 (!) 33 98/64   
05/17/20 0100  89 25 102/62 99 % 05/17/20 0030  89 (!) 31 (!) 88/55 96 % 05/17/20 0000 99.7 °F (37.6 °C) 93 26 103/50 96 % 05/16/20 2300  100 (!) 34 94/70 97 % 05/16/20 2200  91 (!) 35 107/66 98 % 05/16/20 2100  95 (!) 32 (!) 85/59 97 % 05/16/20 2000 99.9 °F (37.7 °C) 98 29 99/60 99 % 05/16/20 1900  96 (!) 32 97/58 94 % 05/16/20 1845  100 29 105/48 97 % 05/16/20 1830  (!) 103 22 (!) 87/63 100 % 05/16/20 1815  100 20 112/73 97 % 05/16/20 1800  98 (!) 37 121/60 99 % 05/16/20 1756     97 % 05/16/20 1745  (!) 105 (!) 38 110/62 97 % 05/16/20 1730  97 23 100/61 96 % 05/16/20 1715  94 26 106/67 96 % 05/16/20 1700  (!) 103 (!) 31 94/65 95 % 05/16/20 1645  96 25 (!) 81/54 90 % 05/16/20 1635  (!) 101 (!) 34 91/57 92 % 05/16/20 1630  100 27 (!) 79/60 95 % 05/16/20 1600 98.4 °F (36.9 °C) 98 26 100/71 98 % 05/16/20 1530  99 30 103/76 97 % 05/16/20 1518  96 (!) 31 97/51 97 % 05/16/20 1500  96 19 106/62 100 % 05/16/20 1430  98 21 95/72 (!) 78 % 05/16/20 1415  96 (!) 31 115/51 91 % 05/16/20 1300  (!) 102 29 109/79   
05/16/20 1230  100 28 107/66 93 % 05/16/20 1200 98.2 °F (36.8 °C) (!) 104 26 114/64   
05/16/20 1130  98 24 (!) 133/112   
05/16/20 0845  (!) 102 22 98/72 97 % 05/16/20 0841  99 28 112/65   
05/16/20 0830  (!) 102 26 (!) 137/109 97 % 05/16/20 0800 98.2 °F (36.8 °C) (!) 101 24 100/63 96 % Intake/Output Summary (Last 24 hours) at 5/17/2020 0740 Last data filed at 5/17/2020 0700 Gross per 24 hour Intake 2875.08 ml Output 185 ml Net 2690.08 ml PHYSICAL EXAM: 
GEN: AA male, NAD 
 
 Seen through ICU window to minimize exposure/transmission to/of COVID-19 Reviewed most current lab test results and cultures  YES Reviewed most current radiology test results   YES Review and summation of old records today    NO Reviewed patient's current orders and MAR    YES 
PMH/SH reviewed - no change compared to H&P 
________________________________________________________________________ Care Plan discussed with: 
  Comments Patient Family RN x Care Manager Consultant Multidiciplinary team rounds were held today with , nursing, pharmacist and clinical coordinator. Patient's plan of care was discussed; medications were reviewed and discharge planning was addressed. ________________________________________________________________________ Total NON critical care TIME:  25  Minutes Total CRITICAL CARE TIME Spent:   Minutes non procedure based Comments >50% of visit spent in counseling and coordination of care    
________________________________________________________________________ Huy Moses DO  
 
Procedures: see electronic medical records for all procedures/Xrays and details which were not copied into this note but were reviewed prior to creation of Plan. LABS: 
I reviewed today's most current labs and imaging studies. Pertinent labs include: 
Recent Labs 05/17/20 
0406 05/16/20 
1212 05/16/20 
8685 WBC 29.2* 26.1* 20.6* HGB 12.3 12.1 11.9*  
HCT 38.1 37.0 36.6  196 185 Recent Labs 05/17/20 
0406 05/16/20 
1212 05/16/20 
4666  05/15/20 
1734 * 129* 130*   < >  --   
K 4.7 4.5 4.6   < >  --   
CL 93* 92* 94*   < >  --   
CO2 22 26 26   < >  --   
* 269* 247*   < >  --   
BUN 69* 56* 54*   < >  --   
CREA 5.77* 4.47* 3.76*   < >  --   
CA 8.7 8.7 8.8   < >  --   
MG  --   --   --   --  1.7 ALB 2.2* 2.4* 2.5*   < >  --   
TBILI 0.9 0.9 1.1*   < >  --   
 SGOT 178* 41* 37   < >  --   
ALT 63 20 18   < >  --   
INR 1.3* 1.2*  --   --   --   
 < > = values in this interval not displayed.   
 
 
Signed: Mckenna Gambino, DO

## 2020-05-17 NOTE — PROGRESS NOTES
0730:  Bedside and Verbal shift change report given to FAYE Snyder RN (oncoming nurse) by Jefferson Bustamante RN (offgoing nurse). Report included the following information SBAR, Kardex, Intake/Output, MAR, Recent Results, Med Rec Status, Cardiac Rhythm Afib with occasional PVCs and Alarm Parameters . 0825:  Dr. Nguyen Lopez on the unit, evaluated the patient. He wants to hold off starting dialysis at this time. 4612: When administering the patient a capsule I used apple sauce to help him swallow. He coughed a few times, but didn't seem to asperate, and the capsule went down. At one point the patient fell asleep with applesauce in his mouth, and I had to wake him and tell him to swallow. 1000:  Mrs Sarah Degroot updated on the condition of her . 1125:  Paged for the on call Cardiologist Dr. Faby Delvalle. 1210:  Dr. Faby Delvalle on the unit and updated on the patient's condition. He will be back to evaluate the patient later. 1530: Advised Dr. Gabby Og the family of this patient is consenting for convalescent plasma treatment. He will fax the specialized convalescent plasma consent form to the unit for a telephone consent to be obtained with, from the patient's wife. 1923:  Bedside and Verbal shift change report given to Delmar Alas (oncoming nurse) by Angella Mcintosh (offgoing nurse). Report included the following information SBAR, Kardex, Intake/Output, MAR, Recent Results, Med Rec Status and Cardiac Rhythm Afib, with occasional PVCs and A flutter.

## 2020-05-18 PROBLEM — R53.1 WEAKNESS GENERALIZED: Status: ACTIVE | Noted: 2020-01-01

## 2020-05-18 PROBLEM — R65.21 SEPTIC SHOCK (HCC): Status: ACTIVE | Noted: 2020-01-01

## 2020-05-18 PROBLEM — A41.9 SEPTIC SHOCK (HCC): Status: ACTIVE | Noted: 2020-01-01

## 2020-05-18 PROBLEM — N17.0 ACUTE RENAL FAILURE WITH TUBULAR NECROSIS (HCC): Status: ACTIVE | Noted: 2020-01-01

## 2020-05-18 NOTE — CONSULTS
[facility] INTENSIVIST Consult Service Note Pulmonary, Critical Care, and Sleep Medicine Name: Kori Jackson MRN: [id number] : 1955 Hospital: [facility] Date: 2020  Admission date: 5/15/2020 Hospital Day: 4 Subjective/Interval History:  
Seen earlier today on rounds. Pt is unstable and acutely ill in the CCU. Patient was re-evaluated multiple times with repeated discussions with CCU team throughout the day : Continues to rapidly decompensate. This morning is largely unresponsive with escalating pressor requirements and worsening multiorgan failure. Multiple phone conversations were had with his wife and children regarding code status and goals of care. They ultimately decided that they wanted to continue aggressive measures including intubation and dialysis; intubation subsequently performed by Dr. Zain Bello.  high fever, sweats overniht. IV levophed 95 mcg, IV vasopressin. D dimer 2.56; Cr 5.77; Ferritin yesterday 909. lactic acid higher, WBC now 67622. CXR reviewed and amazingly clear. . Too weak to verbalize. Called wife at home [phone] , Daughter Don Jacobs. They are both well, they were tested at [facility] yesterday. Results pending. Explained medical management for COVID 19. He is critically ill. He is unpredictable. Family wants to defer to his heart doctor any decisions about code status. We dis discuss possiblerole of Convalescent plasma since Remdesivir is not available and would be reserved for respiratory failure pts on vent. Wife, daughter, son will review [email or web address] org and we will discuss process if they want to proceed. No guarantees offered, as it will take takes to get plasma and the transfusion is not risk free, 11:32 AM 
 
3:38 PM Called wife back. Wife and family has had a chance to review forms and handouts.  
 
· [facility]: Expanded Access to Convalescent Plasma for Treatment of Pts with COVID-19. [facility]#[id number] Clinical Staff: Dr. Kaley Abad MD. Consent for blood and investigation has been obtained. Discussed the use of Convalescent Plasma collected from COVID 19 patients. These individuals have recovered from the illness and donated blood to study. Use does not guarantee that patients will improve or recover after transfusion. They may actually get worse or have side effects. They agree to proceed with requesting convalescent plasma. Sent order for type and screen. Nursing aware and will get phone consent. I called Blood bank supervisor who will request matched unit from [employer]. Family aware that it may take days to acquire. IMPRESSION:  
1. Acute Shock - Septic and cardiogenic- source? On multiple pressors; near max IV levophed and IV vasopressin 2. Severe sepsis- high fever, sweats 3. COVID 23 POSITIVE with flu like illness- at very high risk for complications 4. Proinflammatory/ Prothrombotic 5. Alpha strep septicemia- elevated Procalcitonin > 80 
6. Hyponatremia 7. Chronic combined systolic/diastolic heart failure 8. Hypertensive heart disease with CHF and CKD 9. LAQUITA/CKD 10. Chronic anticoagulation at home- eliquis- stopped 11. Diabetes mellitus type 2 uncontrolled 12. Hyperlipidemia 13. 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 Chronic atrial fibrillation 15. Status post SJM BIV-ICD implant in 2016 
16. Sleep apnea, undiagnosed/untreated 17. Obesity with recent weight gain 18. Body mass index is 33.29 kg/m². - not a good prone candidate 19. Additional workup outlined below 20. Multiorgan dysfunction as outlined above: Pt has one or more acute or chronic illnesses with severe exacerbation with progression or side effects of treatment that poses a threat to life or bodily function 21.  Pt is unstable, unpredictable needing more CCU monitoring; at high risk of sudden decline and decompensation with life threatening consequenses and continued end organ dysfunction and failure 22. Pt is critically ill. Time spent with pt and staff actively rendering care, managing pt and coordinating care as stated below;  80 minutes, exclusive of any procedures RECOMMENDATIONS/PLAN:  
1. CCU monitoring 2. Droplet plus Isolation 3. Intubated 5/18, adjust settings as needed 4. Nephrology following, to start CVVH today if tolerates 5. Continue pressors, currently on max dose dobutamine, vaso, norepi. Epi and rosemary have been added 6. Broaden antibiotics to vanc, cefepime, azithro 7. Add stress dose steroids 8. Can't use lovenox due to renal failure, start heparin drip without a bolus 9. Cardiology following 10. Continue glargine for now, keep a close eye on sugars 11. Check A1c 
12. Follow cultures 13. CXR in AM 
14. Replete electrolytes PRN 15. Labs to follow inflammatory markers electrolytes, renal function and and blood counts 16. Bronchial hygiene with respiratory therapy techniques, bronchodilators 17. Pt needs IV fluids with additives and Drug therapy requiring intensive monitoring for toxicity 18. Prescription drug management with home med reconciliation reviewed 19. DVT, SUP prophylaxis Prognosis grim. I do not expect him to survive much longer given pressor needs and multiorgan failure in the setting of severe underlying cardiac disease. Myself and Dr. Teresa Lucas have spoken with the family; at this point they would like to give him another 24 hours before changing code status or making any other decisions. Palliative care is going to get involved as well which is appreciated. CCT 93 minutes excluding procedures/other providers. Subjective/Initial History:  
I have reviewed the flowsheet and previous days notes. Seen earlier today on rounds. I was asked by Sukhwinder Chinchilla MD to see Mike Alejandra a 71 y.o.  male  in consultation for a chief complaint of shock. Excerpts from admission 5/15/2020 and consult notes reviewed as follows:  
 
\"Mr. Jesus Mars is a 57 y.o. morbidly obese male with Chronic nonischemic dilated cardiomyopathy EF 36%, Chronic systolic congestive heart failure class, H/o atrial fibrillation, On chronic warfarin, Iatrogenic left bundle branch block with predominant RV pacing, 64-70%, Hypertension, Hyperlipidemia and diabetes presents to ED [facility] ED C/O Worsening exertional shortness of breath and around 20 pounds weight gain in last 3 weeks. \" 
 
Pt now in CCU. Poor historian. On room air. No fever. No cough. No diarrhea. Not very active. Saw PCP three weeks ago. Some edema. Chart notes ED [facility] admission in 2017 with decompensation. Patient PCP: Stacey Cage MD 
PMH:  has a past medical history of A-fib ([facility] [address] Arrhythmia, Diabetes ([facility] [address] Endocrine disease, Hypertension, and Irregular heart beat. He also has no past medical history of Difficult intubation, Malignant hyperthermia due to anesthesia, Nausea & vomiting, or Pseudocholinesterase deficiency. PSH:   has a past surgical history that includes pr cardiac surg procedure unlist and insert emergency endotrach airway (5/18/2020). FHX: family history includes Diabetes in his father; Heart Disease in his father and mother. SHX:  reports that he quit smoking about 26 years ago. He has never used smokeless tobacco. He reports that he does not drink alcohol or use drugs. ROS:A comprehensive review of systems was negative except for that written in the HPI. No Known Allergies MEDS:  
Current Facility-Administered Medications Medication  DOBUTamine (DOBUTREX) 500 mg/250 mL (2,000 mcg/mL) infusion  PHENYLephrine (JACLYN-SYNEPHRINE) 30 mg in 0.9% sodium chloride 250 mL infusion  sodium bicarbonate 150 mEq/1000 mL D5W (premix)  EPINEPHrine (ADRENALIN) 5 mg in 0.9% sodium chloride 250 mL infusion  vancomycin (VANCOCIN) 2000 mg in  ml infusion  propofol (DIPRIVAN) 10 mg/mL infusion  cefepime (MAXIPIME) 1 g in 0.9% sodium chloride (MBP/ADV) 50 mL  bicarbonate dialysis (PRISMASOL) BG K 2/Ca 3.5 5000 ml solution  vasopressin (VASOSTRICT) 20 Units in 0.9% sodium chloride 100 mL infusion  enoxaparin (LOVENOX) injection 100 mg  
 insulin glargine (LANTUS) injection 25 Units  0.9% sodium chloride infusion 250 mL  sodium chloride (NS) flush 5-40 mL  sodium chloride (NS) flush 5-40 mL  acetaminophen (TYLENOL) tablet 650 mg  
 azithromycin (ZITHROMAX) 500 mg in 0.9% sodium chloride (MBP/ADV) 250 mL  ondansetron (ZOFRAN) injection 4 mg  alcohol 62% (NOZIN) nasal  1 Ampule  insulin lispro (HUMALOG) injection  glucose chewable tablet 16 g  
 dextrose (D50W) injection syrg 12.5-25 g  
 glucagon (GLUCAGEN) injection 1 mg  acetaminophen (TYLENOL) tablet 650 mg Or  acetaminophen (TYLENOL) suppository 650 mg  
 zinc sulfate (ZINCATE) 220 (50) mg capsule 1 Cap  ascorbic acid (vitamin C) 5 g in dextrose 5% 50 mL IVPB  NOREPINephrine (LEVOPHED) 32 mg in 5% dextrose 250 mL infusion  influenza vaccine 2019-20 (6 mos+)(PF) (FLUARIX/FLULAVAL/FLUZONE QUAD) injection 0.5 mL  sodium chloride (NS) flush 5-10 mL Current Facility-Administered Medications:  
  DOBUTamine (DOBUTREX) 500 mg/250 mL (2,000 mcg/mL) infusion, 0-10 mcg/kg/min, IntraVENous, TITRATE, Lilly PRETTY DO, Last Rate: 36.1 mL/hr at 05/18/20 0924, 10 mcg/kg/min at 05/18/20 9379   PHENYLephrine (JACLYN-SYNEPHRINE) 30 mg in 0.9% sodium chloride 250 mL infusion,  mcg/min, IntraVENous, TITRATE, Samra Stevenson MD, Stopped at 05/18/20 1100 
  sodium bicarbonate 150 mEq/1000 mL D5W (premix), , IntraVENous, CONTINUOUS, Samra Stevenson MD, Stopped at 05/18/20 1100   EPINEPHrine (ADRENALIN) 5 mg in 0.9% sodium chloride 250 mL infusion, 0-10 mcg/min, IntraVENous, TITRATE, Samra Stevenson MD, Last Rate: 30 mL/hr at 05/18/20 1237, 10 mcg/min at 05/18/20 1237 
  vancomycin (VANCOCIN) 2000 mg in  ml infusion, 2,000 mg, IntraVENous, ONCE, Marci Gustafson DO, Last Rate: 250 mL/hr at 05/18/20 1208, 2,000 mg at 05/18/20 1208   propofol (DIPRIVAN) 10 mg/mL infusion, 0-50 mcg/kg/min, IntraVENous, TITRATE, Rosa Elena HILLS MD, Last Rate: 10.9 mL/hr at 05/18/20 1107, 15 mcg/kg/min at 05/18/20 1107   cefepime (MAXIPIME) 1 g in 0.9% sodium chloride (MBP/ADV) 50 mL, 1 g, IntraVENous, Q24H, Marci Gustafson DO, Last Rate: 16.7 mL/hr at 05/18/20 1202, 1 g at 05/18/20 1202   bicarbonate dialysis (PRISMASOL) BG K 2/Ca 3.5 5000 ml solution, , Extracorporeal, DIALYSIS CONTINUOUS, Lisa Montenegro MD 
  vasopressin (VASOSTRICT) 20 Units in 0.9% sodium chloride 100 mL infusion, 0.04 Units/min, IntraVENous, CONTINUOUS, Rosa Elena HILLS MD, Last Rate: 12 mL/hr at 05/18/20 0841, 0.04 Units/min at 05/18/20 0841   enoxaparin (LOVENOX) injection 100 mg, 100 mg, SubCUTAneous, Q24H, Rosa Elena HILLS MD, Stopped at 05/18/20 1200 
  insulin glargine (LANTUS) injection 25 Units, 25 Units, SubCUTAneous, DAILY, Maria Guadalupe Wheeler MD, 25 Units at 05/18/20 0823 
  0.9% sodium chloride infusion 250 mL, 250 mL, IntraVENous, PRN, Maria Guadalupe Wheeler MD 
  sodium chloride (NS) flush 5-40 mL, 5-40 mL, IntraVENous, Q8H, Adam Watkins MD, 10 mL at 05/18/20 0521 
  sodium chloride (NS) flush 5-40 mL, 5-40 mL, IntraVENous, PRN, Adam Watkins MD 
  acetaminophen (TYLENOL) tablet 650 mg, 650 mg, Oral, Q6H PRN, Adam Watkins MD 
  azithromycin [facility]) 500 mg in 0.9% sodium chloride (MBP/ADV) 250 mL, 500 mg, IntraVENous, Q24H, Marci Gustafson, DO, Last Rate: 250 mL/hr at 05/18/20 0101, 500 mg at 05/18/20 0101 
  ondansetron (ZOFRAN) injection 4 mg, 4 mg, IntraVENous, Q8H PRN, Adam Watkins MD, 4 mg at 05/16/20 0137 
  alcohol 62% (NOZIN) nasal  1 Ampule, 1 Ampule, Topical, Q12H, Мария Rodriguez MD, 1 Ampule at 20 8677 
  insulin lispro (HUMALOG) injection, , SubCUTAneous, Q6H, Prashant Sands MD, 3 Units at 20 1222 
  glucose chewable tablet 16 g, 4 Tab, Oral, PRN, Prashant Sands MD 
  dextrose (D50W) injection syrg 12.5-25 g, 12.5-25 g, IntraVENous, PRN, Prashant Sands MD 
  glucagon [facility]) injection 1 mg, 1 mg, IntraMUSCular, PRN, Prashant Sands MD 
  acetaminophen (TYLENOL) tablet 650 mg, 650 mg, Oral, Q6H PRN **OR** acetaminophen (TYLENOL) suppository 650 mg, 650 mg, Rectal, Q6H PRN, Prashant Sands MD 
  zinc sulfate (ZINCATE) 220 (50) mg capsule 1 Cap, 1 Cap, Oral, DAILY, Prashant Sands MD, 1 Cap at 20 1208   ascorbic acid (vitamin C) 5 g in dextrose 5% 50 mL IVPB, 5 g, IntraVENous, Q6H, Olga HILLS MD, 5 g at 20 1216   NOREPINephrine (LEVOPHED) 32 mg in 5% dextrose 250 mL infusion, 2-100 mcg/min, IntraVENous, TITRATE, José Quiñonez DO, Last Rate: 93.8 mL/hr at 20 1100, 200 mcg/min at 20 1100 
  influenza vaccine - (6 mos+)(PF) (FLUARIX/FLULAVAL/FLUZONE QUAD) injection 0.5 mL, 0.5 mL, IntraMUSCular, PRIOR TO DISCHARGE, José Quiñonez DO 
  sodium chloride (NS) flush 5-10 mL, 5-10 mL, IntraVENous, PRN, Arik Gant MD 
 
 
Objective:  
 
Vital Signs: Telemetry:    AFIB Intake/Output:  
Visit Vitals /52 Pulse (!) 131 Temp 99.8 °F (37.7 °C) Resp 16 Ht 6' 3\" (1.905 m) Wt 120.8 kg (266 lb 5.1 oz) SpO2 93% BMI 33.29 kg/m² Temp (24hrs), Av.1 °F (37.3 °C), Min:98.7 °F (37.1 °C), Max:99.8 °F (37.7 °C) O2 Device: Nasal cannula O2 Flow Rate (L/min): 4 l/min Body mass index is 33.29 kg/m². Wt Readings from Last 4 Encounters:  
20 120.8 kg (266 lb 5.1 oz) 19 123.8 kg (273 lb) 17 129.3 kg (285 lb)  
17 107.5 kg (237 lb) Intake/Output Summary (Last 24 hours) at 2020 1309 Last data filed at 2020 2368 Gross per 24 hour Intake 2144.83 ml Output 25 ml Net 2119.83 ml Last shift:      05/18 0701 - 05/18 1900 In: 304.2 [I.V.:304.2] Out: 0 Last 3 shifts: 05/16 1901 - 05/18 0700 In: 2666.4 [P.O.:340; I.V.:2326.4] Out: 35 [Urine:35] Hemodynamics:   
CO:   
CI:   
CVP: CVP (mmHg): 20 mmHg (05/18/20 1112) SVR:   PAP Systolic:   
PAP Diastolic:   
PVR:   
PJ34:    
 
Ventilator Settings:     
Mode Rate TV Press PEEP FiO2 PIP Min. Vent Assist control    500 ml    6 cm H20 100 %  24 cm H2O  9.32 l/min Physical Exam: 
 
General:  male; obtunded, 2L NC 
HEENT: NCAT, poor dentition, lips and mucosa dry Eyes: anicteric; conjunctiva clear Neck: no nodes, no cuff leak, no accessory MM use. Chest: no deformity,  
Cardiac: IR regular; no murmur Lungs: no distress but not protecting airway Abd: soft, NT, hypoactive BS Ext: no edema; no joint swelling; No clubbing : No bright, Neuro: obtunded Psych- no agitation, oriented to person; unable to assess Skin: warm, dry, no cyanosis;  
Pulses: 1-2+ Bilateral pedal, radial 
Capillary: brisk; pale Labs: 
 
Recent Labs 05/18/20 
0320 05/17/20 
0406 05/16/20 
1212 WBC 27.5* 29.2* 26.1* HGB 12.3 12.3 12.1  183 196 INR 1.9* 1.3* 1.2* APTT 47.1* 39.0* 40.7* Recent Labs 05/18/20 
1211 05/18/20 
0320 05/17/20 
0410 05/17/20 
0406  05/16/20 
1212  05/15/20 
1734 NA  --  135*  --  130*  --  129*   < >  --   
K  --  5.3*  --  4.7  --  4.5   < >  --   
CL  --  94*  --  93*  --  92*   < >  --   
CO2  --  17*  --  22  --  26   < >  --   
GLU  --  98  --  251*  --  269*   < >  --   
BUN  --  87*  --  69*  --  56*   < >  --   
CREA  --  7.89*  --  5.77*  --  4.47*   < >  --   
CA  --  7.9*  --  8.7  --  8.7   < >  --   
MG  --   --   --   --   --   --   --  1.7 LAC 6.1* 8.6* 5.0*  --    < >  --    < > 2.3* ALB  --  1.9*  --  2.2*  --  2.4*   < >  --   
SGOT  --  >2,000*  --  178*  --  41*   < >  --   
 ALT  --  631*  --  63  --  20   < >  --   
 < > = values in this interval not displayed. No results for input(s): PH, PCO2, PO2, HCO3, FIO2 in the last 72 hours. Recent Labs 05/18/20 
0320 05/17/20 
0410 05/16/20 
2140 05/16/20 
1212 *  --   --   --   
TROIQ  --  2.36* 2.24* 1.50* Lab Results Component Value Date/Time BNP 93 02/08/2010 03:55 AM  
  
Lab Results Component Value Date/Time Culture result: NO GROWTH 3 DAYS 05/15/2020 06:11 PM  
 Culture result: No growth (<1,000 CFU/ML) 05/15/2020 05:45 PM  
 Culture result: (A) 05/15/2020 05:34 PM  
  ALPHA STREPTOCOCCUS, NOT S. PNEUMONIAE GROWING IN 1 OF 2 BOTTLES DRAWN (SITE = R ARM) Culture result: (A) 05/15/2020 05:34 PM  
  PRELIMINARY REPORT OF GRAM POSITIVE COCCI IN CHAINS GROWING IN 1 OF 2 BOTTLES DRAWN CALLED TO AND READ BACK BY Dar Brown RN [id number] AT 1992 ON 5/16/20. [address] Culture result: REMAINING BOTTLE(S) HAS/HAVE NO GROWTH SO FAR 05/15/2020 05:34 PM  
  
Lab Results Component Value Date/Time  (H) 05/18/2020 03:20 AM  
  01/19/2019 06:40 AM  
 
 
Imaging: 
I have personally reviewed the patients radiographs and have reviewed the reports: CXR Results  (Last 48 hours) 05/18/20 1141  XR CHEST PORT Final result Impression:   impression: Support devices in place as above. Narrative:  Clinical indication: Tubes placement. Portable AP semiupright view of the chest obtained and compared to May 17. The  
tip of the ET tube is well above the jaja in good position. The tip of the NG  
tube project in the expected position of the body of the stomach. Other support  
devices are in good position and unchanged. Cardiomegaly unchanged. Stable  
appearance to lung fields. 05/17/20 0526  XR CHEST PORT Final result Impression:  IMPRESSION:  
No significant change. Narrative:  INDICATION: respiratory failure EXAMINATION:  AP CHEST, PORTABLE  
   
 COMPARISON: 5/16/20 FINDINGS: Single AP portable view of the chest demonstrates no change in  
position of the lines and tubes. Cardiomegaly, unchanged. There is no new  
airspace disease. No pneumothorax or pulmonary edema. Results from [facility] Encounter encounter on 05/15/20 XR CHEST PORT Narrative Clinical indication: Tubes placement. Portable AP semiupright view of the chest obtained and compared to May 17. The 
tip of the ET tube is well above the jaja in good position. The tip of the NG 
tube project in the expected position of the body of the stomach. Other support 
devices are in good position and unchanged. Cardiomegaly unchanged. Stable 
appearance to lung fields. Impression  impression: Support devices in place as above. XR CHEST PORT Narrative INDICATION: respiratory failure EXAMINATION:  AP CHEST, PORTABLE 
 
COMPARISON: 5/16/20 FINDINGS: Single AP portable view of the chest demonstrates no change in 
position of the lines and tubes. Cardiomegaly, unchanged. There is no new 
airspace disease. No pneumothorax or pulmonary edema. Impression IMPRESSION: 
No significant change. XR CHEST PORT Narrative EXAM: XR CHEST PORT INDICATION: verify central line placement COMPARISON: May 15 FINDINGS: A portable AP radiograph of the chest was obtained at 0049 hours. The 
right jugular line tip is in the region of the right atrium. Left subclavian 
leads are stable. The patient is on a cardiac monitor. The lungs are clear. The 
cardiac and mediastinal contours and pulmonary vascularity are normal.  The 
bones and soft tissues are grossly within normal limits. Impression IMPRESSION: Appropriate central line placement. Results from [facility] Encounter encounter on 05/15/20 CT ABD PELV WO CONT Narrative INDICATION: renal failure EXAM: CT Abdomen and Pelvis without IV contrast. No oral contrast. 
 CT dose reduction was achieved through use of a standardized protocol tailored 
for this examination and automatic exposure control for dose modulation. FINDINGS:  
No urinary tract stones are seen. There is no hydroureteronephrosis. The kidneys 
are normal in size. There is no perirenal fluid or ascites. Liver shows no apparent significant finding without contrast. Pancreas, adrenal 
glands, spleen and aorta show no significant enlargement. No inflammation is 
seen. There is no pneumoperitoneum or significant adenopathy. The bladder is unremarkable. The distal ureters are not dilated. There is no 
apparent pelvic mass. The appendix is normal. Bowels are not dilated. There is a 
fat-containing umbilical hernia. Impression IMPRESSION: No Acute Disease. During this entire length of time the patient's condition was unstable, unpredictable and critically ill in the CCU/ ICU. I was immediately available to the patient whose care required several interactions with nursing, multidisciplinary team members leading to multiple interventions with fluid resuscitation and medication adjustments to optimize respiratory support, hemodynamic treatment, medication changes based on repeat labs results, reviews, exams and assessments. The reason for providing this level of medical care was due to a critical illness that impaired one or more vital organ systems, such that there was a high probability of sudden or life threatening deterioration in the patient's condition. This care involved high complexity medical decision making to treat acute and unstable vital organ system failure, and to prevent further life threatening deterioration of the patients condition. I personally: · Reviewed the flowsheet and previous days notes · Reviewed and summarized records or history from previous days note or discussions with staff, family · Parenteral controlled substances - Reviewed/ Adjusted / Weaned / Started · High Risk Drug therapy requiring intensive monitoring for toxicity: eg steroids, pressors, antibiotics · Reviewed and/or ordered Clinical lab tests · Reviewed and/or ordered Radiology tests · Reviewed and/or ordered of Medicine tests · Independently visualized radiologic Images · Reviewed the patients ECG / Telemetry · discussed my assessment/management with : Consultants, Nursing for coordination of care Thank you for allowing us to participate in the care of this patient. We will follow along with you until they no longer require CCU services.  
 
Celena Rob, DO

## 2020-05-18 NOTE — PROGRESS NOTES
0.  Spoke with the wife. Reiterated how gravely ill Mr. Padron [facility]") is and the fact that he may not survive the night. I have my reservations about his ability to tolerate the line placement and if he does his ability to tolerate CVVH. She asked that I speak with the daughter; Im awaiting her call. Very unfortunate situation. 1701. Spoke w/ wife, dtr, and son by phone. No changes now.

## 2020-05-18 NOTE — PROGRESS NOTES
NAME: Bharat Mcintosh :  1955 MRN:  [id number] Assessment :    Plan: 
--LAQUITA Mild hyponatremia Mild Hyperkalemia Shock Sepsis DM type 2 Systolic HF (EF 84%) COVID + 
 --Initiate CVVHD today; SBP in the 90's on pressors. Oligoanuric. Will ask IR for a Lewis line; CVVHD (orders placed. vibha is aware) Subjective: Chief Complaint:  Seen through ICU window. Intubated. I spoke with ICU nurses. I spoke with his daughter and wife over the phone. We discussed his current kidney situation. We discussed his need for KRT; May not survive, but a lower chance of survival without HD. We discussed the CRRT procedure. We discussed the need for a lewis. They are agreeable. Review of Systems:  
 
Symptom Y/N Comments  Symptom Y/N Comments Fever/Chills    Chest Pain Poor Appetite    Edema Cough    Abdominal Pain Sputum    Joint Pain SOB/GROVES    Pruritis/Rash Nausea/vomit    Tolerating PT/OT Diarrhea    Tolerating Diet Constipation    Other Could not obtain due to: See above Objective: VITALS:  
Last 24hrs VS reviewed since prior progress note. Most recent are: 
Visit Vitals /54 Pulse 87 Temp 98.7 °F (37.1 °C) Resp (!) 31 Ht 6' 3\" (1.905 m) Wt 120.8 kg (266 lb 5.1 oz) SpO2 94% BMI 33.29 kg/m² Intake/Output Summary (Last 24 hours) at 2020 3182 Last data filed at 2020 0600 Gross per 24 hour Intake 1894.86 ml Output 25 ml Net 1869.86 ml Telemetry Reviewed: PHYSICAL EXAM: 
General: NAD No edema Lab Data Reviewed: (see below) Medications Reviewed: (see below) PMH/SH reviewed - no change compared to H&P 
________________________________________________________________________ Care Plan discussed with: 
Patient Family  y   
RN y   
Care Manager Consultant: Comments >50% of visit spent in counseling and coordination of care    
 
________________________________________________________________________ Lele Giraldo MD  
 
Procedures: see electronic medical records for all procedures/Xrays and details which 
were not copied into this note but were reviewed prior to creation of Plan. LABS: 
Recent Labs 05/18/20 0320 05/17/20 
0406 WBC 27.5* 29.2* HGB 12.3 12.3 HCT 38.4 38.1  183 Recent Labs 05/18/20 
0320 05/17/20 
0406 05/16/20 
1212  05/15/20 
1734 * 130* 129*   < >  --   
K 5.3* 4.7 4.5   < >  --   
CL 94* 93* 92*   < >  --   
CO2 17* 22 26   < >  --   
BUN 87* 69* 56*   < >  --   
CREA 7.89* 5.77* 4.47*   < >  --   
GLU 98 251* 269*   < >  --   
CA 7.9* 8.7 8.7   < >  --   
MG  --   --   --   --  1.7  
 < > = values in this interval not displayed. Recent Labs 05/18/20 0320 05/17/20 0406 05/16/20 
1212 SGOT >2,000* 178* 41*  104 93 TP 6.9 7.2 7.7 ALB 1.9* 2.2* 2.4*  
GLOB 5.0* 5.0* 5.3* Recent Labs 05/18/20 0320 05/17/20 
0406 05/16/20 
1212 INR 1.9* 1.3* 1.2* PTP 18.6* 13.5* 12.6* APTT 47.1* 39.0* 40.7* Recent Labs 05/16/20 1212 05/15/20 
1734 FERR 909* 577* No results found for: FOL, RBCF No results for input(s): PH, PCO2, PO2 in the last 72 hours. No results for input(s): CPK, CKMB in the last 72 hours. No lab exists for component: TROPONINI No components found for: [place] Lab Results Component Value Date/Time  Color YELLOW/STRAW 05/15/2020 05:34 PM  
 Appearance CLOUDY (A) 05/15/2020 05:34 PM  
 Specific gravity 1.025 05/15/2020 05:34 PM  
 pH (UA) 5.0 05/15/2020 05:34 PM  
 Protein 30 (A) 05/15/2020 05:34 PM  
 Glucose >1,000 (A) 05/15/2020 05:34 PM  
 Ketone TRACE (A) 05/15/2020 05:34 PM  
 Bilirubin Negative 05/15/2020 05:34 PM  
 Urobilinogen 0.2 05/15/2020 05:34 PM  
 Nitrites Negative 05/15/2020 05:34 PM  
 Leukocyte Esterase Negative 05/15/2020 05:34 PM  
 Epithelial cells FEW 05/15/2020 05:34 PM  
 Bacteria Negative 05/15/2020 05:34 PM  
 WBC 0-4 05/15/2020 05:34 PM  
 RBC 0-5 05/15/2020 05:34 PM  
 
 
MEDICATIONS: 
Current Facility-Administered Medications Medication Dose Route Frequency  DOBUTamine (DOBUTREX) 500 mg/250 mL (2,000 mcg/mL) infusion  0-10 mcg/kg/min IntraVENous TITRATE  vasopressin (VASOSTRICT) 20 Units in 0.9% sodium chloride 100 mL infusion  0.04 Units/min IntraVENous CONTINUOUS  
 enoxaparin (LOVENOX) injection 100 mg  100 mg SubCUTAneous Q24H  
 insulin glargine (LANTUS) injection 25 Units  25 Units SubCUTAneous DAILY  0.9% sodium chloride infusion 250 mL  250 mL IntraVENous PRN  
 sodium chloride (NS) flush 5-40 mL  5-40 mL IntraVENous Q8H  
 sodium chloride (NS) flush 5-40 mL  5-40 mL IntraVENous PRN  
 acetaminophen (TYLENOL) tablet 650 mg  650 mg Oral Q6H PRN  
 cefTRIAXone (ROCEPHIN) 1 g in 0.9% sodium chloride (MBP/ADV) 50 mL  1 g IntraVENous Q24H  
 azithromycin (ZITHROMAX) 500 mg in 0.9% sodium chloride (MBP/ADV) 250 mL  500 mg IntraVENous Q24H  
 ondansetron (ZOFRAN) injection 4 mg  4 mg IntraVENous Q8H PRN  
 alcohol 62% (NOZIN) nasal  1 Ampule  1 Ampule Topical Q12H  
 insulin lispro (HUMALOG) injection   SubCUTAneous Q6H  
 glucose chewable tablet 16 g  4 Tab Oral PRN  
 dextrose (D50W) injection syrg 12.5-25 g  12.5-25 g IntraVENous PRN  
 glucagon (GLUCAGEN) injection 1 mg  1 mg IntraMUSCular PRN  
 acetaminophen (TYLENOL) tablet 650 mg  650 mg Oral Q6H PRN Or  
 acetaminophen (TYLENOL) suppository 650 mg  650 mg Rectal Q6H PRN  zinc sulfate (ZINCATE) 220 (50) mg capsule 1 Cap  1 Cap Oral DAILY  ascorbic acid (vitamin C) 5 g in dextrose 5% 50 mL IVPB  5 g IntraVENous Q6H  
 NOREPINephrine (LEVOPHED) 32 mg in 5% dextrose 250 mL infusion  2-100 mcg/min IntraVENous TITRATE  influenza vaccine 2019-20 (6 mos+)(PF) (FLUARIX/FLULAVAL/FLUZONE QUAD) injection 0.5 mL  0.5 mL IntraMUSCular PRIOR TO DISCHARGE  sodium chloride (NS) flush 5-10 mL  5-10 mL IntraVENous PRN

## 2020-05-18 NOTE — PROGRESS NOTES
Progress Note 5/18/2020 11:43 AM 
NAME: Lang Degroot MRN:  [id number] Admit Diagnosis: Sepsis ([facility] [address] [A41.9] Problem List: 1. Shock; septic/cardiogenic 2. Severe sepsis 3. COVID-19 + 4. NSTEMI 5. Hyponatremia 6. Lactic acidosis 7. Acute liver injury 8. Acute on chronic systolic heart failure; Class IV 9. Acute kidney injury; anuric 10. Coagulopathy 11. Nonischemic dilated cardiomyopathy s/p remote ICD 12. Chronic atrial fibrillation 13. Sleep apnea 14. Hypertensive heart disease w/ CKD and HF Assessment/Plan: On multiple pressors Max vent support Dialysis looming Long discussion with the daughter and wife. He is gravely ill and most likely will not survive this. This has been relayed and they understand this. They want to give him another 24 hours and regroup. FULL CODE for now. Will readdress w/ family in the AM. 
 
1. Continue supportive care 2. Continue lovenox as tolerated; OK to hold for procedures 3. Pall care is appropriate [x]       High complexity decision making was performed in this patient at high risk for decompensation with multiple organ involvement. Subjective:  
 
Lang Degroot is intubated and sedated. Discussed with RN events overnight. Review of Systems: 
 
Symptom Y/N Comments  Symptom Y/N Comments Fever/Chills N   Chest Pain N Poor Appetite N   Edema N   
Cough N   Abdominal Pain N Sputum N   Joint Pain N   
SOB/GROVES N   Pruritis/Rash N   
Nausea/vomit N   Tolerating PT/OT Y Diarrhea N   Tolerating Diet Y Constipation N   Other Could NOT obtain due to: sedated Objective:  
  
Physical Exam: 
 
Last 24hrs VS reviewed since prior progress note. Most recent are: 
 
Visit Vitals /52 Pulse (!) 131 Temp 99.8 °F (37.7 °C) Resp 16 Ht 6' 3\" (1.905 m) Wt 120.8 kg (266 lb 5.1 oz) SpO2 93% BMI 33.29 kg/m² Intake/Output Summary (Last 24 hours) at 5/18/2020 1143 Last data filed at 5/18/2020 1000 Gross per 24 hour Intake 2144.83 ml Output 25 ml Net 2119.83 ml General Appearance: Well developed, well nourished, intubated/sedated Ears/Nose/Mouth/Throat: Hearing grossly normal. 
Neck: Supple. Chest: Lungs decreased diffusely Cardiovascular: Regular rate and rhythm, S1S2 normal, no murmur. Abdomen: Soft, non-tender, bowel sounds are active. Extremities: No edema bilaterally. Skin: Warm and dry. []         Post-cath site without hematoma, bruit, tenderness, or thrill. Distal pulses intact. PMH/ reviewed - no change compared to H&P Data Review Telemetry: paced/AF 
 
EKG:  
[x]  No new EKG for review Lab Data Personally Reviewed: 
 
Recent Labs 05/18/20 0320 05/17/20 0406 WBC 27.5* 29.2* HGB 12.3 12.3 HCT 38.4 38.1  183 Recent Labs 05/18/20 0320 05/17/20 
0406 05/16/20 
1212 INR 1.9* 1.3* 1.2* PTP 18.6* 13.5* 12.6* APTT 47.1* 39.0* 40.7* Recent Labs 05/18/20 0320 05/17/20 
0406 05/16/20 
1212  05/15/20 
1734 * 130* 129*   < >  --   
K 5.3* 4.7 4.5   < >  --   
CL 94* 93* 92*   < >  --   
CO2 17* 22 26   < >  --   
BUN 87* 69* 56*   < >  --   
CREA 7.89* 5.77* 4.47*   < >  --   
GLU 98 251* 269*   < >  --   
CA 7.9* 8.7 8.7   < >  --   
MG  --   --   --   --  1.7  
 < > = values in this interval not displayed. Recent Labs 05/18/20 0320 05/17/20 
0410 05/16/20 2140 05/16/20 
1212 *  --   --   --   
TROIQ  --  2.36* 2.24* 1.50* Lab Results Component Value Date/Time Cholesterol, total 92 03/03/2009 09:40 PM  
 HDL Cholesterol 44 03/03/2009 09:40 PM  
 LDL, calculated 38.4 03/03/2009 09:40 PM  
 Triglyceride 48 03/03/2009 09:40 PM  
 CHOL/HDL Ratio 2.1 03/03/2009 09:40 PM  
 
 
Recent Labs 05/18/20 
0320 05/17/20 
0406 05/16/20 
1212 SGOT >2,000* 178* 41*  104 93 TP 6.9 7.2 7.7 ALB 1.9* 2.2* 2.4*  
GLOB 5.0* 5.0* 5.3*  
 
 No results for input(s): PH, PCO2, PO2 in the last 72 hours. Medications Personally Reviewed: 
 
Current Facility-Administered Medications Medication Dose Route Frequency  DOBUTamine (DOBUTREX) 500 mg/250 mL (2,000 mcg/mL) infusion  0-10 mcg/kg/min IntraVENous TITRATE  PHENYLephrine (JACLYN-SYNEPHRINE) 30 mg in 0.9% sodium chloride 250 mL infusion   mcg/min IntraVENous TITRATE  sodium bicarbonate 150 mEq/1000 mL D5W (premix)   IntraVENous CONTINUOUS  
 EPINEPHrine (ADRENALIN) 5 mg in 0.9% sodium chloride 250 mL infusion  0-10 mcg/min IntraVENous TITRATE  vancomycin (VANCOCIN) 2000 mg in  ml infusion  2,000 mg IntraVENous ONCE  propofol (DIPRIVAN) 10 mg/mL infusion  0-50 mcg/kg/min IntraVENous TITRATE  cefepime (MAXIPIME) 1 g in 0.9% sodium chloride (MBP/ADV) 50 mL  1 g IntraVENous Q24H  
 vasopressin (VASOSTRICT) 20 Units in 0.9% sodium chloride 100 mL infusion  0.04 Units/min IntraVENous CONTINUOUS  
 enoxaparin (LOVENOX) injection 100 mg  100 mg SubCUTAneous Q24H  
 insulin glargine (LANTUS) injection 25 Units  25 Units SubCUTAneous DAILY  0.9% sodium chloride infusion 250 mL  250 mL IntraVENous PRN  
 sodium chloride (NS) flush 5-40 mL  5-40 mL IntraVENous Q8H  
 sodium chloride (NS) flush 5-40 mL  5-40 mL IntraVENous PRN  
 acetaminophen (TYLENOL) tablet 650 mg  650 mg Oral Q6H PRN  
 azithromycin (ZITHROMAX) 500 mg in 0.9% sodium chloride (MBP/ADV) 250 mL  500 mg IntraVENous Q24H  
 ondansetron (ZOFRAN) injection 4 mg  4 mg IntraVENous Q8H PRN  
 alcohol 62% (NOZIN) nasal  1 Ampule  1 Ampule Topical Q12H  
 insulin lispro (HUMALOG) injection   SubCUTAneous Q6H  
 glucose chewable tablet 16 g  4 Tab Oral PRN  
 dextrose (D50W) injection syrg 12.5-25 g  12.5-25 g IntraVENous PRN  
 glucagon (GLUCAGEN) injection 1 mg  1 mg IntraMUSCular PRN  
 acetaminophen (TYLENOL) tablet 650 mg  650 mg Oral Q6H PRN  Or  
  acetaminophen (TYLENOL) suppository 650 mg  650 mg Rectal Q6H PRN  zinc sulfate (ZINCATE) 220 (50) mg capsule 1 Cap  1 Cap Oral DAILY  ascorbic acid (vitamin C) 5 g in dextrose 5% 50 mL IVPB  5 g IntraVENous Q6H  
 NOREPINephrine (LEVOPHED) 32 mg in 5% dextrose 250 mL infusion  2-100 mcg/min IntraVENous TITRATE  influenza vaccine 2019-20 (6 mos+)(PF) (FLUARIX/FLULAVAL/FLUZONE QUAD) injection 0.5 mL  0.5 mL IntraMUSCular PRIOR TO DISCHARGE  sodium chloride (NS) flush 5-10 mL  5-10 mL IntraVENous PRN Clearnce Mons III, DO

## 2020-05-18 NOTE — PROGRESS NOTES
Intubation note. Dr. Adolfo Messer at the bedside. 1050:Epi started at 2  
 
1053: Medications for sedation now being given. Etomidate 40 /52, rocur 150 BP 96/80  
 
1055: Rapid afib   
 
1056: cuff inflated, no color change ETT  removed pt remains in afib with RVR 
 
1058: Pt reintubated, SpO2 87% 1101: ETT removed Epi increased to 3  SpO2 97% 1102: Ett replaced,  Positive color change, condensation noted in tube  BP 99/56 HR 130s 
 
1105: prop started at 15 -- /44  
 
1107: epi increased to 4.

## 2020-05-18 NOTE — PROGRESS NOTES
Pharmacy Automatic Renal Dosing Protocol - Antimicrobials Indication for Antimicrobials: sepsis Current Regimen of Each Antimicrobial: 
Vancomycin 2g X1 (Start Date ; Day # 1 Cefepime 1g q 24 hours (; day #1 Azithromycin 500 mg every day ( - ) Previous Antimicrobial Therapy: CTX  -  Goal Level: VANCOMYCIN TROUGH GOAL RANGE Vancomycin Trough: 15 - 20 mcg/mL  (AUC: 400 - 600 mg/hr/Liter/day) Date Dose & Interval Measured (mcg/mL) Extrapolated (mcg/mL) Date & time of next level: TBD Significant Cultures:  
5/15: Blood cx:  
5/15: Blood cx: ALPHA STREPTOCOCCUS - pending 5/15: Urine cx: NG - Final 
5/15: Blood cx: pending 5/15: Respiratory panel: Not detected Radiology / Imaging results: (X-ray, CT scan or MRI):  
5/15: CXR - No Acute Disease Paralysis, amputations, malnutrition: no 
 
Labs: 
Recent Labs 20 
0320 20 
0406 20 
1212 CREA 7.89* 5.77* 4.47* BUN 87* 69* 56* WBC 27.5* 29.2* 26.1* Temp (24hrs), Av.1 °F (37.3 °C), Min:98.7 °F (37.1 °C), Max:99.8 °F (37.7 °C) Creatinine Clearance (mL/min) or Dialysis: 13 (soon to start HD or CVVH) Impression/Plan:  
Patient was on Azithromycin and CTX. Will continue with azithromycin Change CTX to cefepime 1g q 24 hours Vancomycin 2g X1, the remainder of the dose will be based on either CVVH or HD 
WBC elevated and trending down Afebrile Antimicrobial stop date: pending Pharmacy will follow daily and adjust medications as appropriate for renal function and/or serum levels. Thank you, GARY BullardD 
 
Recommended duration of therapy 
http://[facility]/[place]/[facility]/Pharmacy/Clinical%20Companion/Duration%20of%20ABX%20therapy. docx Renal Dosing 
http://[facility]/[place]/[facility]/Pharmacy/Clinical%20Companion/Renal%20Dosing%[id number]. pdf

## 2020-05-18 NOTE — PROCEDURES
[facility] Care[facility] Sleep [facility] Name: Sheryle Sera MRN: [id number] : 1955 Hospital: [facility] Date: 2020 Intubation Note Procedure: elective/emergent intubation IV bicarb x 2 given with IV fluid bolus. IV epinephrine drip started prior to intubation along with IV levophed and vasopressin Indication: Acute respiratory failure with hypoxia Anesthesia- Rapid sequence:  Etomidate 40mg(0.5mg'kg) Paralysis: Rocuronium 150 mg After assessing the airway, the patient underwent preoxygenation with 100% FiO2. Etomidate given then Rocuronium given sequentially asIV push. After adequate sedation and paralisys emergent intubation was performed. A  MAC, 4.0 blade; Videolaryngoscope was used to visualize the epiglottis and vocal chords. Large bloody clot obscuring his cords with crusted scabs made visualization difficult. ETT passed but no color change. Tube removed. Second attempt made after large crust scab stuck to first tube removed with first tube. Smaller tube stuck on another scab. BVM used to support sats in between attempts. Pt remained hemodynamically stable throughout. After positive identification of the vocal chords, an 7.5 ET tube was placed into the trachea with direct visualization. The tube was seen passing through the vocal chords without some difficulty. CO2 colorimetry was employed immediately to verify tube in airway with  appropriate color change indicating detection of CO2. Water vapor was seen within the ET tube, and auscultation of the abdomen revealed no bubbling sounds. OG tube passed. Restraints applied. Auscultation  and inspection of the chest after intubation showed symmetric chest excursion and symmetric air entry bilaterally. Chest X-ray has been ordered and was reviewed. ETT and OGT in good position The patient has been placed on a mechanical ventilator. Settings reviewed and ABG ordered.    
 
Clary Farnsworth MD

## 2020-05-18 NOTE — CONSULTS
[facility] Name:  Jennifer Grigsby 
MR#:  [id number] :  1955 ACCOUNT #:  [de-identified] DATE OF SERVICE:  2020 REQUESTING PHYSICIAN:  David Anne MD 
 
REASON FOR CONSULTATION  Evaluate cardiac status, hypotension, positive COVID-19. CHIEF COMPLAINT:  The patient does not voice any specific complaint. HISTORY OF PRESENT ILLNESS:  The patient is a 57-year-old man with a history of a nonischemic cardiomyopathy with a prior EF of around 20%. He has a biventricular defibrillator and has chronic AFib. He has been followed in the past by Dr. Kayden Starks. He was admitted yesterday after he presented to the emergency room Friday evening with fever and weakness. He also has a history of type 2 diabetes. The patient was hypotensive and was started on pressors and admitted to the ICU. He has been oliguric and had developed progressive renal failure and shock. He is on broad-spectrum antibiotics. He is currently not intubated. His troponin did increase to 2.36 and we were asked to see the patient for evaluation. Earlier today, his COVID-19 test came back positive. PAST MEDICAL HISTORY:  Type 2 diabetes, mild chronic renal insufficiency, hypertension. SURGERIES:  Prior biventricular ICD. CURRENT MEDICATIONS:  IV Levophed, IV vasopressin, vitamin C, Zithromax, ceftriaxone, Lantus insulin, Humalog sliding scale, and Lovenox subcu. SOCIAL HISTORY:  The patient is a former smoker, does not abuse alcohol. FAMILY HISTORY:  His mother apparently had heart disease. REVIEW OF SYSTEMS:  Not obtainable, given the patient's current condition. PHYSICAL EXAMINATION: 
GENERAL:  Reveals a late middle-aged -American male, lethargic in appearance. VITAL SIGNS:  Blood pressure 90/60, heart rate 95, respirations 36, temperature 99.5.  
 
Exam was deferred because of COVID-19 positivity and the following was taken from the admission H and P exam. 
 
HEENT:  No obvious head trauma. Pink conjunctivae. No oral ulcers. NECK:  Supple. Thyroid nontender. LUNGS:  Clear. No wheezing or rhonchi. No accessory muscle use. CARDIAC:  Regular rate and rhythm. No murmurs. ABDOMEN:  Soft, nontender, nondistended. Bowel sounds positive. EXTREMITIES:  No clubbing or cyanosis. Distal pulses 2+. SKIN:  No jaundice. NEUROLOGIC:  No facial asymmetry. No aphasia. Symmetrical strength. LABORATORIES:  Hemoglobin 12.3, white count 29.2, BUN 69, creatinine 5.6. Troponin 2.36. EKG atrial fibrillation, poor R progression, nonspecific ST-T changes. Chest x-ray showed cardiomegaly. No pulmonary edema earlier today. IMPRESSION: 
1. Shock, probably the combination of septic and cardiogenic. 2.  Non-ST-elevation myocardial infarction, probably type 2. 
3.  Dilated nonischemic cardiomyopathy with prior ejection fraction of 20%. 4.  Chronic atrial fibrillation, previously on Eliquis. 5.  Status post prior biventricular pacemaker defibrillator. 6.  Type 2 diabetes. 7.  Acute kidney failure. 8.  COVID-19 positivity. RECOMMENDATIONS:  Continue supportive treatment as you are doing. Dobutamine and or milrinone could be added to the existing pressors, which might help enhance cardiac output. I think the patient's myocardial infarction is due to shock from noncardiac causes as he has no underlying coronary artery disease by previous catheterization. No additional recommendations at this time. Dr. Becky Witt will follow up tomorrow. Carlton Valverde MD 
 
 
[id number] 
D:  05/17/2020 18:17 
T:  05/17/2020 20:26 
JOB #:  [id number] CC:  MD Martin Awan MD

## 2020-05-18 NOTE — PROGRESS NOTES
Care Management: 
 
DEREK: Undetermined. Patient is critical in the ICU. Emergently intubated this am and starting on CVVH. Requiring pressors. Admitted with Sepsis and Covid Positive. Palliative, Pulmonology and nephrology consulted. Prognosis poor. Chart has been reviewed and we will cont to follow as appropriate. Will do a complete assessment at a more appropriate time. Duke Napier RN [facility] 1036

## 2020-05-18 NOTE — DIALYSIS
CRRT Note Ada Per MD orders do NOT start CRRT until pt systolic BP >13. Pt too unstable to start CRRT at this time. BP 87/50, . Primary RN to page Ada once pt stabilizes to start CRRT. Education & pre/post report to DAVIS Covington, primary RN.

## 2020-05-18 NOTE — PROGRESS NOTES
0730: Report received from [facility], [address] [place] [address]. 
 
0800: Pt. Assessed. At this time, Pt. Has Dobutamine infusing at 2.5 mcgs/kg/min, Levophed at 100 mcgs/min (Max now 200 mcgs/min), and Vasopressin at 0.04 units/min. Pt. Remains hypotensive. Dr. Federica Royal at the bedside. Please see MAR for all rates/titrations/and Parameters. Per Dr. Federica Royal, Keep SBP greater than 90. Pt. At this time responds to voice. Oriented to self only. Attempts to moves all extremities. Pupils equal and reactive. Breath sounds Diminished, crackles present. RR 28-44. Pt. dyspneic on exertion and at rest. Pt. Not really able to speak, Mouth very dry. Bowel sounds hypoactive. ABD distended but soft. Umbilical hernia present. Pedal Pulses dopplar-able. All other pulses palpable. Extremities VERY cold. Pt. AFIB/AFLUTTER on the monitor. Rate controlled at this time. Dr. Federica Royal speaking With family on and off throughout this morning concerning Code status and patient plan of care going forward. Expecting a call back from patient's daughter. Skin intact. Patient has a BM. Incontinence care provided. 1012: Spoke to Family, they would like patient to remain a full code and continue with plan of care to intubate and start Dialysis. Will make Dr. Federica Royal aware. 1041: 2 AMPS of sodium bicarb now given per Dr. Federica Royal. 1050: Epinephrine infusion now started at 2 mcgs/min. See Flow sheet for Vital signs and MAR for titrations. 1100: Levophed infusion now Max'd at 200 mcgs/min. See Flow sheet for Vital signs and MAR for titrations. 1107: Pt. Now intubated and OGT tube placed by Dr. Adolfo Messer. Will obtain CXR/KUB for placement of OGT/ETT. Propofol now started at 15 mcg/kg/min. 1200: 1.5 Liter bolus now given per Dr. Adolfo Messer. After providing incontinence care for a BM, Patient became very hypotensive. Per Dr. Federica Royal, Do not turn patient any more than necessary as patient is so unstable and not tolerating Turns at this time. Pt. Reassessed. Pt. Unresponsive. Does not withdraw from Pain. Breathing over the vent. VERY weak cough to inline suction. Eyes always open. Pedal pulses Dopplar-able. Pt. AFIB on the monitor, rate 110-140's. Infusions are as followed: Dobutamine 10mcgs /kg/min, Epinephrine 7 mcgs/min, Levophed 200 mcgs/min, and Vasopressin 0.04 units/min, and propofol at 15 mcgs/kg/min. See Flow sheet for Vital signs and MAR for titrations. 1318: Ricky-synephrine infusion now started at 30 mcgs/min for hypotension (Max now 300 mcgs/min). See Flow sheet for Vital signs and MAR for titrations. 1344: 2 AMPS of sodium Bicarb now given per Dr. Jules Mayorga. 1134: Ricky-synephrine infusion now Max'd at 300 mcgs/min due to SBP in the 60's continuously. See Flow sheet for Vital signs and MAR for titrations. 1352: Propofol now at 5 mcgs as patient is unresponsive. 1426: Sodium bicarb infusion now started at 42 ml/hr. 1436: PRN tylenol given for a temp of 100.9 axillary. 1500: Dr. Jules Mayorga again at the bedside. Was told to stop checking lactic acids (last one 6), Hold off on giving Convalescent plasma as patient is too sick at this time, and do not start heparin infusion until after Lewis is inserted. She is also aware of patient's AFIB, rate 115-140's. No new orders regarding this. 1600: Pt. Reassessed. Pt. Unresponsive. Does not withdraw from Pain. Breathing over the vent. VERY weak cough to inline suction. Eyes always open. AFIB on the monitor rate. Pedal pulses dopplar-able. Dobutamine 10mcgs /kg/min, Epinephrine 10 mcgs/min, Levophed 200 mcgs/min, and Vasopressin 0.04 units/min, Ricky synephrine at 300 mcgs/min, and propofol at 5 mcgs/kg/min. See Flow sheet for Vital signs and MAR for titrations. 1636: Spoke to life net regarding patient, GCS of 3. Pt. At this time, is not a candidate because he is COVID positive. Was told to call with time of death. 1700: Angio at the bedside to insert Lewis catheter for dialysis. Dr. Rashmi Bazan at the bedside. Updated on patient condition and continued Hypotension, SBP continuously in the 80's. No new orders at this time. 1745: CXR obtain for Lewis placement verification. Nadeem Herzog now paged. Awaiting call back. Will start Heparin infusion now. APTT due at midnight tonight. Will make Night shift nurse aware. 1807: Nelly with Nadeem Herzog at the bedside. Pt.'s SBP is 67. At this time cannot start CRRT with a pressure this low. Will page Dr. Kiarra Snyder Regarding this. Spoke To Dr. Rashmi Bazan regarding patient's hypotension. Was told can give 2 more amps of sodium Bicard. No other interventions at this time. 1815: 2 AMPS of bicarb now given. 1845: Spoke to Manasa Morrison. Per Dr. Tran Parada, Unless patient has a SBP greater than 90 and is able to hold that pressure, Do not start CRRT. Will pass on in report. Nelly with [facility] aware. Pt.'s BP at this time is 85/67. Will not start CRRT at this time. 1900: Report given to Olivia Norwood RN. Ferritin Level sent twice today as lab called and said it had clotted earlier today, resent sample. Ferritin level has still not resulted? Olivia Norwood RN to call lab. Ferritin Add on now ordered. 1955: Spoke to Dr. Mekhi Rodriguez. Starting 1/2 NS  infusion now to continue overnight.

## 2020-05-18 NOTE — PROGRESS NOTES
Spiritual Care Assessment/Progress Note formerly [facility] 
 
 
NAME: Garett Gonsalez      MRN: [id number] AGE: 71 y.o. SEX: male Bahai Affiliation: Denominational  
Language: [place] 5/18/2020     Total Time (in minutes): 5 Spiritual Assessment begun in [facility] 2 CRITICAL CARE 3 through conversation with: 
  
    []Patient        [] Family    [] Friend(s) Reason for Consult: Palliative Care, Initial/Spiritual Assessment Spiritual beliefs: (Please include comment if needed) 
   [] Identifies with a adriana tradition:     
   [] Supported by a adriana community:        
   [] Claims no spiritual orientation:       
   [] Seeking spiritual identity:            
   [] Adheres to an individual form of spirituality:       
   [x] Not able to assess:                   
 
    
Identified resources for coping:  
   [] Prayer                           
   [] Music                  [] Guided Imagery 
   [] Family/friends                 [] Pet visits [] Devotional reading                         [x] Unknown 
   [] Other Interventions offered during this visit: (See comments for more details) Patient Interventions: Initial visit Plan of Care: 
 
 [] Support spiritual and/or cultural needs  
 [] Support AMD and/or advance care planning process    
 [] Support grieving process 
 [] Coordinate Rites and/or Rituals  
 [] Coordination with community clergy [] No spiritual needs identified at this time 
 [] Detailed Plan of Care below (See Comments)  [] Make referral to Music Therapy 
[] Make referral to Pet Therapy    
[] Make referral to Addiction services 
[] Make referral to [facility] 
[] Make referral to Spiritual Care Partner 
[] No future visits requested       
[x] Follow up visits as needed Comments: Visited CCU to check in with staff regarding patient and to assess for any spiritual needs.   Nursing staff currently at bedside offering care, and Palliative NP in room as well. Unable to assess at this time. Chaplains remain available and can reach out to family to offer spiritual support as needed. Arti Townsend, Palliative

## 2020-05-18 NOTE — CONSULTS
Palliative Medicine Consult Jevon: [phone]) Patient Name: Mike Alejandra YOB: 1955 Date of Initial Consult: 5/18/2020 Reason for Consult: goals of care Requesting Provider: Gary Sterling MD 
Primary Care Physician: Liliane Ibrahim MD 
 
 SUMMARY:  
Mike Alejandra is a 71 y.o. with a past history of A. fib, CHF, diabetes, hypertension, status post SJM BIV-ICD 2016, who was admitted on 5/15/2020 from home with a diagnosis of COVID-19. Current medical issues leading to Palliative Medicine involvement include: maxing out  2 vasopressors, fever 103.2 on admission, intubated this am, will need CVVH, likelihood of recovery is poor. PALLIATIVE DIAGNOSES:  
1. Weakness 2. Hypotension 3. Acute renal failure 4. Septic shock 5. COVID-19 POS 6. Goals of care PLAN:  
1. Prior to visit, I completed an extensive review of patient's medical records, including medical documentation, vital signs, MARs, and results of various labs and other diagnostics. I also spoke with patient's nurse, Елена Mark and pulmonologist/intensivist Dr. Antonio Sow. Per Dr. Sravanthi Dent note, family want to talk to their cardiologist before deciding on code status. 2.  already spoke with family this am to discuss pt is very ill, not likely to survive, discussed code status, therefore, I am not going to discuss goals of care with them today. I offered and provided a virtual visit via Zoom for ~30 min. 3. Initial consult note routed to primary continuity provider and/or primary health care team members 4. Communicated plan of care with: Palliative IDTLance 192 Team 
 
 GOALS OF CARE / TREATMENT PREFERENCES:  
 
GOALS OF CARE: 
Patient/Health Care Proxy Stated Goals: Prolong life TREATMENT PREFERENCES:  
Code Status: Full Code Advance Care Planning: 
[x] [facility] Interdisciplinary Team has updated the ACP Navigator with [place] and Patient Capacity Primary Decision Maker (Active): Frantz Del Rio Spouse - [phone] Advance Care Planning 5/18/2020 Patient's Healthcare Decision Maker is: Legal Next of Kin Primary Decision Maker Name -  
Primary Decision Maker Phone Number -  
Primary Decision Maker Relationship to Patient -  
Confirm Advance Directive None Patient Would Like to Complete Advance Directive Unable Medical Interventions: Full interventions Other Instructions: Other: As far as possible, the palliative care team has discussed with patient / health care proxy about goals of care / treatment preferences for patient. HISTORY:  
 
History obtained from: chart, RN, family CHIEF COMPLAINT: severe weakness HPI/SUBJECTIVE: The patient is:  
[] Verbal and participatory [x] Non-participatory due to: intubation 5/15: presented to ED with c/o weakness that started 5/14. He was seen at another healthcare facility and diagnosed with low magnesium, discharged home, however, today was still feeling weak with malaise so he came to the ED for evaluation. ABN LAB: lactic acid 2.3, , Na 130, glu 189, Bun/Cr 46/3.24, wbc 14.2. CXR and head CT show no acute abnormality. Clinical Pain Assessment (nonverbal scale for severity on nonverbal patients):  
Clinical Pain Assessment Severity: 0 Activity (Movement): Lying quietly, normal position Duration: for how long has pt been experiencing pain (e.g., 2 days, 1 month, years) Frequency: how often pain is an issue (e.g., several times per day, once every few days, constant) FUNCTIONAL ASSESSMENT:  
 
Palliative Performance Scale (PPS): PPS: 10 PSYCHOSOCIAL/SPIRITUAL SCREENING:  
 
Palliative IDT has assessed this patient for cultural preferences / practices and a referral made as appropriate to needs (Cultural Services, Patient Advocacy, Ethics, etc.) Any spiritual / Mu-ism concerns: 
[] Yes /  [x] No 
 
Caregiver Burnout: 
 [] Yes /  [x] No /  [] No Caregiver Present Anticipatory grief assessment:  
[x] Normal  / [] Maladaptive ESAS Anxiety: Anxiety: 0 
 
ESAS Depression:   unable to assess due to pt factors REVIEW OF SYSTEMS:  
 
Positive and pertinent negative findings in ROS are noted above in HPI. The following systems were [x] reviewed / [] unable to be reviewed as noted in HPI Other findings are noted below. Systems: constitutional, ears/nose/mouth/throat, respiratory, gastrointestinal, genitourinary, musculoskeletal, integumentary, neurologic, psychiatric, endocrine. Positive findings noted below. Modified ESAS Completed by: provider Pain: 0 Anxiety: 0 Dyspnea: 0 Stool Occurrence(s): 1 PHYSICAL EXAM:  
 
From RN flowsheet: 
Wt Readings from Last 3 Encounters:  
05/18/20 266 lb 5.1 oz (120.8 kg) 01/19/19 273 lb (123.8 kg) 08/04/17 285 lb (129.3 kg) Blood pressure 102/52, pulse (!) 131, temperature 99.8 °F (37.7 °C), resp. rate 16, height 6' 3\" (1.905 m), weight 266 lb 5.1 oz (120.8 kg), SpO2 93 %. Pain Scale 1: Numeric (0 - 10) Pain Intensity 1: 0 Last bowel movement, if known:  
 
Constitutional: intubated Eyes: pupils equal, anicteric ENMT: no nasal discharge, dry mucous membranes Cardiovascular:irregular rhythm, distal pulses intact Respiratory: breathing not labored, symmetric Gastrointestinal: soft non-tender, +bowel sounds Musculoskeletal: no deformity, no tenderness to palpation Skin: warm, dry Neurologic: not following commands, not moving all extremities Psychiatric: unable to assess due to pt factors HISTORY:  
 
Active Problems: 
  Sepsis ([address] (5/15/2020) Past Medical History:  
Diagnosis Date  A-fib ([address]  Arrhythmia   
 atrial fibrillation 2013  Diabetes ([address]  Endocrine disease   
 diabetes  Hypertension  Irregular heart beat Past Surgical History:  
Procedure Laterality Date  CARDIAC SURG PROCEDURE UNLIST    
 defib placed in left chest  
 INSERT EMERGENCY ENDOTRACH AIRWAY  2020 Family History Problem Relation Age of Onset  Heart Disease Mother  Diabetes Father  Heart Disease Father History reviewed, no pertinent family history. Social History Tobacco Use  Smoking status: Former Smoker Last attempt to quit: 1993 Years since quittin.8  Smokeless tobacco: Never Used Substance Use Topics  Alcohol use: No  
 
No Known Allergies Current Facility-Administered Medications Medication Dose Route Frequency  DOBUTamine (DOBUTREX) 500 mg/250 mL (2,000 mcg/mL) infusion  0-10 mcg/kg/min IntraVENous TITRATE  PHENYLephrine (JACLYN-SYNEPHRINE) 30 mg in 0.9% sodium chloride 250 mL infusion   mcg/min IntraVENous TITRATE  sodium bicarbonate 150 mEq/1000 mL D5W (premix)   IntraVENous CONTINUOUS  
 EPINEPHrine (ADRENALIN) 5 mg in 0.9% sodium chloride 250 mL infusion  0-10 mcg/min IntraVENous TITRATE  vancomycin (VANCOCIN) 2000 mg in  ml infusion  2,000 mg IntraVENous ONCE  propofol (DIPRIVAN) 10 mg/mL infusion  0-50 mcg/kg/min IntraVENous TITRATE  cefepime (MAXIPIME) 1 g in 0.9% sodium chloride (MBP/ADV) 50 mL  1 g IntraVENous Q24H  
 bicarbonate dialysis (PRISMASOL) BG K 2/Ca 3.5 5000 ml solution   Extracorporeal DIALYSIS CONTINUOUS  
 hydrocortisone Sod Succ (PF) (SOLU-CORTEF) injection 100 mg  100 mg IntraVENous Q6H  
 heparin (porcine) injection 2,000 Units  2,000 Units IntraVENous PRN Or  
 heparin (porcine) injection 4,000 Units  4,000 Units IntraVENous PRN  
 heparin 25,000 units in D5W 250 ml infusion  8-25 Units/kg/hr IntraVENous TITRATE  [COMPLETED] sodium bicarbonate 8.4 % (1 mEq/mL) injection 100 mEq  100 mEq IntraVENous ONCE  
 vasopressin (VASOSTRICT) 20 Units in 0.9% sodium chloride 100 mL infusion  0.04 Units/min IntraVENous CONTINUOUS  
  insulin glargine (LANTUS) injection 25 Units  25 Units SubCUTAneous DAILY  0.9% sodium chloride infusion 250 mL  250 mL IntraVENous PRN  
 sodium chloride (NS) flush 5-40 mL  5-40 mL IntraVENous Q8H  
 sodium chloride (NS) flush 5-40 mL  5-40 mL IntraVENous PRN  
 acetaminophen (TYLENOL) tablet 650 mg  650 mg Oral Q6H PRN  
 azithromycin (ZITHROMAX) 500 mg in 0.9% sodium chloride (MBP/ADV) 250 mL  500 mg IntraVENous Q24H  
 ondansetron (ZOFRAN) injection 4 mg  4 mg IntraVENous Q8H PRN  
 alcohol 62% (NOZIN) nasal  1 Ampule  1 Ampule Topical Q12H  
 insulin lispro (HUMALOG) injection   SubCUTAneous Q6H  
 glucose chewable tablet 16 g  4 Tab Oral PRN  
 dextrose (D50W) injection syrg 12.5-25 g  12.5-25 g IntraVENous PRN  
 glucagon (GLUCAGEN) injection 1 mg  1 mg IntraMUSCular PRN  
 acetaminophen (TYLENOL) tablet 650 mg  650 mg Oral Q6H PRN Or  
 acetaminophen (TYLENOL) suppository 650 mg  650 mg Rectal Q6H PRN  zinc sulfate (ZINCATE) 220 (50) mg capsule 1 Cap  1 Cap Oral DAILY  ascorbic acid (vitamin C) 5 g in dextrose 5% 50 mL IVPB  5 g IntraVENous Q6H  
 NOREPINephrine (LEVOPHED) 32 mg in 5% dextrose 250 mL infusion  2-100 mcg/min IntraVENous TITRATE  influenza vaccine 2019-20 (6 mos+)(PF) (FLUARIX/FLULAVAL/FLUZONE QUAD) injection 0.5 mL  0.5 mL IntraMUSCular PRIOR TO DISCHARGE  sodium chloride (NS) flush 5-10 mL  5-10 mL IntraVENous PRN  
 
 
 
 LAB AND IMAGING FINDINGS:  
 
Lab Results Component Value Date/Time WBC 27.5 (H) 05/18/2020 03:20 AM  
 HGB 12.3 05/18/2020 03:20 AM  
 PLATELET 521 38/62/5699 03:20 AM  
 
Lab Results Component Value Date/Time  Sodium 135 (L) 05/18/2020 03:20 AM  
 Potassium 5.3 (H) 05/18/2020 03:20 AM  
 Chloride 94 (L) 05/18/2020 03:20 AM  
 CO2 17 (L) 05/18/2020 03:20 AM  
 BUN 87 (H) 05/18/2020 03:20 AM  
 Creatinine 7.89 (H) 05/18/2020 03:20 AM  
 Calcium 7.9 (L) 05/18/2020 03:20 AM  
 Magnesium 1.7 05/15/2020 05:34 PM  
  
 Lab Results Component Value Date/Time AST (SGOT) >2,000 (H) 05/18/2020 03:20 AM  
 Alk. phosphatase 115 05/18/2020 03:20 AM  
 Protein, total 6.9 05/18/2020 03:20 AM  
 Albumin 1.9 (L) 05/18/2020 03:20 AM  
 Globulin 5.0 (H) 05/18/2020 03:20 AM  
 
Lab Results Component Value Date/Time INR 1.9 (H) 05/18/2020 03:20 AM  
 Prothrombin time 18.6 (H) 05/18/2020 03:20 AM  
 aPTT 47.1 (H) 05/18/2020 03:20 AM  
  
Lab Results Component Value Date/Time Iron 26 (L) 02/09/2010 05:00 AM  
 TIBC 198 (L) 02/09/2010 05:00 AM  
 Iron % saturation 13 (L) 02/09/2010 05:00 AM  
 Ferritin 909 (H) 05/16/2020 12:12 PM  
  
No results found for: PH, PCO2, PO2 No components found for: [place] Lab Results Component Value Date/Time  (H) 05/18/2020 03:20 AM  
 CK - MB 0.1 02/06/2010 02:26 PM  
  
 
 
   
 
Total time: 90 
Counseling / coordination time, spent as noted above: 75 
> 50% counseling / coordination?: y 
 
Prolonged service was provided for  []30 min   []75 min in face to face time in the presence of the patient, spent as noted above. Time Start:  
Time End:  
Note: this can only be billed with 50503 (initial) or 67312 (follow up). If multiple start / stop times, list each separately.

## 2020-05-18 NOTE — INTERDISCIPLINARY ROUNDS
Critical care interdisciplinary rounds held on 05/18/2020. Following members present, Pharmacy, Diabetes Treatment, Case Management, Clinical Lead, Physical Therapy,  Palliative Care and Nutrition. Led by DAVIS Sullivan RN and Dr. Jules Mayorga. Plan of care discussed. See clinical pathway for plan of care and interventions and desired outcomes.

## 2020-05-19 NOTE — INTERDISCIPLINARY ROUNDS
Critical care interdisciplinary rounds held on [id number]. Rounds presented by [facility], RN and Dr. Jasmeet Gotti. Following members present, Pharmacy, Diabetes Treatment, Case Management, Occupational Therapy, Physical Therapy, and Nutrition. Plan of care discussed. See clinical pathway for plan of care and interventions and desired outcomes.

## 2020-05-19 NOTE — PROGRESS NOTES
NAME: Bharat Mcintosh :  1955 MRN:  [id number] Assessment :    Plan: 
--LAQUITA Mild hyponatremia Mild Hyperkalemia Shock Sepsis DM type 2 Systolic HF (EF 53%) COVID + 
 --Try CVVHD - factor zero for now - SBP now 93 to 114 since 8 pm last night (held yesterday b/c worsened low BP). Oligoanuric. Lewis line  Will give albumin [id number] for a couple of days If poorly tolerated cvvh without UF then his prognosis is even more grim Addendum: BP too low on max pressors - will not be able to proceed with KRT. Will re-evaluate tomorrow. Subjective: Chief Complaint:  In order to limit the exposure risk from COVID 19 and to preserve the hospital's limited supply of PPEs, seen through ICU window. Intubated. Discussed with Martín Murray 5161 and his icu nurse. Review of Systems:  
 
Symptom Y/N Comments  Symptom Y/N Comments Fever/Chills    Chest Pain Poor Appetite    Edema Cough    Abdominal Pain Sputum    Joint Pain SOB/GROVES    Pruritis/Rash Nausea/vomit    Tolerating PT/OT Diarrhea    Tolerating Diet Constipation    Other Could not obtain due to: See above Objective: VITALS:  
Last 24hrs VS reviewed since prior progress note. Most recent are: 
Visit Vitals /55 Pulse (!) 114 Temp 99.5 °F (37.5 °C) Resp 22 Ht 6' 3\" (1.905 m) Wt 120.8 kg (266 lb 5.1 oz) SpO2 100% BMI 33.29 kg/m² Intake/Output Summary (Last 24 hours) at 2020 6584 Last data filed at 2020 0600 Gross per 24 hour Intake 8437.38 ml Output 820 ml Net 7617.38 ml Telemetry Reviewed: PHYSICAL EXAM: 
General: NAD No edema Lab Data Reviewed: (see below) Medications Reviewed: (see below) PMH/SH reviewed - no change compared to H&P 
________________________________________________________________________ Care Plan discussed with: 
Patient Family  y   
RN y   
Care Manager Consultant:     
 
  Comments >50% of visit spent in counseling and coordination of care    
 
________________________________________________________________________ Antonette Oscar MD  
 
Procedures: see electronic medical records for all procedures/Xrays and details which 
were not copied into this note but were reviewed prior to creation of Plan. LABS: 
Recent Labs 05/19/20 
0459 05/18/20 
0320 WBC 30.0* 27.5* HGB 11.3* 12.3 HCT 35.7* 38.4  161 Recent Labs 05/19/20 
3255 05/19/20 
0033 05/18/20 
1706  140 140  
K 4.0 4.0 4.2 CL 93* 93* 95* CO2 22 23 22 BUN 87* 92* 98* CREA 8.32* 7.94* 8.58* * 209* 158* CA 6.5* 7.0* 7.2*  
MG  --  2.4 2.2 PHOS  --  6.6*  6.9* 7.3* Recent Labs 05/19/20 
4593 05/19/20 
0033 05/18/20 
1706 05/18/20 
0320 05/17/20 
0406 SGOT >2,000*  --   --  >2,000* 178* *  --   --  115 104  
TP 5.9*  --   --  6.9 7.2 ALB 1.6* 1.7* 1.8* 1.9* 2.2*  
GLOB 4.3*  --   --  5.0* 5.0* Recent Labs 05/19/20 
3567 05/19/20 
0033 05/18/20 
0320 05/17/20 
0406 INR 4.2*  --  1.9* 1.3* PTP 39.4*  --  18.6* 13.5* APTT 55.0* 48.7* 47.1* 39.0* Recent Labs 05/16/20 
1212 FERR 909* No results found for: FOL, RBCF No results for input(s): PH, PCO2, PO2 in the last 72 hours. Recent Labs 05/18/20 
0320 * No components found for: [place] Lab Results Component Value Date/Time  Color YELLOW/STRAW 05/15/2020 05:34 PM  
 Appearance CLOUDY (A) 05/15/2020 05:34 PM  
 Specific gravity 1.025 05/15/2020 05:34 PM  
 pH (UA) 5.0 05/15/2020 05:34 PM  
 Protein 30 (A) 05/15/2020 05:34 PM  
 Glucose >1,000 (A) 05/15/2020 05:34 PM  
 Ketone TRACE (A) 05/15/2020 05:34 PM  
 Bilirubin Negative 05/15/2020 05:34 PM  
 Urobilinogen 0.2 05/15/2020 05:34 PM  
 Nitrites Negative 05/15/2020 05:34 PM  
 Leukocyte Esterase Negative 05/15/2020 05:34 PM  
 Epithelial cells FEW 05/15/2020 05:34 PM  
 Bacteria Negative 05/15/2020 05:34 PM  
 WBC 0-4 05/15/2020 05:34 PM  
 RBC 0-5 05/15/2020 05:34 PM  
 
 
MEDICATIONS: 
Current Facility-Administered Medications Medication Dose Route Frequency  DOBUTamine (DOBUTREX) 500 mg/250 mL (2,000 mcg/mL) infusion  0-10 mcg/kg/min IntraVENous TITRATE  sodium bicarbonate 150 mEq/1000 mL D5W (premix)   IntraVENous CONTINUOUS  
 EPINEPHrine (ADRENALIN) 5 mg in 0.9% sodium chloride 250 mL infusion  0-10 mcg/min IntraVENous TITRATE  propofol (DIPRIVAN) 10 mg/mL infusion  0-50 mcg/kg/min IntraVENous TITRATE  cefepime (MAXIPIME) 1 g in 0.9% sodium chloride (MBP/ADV) 50 mL  1 g IntraVENous Q24H  
 bicarbonate dialysis (PRISMASOL) BG K 2/Ca 3.5 5000 ml solution   Extracorporeal DIALYSIS CONTINUOUS  
 hydrocortisone Sod Succ (PF) (SOLU-CORTEF) injection 100 mg  100 mg IntraVENous Q6H  
 heparin (porcine) injection 2,000 Units  2,000 Units IntraVENous PRN Or  
 heparin (porcine) injection 4,000 Units  4,000 Units IntraVENous PRN  
 heparin 25,000 units in D5W 250 ml infusion  8-25 Units/kg/hr IntraVENous TITRATE  sodium bicarbonate (8.4%) 1 mEq/mL (8.4 %) injection  PHENYLephrine (JACLYN-SYNEPHRINE) 100 mg in 0.9% sodium chloride 250 mL infusion   mcg/min IntraVENous TITRATE  
 NOREPINephrine (LEVOPHED) 32 mg in 5% dextrose 250 mL infusion  2-200 mcg/min IntraVENous TITRATE  
 0.45% sodium chloride infusion  125 mL/hr IntraVENous CONTINUOUS  
 vasopressin (VASOSTRICT) 20 Units in 0.9% sodium chloride 100 mL infusion  0.04 Units/min IntraVENous CONTINUOUS  
 insulin glargine (LANTUS) injection 25 Units  25 Units SubCUTAneous DAILY  0.9% sodium chloride infusion 250 mL  250 mL IntraVENous PRN  
 sodium chloride (NS) flush 5-40 mL  5-40 mL IntraVENous Q8H  
 sodium chloride (NS) flush 5-40 mL  5-40 mL IntraVENous PRN  
 acetaminophen (TYLENOL) tablet 650 mg  650 mg Oral Q6H PRN  
  azithromycin (ZITHROMAX) 500 mg in 0.9% sodium chloride (MBP/ADV) 250 mL  500 mg IntraVENous Q24H  
 ondansetron (ZOFRAN) injection 4 mg  4 mg IntraVENous Q8H PRN  
 alcohol 62% (NOZIN) nasal  1 Ampule  1 Ampule Topical Q12H  
 insulin lispro (HUMALOG) injection   SubCUTAneous Q6H  
 glucose chewable tablet 16 g  4 Tab Oral PRN  
 dextrose (D50W) injection syrg 12.5-25 g  12.5-25 g IntraVENous PRN  
 glucagon (GLUCAGEN) injection 1 mg  1 mg IntraMUSCular PRN  
 acetaminophen (TYLENOL) tablet 650 mg  650 mg Oral Q6H PRN Or  
 acetaminophen (TYLENOL) suppository 650 mg  650 mg Rectal Q6H PRN  zinc sulfate (ZINCATE) 220 (50) mg capsule 1 Cap  1 Cap Oral DAILY  ascorbic acid (vitamin C) 5 g in dextrose 5% 50 mL IVPB  5 g IntraVENous Q6H  
 influenza vaccine 2019-20 (6 mos+)(PF) (FLUARIX/FLULAVAL/FLUZONE QUAD) injection 0.5 mL  0.5 mL IntraMUSCular PRIOR TO DISCHARGE  sodium chloride (NS) flush 5-10 mL  5-10 mL IntraVENous PRN

## 2020-05-19 NOTE — PROGRESS NOTES
Progress Note 5/19/2020 11:43 AM 
NAME: Chucho Galvez MRN:  [id number] Admit Diagnosis: Sepsis ([facility] [address] [A41.9] Problem List: 1. Shock; septic/cardiogenic 2. Severe sepsis 3. COVID-19 + 4. NSTEMI 5. Hyponatremia 6. Lactic acidosis 7. Acute liver injury 8. Acute on chronic systolic heart failure; Class IV 9. Acute kidney injury; anuric 10. Coagulopathy 11. Nonischemic dilated cardiomyopathy s/p remote ICD 12. Chronic atrial fibrillation 13. Sleep apnea 14. Hypertensive heart disease w/ CKD and HF Assessment/Plan: On multiple pressors Max vent support Dialysis looming; line placed last night. Long discussion with the family multiple times. He is gravely ill and most likely will not survive this. This has been relayed and they understand this. Will readdress w/ family this AM. 
 
Labs going in the wrong direction. Intake/Output Summary (Last 24 hours) at 5/19/2020 4989 Last data filed at 5/19/2020 0600 Gross per 24 hour Intake 8437.38 ml Output 820 ml Net 7617.38 ml 1. Continue added 1/2NS @ 125cc/hr 2. Continue supportive care 3. Continue lovenox as tolerated; OK to hold for procedures 4. Trial of CVVH today 5. Wife updated  
 
  
 [x]       High complexity decision making was performed in this patient at high risk for decompensation with multiple organ involvement. Subjective:  
 
Chucho Galvez is intubated and sedated. Discussed with RN events overnight. Review of Systems: 
 
Symptom Y/N Comments  Symptom Y/N Comments Fever/Chills N   Chest Pain N Poor Appetite N   Edema N   
Cough N   Abdominal Pain N Sputum N   Joint Pain N   
SOB/GROVES N   Pruritis/Rash N   
Nausea/vomit N   Tolerating PT/OT Y Diarrhea N   Tolerating Diet Y Constipation N   Other Could NOT obtain due to: sedated Objective:  
  
Physical Exam: 
 
Last 24hrs VS reviewed since prior progress note. Most recent are: 
 
Visit Vitals BP (!) 85/65 (BP Patient Position: During activity) Pulse (!) 125 Temp 99.5 °F (37.5 °C) Resp 21 Ht 6' 3\" (1.905 m) Wt 120.8 kg (266 lb 5.1 oz) SpO2 100% BMI 33.29 kg/m² Intake/Output Summary (Last 24 hours) at 5/19/2020 4299 Last data filed at 5/19/2020 0600 Gross per 24 hour Intake 8437.38 ml Output 820 ml Net 7617.38 ml General Appearance: Well developed, well nourished, intubated/sedated Ears/Nose/Mouth/Throat: Hearing grossly normal. 
Neck: Supple. Chest: Lungs decreased diffusely Cardiovascular: Regular rate and rhythm, S1S2 normal, no murmur. Abdomen: Soft, non-tender, bowel sounds are active. Extremities: No edema bilaterally. Skin: Warm and dry. []         Post-cath site without hematoma, bruit, tenderness, or thrill. Distal pulses intact. PMH/SH reviewed - no change compared to H&P Data Review Telemetry: paced/AF 
 
EKG:  
[x]  No new EKG for review Lab Data Personally Reviewed: 
 
Recent Labs 05/19/20 
0459 05/18/20 
0320 WBC 30.0* 27.5* HGB 11.3* 12.3 HCT 35.7* 38.4  161 Recent Labs 05/19/20 
7117 05/19/20 
0033 05/18/20 
0320 05/17/20 
0406 INR 4.2*  --  1.9* 1.3* PTP 39.4*  --  18.6* 13.5* APTT 55.0* 48.7* 47.1* 39.0* Recent Labs 05/19/20 
7116 05/19/20 
0033 05/18/20 
1706  140 140  
K 4.0 4.0 4.2 CL 93* 93* 95* CO2 22 23 22 BUN 87* 92* 98* CREA 8.32* 7.94* 8.58* * 209* 158* CA 6.5* 7.0* 7.2*  
MG  --  2.4 2.2 Recent Labs 05/18/20 
0320 05/17/20 
0410 05/16/20 
2140 05/16/20 
1212 *  --   --   --   
TROIQ  --  2.36* 2.24* 1.50* Lab Results Component Value Date/Time Cholesterol, total 92 03/03/2009 09:40 PM  
 HDL Cholesterol 44 03/03/2009 09:40 PM  
 LDL, calculated 38.4 03/03/2009 09:40 PM  
 Triglyceride 48 03/03/2009 09:40 PM  
 CHOL/HDL Ratio 2.1 03/03/2009 09:40 PM  
 
 
Recent Labs 05/19/20 
0459 05/19/20 
0033 05/18/20 
1706 05/18/20 0320 05/17/20 
0406 SGOT >2,000*  --   --  >2,000* 178* *  --   --  115 104  
TP 5.9*  --   --  6.9 7.2 ALB 1.6* 1.7* 1.8* 1.9* 2.2*  
GLOB 4.3*  --   --  5.0* 5.0* No results for input(s): PH, PCO2, PO2 in the last 72 hours. Medications Personally Reviewed: 
 
Current Facility-Administered Medications Medication Dose Route Frequency  DOBUTamine (DOBUTREX) 500 mg/250 mL (2,000 mcg/mL) infusion  0-10 mcg/kg/min IntraVENous TITRATE  sodium bicarbonate 150 mEq/1000 mL D5W (premix)   IntraVENous CONTINUOUS  
 EPINEPHrine (ADRENALIN) 5 mg in 0.9% sodium chloride 250 mL infusion  0-10 mcg/min IntraVENous TITRATE  propofol (DIPRIVAN) 10 mg/mL infusion  0-50 mcg/kg/min IntraVENous TITRATE  cefepime (MAXIPIME) 1 g in 0.9% sodium chloride (MBP/ADV) 50 mL  1 g IntraVENous Q24H  
 bicarbonate dialysis (PRISMASOL) BG K 2/Ca 3.5 5000 ml solution   Extracorporeal DIALYSIS CONTINUOUS  
 hydrocortisone Sod Succ (PF) (SOLU-CORTEF) injection 100 mg  100 mg IntraVENous Q6H  
 heparin (porcine) injection 2,000 Units  2,000 Units IntraVENous PRN Or  
 heparin (porcine) injection 4,000 Units  4,000 Units IntraVENous PRN  
 heparin 25,000 units in D5W 250 ml infusion  8-25 Units/kg/hr IntraVENous TITRATE  PHENYLephrine (JACLYN-SYNEPHRINE) 100 mg in 0.9% sodium chloride 250 mL infusion   mcg/min IntraVENous TITRATE  
 NOREPINephrine (LEVOPHED) 32 mg in 5% dextrose 250 mL infusion  2-200 mcg/min IntraVENous TITRATE  
 0.45% sodium chloride infusion  125 mL/hr IntraVENous CONTINUOUS  
 vasopressin (VASOSTRICT) 20 Units in 0.9% sodium chloride 100 mL infusion  0.04 Units/min IntraVENous CONTINUOUS  
 insulin glargine (LANTUS) injection 25 Units  25 Units SubCUTAneous DAILY  0.9% sodium chloride infusion 250 mL  250 mL IntraVENous PRN  
 sodium chloride (NS) flush 5-40 mL  5-40 mL IntraVENous Q8H  
 sodium chloride (NS) flush 5-40 mL  5-40 mL IntraVENous PRN  
  acetaminophen (TYLENOL) tablet 650 mg  650 mg Oral Q6H PRN  
 azithromycin (ZITHROMAX) 500 mg in 0.9% sodium chloride (MBP/ADV) 250 mL  500 mg IntraVENous Q24H  
 ondansetron (ZOFRAN) injection 4 mg  4 mg IntraVENous Q8H PRN  
 alcohol 62% (NOZIN) nasal  1 Ampule  1 Ampule Topical Q12H  
 insulin lispro (HUMALOG) injection   SubCUTAneous Q6H  
 glucose chewable tablet 16 g  4 Tab Oral PRN  
 dextrose (D50W) injection syrg 12.5-25 g  12.5-25 g IntraVENous PRN  
 glucagon (GLUCAGEN) injection 1 mg  1 mg IntraMUSCular PRN  
 acetaminophen (TYLENOL) tablet 650 mg  650 mg Oral Q6H PRN Or  
 acetaminophen (TYLENOL) suppository 650 mg  650 mg Rectal Q6H PRN  zinc sulfate (ZINCATE) 220 (50) mg capsule 1 Cap  1 Cap Oral DAILY  ascorbic acid (vitamin C) 5 g in dextrose 5% 50 mL IVPB  5 g IntraVENous Q6H  
 influenza vaccine 2019-20 (6 mos+)(PF) (FLUARIX/FLULAVAL/FLUZONE QUAD) injection 0.5 mL  0.5 mL IntraMUSCular PRIOR TO DISCHARGE  sodium chloride (NS) flush 5-10 mL  5-10 mL IntraVENous PRN Nancy Bustamante III, DO

## 2020-05-19 NOTE — PROGRESS NOTES
Hospitalist Progress Note NAME: Diane House :  1955 MRN:  [id number] Assessment / Plan: 
Septic vs cardiogenic shock 2/2 COVID Positive with viral prodrome Hyponatremia Bacteremia Multiorgan failure 
-remains critically ill 
-requiring multiple pressors -appreciate intensivist management 
-worsening renal function 
-nephro on board. SBP too low for RRT now. titrating pressors, RRT as able 
-palliative on board 
-at high risk for further rapid decline, death 
-palliative team on board has communicated with wife/daughter. Full code for now.  
-cont pressor support, empiric abx 
-has been enrolled in convalescent plasma study 
-cont zinc 
-Blood cx 5/15 alpha streptococcus 
-On solucortef 
-Ferritin>[id number] 
-Liver Enzymes elevated Acute renal failure 
-Cr trending up, no RRT yet per nephro 
-trend bmp  IV fluid nephrology consultation appreciated Avoid nephrotoxic medication Hx of nonischemic CM 
-recent weight gain 
-EF 20% 
--s/p BIV-ICD 
 
DM2 
-cont insulin w lantus, SSI 
  
NSTEMI 
-cardiology following 
-cont supportive care 
  
Atrial fibrillation continue Eliquis 
  
Hx of Hypertension 
-currently in cardiogenic vs septic shock as above and requiring pressors 
-hold home antiHTNsives 
  
Code Status: Full Surrogate Decision Maker: 
  
DVT Prophylaxis: Eliquis GI Prophylaxis: not indicated Subjective: Chief Complaint / Reason for Physician Visit Unresponsive on pressor support. Seen through CCU room window and discussed with nursing. Did not enter room to avoid exposure/transmission of COVID-19 Discussed with RN events overnight. Review of Systems: 
Symptom Y/N Comments  Symptom Y/N Comments Fever/Chills    Chest Pain Poor Appetite    Edema Cough    Abdominal Pain Sputum    Joint Pain SOB/GROVES    Pruritis/Rash Nausea/vomit    Tolerating PT/OT Diarrhea    Tolerating Diet Constipation    Other Could NOT obtain due to: AMS Objective: VITALS:  
Last 24hrs VS reviewed since prior progress note. Most recent are: 
Patient Vitals for the past 24 hrs: 
 Temp Pulse Resp BP SpO2  
05/19/20 1400 98.2 °F (36.8 °C) (!) 111 22 121/84 98 % 05/19/20 1330  (!) 102 20 117/76 98 % 05/19/20 1300  (!) 111 28 (!) 61/39 96 % 05/19/20 1230  (!) 114 22 112/70 98 % 05/19/20 1200  (!) 123 21 115/83 99 % 05/19/20 1146  (!) 115 22  99 % 05/19/20 1130  (!) 121 24 123/68 98 % 05/19/20 1100  (!) 112 21 124/76 98 % 05/19/20 1030  (!) 115 23 116/64 99 % 05/19/20 1000  (!) 117 25    
05/19/20 0945  (!) 109 26 (!) 89/53 (!) 70 % 05/19/20 0935  (!) 106 23  100 % 05/19/20 0930  (!) 110 22 101/67 100 % 05/19/20 0900  (!) 109 21 92/76 100 % 05/19/20 0830  (!) 113 22 90/66 100 % 05/19/20 0800 98.5 °F (36.9 °C) (!) 108 22 96/67 100 % 05/19/20 0730  97 26 108/57 100 % 05/19/20 0700  (!) 113 20 110/57 100 % 05/19/20 0645  (!) 125 21 (!) 85/65 100 % 05/19/20 0630  (!) 114 22 105/55 100 % 05/19/20 0615  (!) 121 24 93/56 99 % 05/19/20 0600 99.5 °F (37.5 °C) (!) 116 21 114/52 100 % 05/19/20 0515  (!) 117 23 93/56 96 % 05/19/20 0500  (!) 117 26 99/51 98 % 05/19/20 0453  (!) 122 24  100 % 05/19/20 0400 99.3 °F (37.4 °C) (!) 121 23 95/59 100 % 05/19/20 0345  (!) 121 23 96/59 100 % 05/19/20 0330  (!) 120 23 106/64 98 % 05/19/20 0315  (!) 113 22 104/58 97 % 05/19/20 0300  (!) 113 22 95/52 97 % 05/19/20 0245  (!) 118 25 101/86 97 % 05/19/20 0230  (!) 116 20 101/67 97 % 05/19/20 0200  (!) 118 20 116/66 99 % 05/19/20 0130  (!) 116 21 108/63 100 % 05/19/20 0100  (!) 116 21 116/76 100 % 05/19/20 0030  (!) 114 22 144/70 100 % 05/19/20 0000 99.1 °F (37.3 °C) (!) 115 20 112/67 100 % 05/18/20 2345  (!) 118 21 113/72 100 % 05/18/20 2330  (!) 109 20 118/65 100 % 05/18/20 2328  (!) 114 20  100 % 05/18/20 2315  (!) 119 21 107/62 98 % 05/18/20 2300 99.5 °F (37.5 °C) (!) 112 19 105/62 98 % 05/18/20 2250  (!) 116 19 114/67 100 % 05/18/20 2240  (!) 108 19 126/62 100 % 05/18/20 2230  (!) 110 19 119/68 100 % 05/18/20 2220  (!) 108 19 106/67 100 % 05/18/20 2210  (!) 110 19 120/55 100 % 05/18/20 2200  (!) 106 19 108/63 100 % 05/18/20 2150  (!) 112 19 117/57 100 % 05/18/20 2140  (!) 110 18 116/64 100 % 05/18/20 2130  (!) 117 18 114/54 100 % 05/18/20 2120  (!) 114 19 122/64 100 % 05/18/20 2110  (!) 118 18 119/61 100 % 05/18/20 2100  (!) 116 19 110/67 100 % 05/18/20 2050  (!) 114 19 126/68 100 % 05/18/20 2043  (!) 116 21  100 % 05/18/20 2040  (!) 109 18 114/60 99 % 05/18/20 2030  (!) 111 17 (!) 85/59 91 % 05/18/20 2020  (!) 115 18 94/42 91 % 05/18/20 2010  (!) 114 17 92/56 91 % 05/18/20 2000 100 °F (37.8 °C) (!) 115 17 (!) 88/67 91 % 05/18/20 1900  (!) 117 17 (!) 82/67 94 % 05/18/20 1850  (!) 119 17 (!) 89/50 94 % 05/18/20 1840    (!) 87/50   
05/18/20 1830  (!) 116 17 (!) 85/57 93 % 05/18/20 1820  (!) 118 16 (!) 85/64 94 % 05/18/20 1812  (!) 114 20 94/62   
05/18/20 1807  (!) 111 17 (!) 73/49   
05/18/20 1800  (!) 115 16 (!) 67/45   
05/18/20 1750 (!) 101.2 °F (38.4 °C) (!) 115 17 (!) 76/54   
05/18/20 1740  (!) 119 16 (!) 76/54   
05/18/20 1730  (!) 122 16 (!) 88/33   
05/18/20 1716  (!) 121 16 (!) 86/51 92 % 05/18/20 1710  (!) 123 17 (!) 88/55   
05/18/20 1700  (!) 115 24 (!) 86/48 91 % 05/18/20 1650  (!) 123 21 (!) 81/56 91 % 05/18/20 1640  (!) 123 17 (!) 83/57 91 % 05/18/20 1630  (!) 122 19 (!) 88/61 94 % 05/18/20 1620  (!) 126 (!) 0 (!) 88/48 95 % 05/18/20 1610  (!) 126 16 (!) 81/49 96 % 05/18/20 1600 (!) 100.6 °F (38.1 °C) (!) 124 21 (!) 102/37 92 % 05/18/20 1556  (!) 133 17  93 % 05/18/20 1530  (!) 118 16 (!) 80/51 96 % 05/18/20 1520  (!) 125 17 (!) 76/31 97 % 05/18/20 1510  (!) 127 17 (!) 77/45 96 % 05/18/20 1500  (!) 130 17 (!) 82/49 97 % 05/18/20 1450  (!) 129 17 (!) 80/52 97 % 05/18/20 1440  (!) 130 (!) 6 95/43   
05/18/20 1430  (!) 134 15 100/47 97 % 05/18/20 1420  (!) 129 17 103/58 98 % Intake/Output Summary (Last 24 hours) at 5/19/2020 1416 Last data filed at 5/19/2020 1315 Gross per 24 hour Intake 9181.08 ml Output 730 ml Net 8451.08 ml PHYSICAL EXAM: 
GEN: AA male, NAD On Ventilator Intubated and sedated Seen through ICU window to minimize exposure/transmission to/of COVID-19 Reviewed most current lab test results and cultures  YES Reviewed most current radiology test results   YES Review and summation of old records today    NO Reviewed patient's current orders and MAR    YES 
PMH/SH reviewed - no change compared to H&P 
________________________________________________________________________ Care Plan discussed with: 
  Comments Patient Family RN x Care Manager Consultant Multidiciplinary team rounds were held today with , nursing, pharmacist and clinical coordinator. Patient's plan of care was discussed; medications were reviewed and discharge planning was addressed. ________________________________________________________________________ Total NON critical care TIME:  25  Minutes Total CRITICAL CARE TIME Spent:   Minutes non procedure based Comments >50% of visit spent in counseling and coordination of care    
________________________________________________________________________ Gelacio Nicholas MD  
 
Procedures: see electronic medical records for all procedures/Xrays and details which were not copied into this note but were reviewed prior to creation of Plan. LABS: 
I reviewed today's most current labs and imaging studies. Pertinent labs include: 
Recent Labs 05/19/20 
6106 05/18/20 
0320 05/17/20 
0406 WBC 30.0* 27.5* 29.2* HGB 11.3* 12.3 12.3 HCT 35.7* 38.4 38.1  161 183 Recent Labs 05/19/20 
3185 05/19/20 
0033 05/18/20 
1706 05/18/20 
0320 05/17/20 
0406  140 140 135* 130*  
K 4.0 4.0 4.2 5.3* 4.7 CL 93* 93* 95* 94* 93* CO2 22 23 22 17* 22 * 209* 158* 98 251* BUN 87* 92* 98* 87* 69* CREA 8.32* 7.94* 8.58* 7.89* 5.77* CA 6.5* 7.0* 7.2* 7.9* 8.7 MG  --  2.4 2.2  --   --   
PHOS  --  6.6*  6.9* 7.3*  --   --   
ALB 1.6* 1.7* 1.8* 1.9* 2.2* TBILI 1.1*  --   --  0.5 0.9 SGOT >2,000*  --   --  >2,000* 178* ALT 1,905*  --   --  631* 63 INR 4.2*  --   --  1.9* 1.3* Signed: Zeina Rothman MD

## 2020-05-19 NOTE — CONSULTS
PULMONARY ASSOCIATES [facility] INTENSIVIST Consult Service Note Pulmonary, Critical Care, and Sleep Medicine Name: Carlene Tamayo MRN: [id number] : 1955 Hospital: [place] [facility] Date: 2020  Admission date: 5/15/2020 Hospital Day: 5 Subjective/Interval History:  
Seen earlier today on rounds. Pt is unstable and acutely ill in the CCU. Patient was re-evaluated multiple times with repeated discussions with CCU team throughout the day : Survived the night, remains on max dose of 5 pressors with barely acceptable blood pressures. Intubated, sedated on 100% FiO2.  
 
: Continues to rapidly decompensate. This morning is largely unresponsive with escalating pressor requirements and worsening multiorgan failure. Multiple phone conversations were had with his wife and children regarding code status and goals of care. They ultimately decided that they wanted to continue aggressive measures including intubation and dialysis; intubation subsequently performed by Dr. Rivka Logan.  high fever, sweats overniht. IV levophed 95 mcg, IV vasopressin. D dimer 2.56; Cr 5.77; Ferritin yesterday 909. lactic acid higher, WBC now 21847. CXR reviewed and amazingly clear. . Too weak to verbalize. Called wife at home [phone] , Daughter Carisa Vdies. They are both well, they were tested at [facility] yesterday. Results pending. Explained medical management for COVID 19. He is critically ill. He is unpredictable. Family wants to defer to his heart doctor any decisions about code status. We dis discuss possiblerole of Convalescent plasma since Remdesivir is not available and would be reserved for respiratory failure pts on vent. Wife, daughter, son will review [email or web address] org and we will discuss process if they want to proceed.  No guarantees offered, as it will take takes to get plasma and the transfusion is not risk free, 11:32 AM 
 
 3:38 PM Called wife back. Wife and family has had a chance to review forms and handouts. · [facility]: Expanded Access to Convalescent Plasma for Treatment of Pts with COVID-19. [facility]#[id number] Clinical Staff: Dr. Olga Lovelace MD. Consent for blood and investigation has been obtained. Discussed the use of Convalescent Plasma collected from COVID 19 patients. These individuals have recovered from the illness and donated blood to study. Use does not guarantee that patients will improve or recover after transfusion. They may actually get worse or have side effects. They agree to proceed with requesting convalescent plasma. Sent order for type and screen. Nursing aware and will get phone consent. I called Blood bank supervisor who will request matched unit from [employer]. Family aware that it may take days to acquire. IMPRESSION:  
1. Profound septic shock on max dose of 5 pressors 2. Severe sepsis- high fever, sweats 3. COVID 19 POSITIVE 4. Proinflammatory/ Prothrombotic 5. Alpha strep septicemia- elevated Procalcitonin > 80 
6. Chronic combined systolic/diastolic heart failure 7. Hypertensive heart disease with CHF and CKD 8. LAQUITA/CKD, CVVH to start 9. Chronic anticoagulation at home- eliquis- stopped 10. Diabetes mellitus type 2 uncontrolled 11. Hyperlipidemia 12. 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 Chronic atrial fibrillation 14. Status post SJM BIV-ICD implant in 2016 
15. Sleep apnea, undiagnosed/untreated 16. Obesity with recent weight gain 17. Body mass index is 36.65 kg/m². - not a good prone candidate 18. Additional workup outlined below 19. Multiorgan dysfunction as outlined above: Pt has one or more acute or chronic illnesses with severe exacerbation with progression or side effects of treatment that poses a threat to life or bodily function 20.  Pt is unstable, unpredictable needing more CCU monitoring; at high risk of sudden decline and decompensation with life threatening consequenses and continued end organ dysfunction and failure RECOMMENDATIONS/PLAN:  
1. CCU monitoring 2. Droplet plus Isolation 3. Intubated 5/18, adjust settings as needed. AM ABG pending 4. Nephrology following, to start CVVH today if tolerates 5. Continue pressors, currently on max dose dobutamine, vaso, norepi, epi, rosemary. No room to add more 6. Broaden antibiotics to vanc, cefepime, azithro 7. Stress dose steroids 8. Heparin drip 9. Cardiology following 10. Continue glargine for now, keep a close eye on sugars 11. Follow cultures 12. CXR in AM 
13. Replete electrolytes PRN 14. Labs to follow inflammatory markers electrolytes, renal function and and blood counts 15. Bronchial hygiene with respiratory therapy techniques, bronchodilators 16. Pt needs IV fluids with additives and Drug therapy requiring intensive monitoring for toxicity 17. Prescription drug management with home med reconciliation reviewed 18. DVT, SUP prophylaxis 5/18: 
Prognosis grim. I do not expect him to survive much longer given pressor needs and multiorgan failure in the setting of severe underlying cardiac disease. Myself and Dr. Parul Ann have spoken with the family; at this point they would like to give him another 24 hours before changing code status or making any other decisions. Palliative care is going to get involved as well which is appreciated. CCT 42 minutes excluding procedures/other providers. Will continue goals of care discussions with family Subjective/Initial History:  
I have reviewed the flowsheet and previous days notes. Seen earlier today on rounds. I was asked by Dawn Peña MD to see Manannicolas Diana a 71 y.o.  male  in consultation for a chief complaint of shock. Excerpts from admission 5/15/2020 and consult notes reviewed as follows: \"Mr. Adelina Urbina is a 57 y.o. morbidly obese male with Chronic nonischemic dilated cardiomyopathy EF 38%, Chronic systolic congestive heart failure class, H/o atrial fibrillation, On chronic warfarin, Iatrogenic left bundle branch block with predominant RV pacing, 64-70%, Hypertension, Hyperlipidemia and diabetes presents to ED [facility] ED C/O Worsening exertional shortness of breath and around 20 pounds weight gain in last 3 weeks. \" 
 
Pt now in CCU. Poor historian. On room air. No fever. No cough. No diarrhea. Not very active. Saw PCP three weeks ago. Some edema. Chart notes ED [facility] admission in 2017 with decompensation. Patient PCP: Diandra Rodríguez MD 
PMH:  has a past medical history of A-fib ([facility] [address] Arrhythmia, Diabetes ([facility] [address] Endocrine disease, Hypertension, and Irregular heart beat. He also has no past medical history of Difficult intubation, Malignant hyperthermia due to anesthesia, Nausea & vomiting, or Pseudocholinesterase deficiency. PSH:   has a past surgical history that includes pr cardiac surg procedure unlist; insert emergency endotrach airway (5/18/2020); and ir insert non tunl cvc over 5 yrs (5/18/2020). FHX: family history includes Diabetes in his father; Heart Disease in his father and mother. SHX:  reports that he quit smoking about 26 years ago. He has never used smokeless tobacco. He reports that he does not drink alcohol or use drugs. ROS:A comprehensive review of systems was negative except for that written in the HPI. No Known Allergies MEDS:  
Current Facility-Administered Medications Medication  albumin human 25% (BUMINATE) solution 12.5 g  
 DOBUTamine (DOBUTREX) 500 mg/250 mL (2,000 mcg/mL) infusion  sodium bicarbonate 150 mEq/1000 mL D5W (premix)  EPINEPHrine (ADRENALIN) 5 mg in 0.9% sodium chloride 250 mL infusion  propofol (DIPRIVAN) 10 mg/mL infusion  cefepime (MAXIPIME) 1 g in 0.9% sodium chloride (MBP/ADV) 50 mL  bicarbonate dialysis (PRISMASOL) BG K 2/Ca 3.5 5000 ml solution  hydrocortisone Sod Succ (PF) (SOLU-CORTEF) injection 100 mg  
 heparin (porcine) injection 2,000 Units Or  heparin (porcine) injection 4,000 Units  heparin 25,000 units in D5W 250 ml infusion  PHENYLephrine (JACLYN-SYNEPHRINE) 100 mg in 0.9% sodium chloride 250 mL infusion  NOREPINephrine (LEVOPHED) 32 mg in 5% dextrose 250 mL infusion  0.45% sodium chloride infusion  vasopressin (VASOSTRICT) 20 Units in 0.9% sodium chloride 100 mL infusion  insulin glargine (LANTUS) injection 25 Units  0.9% sodium chloride infusion 250 mL  sodium chloride (NS) flush 5-40 mL  sodium chloride (NS) flush 5-40 mL  acetaminophen (TYLENOL) tablet 650 mg  
 azithromycin (ZITHROMAX) 500 mg in 0.9% sodium chloride (MBP/ADV) 250 mL  ondansetron (ZOFRAN) injection 4 mg  alcohol 62% (NOZIN) nasal  1 Ampule  insulin lispro (HUMALOG) injection  glucose chewable tablet 16 g  
 dextrose (D50W) injection syrg 12.5-25 g  
 glucagon (GLUCAGEN) injection 1 mg  acetaminophen (TYLENOL) tablet 650 mg Or  acetaminophen (TYLENOL) suppository 650 mg  
 zinc sulfate (ZINCATE) 220 (50) mg capsule 1 Cap  ascorbic acid (vitamin C) 5 g in dextrose 5% 50 mL IVPB  influenza vaccine - (6 mos+)(PF) (FLUARIX/FLULAVAL/FLUZONE QUAD) injection 0.5 mL  sodium chloride (NS) flush 5-10 mL Current Facility-Administered Medications:  
  albumin human 25% (BUMINATE) solution 12.5 g, 12.5 g, IntraVENous, Q6H, Tio Montenegro MD 
  DOBUTamine (DOBUTREX) 500 mg/250 mL (2,000 mcg/mL) infusion, 0-10 mcg/kg/min, IntraVENous, TITRATE, Lorretta Chamber T, DO, Last Rate: 36.1 mL/hr at 20 0437, 10 mcg/kg/min at 20 6955   sodium bicarbonate 150 mEq/1000 mL D5W (premix), , IntraVENous, CONTINUOUS, Claudio Price MD, Last Rate: 42 mL/hr at 20 1427   EPINEPHrine (ADRENALIN) 5 mg in 0.9% sodium chloride 250 mL infusion, 0-10 mcg/min, IntraVENous, TITRATE, Marco HILLS MD, Last Rate: 30 mL/hr at 05/19/20 0208, 10 mcg/min at 05/19/20 0208   propofol (DIPRIVAN) 10 mg/mL infusion, 0-50 mcg/kg/min, IntraVENous, TITRATE, Marco HILLS MD, Last Rate: 3.6 mL/hr at 05/18/20 1707, 5 mcg/kg/min at 05/18/20 1707   cefepime (MAXIPIME) 1 g in 0.9% sodium chloride (MBP/ADV) 50 mL, 1 g, IntraVENous, Q24H, Marci Gustafson, DO, Last Rate: 16.7 mL/hr at 05/18/20 1202, 1 g at 05/18/20 1202   bicarbonate dialysis (PRISMASOL) BG K 2/Ca 3.5 5000 ml solution, , Extracorporeal, DIALYSIS CONTINUOUS, Olivia Miranda MD, Stopped at 05/18/20 1300   hydrocortisone Sod Succ (PF) (SOLU-CORTEF) injection 100 mg, 100 mg, IntraVENous, Q6H, Marci Gustafson, DO, 100 mg at 05/19/20 5192   heparin (porcine) injection 2,000 Units, 2,000 Units, IntraVENous, PRN **OR** heparin (porcine) injection 4,000 Units, 4,000 Units, IntraVENous, PRN, Marci Gustafson, DO, 2,000 Units at 05/19/20 0130 
  heparin 25,000 units in D5W 250 ml infusion, 8-25 Units/kg/hr, IntraVENous, TITRATE, Marci Gustafson, DO, Last Rate: 12.1 mL/hr at 05/19/20 0130, 10 Units/kg/hr at 05/19/20 0130 
  PHENYLephrine (JACLYN-SYNEPHRINE) 100 mg in 0.9% sodium chloride 250 mL infusion,  mcg/min, IntraVENous, TITRATE, Marci Gustafson, DO, Last Rate: 45 mL/hr at 05/19/20 0546, 300 mcg/min at 05/19/20 0546   NOREPINephrine (LEVOPHED) 32 mg in 5% dextrose 250 mL infusion, 2-200 mcg/min, IntraVENous, TITRATE, Marci Gustafson, DO, Last Rate: 93.8 mL/hr at 05/19/20 0731, 200 mcg/min at 05/19/20 0731 
  0.45% sodium chloride infusion, 125 mL/hr, IntraVENous, CONTINUOUS, Tam Lagunas III, DO, Last Rate: 125 mL/hr at 05/19/20 0437, 125 mL/hr at 05/19/20 0437 
  vasopressin (VASOSTRICT) 20 Units in 0.9% sodium chloride 100 mL infusion, 0.04 Units/min, IntraVENous, CONTINUOUS, Harley Gusman MD, Last Rate: 12 mL/hr at 05/19/20 0210, 0.04 Units/min at 05/19/20 0210 
  insulin glargine (LANTUS) injection 25 Units, 25 Units, SubCUTAneous, DAILY, Barb Sawyer MD, 25 Units at 05/19/20 5669 
  0.9% sodium chloride infusion 250 mL, 250 mL, IntraVENous, PRN, Barb Sawyer MD 
  sodium chloride (NS) flush 5-40 mL, 5-40 mL, IntraVENous, Q8H, Eric Pham MD, 10 mL at 05/19/20 9610   sodium chloride (NS) flush 5-40 mL, 5-40 mL, IntraVENous, PRN, Eric Pham MD 
  acetaminophen (TYLENOL) tablet 650 mg, 650 mg, Oral, Q6H PRN, Eric Pham MD 
  azithromycin [facility]) 500 mg in 0.9% sodium chloride (MBP/ADV) 250 mL, 500 mg, IntraVENous, Q24H, Marci Gustafson DO, Last Rate: 250 mL/hr at 05/19/20 0136, 500 mg at 05/19/20 0136   ondansetron (ZOFRAN) injection 4 mg, 4 mg, IntraVENous, Q8H PRN, Eric Pham MD, 4 mg at 05/16/20 0137 
  alcohol 62% (NOZIN) nasal  1 Ampule, 1 Ampule, Topical, Q12H, Eric Pham MD, 1 Ampule at 05/19/20 6075 
  insulin lispro (HUMALOG) injection, , SubCUTAneous, Q6H, Barb Sawyer MD, 7 Units at 05/19/20 0533 
  glucose chewable tablet 16 g, 4 Tab, Oral, PRN, Barb Sawyer MD 
  dextrose (D50W) injection syrg 12.5-25 g, 12.5-25 g, IntraVENous, PRN, Barb Sawyer MD 
  glucagon (GLUCAGEN) injection 1 mg, 1 mg, IntraMUSCular, PRN, Barb Sawyer MD 
  acetaminophen (TYLENOL) tablet 650 mg, 650 mg, Oral, Q6H PRN, 650 mg at 05/18/20 2037 **OR** acetaminophen (TYLENOL) suppository 650 mg, 650 mg, Rectal, Q6H PRN, Barb Sawyer MD 
  zinc sulfate (ZINCATE) 220 (50) mg capsule 1 Cap, 1 Cap, Oral, DAILY, Olamide, José SINGH DO, 1 Cap at 05/19/20 0823 
  ascorbic acid (vitamin C) 5 g in dextrose 5% 50 mL IVPB, 5 g, IntraVENous, Q6H, Harjeet HILLS MD, 5 g at 05/19/20 5956   influenza vaccine 2019-20 (6 mos+)(PF) (FLUARIX/FLULAVAL/FLUZONE QUAD) injection 0.5 mL, 0.5 mL, IntraMUSCular, PRIOR TO DISCHARGE, Olamide, Vijay Allison DO 
  sodium chloride (NS) flush 5-10 mL, 5-10 mL, IntraVENous, PRN, AverillVandana riddle MD 
 
 
Objective:  
 
Vital Signs: Telemetry:    AFIB Intake/Output:  
Visit Vitals /57 Pulse 97 Temp 99.5 °F (37.5 °C) Resp 26 Ht 6' 3\" (1.905 m) Wt 133 kg (293 lb 3.4 oz) SpO2 100% BMI 36.65 kg/m² Temp (24hrs), Av °F (37.8 °C), Min:99.1 °F (37.3 °C), Max:101.2 °F (38.4 °C) O2 Device: Endotracheal tube, Ventilator O2 Flow Rate (L/min): 4 l/min Body mass index is 36.65 kg/m². Wt Readings from Last 4 Encounters:  
20 133 kg (293 lb 3.4 oz) 19 123.8 kg (273 lb) 17 129.3 kg (285 lb)  
17 107.5 kg (237 lb) Intake/Output Summary (Last 24 hours) at 2020 4855 Last data filed at 2020 0700 Gross per 24 hour Intake 8635.38 ml Output 830 ml Net 7805.38 ml Last shift:      No intake/output data recorded. Last 3 shifts:  1901 -  0700 In: 9765.2 [I.V.:9725.2] Out: 554 [QWAPL:381] Hemodynamics:   
CO:   
CI:   
CVP: CVP (mmHg): 262 mmHg (20 0730) SVR:   PAP Systolic:   
PAP Diastolic:   
PVR:   
JB79:    
 
Ventilator Settings:     
Mode Rate TV Press PEEP FiO2 PIP Min. Vent Assist control    500 ml    6 cm H20 100 %  26 cm H2O  12.4 l/min Physical Exam: 
 
General:  male; obtunded, 2L NC 
HEENT: NCAT, poor dentition, lips and mucosa dry Eyes: anicteric; conjunctiva clear Neck: no nodes, no cuff leak, no accessory MM use. Chest: no deformity,  
Cardiac: IR regular; no murmur Lungs: no distress but not protecting airway Abd: soft, NT, hypoactive BS Ext: no edema; no joint swelling; No clubbing : No bright, Neuro: obtunded Psych- unable to assess Skin: warm, dry, no cyanosis;  
Pulses: 1-2+ Bilateral pedal, radial 
Capillary: brisk; pale Labs: 
 
Recent Labs 20 
3545 20 
0033 20 
0320 20 
0406 WBC 30.0*  --  27.5* 29.2*  
 HGB 11.3*  --  12.3 12.3   --  161 183 INR 4.2*  --  1.9* 1.3* APTT 55.0* 48.7* 47.1* 39.0* Recent Labs 05/19/20 
7094 05/19/20 
1501 05/19/20 
0033 05/18/20 
1706 05/18/20 
1211 05/18/20 
0320  05/17/20 
0406 NA  --  138 140 140  --  135*  --  130* K  --  4.0 4.0 4.2  --  5.3*  --  4.7 CL  --  93* 93* 95*  --  94*  --  93* CO2  --  22 23 22  --  17*  --  22  
GLU  --  198* 209* 158*  --  98  --  251* BUN  --  87* 92* 98*  --  87*  --  69* CREA  --  8.32* 7.94* 8.58*  --  7.89*  --  5.77* CA  --  6.5* 7.0* 7.2*  --  7.9*  --  8.7 MG  --   --  2.4 2.2  --   --   --   --   
PHOS  --   --  6.6*  6.9* 7.3*  --   --   --   --   
LAC 8.9*  --   --   --  6.1* 8.6*   < >  --   
ALB  --  1.6* 1.7* 1.8*  --  1.9*  --  2.2*  
SGOT  --  >2,000*  --   --   --  >2,000*  --  178* ALT  --  1,905*  --   --   --  631*  --  63  
 < > = values in this interval not displayed. No results for input(s): PH, PCO2, PO2, HCO3, FIO2 in the last 72 hours. Recent Labs 05/18/20 
0320 05/17/20 
0410 05/16/20 
2140 05/16/20 
1212 *  --   --   --   
TROIQ  --  2.36* 2.24* 1.50* Lab Results Component Value Date/Time BNP 93 02/08/2010 03:55 AM  
  
Lab Results Component Value Date/Time Culture result: NO GROWTH 4 DAYS 05/15/2020 06:11 PM  
 Culture result: No growth (<1,000 CFU/ML) 05/15/2020 05:45 PM  
 Culture result: (A) 05/15/2020 05:34 PM  
  ALPHA STREPTOCOCCUS, NOT S. PNEUMONIAE GROWING IN 1 OF 2 BOTTLES DRAWN (SITE = R ARM) Culture result: (A) 05/15/2020 05:34 PM  
  PRELIMINARY REPORT OF GRAM POSITIVE COCCI IN CHAINS GROWING IN 1 OF 2 BOTTLES DRAWN CALLED TO AND READ BACK [place] [address] AT 5284 ON 5/16/20. [address]150 Culture result: REMAINING BOTTLE(S) HAS/HAVE NO GROWTH SO FAR 05/15/2020 05:34 PM  
  
Lab Results Component Value Date/Time  (H) 05/18/2020 03:20 AM  
  01/19/2019 06:40 AM  
 
 
Imaging: I have personally reviewed the patients radiographs and have reviewed the reports: CXR Results  (Last 48 hours) 05/19/20 0607  XR CHEST PORT Final result Impression:  IMPRESSION: Increased right basilar opacity. Narrative:  EXAM:  CR chest portable INDICATION: Intubated. Right basilar opacity. COMPARISON: 5/18/2020 at 1715 hours. TECHNIQUE: Portable AP semierect upright chest view at 0514 hours FINDINGS: The support devices are stable. The cardiomediastinal contours are  
stable. There is increased right basilar opacity. The left lung and pleural  
spaces are clear. There is no pneumothorax. The bones and upper abdomen are  
stable. 05/18/20 1732  XR CHEST PORT Final result Impression:  IMPRESSION:  
1. Interval placement of a dual lumen right IJ approach catheter which projects  
to terminate in the right atrium. Narrative:  INDICATION: . Rigth IJ Lewis placement. COMPARISON: Previous chest xray, earlier same day. LIMITATIONS: Portable technique. Dutch Liu FINDINGS: Single frontal view of the chest.   
.  
Lines/tubes/surgical: No significant change in the appearance of the right IJ  
approach catheter, ET tube and gastric tube. Interval placement of a dual lumen  
catheter from a right IJ approach which projects to terminate in the right  
atrium. Heart/mediastinum: Enlarged cardiopericardial silhouette. Cardiac assist device  
appears unchanged Lungs/pleura: Low lung volumes. Minimal right basilar opacity most suggestive of  
atelectasis. No visualized pleural effusion or pneumothorax. Additional Comments: Degenerative changes in the spine. Dutch Liu 05/18/20 1141  XR CHEST PORT Final result Impression:   impression: Support devices in place as above. Narrative:  Clinical indication: Tubes placement. Portable AP semiupright view of the chest obtained and compared to May 17.  The  
 tip of the ET tube is well above the jaja in good position. The tip of the NG  
tube project in the expected position of the body of the stomach. Other support  
devices are in good position and unchanged. Cardiomegaly unchanged. Stable  
appearance to lung fields. Results from [facility]  K[facility]CEE Encounter encounter on 05/15/20 XR CHEST PORT Narrative INDICATION: . Hessie Simple Lewis placement. COMPARISON: Previous chest xray, earlier same day. LIMITATIONS: Portable technique. Annikaa Vivianehoff FINDINGS: Single frontal view of the chest.  
. 
Lines/tubes/surgical: No significant change in the appearance of the right IJ 
approach catheter, ET tube and gastric tube. Interval placement of a dual lumen 
catheter from a right IJ approach which projects to terminate in the right 
atrium. Heart/mediastinum: Enlarged cardiopericardial silhouette. Cardiac assist device 
appears unchanged Lungs/pleura: Low lung volumes. Minimal right basilar opacity most suggestive of 
atelectasis. No visualized pleural effusion or pneumothorax. Additional Comments: Degenerative changes in the spine. .  
 Impression IMPRESSION: 
1. Interval placement of a dual lumen right IJ approach catheter which projects 
to terminate in the right atrium. XR CHEST PORT Narrative Clinical indication: Tubes placement. Portable AP semiupright view of the chest obtained and compared to May 17. The 
tip of the ET tube is well above the jaja in good position. The tip of the NG 
tube project in the expected position of the body of the stomach. Other support 
devices are in good position and unchanged. Cardiomegaly unchanged. Stable 
appearance to lung fields. Impression  impression: Support devices in place as above. XR CHEST PORT Narrative EXAM:  CR chest portable INDICATION: Intubated. Right basilar opacity. COMPARISON: 5/18/2020 at 1715 hours. TECHNIQUE: Portable AP semierect upright chest view at 0514 hours FINDINGS: The support devices are stable. The cardiomediastinal contours are 
stable. There is increased right basilar opacity. The left lung and pleural 
spaces are clear. There is no pneumothorax. The bones and upper abdomen are 
stable. Impression IMPRESSION: Increased right basilar opacity. Results from [facility] ZAVALA  KAYCEE Encounter encounter on 05/15/20 CT ABD PELV WO CONT Narrative INDICATION: renal failure EXAM: CT Abdomen and Pelvis without IV contrast. No oral contrast. 
CT dose reduction was achieved through use of a standardized protocol tailored 
for this examination and automatic exposure control for dose modulation. FINDINGS:  
No urinary tract stones are seen. There is no hydroureteronephrosis. The kidneys 
are normal in size. There is no perirenal fluid or ascites. Liver shows no apparent significant finding without contrast. Pancreas, adrenal 
glands, spleen and aorta show no significant enlargement. No inflammation is 
seen. There is no pneumoperitoneum or significant adenopathy. The bladder is unremarkable. The distal ureters are not dilated. There is no 
apparent pelvic mass. The appendix is normal. Bowels are not dilated. There is a 
fat-containing umbilical hernia. Impression IMPRESSION: No Acute Disease. During this entire length of time the patient's condition was unstable, unpredictable and critically ill in the CCU/ ICU. I was immediately available to the patient whose care required several interactions with nursing, multidisciplinary team members leading to multiple interventions with fluid resuscitation and medication adjustments to optimize respiratory support, hemodynamic treatment, medication changes based on repeat labs results, reviews, exams and assessments.  The reason for providing this level of medical care was due to a critical illness that impaired one or more vital organ systems, such that there was a high probability of sudden or life threatening deterioration in the patient's condition. This care involved high complexity medical decision making to treat acute and unstable vital organ system failure, and to prevent further life threatening deterioration of the patients condition. I personally: · Reviewed the flowsheet and previous days notes · Reviewed and summarized records or history from previous days note or discussions with staff, family · Parenteral controlled substances - Reviewed/ Adjusted / Weaned / Started · High Risk Drug therapy requiring intensive monitoring for toxicity: eg steroids, pressors, antibiotics · Reviewed and/or ordered Clinical lab tests · Reviewed and/or ordered Radiology tests · Reviewed and/or ordered of Medicine tests · Independently visualized radiologic Images · Reviewed the patients ECG / Telemetry · discussed my assessment/management with : Consultants, Nursing for coordination of care Thank you for allowing us to participate in the care of this patient. We will follow along with you until they no longer require CCU services.  
 
Keshav Christina, DO

## 2020-05-19 NOTE — PROGRESS NOTES
05/19/20 0557 ABCDEF Bundle ABCDE Initiated? Yes SBT Safety Screen Passed No  
SBT Screen Reason for Failure FiO2 > 50%

## 2020-05-19 NOTE — PROGRESS NOTES
Palliative Medicine Consult Jevon: [phone] ([phone]) Patient Name: Lang Degroot YOB: 1955 Date of Initial Consult: 5/18/2020 Reason for Consult: goals of care Requesting Provider: Severo Laughter, MD 
Primary Care Physician: Nathan Mora MD 
 
 SUMMARY:  
Lang Degroot is a 71 y.o. with a past history of A. fib, CHF, diabetes, hypertension, status post SJM BIV-ICD 2016, who was admitted on 5/15/2020 from home with a diagnosis of COVID-19. Current medical issues leading to Palliative Medicine involvement include: maxing out  2 vasopressors, fever 103.2 on admission, intubated this am, will need CVVH, likelihood of recovery is poor. PALLIATIVE DIAGNOSES:  
1. Weakness 2. Hypotension 3. Acute renal failure 4. Septic shock 5. COVID-19 POS 6. Goals of care PLAN:  
1. Prior to visit, I completed an extensive review of patient's medical records, including medical documentation, vital signs, MARs, and results of various labs and other diagnostics. I also spoke with patient's nurse, Flora Sunshine and pulmonologist/intensivist Dr. Rosanne Roberson. 2. CVVH is being held today as pt's BP is too low and unstable on 5 vasopressors. RN reports that pt is so unstable if she just goes into his room his BP drops. Physicians are talking to family about goals of care and code status. 3. Left voicemail on wife's phone. 4. Initial consult note routed to primary continuity provider and/or primary health care team members 5. Communicated plan of care with: Palliative Lance LEDEZ[facility] 192 Team 
 
 GOALS OF CARE / TREATMENT PREFERENCES:  
 
GOALS OF CARE: 
Patient/Health Care Proxy Stated Goals: Prolong life TREATMENT PREFERENCES:  
Code Status: Full Code Advance Care Planning: 
[x] The [facility] Interdisciplinary Team has updated the ACP Navigator with [address] and Patient Capacity Primary Decision Maker (Active): Scotty De La Fuente Spouse - [phone] Advance Care Planning 5/18/2020 Patient's Healthcare Decision Maker is: Legal Next of Kin Primary Decision Maker Name -  
Primary Decision Maker Phone Number -  
Primary Decision Maker Relationship to Patient -  
Confirm Advance Directive None Patient Would Like to Complete Advance Directive Unable Medical Interventions: Full interventions Other Instructions: Other: As far as possible, the palliative care team has discussed with patient / health care proxy about goals of care / treatment preferences for patient. HISTORY:  
 
History obtained from: chart, RN, family CHIEF COMPLAINT: severe weakness HPI/SUBJECTIVE: The patient is:  
[] Verbal and participatory [x] Non-participatory due to: intubation 5/15: presented to ED with c/o weakness that started 5/14. He was seen at another healthcare facility and diagnosed with low magnesium, discharged home, however, today was still feeling weak with malaise so he came to the ED for evaluation. ABN LAB: lactic acid 2.3, , Na 130, glu 189, Bun/Cr 46/3.24, wbc 14.2. CXR and head CT show no acute abnormality. Clinical Pain Assessment (nonverbal scale for severity on nonverbal patients):  
Clinical Pain Assessment Severity: 0 Activity (Movement): Lying quietly, normal position Duration: for how long has pt been experiencing pain (e.g., 2 days, 1 month, years) Frequency: how often pain is an issue (e.g., several times per day, once every few days, constant) FUNCTIONAL ASSESSMENT:  
 
Palliative Performance Scale (PPS): PPS: 10 PSYCHOSOCIAL/SPIRITUAL SCREENING:  
 
Palliative IDT has assessed this patient for cultural preferences / practices and a referral made as appropriate to needs (Cultural Services, Patient Advocacy, Ethics, etc.) Any spiritual / Protestant concerns: 
[] Yes /  [x] No 
 
Caregiver Burnout: 
[] Yes /  [x] No /  [] No Caregiver Present Anticipatory grief assessment: [x] Normal  / [] Maladaptive ESAS Anxiety: Anxiety: 0 
 
ESAS Depression:   unable to assess due to pt factors REVIEW OF SYSTEMS:  
 
Positive and pertinent negative findings in ROS are noted above in HPI. The following systems were [x] reviewed / [] unable to be reviewed as noted in HPI Other findings are noted below. Systems: constitutional, ears/nose/mouth/throat, respiratory, gastrointestinal, genitourinary, musculoskeletal, integumentary, neurologic, psychiatric, endocrine. Positive findings noted below. Modified ESAS Completed by: provider Pain: 0 Anxiety: 0 Dyspnea: 0 Stool Occurrence(s): 1 PHYSICAL EXAM:  
 
From RN flowsheet: 
Wt Readings from Last 3 Encounters:  
05/19/20 293 lb 3.4 oz (133 kg) 01/19/19 273 lb (123.8 kg) 08/04/17 285 lb (129.3 kg) Blood pressure 121/84, pulse (!) 111, temperature 98.2 °F (36.8 °C), resp. rate 22, height 6' 3\" (1.905 m), weight 293 lb 3.4 oz (133 kg), SpO2 98 %. Pain Scale 1: Behavioral Pain Scale (BPS) Pain Intensity 1: 3 Last bowel movement, if known: EXAM:   In order to limit the exposure risk from COVID 19 and to preserve the hospital's limited supply of PPEs, seen through ICU window. Intubated. Discussed with his icu nurse. HISTORY:  
 
Active Problems: 
  Sepsis ([address] (5/15/2020) Weakness generalized (5/18/2020) Septic shock ([address] (5/18/2020) Acute renal failure with tubular necrosis ([address] (5/18/2020) Past Medical History:  
Diagnosis Date  A-fib ([address]  Arrhythmia   
 atrial fibrillation 2013  Diabetes ([address]  Endocrine disease   
 diabetes  Hypertension  Irregular heart beat Past Surgical History:  
Procedure Laterality Date  CARDIAC SURG PROCEDURE UNLIST    
 defib placed in left chest  
 INSERT EMERGENCY ENDOTRACH AIRWAY  5/18/2020  IR INSERT NON TUNL CVC OVER 5 YRS  5/18/2020 Family History Problem Relation Age of Onset  Heart Disease Mother  Diabetes Father  Heart Disease Father History reviewed, no pertinent family history. Social History Tobacco Use  Smoking status: Former Smoker Last attempt to quit: 1993 Years since quittin.8  Smokeless tobacco: Never Used Substance Use Topics  Alcohol use: No  
 
No Known Allergies Current Facility-Administered Medications Medication Dose Route Frequency  albumin human 25% (BUMINATE) solution 12.5 g  12.5 g IntraVENous Q6H  
 [START ON 2020] VANCOMYCIN INFORMATION NOTE   Other ONCE  
 PHARMACY INFORMATION NOTE  1 Each Other BID  sodium bicarbonate (8.4%) 150 mEq in sterile water 1,000 mL infusion   IntraVENous CONTINUOUS  
 DOBUTamine (DOBUTREX) 500 mg/250 mL (2,000 mcg/mL) infusion  0-10 mcg/kg/min IntraVENous TITRATE  EPINEPHrine (ADRENALIN) 5 mg in 0.9% sodium chloride 250 mL infusion  0-10 mcg/min IntraVENous TITRATE  propofol (DIPRIVAN) 10 mg/mL infusion  0-50 mcg/kg/min IntraVENous TITRATE  cefepime (MAXIPIME) 1 g in 0.9% sodium chloride (MBP/ADV) 50 mL  1 g IntraVENous Q24H  
 bicarbonate dialysis (PRISMASOL) BG K 2/Ca 3.5 5000 ml solution   Extracorporeal DIALYSIS CONTINUOUS  
 hydrocortisone Sod Succ (PF) (SOLU-CORTEF) injection 100 mg  100 mg IntraVENous Q6H  
 heparin (porcine) injection 2,000 Units  2,000 Units IntraVENous PRN Or  
 heparin (porcine) injection 4,000 Units  4,000 Units IntraVENous PRN  
 heparin 25,000 units in D5W 250 ml infusion  8-25 Units/kg/hr IntraVENous TITRATE  PHENYLephrine (JACLYN-SYNEPHRINE) 100 mg in 0.9% sodium chloride 250 mL infusion   mcg/min IntraVENous TITRATE  
 NOREPINephrine (LEVOPHED) 32 mg in 5% dextrose 250 mL infusion  2-200 mcg/min IntraVENous TITRATE  
 0.45% sodium chloride infusion  125 mL/hr IntraVENous CONTINUOUS  
 vasopressin (VASOSTRICT) 20 Units in 0.9% sodium chloride 100 mL infusion  0.04 Units/min IntraVENous CONTINUOUS  
 insulin glargine (LANTUS) injection 25 Units  25 Units SubCUTAneous DAILY  0.9% sodium chloride infusion 250 mL  250 mL IntraVENous PRN  
 sodium chloride (NS) flush 5-40 mL  5-40 mL IntraVENous Q8H  
 sodium chloride (NS) flush 5-40 mL  5-40 mL IntraVENous PRN  
 acetaminophen (TYLENOL) tablet 650 mg  650 mg Oral Q6H PRN  
 azithromycin (ZITHROMAX) 500 mg in 0.9% sodium chloride (MBP/ADV) 250 mL  500 mg IntraVENous Q24H  
 ondansetron (ZOFRAN) injection 4 mg  4 mg IntraVENous Q8H PRN  
 alcohol 62% (NOZIN) nasal  1 Ampule  1 Ampule Topical Q12H  
 insulin lispro (HUMALOG) injection   SubCUTAneous Q6H  
 glucose chewable tablet 16 g  4 Tab Oral PRN  
 dextrose (D50W) injection syrg 12.5-25 g  12.5-25 g IntraVENous PRN  
 glucagon (GLUCAGEN) injection 1 mg  1 mg IntraMUSCular PRN  
 acetaminophen (TYLENOL) tablet 650 mg  650 mg Oral Q6H PRN Or  
 acetaminophen (TYLENOL) suppository 650 mg  650 mg Rectal Q6H PRN  zinc sulfate (ZINCATE) 220 (50) mg capsule 1 Cap  1 Cap Oral DAILY  ascorbic acid (vitamin C) 5 g in dextrose 5% 50 mL IVPB  5 g IntraVENous Q6H  
 influenza vaccine 2019-20 (6 mos+)(PF) (FLUARIX/FLULAVAL/FLUZONE QUAD) injection 0.5 mL  0.5 mL IntraMUSCular PRIOR TO DISCHARGE  sodium chloride (NS) flush 5-10 mL  5-10 mL IntraVENous PRN  
 
 
 
 LAB AND IMAGING FINDINGS:  
 
Lab Results Component Value Date/Time WBC 30.0 (H) 05/19/2020 04:59 AM  
 HGB 11.3 (L) 05/19/2020 04:59 AM  
 PLATELET 607 66/65/4741 04:59 AM  
 
Lab Results Component Value Date/Time  Sodium 138 05/19/2020 04:59 AM  
 Potassium 4.0 05/19/2020 04:59 AM  
 Chloride 93 (L) 05/19/2020 04:59 AM  
 CO2 22 05/19/2020 04:59 AM  
 BUN 87 (H) 05/19/2020 04:59 AM  
 Creatinine 8.32 (H) 05/19/2020 04:59 AM  
 Calcium 6.5 (L) 05/19/2020 04:59 AM  
 Magnesium 2.4 05/19/2020 12:33 AM  
 Phosphorus 6.9 (H) 05/19/2020 12:33 AM  
 Phosphorus 6.6 (H) 05/19/2020 12:33 AM  
  
Lab Results Component Value Date/Time AST (SGOT) >2,000 (H) 05/19/2020 04:59 AM  
 Alk. phosphatase 247 (H) 05/19/2020 04:59 AM  
 Protein, total 5.9 (L) 05/19/2020 04:59 AM  
 Albumin 1.6 (L) 05/19/2020 04:59 AM  
 Globulin 4.3 (H) 05/19/2020 04:59 AM  
 
Lab Results Component Value Date/Time INR 4.2 (H) 05/19/2020 04:59 AM  
 Prothrombin time 39.4 (H) 05/19/2020 04:59 AM  
 aPTT 57.8 (H) 05/19/2020 11:44 AM  
  
Lab Results Component Value Date/Time Iron 26 (L) 02/09/2010 05:00 AM  
 TIBC 198 (L) 02/09/2010 05:00 AM  
 Iron % saturation 13 (L) 02/09/2010 05:00 AM  
 Ferritin 27,897 (H) 05/19/2020 05:14 AM  
  
No results found for: PH, PCO2, PO2 No components found for: [place] Lab Results Component Value Date/Time  (H) 05/18/2020 03:20 AM  
 CK - MB 0.1 02/06/2010 02:26 PM  
  
 
 
   
 
Total time: 20 
Counseling / coordination time, spent as noted above: 15 
> 50% counseling / coordination?: y 
 
Prolonged service was provided for  []30 min   []75 min in face to face time in the presence of the patient, spent as noted above. Time Start:  
Time End:  
Note: this can only be billed with 45303 (initial) or 66061 (follow up). If multiple start / stop times, list each separately.

## 2020-05-19 NOTE — PROGRESS NOTES
Hospitalist Progress Note NAME: Mike Jones :  1955 MRN:  [id number] Assessment / Plan: 
Septic vs cardiogenic shock 2/2 COVID Positive with viral prodrome Hyponatremia 
-remains critically ill 
-requiring multiple pressors -appreciate intensivist management 
-worsening renal function 
-nephro on board. SBP too low for RRT now. titrating pressors, RRT as able 
-palliative on board 
-at high risk for further rapid decline, death 
-palliative team on board has communicated with wife/daughter. Full code for now. Family to revisit code status/goals of care in the AM 
-cont pressor support, empiric abx 
-has been enrolled in convalescent plasma study 
-cont zinc 
-azithro, cefepime IV Acute renal failure 
-Cr trending up, no RRT yet per nephro 
-trend bmp  IV fluid nephrology consultation appreciated Avoid nephrotoxic medication Hx of nonischemic CM 
-recent weight gain 
-EF 20% 
--s/p BIV-ICD 
 
DM2 
-cont insulin w lantus, SSI 
  
NSTEMI 
-cardiology following 
-cont supportive care 
  
Atrial fibrillation continue Eliquis 
  
Hx of Hypertension 
-currently in cardiogenic vs septic shock as above and requiring pressors 
-hold home antiHTNsives 
  
Code Status: Full Surrogate Decision Maker: 
  
DVT Prophylaxis: Eliquis GI Prophylaxis: not indicated Subjective: Chief Complaint / Reason for Physician Visit Unresponsive on pressor support. Seen through CCU room window and discussed with nursing. Did not enter room to avoid exposure/transmission of COVID-19 Discussed with RN events overnight. Review of Systems: 
Symptom Y/N Comments  Symptom Y/N Comments Fever/Chills    Chest Pain Poor Appetite    Edema Cough    Abdominal Pain Sputum    Joint Pain SOB/GROVES    Pruritis/Rash Nausea/vomit    Tolerating PT/OT Diarrhea    Tolerating Diet Constipation    Other Could NOT obtain due to: AMS Objective: VITALS:  
 Last 24hrs VS reviewed since prior progress note. Most recent are: 
Patient Vitals for the past 24 hrs: 
 Temp Pulse Resp BP SpO2  
05/18/20 2330  (!) 109 20 118/65 100 % 05/18/20 2328  (!) 114 20  100 % 05/18/20 2315  (!) 119 21 107/62 98 % 05/18/20 2300 99.5 °F (37.5 °C) (!) 112 19 105/62 98 % 05/18/20 2250  (!) 116 19 114/67 100 % 05/18/20 2240  (!) 108 19 126/62 100 % 05/18/20 2230  (!) 110 19 119/68 100 % 05/18/20 2220  (!) 108 19 106/67 100 % 05/18/20 2210  (!) 110 19 120/55 100 % 05/18/20 2200  (!) 106 19 108/63 100 % 05/18/20 2150  (!) 112 19 117/57 100 % 05/18/20 2140  (!) 110 18 116/64 100 % 05/18/20 2130  (!) 117 18 114/54 100 % 05/18/20 2120  (!) 114 19 122/64 100 % 05/18/20 2110  (!) 118 18 119/61 100 % 05/18/20 2100  (!) 116 19 110/67 100 % 05/18/20 2050  (!) 114 19 126/68 100 % 05/18/20 2043  (!) 116 21  100 % 05/18/20 2040  (!) 109 18 114/60 99 % 05/18/20 2030  (!) 111 17 (!) 85/59 91 % 05/18/20 2020  (!) 115 18 94/42 91 % 05/18/20 2010  (!) 114 17 92/56 91 % 05/18/20 2000 100 °F (37.8 °C) (!) 115 17 (!) 88/67 91 % 05/18/20 1900  (!) 117 17 (!) 82/67 94 % 05/18/20 1850  (!) 119 17 (!) 89/50 94 % 05/18/20 1840    (!) 87/50   
05/18/20 1830  (!) 116 17 (!) 85/57 93 % 05/18/20 1820  (!) 118 16 (!) 85/64 94 % 05/18/20 1812  (!) 114 20 94/62   
05/18/20 1807  (!) 111 17 (!) 73/49   
05/18/20 1800  (!) 115 16 (!) 67/45   
05/18/20 1750 (!) 101.2 °F (38.4 °C) (!) 115 17 (!) 76/54   
05/18/20 1740  (!) 119 16 (!) 76/54   
05/18/20 1730  (!) 122 16 (!) 88/33   
05/18/20 1716  (!) 121 16 (!) 86/51 92 % 05/18/20 1710  (!) 123 17 (!) 88/55   
05/18/20 1700  (!) 115 24 (!) 86/48 91 % 05/18/20 1650  (!) 123 21 (!) 81/56 91 % 05/18/20 1640  (!) 123 17 (!) 83/57 91 % 05/18/20 1630  (!) 122 19 (!) 88/61 94 % 05/18/20 1620  (!) 126 (!) 0 (!) 88/48 95 % 05/18/20 1610  (!) 126 16 (!) 81/49 96 % 05/18/20 1600 (!) 100.6 °F (38.1 °C) (!) 124 21 (!) 102/37 92 % 05/18/20 1556  (!) 133 17  93 % 05/18/20 1530  (!) 118 16 (!) 80/51 96 % 05/18/20 1520  (!) 125 17 (!) 76/31 97 % 05/18/20 1510  (!) 127 17 (!) 77/45 96 % 05/18/20 1500  (!) 130 17 (!) 82/49 97 % 05/18/20 1450  (!) 129 17 (!) 80/52 97 % 05/18/20 1440  (!) 130 (!) 6 95/43   
05/18/20 1430  (!) 134 15 100/47 97 % 05/18/20 1420  (!) 129 17 103/58 98 % 05/18/20 1410  (!) 124 17 99/51 96 % 05/18/20 1400  (!) 125 18 (!) 92/33 94 % 05/18/20 1358  (!) 125 17 (!) 86/51 94 % 05/18/20 1350  (!) 127 17 (!) 89/43 93 % 05/18/20 1345  (!) 115 16 (!) 89/46   
05/18/20 1341  (!) 122 16 (!) 70/45   
05/18/20 1337  (!) 126 17 (!) 81/40   
05/18/20 1335  (!) 124 17 (!) 76/39   
05/18/20 1333  (!) 120 17 (!) 66/48   
05/18/20 1330  (!) 126 17 (!) 65/46   
05/18/20 1329  (!) 128 16 (!) 61/42   
05/18/20 1326  (!) 123 16 (!) 60/36   
05/18/20 1323  (!) 125 16 (!) 67/49   
05/18/20 1315  (!) 132 16 (!) 64/40   
05/18/20 1300  (!) 128 20 (!) 129/102   
05/18/20 1245  (!) 129 17 (!) 89/46   
05/18/20 1230  (!) 135 17 (!) 83/59   
05/18/20 1215  (!) 127 17 (!) 84/53   
05/18/20 1200 (!) 100.9 °F (38.3 °C) (!) 126 16 (!) 84/46 92 % 05/18/20 1157  (!) 123 16 (!) 76/46   
05/18/20 1155  (!) 126 16 (!) 87/42   
05/18/20 1152  (!) 127 16 (!) 92/39   
05/18/20 1150  (!) 128 16 (!) 73/55   
05/18/20 1147  (!) 125 16 91/55   
05/18/20 1145  (!) 124 16 (!) 89/49   
05/18/20 1142  (!) 123 16 (!) 76/54   
05/18/20 1140  (!) 129 16 (!) 81/49   
05/18/20 1137  (!) 127 16 (!) 86/51   
05/18/20 1135  (!) 130 16 114/45   
05/18/20 1132  (!) 130 17 92/45   
05/18/20 1130  (!) 136 16 98/52   
05/18/20 1127  (!) 133 16 (!) 84/53   
05/18/20 1125  (!) 135 16 92/62   
05/18/20 1122  (!) 131 16 102/52   
05/18/20 1120  (!) 137 16 102/58   
05/18/20 1117  (!) 130 17 112/56  05/18/20 1115  (!) 132 17 93/57   
05/18/20 1114  (!) 138 16  93 % 05/18/20 1112  (!) 128 18 106/61 91 % 05/18/20 1110  (!) 132 16 98/55   
05/18/20 1107  (!) 139 17 (!) 84/47   
05/18/20 1105  (!) 137 18 92/53   
05/18/20 1102  (!) 128 22 99/56   
05/18/20 1100  (!) 127 27 (!) 84/54 (!) 89 % 05/18/20 1055  (!) 102 26 96/80   
05/18/20 1052  (!) 112 26 111/60   
05/18/20 1050  (!) 107 (!) 31 110/52   
05/18/20 1045  (!) 106 (!) 31 97/52   
05/18/20 1043  (!) 107 (!) 32 111/60 94 % 05/18/20 1004  96 (!) 33 (!) 89/47 93 % 05/18/20 0945  98 (!) 35 (!) 76/41 95 % 05/18/20 0932  96 (!) 33 (!) 82/46 94 % 05/18/20 0930  94 (!) 34 (!) 48/19 93 % 05/18/20 0915  91 (!) 36 (!) 82/32 95 % 05/18/20 0914    98/80   
05/18/20 0905  91 (!) 37  94 % 05/18/20 0900  90 21 (!) 70/48 94 % 05/18/20 0845  93 (!) 34 (!) 85/58 93 % 05/18/20 0841  90 (!) 35 91/50 93 % 05/18/20 0832  88 (!) 35 (!) 73/45 93 % 05/18/20 0824  88 26 (!) 68/48 96 % 05/18/20 0819  91 (!) 31 (!) 62/45 93 % 05/18/20 0815  92 (!) 32 (!) 64/20 93 % 05/18/20 0802  90 19 (!) 74/34 94 % 05/18/20 0800 99.8 °F (37.7 °C) 91 22 (!) 64/45 94 % 05/18/20 0750  88 (!) 35 91/77 95 % 05/18/20 0745  90 10  95 % 05/18/20 0732  89 28 (!) 70/52 92 % 05/18/20 0715  93 (!) 34  96 % 05/18/20 0709  92 (!) 34 (!) 109/39 94 % 05/18/20 0700  91 (!) 37 (!) 88/57 94 % 05/18/20 0630  87 (!) 31 106/54 94 % 05/18/20 0600  90 (!) 37 (!) 83/62 94 % 05/18/20 0530  87 (!) 37 (!) 106/31 95 % 05/18/20 0500  91 (!) 36 101/56 94 % 05/18/20 0400 98.7 °F (37.1 °C) 72 (!) 33 116/50 95 % 05/18/20 0300  83 (!) 34 (!) 88/49 96 % 05/18/20 0200  85 (!) 32 (!) 89/55 96 % 05/18/20 0100  95 (!) 35 91/57 95 % 05/18/20 0000 98.7 °F (37.1 °C) 96 (!) 31 92/55 97 % Intake/Output Summary (Last 24 hours) at 5/18/2020 2346 Last data filed at 5/18/2020 2300 Gross per 24 hour Intake 6099.56 ml  
 Output 550 ml Net 5549.56 ml PHYSICAL EXAM: 
GEN: AA male, NAD Seen through ICU window to minimize exposure/transmission to/of COVID-19 Reviewed most current lab test results and cultures  YES Reviewed most current radiology test results   YES Review and summation of old records today    NO Reviewed patient's current orders and MAR    YES 
PMH/SH reviewed - no change compared to H&P 
________________________________________________________________________ Care Plan discussed with: 
  Comments Patient Family RN x Care Manager Consultant Multidiciplinary team rounds were held today with , nursing, pharmacist and clinical coordinator. Patient's plan of care was discussed; medications were reviewed and discharge planning was addressed. ________________________________________________________________________ Total NON critical care TIME:  25  Minutes Total CRITICAL CARE TIME Spent:   Minutes non procedure based Comments >50% of visit spent in counseling and coordination of care    
________________________________________________________________________ Devin Pacer, DO  
 
Procedures: see electronic medical records for all procedures/Xrays and details which were not copied into this note but were reviewed prior to creation of Plan. LABS: 
I reviewed today's most current labs and imaging studies. Pertinent labs include: 
Recent Labs 05/18/20 
0320 05/17/20 
0406 05/16/20 
1212 WBC 27.5* 29.2* 26.1* HGB 12.3 12.3 12.1 HCT 38.4 38.1 37.0  183 196 Recent Labs 05/18/20 
1706 05/18/20 
0320 05/17/20 
0406 05/16/20 
1212  135* 130* 129*  
K 4.2 5.3* 4.7 4.5  
CL 95* 94* 93* 92* CO2 22 17* 22 26 * 98 251* 269* BUN 98* 87* 69* 56* CREA 8.58* 7.89* 5.77* 4.47* CA 7.2* 7.9* 8.7 8.7 MG 2.2  --   --   --   
PHOS 7.3*  --   --   --   
ALB 1.8* 1.9* 2.2* 2.4*  
 TBILI  --  0.5 0.9 0.9 SGOT  --  >2,000* 178* 41* ALT  --  631* 63 20 INR  --  1.9* 1.3* 1.2* Signed: April Rangel DO

## 2020-05-19 NOTE — PROGRESS NOTES
Hospitalist Progress Note NAME: Smita Colón :  1955 MRN:  [id number] Assessment / Plan: 
Septic vs cardiogenic shock 2/2 COVID Positive Hyponatremia start patient on broad-spectrum antibiotic Rocephin and azithromycin  cont pressor support and empiric abx  gentle hydration with IV fluid Monitoring lactic acid 
-s/p BIV-ICD 
-cont droplet + precautions -npo  
  
Acute renal failure 
-Cr trending up, no RRT yet per nephro 
-trend bmp  IV fluid nephrology consultation appreciated Avoid nephrotoxic medication 
  
Elevated troponin rule out non-STEMI start patient on aspirin Cardiology consultation 
  
DMhold oral hypoglycemic Start patient/scale with insulin 
  
Atrial fibrillation continue Eliquis 
  
Hypertension hold antihypertensive medication 
  
Code Status: Full Surrogate Decision Maker: 
  
DVT Prophylaxis: Eliquis GI Prophylaxis: not indicated Subjective: Chief Complaint / Reason for Physician Visit Unresponsive on pressor support. Seen through CCU room window and discussed with nursing. Did not enter room to avoid exposure/transmission of COVID-19 Discussed with RN events overnight. Review of Systems: 
Symptom Y/N Comments  Symptom Y/N Comments Fever/Chills    Chest Pain Poor Appetite    Edema Cough    Abdominal Pain Sputum    Joint Pain SOB/GROVES    Pruritis/Rash Nausea/vomit    Tolerating PT/OT Diarrhea    Tolerating Diet Constipation    Other Could NOT obtain due to: AMS Objective: VITALS:  
Last 24hrs VS reviewed since prior progress note. Most recent are: 
Patient Vitals for the past 24 hrs: 
 Temp Pulse Resp BP SpO2  
20 2330  (!) 109 20 118/65 100 % 20 2328  (!) 114 20  100 % 20 2315  (!) 119 21 107/62 98 % 20 2300 99.5 °F (37.5 °C) (!) 112 19 105/62 98 % 20 2250  (!) 116 19 114/67 100 % 20 2240  (!) 108 19 126/62 100 % 05/18/20 2230  (!) 110 19 119/68 100 % 05/18/20 2220  (!) 108 19 106/67 100 % 05/18/20 2210  (!) 110 19 120/55 100 % 05/18/20 2200  (!) 106 19 108/63 100 % 05/18/20 2150  (!) 112 19 117/57 100 % 05/18/20 2140  (!) 110 18 116/64 100 % 05/18/20 2130  (!) 117 18 114/54 100 % 05/18/20 2120  (!) 114 19 122/64 100 % 05/18/20 2110  (!) 118 18 119/61 100 % 05/18/20 2100  (!) 116 19 110/67 100 % 05/18/20 2050  (!) 114 19 126/68 100 % 05/18/20 2043  (!) 116 21  100 % 05/18/20 2040  (!) 109 18 114/60 99 % 05/18/20 2030  (!) 111 17 (!) 85/59 91 % 05/18/20 2020  (!) 115 18 94/42 91 % 05/18/20 2010  (!) 114 17 92/56 91 % 05/18/20 2000 100 °F (37.8 °C) (!) 115 17 (!) 88/67 91 % 05/18/20 1900  (!) 117 17 (!) 82/67 94 % 05/18/20 1850  (!) 119 17 (!) 89/50 94 % 05/18/20 1840    (!) 87/50   
05/18/20 1830  (!) 116 17 (!) 85/57 93 % 05/18/20 1820  (!) 118 16 (!) 85/64 94 % 05/18/20 1812  (!) 114 20 94/62   
05/18/20 1807  (!) 111 17 (!) 73/49   
05/18/20 1800  (!) 115 16 (!) 67/45   
05/18/20 1750 (!) 101.2 °F (38.4 °C) (!) 115 17 (!) 76/54   
05/18/20 1740  (!) 119 16 (!) 76/54   
05/18/20 1730  (!) 122 16 (!) 88/33   
05/18/20 1716  (!) 121 16 (!) 86/51 92 % 05/18/20 1710  (!) 123 17 (!) 88/55   
05/18/20 1700  (!) 115 24 (!) 86/48 91 % 05/18/20 1650  (!) 123 21 (!) 81/56 91 % 05/18/20 1640  (!) 123 17 (!) 83/57 91 % 05/18/20 1630  (!) 122 19 (!) 88/61 94 % 05/18/20 1620  (!) 126 (!) 0 (!) 88/48 95 % 05/18/20 1610  (!) 126 16 (!) 81/49 96 % 05/18/20 1600 (!) 100.6 °F (38.1 °C) (!) 124 21 (!) 102/37 92 % 05/18/20 1556  (!) 133 17  93 % 05/18/20 1530  (!) 118 16 (!) 80/51 96 % 05/18/20 1520  (!) 125 17 (!) 76/31 97 % 05/18/20 1510  (!) 127 17 (!) 77/45 96 % 05/18/20 1500  (!) 130 17 (!) 82/49 97 % 05/18/20 1450  (!) 129 17 (!) 80/52 97 % 05/18/20 1440  (!) 130 (!) 6 95/43  05/18/20 1430  (!) 134 15 100/47 97 % 05/18/20 1420  (!) 129 17 103/58 98 % 05/18/20 1410  (!) 124 17 99/51 96 % 05/18/20 1400  (!) 125 18 (!) 92/33 94 % 05/18/20 1358  (!) 125 17 (!) 86/51 94 % 05/18/20 1350  (!) 127 17 (!) 89/43 93 % 05/18/20 1345  (!) 115 16 (!) 89/46   
05/18/20 1341  (!) 122 16 (!) 70/45   
05/18/20 1337  (!) 126 17 (!) 81/40   
05/18/20 1335  (!) 124 17 (!) 76/39  05/18/20 1333  (!) 120 17 (!) 66/48   
05/18/20 1330  (!) 126 17 (!) 65/46   
05/18/20 1329  (!) 128 16 (!) 61/42   
05/18/20 1326  (!) 123 16 (!) 60/36   
05/18/20 1323  (!) 125 16 (!) 67/49   
05/18/20 1315  (!) 132 16 (!) 64/40   
05/18/20 1300  (!) 128 20 (!) 129/102   
05/18/20 1245  (!) 129 17 (!) 89/46  05/18/20 1230  (!) 135 17 (!) 83/59   
05/18/20 1215  (!) 127 17 (!) 84/53   
05/18/20 1200 (!) 100.9 °F (38.3 °C) (!) 126 16 (!) 84/46 92 % 05/18/20 1157  (!) 123 16 (!) 76/46   
05/18/20 1155  (!) 126 16 (!) 87/42   
05/18/20 1152  (!) 127 16 (!) 92/39   
05/18/20 1150  (!) 128 16 (!) 73/55   
05/18/20 1147  (!) 125 16 91/55   
05/18/20 1145  (!) 124 16 (!) 89/49   
05/18/20 1142  (!) 123 16 (!) 76/54   
05/18/20 1140  (!) 129 16 (!) 81/49   
05/18/20 1137  (!) 127 16 (!) 86/51   
05/18/20 1135  (!) 130 16 114/45   
05/18/20 1132  (!) 130 17 92/45   
05/18/20 1130  (!) 136 16 98/52   
05/18/20 1127  (!) 133 16 (!) 84/53   
05/18/20 1125  (!) 135 16 92/62   
05/18/20 1122  (!) 131 16 102/52   
05/18/20 1120  (!) 137 16 102/58   
05/18/20 1117  (!) 130 17 112/56   
05/18/20 1115  (!) 132 17 93/57   
05/18/20 1114  (!) 138 16  93 % 05/18/20 1112  (!) 128 18 106/61 91 % 05/18/20 1110  (!) 132 16 98/55   
05/18/20 1107  (!) 139 17 (!) 84/47   
05/18/20 1105  (!) 137 18 92/53   
05/18/20 1102  (!) 128 22 99/56   
05/18/20 1100  (!) 127 27 (!) 84/54 (!) 89 % 05/18/20 1055  (!) 102 26 96/80  05/18/20 1052  (!) 112 26 111/60   
05/18/20 1050  (!) 107 (!) 31 110/52   
05/18/20 1045  (!) 106 (!) 31 97/52   
05/18/20 1043  (!) 107 (!) 32 111/60 94 % 05/18/20 1004  96 (!) 33 (!) 89/47 93 % 05/18/20 0945  98 (!) 35 (!) 76/41 95 % 05/18/20 0932  96 (!) 33 (!) 82/46 94 % 05/18/20 0930  94 (!) 34 (!) 48/19 93 % 05/18/20 0915  91 (!) 36 (!) 82/32 95 % 05/18/20 0914    98/80   
05/18/20 0905  91 (!) 37  94 % 05/18/20 0900  90 21 (!) 70/48 94 % 05/18/20 0845  93 (!) 34 (!) 85/58 93 % 05/18/20 0841  90 (!) 35 91/50 93 % 05/18/20 0832  88 (!) 35 (!) 73/45 93 % 05/18/20 0824  88 26 (!) 68/48 96 % 05/18/20 0819  91 (!) 31 (!) 62/45 93 % 05/18/20 0815  92 (!) 32 (!) 64/20 93 % 05/18/20 0802  90 19 (!) 74/34 94 % 05/18/20 0800 99.8 °F (37.7 °C) 91 22 (!) 64/45 94 % 05/18/20 0750  88 (!) 35 91/77 95 % 05/18/20 0745  90 10  95 % 05/18/20 0732  89 28 (!) 70/52 92 % 05/18/20 0715  93 (!) 34  96 % 05/18/20 0709  92 (!) 34 (!) 109/39 94 % 05/18/20 0700  91 (!) 37 (!) 88/57 94 % 05/18/20 0630  87 (!) 31 106/54 94 % 05/18/20 0600  90 (!) 37 (!) 83/62 94 % 05/18/20 0530  87 (!) 37 (!) 106/31 95 % 05/18/20 0500  91 (!) 36 101/56 94 % 05/18/20 0400 98.7 °F (37.1 °C) 72 (!) 33 116/50 95 % 05/18/20 0300  83 (!) 34 (!) 88/49 96 % 05/18/20 0200  85 (!) 32 (!) 89/55 96 % 05/18/20 0100  95 (!) 35 91/57 95 % 05/18/20 0000 98.7 °F (37.1 °C) 96 (!) 31 92/55 97 % Intake/Output Summary (Last 24 hours) at 5/18/2020 2342 Last data filed at 5/18/2020 2300 Gross per 24 hour Intake 6099.56 ml Output 550 ml Net 5549.56 ml PHYSICAL EXAM: 
GEN: AA male, NAD Seen through ICU window to minimize exposure/transmission to/of COVID-19 Reviewed most current lab test results and cultures  YES Reviewed most current radiology test results   YES Review and summation of old records today    NO 
 Reviewed patient's current orders and MAR    YES 
PMH/SH reviewed - no change compared to H&P 
________________________________________________________________________ Care Plan discussed with: 
  Comments Patient Family RN x Care Manager Consultant Multidiciplinary team rounds were held today with , nursing, pharmacist and clinical coordinator. Patient's plan of care was discussed; medications were reviewed and discharge planning was addressed. ________________________________________________________________________ Total NON critical care TIME:  25  Minutes Total CRITICAL CARE TIME Spent:   Minutes non procedure based Comments >50% of visit spent in counseling and coordination of care    
________________________________________________________________________ Gala Curly, DO  
 
Procedures: see electronic medical records for all procedures/Xrays and details which were not copied into this note but were reviewed prior to creation of Plan. LABS: 
I reviewed today's most current labs and imaging studies. Pertinent labs include: 
Recent Labs 05/18/20 
0320 05/17/20 
0406 05/16/20 
1212 WBC 27.5* 29.2* 26.1* HGB 12.3 12.3 12.1 HCT 38.4 38.1 37.0  183 196 Recent Labs 05/18/20 
1706 05/18/20 
0320 05/17/20 
0406 05/16/20 
1212  135* 130* 129*  
K 4.2 5.3* 4.7 4.5  
CL 95* 94* 93* 92* CO2 22 17* 22 26 * 98 251* 269* BUN 98* 87* 69* 56* CREA 8.58* 7.89* 5.77* 4.47* CA 7.2* 7.9* 8.7 8.7 MG 2.2  --   --   --   
PHOS 7.3*  --   --   --   
ALB 1.8* 1.9* 2.2* 2.4* TBILI  --  0.5 0.9 0.9 SGOT  --  >2,000* 178* 41* ALT  --  631* 63 20 INR  --  1.9* 1.3* 1.2* Signed: Faby Gould, DO

## 2020-05-19 NOTE — PROGRESS NOTES
Care Management: 
 
Patient remains vented and critical in the ICU and prognosis is poor. Palliative consulted. Chart has been reviewed and we will cont to follow as appropriate. Himanshu Perez RN ACM [id number]

## 2020-05-19 NOTE — PROGRESS NOTES
Pharmacy Automatic Renal Dosing Protocol - Antimicrobials Indication for Antimicrobials: sepsis Current Regimen of Each Antimicrobial: 
Vancomycin 2g X1 (Start Date ; Day # 2 Cefepime 1g q 24 hours (; day #2 Azithromycin 500 mg every day ( - ) Previous Antimicrobial Therapy: CTX  -  Goal Level: VANCOMYCIN TROUGH GOAL RANGE Vancomycin Trough: 15 - 20 mcg/mL  (AUC: 400 - 600 mg/hr/Liter/day) Date Dose & Interval Measured (mcg/mL) Extrapolated (mcg/mL) Date & time of next level: TBD Significant Cultures:  
5/15: Blood cx:  
5/15: Blood cx: ALPHA STREPTOCOCCUS - pending 5/15: Urine cx: NG - Final 
5/15: Blood cx: pending 5/15: Respiratory panel: Not detected Radiology / Imaging results: (X-ray, CT scan or MRI):  
5/15: CXR - No Acute Disease Paralysis, amputations, malnutrition: no 
 
Labs: 
Recent Labs 20 
0320 20 
0406 20 
1212 CREA 7.89* 5.77* 4.47* BUN 87* 69* 56* WBC 27.5* 29.2* 26.1* Temp (24hrs), Av.1 °F (37.3 °C), Min:98.7 °F (37.1 °C), Max:99.8 °F (37.7 °C) Creatinine Clearance (mL/min) or Dialysis: 13 (soon to start HD or CVVH) Impression/Plan:  
Patient was on Azithromycin and CTX. Will continue with azithromycin Change CTX to cefepime 1g q 24 hours Vancomycin 2g X1, the remainder of the dose will be based on either CVVH or HD. However, due to BP, he hasn't been able to start CVVH. Received the loading dose on  at 1200 and will check a random level with AM lab and will dose accordingly or if the CVVH starts and the BP stable, then will dose the vancomycin based on the CVVH 
WBC elevated and trending down Afebrile Antimicrobial stop date: pending Pharmacy will follow daily and adjust medications as appropriate for renal function and/or serum levels. Thank you, Mary Alice Cobb, PHARMD 
 
Recommended duration of therapy http://[facility]/[place]/[facility]/Pharmacy/Clinical%20Companion/Duration%20of%20ABX%20therapy. docx Renal Dosing 
http://[facility]/[place]/[facility]/Pharmacy/Clinical%20Companion/Renal%[id number]. pdf

## 2020-05-20 NOTE — PROGRESS NOTES
NAME: Bethel Loza :  1955 MRN:  [id number] Assessment :    Plan: 
--LAQUITA Mild hyponatremia Mild Hyperkalemia Shock Sepsis DM type 2 Systolic HF (EF 80%) COVID + 
 --Pressors being titrated down; BP ok now; will check back in midday and if stable will likely try CVVHD - factor zero Lewis line  Getting albumin [id number] for a couple of days Subjective: Chief Complaint:  In order to limit the exposure risk from COVID 19 and to preserve the hospital's limited supply of PPEs, seen through ICU window. Intubated. Discussed with [facility] and his icu nurse. Review of Systems:  
 
Symptom Y/N Comments  Symptom Y/N Comments Fever/Chills    Chest Pain Poor Appetite    Edema Cough    Abdominal Pain Sputum    Joint Pain SOB/GROVES    Pruritis/Rash Nausea/vomit    Tolerating PT/OT Diarrhea    Tolerating Diet Constipation    Other Could not obtain due to: See above Objective: VITALS:  
Last 24hrs VS reviewed since prior progress note. Most recent are: 
Visit Vitals /57 Pulse (!) 108 Temp (!) 101.8 °F (38.8 °C) Resp 23 Ht 6' 3\" (1.905 m) Wt 133 kg (293 lb 3.4 oz) SpO2 97% BMI 36.65 kg/m² Intake/Output Summary (Last 24 hours) at 2020 1017 Last data filed at 2020 0900 Gross per 24 hour Intake 50620.76 ml Output 470 ml Net 9609.76 ml Telemetry Reviewed: PHYSICAL EXAM: 
General: NAD No edema Lab Data Reviewed: (see below) Medications Reviewed: (see below) PMH/SH reviewed - no change compared to H&P 
________________________________________________________________________ Care Plan discussed with: 
Patient Family  y   
RN y   
Care Manager Consultant:     
 
  Comments >50% of visit spent in counseling and coordination of care ________________________________________________________________________ Kannan Ribeiro MD  
 
Procedures: see electronic medical records for all procedures/Xrays and details which 
were not copied into this note but were reviewed prior to creation of Plan. LABS: 
Recent Labs  
  05/20/20 0503 05/19/20 0459 WBC 26.1* 30.0* HGB 10.1* 11.3* HCT 31.7* 35.7*  
* 163 Recent Labs  
  05/20/20 0503 05/19/20 0459 05/19/20 0033 05/18/20 
1706 *  134* 138 140 140  
K 3.7  3.7 4.0 4.0 4.2 CL 91*  90* 93* 93* 95* CO2 22  24 22 23 22 BUN 87*  87* 87* 92* 98* CREA 7.99*  7.81* 8.32* 7.94* 8.58* *  159* 198* 209* 158* CA 5.5*  5.5* 6.5* 7.0* 7.2*  
MG 1.6  --  2.4 2.2 PHOS 4.8*  4.8*  --  6.6*  6.9* 7.3* Recent Labs  
  05/20/20 
0503 05/19/20 
0459 05/19/20 
0033  05/18/20 
0320 SGOT >2,000* >2,000*  --   --  >2,000* * 247*  --   --  115  
TP 5.7* 5.9*  --   --  6.9 ALB 1.9*  1.9* 1.6* 1.7*   < > 1.9*  
GLOB 3.8 4.3*  --   --  5.0*  
 < > = values in this interval not displayed. Recent Labs  
  05/20/20 
0503 05/19/20 
2321 05/19/20 
1811  05/19/20 0459 05/18/20 
0320 INR 3.1*  --   --   --  4.2*  --  1.9* PTP 29.3*  --   --   --  39.4*  --  18.6* APTT 81.0* 73.1* 64.8*   < > 55.0*   < > 47.1*  
 < > = values in this interval not displayed. Recent Labs 05/19/20 
0514 05/18/20 
1706 FERR 58,625* 23,311* No results found for: FOL, RBCF No results for input(s): PH, PCO2, PO2 in the last 72 hours. Recent Labs 05/18/20 
0320 * No components found for: [place] Lab Results Component Value Date/Time  Color YELLOW/STRAW 05/15/2020 05:34 PM  
 Appearance CLOUDY (A) 05/15/2020 05:34 PM  
 Specific gravity 1.025 05/15/2020 05:34 PM  
 pH (UA) 5.0 05/15/2020 05:34 PM  
 Protein 30 (A) 05/15/2020 05:34 PM  
 Glucose >1,000 (A) 05/15/2020 05:34 PM  
 Ketone TRACE (A) 05/15/2020 05:34 PM  
 Bilirubin Negative 05/15/2020 05:34 PM  
 Urobilinogen 0.2 05/15/2020 05:34 PM  
 Nitrites Negative 05/15/2020 05:34 PM  
 Leukocyte Esterase Negative 05/15/2020 05:34 PM  
 Epithelial cells FEW 05/15/2020 05:34 PM  
 Bacteria Negative 05/15/2020 05:34 PM  
 WBC 0-4 05/15/2020 05:34 PM  
 RBC 0-5 05/15/2020 05:34 PM  
 
 
MEDICATIONS: 
Current Facility-Administered Medications Medication Dose Route Frequency  albumin human 25% (BUMINATE) solution 12.5 g  12.5 g IntraVENous Q6H  
 PHARMACY INFORMATION NOTE  1 Each Other BID  sodium bicarbonate (8.4%) 150 mEq in sterile water 1,000 mL infusion   IntraVENous CONTINUOUS  
 DOBUTamine (DOBUTREX) 500 mg/250 mL (2,000 mcg/mL) infusion  0-10 mcg/kg/min IntraVENous TITRATE  EPINEPHrine (ADRENALIN) 5 mg in 0.9% sodium chloride 250 mL infusion  0-10 mcg/min IntraVENous TITRATE  propofol (DIPRIVAN) 10 mg/mL infusion  0-50 mcg/kg/min IntraVENous TITRATE  cefepime (MAXIPIME) 1 g in 0.9% sodium chloride (MBP/ADV) 50 mL  1 g IntraVENous Q24H  
 bicarbonate dialysis (PRISMASOL) BG K 2/Ca 3.5 5000 ml solution   Extracorporeal DIALYSIS CONTINUOUS  
 hydrocortisone Sod Succ (PF) (SOLU-CORTEF) injection 100 mg  100 mg IntraVENous Q6H  
 heparin (porcine) injection 2,000 Units  2,000 Units IntraVENous PRN Or  
 heparin (porcine) injection 4,000 Units  4,000 Units IntraVENous PRN  
 heparin 25,000 units in D5W 250 ml infusion  8-25 Units/kg/hr IntraVENous TITRATE  PHENYLephrine (JACLYN-SYNEPHRINE) 100 mg in 0.9% sodium chloride 250 mL infusion   mcg/min IntraVENous TITRATE  
 NOREPINephrine (LEVOPHED) 32 mg in 5% dextrose 250 mL infusion  2-200 mcg/min IntraVENous TITRATE  vasopressin (VASOSTRICT) 20 Units in 0.9% sodium chloride 100 mL infusion  0.04 Units/min IntraVENous CONTINUOUS  
 insulin glargine (LANTUS) injection 25 Units  25 Units SubCUTAneous DAILY  sodium chloride (NS) flush 5-40 mL  5-40 mL IntraVENous Q8H  
  sodium chloride (NS) flush 5-40 mL  5-40 mL IntraVENous PRN  
 acetaminophen (TYLENOL) tablet 650 mg  650 mg Oral Q6H PRN  
 ondansetron (ZOFRAN) injection 4 mg  4 mg IntraVENous Q8H PRN  
 alcohol 62% (NOZIN) nasal  1 Ampule  1 Ampule Topical Q12H  
 insulin lispro (HUMALOG) injection   SubCUTAneous Q6H  
 glucose chewable tablet 16 g  4 Tab Oral PRN  
 dextrose (D50W) injection syrg 12.5-25 g  12.5-25 g IntraVENous PRN  
 glucagon (GLUCAGEN) injection 1 mg  1 mg IntraMUSCular PRN  
 acetaminophen (TYLENOL) tablet 650 mg  650 mg Oral Q6H PRN Or  
 acetaminophen (TYLENOL) suppository 650 mg  650 mg Rectal Q6H PRN  zinc sulfate (ZINCATE) 220 (50) mg capsule 1 Cap  1 Cap Oral DAILY  influenza vaccine 2019-20 (6 mos+)(PF) (FLUARIX/FLULAVAL/FLUZONE QUAD) injection 0.5 mL  0.5 mL IntraMUSCular PRIOR TO DISCHARGE  sodium chloride (NS) flush 5-10 mL  5-10 mL IntraVENous PRN

## 2020-05-20 NOTE — DIABETES MGMT
[address] INITIAL NOTE Presentation Raul Foster is a 71 y.o. male admitted from the ER 5/15/20 with complaints of weakness. No COVID-19 symptoms or contacts noted. Fever. CXR, CT Head & CT ABd negative. Blood cultures positive for alpha streptococcus. Markedly elevated ferritin DX: [id number]. Septic shock. Multiorgan failure TX: Multiple pressors. Solucortef. Insulin. ABx. Nephrology. Palliative care Diabetes: Patient has known Type 2 diabetes, treated with Trulicity, Onglyza & Amaryl PTA (per PTA med list). Family history positive for diabetes in father. Consulted by Provider for advanced diabetes nursing assessment and care, specifically related to  
[x] Inpatient management strategy Diabetes-related medical history-Unable to speak with patient Subjective NA Objective Physical exam 
General  
Vital Signs Febrile. AFib Visit Vitals /61 Pulse (!) 101 Temp (!) 102.1 °F (38.9 °C) Resp 24 Ht 6' 3\" (1.905 m) Wt 133 kg (293 lb 3.4 oz) SpO2 98% BMI 36.65 kg/m² Laboratory Lab Results Component Value Date/Time Hemoglobin A1c 9.8 (H) 02/09/2017 05:42 AM  
 Hemoglobin A1c, External 9.0 03/02/2016 Lab Results Component Value Date/Time LDL, calculated 38.4 03/03/2009 09:40 PM  
 
Lab Results Component Value Date/Time Creatinine 7.81 (H) 05/20/2020 05:03 AM  
 Creatinine 7.99 (H) 05/20/2020 05:03 AM  
 
Lab Results Component Value Date/Time  Sodium 134 (L) 05/20/2020 05:03 AM  
 Sodium 133 (L) 05/20/2020 05:03 AM  
 Potassium 3.7 05/20/2020 05:03 AM  
 Potassium 3.7 05/20/2020 05:03 AM  
 Chloride 90 (L) 05/20/2020 05:03 AM  
 Chloride 91 (L) 05/20/2020 05:03 AM  
 CO2 24 05/20/2020 05:03 AM  
 CO2 22 05/20/2020 05:03 AM  
 Anion gap 20 (H) 05/20/2020 05:03 AM  
 Anion gap 20 (H) 05/20/2020 05:03 AM  
 Glucose 159 (H) 05/20/2020 05:03 AM  
 Glucose 156 (H) 05/20/2020 05:03 AM  
 BUN 87 (H) 05/20/2020 05:03 AM  
 BUN 87 (H) 05/20/2020 05:03 AM  
 Creatinine 7.81 (H) 05/20/2020 05:03 AM  
 Creatinine 7.99 (H) 05/20/2020 05:03 AM  
 BUN/Creatinine ratio 11 (L) 05/20/2020 05:03 AM  
 BUN/Creatinine ratio 11 (L) 05/20/2020 05:03 AM  
 GFR est AA 8 (L) 05/20/2020 05:03 AM  
 GFR est AA 8 (L) 05/20/2020 05:03 AM  
 GFR est non-AA 7 (L) 05/20/2020 05:03 AM  
 GFR est non-AA 7 (L) 05/20/2020 05:03 AM  
 Calcium 5.5 (LL) 05/20/2020 05:03 AM  
 Calcium 5.5 (LL) 05/20/2020 05:03 AM  
 Bilirubin, total 1.8 (H) 05/20/2020 05:03 AM  
 AST (SGOT) >2,000 (H) 05/20/2020 05:03 AM  
 Alk. phosphatase 219 (H) 05/20/2020 05:03 AM  
 Protein, total 5.7 (L) 05/20/2020 05:03 AM  
 Albumin 1.9 (L) 05/20/2020 05:03 AM  
 Albumin 1.9 (L) 05/20/2020 05:03 AM  
 Globulin 3.8 05/20/2020 05:03 AM  
 A-G Ratio 0.5 (L) 05/20/2020 05:03 AM  
 ALT (SGPT) 1,823 (H) 05/20/2020 05:03 AM  
 
Lab Results Component Value Date/Time ALT (SGPT) 1,823 (H) 05/20/2020 05:03 AM  
 
Factors affecting BG pattern Factor Dose Comments Nutrition: NPO Drugs: 
Steroids Hydorcortisone 100mg Q6 hrs Pain  BPS 4 Infection: COVID-19 Vancomycin, Cefepime & Azithromycin WBC 26.1 Other: LAQUITA CVVHD Serum creatinine 7.99 Blood glucose pattern Assessment and Plan Nursing Diagnosis Risk for unstable blood glucose pattern Nursing Intervention Domain 2654 Decision-making Support Nursing Interventions Examined current inpatient diabetes control Explored factors facilitating and impeding inpatient management Evaluation This gentleman, with Type 2 diabetes, hasn't achieved inpatient blood glucose target of 100-180mg/dl. Inpatient blood glucose management has been impacted by 
[x] Kidney dysfunction 
[x] Glucocorticoid use [x]  Multi-organ failure Has been receiving basal and corrective insulin to meet needs. Required 13 units of corrective on 5/18/20 when he received 200 mg of steroid.  When he received 500 mg of steroid on 5/19/20, he required 48 units of corrective insulin. So far today, he has required 12 units of corrective insulin. Since patient receiving scheduled steroid (400mg/24 hours), would anticipate insulin need with another type of insulin. Recommendations Recommend: 
 
Continue Lantus insulin to address metabolic needs Would give Regular insulin 10 units with each dose of hydrocortisone Billing Code(s) [x] 77667 IP subsequent hospital care - 45 minutes [facility] [place] Access via [facility] [place] [facility] (C)

## 2020-05-20 NOTE — PROGRESS NOTES
05/20/20 0534 ABCDEF Bundle SBT Safety Screen Passed No  
SBT Screen Reason for Failure Agitation;Vasopressor use

## 2020-05-20 NOTE — PROGRESS NOTES
Report received from Bhavya Ruffin RN. Dr. Scarlet Parada in, aware of current pressors. 0800  Assessment completed, bloody secretions from mouth noted. Diminished breath sounds noted. Will change Heparin outside of room with other RN due to Covid 19 status. 1200  Dr. Mary Ramirez here, to hold off on CVVH. Aware of weaning down on Levophed as BP tolerates. Ricky now weaning also. Maintained other pressors without change. See assessment. Extra Insulin required for SSI, Dr. Scarlet Parada ordered 10 units. 1600  Resting more quietly now, no changes in assessment noted. 1925  Report given to Danette Hebert RN.

## 2020-05-20 NOTE — PROGRESS NOTES
Care Management: 
 
Patient remains vented and critical in the ICU and prognosis is poor. Only multiple pressors. Looking to possibly start CVVH this afternoon if B/P acceptable. Palliative consulted. Will do complete assessment as appropriate. Chart has been reviewed and we will cont to follow as appropriate. Orlando Panda RN AC 1926

## 2020-05-20 NOTE — PROGRESS NOTES
Addendum 12:56pm: 
CVVHD not starting today, will change cefepime to 1g q24h and give vancomycin x1 dose only. ------------------------------ Pharmacy Automatic Renal Dosing Protocol - Antimicrobials Indication for Antimicrobials: sepsis COVID - POSITIVE Current Regimen of Each Antimicrobial: 
Vancomycin 2g X1 (Start Date ; Day # 3 Cefepime 1g q 24 hours (; day #3 Azithromycin 500 mg every day ( - ) Previous Antimicrobial Therapy: CTX  -  Goal Level: VANCOMYCIN TROUGH GOAL RANGE Vancomycin Trough: 15 - 20 mcg/mL  (AUC: 400 - 600 mg/hr/Liter/day) Date Dose & Interval Measured (mcg/mL) Extrapolated (mcg/mL)  AM N/a 15.6 N/a Date & time of next level:  Significant Cultures:  
5/15: Blood cx #1: NGTD, pedning 5/15: Blood cx #2: ALPHA STREPTOCOCCUS - pending 5/15: Urine cx: NG - Final 
5/15: Respiratory panel: Not detected 5/15: sars-cov-2 positive Radiology / Imaging results: (X-ray, CT scan or MRI):  
5/15: CXR - No Acute Disease Paralysis, amputations, malnutrition: no 
 
Labs: 
Recent Labs  
  20 
0503 20 
0459 20 
0033  20 
0320 CREA 7.99*  7.81* 8.32* 7.94*   < > 7.89* BUN 87*  87* 87* 92*   < > 87* WBC 26.1* 30.0*  --   --  27.5*  
 < > = values in this interval not displayed. Temp (24hrs), Av.4 °F (38 °C), Min:98.2 °F (36.8 °C), Max:103.4 °F (39.7 °C) Creatinine Clearance (mL/min) or Dialysis: CVVHD to start  ~1200 Impression/Plan: · CVVHD to start today · Vancomycin random level this AM within range for redosing · Start vancomycin 1500 mg IV q24h for CVVHD · Change cefepime to 1g IV q8h for CVVHD · Continue current dose azithromycin · Antimicrobial stop date: pending Pharmacy will follow daily and adjust medications as appropriate for renal function and/or serum levels. Thank you, GREGORY Mccrary

## 2020-05-20 NOTE — PROGRESS NOTES
Progress Note 5/20/2020 11:43 AM 
NAME: Carlene Tamayo MRN:  [id number] Admit Diagnosis: Sepsis ([facility] [address] [A41.9] Problem List: 1. Shock; septic/cardiogenic 2. Severe sepsis 3. COVID-19 + 4. NSTEMI 5. Hyponatremia 6. Lactic acidosis 7. Acute liver injury 8. Acute on chronic systolic heart failure; Class IV 9. Acute kidney injury; anuric 10. Coagulopathy 11. Nonischemic dilated cardiomyopathy s/p remote ICD 12. Chronic atrial fibrillation 13. Sleep apnea 14. Hypertensive heart disease w/ CKD and HF Assessment/Plan: On multiple pressors Max vent support Dialysis looming; line placed last night. Long discussion with the family multiple times. He is gravely ill and most likely will not survive this. This has been relayed and they understand this. Intake/Output Summary (Last 24 hours) at 5/20/2020 5004 Last data filed at 5/20/2020 3285 Gross per 24 hour Intake 9606.94 ml Output 470 ml Net 9136.94 ml 1. Continue added 1/2NS @ 125cc/hr 2. Continue supportive care 3. Continue lovenox as tolerated; OK to hold for procedures  
 
  
 [x]       High complexity decision making was performed in this patient at high risk for decompensation with multiple organ involvement. Subjective:  
 
Carlene Tamayo is intubated and sedated. Discussed with RN events overnight. Review of Systems: 
 
Symptom Y/N Comments  Symptom Y/N Comments Fever/Chills N   Chest Pain N Poor Appetite N   Edema N   
Cough N   Abdominal Pain N Sputum N   Joint Pain N   
SOB/GROVES N   Pruritis/Rash N   
Nausea/vomit N   Tolerating PT/OT Y Diarrhea N   Tolerating Diet Y Constipation N   Other Could NOT obtain due to: sedated Objective:  
  
Physical Exam: 
 
Last 24hrs VS reviewed since prior progress note. Most recent are: 
 
Visit Vitals /75 Pulse (!) 115 Temp (!) 103.4 °F (39.7 °C) Resp 29 Ht 6' 3\" (1.905 m) Wt 133 kg (293 lb 3.4 oz) SpO2 97% BMI 36.65 kg/m² Intake/Output Summary (Last 24 hours) at 5/20/2020 5326 Last data filed at 5/20/2020 0683 Gross per 24 hour Intake 9606.94 ml Output 470 ml Net 9136.94 ml General Appearance: Well developed, well nourished, intubated/sedated Ears/Nose/Mouth/Throat: Hearing grossly normal. 
Neck: Supple. Chest: Lungs decreased diffusely Cardiovascular: Regular rate and rhythm, S1S2 normal, no murmur. Abdomen: Soft, non-tender, bowel sounds are active. Extremities: No edema bilaterally. Skin: Warm and dry. []         Post-cath site without hematoma, bruit, tenderness, or thrill. Distal pulses intact. PMH/SH reviewed - no change compared to H&P Data Review Telemetry: paced/AF 
 
EKG:  
[x]  No new EKG for review Lab Data Personally Reviewed: 
 
Recent Labs  
  05/20/20 
0503 05/19/20 
1820 WBC 26.1* 30.0* HGB 10.1* 11.3* HCT 31.7* 35.7*  
* 163 Recent Labs  
  05/20/20 
0503 05/19/20 
2321 05/19/20 
1811  05/19/20 
0459  05/18/20 
0320 INR 3.1*  --   --   --  4.2*  --  1.9* PTP 29.3*  --   --   --  39.4*  --  18.6* APTT 81.0* 73.1* 64.8*   < > 55.0*   < > 47.1*  
 < > = values in this interval not displayed. Recent Labs  
  05/20/20 
0503 05/19/20 
0459 05/19/20 
0033 05/18/20 
1706 *  134* 138 140 140  
K 3.7  3.7 4.0 4.0 4.2 CL 91*  90* 93* 93* 95* CO2 22  24 22 23 22 BUN 87*  87* 87* 92* 98* CREA 7.99*  7.81* 8.32* 7.94* 8.58* *  159* 198* 209* 158* CA 5.5*  5.5* 6.5* 7.0* 7.2*  
MG 1.6  --  2.4 2.2 Recent Labs 05/18/20 
0320 * Lab Results Component Value Date/Time Cholesterol, total 92 03/03/2009 09:40 PM  
 HDL Cholesterol 44 03/03/2009 09:40 PM  
 LDL, calculated 38.4 03/03/2009 09:40 PM  
 Triglyceride 48 03/03/2009 09:40 PM  
 CHOL/HDL Ratio 2.1 03/03/2009 09:40 PM  
 
 
Recent Labs  
  05/20/20 
0503 05/19/20 
0459 05/19/20 
0033  05/18/20 
0320 SGOT >2,000* >2,000*  --   --  >2,000* * 247*  --   --  115  
TP 5.7* 5.9*  --   --  6.9 ALB 1.9*  1.9* 1.6* 1.7*   < > 1.9*  
GLOB 3.8 4.3*  --   --  5.0*  
 < > = values in this interval not displayed. No results for input(s): PH, PCO2, PO2 in the last 72 hours. Medications Personally Reviewed: 
 
Current Facility-Administered Medications Medication Dose Route Frequency  calcium gluconate 2 g in 0.9% sodium chloride 100 mL IVPB  2 g IntraVENous ONCE  
 albumin human 25% (BUMINATE) solution 12.5 g  12.5 g IntraVENous Q6H  
 PHARMACY INFORMATION NOTE  1 Each Other BID  sodium bicarbonate (8.4%) 150 mEq in sterile water 1,000 mL infusion   IntraVENous CONTINUOUS  
 DOBUTamine (DOBUTREX) 500 mg/250 mL (2,000 mcg/mL) infusion  0-10 mcg/kg/min IntraVENous TITRATE  EPINEPHrine (ADRENALIN) 5 mg in 0.9% sodium chloride 250 mL infusion  0-10 mcg/min IntraVENous TITRATE  propofol (DIPRIVAN) 10 mg/mL infusion  0-50 mcg/kg/min IntraVENous TITRATE  cefepime (MAXIPIME) 1 g in 0.9% sodium chloride (MBP/ADV) 50 mL  1 g IntraVENous Q24H  
 bicarbonate dialysis (PRISMASOL) BG K 2/Ca 3.5 5000 ml solution   Extracorporeal DIALYSIS CONTINUOUS  
 hydrocortisone Sod Succ (PF) (SOLU-CORTEF) injection 100 mg  100 mg IntraVENous Q6H  
 heparin (porcine) injection 2,000 Units  2,000 Units IntraVENous PRN Or  
 heparin (porcine) injection 4,000 Units  4,000 Units IntraVENous PRN  
 heparin 25,000 units in D5W 250 ml infusion  8-25 Units/kg/hr IntraVENous TITRATE  PHENYLephrine (JACLYN-SYNEPHRINE) 100 mg in 0.9% sodium chloride 250 mL infusion   mcg/min IntraVENous TITRATE  
 NOREPINephrine (LEVOPHED) 32 mg in 5% dextrose 250 mL infusion  2-200 mcg/min IntraVENous TITRATE  vasopressin (VASOSTRICT) 20 Units in 0.9% sodium chloride 100 mL infusion  0.04 Units/min IntraVENous CONTINUOUS  
 insulin glargine (LANTUS) injection 25 Units  25 Units SubCUTAneous DAILY  sodium chloride (NS) flush 5-40 mL  5-40 mL IntraVENous Q8H  
 sodium chloride (NS) flush 5-40 mL  5-40 mL IntraVENous PRN  
 acetaminophen (TYLENOL) tablet 650 mg  650 mg Oral Q6H PRN  
 ondansetron (ZOFRAN) injection 4 mg  4 mg IntraVENous Q8H PRN  
 alcohol 62% (NOZIN) nasal  1 Ampule  1 Ampule Topical Q12H  
 insulin lispro (HUMALOG) injection   SubCUTAneous Q6H  
 glucose chewable tablet 16 g  4 Tab Oral PRN  
 dextrose (D50W) injection syrg 12.5-25 g  12.5-25 g IntraVENous PRN  
 glucagon (GLUCAGEN) injection 1 mg  1 mg IntraMUSCular PRN  
 acetaminophen (TYLENOL) tablet 650 mg  650 mg Oral Q6H PRN Or  
 acetaminophen (TYLENOL) suppository 650 mg  650 mg Rectal Q6H PRN  zinc sulfate (ZINCATE) 220 (50) mg capsule 1 Cap  1 Cap Oral DAILY  influenza vaccine 2019-20 (6 mos+)(PF) (FLUARIX/FLULAVAL/FLUZONE QUAD) injection 0.5 mL  0.5 mL IntraMUSCular PRIOR TO DISCHARGE  sodium chloride (NS) flush 5-10 mL  5-10 mL IntraVENous PRN Courtney Foreman III, DO

## 2020-05-20 NOTE — PROGRESS NOTES
Report from [address] - Family called in for update - updated. 2145 - 18g in L AC possibly infiltrated, infusions switched to CVP line. 2150 - Propofol on hold - abnomal \"startle\" response noted on assessment & possible infiltrated sedation infusion - confirming \"off sedation\" 2220 - Patient having significant jerking and twitching in bed - does not follow commands - restarted propofol 2245 - Telemed paged about critical calcium 6.1. No orders ever received  
0524 - Telemed paged about critical calcium 5.5.

## 2020-05-20 NOTE — CONSULTS
[facility] INTENSIVIST Consult Service Note Pulmonary, Critical Care, and Sleep Medicine Name: Beau Bennett MRN: [id number] : 1955 Hospital: [place] [address] Date: 2020  Admission date: 5/15/2020 Hospital Day: 6 Subjective/Interval History:  
Seen earlier today on rounds. Pt is unstable and acutely ill in the CCU. Patient was re-evaluated multiple times with repeated discussions with CCU team throughout the day : Norepi dose slightly lower this morning, otherwise no change. : Survived the night, remains on max dose of 5 pressors with barely acceptable blood pressures. Intubated, sedated on 100% FiO2.  
 
: Continues to rapidly decompensate. This morning is largely unresponsive with escalating pressor requirements and worsening multiorgan failure. Multiple phone conversations were had with his wife and children regarding code status and goals of care. They ultimately decided that they wanted to continue aggressive measures including intubation and dialysis; intubation subsequently performed by Dr. Maddie Dawson.  high fever, sweats overniht. IV levophed 95 mcg, IV vasopressin. D dimer 2.56; Cr 5.77; Ferritin yesterday 909. lactic acid higher, WBC now 35664. CXR reviewed and amazingly clear. . Too weak to verbalize. Called wife at home [phone] , Daughter [facility]. They are both well, they were tested at [facility] yesterday. Results pending. Explained medical management for COVID 19. He is critically ill. He is unpredictable. Family wants to defer to his heart doctor any decisions about code status. We dis discuss possiblerole of Convalescent plasma since Remdesivir is not available and would be reserved for respiratory failure pts on vent. Wife, daughter, son will review [email or web address] org and we will discuss process if they want to proceed.  No guarantees offered, as it will take takes to get plasma and the transfusion is not risk free, 11:32 AM 
 
3:38 PM Called wife back. Wife and family has had a chance to review forms and handouts. · [facility]: Expanded Access to Convalescent Plasma for Treatment of Pts with COVID-19. [facility]#[id number] Clinical Staff: Dr. Ashly Ochoa MD. Consent for blood and investigation has been obtained. Discussed the use of Convalescent Plasma collected from COVID 19 patients. These individuals have recovered from the illness and donated blood to study. Use does not guarantee that patients will improve or recover after transfusion. They may actually get worse or have side effects. They agree to proceed with requesting convalescent plasma. Sent order for type and screen. Nursing aware and will get phone consent. I called Blood bank supervisor who will request matched unit from [employer]. Family aware that it may take days to acquire. IMPRESSION:  
1. Profound septic shock on max dose of 5 pressors 2. Severe sepsis- high fever, sweats 3. COVID 19 POSITIVE 4. Proinflammatory/ Prothrombotic 5. Alpha strep septicemia- elevated Procalcitonin > 80 
6. Chronic combined systolic/diastolic heart failure 7. Hypertensive heart disease with CHF and CKD 8. LAQUITA/CKD, CVVH to start 9. Chronic anticoagulation at home- eliquis- stopped 10. Diabetes mellitus type 2 uncontrolled 11. Hyperlipidemia 12. 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 Chronic atrial fibrillation 14. Status post SJM BIV-ICD implant in 2016 
15. Sleep apnea, undiagnosed/untreated 16. Obesity with recent weight gain 17. Body mass index is 36.65 kg/m². - not a good prone candidate 18. Additional workup outlined below 19. Multiorgan dysfunction as outlined above: Pt has one or more acute or chronic illnesses with severe exacerbation with progression or side effects of treatment that poses a threat to life or bodily function 21. Pt is unstable, unpredictable needing more CCU monitoring; at high risk of sudden decline and decompensation with life threatening consequenses and continued end organ dysfunction and failure RECOMMENDATIONS/PLAN:  
1. CCU monitoring 2. Droplet plus Isolation 3. Intubated 5/18, adjust settings as needed 4. Nephrology following, has not been stable enough to tolerate CVVH 5. Continue pressors, currently on max dose dobutamine, vaso, epi, rosemary. Norepi down slightly 6. Continue broad spectum antibiotics--vanc, cefepime, azithro 7. Stress dose steroids 8. Heparin drip 9. Cardiology following 10. Continue glargine for now, keep a close eye on sugars 11. Follow cultures 12. CXR in AM 
13. Replete electrolytes PRN 14. Labs to follow inflammatory markers electrolytes, renal function and and blood counts 15. Bronchial hygiene with respiratory therapy techniques, bronchodilators 16. Pt needs IV fluids with additives and Drug therapy requiring intensive monitoring for toxicity 17. Prescription drug management with home med reconciliation reviewed 18. DVT, SUP prophylaxis 5/18: 
Prognosis grim. I do not expect him to survive much longer given pressor needs and multiorgan failure in the setting of severe underlying cardiac disease. Myself and Dr. Lois Nevarez have spoken with the family; at this point they would like to give him another 24 hours before changing code status or making any other decisions. Palliative care is going to get involved as well which is appreciated. CCT 36 minutes excluding procedures/other providers. Will continue goals of care discussions with family Subjective/Initial History:  
I have reviewed the flowsheet and previous days notes. Seen earlier today on rounds. I was asked by Aide Kahn MD to see Lang Degroot a 71 y.o.  male  in consultation for a chief complaint of shock. Excerpts from admission 5/15/2020 and consult notes reviewed as follows:  
 
\"Mr. Lena Aj is a 57 y.o. morbidly obese male with Chronic nonischemic dilated cardiomyopathy EF 06%, Chronic systolic congestive heart failure class, H/o atrial fibrillation, On chronic warfarin, Iatrogenic left bundle branch block with predominant RV pacing, 64-70%, Hypertension, Hyperlipidemia and diabetes presents to ED [facility] ED C/O Worsening exertional shortness of breath and around 20 pounds weight gain in last 3 weeks. \" 
 
Pt now in CCU. Poor historian. On room air. No fever. No cough. No diarrhea. Not very active. Saw PCP three weeks ago. Some edema. Chart notes ED [facility] admission in 2017 with decompensation. Patient PCP: Roberto Payne MD 
PMH:  has a past medical history of A-fib ([facility] [address] Arrhythmia, Diabetes ([facility] [address] Endocrine disease, Hypertension, and Irregular heart beat. He also has no past medical history of Difficult intubation, Malignant hyperthermia due to anesthesia, Nausea & vomiting, or Pseudocholinesterase deficiency. PSH:   has a past surgical history that includes pr cardiac surg procedure unlist; insert emergency endotrach airway (5/18/2020); and ir insert non tunl cvc over 5 yrs (5/18/2020). FHX: family history includes Diabetes in his father; Heart Disease in his father and mother. SHX:  reports that he quit smoking about 26 years ago. He has never used smokeless tobacco. He reports that he does not drink alcohol or use drugs. ROS:A comprehensive review of systems was negative except for that written in the HPI. No Known Allergies MEDS:  
Current Facility-Administered Medications Medication  calcium gluconate 2 g in 0.9% sodium chloride 100 mL IVPB  albumin human 25% (BUMINATE) solution 12.5 g  
 PHARMACY INFORMATION NOTE  sodium bicarbonate (8.4%) 150 mEq in sterile water 1,000 mL infusion  DOBUTamine (DOBUTREX) 500 mg/250 mL (2,000 mcg/mL) infusion  EPINEPHrine (ADRENALIN) 5 mg in 0.9% sodium chloride 250 mL infusion  propofol (DIPRIVAN) 10 mg/mL infusion  cefepime (MAXIPIME) 1 g in 0.9% sodium chloride (MBP/ADV) 50 mL  bicarbonate dialysis (PRISMASOL) BG K 2/Ca 3.5 5000 ml solution  hydrocortisone Sod Succ (PF) (SOLU-CORTEF) injection 100 mg  
 heparin (porcine) injection 2,000 Units Or  heparin (porcine) injection 4,000 Units  heparin 25,000 units in D5W 250 ml infusion  PHENYLephrine (JACLYN-SYNEPHRINE) 100 mg in 0.9% sodium chloride 250 mL infusion  NOREPINephrine (LEVOPHED) 32 mg in 5% dextrose 250 mL infusion  vasopressin (VASOSTRICT) 20 Units in 0.9% sodium chloride 100 mL infusion  insulin glargine (LANTUS) injection 25 Units  sodium chloride (NS) flush 5-40 mL  sodium chloride (NS) flush 5-40 mL  acetaminophen (TYLENOL) tablet 650 mg  
 ondansetron (ZOFRAN) injection 4 mg  alcohol 62% (NOZIN) nasal  1 Ampule  insulin lispro (HUMALOG) injection  glucose chewable tablet 16 g  
 dextrose (D50W) injection syrg 12.5-25 g  
 glucagon (GLUCAGEN) injection 1 mg  acetaminophen (TYLENOL) tablet 650 mg Or  acetaminophen (TYLENOL) suppository 650 mg  
 zinc sulfate (ZINCATE) 220 (50) mg capsule 1 Cap  influenza vaccine 2019-20 (6 mos+)(PF) (FLUARIX/FLULAVAL/FLUZONE QUAD) injection 0.5 mL  sodium chloride (NS) flush 5-10 mL Current Facility-Administered Medications:  
  calcium gluconate 2 g in 0.9% sodium chloride 100 mL IVPB, 2 g, IntraVENous, ONCE, Marci Gustafson, DO, Last Rate: 120 mL/hr at 05/20/20 0823, 2 g at 05/20/20 8559   albumin human 25% (BUMINATE) solution 12.5 g, 12.5 g, IntraVENous, Q6H, Kathleen Butler MD, 12.5 g at 05/20/20 0195   PHARMACY INFORMATION NOTE, 1 Each, Other, BID, Poly Charles MD, 1 Each at 05/20/20 0900 
  sodium bicarbonate (8.4%) 150 mEq in sterile water 1,000 mL infusion, , IntraVENous, CONTINUOUS, Kelsey Joshua DO, Last Rate: 42 mL/hr at 05/19/20 1303   DOBUTamine (DOBUTREX) 500 mg/250 mL (2,000 mcg/mL) infusion, 0-10 mcg/kg/min, IntraVENous, TITRATE, Abdelrahman PRETTY DO, Last Rate: 36.1 mL/hr at 05/20/20 0853, 10 mcg/kg/min at 05/20/20 5682   EPINEPHrine (ADRENALIN) 5 mg in 0.9% sodium chloride 250 mL infusion, 0-10 mcg/min, IntraVENous, TITRATE, Harjeet HILLS MD, Last Rate: 30 mL/hr at 05/20/20 0200, 10 mcg/min at 05/20/20 0200   propofol (DIPRIVAN) 10 mg/mL infusion, 0-50 mcg/kg/min, IntraVENous, TITRATE, Harjeet HILLS MD, Last Rate: 14.5 mL/hr at 05/20/20 0819, 20 mcg/kg/min at 05/20/20 3581   cefepime (MAXIPIME) 1 g in 0.9% sodium chloride (MBP/ADV) 50 mL, 1 g, IntraVENous, Q24H, Marci Gustafson DO, Last Rate: 16.7 mL/hr at 05/19/20 1140, 1 g at 05/19/20 1140 
  bicarbonate dialysis (PRISMASOL) BG K 2/Ca 3.5 5000 ml solution, , Extracorporeal, DIALYSIS CONTINUOUS, Mojgan Giraldo MD, Stopped at 05/18/20 1300   hydrocortisone Sod Succ (PF) (SOLU-CORTEF) injection 100 mg, 100 mg, IntraVENous, Q6H, Marci Gustafson DO, 100 mg at 05/20/20 4618   heparin (porcine) injection 2,000 Units, 2,000 Units, IntraVENous, PRN **OR** heparin (porcine) injection 4,000 Units, 4,000 Units, IntraVENous, PRN, Marci Gustafson DO, 2,000 Units at 05/19/20 0130 
  heparin 25,000 units in D5W 250 ml infusion, 8-25 Units/kg/hr, IntraVENous, TITRATE, Marci Gustafson DO, Last Rate: 13.3 mL/hr at 05/19/20 1439, 11 Units/kg/hr at 05/19/20 1439   PHENYLephrine (JACLYN-SYNEPHRINE) 100 mg in 0.9% sodium chloride 250 mL infusion,  mcg/min, IntraVENous, TITRATE, Marci Gustafson DO, Last Rate: 45 mL/hr at 05/20/20 0511, 300 mcg/min at 05/20/20 0511 
  NOREPINephrine (LEVOPHED) 32 mg in 5% dextrose 250 mL infusion, 2-200 mcg/min, IntraVENous, TITRATE, Kelsey Joshua DO, Last Rate: 51.6 mL/hr at 05/20/20 0916, 110 mcg/min at 05/20/20 2691   vasopressin (VASOSTRICT) 20 Units in 0.9% sodium chloride 100 mL infusion, 0.04 Units/min, IntraVENous, CONTINUOUS, Jasmina HILLS MD, Last Rate: 12 mL/hr at 05/20/20 0300, 0.04 Units/min at 05/20/20 0300 
  insulin glargine (LANTUS) injection 25 Units, 25 Units, SubCUTAneous, DAILY, Theresa Rocha MD, 25 Units at 05/20/20 6443   sodium chloride (NS) flush 5-40 mL, 5-40 mL, IntraVENous, Q8H, Ni Holcomb MD, 10 mL at 05/20/20 8240   sodium chloride (NS) flush 5-40 mL, 5-40 mL, IntraVENous, PRN, Ni Holcomb MD 
  acetaminophen (TYLENOL) tablet 650 mg, 650 mg, Oral, Q6H PRN, Ni Holcomb MD 
  ondansetron [facility]) injection 4 mg, 4 mg, IntraVENous, Q8H PRN, Ni Holcomb MD, 4 mg at 05/16/20 0137 
  alcohol 62% (NOZIN) nasal  1 Ampule, 1 Ampule, Topical, Q12H, Ni Holcomb MD, 1 Ampule at 05/19/20 2003   insulin lispro (HUMALOG) injection, , SubCUTAneous, Q6H, Theresa Rocha MD, 2 Units at 05/20/20 0619 
  glucose chewable tablet 16 g, 4 Tab, Oral, PRN, Theresa Rocha MD 
  dextrose (D50W) injection syrg 12.5-25 g, 12.5-25 g, IntraVENous, PRN, Theresa Rocha MD 
  glucagon [facility]) injection 1 mg, 1 mg, IntraMUSCular, PRN, Theresa Rocha MD 
  acetaminophen (TYLENOL) tablet 650 mg, 650 mg, Oral, Q6H PRN, 650 mg at 05/20/20 0808 **OR** acetaminophen (TYLENOL) suppository 650 mg, 650 mg, Rectal, Q6H PRN, Theresa Rocha MD 
  zinc sulfate (ZINCATE) 220 (50) mg capsule 1 Cap, 1 Cap, Oral, DAILY, José Quiñonez [facility], DO, 1 Cap at 05/20/20 1372   influenza vaccine 2019-20 (6 mos+)(PF) (FLUARIX/FLULAVAL/FLUZONE QUAD) injection 0.5 mL, 0.5 mL, IntraMUSCular, PRIOR TO DISCHARGE, José Quiñonez,  
  sodium chloride (NS) flush 5-10 mL, 5-10 mL, IntraVENous, PRN, Rosendo March MD 
 
 
Objective:  
 
Vital Signs: Telemetry:    AFIB Intake/Output:  
Visit Vitals /71 Pulse (!) 101 Temp (!) 103.4 °F (39.7 °C) Resp 23 Ht 6' 3\" (1.905 m) Wt 133 kg (293 lb 3.4 oz) SpO2 98% BMI 36.65 kg/m² Temp (24hrs), Av.2 °F (37.9 °C), Min:98.2 °F (36.8 °C), Max:103.4 °F (39.7 °C) O2 Device: Endotracheal tube, Ventilator O2 Flow Rate (L/min): 4 l/min Body mass index is 36.65 kg/m². Wt Readings from Last 4 Encounters:  
20 133 kg (293 lb 3.4 oz) 19 123.8 kg (273 lb) 17 129.3 kg (285 lb)  
17 107.5 kg (237 lb) Intake/Output Summary (Last 24 hours) at 2020 3406 Last data filed at 2020 0900 Gross per 24 hour Intake 47275.76 ml Output 470 ml Net 9609.76 ml Last shift:       07 -  190 In: 9310 [I.V.:1161] Out: 30 [Urine:30] Last 3 shifts: 1901 -  0700 In: 47742.7 [I.V.:39461.7] Out: 1120 [Urine:620] Hemodynamics:   
CO:   
CI:   
CVP: CVP (mmHg): 22 mmHg (20 0900) SVR:   PAP Systolic:   
PAP Diastolic:   
PVR:   
XD43:    
 
Ventilator Settings:     
Mode Rate TV Press PEEP FiO2 PIP Min. Vent Assist control    500 ml    6 cm H20 50 %  24 cm H2O  12.1 l/min Physical Exam: 
 
General:  male; obtunded, 2L NC 
HEENT: NCAT, poor dentition, lips and mucosa dry Eyes: anicteric; conjunctiva clear Neck: no nodes, no cuff leak, no accessory MM use. Chest: no deformity,  
Cardiac: IR regular; no murmur Lungs: no distress, intubated Abd: soft, NT, hypoactive BS Ext: no edema; no joint swelling; No clubbing : No bright, Neuro: obtunded Psych- unable to assess Skin: warm, dry, no cyanosis;  
Pulses: 1-2+ Bilateral pedal, radial 
Capillary: brisk; pale Labs: 
 
Recent Labs  
  20 
0503 20 
2321 20 
1811  20 
0459  20 
0320 WBC 26.1*  --   --   --  30.0*  --  27.5* HGB 10.1*  --   --   --  11.3*  --  12.3 *  --   --   --  163  --  161 INR 3.1*  --   --   --  4.2*  --  1.9* APTT 81.0* 73.1* 64.8*   < > 55.0*   < > 47.1*  
 < > = values in this interval not displayed. Recent Labs  
  05/20/20 
0503 05/19/20 
8942 05/19/20 
5576 05/19/20 
0033 05/18/20 
1706 05/18/20 
1211 05/18/20 
0320 *  134*  --  138 140 140  --  135* K 3.7  3.7  --  4.0 4.0 4.2  --  5.3*  
CL 91*  90*  --  93* 93* 95*  --  94* CO2 22  24  --  22 23 22  --  17* *  159*  --  198* 209* 158*  --  98 BUN 87*  87*  --  87* 92* 98*  --  87* CREA 7.99*  7.81*  --  8.32* 7.94* 8.58*  --  7.89* CA 5.5*  5.5*  --  6.5* 7.0* 7.2*  --  7.9*  
MG 1.6  --   --  2.4 2.2  --   --   
PHOS 4.8*  4.8*  --   --  6.6*  6.9* 7.3*  --   --   
LAC  --  8.9*  --   --   --  6.1* 8.6* ALB 1.9*  1.9*  --  1.6* 1.7* 1.8*  --  1.9*  
SGOT >2,000*  --  >2,000*  --   --   --  >2,000* ALT 1,823*  --  1,905*  --   --   --  631* No results for input(s): PH, PCO2, PO2, HCO3, FIO2 in the last 72 hours. Recent Labs 05/18/20 
0320 * Lab Results Component Value Date/Time BNP 93 02/08/2010 03:55 AM  
  
Lab Results Component Value Date/Time Culture result: NO GROWTH 5 DAYS 05/15/2020 06:11 PM  
 Culture result: No growth (<1,000 CFU/ML) 05/15/2020 05:45 PM  
 Culture result: (A) 05/15/2020 05:34 PM  
  ALPHA STREPTOCOCCUS, NOT S. PNEUMONIAE GROWING IN 1 OF 2 BOTTLES DRAWN (SITE = R ARM) Culture result: (A) 05/15/2020 05:34 PM  
  PRELIMINARY REPORT OF GRAM POSITIVE COCCI IN CHAINS GROWING IN 1 OF 2 BOTTLES DRAWN CALLED TO AND READ BACK BY Malia Mauricio RN [id number] AT 0365 ON 5/16/20. Ira[place] 1850 Culture result: REMAINING BOTTLE(S) HAS/HAVE NO GROWTH SO FAR 05/15/2020 05:34 PM  
  
Lab Results Component Value Date/Time  (H) 05/18/2020 03:20 AM  
  01/19/2019 06:40 AM  
 
 
Imaging: 
I have personally reviewed the patients radiographs and have reviewed the reports: CXR Results  (Last 48 hours) 05/20/20 0558  XR CHEST PORT Final result Impression:  IMPRESSION: No change. Narrative:  Clinical indication: Respiratory failure. Portable AP semiupright view of the chest is obtained compared to May 19. The  
heart remains enlarged. Ill definition of the left hemidiaphragm stable. Support  
devices in place. Next  
   
  
 05/19/20 0607  XR CHEST PORT Final result Impression:  IMPRESSION: Increased right basilar opacity. Narrative:  EXAM:  CR chest portable INDICATION: Intubated. Right basilar opacity. COMPARISON: 5/18/2020 at 1715 hours. TECHNIQUE: Portable AP semierect upright chest view at 0514 hours FINDINGS: The support devices are stable. The cardiomediastinal contours are  
stable. There is increased right basilar opacity. The left lung and pleural  
spaces are clear. There is no pneumothorax. The bones and upper abdomen are  
stable. 05/18/20 1732  XR CHEST PORT Final result Impression:  IMPRESSION:  
1. Interval placement of a dual lumen right IJ approach catheter which projects  
to terminate in the right atrium. Narrative:  INDICATION: . Rigth IJ Lewis placement. COMPARISON: Previous chest xray, earlier same day. LIMITATIONS: Portable technique. Carlos Eckert FINDINGS: Single frontal view of the chest.   
.  
Lines/tubes/surgical: No significant change in the appearance of the right IJ  
approach catheter, ET tube and gastric tube. Interval placement of a dual lumen  
catheter from a right IJ approach which projects to terminate in the right  
atrium. Heart/mediastinum: Enlarged cardiopericardial silhouette. Cardiac assist device  
appears unchanged Lungs/pleura: Low lung volumes. Minimal right basilar opacity most suggestive of  
atelectasis. No visualized pleural effusion or pneumothorax. Additional Comments: Degenerative changes in the spine. Carlos Eckert 05/18/20 1141  XR CHEST PORT Final result Impression:   impression: Support devices in place as above. Narrative:  Clinical indication: Tubes placement. Portable AP semiupright view of the chest obtained and compared to May 17. The  
tip of the ET tube is well above the jaja in good position. The tip of the NG  
tube project in the expected position of the body of the stomach. Other support  
devices are in good position and unchanged. Cardiomegaly unchanged. Stable  
appearance to lung fields. [place] [facility] [place]20 XR CHEST PORT Narrative Clinical indication: Respiratory failure. Portable AP semiupright view of the chest is obtained compared to May 19. The 
heart remains enlarged. Ill definition of the left hemidiaphragm stable. Support 
devices in place. Next Impression IMPRESSION: No change. XR CHEST PORT Narrative INDICATION: . [place] Lewis placement. COMPARISON: Previous chest xray, earlier same day. LIMITATIONS: Portable technique. Armando Reinoso FINDINGS: Single frontal view of the chest.  
. 
Lines/tubes/surgical: No significant change in the appearance of the right IJ 
approach catheter, ET tube and gastric tube. Interval placement of a dual lumen 
catheter from a right IJ approach which projects to terminate in the right 
atrium. Heart/mediastinum: Enlarged cardiopericardial silhouette. Cardiac assist device 
appears unchanged Lungs/pleura: Low lung volumes. Minimal right basilar opacity most suggestive of 
atelectasis. No visualized pleural effusion or pneumothorax. Additional Comments: Degenerative changes in the spine. .  
 Impression IMPRESSION: 
1. Interval placement of a dual lumen right IJ approach catheter which projects 
to terminate in the right atrium. XR CHEST PORT Narrative Clinical indication: Tubes placement. Portable AP semiupright view of the chest obtained and compared to May 17. The 
tip of the ET tube is well above the jaja in good position. The tip of the NG 
tube project in the expected position of the body of the stomach. Other support devices are in good position and unchanged. Cardiomegaly unchanged. Stable 
appearance to lung fields. Impression  impression: Support devices in place as above. Results from [facility] Encounter encounter on 05/15/20 CT ABD PELV WO CONT Narrative INDICATION: renal failure EXAM: CT Abdomen and Pelvis without IV contrast. No oral contrast. 
CT dose reduction was achieved through use of a standardized protocol tailored 
for this examination and automatic exposure control for dose modulation. FINDINGS:  
No urinary tract stones are seen. There is no hydroureteronephrosis. The kidneys 
are normal in size. There is no perirenal fluid or ascites. Liver shows no apparent significant finding without contrast. Pancreas, adrenal 
glands, spleen and aorta show no significant enlargement. No inflammation is 
seen. There is no pneumoperitoneum or significant adenopathy. The bladder is unremarkable. The distal ureters are not dilated. There is no 
apparent pelvic mass. The appendix is normal. Bowels are not dilated. There is a 
fat-containing umbilical hernia. Impression IMPRESSION: No Acute Disease. During this entire length of time the patient's condition was unstable, unpredictable and critically ill in the CCU/ ICU. I was immediately available to the patient whose care required several interactions with nursing, multidisciplinary team members leading to multiple interventions with fluid resuscitation and medication adjustments to optimize respiratory support, hemodynamic treatment, medication changes based on repeat labs results, reviews, exams and assessments. The reason for providing this level of medical care was due to a critical illness that impaired one or more vital organ systems, such that there was a high probability of sudden or life threatening deterioration in the patient's condition.   
 
This care involved high complexity medical decision making to treat acute and unstable vital organ system failure, and to prevent further life threatening deterioration of the patients condition. I personally: · Reviewed the flowsheet and previous days notes · Reviewed and summarized records or history from previous days note or discussions with staff, family · Parenteral controlled substances - Reviewed/ Adjusted / Weaned / Started · High Risk Drug therapy requiring intensive monitoring for toxicity: eg steroids, pressors, antibiotics · Reviewed and/or ordered Clinical lab tests · Reviewed and/or ordered Radiology tests · Reviewed and/or ordered of Medicine tests · Independently visualized radiologic Images · Reviewed the patients ECG / Telemetry · discussed my assessment/management with : Consultants, Nursing for coordination of care Thank you for allowing us to participate in the care of this patient. We will follow along with you until they no longer require CCU services.  
 
Helene Hinojosa, DO

## 2020-05-20 NOTE — PROGRESS NOTES
Hospitalist Progress Note NAME: Hola Weber :  1955 MRN:  [id number] Assessment / Plan: 
Septic vs cardiogenic shock 2/2 COVID Positive with viral prodrome Hyponatremia Bacteremia Multiorgan failure 
-remains critically ill 
-requiring multiple pressors -appreciate intensivist management 
-worsening renal function 
-nephro on board. SBP too low for RRT now. titrating pressors, RRT as able 
-palliative on board 
-at high risk for further rapid decline, death 
-palliative team on board has communicated with wife/daughter. Full code for now.  
-cont pressor support, empiric abx 
-has been enrolled in convalescent plasma study 
-cont zinc 
-Blood cx 5/15 alpha streptococcus 
-On solucortef 
-Ferritin>[id number] 
-Liver Enzymes elevated Acute renal failure 
-Cr trending up, no RRT yet per nephro 
-trend bmp  IV fluid nephrology consultation appreciated Avoid nephrotoxic medication Hx of nonischemic CM 
-recent weight gain 
-EF 20% 
--s/p BIV-ICD 
 
DM2 
-cont insulin w lantus, SSI 
  
NSTEMI 
-cardiology following 
-cont supportive care 
  
Atrial fibrillation continue Eliquis 
  
Hx of Hypertension 
-currently in cardiogenic vs septic shock as above and requiring pressors 
-hold home antiHTNsives 
  
Code Status: Full Surrogate Decision Maker: 
  
DVT Prophylaxis: Eliquis GI Prophylaxis: not indicated Subjective: Chief Complaint / Reason for Physician Visit Unresponsive on pressor support. Seen through CCU room window and discussed with nursing. Did not enter room to avoid exposure/transmission of COVID-19 Discussed with RN events overnight. Review of Systems: 
Symptom Y/N Comments  Symptom Y/N Comments Fever/Chills    Chest Pain Poor Appetite    Edema Cough    Abdominal Pain Sputum    Joint Pain SOB/GROVES    Pruritis/Rash Nausea/vomit    Tolerating PT/OT Diarrhea    Tolerating Diet Constipation    Other Could NOT obtain due to: Intubated and sedated Objective: VITALS:  
Last 24hrs VS reviewed since prior progress note. Most recent are: 
Patient Vitals for the past 24 hrs: 
 Temp Pulse Resp BP SpO2  
05/20/20 1119  98 23  97 % 05/20/20 1115  (!) 101 25 111/57 (!) 87 % 05/20/20 1100  100 22 106/64 98 % 05/20/20 1045  (!) 107 23 120/56 98 % 05/20/20 1030  (!) 107 25 110/49 97 % 05/20/20 1015  100 23 115/64 98 % 05/20/20 1000  99 23 113/58 97 % 05/20/20 0945  97 22 119/69 97 % 05/20/20 0930 (!) 101.8 °F (38.8 °C) (!) 108 23 135/57 97 % 05/20/20 0928  (!) 113 21  97 % 05/20/20 0915  (!) 103 24 126/76 97 % 05/20/20 0900  (!) 101 23 167/71 98 % 05/20/20 0845  (!) 105 23 125/74 98 % 05/20/20 0830  (!) 101 24 124/74 98 % 05/20/20 0815  (!) 115 29 116/75 97 % 05/20/20 0800 (!) 103.4 °F (39.7 °C) (!) 110 27 122/70 98 % 05/20/20 0745  (!) 105 23 132/74 98 % 05/20/20 0730  (!) 104 23 120/69 96 % 05/20/20 0715  (!) 107 23 122/61 98 % 05/20/20 0700  (!) 105 25  98 % 05/20/20 0600  88 22 118/60 98 % 05/20/20 0500  (!) 110 26 108/62 99 % 05/20/20 0400 99.8 °F (37.7 °C) 99 22 113/61 98 % 05/20/20 0300  100 28 113/69 100 % 05/20/20 0200  (!) 101 21 110/61 98 % 05/20/20 0100  (!) 109 22 107/68 99 % 05/20/20 0030    118/61   
05/20/20 0000 99.9 °F (37.7 °C) (!) 108 23 114/56 99 % 05/19/20 2345  (!) 103 23 108/78 99 % 05/19/20 2332  (!) 101 22  99 % 05/19/20 2330  99 22 116/72 99 % 05/19/20 2300  (!) 103 21 119/60 99 % 05/19/20 2230  (!) 105 23 111/68 99 % 05/19/20 2200  96 23 106/66 98 % 05/19/20 2145  98 26 100/61 98 % 05/19/20 2130  (!) 110 23 110/69 99 % 05/19/20 2100  (!) 106 22 108/67 98 % 05/19/20 2045  (!) 110 21 111/67 98 % 05/19/20 2040  (!) 115 23 101/72 98 % 05/19/20 2035  (!) 115 23 105/69 98 % 05/19/20 2030  (!) 108 21 119/73 98 % 05/19/20 2025  (!) 109 23 112/58 98 % 05/19/20 2020  (!) 113 23 118/66 98 % 05/19/20 2015  (!) 115 23 126/72 98 % 05/19/20 2010  (!) 103 21 123/66 98 % 05/19/20 2000 99.5 °F (37.5 °C) (!) 113 26 133/69 99 % 05/19/20 1953  (!) 111 23  98 % 05/19/20 1900  (!) 114 21 131/79 99 % 05/19/20 1830  (!) 112 25 141/70 99 % 05/19/20 1800  (!) 111 21 137/72 99 % 05/19/20 1730  (!) 107 22 112/78 99 % 05/19/20 1700  (!) 106 23 128/76 98 % 05/19/20 1630  (!) 112 13 119/75 98 % 05/19/20 1615  (!) 108 21 109/66 97 % 05/19/20 1600  (!) 109 22 (!) 237/156 99 % 05/19/20 1530  (!) 113 22 122/78 98 % 05/19/20 1505  (!) 120 24  98 % 05/19/20 1500  (!) 118 22 (!) 61/12 98 % 05/19/20 1430  (!) 108 23 130/71 98 % 05/19/20 1400 98.2 °F (36.8 °C) (!) 111 22 121/84 98 % 05/19/20 1330  (!) 102 20 117/76 98 % 05/19/20 1300  (!) 111 28 (!) 61/39 96 % 05/19/20 1230  (!) 114 22 112/70 98 % 05/19/20 1200  (!) 123 21 115/83 99 % Intake/Output Summary (Last 24 hours) at 5/20/2020 1158 Last data filed at 5/20/2020 1100 Gross per 24 hour Intake 25487.95 ml Output 485 ml Net 53670.95 ml PHYSICAL EXAM: 
GEN: AA male, NAD On Ventilator Intubated and sedated Seen through ICU window to minimize exposure/transmission to/of COVID-19 Reviewed most current lab test results and cultures  YES Reviewed most current radiology test results   YES Review and summation of old records today    NO Reviewed patient's current orders and MAR    YES 
PMH/SH reviewed - no change compared to H&P 
________________________________________________________________________ Care Plan discussed with: 
  Comments Patient Family RN x Care Manager Consultant Multidiciplinary team rounds were held today with , nursing, pharmacist and clinical coordinator. Patient's plan of care was discussed; medications were reviewed and discharge planning was addressed. ________________________________________________________________________ Total NON critical care TIME:  25  Minutes Total CRITICAL CARE TIME Spent:   Minutes non procedure based Comments >50% of visit spent in counseling and coordination of care    
________________________________________________________________________ Pascale Kasper MD  
 
Procedures: see electronic medical records for all procedures/Xrays and details which were not copied into this note but were reviewed prior to creation of Plan. LABS: 
I reviewed today's most current labs and imaging studies. Pertinent labs include: 
Recent Labs  
  05/20/20 
0503 05/19/20 
0459 05/18/20 
0320 WBC 26.1* 30.0* 27.5* HGB 10.1* 11.3* 12.3 HCT 31.7* 35.7* 38.4 * 163 161 Recent Labs  
  05/20/20 
0503 05/19/20 
3705 05/19/20 
0033 05/18/20 
1706 05/18/20 
0320 *  134* 138 140 140 135* K 3.7  3.7 4.0 4.0 4.2 5.3*  
CL 91*  90* 93* 93* 95* 94* CO2 22  24 22 23 22 17* *  159* 198* 209* 158* 98 BUN 87*  87* 87* 92* 98* 87* CREA 7.99*  7.81* 8.32* 7.94* 8.58* 7.89* CA 5.5*  5.5* 6.5* 7.0* 7.2* 7.9*  
MG 1.6  --  2.4 2.2  --   
PHOS 4.8*  4.8*  --  6.6*  6.9* 7.3*  --   
ALB 1.9*  1.9* 1.6* 1.7* 1.8* 1.9* TBILI 1.8* 1.1*  --   --  0.5 SGOT >2,000* >2,000*  --   --  >2,000* ALT 1,823* 1,905*  --   --  631* INR 3.1* 4.2*  --   --  1.9* Signed: Pascale Kasper MD

## 2020-05-21 PROBLEM — U07.1 COVID-19: Status: ACTIVE | Noted: 2020-01-01

## 2020-05-21 NOTE — PROGRESS NOTES
Palliative Medicine Consult Jevon: [phone] ([phone]) Patient Name: Alison Rosario YOB: 1955 Date of Initial Consult: 5/18/2020 Reason for Consult: goals of care Requesting Provider: Joshua Bell MD 
Primary Care Physician: Joceline Jiménez MD 
 
 SUMMARY:  
Alison Rosario is a 71 y.o. with a past history of A. fib, CHF, diabetes, hypertension, status post SJM BIV-ICD 2016, who was admitted on 5/15/2020 from home with a diagnosis of COVID-19. Current medical issues leading to Palliative Medicine involvement include: maxing out  2 vasopressors, fever 103.2 on admission, intubated this am, will need CVVH, likelihood of recovery is poor. PALLIATIVE DIAGNOSES:  
1. Weakness 2. Hypotension 3. Acute renal failure 4. Septic shock 5. COVID-19 POS 6. Goals of care PLAN:  
1. Prior to visit, I completed an extensive review of patient's medical records, including medical documentation, vital signs, MARs, and results of various labs and other diagnostics. I also spoke with patient's nurse, Constance Sullivan and pulmonologist/intensivist Dr. Daniel Brady. 2. CVVH started this am, pt is stable. Provided update to family, assisted with virtual visit via [address] [place] [address] Avenue. 3. Initial consult note routed to primary continuity provider and/or primary health care team members 4. Communicated plan of care with: Palliative Lance LEDEZMA 192 Team 
 
 GOALS OF CARE / TREATMENT PREFERENCES:  
 
GOALS OF CARE: 
Patient/Health Care Proxy Stated Goals: Prolong life TREATMENT PREFERENCES:  
Code Status: Full Code Advance Care Planning: 
[x] [facility] Interdisciplinary Team has updated the ACP Navigator with Jericanhashley and Patient Capacity Primary Decision Maker (Active): Zeynep Bella Spouse - [phone] Advance Care Planning 5/18/2020 Patient's Healthcare Decision Maker is: Legal Next of Kin Primary Decision Maker Name -  
Primary Decision Maker Phone Number -  
 Primary Decision Maker Relationship to Patient -  
Confirm Advance Directive None Patient Would Like to Complete Advance Directive Unable Medical Interventions: Full interventions Other Instructions: Other: As far as possible, the palliative care team has discussed with patient / health care proxy about goals of care / treatment preferences for patient. HISTORY:  
 
History obtained from: chart, RN, family CHIEF COMPLAINT: severe weakness HPI/SUBJECTIVE: The patient is:  
[] Verbal and participatory [x] Non-participatory due to: intubation 5/15: presented to ED with c/o weakness that started 5/14. He was seen at another healthcare facility and diagnosed with low magnesium, discharged home, however, today was still feeling weak with malaise so he came to the ED for evaluation. ABN LAB: lactic acid 2.3, , Na 130, glu 189, Bun/Cr 46/3.24, wbc 14.2. CXR and head CT show no acute abnormality. Clinical Pain Assessment (nonverbal scale for severity on nonverbal patients):  
Clinical Pain Assessment Severity: 0 Activity (Movement): Lying quietly, normal position Duration: for how long has pt been experiencing pain (e.g., 2 days, 1 month, years) Frequency: how often pain is an issue (e.g., several times per day, once every few days, constant) FUNCTIONAL ASSESSMENT:  
 
Palliative Performance Scale (PPS): PPS: 10 PSYCHOSOCIAL/SPIRITUAL SCREENING:  
 
Palliative IDT has assessed this patient for cultural preferences / practices and a referral made as appropriate to needs (Cultural Services, Patient Advocacy, Ethics, etc.) Any spiritual / Yazidism concerns: 
[] Yes /  [x] No 
 
Caregiver Burnout: 
[] Yes /  [x] No /  [] No Caregiver Present Anticipatory grief assessment:  
[x] Normal  / [] Maladaptive ESAS Anxiety: Anxiety: 0 
 
ESAS Depression:   unable to assess due to pt factors  REVIEW OF SYSTEMS:  
 
 Positive and pertinent negative findings in ROS are noted above in HPI. The following systems were [x] reviewed / [] unable to be reviewed as noted in HPI Other findings are noted below. Systems: constitutional, ears/nose/mouth/throat, respiratory, gastrointestinal, genitourinary, musculoskeletal, integumentary, neurologic, psychiatric, endocrine. Positive findings noted below. Modified ESAS Completed by: provider Pain: 0 Anxiety: 0 Dyspnea: 0 Stool Occurrence(s): 0 PHYSICAL EXAM:  
 
From RN flowsheet: 
Wt Readings from Last 3 Encounters:  
05/20/20 312 lb 2.7 oz (141.6 kg) 01/19/19 273 lb (123.8 kg) 08/04/17 285 lb (129.3 kg) Blood pressure 125/82, pulse 71, temperature 97.7 °F (36.5 °C), resp. rate 18, height 6' 3\" (1.905 m), weight 312 lb 2.7 oz (141.6 kg), SpO2 100 %. Pain Scale 1: Behavioral Pain Scale (BPS) Pain Intensity 1: 3 Last bowel movement, if known: EXAM:   In order to limit the exposure risk from COVID 19 and to preserve the hospital's limited supply of PPEs, seen through ICU window. Intubated. Discussed with his icu nurse. HISTORY:  
 
Active Problems: 
  Sepsis ([address] (5/15/2020) Weakness generalized (5/18/2020) Septic shock ([address] (5/18/2020) Acute renal failure with tubular necrosis ([address] (5/18/2020) Past Medical History:  
Diagnosis Date  A-fib ([address]  Arrhythmia   
 atrial fibrillation 2013  Diabetes ([address]  Endocrine disease   
 diabetes  Hypertension  Irregular heart beat Past Surgical History:  
Procedure Laterality Date  CARDIAC SURG PROCEDURE UNLIST    
 defib placed in left chest  
 INSERT EMERGENCY ENDOTRACH AIRWAY  5/18/2020  IR INSERT NON TUNL CVC OVER 5 YRS  5/18/2020 Family History Problem Relation Age of Onset  Heart Disease Mother  Diabetes Father  Heart Disease Father History reviewed, no pertinent family history. Social History Tobacco Use  Smoking status: Former Smoker Last attempt to quit: 1993 Years since quittin.8  Smokeless tobacco: Never Used Substance Use Topics  Alcohol use: No  
 
No Known Allergies Current Facility-Administered Medications Medication Dose Route Frequency  chlorhexidine (ORAL CARE KIT) 0.12 % mouthwash 15 mL  15 mL Oral Q12H  clindamycin (CLEOCIN) 900mg D5W 50mL IVPB (premix)  900 mg IntraVENous Q8H  
 cefepime (MAXIPIME) 1 g in 0.9% sodium chloride (MBP/ADV) 50 mL  1 g IntraVENous Q8H  
 vancomycin (VANCOCIN) 1500 mg in  ml infusion  1,500 mg IntraVENous Q24H  
 insulin regular (NOVOLIN R, HUMULIN R) injection 6 Units  6 Units SubCUTAneous Q6H And  
 hydrocortisone Sod Succ (PF) (SOLU-CORTEF) injection 100 mg  100 mg IntraVENous Q6H  
 famotidine (PF) (PEPCID) 20 mg in 0.9% sodium chloride 10 mL injection  20 mg IntraVENous DAILY  acetaminophen (TYLENOL) solution 650 mg  650 mg Oral Q6H PRN  
 albumin human 25% (BUMINATE) solution 12.5 g  12.5 g IntraVENous Q6H  
 PHARMACY INFORMATION NOTE  1 Each Other BID  sodium bicarbonate (8.4%) 150 mEq in sterile water 1,000 mL infusion   IntraVENous CONTINUOUS  
 DOBUTamine (DOBUTREX) 500 mg/250 mL (2,000 mcg/mL) infusion  0-10 mcg/kg/min IntraVENous TITRATE  EPINEPHrine (ADRENALIN) 5 mg in 0.9% sodium chloride 250 mL infusion  0-10 mcg/min IntraVENous TITRATE  propofol (DIPRIVAN) 10 mg/mL infusion  0-50 mcg/kg/min IntraVENous TITRATE  bicarbonate dialysis (PRISMASOL) BG K 2/Ca 3.5 5000 ml solution   Extracorporeal DIALYSIS CONTINUOUS  
 heparin (porcine) injection 2,000 Units  2,000 Units IntraVENous PRN Or  
 heparin (porcine) injection 4,000 Units  4,000 Units IntraVENous PRN  
 heparin 25,000 units in D5W 250 ml infusion  8-25 Units/kg/hr IntraVENous TITRATE  PHENYLephrine (JACLYN-SYNEPHRINE) 100 mg in 0.9% sodium chloride 250 mL infusion   mcg/min IntraVENous TITRATE  NOREPINephrine (LEVOPHED) 32 mg in 5% dextrose 250 mL infusion  2-200 mcg/min IntraVENous TITRATE  vasopressin (VASOSTRICT) 20 Units in 0.9% sodium chloride 100 mL infusion  0.04 Units/min IntraVENous CONTINUOUS  
 insulin glargine (LANTUS) injection 25 Units  25 Units SubCUTAneous DAILY  sodium chloride (NS) flush 5-40 mL  5-40 mL IntraVENous Q8H  
 sodium chloride (NS) flush 5-40 mL  5-40 mL IntraVENous PRN  
 ondansetron (ZOFRAN) injection 4 mg  4 mg IntraVENous Q8H PRN  
 alcohol 62% (NOZIN) nasal  1 Ampule  1 Ampule Topical Q12H  
 insulin lispro (HUMALOG) injection   SubCUTAneous Q6H  
 glucose chewable tablet 16 g  4 Tab Oral PRN  
 dextrose (D50W) injection syrg 12.5-25 g  12.5-25 g IntraVENous PRN  
 glucagon (GLUCAGEN) injection 1 mg  1 mg IntraMUSCular PRN  
 acetaminophen (TYLENOL) tablet 650 mg  650 mg Oral Q6H PRN Or  
 acetaminophen (TYLENOL) suppository 650 mg  650 mg Rectal Q6H PRN  
 influenza vaccine 2019-20 (6 mos+)(PF) (FLUARIX/FLULAVAL/FLUZONE QUAD) injection 0.5 mL  0.5 mL IntraMUSCular PRIOR TO DISCHARGE  sodium chloride (NS) flush 5-10 mL  5-10 mL IntraVENous PRN  
 
 
 
 LAB AND IMAGING FINDINGS:  
 
Lab Results Component Value Date/Time WBC 23.5 (H) 05/21/2020 03:20 AM  
 HGB 9.5 (L) 05/21/2020 03:20 AM  
 PLATELET 943 (L) 30/02/8608 03:20 AM  
 
Lab Results Component Value Date/Time  Sodium 133 (L) 05/21/2020 03:20 AM  
 Sodium 132 (L) 05/21/2020 03:20 AM  
 Potassium 3.6 05/21/2020 03:20 AM  
 Potassium 3.5 05/21/2020 03:20 AM  
 Chloride 90 (L) 05/21/2020 03:20 AM  
 Chloride 90 (L) 05/21/2020 03:20 AM  
 CO2 22 05/21/2020 03:20 AM  
 CO2 23 05/21/2020 03:20 AM  
 BUN 88 (H) 05/21/2020 03:20 AM  
 BUN 90 (H) 05/21/2020 03:20 AM  
 Creatinine 7.64 (H) 05/21/2020 03:20 AM  
 Creatinine 7.91 (H) 05/21/2020 03:20 AM  
 Calcium 5.4 (LL) 05/21/2020 03:20 AM  
 Calcium 5.5 (LL) 05/21/2020 03:20 AM  
 Magnesium 1.7 05/21/2020 03:20 AM  
 Phosphorus 4.4 05/21/2020 03:20 AM  
  
Lab Results Component Value Date/Time AST (SGOT) 1,121 (H) 05/21/2020 03:20 AM  
 Alk. phosphatase 160 (H) 05/21/2020 03:20 AM  
 Protein, total 5.6 (L) 05/21/2020 03:20 AM  
 Albumin 2.2 (L) 05/21/2020 03:20 AM  
 Albumin 2.2 (L) 05/21/2020 03:20 AM  
 Globulin 3.4 05/21/2020 03:20 AM  
 
Lab Results Component Value Date/Time INR 2.4 (H) 05/21/2020 03:20 AM  
 Prothrombin time 23.6 (H) 05/21/2020 03:20 AM  
 aPTT 66.2 (H) 05/21/2020 03:20 AM  
  
Lab Results Component Value Date/Time Iron 26 (L) 02/09/2010 05:00 AM  
 TIBC 198 (L) 02/09/2010 05:00 AM  
 Iron % saturation 13 (L) 02/09/2010 05:00 AM  
 Ferritin 27,897 (H) 05/19/2020 05:14 AM  
  
No results found for: PH, PCO2, PO2 No components found for: [place] Lab Results Component Value Date/Time  (H) 05/18/2020 03:20 AM  
 CK - MB 0.1 02/06/2010 02:26 PM  
  
 
 
   
 
Total time:40 Counseling / coordination time, spent as noted above: 35 
> 50% counseling / coordination?: y 
 
Prolonged service was provided for  []30 min   []75 min in face to face time in the presence of the patient, spent as noted above. Time Start:  
Time End:  
Note: this can only be billed with 86121 (initial) or 98450 (follow up). If multiple start / stop times, list each separately.

## 2020-05-21 NOTE — CONSULTS
[facility] INTENSIVIST Consult Service Note Pulmonary, Critical Care, and Sleep Medicine Name: Lang Degroot MRN: [id number] : 1955 Hospital: [facility] Date: 2020  Admission date: 5/15/2020 Hospital Day: 7 Subjective/Interval History:  
Seen earlier today on rounds. Pt is unstable and acutely ill in the CCU. Patient was re-evaluated multiple times with repeated discussions with CCU team throughout the day : Continue slow improvement in pressor needs. Remains intubated and sedated. : Norepi dose slightly lower this morning, otherwise no change. : Survived the night, remains on max dose of 5 pressors with barely acceptable blood pressures. Intubated, sedated on 100% FiO2.  
 
: Continues to rapidly decompensate. This morning is largely unresponsive with escalating pressor requirements and worsening multiorgan failure. Multiple phone conversations were had with his wife and children regarding code status and goals of care. They ultimately decided that they wanted to continue aggressive measures including intubation and dialysis; intubation subsequently performed by Dr. Abner Cedeño.  high fever, sweats overniht. IV levophed 95 mcg, IV vasopressin. D dimer 2.56; Cr 5.77; Ferritin yesterday 909. lactic acid higher, WBC now 89884. CXR reviewed and amazingly clear. . Too weak to verbalize. Called wife at home [phone] , Daughter Marcus Runner. They are both well, they were tested at [facility] yesterday. Results pending. Explained medical management for COVID 19. He is critically ill. He is unpredictable. Family wants to defer to his heart doctor any decisions about code status. We dis discuss possiblerole of Convalescent plasma since Remdesivir is not available and would be reserved for respiratory failure pts on vent. Wife, daughter, son will review [email or web address] org and we will discuss process if they want to proceed. No guarantees offered, as it will take takes to get plasma and the transfusion is not risk free, 11:32 AM 
 
3:38 PM Called wife back. Wife and family has had a chance to review forms and handouts. · [facility]: Expanded Access to Convalescent Plasma for Treatment of Pts with COVID-19. DLL#[id number] Clinical Staff: Dr. Becki Cordoba MD. Consent for blood and investigation has been obtained. Discussed the use of Convalescent Plasma collected from COVID 19 patients. These individuals have recovered from the illness and donated blood to study. Use does not guarantee that patients will improve or recover after transfusion. They may actually get worse or have side effects. They agree to proceed with requesting convalescent plasma. Sent order for type and screen. Nursing aware and will get phone consent. I called Blood bank supervisor who will request matched unit from [facility] [employer]. Family aware that it may take days to acquire. IMPRESSION:  
1. Profound septic shock on max dose of 5 pressors 2. Severe sepsis- high fever, sweats 3. COVID 19 POSITIVE 4. Proinflammatory/ Prothrombotic 5. Alpha strep septicemia- elevated Procalcitonin > 80 
6. Chronic combined systolic/diastolic heart failure 7. Hypertensive heart disease with CHF and CKD 8. LAQUITA/CKD, CVVH to start 9. Chronic anticoagulation at home- eliquis- stopped 10. Diabetes mellitus type 2 uncontrolled 11. Hyperlipidemia 12. 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 Chronic atrial fibrillation 14. Status post SJM BIV-ICD implant in 2016 
15. Sleep apnea, undiagnosed/untreated 16. Obesity with recent weight gain 17. Body mass index is 39.02 kg/m². - not a good prone candidate 18. Additional workup outlined below 19.  Multiorgan dysfunction as outlined above: Pt has one or more acute or chronic illnesses with severe exacerbation with progression or side effects of treatment that poses a threat to life or bodily function 20. Pt is unstable, unpredictable needing more CCU monitoring; at high risk of sudden decline and decompensation with life threatening consequenses and continued end organ dysfunction and failure RECOMMENDATIONS/PLAN:  
 
1. Droplet plus Isolation 2. Intubated 5/18, adjust settings as needed 3. Nephrology following, has not been stable enough to tolerate CVVH. Assessing daily 4. Continue pressors, wean as able. Remains on norepi, dobutamine, epi, vaso. Wean epi next 5. Continue broad spectum antibiotics--vanc, cefepime, azithro 6. Stress dose steroids 7. Heparin drip, will clarify this morning on rounds 8. Cardiology following 9. Continue glargine for now, keep a close eye on sugars 10. Follow cultures 11. CXR in AM 
12. Replete electrolytes PRN 13. Labs to follow inflammatory markers electrolytes, renal function and and blood counts 14. Bronchial hygiene with respiratory therapy techniques, bronchodilators 15. Pt needs IV fluids with additives and Drug therapy requiring intensive monitoring for toxicity 16. Prescription drug management with home med reconciliation reviewed 17. DVT, SUP prophylaxis Prognosis grim. I do not expect him to survive much longer given pressor needs and multiorgan failure in the setting of severe underlying cardiac disease. Myself and Dr. Mekhi Rodriguez have spoken with the family; at this point they would like to continue all aggressive measures. Palliative care is following as well which is appreciated. CCT 31 minutes excluding procedures/other providers. Will continue goals of care discussions with family Subjective/Initial History:  
I have reviewed the flowsheet and previous days notes. Seen earlier today on rounds. I was asked by Apolonia Dudley MD to see Hola Weber a 71 y.o.  male  in consultation for a chief complaint of shock. Excerpts from admission 5/15/2020 and consult notes reviewed as follows:  
 
\"Mr. Marge Joseph is a 57 y.o. morbidly obese male with Chronic nonischemic dilated cardiomyopathy EF 87%, Chronic systolic congestive heart failure class, H/o atrial fibrillation, On chronic warfarin, Iatrogenic left bundle branch block with predominant RV pacing, 64-70%, Hypertension, Hyperlipidemia and diabetes presents to ED [facility] ED C/O Worsening exertional shortness of breath and around 20 pounds weight gain in last 3 weeks. \" 
 
Pt now in CCU. Poor historian. On room air. No fever. No cough. No diarrhea. Not very active. Saw PCP three weeks ago. Some edema. Chart notes ED [facility] admission in 2017 with decompensation. Patient PCP: Deanna Root MD 
PMH:  has a past medical history of A-fib ([facility] [address] Arrhythmia, Diabetes ([facility] [address] Endocrine disease, Hypertension, and Irregular heart beat. He also has no past medical history of Difficult intubation, Malignant hyperthermia due to anesthesia, Nausea & vomiting, or Pseudocholinesterase deficiency. PSH:   has a past surgical history that includes pr cardiac surg procedure unlist; insert emergency endotrach airway (5/18/2020); and ir insert non tunl cvc over 5 yrs (5/18/2020). FHX: family history includes Diabetes in his father; Heart Disease in his father and mother. SHX:  reports that he quit smoking about 26 years ago. He has never used smokeless tobacco. He reports that he does not drink alcohol or use drugs. ROS:A comprehensive review of systems was negative except for that written in the HPI. No Known Allergies MEDS:  
Current Facility-Administered Medications Medication  chlorhexidine (ORAL CARE KIT) 0.12 % mouthwash 15 mL  famotidine (PF) (PEPCID) 20 mg in 0.9% sodium chloride 10 mL injection  cefepime (MAXIPIME) 1 g in 0.9% sodium chloride (MBP/ADV) 50 mL  hydrocortisone Sod Succ (PF) (SOLU-CORTEF) injection 100 mg  And  
  insulin regular (NOVOLIN R, HUMULIN R) injection 10 Units  acetaminophen (TYLENOL) solution 650 mg  
 albumin human 25% (BUMINATE) solution 12.5 g  
 PHARMACY INFORMATION NOTE  sodium bicarbonate (8.4%) 150 mEq in sterile water 1,000 mL infusion  DOBUTamine (DOBUTREX) 500 mg/250 mL (2,000 mcg/mL) infusion  EPINEPHrine (ADRENALIN) 5 mg in 0.9% sodium chloride 250 mL infusion  propofol (DIPRIVAN) 10 mg/mL infusion  bicarbonate dialysis (PRISMASOL) BG K 2/Ca 3.5 5000 ml solution  heparin (porcine) injection 2,000 Units Or  heparin (porcine) injection 4,000 Units  heparin 25,000 units in D5W 250 ml infusion  PHENYLephrine (JACLYN-SYNEPHRINE) 100 mg in 0.9% sodium chloride 250 mL infusion  NOREPINephrine (LEVOPHED) 32 mg in 5% dextrose 250 mL infusion  vasopressin (VASOSTRICT) 20 Units in 0.9% sodium chloride 100 mL infusion  insulin glargine (LANTUS) injection 25 Units  sodium chloride (NS) flush 5-40 mL  sodium chloride (NS) flush 5-40 mL  ondansetron (ZOFRAN) injection 4 mg  alcohol 62% (NOZIN) nasal  1 Ampule  insulin lispro (HUMALOG) injection  glucose chewable tablet 16 g  
 dextrose (D50W) injection syrg 12.5-25 g  
 glucagon (GLUCAGEN) injection 1 mg  acetaminophen (TYLENOL) tablet 650 mg Or  acetaminophen (TYLENOL) suppository 650 mg  
 influenza vaccine 2019-20 (6 mos+)(PF) (FLUARIX/FLULAVAL/FLUZONE QUAD) injection 0.5 mL  sodium chloride (NS) flush 5-10 mL Current Facility-Administered Medications:  
  chlorhexidine (ORAL CARE KIT) 0.12 % mouthwash 15 mL, 15 mL, Oral, Q12H, Marci Gustafson DO, 15 mL at 05/21/20 3404   famotidine (PF) (PEPCID) 20 mg in 0.9% sodium chloride 10 mL injection, 20 mg, IntraVENous, DAILY, Marci Gustafson DO, 20 mg at 05/21/20 6516   cefepime (MAXIPIME) 1 g in 0.9% sodium chloride (MBP/ADV) 50 mL, 1 g, IntraVENous, Q24H, Marci Gustafson, DO 
   hydrocortisone Sod Succ (PF) (SOLU-CORTEF) injection 100 mg, 100 mg, IntraVENous, Q6H, 100 mg at 05/21/20 0531 **AND** insulin regular (NOVOLIN R, HUMULIN R) injection 10 Units, 10 Units, SubCUTAneous, Q6H, Marci Gustafson DO, 10 Units at 05/21/20 0456   acetaminophen (TYLENOL) solution 650 mg, 650 mg, Oral, Q6H PRN, Gian New MD, 650 mg at 05/20/20 2132   albumin human 25% (BUMINATE) solution 12.5 g, 12.5 g, IntraVENous, Q6H, Amber Montenegro MD, Last Rate: 50 mL/hr at 05/20/20 2326, 12.5 g at 05/21/20 0532   PHARMACY INFORMATION NOTE, 1 Each, Other, BID, Marci Gustafson DO, 1 Each at 05/21/20 0900 
  sodium bicarbonate (8.4%) 150 mEq in sterile water 1,000 mL infusion, , IntraVENous, CONTINUOUS, Vessie Dakins, DO, Last Rate: 42 mL/hr at 05/20/20 1342   DOBUTamine (DOBUTREX) 500 mg/250 mL (2,000 mcg/mL) infusion, 0-10 mcg/kg/min, IntraVENous, TITRATE, Kiarra PRETTY DO, Last Rate: 28.9 mL/hr at 05/21/20 0751, 8 mcg/kg/min at 05/21/20 0751   EPINEPHrine (ADRENALIN) 5 mg in 0.9% sodium chloride 250 mL infusion, 0-10 mcg/min, IntraVENous, TITRATE, Julianne HILLS MD, Last Rate: 12 mL/hr at 05/21/20 0842, 4 mcg/min at 05/21/20 7112   propofol (DIPRIVAN) 10 mg/mL infusion, 0-50 mcg/kg/min, IntraVENous, TITRATE, Julianne HILLS MD, Last Rate: 14.5 mL/hr at 05/21/20 0551, 20 mcg/kg/min at 05/21/20 0551   bicarbonate dialysis (PRISMASOL) BG K 2/Ca 3.5 5000 ml solution, , Extracorporeal, DIALYSIS CONTINUOUS, German Loya MD, Stopped at 05/18/20 1300 
  heparin (porcine) injection 2,000 Units, 2,000 Units, IntraVENous, PRN **OR** heparin (porcine) injection 4,000 Units, 4,000 Units, IntraVENous, PRN, Marci Gustafson DO, 2,000 Units at 05/19/20 0130 
  heparin 25,000 units in D5W 250 ml infusion, 8-25 Units/kg/hr, IntraVENous, TITRATE, Marci Gustafson DO, Last Rate: 8.5 mL/hr at 05/20/20 1622, 7 Units/kg/hr at 05/20/20 1622   PHENYLephrine (JACLYN-SYNEPHRINE) 100 mg in 0.9% sodium chloride 250 mL infusion,  mcg/min, IntraVENous, TITRATE, Marci Gustafson DO, Stopped at 05/21/20 0530 
  NOREPINephrine (LEVOPHED) 32 mg in 5% dextrose 250 mL infusion, 2-200 mcg/min, IntraVENous, TITRATE, Marci Gustafson DO, Last Rate: 46.9 mL/hr at 05/21/20 0751, 100 mcg/min at 05/21/20 0751 
  vasopressin (VASOSTRICT) 20 Units in 0.9% sodium chloride 100 mL infusion, 0.04 Units/min, IntraVENous, CONTINUOUS, Colleen HILLS MD, Last Rate: 12 mL/hr at 05/21/20 0751, 0.04 Units/min at 05/21/20 0751   insulin glargine (LANTUS) injection 25 Units, 25 Units, SubCUTAneous, DAILY, Florentino Romero MD, 25 Units at 05/21/20 9764   sodium chloride (NS) flush 5-40 mL, 5-40 mL, IntraVENous, Q8H, Arsh Andres MD, 10 mL at 05/21/20 2899   sodium chloride (NS) flush 5-40 mL, 5-40 mL, IntraVENous, PRN, Arsh Andres MD 
  ondansetron [facility]) injection 4 mg, 4 mg, IntraVENous, Q8H PRN, Arsh Andres MD, 4 mg at 05/16/20 0137 
  alcohol 62% (NOZIN) nasal  1 Ampule, 1 Ampule, Topical, Q12H, Arsh Andres MD, 1 Ampule at 05/21/20 3876   insulin lispro (HUMALOG) injection, , SubCUTAneous, Q6H, Colleen HILLS MD, Stopped at 05/21/20 0600 
  glucose chewable tablet 16 g, 4 Tab, Oral, PRN, Florentino Romero MD 
  dextrose (D50W) injection syrg 12.5-25 g, 12.5-25 g, IntraVENous, PRN, Florentino Romero MD 
  glucagon [facility]) injection 1 mg, 1 mg, IntraMUSCular, PRN, Florentino Romero MD 
  acetaminophen (TYLENOL) tablet 650 mg, 650 mg, Oral, Q6H PRN, 650 mg at 05/20/20 0808 **OR** acetaminophen (TYLENOL) suppository 650 mg, 650 mg, Rectal, Q6H PRN, Florentino Romero MD, 650 mg at 05/20/20 1449   influenza vaccine 2019-20 (6 mos+)(PF) (FLUARIX/FLULAVAL/FLUZONE QUAD) injection 0.5 mL, 0.5 mL, IntraMUSCular, PRIOR TO DISCHARGE, José Quiñonez,  
  sodium chloride (NS) flush 5-10 mL, 5-10 mL, IntraVENous, PRN, [place], Belkis Goetz MD 
 
 
Objective:  
 
Vital Signs: Telemetry:    AFIB Intake/Output:  
Visit Vitals /67 (BP 1 Location: Right arm) Pulse 87 Temp 99.8 °F (37.7 °C) Resp 19 Ht 6' 3\" (1.905 m) Wt 141.6 kg (312 lb 2.7 oz) SpO2 99% BMI 39.02 kg/m² Temp (24hrs), Av.1 °F (38.4 °C), Min:99.8 °F (37.7 °C), Max:102.1 °F (38.9 °C) O2 Device: Endotracheal tube O2 Flow Rate (L/min): 4 l/min Body mass index is 39.02 kg/m². Wt Readings from Last 4 Encounters:  
20 141.6 kg (312 lb 2.7 oz) 19 123.8 kg (273 lb) 17 129.3 kg (285 lb)  
17 107.5 kg (237 lb) Intake/Output Summary (Last 24 hours) at 2020 8085 Last data filed at 2020 1961 Gross per 24 hour Intake 5959.09 ml Output 103 ml Net 5856.09 ml Last shift:      No intake/output data recorded. Last 3 shifts:  1901 -  0700 In: 35128.9 [I.V.:91165.9] Out: 453 [VDNTO:975] Hemodynamics:   
CO:   
CI:   
CVP: CVP (mmHg): 22 mmHg (20 0800) SVR:   PAP Systolic:   
PAP Diastolic:   
PVR:   
EP02:    
 
Ventilator Settings:     
Mode Rate TV Press PEEP FiO2 PIP Min. Vent Assist control    500 ml    6 cm H20 40 %  36 cm H2O  10.4 l/min Physical Exam: 
 
General:  male; obtunded, 2L NC 
HEENT: NCAT, poor dentition, lips and mucosa dry Eyes: anicteric; conjunctiva clear Neck: no nodes, no cuff leak, no accessory MM use. Chest: no deformity,  
Cardiac: IR regular; no murmur Lungs: no distress, intubated Abd: soft, NT, hypoactive BS Ext: no edema; no joint swelling; No clubbing : No bright, Neuro: obtunded Psych- unable to assess Skin: warm, dry, no cyanosis;  
Pulses: 1-2+ Bilateral pedal, radial 
Capillary: brisk; pale Labs: 
 
Recent Labs  
  20 
0320 20 
2136 20 
1357 20 
0503  20 
0459 WBC 23.5*  --   --  26.1*  --  30.0* HGB 9.5*  --   --  10.1*  --  11.3*  
 *  --   --  133*  --  163 INR 2.4*  --   --  3.1*  --  4.2* APTT 66.2* 74.7* 82.6* 81.0*   < > 55.0*  
 < > = values in this interval not displayed. Recent Labs  
  05/21/20 
0320 05/20/20 
2138 05/20/20 
0503 05/19/20 
8311 05/19/20 
0459  05/18/20 
1211 *  132* 135* 133*  134*  --  138   < >  --   
K 3.6  3.5 3.9 3.7  3.7  --  4.0   < >  --   
CL 90*  90* 89* 91*  90*  --  93*   < >  --   
CO2 22  23 23 22  24  --  22   < >  --   
*  126* 154* 156*  159*  --  198*   < >  --   
BUN 88*  90* 90* 87*  87*  --  87*   < >  --   
CREA 7.64*  7.91* 7.40* 7.99*  7.81*  --  8.32*   < >  --   
CA 5.4*  5.5* 6.1* 5.5*  5.5*  --  6.5*   < >  --   
MG 1.7 2.0 1.6  --   --    < >  --   
PHOS 4.4 4.7 4.8*  4.8*  --   --    < >  --   
LAC  --   --   --  8.9*  --   --  6.1* ALB 2.2*  2.2* 2.2* 1.9*  1.9*  --  1.6*   < >  --   
SGOT 1,121*  --  >2,000*  --  >2,000*  --   --   
ALT 1,049*  --  1,823*  --  1,905*  --   --   
 < > = values in this interval not displayed. No results for input(s): PH, PCO2, PO2, HCO3, FIO2 in the last 72 hours. No results for input(s): CPK, CKNDX, TROIQ in the last 72 hours. No lab exists for component: CPKMB Lab Results Component Value Date/Time BNP 93 02/08/2010 03:55 AM  
  
Lab Results Component Value Date/Time Culture result: NO GROWTH 6 DAYS 05/15/2020 06:11 PM  
 Culture result: No growth (<1,000 CFU/ML) 05/15/2020 05:45 PM  
 Culture result: (A) 05/15/2020 05:34 PM  
  ALPHA STREPTOCOCCUS, NOT S. PNEUMONIAE GROWING IN 1 OF 2 BOTTLES DRAWN (SITE = R ARM) Culture result: (A) 05/15/2020 05:34 PM  
  PRELIMINARY REPORT OF GRAM POSITIVE COCCI IN CHAINS GROWING IN 1 OF 2 BOTTLES DRAWN CALLED TO AND READ BACK BY Malia Mauricio RN [id number] AT 0654 ON 5/16/20. [address] Culture result: REMAINING BOTTLE(S) HAS/HAVE NO GROWTH IN 5 DAYS 05/15/2020 05:34 PM  
  
Lab Results Component Value Date/Time   (H) 05/18/2020 03:20 AM  
  01/19/2019 06:40 AM  
 
 
Imaging: 
I have personally reviewed the patients radiographs and have reviewed the reports: CXR Results  (Last 48 hours) 05/21/20 0536  XR CHEST PORT Final result Impression:  IMPRESSION: Small pleural effusions and bibasilar opacities are stable on the  
left and mildly increased on the right. Narrative:  EXAM:  CR chest portable INDICATION: Bibasilar opacities. COMPARISON: 5/20/2020 at 0449 hours. TECHNIQUE: Portable AP semiupright chest view at 0417 hours FINDINGS: The support devices are stable. The cardiomediastinal contours are  
stable. Bibasilar opacities and small pleural effusions are stable on the left  
and mildly increased on the right. There is no pneumothorax. The bones and upper  
abdomen are stable. 05/20/20 0558  XR CHEST PORT Final result Impression:  IMPRESSION: No change. Narrative:  Clinical indication: Respiratory failure. Portable AP semiupright view of the chest is obtained compared to May 19. The  
heart remains enlarged. Ill definition of the left hemidiaphragm stable. Support  
devices in place. Next Results from [facility] [place], [place] Encounter encounter on 05/15/20 XR CHEST PORT Narrative EXAM:  CR chest portable INDICATION: Bibasilar opacities. COMPARISON: 5/20/2020 at 0449 hours. TECHNIQUE: Portable AP semiupright chest view at 0417 hours FINDINGS: The support devices are stable. The cardiomediastinal contours are 
stable. Bibasilar opacities and small pleural effusions are stable on the left 
and mildly increased on the right. There is no pneumothorax. The bones and upper 
abdomen are stable. Impression IMPRESSION: Small pleural effusions and bibasilar opacities are stable on the 
left and mildly increased on the right. XR CHEST PORT Narrative Clinical indication: Respiratory failure. Portable AP semiupright view of the chest is obtained compared to May 19. The 
heart remains enlarged. Ill definition of the left hemidiaphragm stable. Support 
devices in place. Next Impression IMPRESSION: No change. XR CHEST PORT Narrative INDICATION: . Citlali Glory Lewis placement. COMPARISON: Previous chest xray, earlier same day. LIMITATIONS: Portable technique. Yelitza Graven FINDINGS: Single frontal view of the chest.  
. 
Lines/tubes/surgical: No significant change in the appearance of the right IJ 
approach catheter, ET tube and gastric tube. Interval placement of a dual lumen 
catheter from a right IJ approach which projects to terminate in the right 
atrium. Heart/mediastinum: Enlarged cardiopericardial silhouette. Cardiac assist device 
appears unchanged Lungs/pleura: Low lung volumes. Minimal right basilar opacity most suggestive of 
atelectasis. No visualized pleural effusion or pneumothorax. Additional Comments: Degenerative changes in the spine. .  
 Impression IMPRESSION: 
1. Interval placement of a dual lumen right IJ approach catheter which projects 
to terminate in the right atrium. [place] [facility] [place] 05/15/20 CT ABD PELV WO CONT Narrative INDICATION: renal failure EXAM: CT Abdomen and Pelvis without IV contrast. No oral contrast. 
CT dose reduction was achieved through use of a standardized protocol tailored 
for this examination and automatic exposure control for dose modulation. FINDINGS:  
No urinary tract stones are seen. There is no hydroureteronephrosis. The kidneys 
are normal in size. There is no perirenal fluid or ascites. Liver shows no apparent significant finding without contrast. Pancreas, adrenal 
glands, spleen and aorta show no significant enlargement. No inflammation is 
seen. There is no pneumoperitoneum or significant adenopathy. The bladder is unremarkable. The distal ureters are not dilated. There is no apparent pelvic mass. The appendix is normal. Bowels are not dilated. There is a 
fat-containing umbilical hernia. Impression IMPRESSION: No Acute Disease. During this entire length of time the patient's condition was unstable, unpredictable and critically ill in the CCU/ ICU. I was immediately available to the patient whose care required several interactions with nursing, multidisciplinary team members leading to multiple interventions with fluid resuscitation and medication adjustments to optimize respiratory support, hemodynamic treatment, medication changes based on repeat labs results, reviews, exams and assessments. The reason for providing this level of medical care was due to a critical illness that impaired one or more vital organ systems, such that there was a high probability of sudden or life threatening deterioration in the patient's condition. This care involved high complexity medical decision making to treat acute and unstable vital organ system failure, and to prevent further life threatening deterioration of the patients condition. I personally: · Reviewed the flowsheet and previous days notes · Reviewed and summarized records or history from previous days note or discussions with staff, family · Parenteral controlled substances - Reviewed/ Adjusted / Weaned / Started · High Risk Drug therapy requiring intensive monitoring for toxicity: eg steroids, pressors, antibiotics · Reviewed and/or ordered Clinical lab tests · Reviewed and/or ordered Radiology tests · Reviewed and/or ordered of Medicine tests · Independently visualized radiologic Images · Reviewed the patients ECG / Telemetry · discussed my assessment/management with : Consultants, Nursing for coordination of care Thank you for allowing us to participate in the care of this patient. We will follow along with you until they no longer require CCU services.  
 
Helene Hinojosa, DO

## 2020-05-21 NOTE — PROGRESS NOTES
Progress Note 5/21/2020 11:43 AM 
NAME: John Goss MRN:  [id number] Admit Diagnosis: Sepsis ([facility] [address] [A41.9] Problem List: 1. Shock; septic/cardiogenic 2. Severe sepsis 3. COVID-19 + 4. NSTEMI 5. Hyponatremia 6. Lactic acidosis 7. Acute liver injury 8. Acute on chronic systolic heart failure; Class IV 9. Acute kidney injury; anuric 10. Coagulopathy 11. Nonischemic dilated cardiomyopathy s/p remote ICD 12. Chronic atrial fibrillation 13. Sleep apnea 14. Hypertensive heart disease w/ CKD and HF Assessment/Plan: On multiple pressors Max vent support Dialysis looming; line placed last night. Long discussion with the family multiple times. He is gravely ill and most likely will not survive this. This has been relayed and they understand this. Intake/Output Summary (Last 24 hours) at 5/21/2020 0840 Last data filed at 5/21/2020 0267 Gross per 24 hour Intake 6431.91 ml Output 103 ml Net 6328.91 ml Ricky off Vaso @ 0.04 Norepi @ 100 Dobut @ 8 Epi @ 5 
 
40% FiO2 1. Continue supportive care 2. Continue lovenox as tolerated; OK to hold for procedures 3. CVVH today? [x]       High complexity decision making was performed in this patient at high risk for decompensation with multiple organ involvement. Subjective:  
 
John Goss is intubated and sedated. Discussed with RN events overnight. Review of Systems: 
 
Symptom Y/N Comments  Symptom Y/N Comments Fever/Chills N   Chest Pain N Poor Appetite N   Edema N   
Cough N   Abdominal Pain N Sputum N   Joint Pain N   
SOB/GROVES N   Pruritis/Rash N   
Nausea/vomit N   Tolerating PT/OT Y Diarrhea N   Tolerating Diet Y Constipation N   Other Could NOT obtain due to: sedated Objective:  
  
Physical Exam: 
 
Last 24hrs VS reviewed since prior progress note. Most recent are: 
 
Visit Vitals /67 (BP 1 Location: Right arm) Pulse 87 Temp 99.8 °F (37.7 °C) Resp 19 Ht 6' 3\" (1.905 m) Wt 141.6 kg (312 lb 2.7 oz) SpO2 99% BMI 39.02 kg/m² Intake/Output Summary (Last 24 hours) at 5/21/2020 0840 Last data filed at 5/21/2020 7446 Gross per 24 hour Intake 6431.91 ml Output 103 ml Net 6328.91 ml General Appearance: Well developed, well nourished, intubated/sedated Ears/Nose/Mouth/Throat: Hearing grossly normal. 
Neck: Supple. Chest: Lungs decreased diffusely Cardiovascular: Regular rate and rhythm, S1S2 normal, no murmur. Abdomen: Soft, non-tender, bowel sounds are active. Extremities: No edema bilaterally. Skin: Warm and dry. []         Post-cath site without hematoma, bruit, tenderness, or thrill. Distal pulses intact. PMH/SH reviewed - no change compared to H&P Data Review Telemetry: paced/AF 
 
EKG:  
[x]  No new EKG for review Lab Data Personally Reviewed: 
 
Recent Labs  
  05/21/20 0320 05/20/20 
1281 WBC 23.5* 26.1* HGB 9.5* 10.1* HCT 29.6* 31.7* * 133* Recent Labs  
  05/21/20 0320 05/20/20 2136 05/20/20 
1357 05/20/20 
0503  05/19/20 
0459 INR 2.4*  --   --  3.1*  --  4.2* PTP 23.6*  --   --  29.3*  --  39.4* APTT 66.2* 74.7* 82.6* 81.0*   < > 55.0*  
 < > = values in this interval not displayed. Recent Labs  
  05/21/20 0320 05/20/20 2138 05/20/20 
0503 *  132* 135* 133*  134* K 3.6  3.5 3.9 3.7  3.7 CL 90*  90* 89* 91*  90* CO2 22  23 23 22  24 BUN 88*  90* 90* 87*  87* CREA 7.64*  7.91* 7.40* 7.99*  7.81* *  126* 154* 156*  159* CA 5.4*  5.5* 6.1* 5.5*  5.5* MG 1.7 2.0 1.6 No results for input(s): CPK, CKNDX, TROIQ in the last 72 hours. No lab exists for component: CPB Lab Results Component Value Date/Time  Cholesterol, total 92 03/03/2009 09:40 PM  
 HDL Cholesterol 44 03/03/2009 09:40 PM  
 LDL, calculated 38.4 03/03/2009 09:40 PM  
 Triglyceride 48 03/03/2009 09:40 PM  
 CHOL/HDL Ratio 2.1 03/03/2009 09:40 PM  
 
 
 Recent Labs  
  05/21/20 
0320 05/20/20 
2138 05/20/20 
0503 05/19/20 
0459 SGOT 1,121*  --  >2,000* >2,000* *  --  219* 247* TP 5.6*  --  5.7* 5.9* ALB 2.2*  2.2* 2.2* 1.9*  1.9* 1.6*  
GLOB 3.4  --  3.8 4.3* No results for input(s): PH, PCO2, PO2 in the last 72 hours. Medications Personally Reviewed: 
 
Current Facility-Administered Medications Medication Dose Route Frequency  chlorhexidine (ORAL CARE KIT) 0.12 % mouthwash 15 mL  15 mL Oral Q12H  
 famotidine (PF) (PEPCID) 20 mg in 0.9% sodium chloride 10 mL injection  20 mg IntraVENous DAILY  cefepime (MAXIPIME) 1 g in 0.9% sodium chloride (MBP/ADV) 50 mL  1 g IntraVENous Q24H  hydrocortisone Sod Succ (PF) (SOLU-CORTEF) injection 100 mg  100 mg IntraVENous Q6H And  
 insulin regular (NOVOLIN R, HUMULIN R) injection 10 Units  10 Units SubCUTAneous Q6H  
 acetaminophen (TYLENOL) solution 650 mg  650 mg Oral Q6H PRN  
 albumin human 25% (BUMINATE) solution 12.5 g  12.5 g IntraVENous Q6H  
 PHARMACY INFORMATION NOTE  1 Each Other BID  sodium bicarbonate (8.4%) 150 mEq in sterile water 1,000 mL infusion   IntraVENous CONTINUOUS  
 DOBUTamine (DOBUTREX) 500 mg/250 mL (2,000 mcg/mL) infusion  0-10 mcg/kg/min IntraVENous TITRATE  EPINEPHrine (ADRENALIN) 5 mg in 0.9% sodium chloride 250 mL infusion  0-10 mcg/min IntraVENous TITRATE  propofol (DIPRIVAN) 10 mg/mL infusion  0-50 mcg/kg/min IntraVENous TITRATE  bicarbonate dialysis (PRISMASOL) BG K 2/Ca 3.5 5000 ml solution   Extracorporeal DIALYSIS CONTINUOUS  
 heparin (porcine) injection 2,000 Units  2,000 Units IntraVENous PRN Or  
 heparin (porcine) injection 4,000 Units  4,000 Units IntraVENous PRN  
 heparin 25,000 units in D5W 250 ml infusion  8-25 Units/kg/hr IntraVENous TITRATE  PHENYLephrine (JACLYN-SYNEPHRINE) 100 mg in 0.9% sodium chloride 250 mL infusion   mcg/min IntraVENous TITRATE  NOREPINephrine (LEVOPHED) 32 mg in 5% dextrose 250 mL infusion  2-200 mcg/min IntraVENous TITRATE  vasopressin (VASOSTRICT) 20 Units in 0.9% sodium chloride 100 mL infusion  0.04 Units/min IntraVENous CONTINUOUS  
 insulin glargine (LANTUS) injection 25 Units  25 Units SubCUTAneous DAILY  sodium chloride (NS) flush 5-40 mL  5-40 mL IntraVENous Q8H  
 sodium chloride (NS) flush 5-40 mL  5-40 mL IntraVENous PRN  
 ondansetron (ZOFRAN) injection 4 mg  4 mg IntraVENous Q8H PRN  
 alcohol 62% (NOZIN) nasal  1 Ampule  1 Ampule Topical Q12H  
 insulin lispro (HUMALOG) injection   SubCUTAneous Q6H  
 glucose chewable tablet 16 g  4 Tab Oral PRN  
 dextrose (D50W) injection syrg 12.5-25 g  12.5-25 g IntraVENous PRN  
 glucagon (GLUCAGEN) injection 1 mg  1 mg IntraMUSCular PRN  
 acetaminophen (TYLENOL) tablet 650 mg  650 mg Oral Q6H PRN Or  
 acetaminophen (TYLENOL) suppository 650 mg  650 mg Rectal Q6H PRN  zinc sulfate (ZINCATE) 220 (50) mg capsule 1 Cap  1 Cap Oral DAILY  influenza vaccine 2019-20 (6 mos+)(PF) (FLUARIX/FLULAVAL/FLUZONE QUAD) injection 0.5 mL  0.5 mL IntraMUSCular PRIOR TO DISCHARGE  sodium chloride (NS) flush 5-10 mL  5-10 mL IntraVENous PRN Britt Economy III, DO

## 2020-05-21 NOTE — PROGRESS NOTES
Nutrition Assessment: 
 
RECOMMENDATIONS:  
Consider initiation of TPN in 2-4 days if unable to come off of pressors and able to tolerate CVVH (RD to provide recommendations prn) DIETITIANS INTERVENTIONS/PLAN:  
Monitor plan of care Monitor plan for nutrition support ASSESSMENT:  
Pt admitted with sepsis. PMH: CKD, HTN, CHF, DM. Chart reviewed, case discussed during CCU rounds. Pt is COVID+. Intubated and sedated with propofol @ 14.5mL/h, which provides 383 kcals daily. Plans to start CVVH today. He is NPO day 5. MST negative for recent wt loss or poor appetite. Levo at 95, vaso at 0.04, dobut at 5. Pressors have improved as he was on 5 pressors at the beginning of the week. Pt is 30L +, would not consider TPN until he is NPO day 7. Electrolytes WNL. Will monitor plan of care and readiness for nutrition support daily. SUBJECTIVE/OBJECTIVE:  
Pt intubated and sedated Diet Order: NPO 
% Eaten:  No data found. Pertinent Medications:cefepime, cleocin, pepcid, solucortef, insulin, lantus, humalog, vancomycin; Drips: propofol, dobutamine, vaso, levo. I/O's: +30L Chemistries: 
Lab Results Component Value Date/Time  Sodium 133 (L) 05/21/2020 03:20 AM  
 Sodium 132 (L) 05/21/2020 03:20 AM  
 Potassium 3.6 05/21/2020 03:20 AM  
 Potassium 3.5 05/21/2020 03:20 AM  
 Chloride 90 (L) 05/21/2020 03:20 AM  
 Chloride 90 (L) 05/21/2020 03:20 AM  
 CO2 22 05/21/2020 03:20 AM  
 CO2 23 05/21/2020 03:20 AM  
 Anion gap 21 (H) 05/21/2020 03:20 AM  
 Anion gap 19 (H) 05/21/2020 03:20 AM  
 Glucose 87 05/21/2020 11:39 AM  
 BUN 88 (H) 05/21/2020 03:20 AM  
 BUN 90 (H) 05/21/2020 03:20 AM  
 Creatinine 7.64 (H) 05/21/2020 03:20 AM  
 Creatinine 7.91 (H) 05/21/2020 03:20 AM  
 BUN/Creatinine ratio 12 05/21/2020 03:20 AM  
 BUN/Creatinine ratio 11 (L) 05/21/2020 03:20 AM  
 GFR est AA 9 (L) 05/21/2020 03:20 AM  
 GFR est AA 8 (L) 05/21/2020 03:20 AM  
 GFR est non-AA 7 (L) 05/21/2020 03:20 AM  
 GFR est non-AA 7 (L) 05/21/2020 03:20 AM  
 Calcium 5.4 (LL) 05/21/2020 03:20 AM  
 Calcium 5.5 (LL) 05/21/2020 03:20 AM  
 AST (SGOT) 1,121 (H) 05/21/2020 03:20 AM  
 Alk. phosphatase 160 (H) 05/21/2020 03:20 AM  
 Protein, total 5.6 (L) 05/21/2020 03:20 AM  
 Albumin 2.2 (L) 05/21/2020 03:20 AM  
 Albumin 2.2 (L) 05/21/2020 03:20 AM  
 Globulin 3.4 05/21/2020 03:20 AM  
 A-G Ratio 0.6 (L) 05/21/2020 03:20 AM  
 ALT (SGPT) 1,049 (H) 05/21/2020 03:20 AM  
  
Anthropometrics: Height: 6' 3\" (190.5 cm) Weight: 141.6 kg (312 lb 2.7 oz)  [] standing scale     []bed scale    []stated   []unknown IBW (%IBW):   ( ) UBW (%UBW):   (  %) BMI: Body mass index is 39.02 kg/m². This BMI is indicative of: 
[]Underweight   []Normal   []Overweight   [] Obesity   [x] Extreme Obesity (BMI>40) Estimated Nutrition Needs (Based on): 8185 Kcals/day(PSU (MSJ 2280)) , 134 g(-178 (1.5-2gPro/kg) ) Protein Carbohydrate: At Least 130 g/day  Fluids: per MD mL/day Last BM: 5/18   []Active     []Hyperactive  [x]Hypoactive       [] Absent   BS Skin:    [x] Intact   [] Incision  [] Breakdown   [] DTI   [] Tears/Excoriation/Abrasion  [x]Edema(+1 nonpitting-generalized, all extremities)  [] Other: Wt Readings from Last 30 Encounters:  
05/20/20 141.6 kg (312 lb 2.7 oz) 01/19/19 123.8 kg (273 lb) 08/04/17 129.3 kg (285 lb)  
03/20/17 107.5 kg (237 lb) 02/14/17 149.2 kg (328 lb 14.8 oz) 06/21/16 138.3 kg (305 lb)  
03/16/16 151.4 kg (333 lb 11.2 oz) 08/02/13 136.1 kg (300 lb) 10/16/10 133.8 kg (295 lb)  
07/14/10 133.8 kg (295 lb) 05/14/10 133.4 kg (294 lb) NUTRITION DIAGNOSES:  
Problem:  Inadequate protein-energy intake Etiology: related to pt NPO 2' vent Signs/Symptoms: as evidenced by NPO + propofol meets <15% kcal and 0% protein needs. NUTRITION INTERVENTIONS: 
  Enteral/Parenteral Nutrition: Initiate parenteral nutrition GOAL:  
Plan of care will be determined in 2-4 days. Cultural, Zoroastrian, or Ethnic Dietary Needs: None LEARNING NEEDS (Diet, Food/Nutrient-Drug Interaction):  
 [x] None Identified 
 [] Identified and Education Provided/Documented 
 [] Identified and Pt declined/was not appropriate [x] Interdisciplinary Care Plan Reviewed/Documented  
 [x] Participated in Discharge Planning: UTD [x] Interdisciplinary Rounds NUTRITION RISK:  
 [x] High              [] Moderate           []  Low  []  Minimal/Uncompromised PT SEEN FOR:  
 []  MD Consult: []Calorie Count []Diabetic Diet Education []Diet Education []Electrolyte Management []General Nutrition Management and Supplements []Management of Tube Feeding []TPN Recommendations []  RN Referral:  []MST score >=2 
   []Enteral/Parenteral Nutrition PTA []Pregnant: Gestational DM or Multigestation  
[]  Low BMI []  Re-Screen  
[]  LOS [x]  NPO/clears x 5 days []  New TF/TPN Preston Santiago RD, [facility] Pager [phone] Weekend Pager [phone]

## 2020-05-21 NOTE — PROGRESS NOTES
05/21/20 8732 ABCDEF Bundle SBT Safety Screen Passed No  
SBT Screen Reason for Failure Vasopressor use

## 2020-05-21 NOTE — PROGRESS NOTES
NAME: Carlene Tamayo :  1955 MRN:  [id number] Assessment :    Plan: 
--LAQUITA Mild hyponatremia Mild Hyperkalemia Shock Sepsis DM type 2 Systolic HF (EF 74%) COVID + 
 --Pressors being titrated down; labs look ok (although still quite poor GFR), but up nearly 30 liters Initiate CVVHD ; try factor 50 Lewis line  Getting albumin q 6hours for a couple of days (stop tomorrow) Subjective: Chief Complaint:  In order to limit the exposure risk from COVID 19 and to preserve the hospital's limited supply of PPEs, seen through ICU window. Intubated. Discussed with [facility] and his icu nurse. Review of Systems:  
 
Symptom Y/N Comments  Symptom Y/N Comments Fever/Chills    Chest Pain Poor Appetite    Edema Cough    Abdominal Pain Sputum    Joint Pain SOB/GROVES    Pruritis/Rash Nausea/vomit    Tolerating PT/OT Diarrhea    Tolerating Diet Constipation    Other Could not obtain due to: See above Objective: VITALS:  
Last 24hrs VS reviewed since prior progress note. Most recent are: 
Visit Vitals /86 Pulse 73 Temp 98.6 °F (37 °C) Resp 19 Ht 6' 3\" (1.905 m) Wt 141.6 kg (312 lb 2.7 oz) SpO2 100% BMI 39.02 kg/m² Intake/Output Summary (Last 24 hours) at 2020 1407 Last data filed at 2020 1400 Gross per 24 hour Intake 4981.36 ml Output 694 ml Net 4287.36 ml Telemetry Reviewed: PHYSICAL EXAM: 
General: NAD No edema Lab Data Reviewed: (see below) Medications Reviewed: (see below) PMH/SH reviewed - no change compared to H&P 
________________________________________________________________________ Care Plan discussed with: 
Patient Family  y   
RN y   
Care Manager Consultant:     
 
  Comments >50% of visit spent in counseling and coordination of care ________________________________________________________________________ Zonia Sharma MD  
 
Procedures: see electronic medical records for all procedures/Xrays and details which 
were not copied into this note but were reviewed prior to creation of Plan. LABS: 
Recent Labs  
  05/21/20 0320 05/20/20 
0503 WBC 23.5* 26.1* HGB 9.5* 10.1* HCT 29.6* 31.7* * 133* Recent Labs  
  05/21/20 
1139 05/21/20 0320 05/20/20 2138 05/20/20 
0503 NA  --  133*  132* 135* 133*  134* K  --  3.6  3.5 3.9 3.7  3.7 CL  --  90*  90* 89* 91*  90* CO2  --  22  23 23 22  24 BUN  --  88*  90* 90* 87*  87* CREA  --  7.64*  7.91* 7.40* 7.99*  7.81* GLU 87 125*  126* 154* 156*  159* CA  --  5.4*  5.5* 6.1* 5.5*  5.5* MG  --  1.7 2.0 1.6 PHOS  --  4.4 4.7 4.8*  4.8* Recent Labs  
  05/21/20 0320 05/20/20 2138 05/20/20 
0503 05/19/20 
0459 SGOT 1,121*  --  >2,000* >2,000* *  --  219* 247* TP 5.6*  --  5.7* 5.9* ALB 2.2*  2.2* 2.2* 1.9*  1.9* 1.6*  
GLOB 3.4  --  3.8 4.3* Recent Labs  
  05/21/20 0320 05/20/20 2136 05/20/20 
1357 05/20/20 
0503  05/19/20 
0459 INR 2.4*  --   --  3.1*  --  4.2* PTP 23.6*  --   --  29.3*  --  39.4* APTT 66.2* 74.7* 82.6* 81.0*   < > 55.0*  
 < > = values in this interval not displayed. Recent Labs 05/19/20 
0514 05/18/20 
1706 FERR 13,300* 23,311* No results found for: FOL, RBCF No results for input(s): PH, PCO2, PO2 in the last 72 hours. No results for input(s): CPK, CKMB in the last 72 hours. No lab exists for component: TROPONINI No components found for: [place] Lab Results Component Value Date/Time  Color YELLOW/STRAW 05/15/2020 05:34 PM  
 Appearance CLOUDY (A) 05/15/2020 05:34 PM  
 Specific gravity 1.025 05/15/2020 05:34 PM  
 pH (UA) 5.0 05/15/2020 05:34 PM  
 Protein 30 (A) 05/15/2020 05:34 PM  
 Glucose >1,000 (A) 05/15/2020 05:34 PM  
 Ketone TRACE (A) 05/15/2020 05:34 PM  
 Bilirubin Negative 05/15/2020 05:34 PM  
 Urobilinogen 0.2 05/15/2020 05:34 PM  
 Nitrites Negative 05/15/2020 05:34 PM  
 Leukocyte Esterase Negative 05/15/2020 05:34 PM  
 Epithelial cells FEW 05/15/2020 05:34 PM  
 Bacteria Negative 05/15/2020 05:34 PM  
 WBC 0-4 05/15/2020 05:34 PM  
 RBC 0-5 05/15/2020 05:34 PM  
 
 
MEDICATIONS: 
Current Facility-Administered Medications Medication Dose Route Frequency  chlorhexidine (ORAL CARE KIT) 0.12 % mouthwash 15 mL  15 mL Oral Q12H  clindamycin (CLEOCIN) 900mg D5W 50mL IVPB (premix)  900 mg IntraVENous Q8H  
 cefepime (MAXIPIME) 1 g in 0.9% sodium chloride (MBP/ADV) 50 mL  1 g IntraVENous Q8H  
 vancomycin (VANCOCIN) 1500 mg in  ml infusion  1,500 mg IntraVENous Q24H  
 famotidine (PF) (PEPCID) 20 mg in 0.9% sodium chloride 10 mL injection  20 mg IntraVENous DAILY  hydrocortisone Sod Succ (PF) (SOLU-CORTEF) injection 100 mg  100 mg IntraVENous Q6H And  
 insulin regular (NOVOLIN R, HUMULIN R) injection 10 Units  10 Units SubCUTAneous Q6H  
 acetaminophen (TYLENOL) solution 650 mg  650 mg Oral Q6H PRN  
 albumin human 25% (BUMINATE) solution 12.5 g  12.5 g IntraVENous Q6H  
 PHARMACY INFORMATION NOTE  1 Each Other BID  sodium bicarbonate (8.4%) 150 mEq in sterile water 1,000 mL infusion   IntraVENous CONTINUOUS  
 DOBUTamine (DOBUTREX) 500 mg/250 mL (2,000 mcg/mL) infusion  0-10 mcg/kg/min IntraVENous TITRATE  EPINEPHrine (ADRENALIN) 5 mg in 0.9% sodium chloride 250 mL infusion  0-10 mcg/min IntraVENous TITRATE  propofol (DIPRIVAN) 10 mg/mL infusion  0-50 mcg/kg/min IntraVENous TITRATE  bicarbonate dialysis (PRISMASOL) BG K 2/Ca 3.5 5000 ml solution   Extracorporeal DIALYSIS CONTINUOUS  
 heparin (porcine) injection 2,000 Units  2,000 Units IntraVENous PRN Or  
 heparin (porcine) injection 4,000 Units  4,000 Units IntraVENous PRN  
 heparin 25,000 units in D5W 250 ml infusion  8-25 Units/kg/hr IntraVENous TITRATE  PHENYLephrine (JACLYN-SYNEPHRINE) 100 mg in 0.9% sodium chloride 250 mL infusion   mcg/min IntraVENous TITRATE  
 NOREPINephrine (LEVOPHED) 32 mg in 5% dextrose 250 mL infusion  2-200 mcg/min IntraVENous TITRATE  vasopressin (VASOSTRICT) 20 Units in 0.9% sodium chloride 100 mL infusion  0.04 Units/min IntraVENous CONTINUOUS  
 insulin glargine (LANTUS) injection 25 Units  25 Units SubCUTAneous DAILY  sodium chloride (NS) flush 5-40 mL  5-40 mL IntraVENous Q8H  
 sodium chloride (NS) flush 5-40 mL  5-40 mL IntraVENous PRN  
 ondansetron (ZOFRAN) injection 4 mg  4 mg IntraVENous Q8H PRN  
 alcohol 62% (NOZIN) nasal  1 Ampule  1 Ampule Topical Q12H  
 insulin lispro (HUMALOG) injection   SubCUTAneous Q6H  
 glucose chewable tablet 16 g  4 Tab Oral PRN  
 dextrose (D50W) injection syrg 12.5-25 g  12.5-25 g IntraVENous PRN  
 glucagon (GLUCAGEN) injection 1 mg  1 mg IntraMUSCular PRN  
 acetaminophen (TYLENOL) tablet 650 mg  650 mg Oral Q6H PRN Or  
 acetaminophen (TYLENOL) suppository 650 mg  650 mg Rectal Q6H PRN  
 influenza vaccine 2019-20 (6 mos+)(PF) (FLUARIX/FLULAVAL/FLUZONE QUAD) injection 0.5 mL  0.5 mL IntraMUSCular PRIOR TO DISCHARGE  sodium chloride (NS) flush 5-10 mL  5-10 mL IntraVENous PRN

## 2020-05-21 NOTE — DIABETES MGMT
[facility] PROGRAM FOR DIABETES HEALTH 
 
FOLLOW-UP NOTE Presentation Maryjane Lawson is a 71 y.o. male admitted from the ER 5/15/20 with complaints of weakness. No COVID-19 symptoms or contacts noted. Fever. CXR, CT Head & CT ABd negative. Blood cultures positive for alpha streptococcus. Markedly elevated ferritin DX: [id number]. Septic shock. Multiorgan failure TX: Multiple pressors. Solucortef. Insulin. ABx. CVVH. Palliative care 
  
Diabetes: Patient has known Type 2 diabetes, treated with Trulicity, Onglyza & Amaryl PTA (per PTA med list). Family history positive for diabetes in father. Subjective NA Objective Physical exam 
General Responds to pain Vital Signs Afebrile. NSR Visit Vitals /77 Pulse 70 Temp 97.7 °F (36.5 °C) Resp 18 Ht 6' 3\" (1.905 m) Wt 141.6 kg (312 lb 2.7 oz) SpO2 100% BMI 39.02 kg/m² Laboratory Lab Results Component Value Date/Time Hemoglobin A1c 9.8 (H) 02/09/2017 05:42 AM  
 Hemoglobin A1c, External 9.0 03/02/2016 Lab Results Component Value Date/Time LDL, calculated 38.4 03/03/2009 09:40 PM  
 
Lab Results Component Value Date/Time Creatinine 7.64 (H) 05/21/2020 03:20 AM  
 Creatinine 7.91 (H) 05/21/2020 03:20 AM  
 
Lab Results Component Value Date/Time  Sodium 133 (L) 05/21/2020 03:20 AM  
 Sodium 132 (L) 05/21/2020 03:20 AM  
 Potassium 3.6 05/21/2020 03:20 AM  
 Potassium 3.5 05/21/2020 03:20 AM  
 Chloride 90 (L) 05/21/2020 03:20 AM  
 Chloride 90 (L) 05/21/2020 03:20 AM  
 CO2 22 05/21/2020 03:20 AM  
 CO2 23 05/21/2020 03:20 AM  
 Anion gap 21 (H) 05/21/2020 03:20 AM  
 Anion gap 19 (H) 05/21/2020 03:20 AM  
 Glucose 87 05/21/2020 11:39 AM  
 BUN 88 (H) 05/21/2020 03:20 AM  
 BUN 90 (H) 05/21/2020 03:20 AM  
 Creatinine 7.64 (H) 05/21/2020 03:20 AM  
 Creatinine 7.91 (H) 05/21/2020 03:20 AM  
 BUN/Creatinine ratio 12 05/21/2020 03:20 AM  
 BUN/Creatinine ratio 11 (L) 05/21/2020 03:20 AM  
 GFR est AA 9 (L) 05/21/2020 03:20 AM  
 GFR est AA 8 (L) 05/21/2020 03:20 AM  
 GFR est non-AA 7 (L) 05/21/2020 03:20 AM  
 GFR est non-AA 7 (L) 05/21/2020 03:20 AM  
 Calcium 5.4 (LL) 05/21/2020 03:20 AM  
 Calcium 5.5 (LL) 05/21/2020 03:20 AM  
 Bilirubin, total 2.5 (H) 05/21/2020 03:20 AM  
 AST (SGOT) 1,121 (H) 05/21/2020 03:20 AM  
 Alk. phosphatase 160 (H) 05/21/2020 03:20 AM  
 Protein, total 5.6 (L) 05/21/2020 03:20 AM  
 Albumin 2.2 (L) 05/21/2020 03:20 AM  
 Albumin 2.2 (L) 05/21/2020 03:20 AM  
 Globulin 3.4 05/21/2020 03:20 AM  
 A-G Ratio 0.6 (L) 05/21/2020 03:20 AM  
 ALT (SGPT) 1,049 (H) 05/21/2020 03:20 AM  
 
Lab Results Component Value Date/Time ALT (SGPT) 1,049 (H) 05/21/2020 03:20 AM  
 
Factors affecting BG pattern Factor Dose Comments Nutrition: NPO  Still requiring multiple pressors Drugs: 
Steroids Hydrocortisone 100mg Q6 hrs Pain  BPS 3 Infection: COVID-19 Cefepime, Clindamycin & Vancomycin WBC down to 23.5 Other: LAQUITA CVVH Blood glucose pattern Assessment and Plan Nursing Diagnosis Risk for unstable blood glucose pattern Nursing Intervention Domain 2825 Decision-making Support Nursing Interventions Examined current inpatient diabetes control Explored factors facilitating and impeding inpatient management Evaluation This gentleman, with Type 2 diabetes, has improved inpatient blood glucoses since linking regular insulin with steroid. Since he already received basal insulin today, will decrease linked regular in order to prevent hypoglycemia. Optimally would like to reduce the Lantus insulin dose. Recommendations Recommend: 
 
Reduce Regular insulin to 6 units with each steroid dose (as per conversation with Dr. Maddie Dawson). Billing Code(s) [x] 46417 IP subsequent hospital care - 15 minutes Jassi Stanton, CNS Access via [employer] [place] [phone]

## 2020-05-21 NOTE — PROGRESS NOTES
0700: Bedside shift change report received from Farshad Simon [facility] (offgoing nurse). Report included the following information SBAR, Kardex, ED Summary, Procedure Summary, Intake/Output, MAR and Recent Results. 0800: Shift assessment completed, see flowsheets 1020: Martín Murray 1154 at bedside to initiate CVVH 
 
1055: CVVH started, VSS at this time will continue to monitor 1200: Reassessment completed, see flowsheets. 1204: Epinephrine gtt stopped  
 
1500: CVVH Factor increased from 0 to 50, Pt stable at this time. 1600: Reassessment completed, see flowsheets. 21 : Nephrology paged in regards to patients lab work -- Dr. Dhaval Baird on call 
 
[phone]: Dr. Dhaval Baird returned paged, telephone orders received to switch patient to 4K CVVH bags and reorder 2g calcium chloride. Orders placed  
 
1900: Bedside shift change report given to Farshad Simon RN (oncoming nurse) by Enma Kumar RN (offgoing nurse). Report included the following information SBAR, Kardex, ED Summary, Procedure Summary, Intake/Output, MAR and Recent Results.

## 2020-05-21 NOTE — PROGRESS NOTES
Hospitalist Progress Note NAME: Diane House :  1955 MRN:  [id number] Assessment / Plan: 
Septic vs cardiogenic shock 2/2 COVID Positive with viral prodrome Hyponatremia Bacteremia Multiorgan failure 
-remains critically ill 
-requiring multiple pressors -appreciate intensivist management 
-worsening renal function, CRRT initiated  
-palliative on board 
-at high risk for further rapid decline, death 
-palliative team on board has communicated with wife/daughter. Full code for now.  
-cont pressor support, empiric abx (vanco,cefepime and clindamycin) 
-has been enrolled in convalescent plasma study 
-cont zinc 
-Blood cx 5/15 alpha streptococcus, repeat blood cx 
-On solucortef 
-Ferritin>[id number] 
-Liver Enzymes elevated, trending down Acute renal failure 
-initiated CRRT  
-trend bmp nephrology consultation appreciated Avoid nephrotoxic medication Hx of nonischemic CM 
-recent weight gain 
-EF 20% 
--s/p BIV-ICD 
 
DM2 
-cont insulin w lantus, SSI 
  
NSTEMI 
-cardiology following 
-cont supportive care 
  
Atrial fibrillation Eliquis on hold for now 
  
Hx of Hypertension 
-currently in cardiogenic vs septic shock as above and requiring pressors 
-hold home antiHTNsives 
  
Code Status: Full Surrogate Decision Maker: 
  
DVT Prophylaxis: heparin GI Prophylaxis: not indicated Subjective: Chief Complaint / Reason for Physician Visit Unresponsive on pressor support. Seen through CCU room window and discussed with nursing. Did not enter room to avoid exposure/transmission of COVID-19 Discussed with RN events overnight. Review of Systems: 
Symptom Y/N Comments  Symptom Y/N Comments Fever/Chills    Chest Pain Poor Appetite    Edema Cough    Abdominal Pain Sputum    Joint Pain SOB/GROVES    Pruritis/Rash Nausea/vomit    Tolerating PT/OT Diarrhea    Tolerating Diet Constipation    Other Could NOT obtain due to: Intubated and sedated Objective: VITALS:  
Last 24hrs VS reviewed since prior progress note. Most recent are: 
Patient Vitals for the past 24 hrs: 
 Temp Pulse Resp BP SpO2  
05/21/20 1215  77 19 137/81 100 % 05/21/20 1200 98.6 °F (37 °C) 77 18 136/72 100 % 05/21/20 1145  76 18 128/66 100 % 05/21/20 1130  85 19  99 % 05/21/20 1127  79 20  100 % 05/21/20 1115  77 19 139/70 99 % 05/21/20 1109  80 17 122/66 99 % 05/21/20 1100  87 18 106/68 99 % 05/21/20 1057  85 18 112/69 98 % 05/21/20 1053  85 18 122/60 99 % 05/21/20 1049  83  122/60   
05/21/20 1045  85 20 142/83 99 % 05/21/20 1030  87 19 141/77 99 % 05/21/20 1015  87 18 143/85 99 % 05/21/20 1000  86 17 140/81 99 % 05/21/20 0947  86 18 150/77 99 % 05/21/20 0930  89 18 157/66 100 % 05/21/20 0915  91 19 141/76 99 % 05/21/20 0900  91 20 148/80 98 % 05/21/20 0845  89 18 147/82 100 % 05/21/20 0830  91 19 145/82 99 % 05/21/20 0815  89 18 159/77 97 % 05/21/20 0800 99.8 °F (37.7 °C) 87 19 137/67 99 % 05/21/20 0745  93 22 159/74 100 % 05/21/20 0730  89 18 136/74 100 % 05/21/20 0715  86 21 147/75 99 % 05/21/20 0700  91 19 141/68 98 % 05/21/20 0630  97 19 134/70 99 % 05/21/20 0600  89 22 147/88 99 % 05/21/20 0530  94 21 130/71 99 % 05/21/20 0500  94 18 131/65 100 % 05/21/20 0430  100 21 140/70 99 % 05/21/20 0410  94 19  99 % 05/21/20 0400 (!) 100.7 °F (38.2 °C) 96 19 126/69 100 % 05/21/20 0330  (!) 108 25 125/64 99 % 05/21/20 0300  (!) 102 24 125/62 100 % 05/21/20 0230  92 21 118/73 100 % 05/21/20 0200  95 20 122/70 (!) 87 % 05/21/20 0130  91 21 121/65 100 % 05/21/20 0100  100 21 135/70 100 % 05/21/20 0030  99 20 108/64 100 % 05/21/20 0000 (!) 100.7 °F (38.2 °C) (!) 107 23 131/65 98 % 05/20/20 2300  97 21 132/73 100 % 05/20/20 2230  98 24 128/71 100 % 05/20/20 2200  (!) 105 22 113/69 99 % 05/20/20 2130  (!) 102 22 116/71 100 % 05/20/20 2100  (!) 105 21 128/69 96 % 05/20/20 2051  92 22  96 % 05/20/20 2030  99 23 131/58   
05/20/20 2000 (!) 101.3 °F (38.5 °C) 99 22 127/66 96 % 05/20/20 1930  (!) 101 22 99/42   
05/20/20 1915  (!) 104 22 120/63   
05/20/20 1900  99 21 110/77 100 % 05/20/20 1830  98 21 117/68 100 % 05/20/20 1815  (!) 104 24 127/58 90 % 05/20/20 1800  98 22 121/60 99 % 05/20/20 1700  (!) 107 24 125/58   
05/20/20 1630  (!) 105 23 113/67   
05/20/20 1615  100 22 111/71   
05/20/20 1602  99 23  99 % 05/20/20 1600  100 22 116/73   
05/20/20 1545 (!) 101.1 °F (38.4 °C) 99 22 126/63   
05/20/20 1530  (!) 109 24 111/58 (!) 86 % 05/20/20 1500  (!) 108 23 123/61 (!) 84 % 05/20/20 1400  98 21 109/57 99 % 05/20/20 1300  (!) 101 24 128/61 98 % Intake/Output Summary (Last 24 hours) at 5/21/2020 1250 Last data filed at 5/21/2020 1200 Gross per 24 hour Intake 5193.52 ml Output 248 ml Net 4945.52 ml PHYSICAL EXAM: 
GEN: AA male, NAD On Ventilator Intubated and sedated Remains febrile Seen through ICU window to minimize exposure/transmission to/of COVID-19 Reviewed most current lab test results and cultures  YES Reviewed most current radiology test results   YES Review and summation of old records today    NO Reviewed patient's current orders and MAR    YES 
PMH/SH reviewed - no change compared to H&P 
________________________________________________________________________ Care Plan discussed with: 
  Comments Patient Family RN x Care Manager Consultant Multidiciplinary team rounds were held today with , nursing, pharmacist and clinical coordinator. Patient's plan of care was discussed; medications were reviewed and discharge planning was addressed. ________________________________________________________________________ Total NON critical care TIME:  25  Minutes Total CRITICAL CARE TIME Spent:   Minutes non procedure based Comments >50% of visit spent in counseling and coordination of care    
________________________________________________________________________ Corinne Armando MD  
 
Procedures: see electronic medical records for all procedures/Xrays and details which were not copied into this note but were reviewed prior to creation of Plan. LABS: 
I reviewed today's most current labs and imaging studies. Pertinent labs include: 
Recent Labs  
  05/21/20 
0320 05/20/20 
0503 05/19/20 
0459 WBC 23.5* 26.1* 30.0* HGB 9.5* 10.1* 11.3* HCT 29.6* 31.7* 35.7*  
* 133* 163 Recent Labs  
  05/21/20 
1139 05/21/20 
0320 05/20/20 
2138 05/20/20 
0503 05/19/20 
2067 NA  --  133*  132* 135* 133*  134* 138 K  --  3.6  3.5 3.9 3.7  3.7 4.0  
CL  --  90*  90* 89* 91*  90* 93* CO2  --  22  23 23 22  24 22 GLU 87 125*  126* 154* 156*  159* 198* BUN  --  88*  90* 90* 87*  87* 87* CREA  --  7.64*  7.91* 7.40* 7.99*  7.81* 8.32* CA  --  5.4*  5.5* 6.1* 5.5*  5.5* 6.5* MG  --  1.7 2.0 1.6  --   
PHOS  --  4.4 4.7 4.8*  4.8*  --   
ALB  --  2.2*  2.2* 2.2* 1.9*  1.9* 1.6* TBILI  --  2.5*  --  1.8* 1.1*  
SGOT  --  1,121*  --  >2,000* >2,000* ALT  --  1,049*  --  1,823* 1,905* INR  --  2.4*  --  3.1* 4.2* Signed: Corinne Armando MD

## 2020-05-21 NOTE — INTERDISCIPLINARY ROUNDS
Critical care interdisciplinary rounds held on 05/21/2020. Following members present, Pharmacy, Diabetes Treatment, Case Management, Respiratory Therapy, Physical Therapy,  Palliative Care and Nutrition. Led by Km Lai RN and Dr. Jules Mayorga. Plan of care discussed. See clinical pathway for plan of care and interventions and desired outcomes. None

## 2020-05-21 NOTE — DIALYSIS
[facility]       [phone] Orders Mode: CVVHD started @ 1050 Factor: 0 ml/hr Dialysate: 2000 ml/hr Blood Flow Rate: 200 ml/min Metrics BP / HR: 122/60, 83 Blood Flow Rate: 200 ml/min AP:                         -92  
RP: 83 TMP: 11  
PD: 28  
FP: 128 Dialysate: 2000 ml/hr Comments / Plan: CRRT initiated per MD order. Patient, code status, labs, and orders verified. Consents obtained. +COVID-19: N95, surgical mask, face shield, gown & gloves worn. Pt intubated. Line placement confirmed by chest xray. Filter set-up, primed, tested and running well. RIJ temporary non-tunneled CVC, dressing CDI with bio-patch. No signs of redness, drainage, or infection visualized. Each catheter limb disinfected for 60 seconds per limb with alcohol swabs. Caps removed, dialysis CVC hub scrubbed with Prevantics for 5 seconds, followed by a 5 second dry time per Hospital P&P. +aspiration/+flush x 2 ports with no resistance. Lines visible and connections secure with blood warmer to return line at 37*C. Pt on systemic heparin. Education, pre and post to WESTON Horn, primary RN.

## 2020-05-21 NOTE — PROGRESS NOTES
Pharmacy Automatic Renal Dosing Protocol - Antimicrobials Indication for Antimicrobials: sepsis, bacteremia COVID - POSITIVE Current Regimen of Each Antimicrobial: 
Vancomycin 2g X1 (Start Date ; Day # 4 Cefepime 1g q 24 hours (; day # 4 Clindamycin 900 mg IV q8h; started ; day 1 Previous Antimicrobial Therapy: CTX  -  Azithromycin 500 mg every day ( - ) Goal Level: VANCOMYCIN TROUGH GOAL RANGE Vancomycin Trough: 15 - 20 mcg/mL  (AUC: 400 - 600 mg/hr/Liter/day) Date Dose & Interval Measured (mcg/mL) Extrapolated (mcg/mL)  AM N/a 15.6 N/a Date & time of next level:  Significant Cultures:  
5/15: Blood cx #1: NGTD, pending 5/15: Blood cx #2: ALPHA STREPTOCOCCUS - pending 5/15: Urine cx: NG - Final 
5/15: Respiratory panel: Not detected 5/15: sars-cov-2 positive Radiology / Imaging results: (X-ray, CT scan or MRI):  
5/15: CXR - No Acute Disease Paralysis, amputations, malnutrition: no 
 
Labs: 
Recent Labs  
  20 
0320 20 
2138 20 
0503 20 
0459 CREA 7.64*  7.91* 7.40* 7.99*  7.81* 8.32* BUN 88*  90* 90* 87*  87* 87* WBC 23.5*  --  26.1* 30.0* Temp (24hrs), Av.4 °F (38 °C), Min:98.6 °F (37 °C), Max:101.3 °F (38.5 °C) Creatinine Clearance (mL/min) or Dialysis: CVVHD Impression/Plan:  
CVVHD started today Start vancomycin 1500 mg IV q24h for CVVHD Change cefepime to 1g IV q8h for CVVHD Antimicrobial stop date: pending Pharmacy will follow daily and adjust medications as appropriate for renal function and/or serum levels. Thank you, GREGORY Dya

## 2020-05-22 NOTE — DIABETES MGMT
1545 [facility] PROGRAM FOR DIABETES HEALTH 
 
FOLLOW-UP NOTE Presentation Luis Daniel Washington a 71 y. o. male admitted from the ER 5/15/20 with complaints of weakness. No COVID-19 symptoms or contacts noted. Fever. CXR, CT Head & CT ABd negative. Blood cultures positive for alpha streptococcus. Markedly elevated ferritin DX: [id number]. Septic shock. Multiorgan failure TX: Anti-COVID therapies. Pacer. Blood cultures. Palliative care Recent diagnostics: 
5/21/20 Hepatitis B surface Ab Reactive 5/22/20 WBC 22.9. Albumin 2.8. Bili 6.8. . . Procalcitonin 203.42. Triglyceride <15. Serum creatinine 4.73/GFR 15. CXR: Stable bibasilar opacities and pleural effusions Critical care measures: 
 AC Ventilation via ET Sedation via Precedex BP management via norepi ABx Heparin Insulin Steroids Dialysis Diabetes: Patient has known Type 2 diabetes, treated with Trulicity, Onglyza & Amaryl PTA (per PTA med list). Family history positive for diabetes in father. Subjective NA Objective Physical exam 
General Responds to pain per nursing Vital Signs Afebrile. Paced rthythm Visit Vitals /64 Pulse 70 Temp (!) 96.6 °F (35.9 °C) Resp 15 Ht 6' 3\" (1.905 m) Wt 142.4 kg (313 lb 15 oz) SpO2 96% BMI 39.24 kg/m² Laboratory Lab Results Component Value Date/Time Hemoglobin A1c 9.8 (H) 02/09/2017 05:42 AM  
 Hemoglobin A1c, External 9.0 03/02/2016 Lab Results Component Value Date/Time LDL, calculated 38.4 03/03/2009 09:40 PM  
 
Lab Results Component Value Date/Time Creatinine 4.73 (H) 05/22/2020 05:13 AM  
 
Lab Results Component Value Date/Time  Sodium 132 (L) 05/22/2020 05:13 AM  
 Potassium 3.6 05/22/2020 05:13 AM  
 Chloride 95 (L) 05/22/2020 05:13 AM  
 CO2 27 05/22/2020 05:13 AM  
 Anion gap 10 05/22/2020 05:13 AM  
 Glucose 90 05/22/2020 05:13 AM  
 BUN 60 (H) 05/22/2020 05:13 AM  
 Creatinine 4.73 (H) 05/22/2020 05:13 AM  
 BUN/Creatinine ratio 13 05/22/2020 05:13 AM  
 GFR est AA 15 (L) 05/22/2020 05:13 AM  
 GFR est non-AA 12 (L) 05/22/2020 05:13 AM  
 Calcium 7.4 (L) 05/22/2020 05:13 AM  
 Bilirubin, total 6.8 (H) 05/22/2020 05:13 AM  
 AST (SGOT) 278 (H) 05/22/2020 05:13 AM  
 Alk. phosphatase 120 (H) 05/22/2020 05:13 AM  
 Protein, total 5.9 (L) 05/22/2020 05:13 AM  
 Albumin 2.6 (L) 05/22/2020 05:13 AM  
 Albumin 2.8 (L) 05/22/2020 05:13 AM  
 Globulin 3.3 05/22/2020 05:13 AM  
 A-G Ratio 0.8 (L) 05/22/2020 05:13 AM  
 ALT (SGPT) 643 (H) 05/22/2020 05:13 AM  
 
Lab Results Component Value Date/Time ALT (SGPT) 643 (H) 05/22/2020 05:13 AM  
 
Factors affecting BG pattern Factor Dose Comments Nutrition: NPO. OG in place Drugs: 
Steroids Solucortef Q6 hrs Pain  BPS 3 Infection: COVID Multiple agents WBC 22.9 Blood glucose pattern Assessment and Plan Nursing Diagnosis Risk for unstable blood glucose pattern Nursing Intervention Domain 5250 Decision-making Support Nursing Interventions Examined current inpatient diabetes control Explored factors facilitating and impeding inpatient management Evaluation This gentleman, with Type 2 diabetes, improved inpatient blood glucoses when linked regular insulin linked with steroid. Basal insulin would have optimally been reduced yesterday but had already been given by the time I saw patient. Hence, had to reduce linked regular insulin to steroid, but again dosing already given by the time I saw patient and made recommendations. Hypoglycemia. No further regular insulin given. Lantus insulin remains at 25 units D. BGs 130s today Recommendations Recommend: 
 
Continuing current Lantus insulin dose Eliminating regular insulin all together (rely on basal insulin dose) Billing Code(s) [x] 92732 IP subsequent hospital care - 15 minutes Jassi Stanton, CNS Access via [employer] [place] [phone]

## 2020-05-22 NOTE — PROGRESS NOTES
05/22/20 0535 ABCDEF Bundle SBT Safety Screen Passed No  
SBT Screen Reason for Failure Vasopressor use

## 2020-05-22 NOTE — DIALYSIS
[facility]       [phone] Orders Mode: CVVHD Prismasol Bath: 4K/2.5Ca Blood Flow Rate: 180ml/min Prismasol Dose: 2000ml/hr Factor: 50ml/hr Metrics BP/HR: 108/69 69 Access Pressure: -90 Filter Pressure: 130 Return Pressure: 74 TMP: 31  
PD: 33 Dialysate Flow Rate: 2000ml/hr Comments / Plan:  
Patient, orders, line and consent verified. Labs, notes and code status reviewed. [id number] filter running well with no indication for change at this time. RIJ non-tunneled CVC CDI with transparent dressing and biopatch in place. Lines visible/secure with blood warmer attached to the return line and set to 37*C. Education and pre/post with primary RN Rounding completed from outside of the room due to COVID+/PPE conservation with the assistance of the primary RN.

## 2020-05-22 NOTE — INTERDISCIPLINARY ROUNDS
Interdisciplinary team rounds were held 5/22/2020 with the following team members:Care Management, Diabetes Treatment Specialist, Nursing, Nutrition, Palliative Care, Pharmacy, Physical Therapy, Physician and Respiratory Therapy. Plan of care discussed. See clinical pathway and/or care plan for interventions and desired outcomes.

## 2020-05-22 NOTE — PROGRESS NOTES
0730 Bedside and Verbal shift change report given to Fadi Guerra (oncoming nurse) by Lurdes Rodriguez (offgoing nurse). Report included the following information SBAR, Kardex, ED Summary, Procedure Summary, Intake/Output, MAR, Recent Results and Cardiac Rhythm paced. 0800 pt assessed per flowsheet. Dr Karla Flores in to assess pt, reviewed plan of care, new orders received and carried out. 
0900 procalcitonin resulted and discussed with Dr Karla Flores, [facility] paired, 20 mins apart, collected from CVVHD line as per discussion. 1000 IDR complete with Dr Karla Flores and team 
[phone] Dr Libertad Guerrero in to assess pt, will change prismasol due to high demand and low supply of 4K bags. 1200 pt reassessed per flowsheet, labs collected and sent 1315 no change in hep infusion 1330 Dr Priscilla Newton in to assess pt, reviewed plan of care, new orders received 1500 wife has called for updates, updates provided 1600 pt reassessed per flowsheet, continue with CVVHD as estelle, weaning Levo slowly, Gómez hugger remains in use due to hypothermia. 1800 pt repositioned for comfort 1930 Bedside and Verbal shift change report given to Lurdes Rodriguez (oncoming nurse) by Fadi Guerra (offgoing nurse). Report included the following information SBAR, Kardex, ED Summary, Intake/Output, MAR, Recent Results and Cardiac Rhythm paced.

## 2020-05-22 NOTE — PROGRESS NOTES
200 - Report from Enrique Johnson, [address] - [employer] message to Dr. Akash Lyle: patient on CRRT tolerating factor 50 very well, wondering if you wanted me to do like a  factor overnight b/c I'm still weaning pressors at this point so plenty of room on BP. Also wondering if you want me to put in some PRN electrolyte replacements for K/Ca++, he has had some intermittent rhythm changes now (going to 100% paced at times which we haven't seen yet). 2041: Dr. Akash Lyle: Yes, to all 3. Increase to a max factor of 150 if able, add prn iv K and add prn Calcium: 20meq K 3.4 or lower, 40meq K 3.0 or lower, 2g CaChl for Ca 7.0 or lower. 2100 - Hypothermia noted, unclear if true temp. Attempting to find rectal probe. 2330 - Bairhugger applied 56 - Dr. Akash Lyle paged on [employer] about patient's bloodsugar dropping to see if we could make the HCO3 infusion mix in D5 
0058 - Change to 150mEq HCO3 in Dex5%-0.45%NS and run it at 100mL/hr. Shift Summary Factor increased to  PRN K/Ca++ replacement added Pt hypothermic and put on barehugger Bicarb solution change to d/t hypoglycemia

## 2020-05-22 NOTE — PROGRESS NOTES
1900 - Report from [place], RN 
0700 - Report to Syl, [facility] Shift Summary No acute events overnight. Did have to go up on Levophed to 60mcg/min. Was able to pull a factory of 150mL on CRRT.

## 2020-05-22 NOTE — PROGRESS NOTES
Progress Note 5/22/2020 11:43 AM 
NAME: Kori Jackson MRN:  [id number] Admit Diagnosis: Sepsis ([facility] [address] [A41.9] Problem List: 1. Shock; septic/cardiogenic 2. Severe sepsis 3. COVID-19 + 4. NSTEMI 5. Hyponatremia 6. Lactic acidosis 7. Acute liver injury 8. Acute on chronic systolic heart failure; Class IV 9. Acute kidney injury; anuric 10. Coagulopathy 11. Nonischemic dilated cardiomyopathy s/p remote ICD 12. Chronic atrial fibrillation 13. Sleep apnea 14. Hypertensive heart disease w/ CKD and HF Assessment/Plan: On multiple pressors Max vent support Dialysis looming; line placed last night. Long discussion with the family multiple times. He is gravely ill and most likely will not survive this. This has been relayed and they understand this. Intake/Output Summary (Last 24 hours) at 5/22/2020 3934 Last data filed at 5/22/2020 0900 Gross per 24 hour Intake 4322.24 ml Output 6173 ml Net -1850.76 ml Ricky off Vaso @ 0.04 Norepi @ 60 Dobut @ 5 Epi off 40% FiO2 1. Continue supportive care 2. Continue lovenox as tolerated; OK to hold for procedures 3. CVVH ongoing; tolerating full factor 4. Remains critically ill but I am hopeful 5. Discussed w/ family today  
 
  
 [x]       High complexity decision making was performed in this patient at high risk for decompensation with multiple organ involvement. Subjective:  
 
Kori Jackson is intubated and sedated. Discussed with RN events overnight. Review of Systems: 
 
Symptom Y/N Comments  Symptom Y/N Comments Fever/Chills N   Chest Pain N Poor Appetite N   Edema N   
Cough N   Abdominal Pain N Sputum N   Joint Pain N   
SOB/GROVES N   Pruritis/Rash N   
Nausea/vomit N   Tolerating PT/OT Y Diarrhea N   Tolerating Diet Y Constipation N   Other Could NOT obtain due to: sedated Objective:  
  
Physical Exam: Last 24hrs VS reviewed since prior progress note. Most recent are: 
 
Visit Vitals /68 Pulse 70 Temp (!) 96.7 °F (35.9 °C) Resp 17 Ht 6' 3\" (1.905 m) Wt 142.4 kg (313 lb 15 oz) SpO2 100% BMI 39.24 kg/m² Intake/Output Summary (Last 24 hours) at 5/22/2020 9424 Last data filed at 5/22/2020 0900 Gross per 24 hour Intake 4322.24 ml Output 6173 ml Net -1850.76 ml General Appearance: Well developed, well nourished, intubated/sedated Ears/Nose/Mouth/Throat: Hearing grossly normal. 
Neck: Supple. Chest: Lungs decreased diffusely Cardiovascular: Regular rate and rhythm, S1S2 normal, no murmur. Abdomen: Soft, non-tender, bowel sounds are active. Extremities: No edema bilaterally. Skin: Warm and dry. []         Post-cath site without hematoma, bruit, tenderness, or thrill. Distal pulses intact. PMH/SH reviewed - no change compared to H&P Data Review Telemetry: paced/AF 
 
EKG:  
[x]  No new EKG for review Lab Data Personally Reviewed: 
 
Recent Labs  
  05/22/20 
5185 05/21/20 
0320 WBC 22.9* 23.5* HGB 9.7* 9.5* HCT 29.4* 29.6* PLT 98* 120* Recent Labs  
  05/22/20 
1567 05/21/20 
2317 05/21/20 
0320  05/20/20 
0503 INR 2.4*  --  2.4*  --  3.1* PTP 23.3*  --  23.6*  --  29.3* APTT 53.8* 59.8* 66.2*   < > 81.0*  
 < > = values in this interval not displayed. Recent Labs  
  05/22/20 
0513 05/21/20 
2317 05/21/20 
1727 05/21/20 
1640  05/21/20 
0320 * 132*  --  134*  --  133*  132* K 3.6 3.6  --  3.4*  --  3.6  3.5 CL 95* 94*  --  93*  --  90*  90* CO2 27 26  --  26  --  22  23 BUN 60* 67*  --  82*  --  88*  90* CREA 4.73* 4.87*  --  5.71*  --  7.64*  7.91* GLU 90 76 57* 58*   < > 125*  126* CA 7.4* 7.8*  --  6.8*  --  5.4*  5.5* MG  --  2.2  --  2.0  --  1.7  
 < > = values in this interval not displayed. No results for input(s): CPK, CKNDX, TROIQ in the last 72 hours. No lab exists for component: CPKMB Lab Results Component Value Date/Time Cholesterol, total 92 03/03/2009 09:40 PM  
 HDL Cholesterol 44 03/03/2009 09:40 PM  
 LDL, calculated 38.4 03/03/2009 09:40 PM  
 Triglyceride <15 05/22/2020 05:13 AM  
 CHOL/HDL Ratio 2.1 03/03/2009 09:40 PM  
 
 
Recent Labs  
  05/22/20 
0513 05/21/20 
2317 05/21/20 
1640 05/21/20 
0320  05/20/20 
0503 SGOT 278*  --   --  1,121*  --  >2,000* *  --   --  160*  --  219* TP 5.9*  --   --  5.6*  --  5.7* ALB 2.8*  2.6* 2.6* 2.7* 2.2*  2.2*   < > 1.9*  1.9*  
GLOB 3.3  --   --  3.4  --  3.8  
 < > = values in this interval not displayed. No results for input(s): PH, PCO2, PO2 in the last 72 hours. Medications Personally Reviewed: 
 
Current Facility-Administered Medications Medication Dose Route Frequency  dextrose 5% and 0.9% NaCl infusion  100 mL/hr IntraVENous CONTINUOUS  chlorhexidine (ORAL CARE KIT) 0.12 % mouthwash 15 mL  15 mL Oral Q12H  clindamycin (CLEOCIN) 900mg D5W 50mL IVPB (premix)  900 mg IntraVENous Q8H  
 cefepime (MAXIPIME) 1 g in 0.9% sodium chloride (MBP/ADV) 50 mL  1 g IntraVENous Q8H  
 vancomycin (VANCOCIN) 1500 mg in  ml infusion  1,500 mg IntraVENous Q24H  
 insulin regular (NOVOLIN R, HUMULIN R) injection 6 Units  6 Units SubCUTAneous Q6H And  
 hydrocortisone Sod Succ (PF) (SOLU-CORTEF) injection 100 mg  100 mg IntraVENous Q6H  
 bicarbonate dialysis (PRISMASOL) BG K 4/Ca 2.5 5000 ml solution   Extracorporeal DIALYSIS CONTINUOUS  
 potassium chloride 20 mEq in 50 ml IVPB  20 mEq IntraVENous PRN  
 calcium chloride injection 2 g  2 g IntraVENous PRN  
 famotidine (PF) (PEPCID) 20 mg in 0.9% sodium chloride 10 mL injection  20 mg IntraVENous DAILY  acetaminophen (TYLENOL) solution 650 mg  650 mg Oral Q6H PRN  
 PHARMACY INFORMATION NOTE  1 Each Other BID  DOBUTamine (DOBUTREX) 500 mg/250 mL (2,000 mcg/mL) infusion  0-10 mcg/kg/min IntraVENous TITRATE  EPINEPHrine (ADRENALIN) 5 mg in 0.9% sodium chloride 250 mL infusion  0-10 mcg/min IntraVENous TITRATE  propofol (DIPRIVAN) 10 mg/mL infusion  0-50 mcg/kg/min IntraVENous TITRATE  heparin (porcine) injection 2,000 Units  2,000 Units IntraVENous PRN Or  
 heparin (porcine) injection 4,000 Units  4,000 Units IntraVENous PRN  
 heparin 25,000 units in D5W 250 ml infusion  8-25 Units/kg/hr IntraVENous TITRATE  PHENYLephrine (JACLYN-SYNEPHRINE) 100 mg in 0.9% sodium chloride 250 mL infusion   mcg/min IntraVENous TITRATE  
 NOREPINephrine (LEVOPHED) 32 mg in 5% dextrose 250 mL infusion  2-200 mcg/min IntraVENous TITRATE  vasopressin (VASOSTRICT) 20 Units in 0.9% sodium chloride 100 mL infusion  0.04 Units/min IntraVENous CONTINUOUS  
 insulin glargine (LANTUS) injection 25 Units  25 Units SubCUTAneous DAILY  sodium chloride (NS) flush 5-40 mL  5-40 mL IntraVENous Q8H  
 sodium chloride (NS) flush 5-40 mL  5-40 mL IntraVENous PRN  
 ondansetron (ZOFRAN) injection 4 mg  4 mg IntraVENous Q8H PRN  
 alcohol 62% (NOZIN) nasal  1 Ampule  1 Ampule Topical Q12H  
 insulin lispro (HUMALOG) injection   SubCUTAneous Q6H  
 glucose chewable tablet 16 g  4 Tab Oral PRN  
 dextrose (D50W) injection syrg 12.5-25 g  12.5-25 g IntraVENous PRN  
 glucagon (GLUCAGEN) injection 1 mg  1 mg IntraMUSCular PRN  
 acetaminophen (TYLENOL) tablet 650 mg  650 mg Oral Q6H PRN Or  
 acetaminophen (TYLENOL) suppository 650 mg  650 mg Rectal Q6H PRN  
 influenza vaccine 2019-20 (6 mos+)(PF) (FLUARIX/FLULAVAL/FLUZONE QUAD) injection 0.5 mL  0.5 mL IntraMUSCular PRIOR TO DISCHARGE  sodium chloride (NS) flush 5-10 mL  5-10 mL IntraVENous PRN Gualberto  III, DO

## 2020-05-22 NOTE — PROGRESS NOTES
NAME: Hola Weber :  1955 MRN:  [id number] Assessment :    Plan: 
--LAQUITA Mild hyponatremia Mild Hyperkalemia Shock Sepsis DM type 2 Systolic HF (EF 95%) COVID + Initiated CVVHD ; tolerating UF (factor 150); up ~ 30 liters; alternate 2k/4k bath (running a bit low on 4 K baths) Lewis line  Stop albumin D/c bicarb gtt. Give D5NS for now (low glucose) Subjective: Chief Complaint:  In order to limit the exposure risk from COVID 19 and to preserve the hospital's limited supply of PPEs, seen through ICU window. Intubated. Discussed with Nadeem Herzog and his icu nurse and pharmacy. Review of Systems:  
 
Symptom Y/N Comments  Symptom Y/N Comments Fever/Chills    Chest Pain Poor Appetite    Edema Cough    Abdominal Pain Sputum    Joint Pain SOB/GROVES    Pruritis/Rash Nausea/vomit    Tolerating PT/OT Diarrhea    Tolerating Diet Constipation    Other Could not obtain due to: See above Objective: VITALS:  
Last 24hrs VS reviewed since prior progress note. Most recent are: 
Visit Vitals /69 Pulse 69 Temp (!) 96.2 °F (35.7 °C) Resp 20 Ht 6' 3\" (1.905 m) Wt 142.4 kg (313 lb 15 oz) SpO2 100% BMI 39.24 kg/m² Intake/Output Summary (Last 24 hours) at 2020 6038 Last data filed at 2020 0600 Gross per 24 hour Intake 3021.57 ml Output 5499 ml Net -2477.43 ml Telemetry Reviewed: PHYSICAL EXAM: 
General: NAD No edema Lab Data Reviewed: (see below) Medications Reviewed: (see below) PMH/SH reviewed - no change compared to H&P 
________________________________________________________________________ Care Plan discussed with: 
Patient Family  y   
RN y   
Care Manager Consultant:     
 
  Comments >50% of visit spent in counseling and coordination of care ________________________________________________________________________ Maryam Garcia MD  
 
Procedures: see electronic medical records for all procedures/Xrays and details which 
were not copied into this note but were reviewed prior to creation of Plan. LABS: 
Recent Labs  
  05/22/20 
0513 05/21/20 
0320 WBC 22.9* 23.5* HGB 9.7* 9.5* HCT 29.4* 29.6* PLT 98* 120* Recent Labs  
  05/22/20 0513 05/21/20 2317 05/21/20 1727 05/21/20 1640 05/21/20 
0320 * 132*  --  134*  --  133*  132* K 3.6 3.6  --  3.4*  --  3.6  3.5 CL 95* 94*  --  93*  --  90*  90* CO2 27 26  --  26  --  22  23 BUN 60* 67*  --  82*  --  88*  90* CREA 4.73* 4.87*  --  5.71*  --  7.64*  7.91* GLU 90 76 57* 58*   < > 125*  126* CA 7.4* 7.8*  --  6.8*  --  5.4*  5.5* MG  --  2.2  --  2.0  --  1.7 PHOS 3.9 4.2  --  4.2  --  4.4  
 < > = values in this interval not displayed. Recent Labs  
  05/22/20 0513 05/21/20 2317 05/21/20 1640 05/21/20 
0320  05/20/20 
0503 SGOT 278*  --   --  1,121*  --  >2,000* *  --   --  160*  --  219* TP 5.9*  --   --  5.6*  --  5.7* ALB 2.8*  2.6* 2.6* 2.7* 2.2*  2.2*   < > 1.9*  1.9*  
GLOB 3.3  --   --  3.4  --  3.8  
 < > = values in this interval not displayed. Recent Labs  
  05/22/20 
5552 05/21/20 
2317 05/21/20 
0320  05/20/20 
0503 INR 2.4*  --  2.4*  --  3.1* PTP 23.3*  --  23.6*  --  29.3* APTT 53.8* 59.8* 66.2*   < > 81.0*  
 < > = values in this interval not displayed. No results for input(s): FE, TIBC, PSAT, FERR in the last 72 hours. No results found for: FOL, RBCF No results for input(s): PH, PCO2, PO2 in the last 72 hours. No results for input(s): CPK, CKMB in the last 72 hours. No lab exists for component: TROPONINI No components found for: [place] Lab Results Component Value Date/Time  Color YELLOW/STRAW 05/15/2020 05:34 PM  
 Appearance CLOUDY (A) 05/15/2020 05:34 PM  
 Specific gravity 1.025 05/15/2020 05:34 PM  
 pH (UA) 5.0 05/15/2020 05:34 PM  
 Protein 30 (A) 05/15/2020 05:34 PM  
 Glucose >1,000 (A) 05/15/2020 05:34 PM  
 Ketone TRACE (A) 05/15/2020 05:34 PM  
 Bilirubin Negative 05/15/2020 05:34 PM  
 Urobilinogen 0.2 05/15/2020 05:34 PM  
 Nitrites Negative 05/15/2020 05:34 PM  
 Leukocyte Esterase Negative 05/15/2020 05:34 PM  
 Epithelial cells FEW 05/15/2020 05:34 PM  
 Bacteria Negative 05/15/2020 05:34 PM  
 WBC 0-4 05/15/2020 05:34 PM  
 RBC 0-5 05/15/2020 05:34 PM  
 
 
MEDICATIONS: 
Current Facility-Administered Medications Medication Dose Route Frequency  dextrose 5% and 0.9% NaCl infusion  100 mL/hr IntraVENous CONTINUOUS  chlorhexidine (ORAL CARE KIT) 0.12 % mouthwash 15 mL  15 mL Oral Q12H  clindamycin (CLEOCIN) 900mg D5W 50mL IVPB (premix)  900 mg IntraVENous Q8H  
 cefepime (MAXIPIME) 1 g in 0.9% sodium chloride (MBP/ADV) 50 mL  1 g IntraVENous Q8H  
 vancomycin (VANCOCIN) 1500 mg in  ml infusion  1,500 mg IntraVENous Q24H  
 insulin regular (NOVOLIN R, HUMULIN R) injection 6 Units  6 Units SubCUTAneous Q6H And  
 hydrocortisone Sod Succ (PF) (SOLU-CORTEF) injection 100 mg  100 mg IntraVENous Q6H  
 bicarbonate dialysis (PRISMASOL) BG K 4/Ca 2.5 5000 ml solution   Extracorporeal DIALYSIS CONTINUOUS  
 potassium chloride 20 mEq in 50 ml IVPB  20 mEq IntraVENous PRN  
 calcium chloride injection 2 g  2 g IntraVENous PRN  
 famotidine (PF) (PEPCID) 20 mg in 0.9% sodium chloride 10 mL injection  20 mg IntraVENous DAILY  acetaminophen (TYLENOL) solution 650 mg  650 mg Oral Q6H PRN  
 albumin human 25% (BUMINATE) solution 12.5 g  12.5 g IntraVENous Q6H  
 PHARMACY INFORMATION NOTE  1 Each Other BID  DOBUTamine (DOBUTREX) 500 mg/250 mL (2,000 mcg/mL) infusion  0-10 mcg/kg/min IntraVENous TITRATE  EPINEPHrine (ADRENALIN) 5 mg in 0.9% sodium chloride 250 mL infusion  0-10 mcg/min IntraVENous TITRATE  propofol (DIPRIVAN) 10 mg/mL infusion  0-50 mcg/kg/min IntraVENous TITRATE  heparin (porcine) injection 2,000 Units  2,000 Units IntraVENous PRN Or  
 heparin (porcine) injection 4,000 Units  4,000 Units IntraVENous PRN  
 heparin 25,000 units in D5W 250 ml infusion  8-25 Units/kg/hr IntraVENous TITRATE  PHENYLephrine (JACLYN-SYNEPHRINE) 100 mg in 0.9% sodium chloride 250 mL infusion   mcg/min IntraVENous TITRATE  
 NOREPINephrine (LEVOPHED) 32 mg in 5% dextrose 250 mL infusion  2-200 mcg/min IntraVENous TITRATE  vasopressin (VASOSTRICT) 20 Units in 0.9% sodium chloride 100 mL infusion  0.04 Units/min IntraVENous CONTINUOUS  
 insulin glargine (LANTUS) injection 25 Units  25 Units SubCUTAneous DAILY  sodium chloride (NS) flush 5-40 mL  5-40 mL IntraVENous Q8H  
 sodium chloride (NS) flush 5-40 mL  5-40 mL IntraVENous PRN  
 ondansetron (ZOFRAN) injection 4 mg  4 mg IntraVENous Q8H PRN  
 alcohol 62% (NOZIN) nasal  1 Ampule  1 Ampule Topical Q12H  
 insulin lispro (HUMALOG) injection   SubCUTAneous Q6H  
 glucose chewable tablet 16 g  4 Tab Oral PRN  
 dextrose (D50W) injection syrg 12.5-25 g  12.5-25 g IntraVENous PRN  
 glucagon (GLUCAGEN) injection 1 mg  1 mg IntraMUSCular PRN  
 acetaminophen (TYLENOL) tablet 650 mg  650 mg Oral Q6H PRN Or  
 acetaminophen (TYLENOL) suppository 650 mg  650 mg Rectal Q6H PRN  
 influenza vaccine 2019-20 (6 mos+)(PF) (FLUARIX/FLULAVAL/FLUZONE QUAD) injection 0.5 mL  0.5 mL IntraMUSCular PRIOR TO DISCHARGE  sodium chloride (NS) flush 5-10 mL  5-10 mL IntraVENous PRN

## 2020-05-22 NOTE — PROGRESS NOTES
Hospitalist Progress Note NAME: Keeley Steele :  1955 MRN:  [id number] Assessment / Plan: 
Septic vs cardiogenic shock 2/2 COVID Positive with viral prodrome Hyponatremia Bacteremia Multiorgan failure 
-remains critically ill 
-requiring multiple pressors, on 2 pressors now down from 5 
-appreciate intensivist management 
-worsening renal function, CRRT initiated  
-palliative on board 
-at high risk for further rapid decline, death 
-palliative team on board has communicated with wife/daughter. Full code for now.  
-cont pressor support, empiric abx (vanco,cefepime and clindamycin) 
-has been enrolled in convalescent plasma study 
-cont zinc 
-Blood cx 5/15 alpha streptococcus, repeat blood cx obtained 
-On solucortef 
-Ferritin>[id number] 
-Liver Enzymes elevated, trending down Acute renal failure 
-initiated CRRT  
-trend bmp nephrology consultation appreciated Avoid nephrotoxic medication Hx of nonischemic CM 
-recent weight gain 
-EF 20% 
--s/p BIV-ICD 
 
DM2 
-cont insulin w lantus, SSI 
  
NSTEMI 
-cardiology following 
-cont supportive care 
  
Atrial fibrillation Eliquis on hold for now 
  
Hx of Hypertension 
-currently in cardiogenic vs septic shock as above and requiring pressors 
-hold home antiHTNsives 
  
Code Status: Full Surrogate Decision Maker: 
  
DVT Prophylaxis: heparin GI Prophylaxis: not indicated Subjective: Chief Complaint / Reason for Physician Visit Unresponsive on pressor support. Seen through CCU room window and discussed with nursing. Did not enter room to avoid exposure/transmission of COVID-19 Discussed with RN events overnight. Review of Systems: 
Symptom Y/N Comments  Symptom Y/N Comments Fever/Chills    Chest Pain Poor Appetite    Edema Cough    Abdominal Pain Sputum    Joint Pain SOB/GROVES    Pruritis/Rash Nausea/vomit    Tolerating PT/OT Diarrhea    Tolerating Diet Constipation    Other Could NOT obtain due to: Intubated and sedated Objective: VITALS:  
Last 24hrs VS reviewed since prior progress note. Most recent are: 
Patient Vitals for the past 24 hrs: 
 Temp Pulse Resp BP SpO2  
05/22/20 1232  70 18  96 % 05/22/20 1215 96.8 °F (36 °C) 70 14 97/58   
05/22/20 1200 96.8 °F (36 °C) 71 15 96/57   
05/22/20 1145 96.8 °F (36 °C) 70 18 90/56   
05/22/20 1137 96.8 °F (36 °C) 73 17 (!) 81/46   
05/22/20 1135 96.8 °F (36 °C) 70 18 (!) 77/49   
05/22/20 1134 96.8 °F (36 °C) 70 19 (!) 82/43   
05/22/20 1100 96.8 °F (36 °C) 70 20 107/64   
05/22/20 1045 96.8 °F (36 °C) 71 17 102/64   
05/22/20 1030 96.8 °F (36 °C) 70 16 104/67   
05/22/20 1015 96.8 °F (36 °C) 70 16 103/67   
05/22/20 1000 96.8 °F (36 °C) 70 17 105/66   
05/22/20 0945 96.8 °F (36 °C) 73 17 116/62   
05/22/20 0930 96.8 °F (36 °C) 71 18 115/69   
05/22/20 0915 (!) 96.7 °F (35.9 °C) 70 18 111/61   
05/22/20 0900 (!) 96.7 °F (35.9 °C) 70 17 123/68 100 % 05/22/20 0845 (!) 96.6 °F (35.9 °C) 70 18 115/64 100 % 05/22/20 0830 (!) 96.5 °F (35.8 °C) 70 17 103/61 100 % 05/22/20 0815 (!) 96.5 °F (35.8 °C) 75 17 114/63 100 % 05/22/20 0800 (!) 96.4 °F (35.8 °C) 72 18 105/68   
05/22/20 0745 (!) 96.3 °F (35.7 °C) 70 19 105/64 100 % 05/22/20 0734  72 19  100 % 05/22/20 0730 (!) 96.3 °F (35.7 °C) 71 20 115/64 100 % 05/22/20 0715 (!) 96.2 °F (35.7 °C) 70 19 106/63 100 % 05/22/20 0700 (!) 96.2 °F (35.7 °C) 69 20 108/69 100 % 05/22/20 0600 (!) 96 °F (35.6 °C) 69 15 98/60   
05/22/20 0500 (!) 95.7 °F (35.4 °C) 70 18 101/63 (!) 88 % 05/22/20 0439   17  99 % 05/22/20 0400 (!) 95.4 °F (35.2 °C) 70 18 104/66 97 % 05/22/20 0330 (!) 95.3 °F (35.2 °C) 70 17 101/62 98 % 05/22/20 0300 (!) 95.2 °F (35.1 °C) 70 17 105/67 96 % 05/22/20 0230 (!) 95 °F (35 °C) 70 17 105/67 97 % 05/22/20 0200 (!) 95 °F (35 °C) 70 19 110/66 96 % 05/22/20 0130 (!) 94.9 °F (34.9 °C) 70 19 103/65 96 % 05/22/20 0100 (!) 95 °F (35 °C) 70 17 109/68 98 % 05/22/20 0030 (!) 95.1 °F (35.1 °C) 70 16 110/68 98 % 05/22/20 0000 (!) 95.2 °F (35.1 °C) 70 17 118/71 98 % 05/21/20 2343   16    
05/21/20 2330 (!) 95.2 °F (35.1 °C) 70 18 113/72 96 % 05/21/20 2300  70 18 133/85   
05/21/20 2230  70 19 135/84   
05/21/20 2200  70 17 131/83   
05/21/20 2130  71 18 126/79 100 % 05/21/20 2100 (!) 95.8 °F (35.4 °C) 70 16 127/77 100 % 05/21/20 2030  70 16 129/83 100 % 05/21/20 2000  70 18 143/84 100 % 05/21/20 1945   18  100 % 05/21/20 1930  70 17 141/81 100 % 05/21/20 1915  71 18 138/86 100 % 05/21/20 1900  71 17 158/85 100 % 05/21/20 1845  71 18 133/78 100 % 05/21/20 1830  70 18 139/85 100 % 05/21/20 1815  70 17 131/79 100 % 05/21/20 1800  71 19 112/74 100 % 05/21/20 1745  70 17 135/81 100 % 05/21/20 1730  71 18 125/82 100 % 05/21/20 1715  71 17 140/81 100 % 05/21/20 1700  70 18 136/77 100 % 05/21/20 1645  71 18 125/82 100 % 05/21/20 1630  70 18 138/82 100 % 05/21/20 1615  71 19 140/81 100 % 05/21/20 1603  72 20  100 % 05/21/20 1600 97.7 °F (36.5 °C) 72 18 144/80 100 % 05/21/20 1545  73 19 143/81 100 % 05/21/20 1530  73 17 136/79 100 % 05/21/20 1515  71 19 143/81 100 % 05/21/20 1500  76 20 137/85 100 % 05/21/20 1445  74 20 145/81 100 % 05/21/20 1430  73 17 132/83 100 % 05/21/20 1415  73 18 134/78 100 % 05/21/20 1400  74 18 144/81 100 % 05/21/20 1345  73 19 147/86 100 % 05/21/20 1330  75 17 133/82 100 % 05/21/20 1315  72 17 134/77 100 % Intake/Output Summary (Last 24 hours) at 5/22/2020 1311 Last data filed at 5/22/2020 1200 Gross per 24 hour Intake 3623.69 ml Output 6842 ml Net -3218.31 ml PHYSICAL EXAM: 
GEN: AA male, NAD On Ventilator Intubated and sedated 
anasarca noticed Seen through ICU window to minimize exposure/transmission to/of COVID-19 Reviewed most current lab test results and cultures  YES Reviewed most current radiology test results   YES Review and summation of old records today    NO Reviewed patient's current orders and MAR    YES 
PMH/SH reviewed - no change compared to H&P 
________________________________________________________________________ Care Plan discussed with: 
  Comments Patient Family RN x Care Manager Consultant Multidiciplinary team rounds were held today with , nursing, pharmacist and clinical coordinator. Patient's plan of care was discussed; medications were reviewed and discharge planning was addressed. ________________________________________________________________________ Total NON critical care TIME:  25  Minutes Total CRITICAL CARE TIME Spent:   Minutes non procedure based Comments >50% of visit spent in counseling and coordination of care    
________________________________________________________________________ Isaiah Ruth MD  
 
Procedures: see electronic medical records for all procedures/Xrays and details which were not copied into this note but were reviewed prior to creation of Plan. LABS: 
I reviewed today's most current labs and imaging studies. Pertinent labs include: 
Recent Labs  
  05/22/20 
0513 05/21/20 
0320 05/20/20 
0503 WBC 22.9* 23.5* 26.1* HGB 9.7* 9.5* 10.1* HCT 29.4* 29.6* 31.7*  
PLT 98* 120* 133* Recent Labs  
  05/22/20 
0513 05/21/20 
2317 05/21/20 
1727 05/21/20 
1640  05/21/20 
0320  05/20/20 
0503 * 132*  --  134*  --  133*  132*   < > 133*  134* K 3.6 3.6  --  3.4*  --  3.6  3.5   < > 3.7  3.7 CL 95* 94*  --  93*  --  90*  90*   < > 91*  90* CO2 27 26  --  26  --  22  23   < > 22  24 GLU 90 76 57* 58*   < > 125*  126*   < > 156*  159* BUN 60* 67*  --  82*  --  88*  90*   < > 87*  87* CREA 4.73* 4.87*  --  5.71*  --  7.64*  7.91*   < > 7.99*  7.81* CA 7.4* 7.8*  --  6.8*  --  5.4*  5.5*   < > 5.5*  5.5* MG  --  2.2  --  2.0  --  1.7   < > 1.6 PHOS 3.9 4.2  --  4.2  --  4.4   < > 4.8*  4.8* ALB 2.8*  2.6* 2.6*  --  2.7*  --  2.2*  2.2*   < > 1.9*  1.9* TBILI 6.8*  --   --   --   --  2.5*  --  1.8* SGOT 278*  --   --   --   --  1,121*  --  >2,000* *  --   --   --   --  1,049*  --  1,823* INR 2.4*  --   --   --   --  2.4*  --  3.1*  
 < > = values in this interval not displayed.   
 
 
Signed: Bello Loyd MD

## 2020-05-22 NOTE — CONSULTS
[facility] INTENSIVIST Consult Service Note Pulmonary, Critical Care, and Sleep Medicine Name: Alison Rosario MRN: [id number] : 1955 Hospital[facility] Date: 2020  Admission date: 5/15/2020 Hospital Day: 8 Subjective/Interval History:  
Seen earlier today on rounds. Pt is unstable and acutely ill in the CCU. Patient was re-evaluated multiple times with repeated discussions with CCU team throughout the day 20: Intubated, sedated, on pressors. ROS and HPI are not obtainable. : Continue slow improvement in pressor needs. Remains intubated and sedated. : Norepi dose slightly lower this morning, otherwise no change. : Survived the night, remains on max dose of 5 pressors with barely acceptable blood pressures. Intubated, sedated on 100% FiO2.  
 
: Continues to rapidly decompensate. This morning is largely unresponsive with escalating pressor requirements and worsening multiorgan failure. Multiple phone conversations were had with his wife and children regarding code status and goals of care. They ultimately decided that they wanted to continue aggressive measures including intubation and dialysis; intubation subsequently performed by Dr. Anu Anaya.  high fever, sweats overniht. IV levophed 95 mcg, IV vasopressin. D dimer 2.56; Cr 5.77; Ferritin yesterday 909. lactic acid higher, WBC now 10320. CXR reviewed and amazingly clear. . Too weak to verbalize. Called wife at home [phone] , Daughter Sanjana Alberto. They are both well, they were tested at [facility] yesterday. Results pending. Explained medical management for COVID 19. He is critically ill. He is unpredictable. Family wants to defer to his heart doctor any decisions about code status.  We dis discuss possiblerole of Convalescent plasma since Remdesivir is not available and would be reserved for respiratory failure pts on vent. Wife, daughter, son will review [email or web address] and we will discuss process if they want to proceed. No guarantees offered, as it will take takes to get plasma and the transfusion is not risk free, 11:32 AM 
 
3:38 PM Called wife back. Wife and family has had a chance to review forms and handouts. · [facility]: Expanded Access to Convalescent Plasma for Treatment of Pts with COVID-19. [id number] Clinical Staff: Dr. Constance Meehan MD. Consent for blood and investigation has been obtained. Discussed the use of Convalescent Plasma collected from COVID 19 patients. These individuals have recovered from the illness and donated blood to study. Use does not guarantee that patients will improve or recover after transfusion. They may actually get worse or have side effects. They agree to proceed with requesting convalescent plasma. Sent order for type and screen. Nursing aware and will get phone consent. I called Blood bank supervisor who will request matched unit from [employer]. Family aware that it may take days to acquire. IMPRESSION:  
1. Profound septic shock on high level of pressors. 2. Severe sepsis- high fever, sweats 3. COVID 19 POSITIVE 4. Proinflammatory/ Prothrombotic 5. Alpha strep septicemia- elevated Procalcitonin > 80 
6. Chronic combined systolic/diastolic heart failure 7. Hypertensive heart disease with CHF and CKD 8. LAQUITA/CKD, CVVH to start 9. Chronic anticoagulation at home- eliquis- stopped 10. Diabetes mellitus type 2 uncontrolled 11. Hyperlipidemia 12. 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 Chronic atrial fibrillation 14. Status post SJM BIV-ICD implant in 2016 
15. Sleep apnea, undiagnosed/untreated 16. Obesity with recent weight gain 17. Body mass index is 39.24 kg/m². - not a good prone candidate 18. Additional workup outlined below 23. Multiorgan dysfunction as outlined above: Pt has one or more acute or chronic illnesses with severe exacerbation with progression or side effects of treatment that poses a threat to life or bodily function 20. Pt is unstable, unpredictable needing more CCU monitoring; at high risk of sudden decline and decompensation with life threatening consequenses and continued end organ dysfunction and failure; Critically ill, high risk death. Over 50 min CC, EOP. RECOMMENDATIONS/PLAN:  
 
1. Droplet plus Isolation 2. Intubated 5/18, adjust settings as needed 3. Nephrology following, has not been stable enough to tolerate CVVH. Assessing daily 4. Continue pressors, wean as able. Remains on norepi, dobutamine, epi, vaso. Wean epi next 5. Continue broad spectum antibiotics--vanc, cefepime, azithro 6. Stress dose steroids 7. Heparin drip, will clarify this morning on rounds 8. Cardiology following 9. Continue glargine for now, keep a close eye on sugars 10. Follow cultures 11. CXR in AM 
12. Replete electrolytes PRN 13. Labs to follow inflammatory markers electrolytes, renal function and and blood counts 14. Bronchial hygiene with respiratory therapy techniques, bronchodilators 15. Pt needs IV fluids with additives and Drug therapy requiring intensive monitoring for toxicity 16. Prescription drug management with home med reconciliation reviewed 17. DVT, SUP prophylaxis Prognosis grim. I do not expect him to survive much longer given pressor needs and multiorgan failure in the setting of severe underlying cardiac disease. Continue all aggressive measures. Palliative care is following as well which is appreciated. Subjective/Initial History:  
I have reviewed the flowsheet and previous days notes. Seen earlier today on rounds. I was asked by David Anne MD to see Bharat Dayna a 71 y.o.  male  in consultation for a chief complaint of shock. Excerpts from admission 5/15/2020 and consult notes reviewed as follows:  
 
\"Mr. Fransico Quarles is a 57 y.o. morbidly obese male with Chronic nonischemic dilated cardiomyopathy EF 36%, Chronic systolic congestive heart failure class, H/o atrial fibrillation, On chronic warfarin, Iatrogenic left bundle branch block with predominant RV pacing, 64-70%, Hypertension, Hyperlipidemia and diabetes presents to ED [facility] ED C/O Worsening exertional shortness of breath and around 20 pounds weight gain in last 3 weeks. \" 
 
Pt now in CCU. Poor historian. On room air. No fever. No cough. No diarrhea. Not very active. Saw PCP three weeks ago. Some edema. Chart notes ED [facility] admission in 2017 with decompensation. Patient PCP: Jovanni Vo MD 
PMH:  has a past medical history of A-fib ([facility] [address] Arrhythmia, Diabetes ([facility] [address] Endocrine disease, Hypertension, and Irregular heart beat. He also has no past medical history of Difficult intubation, Malignant hyperthermia due to anesthesia, Nausea & vomiting, or Pseudocholinesterase deficiency. PSH:   has a past surgical history that includes pr cardiac surg procedure unlist; insert emergency endotrach airway (5/18/2020); and ir insert non tunl cvc over 5 yrs (5/18/2020). FHX: family history includes Diabetes in his father; Heart Disease in his father and mother. SHX:  reports that he quit smoking about 26 years ago. He has never used smokeless tobacco. He reports that he does not drink alcohol or use drugs. ROS:A comprehensive review of systems was negative except for that written in the HPI. No Known Allergies MEDS:  
Current Facility-Administered Medications Medication  dextrose 5% and 0.9% NaCl infusion  chlorhexidine (ORAL CARE KIT) 0.12 % mouthwash 15 mL  clindamycin (CLEOCIN) 900mg D5W 50mL IVPB (premix)  cefepime (MAXIPIME) 1 g in 0.9% sodium chloride (MBP/ADV) 50 mL  vancomycin (VANCOCIN) 1500 mg in  ml infusion  insulin regular (NOVOLIN R, HUMULIN R) injection 6 Units And  
 hydrocortisone Sod Succ (PF) (SOLU-CORTEF) injection 100 mg  
 bicarbonate dialysis (PRISMASOL) BG K 4/Ca 2.5 5000 ml solution  potassium chloride 20 mEq in 50 ml IVPB  calcium chloride injection 2 g  
 famotidine (PF) (PEPCID) 20 mg in 0.9% sodium chloride 10 mL injection  acetaminophen (TYLENOL) solution 650 mg  PHARMACY INFORMATION NOTE  DOBUTamine (DOBUTREX) 500 mg/250 mL (2,000 mcg/mL) infusion  EPINEPHrine (ADRENALIN) 5 mg in 0.9% sodium chloride 250 mL infusion  propofol (DIPRIVAN) 10 mg/mL infusion  heparin (porcine) injection 2,000 Units Or  heparin (porcine) injection 4,000 Units  heparin 25,000 units in D5W 250 ml infusion  PHENYLephrine (JACLYN-SYNEPHRINE) 100 mg in 0.9% sodium chloride 250 mL infusion  NOREPINephrine (LEVOPHED) 32 mg in 5% dextrose 250 mL infusion  vasopressin (VASOSTRICT) 20 Units in 0.9% sodium chloride 100 mL infusion  insulin glargine (LANTUS) injection 25 Units  sodium chloride (NS) flush 5-40 mL  sodium chloride (NS) flush 5-40 mL  ondansetron (ZOFRAN) injection 4 mg  alcohol 62% (NOZIN) nasal  1 Ampule  insulin lispro (HUMALOG) injection  glucose chewable tablet 16 g  
 dextrose (D50W) injection syrg 12.5-25 g  
 glucagon (GLUCAGEN) injection 1 mg  acetaminophen (TYLENOL) tablet 650 mg Or  acetaminophen (TYLENOL) suppository 650 mg  
 influenza vaccine 2019-20 (6 mos+)(PF) (FLUARIX/FLULAVAL/FLUZONE QUAD) injection 0.5 mL  sodium chloride (NS) flush 5-10 mL Current Facility-Administered Medications:  
  dextrose 5% and 0.9% NaCl infusion, 100 mL/hr, IntraVENous, CONTINUOUS, Brenda Montenegro MD 
  chlorhexidine (ORAL CARE KIT) 0.12 % mouthwash 15 mL, 15 mL, Oral, Q12H, Marci Gustafson DO, 15 mL at 05/21/20 2130   clindamycin (CLEOCIN) 900mg D5W 50mL IVPB (premix), 900 mg, IntraVENous, Q8H, Rian SINGH DO, Last Rate: 100 mL/hr at 05/22/20 0251, 900 mg at 05/22/20 0251   cefepime (MAXIPIME) 1 g in 0.9% sodium chloride (MBP/ADV) 50 mL, 1 g, IntraVENous, Q8H, Marci Gustafson DO, Last Rate: 50 mL/hr at 05/22/20 0426, 1 g at 05/22/20 0426 
  vancomycin (VANCOCIN) 1500 mg in  ml infusion, 1,500 mg, IntraVENous, Q24H, Marci Gustafson, DO, Last Rate: 250 mL/hr at 05/21/20 1504, 1,500 mg at 05/21/20 1504   hydrocortisone Sod Succ (PF) (SOLU-CORTEF) injection 100 mg, 100 mg, IntraVENous, Q6H, 100 mg at 05/22/20 0501 **AND** insulin regular (NOVOLIN R, HUMULIN R) injection 6 Units, 6 Units, SubCUTAneous, Q6H, Carmenza HILLS MD, Stopped at 05/21/20 1800 
  bicarbonate dialysis (PRISMASOL) BG K 4/Ca 2.5 5000 ml solution, , Extracorporeal, DIALYSIS CONTINUOUS, Sita Montenegro MD, Last Rate: 2,000 mL/hr at 05/22/20 0509, 2,000 mL/hr at 05/22/20 2838   potassium chloride 20 mEq in 50 ml IVPB, 20 mEq, IntraVENous, PRN, Jose Alberto Jama MD 
  calcium chloride injection 2 g, 2 g, IntraVENous, PRN, Sita Montenegro MD 
  famotidine (PF) (PEPCID) 20 mg in 0.9% sodium chloride 10 mL injection, 20 mg, IntraVENous, DAILY, Marci Gustafson DO, 20 mg at 05/21/20 8293   acetaminophen (TYLENOL) solution 650 mg, 650 mg, Oral, Q6H PRN, Anita Jo MD, 650 mg at 05/20/20 2132   PHARMACY INFORMATION NOTE, 1 Each, Other, BID, Marci Gustafson, , 1 Each at 05/21/20 1400 
  DOBUTamine (DOBUTREX) 500 mg/250 mL (2,000 mcg/mL) infusion, 0-10 mcg/kg/min, IntraVENous, TITRATE, Jefe Donaldo T, DO, Last Rate: 18 mL/hr at 05/22/20 0523, 5 mcg/kg/min at 05/22/20 2737   EPINEPHrine (ADRENALIN) 5 mg in 0.9% sodium chloride 250 mL infusion, 0-10 mcg/min, IntraVENous, TITRATE, Luis Carlos Moreira MD, Stopped at 05/21/20 1204   propofol (DIPRIVAN) 10 mg/mL infusion, 0-50 mcg/kg/min, IntraVENous, TITRATE, Carmenza HILLS MD, Last Rate: 14.5 mL/hr at 05/22/20 0624, 20 mcg/kg/min at 05/22/20 0814 
  heparin (porcine) injection 2,000 Units, 2,000 Units, IntraVENous, PRN **OR** heparin (porcine) injection 4,000 Units, 4,000 Units, IntraVENous, PRN, Marci Gustafson, DO, 2,000 Units at 05/19/20 0130 
  heparin 25,000 units in D5W 250 ml infusion, 8-25 Units/kg/hr, IntraVENous, TITRATE, Marci Gustafson, DO, Last Rate: 9.7 mL/hr at 05/22/20 0625, 8 Units/kg/hr at 05/22/20 5013   PHENYLephrine (JACLYN-SYNEPHRINE) 100 mg in 0.9% sodium chloride 250 mL infusion,  mcg/min, IntraVENous, TITRATE, Marci Gustafson DO, Stopped at 05/21/20 0530 
  NOREPINephrine (LEVOPHED) 32 mg in 5% dextrose 250 mL infusion, 2-200 mcg/min, IntraVENous, TITRATE, Marci Gustafson, , Last Rate: 28.1 mL/hr at 05/22/20 0138, 60 mcg/min at 05/22/20 0138   vasopressin (VASOSTRICT) 20 Units in 0.9% sodium chloride 100 mL infusion, 0.04 Units/min, IntraVENous, CONTINUOUS, Jaja HILLS MD, Last Rate: 12 mL/hr at 05/22/20 0521, 0.04 Units/min at 05/22/20 0521 
  insulin glargine (LANTUS) injection 25 Units, 25 Units, SubCUTAneous, DAILY, Jorge Najera MD, 25 Units at 05/21/20 5837   sodium chloride (NS) flush 5-40 mL, 5-40 mL, IntraVENous, Q8H, Kavitha Irwin MD, 10 mL at 05/22/20 0522   sodium chloride (NS) flush 5-40 mL, 5-40 mL, IntraVENous, PRN, Kavitha Irwin MD 
  ondansetron [facility]) injection 4 mg, 4 mg, IntraVENous, Q8H PRN, Kavitha Irwin MD, 4 mg at 05/16/20 0137 
  alcohol 62% (NOZIN) nasal  1 Ampule, 1 Ampule, Topical, Q12H, Kavitha Irwin MD, 1 Ampule at 05/21/20 2127 
  insulin lispro (HUMALOG) injection, , SubCUTAneous, Q6H, Jaja HILLS MD, Stopped at 05/21/20 0600 
  glucose chewable tablet 16 g, 4 Tab, Oral, PRN, Jorge Najera MD 
  dextrose (D50W) injection syrg 12.5-25 g, 12.5-25 g, IntraVENous, PRN, Jaja HILLS MD, 12.5 g at 05/22/20 0112 
  glucagon (GLUCAGEN) injection 1 mg, 1 mg, IntraMUSCular, PRN, Jorge Najera MD 
   acetaminophen (TYLENOL) tablet 650 mg, 650 mg, Oral, Q6H PRN, 650 mg at 20 0808 **OR** acetaminophen (TYLENOL) suppository 650 mg, 650 mg, Rectal, Q6H PRN, Barb Sawyer MD, 650 mg at 20 1449   influenza vaccine  (6 mos+)(PF) (FLUARIX/FLULAVAL/FLUZONE QUAD) injection 0.5 mL, 0.5 mL, IntraMUSCular, PRIOR TO DISCHARGE, José Quiñonez,  
  sodium chloride (NS) flush 5-10 mL, 5-10 mL, IntraVENous, PRN, Brack[place], Olman Quick MD 
 
 
Objective:  
 
Vital Signs: Telemetry:    AFIB Intake/Output:  
Visit Vitals /69 Pulse 72 Temp (!) 96.2 °F (35.7 °C) Resp 19 Ht 6' 3\" (1.905 m) Wt 142.4 kg (313 lb 15 oz) SpO2 100% BMI 39.24 kg/m² Temp (24hrs), Av.9 °F (35.5 °C), Min:94.9 °F (34.9 °C), Max:99.8 °F (37.7 °C) O2 Device: Ventilator O2 Flow Rate (L/min): 4 l/min Body mass index is 39.24 kg/m². Wt Readings from Last 4 Encounters:  
20 142.4 kg (313 lb 15 oz) 19 123.8 kg (273 lb) 17 129.3 kg (285 lb)  
17 107.5 kg (237 lb) Intake/Output Summary (Last 24 hours) at 2020 7456 Last data filed at 2020 0600 Gross per 24 hour Intake 3021.57 ml Output 5499 ml Net -2477.43 ml Last shift:      No intake/output data recorded. Last 3 shifts:  190 -  0700 In: 5709.8 [I.V.:5599.8] Out: 6396 [Urine:25] Hemodynamics:   
CO:   
CI:   
CVP: CVP (mmHg): 17 mmHg (20 0700) SVR:   PAP Systolic:   
PAP Diastolic:   
PVR:   
EE86:    
 
Ventilator Settings:     
Mode Rate TV Press PEEP FiO2 PIP Min. Vent Assist control    500 ml    6 cm H20 40 %  30 cm H2O  9.44 l/min Physical Exam: 
 
General:  male; obtunded, on intubated and on vent, on CVVHD, on pressors. ON levophed, Vasopressin, on dobutamine. Propofol. HEENT: NCAT, poor dentition, lips and mucosa dry Eyes: anicteric; conjunctiva clear Neck: no nodes, no cuff leak, no accessory MM use. Chest: no deformity,  
Cardiac: IR regular; no murmur Lungs: no distress, intubated Abd: soft, NT, hypoactive BS Ext: no edema; no joint swelling; No clubbing : No bright, Neuro: obtunded, not able to fully assess. Psych- unable to assess Skin: warm, dry, no cyanosis;  
Pulses: 1-2+ Bilateral pedal, radial 
Capillary: brisk; pale Labs: 
 
Recent Labs  
  05/22/20 
1254 05/21/20 
2317 05/21/20 
0320  05/20/20 
0503 WBC 22.9*  --  23.5*  --  26.1* HGB 9.7*  --  9.5*  --  10.1* PLT 98*  --  120*  --  133* INR 2.4*  --  2.4*  --  3.1* APTT 53.8* 59.8* 66.2*   < > 81.0*  
 < > = values in this interval not displayed. Recent Labs  
  05/22/20 
0513 05/21/20 
2317 05/21/20 
1727 05/21/20 
1640  05/21/20 
0320  05/20/20 
0503 * 132*  --  134*  --  133*  132*   < > 133*  134* K 3.6 3.6  --  3.4*  --  3.6  3.5   < > 3.7  3.7 CL 95* 94*  --  93*  --  90*  90*   < > 91*  90* CO2 27 26  --  26  --  22  23   < > 22  24 GLU 90 76 57* 58*   < > 125*  126*   < > 156*  159* BUN 60* 67*  --  82*  --  88*  90*   < > 87*  87* CREA 4.73* 4.87*  --  5.71*  --  7.64*  7.91*   < > 7.99*  7.81* CA 7.4* 7.8*  --  6.8*  --  5.4*  5.5*   < > 5.5*  5.5* MG  --  2.2  --  2.0  --  1.7   < > 1.6 PHOS 3.9 4.2  --  4.2  --  4.4   < > 4.8*  4.8* ALB 2.8*  2.6* 2.6*  --  2.7*  --  2.2*  2.2*   < > 1.9*  1.9*  
SGOT 278*  --   --   --   --  1,121*  --  >2,000* *  --   --   --   --  1,049*  --  1,823*  
 < > = values in this interval not displayed. No results for input(s): PH, PCO2, PO2, HCO3, FIO2 in the last 72 hours. No results for input(s): CPK, CKNDX, TROIQ in the last 72 hours. No lab exists for component: CPKMB Lab Results Component Value Date/Time BNP 93 02/08/2010 03:55 AM  
  
Lab Results Component Value Date/Time  Culture result: NO GROWTH 6 DAYS 05/15/2020 06:11 PM  
 Culture result: No growth (<1,000 CFU/ML) 05/15/2020 05:45 PM  
 Culture result: (A) 05/15/2020 05:34 PM  
  ALPHA STREPTOCOCCUS, NOT S. PNEUMONIAE GROWING IN 1 OF 2 BOTTLES DRAWN (SITE = R ARM) Culture result: (A) 05/15/2020 05:34 PM  
  PRELIMINARY REPORT OF GRAM POSITIVE COCCI IN CHAINS GROWING IN 1 OF 2 BOTTLES DRAWN CALLED TO AND READ BACK BY Mary Ellen Palacios RN [facility] AT 8230 ON 5/16/20. [address] Culture result: REMAINING BOTTLE(S) HAS/HAVE NO GROWTH IN 5 DAYS 05/15/2020 05:34 PM  
  
Lab Results Component Value Date/Time  (H) 05/18/2020 03:20 AM  
  01/19/2019 06:40 AM  
 
 
Imaging: 
I have personally reviewed the patients radiographs and have reviewed the reports: CXR Results  (Last 48 hours) 05/22/20 0633  XR CHEST PORT Final result Impression:  IMPRESSION: Stable bibasilar opacities and pleural effusions. Narrative:  EXAM:  CR chest portable INDICATION: Pleural effusions and bibasilar opacities. COMPARISON: 5/21/2020 at 0417 hours. TECHNIQUE: Portable AP chest view at 0444 hours FINDINGS: The support devices are stable. The cardiomediastinal contours are  
stable. Bibasilar opacities and pleural effusions are stable. There is no  
pneumothorax. The bones and upper abdomen are stable. 05/21/20 0536  XR CHEST PORT Final result Impression:  IMPRESSION: Small pleural effusions and bibasilar opacities are stable on the  
left and mildly increased on the right. Narrative:  EXAM:  CR chest portable INDICATION: Bibasilar opacities. COMPARISON: 5/20/2020 at 0449 hours. TECHNIQUE: Portable AP semiupright chest view at 0417 hours FINDINGS: The support devices are stable. The cardiomediastinal contours are  
stable. Bibasilar opacities and small pleural effusions are stable on the left  
and mildly increased on the right. There is no pneumothorax. The bones and upper  
abdomen are stable. [facility] XR CHEST PORT Narrative EXAM:  CR chest portable INDICATION: Pleural effusions and bibasilar opacities. COMPARISON: 5/21/2020 at 0417 hours. TECHNIQUE: Portable AP chest view at 0444 hours FINDINGS: The support devices are stable. The cardiomediastinal contours are 
stable. Bibasilar opacities and pleural effusions are stable. There is no 
pneumothorax. The bones and upper abdomen are stable. Impression IMPRESSION: Stable bibasilar opacities and pleural effusions. XR CHEST PORT Narrative EXAM:  CR chest portable INDICATION: Bibasilar opacities. COMPARISON: 5/20/2020 at 0449 hours. TECHNIQUE: Portable AP semiupright chest view at 0417 hours FINDINGS: The support devices are stable. The cardiomediastinal contours are 
stable. Bibasilar opacities and small pleural effusions are stable on the left 
and mildly increased on the right. There is no pneumothorax. The bones and upper 
abdomen are stable. Impression IMPRESSION: Small pleural effusions and bibasilar opacities are stable on the 
left and mildly increased on the right. XR CHEST PORT Narrative Clinical indication: Respiratory failure. Portable AP semiupright view of the chest is obtained compared to May 19. The 
heart remains enlarged. Ill definition of the left hemidiaphragm stable. Support 
devices in place. Next Impression IMPRESSION: No change. Results from [facility] [place] Encounter encounter on 05/15/20 CT ABD PELV WO CONT Narrative INDICATION: renal failure EXAM: CT Abdomen and Pelvis without IV contrast. No oral contrast. 
CT dose reduction was achieved through use of a standardized protocol tailored 
for this examination and automatic exposure control for dose modulation. FINDINGS:  
No urinary tract stones are seen. There is no hydroureteronephrosis. The kidneys 
are normal in size. There is no perirenal fluid or ascites. Liver shows no apparent significant finding without contrast. Pancreas, adrenal 
glands, spleen and aorta show no significant enlargement. No inflammation is 
seen. There is no pneumoperitoneum or significant adenopathy. The bladder is unremarkable. The distal ureters are not dilated. There is no 
apparent pelvic mass. The appendix is normal. Bowels are not dilated. There is a 
fat-containing umbilical hernia. Impression IMPRESSION: No Acute Disease. During this entire length of time the patient's condition was unstable, unpredictable and critically ill in the CCU/ ICU. I was immediately available to the patient whose care required several interactions with nursing, multidisciplinary team members leading to multiple interventions with fluid resuscitation and medication adjustments to optimize respiratory support, hemodynamic treatment, medication changes based on repeat labs results, reviews, exams and assessments. The reason for providing this level of medical care was due to a critical illness that impaired one or more vital organ systems, such that there was a high probability of sudden or life threatening deterioration in the patient's condition. This care involved high complexity medical decision making to treat acute and unstable vital organ system failure, and to prevent further life threatening deterioration of the patients condition. I personally: · Reviewed the flowsheet and previous days notes · Reviewed and summarized records or history from previous days note or discussions with staff, family · Parenteral controlled substances - Reviewed/ Adjusted / Weaned / Started · High Risk Drug therapy requiring intensive monitoring for toxicity: eg steroids, pressors, antibiotics · Reviewed and/or ordered Clinical lab tests · Reviewed and/or ordered Radiology tests · Reviewed and/or ordered of Medicine tests · Independently visualized radiologic Images · Reviewed the patients ECG / Telemetry · discussed my assessment/management with : Consultants, Nursing for coordination of care Thank you for allowing us to participate in the care of this patient. We will follow along with you until they no longer require CCU services.  
 
Mandy Easley MD

## 2020-05-23 NOTE — PROGRESS NOTES
05/23/20 6489 ABCDEF Bundle SBT Safety Screen Passed No  
SBT Screen Reason for Failure Vasopressor use

## 2020-05-23 NOTE — PROGRESS NOTES
NAME: Lang Degroot :  1955 MRN:  [id number] Assessment :    Plan: 
--LAQUITA Mild hyponatremia Mild Hyperkalemia Shock Sepsis DM type 2 Systolic HF (EF 95%) COVID + Initiated CVVHD ; tolerating UF (factor 150); up ~ 30 liters; alternate 2k/4k bath (running a bit low on 4 K baths) Lewis line  Tolerating a factor of 150cc with cvp his am of 13 Subjective: Chief Complaint:  In order to limit the exposure risk from COVID 19 and to preserve the hospital's limited supply of PPEs, seen through ICU window. Intubated. Discussed with his icu nurse Review of Systems:  
 
Symptom Y/N Comments  Symptom Y/N Comments Fever/Chills    Chest Pain Poor Appetite    Edema Cough    Abdominal Pain Sputum    Joint Pain SOB/GROVES    Pruritis/Rash Nausea/vomit    Tolerating PT/OT Diarrhea    Tolerating Diet Constipation    Other Could not obtain due to: See above Objective: VITALS:  
Last 24hrs VS reviewed since prior progress note. Most recent are: 
Visit Vitals BP 95/58 Pulse 70 Temp 97.1 °F (36.2 °C) Resp 17 Ht 6' 3\" (1.905 m) Wt 137.2 kg (302 lb 7.5 oz) SpO2 99% BMI 37.81 kg/m² Intake/Output Summary (Last 24 hours) at 2020 1332 Last data filed at 2020 1300 Gross per 24 hour Intake 5195.85 ml Output 7573 ml Net -2377.15 ml Telemetry Reviewed: PHYSICAL EXAM: 
General: NAD, ett in place Edema improving Lab Data Reviewed: (see below) Medications Reviewed: (see below) PMH/SH reviewed - no change compared to H&P 
________________________________________________________________________ Care Plan discussed with: 
Patient Family RN y   
Care Manager Consultant:     
 
  Comments >50% of visit spent in counseling and coordination of care ________________________________________________________________________ Mona Hidalgo MD  
 
Procedures: see electronic medical records for all procedures/Xrays and details which 
were not copied into this note but were reviewed prior to creation of Plan. LABS: 
Recent Labs  
  05/23/20 0412 05/22/20 
2500 WBC 27.9* 22.9* HGB 10.0* 9.7* HCT 30.6* 29.4*  
* 98* Recent Labs  
  05/23/20 0412 05/22/20 
1511 05/22/20 
0513 05/21/20 
2317  135* 132* 132* K 4.0 3.8 3.6 3.6  96* 95* 94* CO2 26 27 27 26 BUN 43* 51* 60* 67* CREA 3.38* 4.21* 4.73* 4.87* * 120* 90 76 CA 7.0* 7.2* 7.4* 7.8*  
MG 2.2 2.1  --  2.2 PHOS 3.4 3.4  3.4 3.9 4.2 Recent Labs  
  05/23/20 0412 05/22/20 
1511 05/22/20 
0513  05/21/20 
0320 SGOT 162*  --  278*  --  1,121* *  --  120*  --  160* TP 5.6*  --  5.9*  --  5.6* ALB 2.4* 2.5* 2.8*  2.6*   < > 2.2*  2.2*  
GLOB 3.2  --  3.3  --  3.4  
 < > = values in this interval not displayed. Recent Labs  
  05/23/20 
0506 05/23/20 
8209 05/23/20 
0126  05/22/20 
9377  05/21/20 
0320 INR  --  1.8*  --   --  2.4*  --  2.4* PTP  --  18.2*  --   --  23.3*  --  23.6* APTT 50.4* 50.8* 53.8*   < > 53.8*   < > 66.2*  
 < > = values in this interval not displayed. No results for input(s): FE, TIBC, PSAT, FERR in the last 72 hours. No results found for: FOL, RBCF No results for input(s): PH, PCO2, PO2 in the last 72 hours. No results for input(s): CPK, CKMB in the last 72 hours. No lab exists for component: TROPONINI No components found for: [place] Lab Results Component Value Date/Time  Color YELLOW/STRAW 05/15/2020 05:34 PM  
 Appearance CLOUDY (A) 05/15/2020 05:34 PM  
 Specific gravity 1.025 05/15/2020 05:34 PM  
 pH (UA) 5.0 05/15/2020 05:34 PM  
 Protein 30 (A) 05/15/2020 05:34 PM  
 Glucose >1,000 (A) 05/15/2020 05:34 PM  
 Ketone TRACE (A) 05/15/2020 05:34 PM  
 Bilirubin Negative 05/15/2020 05:34 PM  
 Urobilinogen 0.2 05/15/2020 05:34 PM  
 Nitrites Negative 05/15/2020 05:34 PM  
 Leukocyte Esterase Negative 05/15/2020 05:34 PM  
 Epithelial cells FEW 05/15/2020 05:34 PM  
 Bacteria Negative 05/15/2020 05:34 PM  
 WBC 0-4 05/15/2020 05:34 PM  
 RBC 0-5 05/15/2020 05:34 PM  
 
 
MEDICATIONS: 
Current Facility-Administered Medications Medication Dose Route Frequency  dextrose 5% and 0.9% NaCl infusion  100 mL/hr IntraVENous CONTINUOUS  
 bicarbonate dialysis (PRISMASOL) BG K 2/Ca 3.5 5000 ml solution   Extracorporeal DIALYSIS CONTINUOUS And  
 bicarbonate dialysis (PRISMASOL) BG K 4/Ca 2.5 5000 ml solution   Extracorporeal DIALYSIS CONTINUOUS  
 zinc oxide-cod liver oil (DESITIN) 40 % paste   Topical PRN  
 vancomycin (VANCOCIN) 1250 mg in  ml infusion  1,250 mg IntraVENous Q24H  chlorhexidine (ORAL CARE KIT) 0.12 % mouthwash 15 mL  15 mL Oral Q12H  clindamycin (CLEOCIN) 900mg D5W 50mL IVPB (premix)  900 mg IntraVENous Q8H  
 cefepime (MAXIPIME) 1 g in 0.9% sodium chloride (MBP/ADV) 50 mL  1 g IntraVENous Q8H  
 hydrocortisone Sod Succ (PF) (SOLU-CORTEF) injection 100 mg  100 mg IntraVENous Q6H  
 potassium chloride 20 mEq in 50 ml IVPB  20 mEq IntraVENous PRN  
 calcium chloride injection 2 g  2 g IntraVENous PRN  
 famotidine (PF) (PEPCID) 20 mg in 0.9% sodium chloride 10 mL injection  20 mg IntraVENous DAILY  acetaminophen (TYLENOL) solution 650 mg  650 mg Oral Q6H PRN  
 PHARMACY INFORMATION NOTE  1 Each Other BID  DOBUTamine (DOBUTREX) 500 mg/250 mL (2,000 mcg/mL) infusion  0-10 mcg/kg/min IntraVENous TITRATE  propofol (DIPRIVAN) 10 mg/mL infusion  0-50 mcg/kg/min IntraVENous TITRATE  heparin (porcine) injection 2,000 Units  2,000 Units IntraVENous PRN Or  
 heparin (porcine) injection 4,000 Units  4,000 Units IntraVENous PRN  
 heparin 25,000 units in D5W 250 ml infusion  8-25 Units/kg/hr IntraVENous TITRATE  PHENYLephrine (JACLYN-SYNEPHRINE) 100 mg in 0.9% sodium chloride 250 mL infusion   mcg/min IntraVENous TITRATE  
 NOREPINephrine (LEVOPHED) 32 mg in 5% dextrose 250 mL infusion  2-200 mcg/min IntraVENous TITRATE  vasopressin (VASOSTRICT) 20 Units in 0.9% sodium chloride 100 mL infusion  0.04 Units/min IntraVENous CONTINUOUS  
 insulin glargine (LANTUS) injection 25 Units  25 Units SubCUTAneous DAILY  sodium chloride (NS) flush 5-40 mL  5-40 mL IntraVENous Q8H  
 sodium chloride (NS) flush 5-40 mL  5-40 mL IntraVENous PRN  
 ondansetron (ZOFRAN) injection 4 mg  4 mg IntraVENous Q8H PRN  
 alcohol 62% (NOZIN) nasal  1 Ampule  1 Ampule Topical Q12H  
 insulin lispro (HUMALOG) injection   SubCUTAneous Q6H  
 glucose chewable tablet 16 g  4 Tab Oral PRN  
 dextrose (D50W) injection syrg 12.5-25 g  12.5-25 g IntraVENous PRN  
 glucagon (GLUCAGEN) injection 1 mg  1 mg IntraMUSCular PRN  
 acetaminophen (TYLENOL) tablet 650 mg  650 mg Oral Q6H PRN Or  
 acetaminophen (TYLENOL) suppository 650 mg  650 mg Rectal Q6H PRN  
 influenza vaccine 2019-20 (6 mos+)(PF) (FLUARIX/FLULAVAL/FLUZONE QUAD) injection 0.5 mL  0.5 mL IntraMUSCular PRIOR TO DISCHARGE  sodium chloride (NS) flush 5-10 mL  5-10 mL IntraVENous PRN

## 2020-05-23 NOTE — PROGRESS NOTES
Hospitalist Progress Note NAME: Smita Colón :  1955 MRN:  [id number] Assessment / Plan: 
Septic vs cardiogenic shock 2/2 COVID Positive with viral prodrome Hyponatremia Bacteremia Multiorgan failure 
-remains critically ill 
-requiring multiple pressors, on 2 pressors now down from 5 
-appreciate intensivist management 
-worsening renal function, CRRT initiated  
-palliative on board 
-at high risk for further rapid decline, death 
-palliative team on board has communicated with wife/daughter. Full code for now.  
-cont pressor support, empiric abx (vanco,cefepime and clindamycin) 
-has been enrolled in convalescent plasma study 
-cont zinc 
-Blood cx 5/15 alpha streptococcus, repeat blood cx obtained and NGTd 
-On solucortef 
-Ferritin>[id number] 
-Liver Enzymes elevated, trending down 
-Bilirubin trending up Acute renal failure 
-initiated CRRT  
-trend bmp nephrology consultation appreciated Avoid nephrotoxic medication Hx of nonischemic CM 
-recent weight gain 
-EF 20% 
--s/p BIV-ICD 
 
DM2 
-cont insulin w lantus, SSI 
  
NSTEMI 
-cardiology following 
-cont supportive care 
  
Atrial fibrillation Eliquis on hold for now 
  
Hx of Hypertension 
-currently in cardiogenic vs septic shock as above and requiring pressors 
-hold home antiHTNsives 
  
Code Status: Full Surrogate Decision Maker: 
  
DVT Prophylaxis: heparin GI Prophylaxis: not indicated Subjective: Chief Complaint / Reason for Physician Visit Unresponsive on pressor support. Seen through CCU room window and discussed with nursing. Did not enter room to avoid exposure/transmission of COVID-19 Discussed with RN events overnight. Review of Systems: 
Symptom Y/N Comments  Symptom Y/N Comments Fever/Chills    Chest Pain Poor Appetite    Edema Cough    Abdominal Pain Sputum    Joint Pain SOB/GROVES    Pruritis/Rash Nausea/vomit    Tolerating PT/OT Diarrhea    Tolerating Diet Constipation    Other Could NOT obtain due to: Intubated and sedated Objective: VITALS:  
Last 24hrs VS reviewed since prior progress note. Most recent are: 
Patient Vitals for the past 24 hrs: 
 Temp Pulse Resp BP SpO2  
05/23/20 1300 97.1 °F (36.2 °C) 70 17 95/58 99 % 05/23/20 1245 97.1 °F (36.2 °C) 70 20 92/59 99 % 05/23/20 1230 97.2 °F (36.2 °C) 76 18 92/60 99 % 05/23/20 1215 97.1 °F (36.2 °C) 71 18 94/62 100 % 05/23/20 1200 97 °F (36.1 °C) 77 20 92/60 99 % 05/23/20 1145 97.2 °F (36.2 °C) 75 19 98/62 99 % 05/23/20 1130 97.5 °F (36.4 °C) 77 21 100/68 99 % 05/23/20 1115 97.5 °F (36.4 °C) 75 16 112/64 99 % 05/23/20 1100 97.7 °F (36.5 °C) 76 20 108/65 99 % 05/23/20 1045 97.7 °F (36.5 °C) 75 16 110/62 99 % 05/23/20 1030 97.8 °F (36.6 °C) 78 16 106/62 99 % 05/23/20 1015 97.8 °F (36.6 °C) 74 17 104/58 99 % 05/23/20 1000 97.8 °F (36.6 °C) 73 17 102/61 99 % 05/23/20 0945 98 °F (36.7 °C) 73 18 102/65 99 % 05/23/20 0930 98.1 °F (36.7 °C) 72 18 97/58 99 % 05/23/20 0915 98.2 °F (36.8 °C) 72 20 101/62 99 % 05/23/20 0900 98.2 °F (36.8 °C) 75 21 109/67 99 % 05/23/20 0845 98.2 °F (36.8 °C) 70 20 112/66 99 % 05/23/20 0830 98.3 °F (36.8 °C) 72 18 110/64 99 % 05/23/20 0827  72 22  99 % 05/23/20 0815 98.3 °F (36.8 °C) 72 21 113/67 99 % 05/23/20 0800 98.3 °F (36.8 °C) 72 20 102/61 99 % 05/23/20 0745 98.3 °F (36.8 °C) 71 19 113/66 99 % 05/23/20 0730 98.3 °F (36.8 °C) 73 19 108/63 99 % 05/23/20 0715 98.3 °F (36.8 °C) 71 20 102/59 99 % 05/23/20 0700 98.2 °F (36.8 °C) 74 19 112/55 99 % 05/23/20 0630 98.2 °F (36.8 °C) 72 18 108/60 99 % 05/23/20 0600 98.1 °F (36.7 °C) 72 19 98/63 99 % 05/23/20 0531  73 19  99 % 05/23/20 0530 98.1 °F (36.7 °C) 73 14 100/62 100 % 05/23/20 0500 98 °F (36.7 °C) 71 15 95/62 100 % 05/23/20 0430 97.9 °F (36.6 °C) 72 (!) 6 104/59 100 % 05/23/20 0400 97.8 °F (36.6 °C) 71 17 (!) 89/53 100 % 05/23/20 0330 97.7 °F (36.5 °C) 73 18 101/59 100 % 05/23/20 0300 97.7 °F (36.5 °C) 70 15 96/56 99 % 05/23/20 0230 97.6 °F (36.4 °C) 70 15 92/56 100 % 05/23/20 0200 97.5 °F (36.4 °C) 71 18 108/59 100 % 05/23/20 0130 97.5 °F (36.4 °C) 73 15 96/61 98 % 05/23/20 0100 97.4 °F (36.3 °C) 73 17 106/56 (!) 88 % 05/23/20 0030 97.4 °F (36.3 °C) 70 18 99/60 91 % 05/23/20 0000 97.4 °F (36.3 °C) 71 19  97 % 05/22/20 2330 97.3 °F (36.3 °C) 70 15 91/56 93 % 05/22/20 2302  70 21  93 % 05/22/20 2300 97.3 °F (36.3 °C) 70 16 91/54 93 % 05/22/20 2230 97.2 °F (36.2 °C) 72 17 (!) 86/60 94 % 05/22/20 2200 97.1 °F (36.2 °C) 70 12 (!) 88/55 97 % 05/22/20 2130 97 °F (36.1 °C) 70 18 (!) 80/54 97 % 05/22/20 2100 96.9 °F (36.1 °C) 70 19 (!) 79/52 97 % 05/22/20 2030 96.8 °F (36 °C) 71 18 (!) 89/61 97 % 05/22/20 2024  71 24  94 % 05/22/20 2000 (!) 96.6 °F (35.9 °C) 70 16 91/61 98 % 05/22/20 1945 (!) 96.6 °F (35.9 °C) 70 15 94/64 98 % 05/22/20 1930 (!) 96.5 °F (35.8 °C) 70 16 92/62 98 % 05/22/20 1915 (!) 96.4 °F (35.8 °C) 70 17 91/68 99 % 05/22/20 1900 (!) 96.3 °F (35.7 °C) 70 17 91/62 98 % 05/22/20 1845 (!) 96.2 °F (35.7 °C) 70 19 96/65 98 % 05/22/20 1830 (!) 96.1 °F (35.6 °C) 70 16 94/64 98 % 05/22/20 1815 (!) 96 °F (35.6 °C) 70 15 91/63 98 % 05/22/20 1800 (!) 96 °F (35.6 °C) 70 16 96/64 98 % 05/22/20 1745 (!) 95.9 °F (35.5 °C) 70 16 94/65 98 % 05/22/20 1730 (!) 95.9 °F (35.5 °C) 70 17 95/66 97 % 05/22/20 1715 (!) 95.9 °F (35.5 °C) 70 17 93/67   
05/22/20 1700 (!) 95.9 °F (35.5 °C) 71 18 99/61   
05/22/20 1645 (!) 95.9 °F (35.5 °C) 70 14 (!) 88/62   
05/22/20 1631  70 19  97 % 05/22/20 1630 (!) 95.9 °F (35.5 °C) 70 15 90/64   
05/22/20 1615 (!) 95.9 °F (35.5 °C) 70 15 93/66   
05/22/20 1600 (!) 96 °F (35.6 °C) 71 16 95/65 100 % 05/22/20 1545 (!) 96.1 °F (35.6 °C) 70 15 96/65   
05/22/20 1530 (!) 96.1 °F (35.6 °C) 70 16 104/62  05/22/20 1515 (!) 96.2 °F (35.7 °C) 70 15 110/71   
05/22/20 1500 (!) 96.2 °F (35.7 °C) 70 16 112/71   
05/22/20 1445 (!) 96.3 °F (35.7 °C) 70 16 113/72   
05/22/20 1430 (!) 96.3 °F (35.7 °C) 73 14 102/68   
05/22/20 1415 (!) 96.3 °F (35.7 °C) 73 15 106/69   
05/22/20 1400 (!) 96.5 °F (35.8 °C) 71 15 97/60  Intake/Output Summary (Last 24 hours) at 5/23/2020 1352 Last data filed at 5/23/2020 1300 Gross per 24 hour Intake 5195.85 ml Output 7573 ml Net -2377.15 ml PHYSICAL EXAM: 
GEN: AA male, NAD On Ventilator Intubated and sedated 
anasarca noticed 
jaudiced Seen through ICU window to minimize exposure/transmission to/of COVID-19 Reviewed most current lab test results and cultures  YES Reviewed most current radiology test results   YES Review and summation of old records today    NO Reviewed patient's current orders and MAR    YES 
PMH/ reviewed - no change compared to H&P 
________________________________________________________________________ Care Plan discussed with: 
  Comments Patient Family RN x Care Manager Consultant Multidiciplinary team rounds were held today with , nursing, pharmacist and clinical coordinator. Patient's plan of care was discussed; medications were reviewed and discharge planning was addressed. ________________________________________________________________________ Total NON critical care TIME:  25  Minutes Total CRITICAL CARE TIME Spent:   Minutes non procedure based Comments >50% of visit spent in counseling and coordination of care    
________________________________________________________________________ Pascale Kasper MD  
 
Procedures: see electronic medical records for all procedures/Xrays and details which were not copied into this note but were reviewed prior to creation of Plan. LABS: 
I reviewed today's most current labs and imaging studies. Pertinent labs include: 
Recent Labs  
  05/23/20 
0412 05/22/20 
0513 05/21/20 
0320 WBC 27.9* 22.9* 23.5* HGB 10.0* 9.7* 9.5* HCT 30.6* 29.4* 29.6*  
* 98* 120* Recent Labs  
  05/23/20 
6357 05/22/20 
1511 05/22/20 
0513 05/21/20 
2317  05/21/20 
0320  135* 132* 132*   < > 133*  132* K 4.0 3.8 3.6 3.6   < > 3.6  3.5  96* 95* 94*   < > 90*  90* CO2 26 27 27 26   < > 22  23 * 120* 90 76   < > 125*  126* BUN 43* 51* 60* 67*   < > 88*  90* CREA 3.38* 4.21* 4.73* 4.87*   < > 7.64*  7.91* CA 7.0* 7.2* 7.4* 7.8*   < > 5.4*  5.5* MG 2.2 2.1  --  2.2   < > 1.7 PHOS 3.4 3.4  3.4 3.9 4.2   < > 4.4 ALB 2.4* 2.5* 2.8*  2.6* 2.6*   < > 2.2*  2.2* TBILI 8.1*  --  6.8*  --   --  2.5* SGOT 162*  --  278*  --   --  1,121* *  --  643*  --   --  1,049* INR 1.8*  --  2.4*  --   --  2.4*  
 < > = values in this interval not displayed.   
 
 
Signed: Jill Logan MD

## 2020-05-23 NOTE — PROGRESS NOTES
PULMONARY ASSOCI[facility] INTENSIVIST Consult Service Note Pulmonary, Critical Care, and Sleep Medicine Name: Sheryle Sera MRN: [id number] : 1955 Hospital: [place] [address] Date: 2020  Admission date: 5/15/2020 Hospital Day: 9 Subjective/Interval History:  
 
20: remains critically ill on mechanical ventilation/pressors 20: Intubated, sedated, on pressors. ROS and HPI are not obtainable. : Continue slow improvement in pressor needs. Remains intubated and sedated. : Norepi dose slightly lower this morning, otherwise no change. : Survived the night, remains on max dose of 5 pressors with barely acceptable blood pressures. Intubated, sedated on 100% FiO2.  
: Continues to rapidly decompensate. This morning is largely unresponsive with escalating pressor requirements and worsening multiorgan failure. Multiple phone conversations were had with his wife and children regarding code status and goals of care. They ultimately decided that they wanted to continue aggressive measures including intubation and dialysis; intubation subsequently performed by Dr. Adolfo Messer.  high fever, sweats overniht. IV levophed 95 mcg, IV vasopressin. D dimer 2.56; Cr 5.77; Ferritin yesterday 909. lactic acid higher, WBC now 89882. CXR reviewed and amazingly clear. . Too weak to verbalize. Called wife at home [phone] , Daughter Sunni. They are both well, they were tested at [facility] yesterday. Results pending. Explained medical management for COVID 19. He is critically ill. He is unpredictable. Family wants to defer to his heart doctor any decisions about code status. We dis discuss possiblerole of Convalescent plasma since Remdesivir is not available and would be reserved for respiratory failure pts on vent. Wife, daughter, son will review [email or web address] org and we will discuss process if they want to proceed.  No guarantees offered, as it will take takes to get plasma and the transfusion is not risk free, 11:32 AM 
 
3:38 PM Called wife back. Wife and family has had a chance to review forms and handouts. · [facility]: Expanded Access to Convalescent Plasma for Treatment of Pts with COVID-19. LBO#[id number] Clinical Staff: Dr. Alma Cantor MD. Consent for blood and investigation has been obtained. Discussed the use of Convalescent Plasma collected from COVID 19 patients. These individuals have recovered from the illness and donated blood to study. Use does not guarantee that patients will improve or recover after transfusion. They may actually get worse or have side effects. They agree to proceed with requesting convalescent plasma. Sent order for type and screen. Nursing aware and will get phone consent. I called Blood bank supervisor who will request matched unit from [employer]. Family aware that it may take days to acquire. IMPRESSION:  
1. Acute hypoxic respiratory failure, intubated 5-18 2. Profound septic shock on high level of pressors. 3. COVID 19 pneumonia 4. Alpha strep septicemia- elevated Procalcitonin > 80 
5. Chronic combined systolic/diastolic heart failure 6. Hypertensive heart disease with CHF and CKD 7. LAQUITA/CKD, CVVH to start 8. Chronic anticoagulation at home- eliquis 9. Diabetes mellitus type 2 uncontrolled 10. Hyperlipidemia 11. 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 Chronic atrial fibrillation 13. Status post SJM BIV-ICD implant in 2016 
14. Sleep apnea, undiagnosed/untreated 15. Obesity with recent weight gain RECOMMENDATIONS/PLAN:  
1. Ventilator support - adjust for ABG and wean as tolerated, hopefully can try SBT tomorrow 2. Continue pressors, wean as able 3. Continue antibiotics 4. Stress dose steroids 5. RRT per nephrology 6. Heparin drip 7. Cardiology following 8. Glycemic monitoring and control 9. Replete electrolytes PRN 10. DVT, SUP prophylaxis 11. Remains critically ill Subjective/Initial History:  
I have reviewed the flowsheet and previous days notes. Seen earlier today on rounds. I was asked by Ranjeet Menon MD to see Marly Gaines a 71 y.o.  male  in consultation for a chief complaint of shock. Excerpts from admission 5/15/2020 and consult notes reviewed as follows:  
 
\"Mr. Lilliam Zheng is a 57 y.o. morbidly obese male with Chronic nonischemic dilated cardiomyopathy EF 32%, Chronic systolic congestive heart failure class, H/o atrial fibrillation, On chronic warfarin, Iatrogenic left bundle branch block with predominant RV pacing, 64-70%, Hypertension, Hyperlipidemia and diabetes presents to ED [facility] ED C/O Worsening exertional shortness of breath and around 20 pounds weight gain in last 3 weeks. \" 
 
Pt now in CCU. Poor historian. On room air. No fever. No cough. No diarrhea. Not very active. Saw PCP three weeks ago. Some edema. Chart notes ED [facility] admission in 2017 with decompensation. Patient PCP: Sim Umaña MD 
PMH:  has a past medical history of A-fib ([address] Arrhythmia, Diabetes ([address] Endocrine disease, Hypertension, and Irregular heart beat. He also has no past medical history of Difficult intubation, Malignant hyperthermia due to anesthesia, Nausea & vomiting, or Pseudocholinesterase deficiency. PSH:   has a past surgical history that includes pr cardiac surg procedure unlist; insert emergency endotrach airway (5/18/2020); and ir insert non tunl cvc over 5 yrs (5/18/2020). FHX: family history includes Diabetes in his father; Heart Disease in his father and mother. SHX:  reports that he quit smoking about 26 years ago. He has never used smokeless tobacco. He reports that he does not drink alcohol or use drugs. ROS:A comprehensive review of systems was negative except for that written in the HPI. No Known Allergies MEDS:  
 Current Facility-Administered Medications Medication  dextrose 5% and 0.9% NaCl infusion  bicarbonate dialysis (PRISMASOL) BG K 2/Ca 3.5 5000 ml solution And  
 bicarbonate dialysis (PRISMASOL) BG K 4/Ca 2.5 5000 ml solution  zinc oxide-cod liver oil (DESITIN) 40 % paste  vancomycin (VANCOCIN) 1250 mg in  ml infusion  chlorhexidine (ORAL CARE KIT) 0.12 % mouthwash 15 mL  clindamycin (CLEOCIN) 900mg D5W 50mL IVPB (premix)  cefepime (MAXIPIME) 1 g in 0.9% sodium chloride (MBP/ADV) 50 mL  hydrocortisone Sod Succ (PF) (SOLU-CORTEF) injection 100 mg  potassium chloride 20 mEq in 50 ml IVPB  calcium chloride injection 2 g  
 famotidine (PF) (PEPCID) 20 mg in 0.9% sodium chloride 10 mL injection  acetaminophen (TYLENOL) solution 650 mg  PHARMACY INFORMATION NOTE  DOBUTamine (DOBUTREX) 500 mg/250 mL (2,000 mcg/mL) infusion  propofol (DIPRIVAN) 10 mg/mL infusion  heparin (porcine) injection 2,000 Units Or  heparin (porcine) injection 4,000 Units  heparin 25,000 units in D5W 250 ml infusion  PHENYLephrine (JACLYN-SYNEPHRINE) 100 mg in 0.9% sodium chloride 250 mL infusion  NOREPINephrine (LEVOPHED) 32 mg in 5% dextrose 250 mL infusion  vasopressin (VASOSTRICT) 20 Units in 0.9% sodium chloride 100 mL infusion  insulin glargine (LANTUS) injection 25 Units  sodium chloride (NS) flush 5-40 mL  sodium chloride (NS) flush 5-40 mL  ondansetron (ZOFRAN) injection 4 mg  alcohol 62% (NOZIN) nasal  1 Ampule  insulin lispro (HUMALOG) injection  glucose chewable tablet 16 g  
 dextrose (D50W) injection syrg 12.5-25 g  
 glucagon (GLUCAGEN) injection 1 mg  acetaminophen (TYLENOL) tablet 650 mg Or  acetaminophen (TYLENOL) suppository 650 mg  
 influenza vaccine 2019-20 (6 mos+)(PF) (FLUARIX/FLULAVAL/FLUZONE QUAD) injection 0.5 mL  sodium chloride (NS) flush 5-10 mL Current Facility-Administered Medications:   dextrose 5% and 0.9% NaCl infusion, 100 mL/hr, IntraVENous, CONTINUOUS, Gaurav Montenegro MD, Last Rate: 100 mL/hr at 05/23/20 0635, 100 mL/hr at 05/23/20 1298   bicarbonate dialysis (PRISMASOL) BG K 2/Ca 3.5 5000 ml solution, , Extracorporeal, DIALYSIS CONTINUOUS, Last Rate: 2,000 mL/hr at 05/23/20 0903, 2,000 mL/hr at 05/23/20 0903 **AND** bicarbonate dialysis (PRISMASOL) BG K 4/Ca 2.5 5000 ml solution, , Extracorporeal, DIALYSIS CONTINUOUS, Gaurav Montenegro MD, Last Rate: 2,000 mL/hr at 05/23/20 0635, 2,000 mL/hr at 05/23/20 7684   zinc oxide-cod liver oil (DESITIN) 40 % paste, , Topical, PRN, Shelby Funez MD 
  vancomycin (VANCOCIN) 1250 mg in  ml infusion, 1,250 mg, IntraVENous, Q24H, Jaydon Burgos MD 
  chlorhexidine (ORAL CARE KIT) 0.12 % mouthwash 15 mL, 15 mL, Oral, Q12H, Marci Gustafson DO, 15 mL at 05/23/20 0115   clindamycin (CLEOCIN) 900mg D5W 50mL IVPB (premix), 900 mg, IntraVENous, Q8H, Marci Gustafson DO, Last Rate: 100 mL/hr at 05/23/20 0249, 900 mg at 05/23/20 0249   cefepime (MAXIPIME) 1 g in 0.9% sodium chloride (MBP/ADV) 50 mL, 1 g, IntraVENous, Q8H, Marci Gustafson DO, Last Rate: 50 mL/hr at 05/23/20 0401, 1 g at 05/23/20 0401   hydrocortisone Sod Succ (PF) (SOLU-CORTEF) injection 100 mg, 100 mg, IntraVENous, Q6H, 100 mg at 05/23/20 0646 **AND** [DISCONTINUED] insulin regular (NOVOLIN R, HUMULIN R) injection 6 Units, 6 Units, SubCUTAneous, Q6H, Jerome Hurd MD, Stopped at 05/21/20 1800   potassium chloride 20 mEq in 50 ml IVPB, 20 mEq, IntraVENous, PRN, Michelle Yancey MD 
  calcium chloride injection 2 g, 2 g, IntraVENous, PRN, Gaurav Montenegro MD 
  famotidine (PF) (PEPCID) 20 mg in 0.9% sodium chloride 10 mL injection, 20 mg, IntraVENous, DAILY, Marci Gustafson DO, 20 mg at 05/23/20 2656   acetaminophen (TYLENOL) solution 650 mg, 650 mg, Oral, Q6H PRN, Eduard Haile MD, 650 mg at 05/20/20 7148   PHARMACY INFORMATION NOTE, 1 Each, Other, BID, Marci Gustafson, DO, 1 Each at 05/22/20 1400 
  DOBUTamine (DOBUTREX) 500 mg/250 mL (2,000 mcg/mL) infusion, 0-10 mcg/kg/min, IntraVENous, TITRATE, David PRTETY DO, Last Rate: 18 mL/hr at 05/23/20 0910, 5 mcg/kg/min at 05/23/20 0910   propofol (DIPRIVAN) 10 mg/mL infusion, 0-50 mcg/kg/min, IntraVENous, TITRATE, Fidel HILLS MD, Last Rate: 10.9 mL/hr at 05/23/20 0701, 15 mcg/kg/min at 05/23/20 0701   heparin (porcine) injection 2,000 Units, 2,000 Units, IntraVENous, PRN **OR** heparin (porcine) injection 4,000 Units, 4,000 Units, IntraVENous, PRN, Marci Gustafson, DO, 2,000 Units at 05/19/20 0130 
  heparin 25,000 units in D5W 250 ml infusion, 8-25 Units/kg/hr, IntraVENous, TITRATE, Marci Gustafson, DO, Last Rate: 12.1 mL/hr at 05/23/20 0250, 10 Units/kg/hr at 05/23/20 0250   PHENYLephrine (JACLYN-SYNEPHRINE) 100 mg in 0.9% sodium chloride 250 mL infusion,  mcg/min, IntraVENous, TITRATE, Marci Gustafson DO, Stopped at 05/21/20 0530 
  NOREPINephrine (LEVOPHED) 32 mg in 5% dextrose 250 mL infusion, 2-200 mcg/min, IntraVENous, TITRATE, Marci Gustafson, DO, Last Rate: 21.1 mL/hr at 05/23/20 0910, 45 mcg/min at 05/23/20 0910 
  vasopressin (VASOSTRICT) 20 Units in 0.9% sodium chloride 100 mL infusion, 0.04 Units/min, IntraVENous, CONTINUOUS, Fidel HILLS MD, Last Rate: 12 mL/hr at 05/23/20 0635, 0.04 Units/min at 05/23/20 7487   insulin glargine (LANTUS) injection 25 Units, 25 Units, SubCUTAneous, DAILY, Erna Holguin MD, 25 Units at 05/22/20 1527 
  sodium chloride (NS) flush 5-40 mL, 5-40 mL, IntraVENous, Q8H, Chavo Gray MD, 10 mL at 05/23/20 4467   sodium chloride (NS) flush 5-40 mL, 5-40 mL, IntraVENous, PRN, Chavo Gray MD 
  ondansetron [facility]) injection 4 mg, 4 mg, IntraVENous, Q8H PRN, Chavo Gray MD, 4 mg at 05/16/20 0137 
  alcohol 62% (NOZIN) nasal  1 Ampule, 1 Ampule, Topical, Q12H, Petey Herron MD, 1 Ampule at 20 3484 
  insulin lispro (HUMALOG) injection, , SubCUTAneous, Q6H, Avel HILLS MD, 2 Units at 20 1613 
  glucose chewable tablet 16 g, 4 Tab, Oral, PRN, Emelina Hampton MD 
  dextrose (D50W) injection syrg 12.5-25 g, 12.5-25 g, IntraVENous, PRN, Avel HILSL MD, 12.5 g at 20 0112 
  glucagon (GLUCAGEN) injection 1 mg, 1 mg, IntraMUSCular, PRN, Emelina Hampton MD 
  acetaminophen (TYLENOL) tablet 650 mg, 650 mg, Oral, Q6H PRN, 650 mg at 20 0808 **OR** acetaminophen (TYLENOL) suppository 650 mg, 650 mg, Rectal, Q6H PRN, Emelina Hampton MD, 650 mg at 20 1449   influenza vaccine  (6 mos+)(PF) (FLUARIX/FLULAVAL/FLUZONE QUAD) injection 0.5 mL, 0.5 mL, IntraMUSCular, PRIOR TO DISCHARGE, José Quiñonez,  
  sodium chloride (NS) flush 5-10 mL, 5-10 mL, IntraVENous, PRN, Chio March MD 
 
 
Objective:  
 
Vital Signs: Telemetry:    AFIB Intake/Output:  
Visit Vitals /67 Pulse 75 Temp 98.2 °F (36.8 °C) Resp 21 Ht 6' 3\" (1.905 m) Wt 137.2 kg (302 lb 7.5 oz) SpO2 99% BMI 37.81 kg/m² Temp (24hrs), Av °F (36.1 °C), Min:95.9 °F (35.5 °C), Max:98.3 °F (36.8 °C) O2 Device: Ventilator, Endotracheal tube O2 Flow Rate (L/min): 4 l/min Body mass index is 37.81 kg/m². Wt Readings from Last 4 Encounters:  
20 137.2 kg (302 lb 7.5 oz) 19 123.8 kg (273 lb) 17 129.3 kg (285 lb)  
17 107.5 kg (237 lb) Intake/Output Summary (Last 24 hours) at 2020 3707 Last data filed at 2020 0900 Gross per 24 hour Intake 5152.61 ml Output 7639 ml Net -2486.39 ml Last shift:       0701 -  1900 In: 450.3 [I.V.:450.3] Out: 706 [Urine:5] Last 3 shifts: 1901 -  0700 In: 7550.5 [I.V.:7530.5] Out: 31784 [Urine:190] Hemodynamics:   
CO:   
CI:   
CVP: CVP (mmHg): 13 mmHg (20 0900) SVR:   PAP Systolic:   
 PAP Diastolic:   
PVR:   
XM07:    
 
Ventilator Settings:     
Mode Rate TV Press PEEP FiO2 PIP Min. Vent Assist control    500 ml    6 cm H20 40 %  22 cm H2O  10.6 l/min Physical Exam: 
 
General: intubated/sedated HEENT: NCAT, poor dentition, lips and mucosa dry Eyes: anicteric; conjunctiva clear Neck: no nodes, no cuff leak, no accessory MM use. Chest: no deformity,  
Cardiac: IR regular; no murmur Lungs: no distress, intubated Abd: soft, NT, hypoactive BS Ext: no edema; no joint swelling; No clubbing Neuro: sedated Data:  
 
Current Facility-Administered Medications Medication Dose Route Frequency  dextrose 5% and 0.9% NaCl infusion  100 mL/hr IntraVENous CONTINUOUS  
 bicarbonate dialysis (PRISMASOL) BG K 2/Ca 3.5 5000 ml solution   Extracorporeal DIALYSIS CONTINUOUS And  
 bicarbonate dialysis (PRISMASOL) BG K 4/Ca 2.5 5000 ml solution   Extracorporeal DIALYSIS CONTINUOUS  
 vancomycin (VANCOCIN) 1250 mg in  ml infusion  1,250 mg IntraVENous Q24H  chlorhexidine (ORAL CARE KIT) 0.12 % mouthwash 15 mL  15 mL Oral Q12H  clindamycin (CLEOCIN) 900mg D5W 50mL IVPB (premix)  900 mg IntraVENous Q8H  
 cefepime (MAXIPIME) 1 g in 0.9% sodium chloride (MBP/ADV) 50 mL  1 g IntraVENous Q8H  
 hydrocortisone Sod Succ (PF) (SOLU-CORTEF) injection 100 mg  100 mg IntraVENous Q6H  
 famotidine (PF) (PEPCID) 20 mg in 0.9% sodium chloride 10 mL injection  20 mg IntraVENous DAILY  PHARMACY INFORMATION NOTE  1 Each Other BID  DOBUTamine (DOBUTREX) 500 mg/250 mL (2,000 mcg/mL) infusion  0-10 mcg/kg/min IntraVENous TITRATE  propofol (DIPRIVAN) 10 mg/mL infusion  0-50 mcg/kg/min IntraVENous TITRATE  heparin 25,000 units in D5W 250 ml infusion  8-25 Units/kg/hr IntraVENous TITRATE  PHENYLephrine (JACLYN-SYNEPHRINE) 100 mg in 0.9% sodium chloride 250 mL infusion   mcg/min IntraVENous TITRATE  
 NOREPINephrine (LEVOPHED) 32 mg in 5% dextrose 250 mL infusion  2-200 mcg/min IntraVENous TITRATE  vasopressin (VASOSTRICT) 20 Units in 0.9% sodium chloride 100 mL infusion  0.04 Units/min IntraVENous CONTINUOUS  
 insulin glargine (LANTUS) injection 25 Units  25 Units SubCUTAneous DAILY  sodium chloride (NS) flush 5-40 mL  5-40 mL IntraVENous Q8H  
 alcohol 62% (NOZIN) nasal  1 Ampule  1 Ampule Topical Q12H  
 insulin lispro (HUMALOG) injection   SubCUTAneous Q6H  
 influenza vaccine 2019-20 (6 mos+)(PF) (FLUARIX/FLULAVAL/FLUZONE QUAD) injection 0.5 mL  0.5 mL IntraMUSCular PRIOR TO DISCHARGE Labs: 
Recent Labs  
  05/23/20 
6985 05/22/20 
0513 05/21/20 
0320 WBC 27.9* 22.9* 23.5* HGB 10.0* 9.7* 9.5* HCT 30.6* 29.4* 29.6*  
* 98* 120* Recent Labs  
  05/23/20 
7343 05/22/20 
1511 05/22/20 
0513 05/21/20 
2317  05/21/20 
0320  135* 132* 132*   < > 133*  132* K 4.0 3.8 3.6 3.6   < > 3.6  3.5  96* 95* 94*   < > 90*  90* CO2 26 27 27 26   < > 22  23 * 120* 90 76   < > 125*  126* BUN 43* 51* 60* 67*   < > 88*  90* CREA 3.38* 4.21* 4.73* 4.87*   < > 7.64*  7.91* CA 7.0* 7.2* 7.4* 7.8*   < > 5.4*  5.5* MG 2.2 2.1  --  2.2   < > 1.7 PHOS 3.4 3.4  3.4 3.9 4.2   < > 4.4 ALB 2.4* 2.5* 2.8*  2.6* 2.6*   < > 2.2*  2.2* TBILI 8.1*  --  6.8*  --   --  2.5* SGOT 162*  --  278*  --   --  1,121* *  --  643*  --   --  1,049* INR 1.8*  --  2.4*  --   --  2.4*  
 < > = values in this interval not displayed. Recent Labs  
  05/23/20 
0552 05/22/20 
0439 05/21/20 
0455 PHI 7.387 7.405 7. [address] PCO2I 38.7 40.1 35.1 PO2I 116* 133* 148* HCO3I 23.3 25.1 21.6*  
FIO2I 40 40 50 Imaging: 
I have personally reviewed the patients radiographs and have reviewed the reports: 
Decreased bilateral effusions Total critical care time exclusive of procedures: 35 minutes Devan Kelly MD

## 2020-05-23 NOTE — PROGRESS NOTES
Pharmacy Automatic Renal Dosing Protocol - Antimicrobials Indication for Antimicrobials: sepsis, bacteremia COVID - POSITIVE Current Regimen of Each Antimicrobial: 
Vancomycin 2g X1 (Start Date ; Day # 6 Cefepime 1g q 24 hours (; day # 6 Clindamycin 900 mg IV q8h; started ; day 3 Previous Antimicrobial Therapy: CTX  -  Azithromycin 500 mg every day ( - ) Goal Level: VANCOMYCIN TROUGH GOAL RANGE Vancomycin Trough: 15 - 20 mcg/mL  (AUC: 400 - 600 mg/hr/Liter/day) Date Dose & Interval Measured (mcg/mL) Extrapolated (mcg/mL)  AM N/a 15.6 N/a  
 @1511 1500mg q 24h 21.3 N/A Date & time of next level:  @ 1700 - not entered Significant Cultures:  
5/15: Blood cx #1: NGTD, final 
5/15: Blood cx #2: ALPHA STREPTOCOCCUS - final 
5/15: Urine cx: NG - Final 
5/15: Respiratory panel: Not detected 5/15: sars-cov-2 positive  blood cx pending Radiology / Imaging results: (X-ray, CT scan or MRI):  
5/15: CXR - No Acute Disease  CXR IMPRESSION: Small pleural effusions and bibasilar opacities are stable on the 
left and mildly increased on the right. Paralysis, amputations, malnutrition: no 
 
Labs: 
Recent Labs  
  20 
0412 20 
1511 20 
0513  20 
0320 CREA 3.38* 4.21* 4.73*   < > 7.64*  7.91* BUN 43* 51* 60*   < > 88*  90* WBC 27.9*  --  22.9*  --  23.5*  
 < > = values in this interval not displayed. Temp (24hrs), Av.9 °F (36.1 °C), Min:95.9 °F (35.5 °C), Max:98.3 °F (36.8 °C) Creatinine Clearance (mL/min) or Dialysis: CVVHD  
DFR 2000 mL/hr Impression/Plan:  
CVVHD continues Blood cultures resent today Continue current dose of abx; appropriate for indication/renal function Vancomycin decreased to 1250mg q 24hr Antimicrobial stop date: pending Pharmacy will follow daily and adjust medications as appropriate for renal function and/or serum levels. Thank you, Julio Jack PHARMD 
 
Recommended duration of therapy 
http://[facility]/Pharmacy/Clinical%20Companion/Duration%20of%20ABX%20therapy. docx Renal Dosing 
http://[facility]/[place]/[facility]/Pharmacy/Clinical%20Companion/Renal%[id number]. pdf

## 2020-05-23 NOTE — DIALYSIS
[facility]       [phone] Orders Mode: CVVHD Prismasol Bath: 4K/2.5Ca Blood Flow Rate: 180ml/min Prismasol Dose: 2000ml/hr Factor: 50ml/hr Metrics BP/HR: 108/65 76 Access Pressure: -97 Filter Pressure: 120 Return Pressure: 70 TMP: 65  
PD: 32 Dialysate Flow Rate: 2000ml/hr Comments / Plan:  
Patient, orders, line and consent verified. Labs, notes and code status reviewed. [id number] filter running well with no indication for change at this time. RIJ non-tunneled CVC CDI with transparent dressing and biopatch in place. Lines visible/secure with blood warmer attached to the return line and set to 37*C. Education and pre/post with primary RN 
  
Rounding completed from outside of the room due to COVID+/PPE conservation with the assistance of the primary RN.

## 2020-05-23 NOTE — PROGRESS NOTES
Progress Note 5/23/2020 11:43 AM 
NAME: Mike Alejandra MRN:  [id number] Admit Diagnosis: Sepsis ([facility] [address] [A41.9] Problem List: 1. Shock; septic/cardiogenic 2. Severe sepsis 3. COVID-19 + 4. NSTEMI 5. Hyponatremia 6. Lactic acidosis 7. Acute liver injury 8. Acute on chronic systolic heart failure; Class IV 9. Acute kidney injury; anuric 10. Coagulopathy 11. Nonischemic dilated cardiomyopathy s/p remote ICD 12. Chronic atrial fibrillation 13. Sleep apnea 14. Hypertensive heart disease w/ CKD and HF Assessment/Plan:  
Long discussion with the family multiple times. He is gravely ill and most likely will not survive this. This has been relayed and they understand this. Intake/Output Summary (Last 24 hours) at 5/23/2020 0932 Last data filed at 5/23/2020 0900 Gross per 24 hour Intake 5152.61 ml Output 7639 ml Net -2486.39 ml Ricky off Vaso @ 0.04 Norepi @ 45 Dobut @ 5 Epi off 40% FiO2 1. Continue supportive care 2. Continue lovenox as tolerated; OK to hold for procedures 3. CVVH ongoing; tolerating full factor 4. Remains critically ill but I am hopeful 5. Discussed w/ family daily 6. Probably ok to wean dobutamine to 2.5 and then if he does ok to off  
 
  
 [x]       High complexity decision making was performed in this patient at high risk for decompensation with multiple organ involvement. Subjective:  
 
Mike Alejandra is intubated and sedated. Discussed with RN events overnight. Review of Systems: 
 
Symptom Y/N Comments  Symptom Y/N Comments Fever/Chills N   Chest Pain N Poor Appetite N   Edema N   
Cough N   Abdominal Pain N Sputum N   Joint Pain N   
SOB/GROVES N   Pruritis/Rash N   
Nausea/vomit N   Tolerating PT/OT Y Diarrhea N   Tolerating Diet Y Constipation N   Other Could NOT obtain due to: sedated Objective:  
  
Physical Exam: 
 
Last 24hrs VS reviewed since prior progress note. Most recent are: Visit Vitals /67 Pulse 75 Temp 98.2 °F (36.8 °C) Resp 21 Ht 6' 3\" (1.905 m) Wt 137.2 kg (302 lb 7.5 oz) SpO2 99% BMI 37.81 kg/m² Intake/Output Summary (Last 24 hours) at 5/23/2020 0932 Last data filed at 5/23/2020 0900 Gross per 24 hour Intake 5152.61 ml Output 7639 ml Net -2486.39 ml General Appearance: Well developed, well nourished, intubated/sedated Ears/Nose/Mouth/Throat: Hearing grossly normal. 
Neck: Supple. Chest: Lungs decreased diffusely Cardiovascular: Regular rate and rhythm, S1S2 normal, no murmur. Abdomen: Soft, non-tender, bowel sounds are active. Extremities: No edema bilaterally. Skin: Warm and dry. []         Post-cath site without hematoma, bruit, tenderness, or thrill. Distal pulses intact. PMH/SH reviewed - no change compared to H&P Data Review Telemetry: paced/AF 
 
EKG:  
[x]  No new EKG for review Lab Data Personally Reviewed: 
 
Recent Labs  
  05/23/20 
2775 05/22/20 
6323 WBC 27.9* 22.9* HGB 10.0* 9.7* HCT 30.6* 29.4*  
* 98* Recent Labs  
  05/23/20 
2182 05/23/20 
8936 05/23/20 
0126  05/22/20 
4317  05/21/20 
0320 INR  --  1.8*  --   --  2.4*  --  2.4* PTP  --  18.2*  --   --  23.3*  --  23.6* APTT 50.4* 50.8* 53.8*   < > 53.8*   < > 66.2*  
 < > = values in this interval not displayed. Recent Labs  
  05/23/20 
0412 05/22/20 
1511 05/22/20 
0513 05/21/20 
2317  135* 132* 132* K 4.0 3.8 3.6 3.6  96* 95* 94* CO2 26 27 27 26 BUN 43* 51* 60* 67* CREA 3.38* 4.21* 4.73* 4.87* * 120* 90 76 CA 7.0* 7.2* 7.4* 7.8*  
MG 2.2 2.1  --  2.2 No results for input(s): CPK, CKNDX, TROIQ in the last 72 hours. No lab exists for component: CPKMB Lab Results Component Value Date/Time  Cholesterol, total 92 03/03/2009 09:40 PM  
 HDL Cholesterol 44 03/03/2009 09:40 PM  
 LDL, calculated 38.4 03/03/2009 09:40 PM  
 Triglyceride <15 05/22/2020 05:13 AM  
 CHOL/HDL Ratio 2.1 03/03/2009 09:40 PM  
 
 
Recent Labs  
  05/23/20 
0412 05/22/20 
1511 05/22/20 
0513  05/21/20 
0320 SGOT 162*  --  278*  --  1,121* *  --  120*  --  160* TP 5.6*  --  5.9*  --  5.6* ALB 2.4* 2.5* 2.8*  2.6*   < > 2.2*  2.2*  
GLOB 3.2  --  3.3  --  3.4  
 < > = values in this interval not displayed. No results for input(s): PH, PCO2, PO2 in the last 72 hours. Medications Personally Reviewed: 
 
Current Facility-Administered Medications Medication Dose Route Frequency  dextrose 5% and 0.9% NaCl infusion  100 mL/hr IntraVENous CONTINUOUS  
 bicarbonate dialysis (PRISMASOL) BG K 2/Ca 3.5 5000 ml solution   Extracorporeal DIALYSIS CONTINUOUS And  
 bicarbonate dialysis (PRISMASOL) BG K 4/Ca 2.5 5000 ml solution   Extracorporeal DIALYSIS CONTINUOUS  
 zinc oxide-cod liver oil (DESITIN) 40 % paste   Topical PRN  
 vancomycin (VANCOCIN) 1250 mg in  ml infusion  1,250 mg IntraVENous Q24H  chlorhexidine (ORAL CARE KIT) 0.12 % mouthwash 15 mL  15 mL Oral Q12H  clindamycin (CLEOCIN) 900mg D5W 50mL IVPB (premix)  900 mg IntraVENous Q8H  
 cefepime (MAXIPIME) 1 g in 0.9% sodium chloride (MBP/ADV) 50 mL  1 g IntraVENous Q8H  
 hydrocortisone Sod Succ (PF) (SOLU-CORTEF) injection 100 mg  100 mg IntraVENous Q6H  
 potassium chloride 20 mEq in 50 ml IVPB  20 mEq IntraVENous PRN  
 calcium chloride injection 2 g  2 g IntraVENous PRN  
 famotidine (PF) (PEPCID) 20 mg in 0.9% sodium chloride 10 mL injection  20 mg IntraVENous DAILY  acetaminophen (TYLENOL) solution 650 mg  650 mg Oral Q6H PRN  
 PHARMACY INFORMATION NOTE  1 Each Other BID  DOBUTamine (DOBUTREX) 500 mg/250 mL (2,000 mcg/mL) infusion  0-10 mcg/kg/min IntraVENous TITRATE  propofol (DIPRIVAN) 10 mg/mL infusion  0-50 mcg/kg/min IntraVENous TITRATE  heparin (porcine) injection 2,000 Units  2,000 Units IntraVENous PRN  Or  
  heparin (porcine) injection 4,000 Units  4,000 Units IntraVENous PRN  
 heparin 25,000 units in D5W 250 ml infusion  8-25 Units/kg/hr IntraVENous TITRATE  PHENYLephrine (JACLYN-SYNEPHRINE) 100 mg in 0.9% sodium chloride 250 mL infusion   mcg/min IntraVENous TITRATE  
 NOREPINephrine (LEVOPHED) 32 mg in 5% dextrose 250 mL infusion  2-200 mcg/min IntraVENous TITRATE  vasopressin (VASOSTRICT) 20 Units in 0.9% sodium chloride 100 mL infusion  0.04 Units/min IntraVENous CONTINUOUS  
 insulin glargine (LANTUS) injection 25 Units  25 Units SubCUTAneous DAILY  sodium chloride (NS) flush 5-40 mL  5-40 mL IntraVENous Q8H  
 sodium chloride (NS) flush 5-40 mL  5-40 mL IntraVENous PRN  
 ondansetron (ZOFRAN) injection 4 mg  4 mg IntraVENous Q8H PRN  
 alcohol 62% (NOZIN) nasal  1 Ampule  1 Ampule Topical Q12H  
 insulin lispro (HUMALOG) injection   SubCUTAneous Q6H  
 glucose chewable tablet 16 g  4 Tab Oral PRN  
 dextrose (D50W) injection syrg 12.5-25 g  12.5-25 g IntraVENous PRN  
 glucagon (GLUCAGEN) injection 1 mg  1 mg IntraMUSCular PRN  
 acetaminophen (TYLENOL) tablet 650 mg  650 mg Oral Q6H PRN Or  
 acetaminophen (TYLENOL) suppository 650 mg  650 mg Rectal Q6H PRN  
 influenza vaccine 2019-20 (6 mos+)(PF) (FLUARIX/FLULAVAL/FLUZONE QUAD) injection 0.5 mL  0.5 mL IntraMUSCular PRIOR TO DISCHARGE  sodium chloride (NS) flush 5-10 mL  5-10 mL IntraVENous PRN Cipriano Ruth III, DO

## 2020-05-23 NOTE — PROGRESS NOTES
Bedside and Verbal shift change report received from Adebayo Wagner RN (oncoming nurse). Report included the following information SBAR, Kardex, Intake/Output, MAR, Recent Results and Cardiac Rhythm Paced.

## 2020-05-23 NOTE — PROGRESS NOTES
0715 Bedside and Verbal shift change report given to Julius Engle (oncoming nurse) by Jenice Duane (offgoing nurse). Report included the following information SBAR, Kardex, ED Summary, Intake/Output, MAR, Recent Results and Cardiac Rhythm paced. 0800 pt assessed per flowsheet, continue with CVVHD with factor of 150- pt estelle well. Weaning Levo as tolerated, continue Vasopressin and Dobutamine as per order. Pt appears slightly more alert this am, unable to follow commands any more than opening his eyes. 0800 Dr Lesley Willett in to assess pt with review of plan of care. Continue with factor of 150 until CVP is 5. 
1000 Dr Crissy Hoyt and Nikemma Figueroa in to assess pt with review of plan of care, new orders received 
1200 pt reassessed per flowsheet. Wife has called with updates provided 1400 heparin infusion was infusing into left arm, which is leaking a significant amount. I have changed the infusion site to the central line. 1600 pt reassessed per flowsheet, labs collected and sent 1700 spoke with Pharmacist in reference to PTT 35 and heparin infusion had been leaking. Plan to leave the heparin infusion at 11 ml- as it was, and recheck 6 hrs after drip was placed on central line. 1900 Bedside and Verbal shift change report given to Afshin Menon (oncoming nurse) by Julius Engle (offgoing nurse). Report included the following information SBAR, Kardex, ED Summary, Intake/Output, MAR, Recent Results and Cardiac Rhythm paced.

## 2020-05-24 NOTE — PROGRESS NOTES
Progress Note 5/24/2020 11:43 AM 
NAME: Hola Weber MRN:  [id number] Admit Diagnosis: Sepsis ([facility] [address] [A41.9] Problem List: 1. Shock; septic/cardiogenic 2. Severe sepsis 3. COVID-19 + 4. NSTEMI 5. Hyponatremia 6. Lactic acidosis 7. Acute liver injury 8. Acute on chronic systolic heart failure; Class IV 9. Acute kidney injury; anuric 10. Coagulopathy 11. Nonischemic dilated cardiomyopathy s/p remote ICD 12. Chronic atrial fibrillation 13. Sleep apnea 14. Hypertensive heart disease w/ CKD and HF Assessment/Plan:  
Long discussion with the family multiple times. He is gravely ill and most likely will not survive this. This has been relayed and they understand this. Intake/Output Summary (Last 24 hours) at 5/24/2020 3970 Last data filed at 5/24/2020 0700 Gross per 24 hour Intake 2888.84 ml Output 5817 ml Net -2928.16 ml Ricky off Vaso @ 0.04 Norepi @ 30 Dobut off Epi off 30% FiO2 Responds to pain CXR unchanged 1. Continue supportive care 2. Continue lovenox as tolerated; OK to hold for procedures 3. CVVH ongoing; tolerating full factor 4. Remains critically ill but I am hopeful 5. Discussed w/ family this AM via phone  
 
  
 [x]       High complexity decision making was performed in this patient at high risk for decompensation with multiple organ involvement. Subjective:  
 
Hola Weber is intubated and sedated. Discussed with RN events overnight. Review of Systems: 
 
Symptom Y/N Comments  Symptom Y/N Comments Fever/Chills N   Chest Pain N Poor Appetite N   Edema N   
Cough N   Abdominal Pain N Sputum N   Joint Pain N   
SOB/GROVES N   Pruritis/Rash N   
Nausea/vomit N   Tolerating PT/OT Y Diarrhea N   Tolerating Diet Y Constipation N   Other Could NOT obtain due to: sedated Objective:  
  
Physical Exam: 
 
Last 24hrs VS reviewed since prior progress note. Most recent are: 
 
Visit Vitals /64 Pulse 68 Temp 97.7 °F (36.5 °C) Resp 19 Ht 6' 3\" (1.905 m) Wt 135.7 kg (299 lb 2.6 oz) SpO2 97% BMI 37.39 kg/m² Intake/Output Summary (Last 24 hours) at 5/24/2020 9568 Last data filed at 5/24/2020 0700 Gross per 24 hour Intake 2888.84 ml Output 5817 ml Net -2928.16 ml General Appearance: Well developed, well nourished, intubated/sedated Ears/Nose/Mouth/Throat: Hearing grossly normal. 
Neck: Supple. Chest: Lungs decreased diffusely Cardiovascular: Regular rate and rhythm, S1S2 normal, no murmur. Abdomen: Soft, non-tender, bowel sounds are active. Extremities: No edema bilaterally. Skin: Warm and dry. []         Post-cath site without hematoma, bruit, tenderness, or thrill. Distal pulses intact. PMH/ reviewed - no change compared to H&P Data Review Telemetry: paced/AF 
 
EKG:  
[x]  No new EKG for review Lab Data Personally Reviewed: 
 
Recent Labs  
  05/24/20 0408 05/23/20 
5834 WBC 30.5* 27.9* HGB 9.9* 10.0* HCT 30.1* 30.6* * 114* Recent Labs  
  05/24/20 
0221 05/23/20 2036 05/23/20 
1556  05/23/20 
9980  05/22/20 
1500 INR 1.7*  --   --   --  1.8*  --  2.4* PTP 17.1*  --   --   --  18.2*  --  23.3* APTT 60.3* 60.6* 39.5*   < > 50.8*   < > 53.8*  
 < > = values in this interval not displayed. Recent Labs  
  05/24/20 0408 05/23/20 
1556 05/23/20 
6271  136 137  
K 3.6 3.7 4.0  
 102 100 CO2 25 25 26 BUN 36* 37* 43* CREA 2.58* 2.78* 3.38* * 197* 155* CA 7.9* 7.8* 7.0*  
MG 2.4 2.3 2.2 No results for input(s): CPK, CKNDX, TROIQ in the last 72 hours. No lab exists for component: CPKMB Lab Results Component Value Date/Time Cholesterol, total 92 03/03/2009 09:40 PM  
 HDL Cholesterol 44 03/03/2009 09:40 PM  
 LDL, calculated 38.4 03/03/2009 09:40 PM  
 Triglyceride <15 05/22/2020 05:13 AM  
 CHOL/HDL Ratio 2.1 03/03/2009 09:40 PM  
 
 
Recent Labs  
  05/24/20 
0408 05/23/20 0328 [id number] 05/23/20 
9405  05/22/20 
3515 SGOT 111*  --  162*  --  278* *  --  121*  --  120* TP 6.0*  --  5.6*  --  5.9* ALB 2.2* 2.4* 2.4*   < > 2.8*  2.6*  
GLOB 3.8  --  3.2  --  3.3  
 < > = values in this interval not displayed. No results for input(s): PH, PCO2, PO2 in the last 72 hours. Medications Personally Reviewed: 
 
Current Facility-Administered Medications Medication Dose Route Frequency  dextrose 5% and 0.9% NaCl infusion  100 mL/hr IntraVENous CONTINUOUS  
 bicarbonate dialysis (PRISMASOL) BG K 2/Ca 3.5 5000 ml solution   Extracorporeal DIALYSIS CONTINUOUS And  
 bicarbonate dialysis (PRISMASOL) BG K 4/Ca 2.5 5000 ml solution   Extracorporeal DIALYSIS CONTINUOUS  
 zinc oxide-cod liver oil (DESITIN) 40 % paste   Topical PRN  
 vancomycin (VANCOCIN) 1250 mg in  ml infusion  1,250 mg IntraVENous Q24H  chlorhexidine (ORAL CARE KIT) 0.12 % mouthwash 15 mL  15 mL Oral Q12H  clindamycin (CLEOCIN) 900mg D5W 50mL IVPB (premix)  900 mg IntraVENous Q8H  
 cefepime (MAXIPIME) 1 g in 0.9% sodium chloride (MBP/ADV) 50 mL  1 g IntraVENous Q8H  
 hydrocortisone Sod Succ (PF) (SOLU-CORTEF) injection 100 mg  100 mg IntraVENous Q6H  
 potassium chloride 20 mEq in 50 ml IVPB  20 mEq IntraVENous PRN  
 calcium chloride injection 2 g  2 g IntraVENous PRN  
 famotidine (PF) (PEPCID) 20 mg in 0.9% sodium chloride 10 mL injection  20 mg IntraVENous DAILY  acetaminophen (TYLENOL) solution 650 mg  650 mg Oral Q6H PRN  
 PHARMACY INFORMATION NOTE  1 Each Other BID  DOBUTamine (DOBUTREX) 500 mg/250 mL (2,000 mcg/mL) infusion  0-10 mcg/kg/min IntraVENous TITRATE  propofol (DIPRIVAN) 10 mg/mL infusion  0-50 mcg/kg/min IntraVENous TITRATE  heparin (porcine) injection 2,000 Units  2,000 Units IntraVENous PRN Or  
 heparin (porcine) injection 4,000 Units  4,000 Units IntraVENous PRN  
 heparin 25,000 units in D5W 250 ml infusion  8-25 Units/kg/hr IntraVENous TITRATE  PHENYLephrine (JACLYN-SYNEPHRINE) 100 mg in 0.9% sodium chloride 250 mL infusion   mcg/min IntraVENous TITRATE  
 NOREPINephrine (LEVOPHED) 32 mg in 5% dextrose 250 mL infusion  2-200 mcg/min IntraVENous TITRATE  vasopressin (VASOSTRICT) 20 Units in 0.9% sodium chloride 100 mL infusion  0.04 Units/min IntraVENous CONTINUOUS  
 insulin glargine (LANTUS) injection 25 Units  25 Units SubCUTAneous DAILY  sodium chloride (NS) flush 5-40 mL  5-40 mL IntraVENous Q8H  
 sodium chloride (NS) flush 5-40 mL  5-40 mL IntraVENous PRN  
 ondansetron (ZOFRAN) injection 4 mg  4 mg IntraVENous Q8H PRN  
 alcohol 62% (NOZIN) nasal  1 Ampule  1 Ampule Topical Q12H  
 insulin lispro (HUMALOG) injection   SubCUTAneous Q6H  
 glucose chewable tablet 16 g  4 Tab Oral PRN  
 dextrose (D50W) injection syrg 12.5-25 g  12.5-25 g IntraVENous PRN  
 glucagon (GLUCAGEN) injection 1 mg  1 mg IntraMUSCular PRN  
 acetaminophen (TYLENOL) tablet 650 mg  650 mg Oral Q6H PRN Or  
 acetaminophen (TYLENOL) suppository 650 mg  650 mg Rectal Q6H PRN  
 influenza vaccine 2019-20 (6 mos+)(PF) (FLUARIX/FLULAVAL/FLUZONE QUAD) injection 0.5 mL  0.5 mL IntraMUSCular PRIOR TO DISCHARGE  sodium chloride (NS) flush 5-10 mL  5-10 mL IntraVENous PRN Richard Naik III, DO

## 2020-05-24 NOTE — PROGRESS NOTES
[facility] INTENSIVIST Consult Service Note Pulmonary, Critical Care, and Sleep Medicine Name: Carlene Tamayo MRN: [id number] : 1955 Hospital: [facility] Date: 2020  Admission date: 5/15/2020 Hospital Day: 10 Subjective/Interval History:  
 
20: remains pressor and vent-dependent. Sedation lightened, but not alert enough to follow commands. 20: remains critically ill on mechanical ventilation/pressors 20: Intubated, sedated, on pressors. ROS and HPI are not obtainable. : Continue slow improvement in pressor needs. Remains intubated and sedated. : Norepi dose slightly lower this morning, otherwise no change. : Survived the night, remains on max dose of 5 pressors with barely acceptable blood pressures. Intubated, sedated on 100% FiO2.  
: Continues to rapidly decompensate. This morning is largely unresponsive with escalating pressor requirements and worsening multiorgan failure. Multiple phone conversations were had with his wife and children regarding code status and goals of care. They ultimately decided that they wanted to continue aggressive measures including intubation and dialysis; intubation subsequently performed by Dr. Rivka Logan.  high fever, sweats overniht. IV levophed 95 mcg, IV vasopressin. D dimer 2.56; Cr 5.77; Ferritin yesterday 909. lactic acid higher, WBC now 37995. CXR reviewed and amazingly clear. . Too weak to verbalize. Called wife at home [phone] , Daughter Carisa Vides. They are both well, they were tested at [facility] yesterday. Results pending. Explained medical management for COVID 19. He is critically ill. He is unpredictable. Family wants to defer to his heart doctor any decisions about code status.  We dis discuss possiblerole of Convalescent plasma since Remdesivir is not available and would be reserved for respiratory failure pts on vent. Wife, daughter, son will review [email or web address] and we will discuss process if they want to proceed. No guarantees offered, as it will take takes to get plasma and the transfusion is not risk free, 11:32 AM 
 
3:38 PM Called wife back. Wife and family has had a chance to review forms and handouts. · [facility]: Expanded Access to Convalescent Plasma for Treatment of Pts with COVID-19. KNX#[id number] Clinical Staff: Dr. Natalie Felix MD. Consent for blood and investigation has been obtained. Discussed the use of Convalescent Plasma collected from COVID 19 patients. These individuals have recovered from the illness and donated blood to study. Use does not guarantee that patients will improve or recover after transfusion. They may actually get worse or have side effects. They agree to proceed with requesting convalescent plasma. Sent order for type and screen. Nursing aware and will get phone consent. I called Blood bank supervisor who will request matched unit from [employer]. Family aware that it may take days to acquire. IMPRESSION:  
1. Acute hypoxic respiratory failure, intubated 5-18 2. Profound septic shock on high level of pressors. 3. COVID 19 pneumonia 4. Alpha strep septicemia- elevated Procalcitonin > 80 
5. Chronic combined systolic/diastolic heart failure 6. Hypertensive heart disease with CHF and CKD 7. LAQUITA/CKD, CVVH to start 8. Chronic anticoagulation at home- eliquis 9. Diabetes mellitus type 2 uncontrolled 10. Hyperlipidemia 11. 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 Chronic atrial fibrillation 13. Status post SJM BIV-ICD implant in 2016 
14. Sleep apnea, undiagnosed/untreated 15. Obesity with recent weight gain RECOMMENDATIONS/PLAN:  
1. Ventilator support - adjust for ABG and wean as tolerated, mental status and relatively high pressor doses make attempts at weaning/extubation difficult currently 2. Continue pressors, wean as able 3. Minimize sedation 4. Continue antibiotics 5. Stress dose steroids 6. RRT per nephrology 7. Heparin drip 8. Cardiology following 9. Glycemic monitoring and control 10. Replete electrolytes PRN 11. DVT, SUP prophylaxis 12. Remains critically ill Subjective/Initial History:  
I have reviewed the flowsheet and previous days notes. Seen earlier today on rounds. I was asked by Aide Kahn MD to see Lang Degroot a 71 y.o.  male  in consultation for a chief complaint of shock. Excerpts from admission 5/15/2020 and consult notes reviewed as follows:  
 
\"Mr. Neisha Camara is a 57 y.o. morbidly obese male with Chronic nonischemic dilated cardiomyopathy EF 43%, Chronic systolic congestive heart failure class, H/o atrial fibrillation, On chronic warfarin, Iatrogenic left bundle branch block with predominant RV pacing, 64-70%, Hypertension, Hyperlipidemia and diabetes presents to ED [facility] ED C/O Worsening exertional shortness of breath and around 20 pounds weight gain in last 3 weeks. \" 
 
Pt now in CCU. Poor historian. On room air. No fever. No cough. No diarrhea. Not very active. Saw PCP three weeks ago. Some edema. Chart notes ED [facility] admission in 2017 with decompensation. Patient PCP: Nathan Mora MD 
PMH:  has a past medical history of A-fib ([facility] [address] Arrhythmia, Diabetes ([facility] [address] Endocrine disease, Hypertension, and Irregular heart beat. He also has no past medical history of Difficult intubation, Malignant hyperthermia due to anesthesia, Nausea & vomiting, or Pseudocholinesterase deficiency. PSH:   has a past surgical history that includes pr cardiac surg procedure unlist; insert emergency endotrach airway (5/18/2020); and ir insert non tunl cvc over 5 yrs (5/18/2020). FHX: family history includes Diabetes in his father; Heart Disease in his father and mother. SHX:  reports that he quit smoking about 26 years ago.  He has never used smokeless tobacco. He reports that he does not drink alcohol or use drugs. ROS:A comprehensive review of systems was negative except for that written in the HPI. No Known Allergies MEDS:  
Current Facility-Administered Medications Medication  dextrose 5% and 0.9% NaCl infusion  bicarbonate dialysis (PRISMASOL) BG K 2/Ca 3.5 5000 ml solution And  
 bicarbonate dialysis (PRISMASOL) BG K 4/Ca 2.5 5000 ml solution  zinc oxide-cod liver oil (DESITIN) 40 % paste  vancomycin (VANCOCIN) 1250 mg in  ml infusion  chlorhexidine (ORAL CARE KIT) 0.12 % mouthwash 15 mL  clindamycin (CLEOCIN) 900mg D5W 50mL IVPB (premix)  cefepime (MAXIPIME) 1 g in 0.9% sodium chloride (MBP/ADV) 50 mL  hydrocortisone Sod Succ (PF) (SOLU-CORTEF) injection 100 mg  potassium chloride 20 mEq in 50 ml IVPB  calcium chloride injection 2 g  
 famotidine (PF) (PEPCID) 20 mg in 0.9% sodium chloride 10 mL injection  acetaminophen (TYLENOL) solution 650 mg  PHARMACY INFORMATION NOTE  DOBUTamine (DOBUTREX) 500 mg/250 mL (2,000 mcg/mL) infusion  propofol (DIPRIVAN) 10 mg/mL infusion  heparin (porcine) injection 2,000 Units Or  heparin (porcine) injection 4,000 Units  heparin 25,000 units in D5W 250 ml infusion  PHENYLephrine (JACLYN-SYNEPHRINE) 100 mg in 0.9% sodium chloride 250 mL infusion  NOREPINephrine (LEVOPHED) 32 mg in 5% dextrose 250 mL infusion  vasopressin (VASOSTRICT) 20 Units in 0.9% sodium chloride 100 mL infusion  insulin glargine (LANTUS) injection 25 Units  sodium chloride (NS) flush 5-40 mL  sodium chloride (NS) flush 5-40 mL  ondansetron (ZOFRAN) injection 4 mg  alcohol 62% (NOZIN) nasal  1 Ampule  insulin lispro (HUMALOG) injection  glucose chewable tablet 16 g  
 dextrose (D50W) injection syrg 12.5-25 g  
 glucagon (GLUCAGEN) injection 1 mg  acetaminophen (TYLENOL) tablet 650 mg Or  acetaminophen (TYLENOL) suppository 650 mg  
 influenza vaccine 2019-20 (6 mos+)(PF) (FLUARIX/FLULAVAL/FLUZONE QUAD) injection 0.5 mL  sodium chloride (NS) flush 5-10 mL Current Facility-Administered Medications:  
  dextrose 5% and 0.9% NaCl infusion, 100 mL/hr, IntraVENous, CONTINUOUS, Delmar Valdes MD, Last Rate: 100 mL/hr at 05/24/20 0847, 100 mL/hr at 05/24/20 0847 
  bicarbonate dialysis (PRISMASOL) BG K 2/Ca 3.5 5000 ml solution, , Extracorporeal, DIALYSIS CONTINUOUS, Last Rate: 2,000 mL/hr at 05/24/20 0959, 2,000 mL/hr at 05/24/20 0959 **AND** bicarbonate dialysis (PRISMASOL) BG K 4/Ca 2.5 5000 ml solution, , Extracorporeal, DIALYSIS CONTINUOUS, Suzette Montenegro MD, Last Rate: 2,000 mL/hr at 05/24/20 0742, 2,000 mL/hr at 05/24/20 4554   zinc oxide-cod liver oil (DESITIN) 40 % paste, , Topical, PRN, Mariza UREÑA MD 
  vancomycin (VANCOCIN) 1250 mg in  ml infusion, 1,250 mg, IntraVENous, Q24H, Mariza UREÑA MD, Last Rate: 125 mL/hr at 05/23/20 1800, 1,250 mg at 05/23/20 1800   chlorhexidine (ORAL CARE KIT) 0.12 % mouthwash 15 mL, 15 mL, Oral, Q12H, Marci Gustafson DO, 15 mL at 05/24/20 6573   clindamycin (CLEOCIN) 900mg D5W 50mL IVPB (premix), 900 mg, IntraVENous, Q8H, Marci Gustafson DO, Last Rate: 100 mL/hr at 05/24/20 0205, 900 mg at 05/24/20 0205   cefepime (MAXIPIME) 1 g in 0.9% sodium chloride (MBP/ADV) 50 mL, 1 g, IntraVENous, Q8H, Marci Gustafson DO, Last Rate: 50 mL/hr at 05/24/20 0401, 1 g at 05/24/20 0401   hydrocortisone Sod Succ (PF) (SOLU-CORTEF) injection 100 mg, 100 mg, IntraVENous, Q6H, 100 mg at 05/24/20 0501 **AND** [DISCONTINUED] insulin regular (NOVOLIN R, HUMULIN R) injection 6 Units, 6 Units, SubCUTAneous, Q6H, Stanley Taylor MD, Stopped at 05/21/20 1800   potassium chloride 20 mEq in 50 ml IVPB, 20 mEq, IntraVENous, PRN, Delmar Valdes MD 
  calcium chloride injection 2 g, 2 g, IntraVENous, PRN, Delmar Valdes MD 
   famotidine (PF) (PEPCID) 20 mg in 0.9% sodium chloride 10 mL injection, 20 mg, IntraVENous, DAILY, Marci Gustafson DO, 20 mg at 05/24/20 9989   acetaminophen (TYLENOL) solution 650 mg, 650 mg, Oral, Q6H PRN, Alfonso Collier MD, 650 mg at 05/20/20 2132   PHARMACY INFORMATION NOTE, 1 Each, Other, BID, Marci Gustafson DO, 1 Each at 05/24/20 0900 
  DOBUTamine (DOBUTREX) 500 mg/250 mL (2,000 mcg/mL) infusion, 0-10 mcg/kg/min, IntraVENous, TITRATE, Rosette PRETTY DO, Stopped at 05/23/20 1312   propofol (DIPRIVAN) 10 mg/mL infusion, 0-50 mcg/kg/min, IntraVENous, TITRATE, Deja HILLS MD, Last Rate: 10.9 mL/hr at 05/24/20 0659, 15 mcg/kg/min at 05/24/20 8716   heparin (porcine) injection 2,000 Units, 2,000 Units, IntraVENous, PRN **OR** heparin (porcine) injection 4,000 Units, 4,000 Units, IntraVENous, PRN, Marci Gustafson DO, 2,000 Units at 05/19/20 0130 
  heparin 25,000 units in D5W 250 ml infusion, 8-25 Units/kg/hr, IntraVENous, TITRATE, Marci Gustafson, , Last Rate: 13.3 mL/hr at 05/23/20 2233, 11 Units/kg/hr at 05/23/20 2233   PHENYLephrine (JACLYN-SYNEPHRINE) 100 mg in 0.9% sodium chloride 250 mL infusion,  mcg/min, IntraVENous, TITRATE, Marci Gustafson DO, Stopped at 05/21/20 0530 
  NOREPINephrine (LEVOPHED) 32 mg in 5% dextrose 250 mL infusion, 2-200 mcg/min, IntraVENous, TITRATE, Marci Gustafson DO, Last Rate: 16.4 mL/hr at 05/24/20 1004, 35 mcg/min at 05/24/20 1004   vasopressin (VASOSTRICT) 20 Units in 0.9% sodium chloride 100 mL infusion, 0.04 Units/min, IntraVENous, CONTINUOUS, Deja HILLS MD, Last Rate: 12 mL/hr at 05/24/20 0944, 0.04 Units/min at 05/24/20 0944   insulin glargine (LANTUS) injection 25 Units, 25 Units, SubCUTAneous, DAILY, Deja HILLS MD, 25 Units at 05/23/20 1120 
  sodium chloride (NS) flush 5-40 mL, 5-40 mL, IntraVENous, Q8H, Alfonso Collier MD, 10 mL at 05/24/20 0501   sodium chloride (NS) flush 5-40 mL, 5-40 mL, IntraVENous, PRN, Ni Holcomb MD 
  ondansetron [facility]) injection 4 mg, 4 mg, IntraVENous, Q8H PRN, Ni Holcomb MD, 4 mg at 20 0137 
  alcohol 62% (NOZIN) nasal  1 Ampule, 1 Ampule, Topical, Q12H, Ni Holcomb MD, 1 Ampule at 20 0850 
  insulin lispro (HUMALOG) injection, , SubCUTAneous, Q6H, Jasmina HILLS MD, Stopped at 20 0600 
  glucose chewable tablet 16 g, 4 Tab, Oral, PRN, Theresa Rocha MD 
  dextrose (D50W) injection syrg 12.5-25 g, 12.5-25 g, IntraVENous, PRN, Jasmina HILLS MD, 12.5 g at 20 0112 
  glucagon (GLUCAGEN) injection 1 mg, 1 mg, IntraMUSCular, PRN, Theresa Rocha MD 
  acetaminophen (TYLENOL) tablet 650 mg, 650 mg, Oral, Q6H PRN, 650 mg at 20 0808 **OR** acetaminophen (TYLENOL) suppository 650 mg, 650 mg, Rectal, Q6H PRN, Theresa Rocha MD, 650 mg at 20 1449   influenza vaccine - (6 mos+)(PF) (FLUARIX/FLULAVAL/FLUZONE QUAD) injection 0.5 mL, 0.5 mL, IntraMUSCular, PRIOR TO DISCHARGE, José Quiñonez,  
  sodium chloride (NS) flush 5-10 mL, 5-10 mL, IntraVENous, PRN, Rosendo March MD 
 
 
Objective:  
 
Vital Signs: Telemetry:    AFIB Intake/Output:  
Visit Vitals BP (!) 72/44 Pulse 68 Temp 97.9 °F (36.6 °C) Resp 21 Ht 6' 3\" (1.905 m) Wt 135.7 kg (299 lb 2.6 oz) SpO2 97% BMI 37.39 kg/m² Temp (24hrs), Av.3 °F (36.3 °C), Min:96.7 °F (35.9 °C), Max:97.9 °F (36.6 °C) O2 Device: Endotracheal tube O2 Flow Rate (L/min): 4 l/min Body mass index is 37.39 kg/m². Wt Readings from Last 4 Encounters:  
20 135.7 kg (299 lb 2.6 oz) 19 123.8 kg (273 lb) 17 129.3 kg (285 lb)  
17 107.5 kg (237 lb) Intake/Output Summary (Last 24 hours) at 2020 1055 Last data filed at 2020 0800 Gross per 24 hour Intake 3043.42 ml Output 5138 ml Net -2094.58 ml Last shift:      05/24 0701 - 05/24 1900 In: 508.2 [I.V.:508.2] Out: 0 Last 3 shifts: 05/22 1901 - 05/24 0700 In: 5490.3 [I.V.:5385.3] Out: 06930 [Urine:25] Hemodynamics:   
CO:   
CI:   
CVP: CVP (mmHg): 6 mmHg (05/24/20 1000) SVR:   PAP Systolic:   
PAP Diastolic:   
PVR:   
TL64:    
 
Ventilator Settings:     
Mode Rate TV Press PEEP FiO2 PIP Min. Vent Assist control    500 ml    6 cm H20 30 %  26 cm H2O  11.3 l/min Physical Exam: 
 
General: intubated/sedated HEENT: NCAT, poor dentition, lips and mucosa dry Eyes: anicteric; conjunctiva clear Neck: no nodes, no cuff leak, no accessory MM use. Chest: no deformity,  
Cardiac: IR regular; no murmur Lungs: no distress, intubated Abd: soft, NT, hypoactive BS Ext: no edema; no joint swelling; No clubbing Neuro: sedated Data:  
 
Current Facility-Administered Medications Medication Dose Route Frequency  dextrose 5% and 0.9% NaCl infusion  100 mL/hr IntraVENous CONTINUOUS  
 bicarbonate dialysis (PRISMASOL) BG K 2/Ca 3.5 5000 ml solution   Extracorporeal DIALYSIS CONTINUOUS And  
 bicarbonate dialysis (PRISMASOL) BG K 4/Ca 2.5 5000 ml solution   Extracorporeal DIALYSIS CONTINUOUS  
 vancomycin (VANCOCIN) 1250 mg in  ml infusion  1,250 mg IntraVENous Q24H  chlorhexidine (ORAL CARE KIT) 0.12 % mouthwash 15 mL  15 mL Oral Q12H  clindamycin (CLEOCIN) 900mg D5W 50mL IVPB (premix)  900 mg IntraVENous Q8H  
 cefepime (MAXIPIME) 1 g in 0.9% sodium chloride (MBP/ADV) 50 mL  1 g IntraVENous Q8H  
 hydrocortisone Sod Succ (PF) (SOLU-CORTEF) injection 100 mg  100 mg IntraVENous Q6H  
 famotidine (PF) (PEPCID) 20 mg in 0.9% sodium chloride 10 mL injection  20 mg IntraVENous DAILY  PHARMACY INFORMATION NOTE  1 Each Other BID  DOBUTamine (DOBUTREX) 500 mg/250 mL (2,000 mcg/mL) infusion  0-10 mcg/kg/min IntraVENous TITRATE  propofol (DIPRIVAN) 10 mg/mL infusion  0-50 mcg/kg/min IntraVENous TITRATE  heparin 25,000 units in D5W 250 ml infusion  8-25 Units/kg/hr IntraVENous TITRATE  PHENYLephrine (JACLYN-SYNEPHRINE) 100 mg in 0.9% sodium chloride 250 mL infusion   mcg/min IntraVENous TITRATE  
 NOREPINephrine (LEVOPHED) 32 mg in 5% dextrose 250 mL infusion  2-200 mcg/min IntraVENous TITRATE  vasopressin (VASOSTRICT) 20 Units in 0.9% sodium chloride 100 mL infusion  0.04 Units/min IntraVENous CONTINUOUS  
 insulin glargine (LANTUS) injection 25 Units  25 Units SubCUTAneous DAILY  sodium chloride (NS) flush 5-40 mL  5-40 mL IntraVENous Q8H  
 alcohol 62% (NOZIN) nasal  1 Ampule  1 Ampule Topical Q12H  
 insulin lispro (HUMALOG) injection   SubCUTAneous Q6H  
 influenza vaccine 2019-20 (6 mos+)(PF) (FLUARIX/FLULAVAL/FLUZONE QUAD) injection 0.5 mL  0.5 mL IntraMUSCular PRIOR TO DISCHARGE Labs: 
Recent Labs  
  05/24/20 
0408 05/23/20 
3635 05/22/20 
3152 WBC 30.5* 27.9* 22.9* HGB 9.9* 10.0* 9.7* HCT 30.1* 30.6* 29.4*  
* 114* 98* Recent Labs  
  05/24/20 
0408 05/24/20 
0221 05/23/20 
1556 05/23/20 
5907  05/22/20 
3035   --  136 137   < > 132* K 3.6  --  3.7 4.0   < > 3.6   --  102 100   < > 95* CO2 25  --  25 26   < > 27 *  --  197* 155*   < > 90 BUN 36*  --  37* 43*   < > 60* CREA 2.58*  --  2.78* 3.38*   < > 4.73* CA 7.9*  --  7.8* 7.0*   < > 7.4* MG 2.4  --  2.3 2.2   < >  --   
PHOS 3.0  --  3.2 3.4   < > 3.9 ALB 2.2*  --  2.4* 2.4*   < > 2.8*  2.6* TBILI 9.2*  --   --  8.1*  --  6.8* SGOT 111*  --   --  162*  --  278* *  --   --  496*  --  643* INR  --  1.7*  --  1.8*  --  2.4*  
 < > = values in this interval not displayed. Recent Labs  
  05/24/20 
0529 05/23/20 
0552 05/22/20 
8834 PHI 7.359 7.387 7.405 PCO2I 43.3 38.7 40.1 PO2I 128* 116* 133* HCO3I 24.4 23.3 25.1 FIO2I 40 40 40 Imaging: 
I have personally reviewed the patients radiographs and have reviewed the reports: Decreased bilateral effusions Total critical care time exclusive of procedures: 35 minutes Zulay Bain MD

## 2020-05-24 NOTE — PROGRESS NOTES
NAME: Tangela Harp :  1955 MRN:  [id number] Assessment :    Plan: 
--LAQUITA Mild hyponatremia Mild Hyperkalemia Shock Sepsis DM type 2 Systolic HF (EF 50%) COVID + Initiated CVVHD ; tolerating UF (factor 150); up ~ 30 liters--alternating 2k, 4k baths as pharmacy is low on certain dialysates Lewis line  Overnight factor was decreased-d/w nursing this am, will increase factor back to 150cc-as long as cvp > 10 would leave at this factor Subjective: Chief Complaint:  In order to limit the exposure risk from COVID 19 and to preserve the hospital's limited supply of PPEs, seen through ICU window. Intubated. Discussed with his icu nurse Review of Systems:  
 
Symptom Y/N Comments  Symptom Y/N Comments Fever/Chills    Chest Pain Poor Appetite    Edema Cough    Abdominal Pain Sputum    Joint Pain SOB/GROVES    Pruritis/Rash Nausea/vomit    Tolerating PT/OT Diarrhea    Tolerating Diet Constipation    Other Could not obtain due to: See above Objective: VITALS:  
Last 24hrs VS reviewed since prior progress note. Most recent are: 
Visit Vitals /64 Pulse 68 Temp 97.7 °F (36.5 °C) Resp 19 Ht 6' 3\" (1.905 m) Wt 135.7 kg (299 lb 2.6 oz) SpO2 97% BMI 37.39 kg/m² Intake/Output Summary (Last 24 hours) at 2020 6860 Last data filed at 2020 0700 Gross per 24 hour Intake 2888.84 ml Output 5817 ml Net -2928.16 ml Telemetry Reviewed: PHYSICAL EXAM: 
General: NAD, ett in place [place] Edema  -non pitting edema in legs, but still has UE edema Lab Data Reviewed: (see below) Medications Reviewed: (see below) PMH/SH reviewed - no change compared to H&P 
________________________________________________________________________ Care Plan discussed with: 
Patient Family RN y   
Care Manager Consultant:     
 
  Comments >50% of visit spent in counseling and coordination of care    
 
________________________________________________________________________ Rajat Elliott MD  
 
Procedures: see electronic medical records for all procedures/Xrays and details which 
were not copied into this note but were reviewed prior to creation of Plan. LABS: 
Recent Labs  
  05/24/20 0408 05/23/20 
5563 WBC 30.5* 27.9* HGB 9.9* 10.0* HCT 30.1* 30.6* * 114* Recent Labs  
  05/24/20 0408 05/23/20 1556 05/23/20 
9391  136 137  
K 3.6 3.7 4.0  
 102 100 CO2 25 25 26 BUN 36* 37* 43* CREA 2.58* 2.78* 3.38* * 197* 155* CA 7.9* 7.8* 7.0*  
MG 2.4 2.3 2.2 PHOS 3.0 3.2 3.4 Recent Labs  
  05/24/20 0408 05/23/20 1556 05/23/20 
5617  05/22/20 
2046 SGOT 111*  --  162*  --  278* *  --  121*  --  120* TP 6.0*  --  5.6*  --  5.9* ALB 2.2* 2.4* 2.4*   < > 2.8*  2.6*  
GLOB 3.8  --  3.2  --  3.3  
 < > = values in this interval not displayed. Recent Labs  
  05/24/20 0221 05/23/20 2036 05/23/20 1556 05/23/20 
7722  05/22/20 
5596 INR 1.7*  --   --   --  1.8*  --  2.4* PTP 17.1*  --   --   --  18.2*  --  23.3* APTT 60.3* 60.6* 39.5*   < > 50.8*   < > 53.8*  
 < > = values in this interval not displayed. No results for input(s): FE, TIBC, PSAT, FERR in the last 72 hours. No results found for: FOL, RBCF No results for input(s): PH, PCO2, PO2 in the last 72 hours. No results for input(s): CPK, CKMB in the last 72 hours. No lab exists for component: TROPONINI No components found for: [place] Lab Results Component Value Date/Time  Color YELLOW/STRAW 05/15/2020 05:34 PM  
 Appearance CLOUDY (A) 05/15/2020 05:34 PM  
 Specific gravity 1.025 05/15/2020 05:34 PM  
 pH (UA) 5.0 05/15/2020 05:34 PM  
 Protein 30 (A) 05/15/2020 05:34 PM  
 Glucose >1,000 (A) 05/15/2020 05:34 PM  
 Ketone TRACE (A) 05/15/2020 05:34 PM  
 Bilirubin Negative 05/15/2020 05:34 PM  
 Urobilinogen 0.2 05/15/2020 05:34 PM  
 Nitrites Negative 05/15/2020 05:34 PM  
 Leukocyte Esterase Negative 05/15/2020 05:34 PM  
 Epithelial cells FEW 05/15/2020 05:34 PM  
 Bacteria Negative 05/15/2020 05:34 PM  
 WBC 0-4 05/15/2020 05:34 PM  
 RBC 0-5 05/15/2020 05:34 PM  
 
 
MEDICATIONS: 
Current Facility-Administered Medications Medication Dose Route Frequency  dextrose 5% and 0.9% NaCl infusion  100 mL/hr IntraVENous CONTINUOUS  
 bicarbonate dialysis (PRISMASOL) BG K 2/Ca 3.5 5000 ml solution   Extracorporeal DIALYSIS CONTINUOUS And  
 bicarbonate dialysis (PRISMASOL) BG K 4/Ca 2.5 5000 ml solution   Extracorporeal DIALYSIS CONTINUOUS  
 zinc oxide-cod liver oil (DESITIN) 40 % paste   Topical PRN  
 vancomycin (VANCOCIN) 1250 mg in  ml infusion  1,250 mg IntraVENous Q24H  chlorhexidine (ORAL CARE KIT) 0.12 % mouthwash 15 mL  15 mL Oral Q12H  clindamycin (CLEOCIN) 900mg D5W 50mL IVPB (premix)  900 mg IntraVENous Q8H  
 cefepime (MAXIPIME) 1 g in 0.9% sodium chloride (MBP/ADV) 50 mL  1 g IntraVENous Q8H  
 hydrocortisone Sod Succ (PF) (SOLU-CORTEF) injection 100 mg  100 mg IntraVENous Q6H  
 potassium chloride 20 mEq in 50 ml IVPB  20 mEq IntraVENous PRN  
 calcium chloride injection 2 g  2 g IntraVENous PRN  
 famotidine (PF) (PEPCID) 20 mg in 0.9% sodium chloride 10 mL injection  20 mg IntraVENous DAILY  acetaminophen (TYLENOL) solution 650 mg  650 mg Oral Q6H PRN  
 PHARMACY INFORMATION NOTE  1 Each Other BID  DOBUTamine (DOBUTREX) 500 mg/250 mL (2,000 mcg/mL) infusion  0-10 mcg/kg/min IntraVENous TITRATE  propofol (DIPRIVAN) 10 mg/mL infusion  0-50 mcg/kg/min IntraVENous TITRATE  heparin (porcine) injection 2,000 Units  2,000 Units IntraVENous PRN  Or  
 heparin (porcine) injection 4,000 Units  4,000 Units IntraVENous PRN  
  heparin 25,000 units in D5W 250 ml infusion  8-25 Units/kg/hr IntraVENous TITRATE  PHENYLephrine (JACLYN-SYNEPHRINE) 100 mg in 0.9% sodium chloride 250 mL infusion   mcg/min IntraVENous TITRATE  
 NOREPINephrine (LEVOPHED) 32 mg in 5% dextrose 250 mL infusion  2-200 mcg/min IntraVENous TITRATE  vasopressin (VASOSTRICT) 20 Units in 0.9% sodium chloride 100 mL infusion  0.04 Units/min IntraVENous CONTINUOUS  
 insulin glargine (LANTUS) injection 25 Units  25 Units SubCUTAneous DAILY  sodium chloride (NS) flush 5-40 mL  5-40 mL IntraVENous Q8H  
 sodium chloride (NS) flush 5-40 mL  5-40 mL IntraVENous PRN  
 ondansetron (ZOFRAN) injection 4 mg  4 mg IntraVENous Q8H PRN  
 alcohol 62% (NOZIN) nasal  1 Ampule  1 Ampule Topical Q12H  
 insulin lispro (HUMALOG) injection   SubCUTAneous Q6H  
 glucose chewable tablet 16 g  4 Tab Oral PRN  
 dextrose (D50W) injection syrg 12.5-25 g  12.5-25 g IntraVENous PRN  
 glucagon (GLUCAGEN) injection 1 mg  1 mg IntraMUSCular PRN  
 acetaminophen (TYLENOL) tablet 650 mg  650 mg Oral Q6H PRN Or  
 acetaminophen (TYLENOL) suppository 650 mg  650 mg Rectal Q6H PRN  
 influenza vaccine 2019-20 (6 mos+)(PF) (FLUARIX/FLULAVAL/FLUZONE QUAD) injection 0.5 mL  0.5 mL IntraMUSCular PRIOR TO DISCHARGE  sodium chloride (NS) flush 5-10 mL  5-10 mL IntraVENous PRN

## 2020-05-24 NOTE — PROGRESS NOTES
Pharmacy Automatic Renal Dosing Protocol - Antimicrobials Indication for Antimicrobials: sepsis, bacteremia COVID - POSITIVE Current Regimen of Each Antimicrobial: 
Vancomycin 2g X1 (Start Date ; Day # 7 Cefepime 1g q 24 hours (; day # 7 Clindamycin 900 mg IV q8h; started ; day 4 Previous Antimicrobial Therapy: CTX  -  Azithromycin 500 mg every day ( - ) Goal Level: VANCOMYCIN TROUGH GOAL RANGE Vancomycin Trough: 15 - 20 mcg/mL  (AUC: 400 - 600 mg/hr/Liter/day) Date Dose & Interval Measured (mcg/mL) Extrapolated (mcg/mL)  AM N/a 15.6 N/a  
 @1511 1500mg q 24h 21.3 N/A Date & time of next level:  @ 1700 - not entered Significant Cultures:  
5/15: Blood cx #1: NGTD, final 
5/15: Blood cx #2: ALPHA STREPTOCOCCUS - final 
5/15: Urine cx: NG - Final 
5/15: Respiratory panel: Not detected 5/15: sars-cov-2 positive  blood cx- NGTD - pending Radiology / Imaging results: (X-ray, CT scan or MRI):  
5/15: CXR - No Acute Disease  CXR IMPRESSION: Small pleural effusions and bibasilar opacities are stable on the 
left and mildly increased on the right. Paralysis, amputations, malnutrition: no 
 
Labs: 
Recent Labs  
  20 
0408 20 
1556 20 
0412  20 
5324 CREA 2.58* 2.78* 3.38*   < > 4.73* BUN 36* 37* 43*   < > 60* WBC 30.5*  --  27.9*  --  22.9*  
 < > = values in this interval not displayed. Temp (24hrs), Av.4 °F (36.3 °C), Min:96.7 °F (35.9 °C), Max:98.3 °F (36.8 °C) Creatinine Clearance (mL/min) or Dialysis: CVVHD  
DFR 2000 mL/hr Impression/Plan:  
CVVHD continues Continue current dose of abx; appropriate for indication/renal function Vancomycin decreased to 1250mg q 24hr Antimicrobial stop date: pending Pharmacy will follow daily and adjust medications as appropriate for renal function and/or serum levels. Thank you, Gino Lennox, [facility] 
 
 Recommended duration of therapy 
http://[email or web address]/[facility]/Pharmacy/Clinical%20Companion/Duration%20of%20ABX%20therapy. docx Renal Dosing 
http://[facility]/[place]/[facility]/Pharmacy/Clinical%20Companion/Renal%20Dosing%[email or web address]. pdf

## 2020-05-24 NOTE — PROGRESS NOTES
05/24/20 8778 ABCDEF Bundle SBT Safety Screen Passed No  
SBT Screen Reason for Failure Vasopressor use

## 2020-05-24 NOTE — DIALYSIS
[facility]       [phone] Orders Mode: CVVHD Factor: 150 ml/hr DFR: 2000 ml/hr Blood Flow Rate: 180 ml/min Metrics BP / HR: 97/61  70 Blood Flow Rate: 180 ml/min AP:                         -108 RP: 46 TMP: 19 PD: 38  
FP: 129 DFR: 2000 ml/hr Comments / Plan: A bedside to change filter due to reaching filter max later on in the day, all possible blood returned to pt by Omero Osorio RN. Orders, consent, pt and code status confirmed. RIJ non-tunneled CVC +aspiration/+flush x2. Bio-patch C/D/I. New HF 1000 primed, tested, and running well. Lines visible, secure, connections fastened well and blood warmer on return line set to 37 °C. Education and pre/post report given to fany Barnard RN

## 2020-05-24 NOTE — PROGRESS NOTES
Hospitalist Progress Note NAME: Marly Gaines :  1955 MRN:  [id number] Assessment / Plan: 
Septic vs cardiogenic shock 2/2 COVID Positive with viral prodrome Hyponatremia Bacteremia Multiorgan failure 
-remains critically ill 
-requiring multiple pressors, on 2 pressors now down from 5 
-appreciate intensivist management 
-worsening renal function, CRRT initiated  
-palliative on board 
-at high risk for further rapid decline, death 
-palliative team on board has communicated with wife/daughter. Full code for now.  
-cont pressor support, empiric abx (vanco,cefepime and clindamycin) 
-has been enrolled in convalescent plasma study 
-cont zinc 
-Blood cx 5/15 alpha streptococcus, repeat blood cx obtained and NGTd 
-On solucortef 
-Ferritin>33065 
-Liver Enzymes elevated, trending down 
-Bilirubin trending up 
-Leucocytosis worsening Acute renal failure 
-initiated CRRT  
-trend bmp nephrology consultation appreciated Avoid nephrotoxic medication Hx of nonischemic CM 
-recent weight gain 
-EF 20% 
--s/p BIV-ICD 
 
DM2 
-cont insulin w lantus, SSI 
  
NSTEMI 
-cardiology following 
-cont supportive care 
  
Atrial fibrillation Eliquis on hold for now 
  
Hx of Hypertension 
-currently in cardiogenic vs septic shock as above and requiring pressors 
-hold home antiHTNsives 
  
Code Status: Full Surrogate Decision Maker: 
  
DVT Prophylaxis: heparin GI Prophylaxis: not indicated Subjective: Chief Complaint / Reason for Physician Visit Unresponsive on pressor support. Seen through CCU room window and discussed with nursing. Did not enter room to avoid exposure/transmission of COVID-19 Discussed with RN events overnight. Review of Systems: 
Symptom Y/N Comments  Symptom Y/N Comments Fever/Chills    Chest Pain Poor Appetite    Edema Cough    Abdominal Pain Sputum    Joint Pain SOB/GROVES    Pruritis/Rash Nausea/vomit    Tolerating PT/OT Diarrhea    Tolerating Diet Constipation    Other Could NOT obtain due to: Intubated and sedated Objective: VITALS:  
Last 24hrs VS reviewed since prior progress note. Most recent are: 
Patient Vitals for the past 24 hrs: 
 Temp Pulse Resp BP SpO2  
05/24/20 1115 98 °F (36.7 °C) 81 14 121/64 98 % 05/24/20 1102  66 17  97 % 05/24/20 1100 98 °F (36.7 °C) 74 (!) 7 116/62 97 % 05/24/20 1045 98 °F (36.7 °C) 69 18 114/63 97 % 05/24/20 1030 98 °F (36.7 °C) 69 18 109/60 97 % 05/24/20 1015 98 °F (36.7 °C) 70 20 114/62 97 % 05/24/20 1000 97.9 °F (36.6 °C) 68 21 (!) 72/44 97 % 05/24/20 0930 97.8 °F (36.6 °C) 75 18 91/54 98 % 05/24/20 0915 97.8 °F (36.6 °C) 73 20 93/55 99 % 05/24/20 0900 97.7 °F (36.5 °C) 70 21 116/63 98 % 05/24/20 0845 97.7 °F (36.5 °C) 70 20 120/64 97 % 05/24/20 0830 97.6 °F (36.4 °C) 71 21 125/74 97 % 05/24/20 0815 97.7 °F (36.5 °C) 68 19 122/64 97 % 05/24/20 0800 97.8 °F (36.6 °C) 70 19 120/64 98 % 05/24/20 0745 97.7 °F (36.5 °C) 72 18 113/65 97 % 05/24/20 0730 97.7 °F (36.5 °C) 74 17 114/64 97 % 05/24/20 0715 97.7 °F (36.5 °C) 74 17 114/65 97 % 05/24/20 0700 97.6 °F (36.4 °C) 71 18 114/65 97 % 05/24/20 0515   18  98 % 05/24/20 0300 97.6 °F (36.4 °C) 71 21 107/70 98 % 05/24/20 0200 97.6 °F (36.4 °C) 71 18 (!) 89/53 99 % 05/24/20 0100 97.7 °F (36.5 °C) 70 22 96/57 99 % 05/24/20 0040  70  97/61   
05/24/20 0031   21  100 % 05/24/20 0000 97.5 °F (36.4 °C) 67 18 (!) 88/64 99 % 05/23/20 2300 97.7 °F (36.5 °C) 69 15 (!) 89/55 98 % 05/23/20 2230 97.6 °F (36.4 °C) 70 20 (!) 89/56 99 % 05/23/20 2200 97.6 °F (36.4 °C) 71 21 (!) 87/54 100 % 05/23/20 2100 97.5 °F (36.4 °C) 70 18 (!) 84/52 100 % 05/23/20 2058   20  100 % 05/23/20 2030 97.4 °F (36.3 °C) 69 22 94/68 99 % 05/23/20 2000 97.3 °F (36.3 °C) 68 26 95/59 99 % 05/23/20 1930 97.3 °F (36.3 °C) 69 20 93/58 100 % 05/23/20 1900 97.2 °F (36.2 °C) 68 19 (!) 89/57 99 % 05/23/20 1845 97.3 °F (36.3 °C) 70 23 91/58 99 % 05/23/20 1830 97.2 °F (36.2 °C) 63 21 91/58 99 % 05/23/20 1815 97.1 °F (36.2 °C) 73 21 99/62 100 % 05/23/20 1800 97.2 °F (36.2 °C) 72 20 97/60 100 % 05/23/20 1745 97.3 °F (36.3 °C) 70 19 96/59 100 % 05/23/20 1730 97.3 °F (36.3 °C) 73 20 93/57 100 % 05/23/20 1715 97.1 °F (36.2 °C) 74 23 159/90   
05/23/20 1709  71 20  98 % 05/23/20 1700 97.3 °F (36.3 °C)   97/62 98 % 05/23/20 1645 97.1 °F (36.2 °C) 70 19 95/59 99 % 05/23/20 1630 97 °F (36.1 °C) 73 19 95/61 99 % 05/23/20 1615 97 °F (36.1 °C) 70 17 92/58 99 % 05/23/20 1606 96.9 °F (36.1 °C) 71 18 (!) 88/59 98 % 05/23/20 1600 96.9 °F (36.1 °C) 70 18 (!) 89/55 99 % 05/23/20 1545 96.8 °F (36 °C) 70 17 121/71 98 % 05/23/20 1515 96.8 °F (36 °C) 70 19 97/61 99 % 05/23/20 1500 96.8 °F (36 °C) 71 15 100/64 99 % 05/23/20 1445 96.8 °F (36 °C) 72 18 101/64 99 % 05/23/20 1430 96.8 °F (36 °C) 73 18 103/65 99 % 05/23/20 1415 (!) 96.7 °F (35.9 °C) 70 26 101/64 99 % 05/23/20 1400 96.8 °F (36 °C) 72 18 101/63 99 % 05/23/20 1345 96.8 °F (36 °C) 75 18 94/61 99 % 05/23/20 1330 96.8 °F (36 °C) 73 18 90/58 99 % 05/23/20 1315 97 °F (36.1 °C) 71 18 94/59 99 % 05/23/20 1300 97.1 °F (36.2 °C) 70 17 95/58 99 % 05/23/20 1245 97.1 °F (36.2 °C) 70 20 92/59 99 % 05/23/20 1230 97.2 °F (36.2 °C) 76 18 92/60 99 % 05/23/20 1215 97.1 °F (36.2 °C) 71 18 94/62 100 % 05/23/20 1200 97 °F (36.1 °C) 77 20 92/60 99 % 05/23/20 1145 97.2 °F (36.2 °C) 75 19 98/62 99 % 05/23/20 1130 97.5 °F (36.4 °C) 77 21 100/68 99 % Intake/Output Summary (Last 24 hours) at 5/24/2020 1129 Last data filed at 5/24/2020 0800 Gross per 24 hour Intake 3043.42 ml Output 4812 ml Net -1768.58 ml PHYSICAL EXAM: 
GEN: AA male, NAD On Ventilator Intubated and sedated 
anasarca noticed, improving edema 
jaudiced Seen through ICU window to minimize exposure/transmission to/of COVID-19 Reviewed most current lab test results and cultures  YES Reviewed most current radiology test results   YES Review and summation of old records today    NO Reviewed patient's current orders and MAR    YES 
PMH/SH reviewed - no change compared to H&P 
________________________________________________________________________ Care Plan discussed with: 
  Comments Patient Family RN x Care Manager Consultant Multidiciplinary team rounds were held today with , nursing, pharmacist and clinical coordinator. Patient's plan of care was discussed; medications were reviewed and discharge planning was addressed. ________________________________________________________________________ Total NON critical care TIME:  25  Minutes Total CRITICAL CARE TIME Spent:   Minutes non procedure based Comments >50% of visit spent in counseling and coordination of care    
________________________________________________________________________ Celi Dent MD  
 
Procedures: see electronic medical records for all procedures/Xrays and details which were not copied into this note but were reviewed prior to creation of Plan. LABS: 
I reviewed today's most current labs and imaging studies. Pertinent labs include: 
Recent Labs  
  05/24/20 
0408 05/23/20 
9775 05/22/20 
4208 WBC 30.5* 27.9* 22.9* HGB 9.9* 10.0* 9.7* HCT 30.1* 30.6* 29.4*  
* 114* 98* Recent Labs  
  05/24/20 
0408 05/24/20 0221 05/23/20 
1556 05/23/20 
2210  05/22/20 
2862   --  136 137   < > 132* K 3.6  --  3.7 4.0   < > 3.6   --  102 100   < > 95* CO2 25  --  25 26   < > 27 *  --  197* 155*   < > 90 BUN 36*  --  37* 43*   < > 60* CREA 2.58*  --  2.78* 3.38*   < > 4.73* CA 7.9*  --  7.8* 7.0*   < > 7.4* MG 2.4  --  2.3 2.2   < >  --   
PHOS 3.0  --  3.2 3.4   < > 3.9 ALB 2.2*  --  2.4* 2.4*   < > 2.8*  2.6* TBILI 9.2*  --   --  8.1*  --  6.8* SGOT 111*  --   --  162*  --  278* *  --   --  496*  --  643* INR  --  1.7*  --  1.8*  --  2.4*  
 < > = values in this interval not displayed.   
 
 
Signed: Pascale Kasper MD

## 2020-05-24 NOTE — PROGRESS NOTES
Bedside and Verbal shift change report given to Phil Sanz RN (oncoming nurse) by Megan Gómez RN (offgoing nurse). Report included the following information SBAR, Procedure Summary, Intake/Output, MAR, Recent Results, Med Rec Status and Cardiac Rhythm Paced.

## 2020-05-24 NOTE — PROGRESS NOTES
0700 Bedside and Verbal shift change report given to Fadi Guerra (oncoming nurse) by Noam Addison (offgoing nurse). Report included the following information SBAR, Kardex, Procedure Summary, Intake/Output, MAR, Recent Results and Cardiac Rhythm paced 0800 pt assessed per flowsheet. infusions verified 0830 Dr Jean Marie Perry has been in with review of plan of care. Dr Linda Sorensen in to assess pt, reviewed plan of care. He has called and spoken with family to provide updates 1000 Dr Jennifer Melendez I to assess pt, reviewed plan of care, new orders received. 1200 pt reassessed per flowsheet 1230 OGT placement verified with auscultation and CXR reviewed from the am. TF, Nepro started at rate and goal of 10 ml/hr as trickle feeds. 1400 continue to wean down Levo as estelle, estelle trickle feeds well. Dr Shell Choudhary in to assess pt. Reviewed plan of care with new orders received. 1600 pt reassessed per flowsheet, labs collected and sent. Propofol placed on hold in order to minimize sedation as per Dr Lisa Walters order 1645 PTT high, due to being therapeutic previously, will recheck prior to making changes 1800 PTT WNL, no changes indicated. trialed pt off of Propofol. Pt opened eyes, no command following, no tracking, no withdraw from pain. BP did drop and pt was coughing and biting ETT. Propofol resumed at 10 mcg/kg/min. Factor decreased to 100 once CVP down <8. 
1910 Bedside and Verbal shift change report given to Noam Addison (oncoming nurse) by Fadi Guerra (offgoing nurse). Report included the following information SBAR, Kardex, Procedure Summary, Intake/Output, MAR, Recent Results and Cardiac Rhythm paced.

## 2020-05-24 NOTE — PROGRESS NOTES
Patient remains sedated, not easily aroused with turns, Minimal response to pain. CVVH continues without difficulty, Remains on pressors, [id number]'s. MAp's above 60. Reported off to oncoming nurse

## 2020-05-25 NOTE — PROGRESS NOTES
1900- report received from [facility]. 0400- levo increased. Pt sbp in 62s. Labs drawn. Will continue to monitor.   
 
0700- report given to [facility]

## 2020-05-25 NOTE — PROGRESS NOTES
Hospitalist Progress Note NAME: Kaylie Perkins :  1955 MRN:  [id number] Assessment / Plan: 
Septic vs cardiogenic shock 2/2 COVID Positive with viral prodrome Hyponatremia Bacteremia Multiorgan failure 
-remains critically ill 
-requiring multiple pressors, on 2 pressors now down from 5 
-appreciate intensivist management 
-worsening renal function, CRRT initiated  
-palliative on board 
-at high risk for further rapid decline, death 
-palliative team on board has communicated with wife/daughter. Full code for now.  
-cont pressor support, empiric abx (vanco,cefepime and clindamycin) 
-has been enrolled in convalescent plasma study 
-cont zinc 
-Blood cx 5/15 alpha streptococcus, repeat blood cx obtained and NGTd 
-On solucortef 
-Ferritin>25182 
-Liver Enzymes elevated, trending down 
-Bilirubin trending up 
-Leucocytosis worsening Acute renal failure 
-initiated CRRT  
-trend bmp nephrology consultation appreciated Avoid nephrotoxic medication Hx of nonischemic CM 
-recent weight gain 
-EF 20% 
--s/p BIV-ICD 
 
DM2 
-cont insulin w lantus, SSI 
  
NSTEMI 
-cardiology following 
-cont supportive care 
  
Atrial fibrillation Eliquis on hold for now 
  
Hx of Hypertension 
-currently in cardiogenic vs septic shock as above and requiring pressors 
-hold home antiHTNsives 
  
Code Status: Full Surrogate Decision Maker: 
  
DVT Prophylaxis: heparin GI Prophylaxis: not indicated Subjective: Chief Complaint / Reason for Physician Visit Unresponsive on pressor support. Seen through CCU room window and discussed with nursing. Did not enter room to avoid exposure/transmission of COVID-19 Discussed with RN events overnight. Review of Systems: 
Symptom Y/N Comments  Symptom Y/N Comments Fever/Chills    Chest Pain Poor Appetite    Edema Cough    Abdominal Pain Sputum    Joint Pain SOB/GROVES    Pruritis/Rash Nausea/vomit    Tolerating PT/OT Diarrhea    Tolerating Diet Constipation    Other Could NOT obtain due to: Intubated and sedated Objective: VITALS:  
Last 24hrs VS reviewed since prior progress note. Most recent are: 
Patient Vitals for the past 24 hrs: 
 Temp Pulse Resp BP SpO2  
05/25/20 1300  70 10 121/67 97 % 05/25/20 1200 97.5 °F (36.4 °C) 75 19 121/68 96 % 05/25/20 1119  70 18  98 % 05/25/20 1100  70 (!) 6 109/66 97 % 05/25/20 1000 97.6 °F (36.4 °C) 75 (!) 0 94/54 98 % 05/25/20 0900 97.4 °F (36.3 °C) 70 12 101/55 98 % 05/25/20 0852  70 19  95 % 05/25/20 0800 97.3 °F (36.3 °C) 70 18 101/55 97 % 05/25/20 0753  71  116/51   
05/25/20 0700 97.1 °F (36.2 °C) 70 17 113/48 97 % 05/25/20 0600 97 °F (36.1 °C) 75 18 105/58 98 % 05/25/20 0500 97.4 °F (36.3 °C) 71 18 118/67 93 % 05/25/20 0400 97.8 °F (36.6 °C) 68 (!) 0 117/67 97 % 05/25/20 0350  70 20  94 % 05/25/20 0300 97.6 °F (36.4 °C) 74 22 93/61 94 % 05/25/20 0200 97.7 °F (36.5 °C) 69 21 100/61 98 % 05/25/20 0100 97.7 °F (36.5 °C) 70 23 97/60 98 % 05/25/20 0000 97.6 °F (36.4 °C) 73 23 95/60 97 % 05/24/20 2338  70 22  97 % 05/24/20 2300 97.5 °F (36.4 °C) 70 22 94/59 97 % 05/24/20 2200 97.5 °F (36.4 °C) 70 19 96/58 100 % 05/24/20 2106  70 22  100 % 05/24/20 2100 97.2 °F (36.2 °C) 70 21 96/58 98 % 05/24/20 2000 97.1 °F (36.2 °C) 70 (!) 0 (!) 87/55 98 % 05/24/20 1900 97.1 °F (36.2 °C) 71 16 95/56 98 % 05/24/20 1845 97 °F (36.1 °C) 70 21 (!) 75/47 96 % 05/24/20 1830 96.9 °F (36.1 °C) 70 13 90/56 100 % 05/24/20 1815 97 °F (36.1 °C) 74 16 116/67 98 % 05/24/20 1800 97 °F (36.1 °C) 70 11 128/67 96 % 05/24/20 1745 97.1 °F (36.2 °C) 70 13 140/75 95 % 05/24/20 1730 97.1 °F (36.2 °C) 70 14 145/75 95 % 05/24/20 1723 97 °F (36.1 °C) 71 21 148/76 97 % 05/24/20 1717 96.8 °F (36 °C) 71 18 (!) 62/47 96 % 05/24/20 1715 96.8 °F (36 °C) 72 19 (!) 75/63 95 % 05/24/20 1700 96.8 °F (36 °C) 70 21 93/55 96 % 05/24/20 1645 96.8 °F (36 °C) 72 19 102/60 96 % 05/24/20 1630 96.8 °F (36 °C) 70 18 101/61 96 % 05/24/20 1615 96.9 °F (36.1 °C) 71 17 111/63 96 % 05/24/20 1600 96.9 °F (36.1 °C) 70 18 107/62 96 % 05/24/20 1545 96.8 °F (36 °C) 70 19 98/61 96 % 05/24/20 1530 96.8 °F (36 °C) 70 18 (!) 87/51 97 % 05/24/20 1515 96.8 °F (36 °C) 71 18 93/55 97 % 05/24/20 1500 96.9 °F (36.1 °C) 72 20 99/59 96 % 05/24/20 1445 97 °F (36.1 °C) 75 16 105/61 96 % 05/24/20 1430 97.1 °F (36.2 °C) 76 18 111/65 96 % 05/24/20 1415 97.1 °F (36.2 °C) 72 20 109/61 96 % 05/24/20 1400 97.1 °F (36.2 °C) 72 20 109/69 95 % Intake/Output Summary (Last 24 hours) at 5/25/2020 1347 Last data filed at 5/25/2020 1300 Gross per 24 hour Intake 4076.37 ml Output 4978 ml Net -901.63 ml PHYSICAL EXAM: 
GEN: AA male, NAD On Ventilator Intubated and sedated 
anasarca noticed, improving edema 
jaudiced Seen through ICU window to minimize exposure/transmission to/of COVID-19 Reviewed most current lab test results and cultures  YES Reviewed most current radiology test results   YES Review and summation of old records today    NO Reviewed patient's current orders and MAR    YES 
PMH/SH reviewed - no change compared to H&P 
________________________________________________________________________ Care Plan discussed with: 
  Comments Patient Family RN x Care Manager Consultant Multidiciplinary team rounds were held today with , nursing, pharmacist and clinical coordinator. Patient's plan of care was discussed; medications were reviewed and discharge planning was addressed. ________________________________________________________________________ Total NON critical care TIME:  25  Minutes Total CRITICAL CARE TIME Spent:   Minutes non procedure based Comments >50% of visit spent in counseling and coordination of care    
________________________________________________________________________ Constance Small MD  
 
Procedures: see electronic medical records for all procedures/Xrays and details which were not copied into this note but were reviewed prior to creation of Plan. LABS: 
I reviewed today's most current labs and imaging studies. Pertinent labs include: 
Recent Labs  
  05/24/20 
0408 05/23/20 
5029 WBC 30.5* 27.9* HGB 9.9* 10.0* HCT 30.1* 30.6* * 114* Recent Labs  
  05/25/20 
0409 05/24/20 
1605 05/24/20 
0408 05/24/20 
0221  05/23/20 
3264  136 136  --    < > 137  
K 3.7 3.5 3.6  --    < > 4.0  
 104 104  --    < > 100 CO2 27 29 25  --    < > 26 * 165* 166*  --    < > 155* BUN 35* 37* 36*  --    < > 43* CREA 2.36* 2.39* 2.58*  --    < > 3.38* CA 7.6* 7.7* 7.9*  --    < > 7.0*  
MG 2.4 2.2 2.4  --    < > 2.2 PHOS 3.1 3.1 3.0  --    < > 3.4 ALB 2.1* 2.2* 2.2*  --    < > 2.4* TBILI  --   --  9.2*  --   --  8.1*  
SGOT  --   --  111*  --   --  162* ALT  --   --  358*  --   --  496* INR 1.6*  --   --  1.7*  --  1.8*  
 < > = values in this interval not displayed.   
 
 
Signed: Constance Small MD

## 2020-05-25 NOTE — PROGRESS NOTES
05/25/20 9473 ABCDEF Bundle SBT Safety Screen Passed No  
SBT Screen Reason for Failure Vasopressor use

## 2020-05-25 NOTE — PROGRESS NOTES
26 - Received report from Sruthi Chappell., RN. 
 
5645 - Assessment completed. Patient grimaces to pain and flexes left foot only with painful stimuli. On ventilator for respiratory support. A-fib paced with demand pacemaker. Currently on CRRT alternating 2k and 4k dialysate bags per order. Not making any urine in bright. OGT infusing 10 mL/hr of Nepro at goal. Ewa Martinez being used for patient temperature of 97F on rectal probe. 1200 - Reassessment completed. No changes noted. Propofol stopped to monitor patient mental status. 1320 - Patient off propofol for over 1 hour. Does not follow any commands, eyes open spontaneously but does not track or focus, and grimaces to pain.

## 2020-05-25 NOTE — PROGRESS NOTES
Pharmacy Automatic Renal Dosing Protocol - Antimicrobials Indication for Antimicrobials: sepsis, bacteremia COVID - POSITIVE Current Regimen of Each Antimicrobial: 
Vancomycin 2g X1 (Start Date ; Day # 8 Cefepime 1g q8 hours (; day # 8 Clindamycin 900 mg IV q8h; started ; day 5 Previous Antimicrobial Therapy: CTX  -  Azithromycin 500 mg every day ( - ) Goal Level: VANCOMYCIN TROUGH GOAL RANGE Vancomycin Trough: 15 - 20 mcg/mL  (AUC: 400 - 600 mg/hr/Liter/day) Date Dose & Interval Measured (mcg/mL) Extrapolated (mcg/mL)  AM N/a 15.6 N/a  
 @1511 1500mg q 24h 21.3 N/A  
 1250mg q24h 22.7 Date & time of next level:  @ 1700 Significant Cultures:  
5/15: Blood cx #1: NGTD, final 
5/15: Blood cx #2: ALPHA STREPTOCOCCUS - final 
5/15: Urine cx: NG - Final 
5/15: Respiratory panel: Not detected 5/15: sars-cov-2 positive  blood cx- NGTD - pending Radiology / Imaging results: (X-ray, CT scan or MRI):  
5/15: CXR - No Acute Disease  CXR IMPRESSION: Small pleural effusions and bibasilar opacities are stable on the 
left and mildly increased on the right. Paralysis, amputations, malnutrition: no 
 
Labs: 
Recent Labs  
  20 
1511 20 
0409 20 
1605 20 
0408  20 
4842 CREA 2.27* 2.36* 2.39* 2.58*   < > 3.38* BUN 38* 35* 37* 36*   < > 43* WBC  --   --   --  30.5*  --  27.9*  
 < > = values in this interval not displayed. Temp (24hrs), Av.2 °F (36.2 °C), Min:96.6 °F (35.9 °C), Max:98 °F (36.7 °C) Creatinine Clearance (mL/min) or Dialysis: CVVHD  
DFR 2000 mL/hr Impression/Plan: · CVVHD continues · Vancomycin decreased to 1000 mg every 24 hours for level > 20 · Antimicrobial stop date: pending Pharmacy will follow daily and adjust medications as appropriate for renal function and/or serum levels. Thank you, Trini Ortiz, PHARMD

## 2020-05-25 NOTE — PROGRESS NOTES
1900- report received from [facility]. 2200- call to pt wife from the room so that she may speak to him. 2345- cleaned pt of bm. Turned. Pt reassessed, no changes to previous. 0100- levo titrated down, will continue to monitor. 0400- levo returned to previous level of 15 
0700- report given to [facility].

## 2020-05-25 NOTE — PROGRESS NOTES
PULMONARY ASSOCI[facility] INTENSIVIST Consult Service Note Pulmonary, Critical Care, and Sleep Medicine Name: Kaylie Perkins MRN: [id number] : 1955 Hospital: [place] [address] Date: 2020  Admission date: 5/15/2020 Hospital Day: 6 Subjective/Interval History:  
 
20: not following commands when sedation lightened. Still requiring pressors and CRRT. 20: remains pressor and vent-dependent. Sedation lightened, but not alert enough to follow commands. 20: remains critically ill on mechanical ventilation/pressors 20: Intubated, sedated, on pressors. ROS and HPI are not obtainable. : Continue slow improvement in pressor needs. Remains intubated and sedated. : Norepi dose slightly lower this morning, otherwise no change. : Survived the night, remains on max dose of 5 pressors with barely acceptable blood pressures. Intubated, sedated on 100% FiO2.  
: Continues to rapidly decompensate. This morning is largely unresponsive with escalating pressor requirements and worsening multiorgan failure. Multiple phone conversations were had with his wife and children regarding code status and goals of care. They ultimately decided that they wanted to continue aggressive measures including intubation and dialysis; intubation subsequently performed by Dr. Patience Herrera.  high fever, sweats overniht. IV levophed 95 mcg, IV vasopressin. D dimer 2.56; Cr 5.77; Ferritin yesterday 909. lactic acid higher, WBC now 18238. CXR reviewed and amazingly clear. . Too weak to verbalize. Called wife at home [phone] , Daughter Taylor Vaca. They are both well, they were tested at [facility] yesterday. Results pending. Explained medical management for COVID 19. He is critically ill. He is unpredictable. Family wants to defer to his heart doctor any decisions about code status.  We dis discuss possiblerole of Convalescent plasma since Remdesivir is not available and would be reserved for respiratory failure pts on vent. Wife, daughter, son will review [email or web address] and we will discuss process if they want to proceed. No guarantees offered, as it will take takes to get plasma and the transfusion is not risk free, 11:32 AM 
 
3:38 PM Called wife back. Wife and family has had a chance to review forms and handouts. · [facility]: Expanded Access to Convalescent Plasma for Treatment of Pts with COVID-19. LZR#[id number] Clinical Staff: Dr. Gissell Pereira MD. Consent for blood and investigation has been obtained. Discussed the use of Convalescent Plasma collected from COVID 19 patients. These individuals have recovered from the illness and donated blood to study. Use does not guarantee that patients will improve or recover after transfusion. They may actually get worse or have side effects. They agree to proceed with requesting convalescent plasma. Sent order for type and screen. Nursing aware and will get phone consent. I called Blood bank supervisor who will request matched unit from [employer]. Family aware that it may take days to acquire. IMPRESSION:  
1. Acute hypoxic respiratory failure, intubated 5-18 2. Profound septic shock on high level of pressors. 3. COVID 19 pneumonia 4. Alpha strep septicemia- elevated Procalcitonin > 80 
5. Chronic combined systolic/diastolic heart failure 6. Hypertensive heart disease with CHF and CKD 7. LAQUITA/CKD, CVVH to start 8. Chronic anticoagulation at home- eliquis 9. Diabetes mellitus type 2 uncontrolled 10. Hyperlipidemia 11. 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 Chronic atrial fibrillation 13. Status post SJM BIV-ICD implant in 2016 
14. Sleep apnea, undiagnosed/untreated 15. Obesity with recent weight gain RECOMMENDATIONS/PLAN:  
1.  Ventilator support - mental status and relatively high pressor doses make attempts at weaning/extubation difficult currently 2. Continue pressors, wean as able 3. Minimize sedation 4. Continue antibiotics 5. Stress dose steroids 6. RRT per nephrology 7. Heparin drip 8. Cardiology following 9. Glycemic monitoring and control 10. Replete electrolytes PRN 11. DVT, SUP prophylaxis 12. Remains critically ill Subjective/Initial History:  
I have reviewed the flowsheet and previous days notes. Seen earlier today on rounds. I was asked by Malgorzata Patel MD to see Chucho Galvez a 71 y.o.  male  in consultation for a chief complaint of shock. Excerpts from admission 5/15/2020 and consult notes reviewed as follows:  
 
\"Mr. Ernesto Jon is a 57 y.o. morbidly obese male with Chronic nonischemic dilated cardiomyopathy EF 84%, Chronic systolic congestive heart failure class, H/o atrial fibrillation, On chronic warfarin, Iatrogenic left bundle branch block with predominant RV pacing, 64-70%, Hypertension, Hyperlipidemia and diabetes [facility] [address] C/O Worsening exertional shortness of breath and around 20 pounds weight gain in last 3 weeks. \" 
 
Pt now in CCU. Poor historian. On room air. No fever. No cough. No diarrhea. Not very active. Saw PCP three weeks ago. Some edema. [facility] with [facility]ensation. Patient PCP: Breana Zaldivar MD 
PMH:  has a past medical history of A-fib ([facility] [address] Arrhythmia, Diabetes ([facility] [address] Endocrine disease, Hypertension, and Irregular heart beat. He also has no past medical history of Difficult intubation, Malignant hyperthermia due to anesthesia, Nausea & vomiting, or Pseudocholinesterase deficiency. PSH:   has a past surgical history that includes pr cardiac surg procedure unlist; insert emergency endotrach airway (5/18/2020); and ir insert non tunl cvc over 5 yrs (5/18/2020). FHX: family history includes Diabetes in his father; Heart Disease in his father and mother. SHX:  reports that he quit smoking about 26 years ago. He has never used smokeless tobacco. He reports that he does not drink alcohol or use drugs. ROS:A comprehensive review of systems was negative except for that written in the HPI. No Known Allergies MEDS:  
Current Facility-Administered Medications Medication  Vancomycin  trough prior to dose due 5/25 at 1700  polyethylene glycol (MIRALAX) packet 17 g  
 dextrose 5% and 0.9% NaCl infusion  bicarbonate dialysis (PRISMASOL) BG K 2/Ca 3.5 5000 ml solution And  
 bicarbonate dialysis (PRISMASOL) BG K 4/Ca 2.5 5000 ml solution  zinc oxide-cod liver oil (DESITIN) 40 % paste  vancomycin (VANCOCIN) 1250 mg in  ml infusion  chlorhexidine (ORAL CARE KIT) 0.12 % mouthwash 15 mL  clindamycin (CLEOCIN) 900mg D5W 50mL IVPB (premix)  cefepime (MAXIPIME) 1 g in 0.9% sodium chloride (MBP/ADV) 50 mL  hydrocortisone Sod Succ (PF) (SOLU-CORTEF) injection 100 mg  potassium chloride 20 mEq in 50 ml IVPB  calcium chloride injection 2 g  
 famotidine (PF) (PEPCID) 20 mg in 0.9% sodium chloride 10 mL injection  acetaminophen (TYLENOL) solution 650 mg  PHARMACY INFORMATION NOTE  DOBUTamine (DOBUTREX) 500 mg/250 mL (2,000 mcg/mL) infusion  propofol (DIPRIVAN) 10 mg/mL infusion  heparin (porcine) injection 2,000 Units Or  heparin (porcine) injection 4,000 Units  heparin 25,000 units in D5W 250 ml infusion  PHENYLephrine (JACLYN-SYNEPHRINE) 100 mg in 0.9% sodium chloride 250 mL infusion  NOREPINephrine (LEVOPHED) 32 mg in 5% dextrose 250 mL infusion  vasopressin (VASOSTRICT) 20 Units in 0.9% sodium chloride 100 mL infusion  insulin glargine (LANTUS) injection 25 Units  sodium chloride (NS) flush 5-40 mL  sodium chloride (NS) flush 5-40 mL  ondansetron (ZOFRAN) injection 4 mg  alcohol 62% (NOZIN) nasal  1 Ampule  insulin lispro (HUMALOG) injection  glucose chewable tablet 16 g  
 dextrose (D50W) injection syrg 12.5-25 g  
 glucagon (GLUCAGEN) injection 1 mg  acetaminophen (TYLENOL) tablet 650 mg Or  acetaminophen (TYLENOL) suppository 650 mg  
 influenza vaccine 2019-20 (6 mos+)(PF) (FLUARIX/FLULAVAL/FLUZONE QUAD) injection 0.5 mL  sodium chloride (NS) flush 5-10 mL Current Facility-Administered Medications:   Vancomycin  trough prior to dose due 5/25 at 1700 , , Other, ONCE, Guero Lomax MD 
  polyethylene glycol [facility]) packet 17 g, 17 g, Per NG tube, DAILY PRN, Anaya López MD, 17 g at 05/24/20 1703   dextrose 5% and 0.9% NaCl infusion, 100 mL/hr, IntraVENous, CONTINUOUS, Alexandro Montenegro MD, Last Rate: 100 mL/hr at 05/24/20 0847, 100 mL/hr at 05/24/20 0847 
  bicarbonate dialysis (PRISMASOL) BG K 2/Ca 3.5 5000 ml solution, , Extracorporeal, DIALYSIS CONTINUOUS, Last Rate: 2,000 mL/hr at 05/25/20 0813, 2,000 mL/hr at 05/25/20 0813 **AND** bicarbonate dialysis (PRISMASOL) BG K 4/Ca 2.5 5000 ml solution, , Extracorporeal, DIALYSIS CONTINUOUS, Alexandro Montenegro MD, Last Rate: 2,000 mL/hr at 05/25/20 1049, 2,000 mL/hr at 05/25/20 1049 
  zinc oxide-cod liver oil (DESITIN) 40 % paste, , Topical, PRN, Haritha UREÑA MD 
  vancomycin (VANCOCIN) 1250 mg in  ml infusion, 1,250 mg, IntraVENous, Q24H, Haritha UREÑA MD, Last Rate: 125 mL/hr at 05/24/20 1629, 1,250 mg at 05/24/20 1629   chlorhexidine (ORAL CARE KIT) 0.12 % mouthwash 15 mL, 15 mL, Oral, Q12H, Marci Gustafson DO, 15 mL at 05/25/20 7275   clindamycin (CLEOCIN) 900mg D5W 50mL IVPB (premix), 900 mg, IntraVENous, Q8H, Marci Gustafson, DO, Last Rate: 100 mL/hr at 05/25/20 0213, 900 mg at 05/25/20 7934   cefepime (MAXIPIME) 1 g in 0.9% sodium chloride (MBP/ADV) 50 mL, 1 g, IntraVENous, Q8H, Marci Gustafson, DO, Last Rate: 50 mL/hr at 05/25/20 0416, 1 g at 05/25/20 0416   hydrocortisone Sod Succ (PF) (SOLU-CORTEF) injection 100 mg, 100 mg, IntraVENous, Q6H, 100 mg at 05/25/20 0516 **AND** [DISCONTINUED] insulin regular (NOVOLIN R, HUMULIN R) injection 6 Units, 6 Units, SubCUTAneous, Q6H, Clementina Del Rosario MD, Stopped at 05/21/20 1800   potassium chloride 20 mEq in 50 ml IVPB, 20 mEq, IntraVENous, PRN, Polo Boeck, MD 
  calcium chloride injection 2 g, 2 g, IntraVENous, PRN, Damaso Montenegro MD 
  famotidine (PF) (PEPCID) 20 mg in 0.9% sodium chloride 10 mL injection, 20 mg, IntraVENous, DAILY, Marci Gustafson DO, 20 mg at 05/25/20 1980   acetaminophen (TYLENOL) solution 650 mg, 650 mg, Oral, Q6H PRN, Ismael Gann MD, 650 mg at 05/20/20 2132   PHARMACY INFORMATION NOTE, 1 Each, Other, BID, Marci Gustafson DO, 1 Each at 05/25/20 0900 
  DOBUTamine (DOBUTREX) 500 mg/250 mL (2,000 mcg/mL) infusion, 0-10 mcg/kg/min, IntraVENous, TITRATE, Delmis PRETTY DO, Stopped at 05/23/20 1312   propofol (DIPRIVAN) 10 mg/mL infusion, 0-50 mcg/kg/min, IntraVENous, TITRATE, Tacy Severance C, MD, Last Rate: 7.2 mL/hr at 05/24/20 1720, 10 mcg/kg/min at 05/24/20 1720   heparin (porcine) injection 2,000 Units, 2,000 Units, IntraVENous, PRN **OR** heparin (porcine) injection 4,000 Units, 4,000 Units, IntraVENous, PRN, Marci Gustafson DO, 2,000 Units at 05/19/20 0130 
  heparin 25,000 units in D5W 250 ml infusion, 8-25 Units/kg/hr, IntraVENous, TITRATE, Marci Gustafson DO, Last Rate: 13.3 mL/hr at 05/25/20 0600, 11 Units/kg/hr at 05/25/20 0600 
  PHENYLephrine (JACLYN-SYNEPHRINE) 100 mg in 0.9% sodium chloride 250 mL infusion,  mcg/min, IntraVENous, TITRATE, Marci Gustafson, , Stopped at 05/21/20 0530 
  NOREPINephrine (LEVOPHED) 32 mg in 5% dextrose 250 mL infusion, 2-200 mcg/min, IntraVENous, TITRATE, Marci Gustafson, , Last Rate: 7 mL/hr at 05/25/20 0746, 15 mcg/min at 05/25/20 0746 
  vasopressin (VASOSTRICT) 20 Units in 0.9% sodium chloride 100 mL infusion, 0.04 Units/min, IntraVENous, CONTINUOUS, Clementina Del Rosario MD, Last Rate: 12 mL/hr at 20, 0.04 Units/min at 20 1817 
  insulin glargine (LANTUS) injection 25 Units, 25 Units, SubCUTAneous, DAILY, Sawyer Greer MD, 25 Units at 20 1005   sodium chloride (NS) flush 5-40 mL, 5-40 mL, IntraVENous, Q8H, Claire Ball MD, 10 mL at 20 5292   sodium chloride (NS) flush 5-40 mL, 5-40 mL, IntraVENous, PRN, Claire Ball MD 
  ondansetron [facility]) injection 4 mg, 4 mg, IntraVENous, Q8H PRN, Claire Ball MD, 4 mg at 20 0137 
  alcohol 62% (NOZIN) nasal  1 Ampule, 1 Ampule, Topical, Q12H, Claire Ball MD, 1 Ampule at 20 0813 
  insulin lispro (HUMALOG) injection, , SubCUTAneous, Q6H, Odilia HILLS MD, 2 Units at 20 0524 
  glucose chewable tablet 16 g, 4 Tab, Oral, PRN, Sawyer Greer MD 
  dextrose (D50W) injection syrg 12.5-25 g, 12.5-25 g, IntraVENous, PRN, Odilia HILLS MD, 12.5 g at 20 0112 
  glucagon (GLUCAGEN) injection 1 mg, 1 mg, IntraMUSCular, PRN, Sawyer Greer MD 
  acetaminophen (TYLENOL) tablet 650 mg, 650 mg, Oral, Q6H PRN, 650 mg at 20 0808 **OR** acetaminophen (TYLENOL) suppository 650 mg, 650 mg, Rectal, Q6H PRN, Sawyer Greer MD, 650 mg at 20 1449   influenza vaccine - (6 mos+)(PF) (FLUARIX/FLULAVAL/FLUZONE QUAD) injection 0.5 mL, 0.5 mL, IntraMUSCular, PRIOR TO DISCHARGE, José Quiñonez DO 
  sodium chloride (NS) flush 5-10 mL, 5-10 mL, IntraVENous, PRN, Cortney March MD 
 
 
Objective:  
 
Vital Signs: Telemetry:    AFIB Intake/Output:  
Visit Vitals BP 94/54 Pulse 75 Temp 97.6 °F (36.4 °C) Resp (!) 0 Ht 6' 3\" (1.905 m) Wt 128 kg (282 lb 3 oz) SpO2 98% BMI 35.27 kg/m² Temp (24hrs), Av.2 °F (36.2 °C), Min:96.8 °F (36 °C), Max:98.1 °F (36.7 °C) O2 Device: Endotracheal tube O2 Flow Rate (L/min): 4 l/min Body mass index is 35.27 kg/m². Wt Readings from Last 4 Encounters: 05/25/20 128 kg (282 lb 3 oz) 01/19/19 123.8 kg (273 lb) 08/04/17 129.3 kg (285 lb)  
03/20/17 107.5 kg (237 lb) Intake/Output Summary (Last 24 hours) at 5/25/2020 1100 Last data filed at 5/25/2020 1000 Gross per 24 hour Intake 4144.54 ml Output 4657 ml Net -512.46 ml Last shift:      05/25 0701 - 05/25 1900 In: 433.5 [I.V.:423.5] Out: 718 Last 3 shifts: 05/23 1901 - 05/25 0700 In: 8059 [I.V.:4634] Out: 7190 Hemodynamics:   
CO:   
CI:   
CVP: CVP (mmHg): 8 mmHg (05/25/20 1000) SVR:   PAP Systolic:   
PAP Diastolic:   
PVR:   
NQ73:    
 
Ventilator Settings:     
Mode Rate TV Press PEEP FiO2 PIP Min. Vent Assist control    500 ml    6 cm H20 30 %  21 cm H2O  11.9 l/min Physical Exam: 
 
General: intubated/sedated HEENT: NCAT, poor dentition, lips and mucosa dry Eyes: anicteric; conjunctiva clear Neck: no nodes, no cuff leak, no accessory MM use. Chest: no deformity,  
Cardiac: IR regular; no murmur Lungs: no distress, intubated Abd: soft, NT, hypoactive BS Ext: no edema; no joint swelling; No clubbing Neuro: sedated Data:  
 
Current Facility-Administered Medications Medication Dose Route Frequency  Vancomycin  trough prior to dose due 5/25 at 1700    Other ONCE  
 dextrose 5% and 0.9% NaCl infusion  100 mL/hr IntraVENous CONTINUOUS  
 bicarbonate dialysis (PRISMASOL) BG K 2/Ca 3.5 5000 ml solution   Extracorporeal DIALYSIS CONTINUOUS And  
 bicarbonate dialysis (PRISMASOL) BG K 4/Ca 2.5 5000 ml solution   Extracorporeal DIALYSIS CONTINUOUS  
 vancomycin (VANCOCIN) 1250 mg in  ml infusion  1,250 mg IntraVENous Q24H  chlorhexidine (ORAL CARE KIT) 0.12 % mouthwash 15 mL  15 mL Oral Q12H  clindamycin (CLEOCIN) 900mg D5W 50mL IVPB (premix)  900 mg IntraVENous Q8H  
 cefepime (MAXIPIME) 1 g in 0.9% sodium chloride (MBP/ADV) 50 mL  1 g IntraVENous Q8H  
 hydrocortisone Sod Succ (PF) (SOLU-CORTEF) injection 100 mg  100 mg IntraVENous Q6H  
 famotidine (PF) (PEPCID) 20 mg in 0.9% sodium chloride 10 mL injection  20 mg IntraVENous DAILY  PHARMACY INFORMATION NOTE  1 Each Other BID  DOBUTamine (DOBUTREX) 500 mg/250 mL (2,000 mcg/mL) infusion  0-10 mcg/kg/min IntraVENous TITRATE  propofol (DIPRIVAN) 10 mg/mL infusion  0-50 mcg/kg/min IntraVENous TITRATE  heparin 25,000 units in D5W 250 ml infusion  8-25 Units/kg/hr IntraVENous TITRATE  PHENYLephrine (JACLYN-SYNEPHRINE) 100 mg in 0.9% sodium chloride 250 mL infusion   mcg/min IntraVENous TITRATE  
 NOREPINephrine (LEVOPHED) 32 mg in 5% dextrose 250 mL infusion  2-200 mcg/min IntraVENous TITRATE  vasopressin (VASOSTRICT) 20 Units in 0.9% sodium chloride 100 mL infusion  0.04 Units/min IntraVENous CONTINUOUS  
 insulin glargine (LANTUS) injection 25 Units  25 Units SubCUTAneous DAILY  sodium chloride (NS) flush 5-40 mL  5-40 mL IntraVENous Q8H  
 alcohol 62% (NOZIN) nasal  1 Ampule  1 Ampule Topical Q12H  
 insulin lispro (HUMALOG) injection   SubCUTAneous Q6H  
 influenza vaccine 2019-20 (6 mos+)(PF) (FLUARIX/FLULAVAL/FLUZONE QUAD) injection 0.5 mL  0.5 mL IntraMUSCular PRIOR TO DISCHARGE Labs: 
Recent Labs  
  05/24/20 
0408 05/23/20 
9802 WBC 30.5* 27.9* HGB 9.9* 10.0* HCT 30.1* 30.6* * 114* Recent Labs  
  05/25/20 
0409 05/24/20 
1605 05/24/20 
0408 05/24/20 
0221  05/23/20 
2962  136 136  --    < > 137  
K 3.7 3.5 3.6  --    < > 4.0  
 104 104  --    < > 100 CO2 27 29 25  --    < > 26 * 165* 166*  --    < > 155* BUN 35* 37* 36*  --    < > 43* CREA 2.36* 2.39* 2.58*  --    < > 3.38* CA 7.6* 7.7* 7.9*  --    < > 7.0*  
MG 2.4 2.2 2.4  --    < > 2.2 PHOS 3.1 3.1 3.0  --    < > 3.4 ALB 2.1* 2.2* 2.2*  --    < > 2.4* TBILI  --   --  9.2*  --   --  8.1*  
SGOT  --   --  111*  --   --  162* ALT  --   --  358*  --   --  496* INR 1.6*  --   --  1.7*  --  1.8*  
 < > = values in this interval not displayed. Recent Labs  
  05/24/20 
0529 05/23/20 
9061 PHI 7.359 7. 27042 [facility] Cassius Underwood PCO2I 43.3 38.7 PO2I 128* 116* HCO3I 24.4 23.3 FIO2I 40 40 Imaging: 
I have personally reviewed the patients radiographs and have reviewed the reports: 
None today Total critical care time exclusive of procedures: 35 minutes Piotr Solorio MD

## 2020-05-25 NOTE — PROGRESS NOTES
26 - Received report from Maegan Frausto RN. 
 
3072 - Assessment completed. Patient grimaces to pain and flexes left foot only with painful stimuli. On ventilator for respiratory support. A-fib paced with demand pacemaker. Currently on CRRT alternating 2k and 4k dialysate bags per order. Not making any urine in bright. OGT infusing 10 mL/hr of Nepro at goal. Antonette Sails being used for patient temperature of 97F on rectal probe. 1200 - Reassessment completed. No changes noted. Propofol stopped to monitor patient mental status. 1320 - Patient off propofol for over 1 hour. Does not follow any commands, eyes open spontaneously but does not track or focus, and grimaces to pain. Tolerating ventilator with no issues. 1445 - Attempted to wean patient off Levophed, at 5 mcg/min patient BP dropped to 70s, increased levophed back up to 10 mcg/min. 1600 - Reassessment completed. No changes noted. [address] and spoke to pharmacy about Critical vanc trough of 22.7. Nurse was told to keep current dose running at 125 mL/hr. 
 
1900 - Report given to Maegan Frausto RN.

## 2020-05-25 NOTE — PROGRESS NOTES
Nutrition Assessment: 
 
RECOMMENDATIONS:  
If pressors remain stable and MAP >65, recommend TF advancement: 
 Nepro @ 25mL/h + Prosource 8 x day/split in to 4 doses + 50mL H2O flush q 6h (provides 1560kcals/169gPro/97gCHO/636mL) OR if fiber free TF formula is preferred given pressors: 
 Osmolite 1.2 @ 40mL/h + Prosource 8 x day + 50mL H2O flush q 6h (provides 1632kcals/173gPro/151gCHO/987mL) ASSESSMENT:  
Chart reviewed, case discussed during CCU rounds. Pt remains intubated, no rounds held given the holiday. Sedated with propofol @ 7.2mL/h, which provides 190 kcals daily. Trophic TF started yesterday. Pt is on CVVH. Tolerating trophic feeds well thus far. Levo at 15 and vaso at 0.04. MAP in the high 60's. If pt's hemodynamics remains stable x 24h, will recommend TF advancement to better meet kcal and protein needs. Could continue on Nepro (not a low fiber formula, but goal rate would be so low fiber contribution would be negligible). Also provided recommendations for fiber free formula, higher goal rate would allow us to forego a supplemental MVI. TF + propofol will meet roughly 75% kcal and 100% protein needs, appopriate given BMI 35. Lytes WNL. BM noted today. Dietitians Intervention(s)/Plan(s): TF recommendations SUBJECTIVE/OBJECTIVE:  
Pt intubated and sedated Diet Order: Other (comment), NPO(TF via NGT: Nepro @ 10mL/h + 50mL H2O flush q 6h (provides 432kcals/19gPro/39gCHO/375mL) ) 
% Eaten:  No data found. Nepro  at 10 mL/hr flush with 50 mL  Q6H  via OG Tube   Residuals: 0 mL Pertinent Medications:cefepime, cleocin, pepcid, solucrtef, lantus, humalog, vancomycin; [email or web address]); Drips: propofol, levo, vaso. I/O's:+23.1L Chemistries: 
Lab Results Component Value Date/Time  Sodium 137 05/25/2020 04:09 AM  
 Potassium 3.7 05/25/2020 04:09 AM  
 Chloride 104 05/25/2020 04:09 AM  
 CO2 27 05/25/2020 04:09 AM  
 Anion gap 6 05/25/2020 04:09 AM  
 Glucose 186 (H) 05/25/2020 04:09 AM  
 BUN 35 (H) 05/25/2020 04:09 AM  
 Creatinine 2.36 (H) 05/25/2020 04:09 AM  
 BUN/Creatinine ratio 15 05/25/2020 04:09 AM  
 GFR est AA 34 (L) 05/25/2020 04:09 AM  
 GFR est non-AA 28 (L) 05/25/2020 04:09 AM  
 Calcium 7.6 (L) 05/25/2020 04:09 AM  
 Albumin 2.1 (L) 05/25/2020 04:09 AM  
  
Anthropometrics: Height: 6' 3\" (190.5 cm) Weight: 128 kg (282 lb 3 oz)  [] standing scale    []bed scale    []stated   []unknown IBW (%IBW):   ( ) UBW (%UBW):   (  %) BMI: Body mass index is 35.27 kg/m². This BMI is indicative of: 
[]Underweight   []Normal   []Overweight   [x] Obesity   [] Extreme Obesity (BMI>40) Estimated Nutrition Needs (Based on): 4425 Kcals/day(PSU (MSJ 2150)) , 134 g(-178 (1.5-2gPro/kg) ) Protein Carbohydrate: At Least 130 g/day  Fluids: per MD mL/day Last BM: 5/25   []Active     []Hyperactive  [x]Hypoactive       [] Absent   BS Skin:    [x] Intact   [] Incision  [] Breakdown   [] DTI   [] Tears/Excoriation/Abrasion  [x]Edema(+1 nonpitting-generalized; nonpitting-BUE; trace/pitting-BLE) [] Other: Wt Readings from Last 30 Encounters:  
05/25/20 128 kg (282 lb 3 oz) 01/19/19 123.8 kg (273 lb) 08/04/17 129.3 kg (285 lb)  
03/20/17 107.5 kg (237 lb) 02/14/17 149.2 kg (328 lb 14.8 oz) 06/21/16 138.3 kg (305 lb)  
03/16/16 151.4 kg (333 lb 11.2 oz) 08/02/13 136.1 kg (300 lb) 10/16/10 133.8 kg (295 lb)  
07/14/10 133.8 kg (295 lb) 05/14/10 133.4 kg (294 lb) Dx of Malnutrition since admission: UTD NUTRITION DIAGNOSES:  
Problem:  Inadequate protein-energy intake Etiology: related to pt NPO 2' vent Signs/Symptoms: as evidenced by NPO + propofol meets <15% kcal and 0% protein needs. Previous dx resolving. NUTRITION INTERVENTIONS: 
  Enteral/Parenteral Nutrition: Modify rate, concentration, composition, and schedule GOAL:  
Pt will tolerate TF advancement with residuals <500mL in 2-4 days NUTRITION MONITORING AND EVALUATION Previous Goal: Plan of care will be determined in 2-4 days Previous Goal Met: Yes Previous Recommendations Implemented: Yes Cultural, Religion, or Ethnic Dietary Needs: None LEARNING NEEDS (Diet, Food/Nutrient-Drug Interaction):  
 [x] None Identified 
 [] Identified and Education Provided/Documented 
 [] Identified and Pt declined/was not appropriate [x] Interdisciplinary Care Plan Reviewed/Documented  
 [x] Participated in Discharge Planning: UTD [] Interdisciplinary Rounds NUTRITION RISK:  
 [x] High              [] Moderate           []  Low  []  Minimal/Uncompromised Rigo Almanza RD, [facility] Pager [phone] Weekend Pager [phone]

## 2020-05-25 NOTE — DIALYSIS
[facility]       [phone] Orders Mode: CVVHD Factor: 150 ml/hr Dialysate: 2000 ml/hr Blood Flow Rate: 200 ml/min Metrics BP / HR: 116/51, 71 Blood Flow Rate: 200 ml/min AP:                         -106 RP: 38 TMP: 31  
PD: 35  
FP: 129 Dialysate: 2000 ml/hr Comments / Plan:  
   [id number] filter running well with no indication for change at this time. Consents, patient, code status, labs and orders verified. +COVID-19: Observed pt & CRRT machine outside room with primary RN inside pt room to reduce exposure & use of PPE. RIJ temporary CVC. Unable to observe CVC dressing, primary RN to change if needed per policy & procedure. No signs of redness, drainage, or infection visualized by primary RN. Lines visible and connections secure with blood warmer to return line at 37*C. Education, pre and post to primary RN.

## 2020-05-25 NOTE — PROGRESS NOTES
NAME: Edi Mahmood :  1955 MRN:  [id number] Assessment :    Plan: 
--LAQUITA Shock Sepsis DM type 2 Systolic HF (EF 84%) COVID + Initiated CVVHD ; tolerating UF (factor 150); up ~ 30 liters--alternating 2k, 4k baths as pharmacy is low on certain dialysates Lewis line  Subjective: Chief Complaint:  In order to limit the exposure risk from COVID 19 and to preserve the hospital's limited supply of PPEs, seen through ICU window. Intubated. Remains critically ill On CVVH Review of Systems:  
 
Symptom Y/N Comments  Symptom Y/N Comments Fever/Chills    Chest Pain Poor Appetite    Edema Cough    Abdominal Pain Sputum    Joint Pain SOB/GROVES    Pruritis/Rash Nausea/vomit    Tolerating PT/OT Diarrhea    Tolerating Diet Constipation    Other Could not obtain due to: See above Objective: VITALS:  
Last 24hrs VS reviewed since prior progress note. Most recent are: 
Visit Vitals BP 94/54 Pulse 75 Temp 97.6 °F (36.4 °C) Resp (!) 0 Ht 6' 3\" (1.905 m) Wt 128 kg (282 lb 3 oz) SpO2 98% BMI 35.27 kg/m² Intake/Output Summary (Last 24 hours) at 2020 1024 Last data filed at 2020 1000 Gross per 24 hour Intake 4144.54 ml Output 4857 ml Net -712.46 ml Telemetry Reviewed: PHYSICAL EXAM: 
General: NAD, ett in place Lebron Edema  -+in UE/LE Lab Data Reviewed: (see below) Medications Reviewed: (see below) PMH/SH reviewed - no change compared to H&P 
________________________________________________________________________ Care Plan discussed with: 
Patient Family RN Care Manager Consultant:     
 
  Comments >50% of visit spent in counseling and coordination of care    
 
________________________________________________________________________ Maty Strickland MD  
 
 Procedures: see electronic medical records for all procedures/Xrays and details which 
were not copied into this note but were reviewed prior to creation of Plan. LABS: 
Recent Labs  
  05/24/20 
0408 05/23/20 
9269 WBC 30.5* 27.9* HGB 9.9* 10.0* HCT 30.1* 30.6* * 114* Recent Labs  
  05/25/20 
0409 05/24/20 
1605 05/24/20 
0408  136 136  
K 3.7 3.5 3.6  104 104 CO2 27 29 25 BUN 35* 37* 36* CREA 2.36* 2.39* 2.58* * 165* 166* CA 7.6* 7.7* 7.9*  
MG 2.4 2.2 2.4 PHOS 3.1 3.1 3.0 Recent Labs  
  05/25/20 
0409 05/24/20 
1605 05/24/20 0408 05/23/20 
2278 SGOT  --   --  111*  --  162* AP  --   --  144*  --  121* TP  --   --  6.0*  --  5.6* ALB 2.1* 2.2* 2.2*   < > 2.4*  
GLOB  --   --  3.8  --  3.2  
 < > = values in this interval not displayed. Recent Labs  
  05/25/20 0409 05/24/20 
1709 05/24/20 1605 05/24/20 
0221  05/23/20 
8547 INR 1.6*  --   --  1.7*  --  1.8* PTP 16.0*  --   --  17.1*  --  18.2* APTT 67.2* 76.7* 83.9* 60.3*   < > 50.8*  
 < > = values in this interval not displayed. MEDICATIONS: 
Current Facility-Administered Medications Medication Dose Route Frequency  polyethylene glycol (MIRALAX) packet 17 g  17 g Per NG tube DAILY PRN  
 dextrose 5% and 0.9% NaCl infusion  100 mL/hr IntraVENous CONTINUOUS  
 bicarbonate dialysis (PRISMASOL) BG K 2/Ca 3.5 5000 ml solution   Extracorporeal DIALYSIS CONTINUOUS And  
 bicarbonate dialysis (PRISMASOL) BG K 4/Ca 2.5 5000 ml solution   Extracorporeal DIALYSIS CONTINUOUS  
 zinc oxide-cod liver oil (DESITIN) 40 % paste   Topical PRN  
 vancomycin (VANCOCIN) 1250 mg in  ml infusion  1,250 mg IntraVENous Q24H  chlorhexidine (ORAL CARE KIT) 0.12 % mouthwash 15 mL  15 mL Oral Q12H  clindamycin (CLEOCIN) 900mg D5W 50mL IVPB (premix)  900 mg IntraVENous Q8H  
 cefepime (MAXIPIME) 1 g in 0.9% sodium chloride (MBP/ADV) 50 mL  1 g IntraVENous Q8H  
  hydrocortisone Sod Succ (PF) (SOLU-CORTEF) injection 100 mg  100 mg IntraVENous Q6H  
 potassium chloride 20 mEq in 50 ml IVPB  20 mEq IntraVENous PRN  
 calcium chloride injection 2 g  2 g IntraVENous PRN  
 famotidine (PF) (PEPCID) 20 mg in 0.9% sodium chloride 10 mL injection  20 mg IntraVENous DAILY  acetaminophen (TYLENOL) solution 650 mg  650 mg Oral Q6H PRN  
 PHARMACY INFORMATION NOTE  1 Each Other BID  DOBUTamine (DOBUTREX) 500 mg/250 mL (2,000 mcg/mL) infusion  0-10 mcg/kg/min IntraVENous TITRATE  propofol (DIPRIVAN) 10 mg/mL infusion  0-50 mcg/kg/min IntraVENous TITRATE  heparin (porcine) injection 2,000 Units  2,000 Units IntraVENous PRN Or  
 heparin (porcine) injection 4,000 Units  4,000 Units IntraVENous PRN  
 heparin 25,000 units in D5W 250 ml infusion  8-25 Units/kg/hr IntraVENous TITRATE  PHENYLephrine (JACLYN-SYNEPHRINE) 100 mg in 0.9% sodium chloride 250 mL infusion   mcg/min IntraVENous TITRATE  
 NOREPINephrine (LEVOPHED) 32 mg in 5% dextrose 250 mL infusion  2-200 mcg/min IntraVENous TITRATE  vasopressin (VASOSTRICT) 20 Units in 0.9% sodium chloride 100 mL infusion  0.04 Units/min IntraVENous CONTINUOUS  
 insulin glargine (LANTUS) injection 25 Units  25 Units SubCUTAneous DAILY  sodium chloride (NS) flush 5-40 mL  5-40 mL IntraVENous Q8H  
 sodium chloride (NS) flush 5-40 mL  5-40 mL IntraVENous PRN  
 ondansetron (ZOFRAN) injection 4 mg  4 mg IntraVENous Q8H PRN  
 alcohol 62% (NOZIN) nasal  1 Ampule  1 Ampule Topical Q12H  
 insulin lispro (HUMALOG) injection   SubCUTAneous Q6H  
 glucose chewable tablet 16 g  4 Tab Oral PRN  
 dextrose (D50W) injection syrg 12.5-25 g  12.5-25 g IntraVENous PRN  
 glucagon (GLUCAGEN) injection 1 mg  1 mg IntraMUSCular PRN  
 acetaminophen (TYLENOL) tablet 650 mg  650 mg Oral Q6H PRN  Or  
 acetaminophen (TYLENOL) suppository 650 mg  650 mg Rectal Q6H PRN  
  influenza vaccine 2019-20 (6 mos+)(PF) (FLUARIX/FLULAVAL/FLUZONE QUAD) injection 0.5 mL  0.5 mL IntraMUSCular PRIOR TO DISCHARGE  sodium chloride (NS) flush 5-10 mL  5-10 mL IntraVENous PRN

## 2020-05-25 NOTE — PROGRESS NOTES
Progress Note 5/25/2020 11:43 AM 
NAME: Alison Rosario MRN:  [id number] Admit Diagnosis: Sepsis ([facility] [address] [A41.9] Problem List: 1. Shock; septic/cardiogenic 2. Severe sepsis 3. COVID-19 + 4. NSTEMI 5. Hyponatremia 6. Lactic acidosis 7. Acute liver injury 8. Acute on chronic systolic heart failure; Class IV 9. Acute kidney injury; anuric 10. Coagulopathy 11. Nonischemic dilated cardiomyopathy s/p remote ICD 12. Chronic atrial fibrillation 13. Sleep apnea 14. Hypertensive heart disease w/ CKD and HF Assessment/Plan:  
Long discussion with the family multiple times. He is gravely ill and most likely will not survive this. This has been relayed and they understand this. Intake/Output Summary (Last 24 hours) at 5/25/2020 2581 Last data filed at 5/25/2020 0900 Gross per 24 hour Intake 4005.04 ml Output 4608 ml Net -602.96 ml Ricky off Vaso @ 0.04 Norepi @ 15 Dobut off Epi off 30% FiO2 Responds to pain 1. Continue supportive care 2. Continue lovenox as tolerated; OK to hold for procedures 3. CVVH ongoing; tolerating full factor 4. Remains critically ill but I am hopeful 5. Discussed w/ family   
 
  
 [x]       High complexity decision making was performed in this patient at high risk for decompensation with multiple organ involvement. Subjective:  
 
Alison Rosario is intubated and sedated. Discussed with RN events overnight. Review of Systems: 
 
Symptom Y/N Comments  Symptom Y/N Comments Fever/Chills N   Chest Pain N Poor Appetite N   Edema N   
Cough N   Abdominal Pain N Sputum N   Joint Pain N   
SOB/GROVES N   Pruritis/Rash N   
Nausea/vomit N   Tolerating PT/OT Y Diarrhea N   Tolerating Diet Y Constipation N   Other Could NOT obtain due to: sedated Objective:  
  
Physical Exam: 
 
Last 24hrs VS reviewed since prior progress note. Most recent are: 
 
Visit Vitals /55 Pulse 70 Temp 97.4 °F (36.3 °C) Resp 12 Ht 6' 3\" (1.905 m) Wt 128 kg (282 lb 3 oz) SpO2 98% BMI 35.27 kg/m² Intake/Output Summary (Last 24 hours) at 5/25/2020 4278 Last data filed at 5/25/2020 0900 Gross per 24 hour Intake 4005.04 ml Output 4608 ml Net -602.96 ml General Appearance: Well developed, well nourished, intubated/sedated Ears/Nose/Mouth/Throat: Hearing grossly normal. 
Neck: Supple. Chest: Lungs decreased diffusely Cardiovascular: Regular rate and rhythm, S1S2 normal, no murmur. Abdomen: Soft, non-tender, bowel sounds are active. Extremities: No edema bilaterally. Skin: Warm and dry. []         Post-cath site without hematoma, bruit, tenderness, or thrill. Distal pulses intact. PMH/SH reviewed - no change compared to H&P Data Review Telemetry: paced/AF 
 
EKG:  
[x]  No new EKG for review Lab Data Personally Reviewed: 
 
Recent Labs  
  05/24/20 
0408 05/23/20 
6069 WBC 30.5* 27.9* HGB 9.9* 10.0* HCT 30.1* 30.6* * 114* Recent Labs  
  05/25/20 
0409 05/24/20 
1709 05/24/20 
1605 05/24/20 
0221  05/23/20 
6112 INR 1.6*  --   --  1.7*  --  1.8* PTP 16.0*  --   --  17.1*  --  18.2* APTT 67.2* 76.7* 83.9* 60.3*   < > 50.8*  
 < > = values in this interval not displayed. Recent Labs  
  05/25/20 
0409 05/24/20 
1605 05/24/20 
0408  136 136  
K 3.7 3.5 3.6  104 104 CO2 27 29 25 BUN 35* 37* 36* CREA 2.36* 2.39* 2.58* * 165* 166* CA 7.6* 7.7* 7.9*  
MG 2.4 2.2 2.4 No results for input(s): CPK, CKNDX, TROIQ in the last 72 hours. No lab exists for component: CPKMB Lab Results Component Value Date/Time Cholesterol, total 92 03/03/2009 09:40 PM  
 HDL Cholesterol 44 03/03/2009 09:40 PM  
 LDL, calculated 38.4 03/03/2009 09:40 PM  
 Triglyceride <15 05/22/2020 05:13 AM  
 CHOL/HDL Ratio 2.1 03/03/2009 09:40 PM  
 
 
Recent Labs  
  05/25/20 
0409 05/24/20 
1605 05/24/20 
0408  05/23/20 
7759 SGOT  --   --  111*  --  162* AP  --   --  144*  --  121* TP  --   --  6.0*  --  5.6* ALB 2.1* 2.2* 2.2*   < > 2.4*  
GLOB  --   --  3.8  --  3.2  
 < > = values in this interval not displayed. No results for input(s): PH, PCO2, PO2 in the last 72 hours. Medications Personally Reviewed: 
 
Current Facility-Administered Medications Medication Dose Route Frequency  polyethylene glycol (MIRALAX) packet 17 g  17 g Per NG tube DAILY PRN  
 dextrose 5% and 0.9% NaCl infusion  100 mL/hr IntraVENous CONTINUOUS  
 bicarbonate dialysis (PRISMASOL) BG K 2/Ca 3.5 5000 ml solution   Extracorporeal DIALYSIS CONTINUOUS And  
 bicarbonate dialysis (PRISMASOL) BG K 4/Ca 2.5 5000 ml solution   Extracorporeal DIALYSIS CONTINUOUS  
 zinc oxide-cod liver oil (DESITIN) 40 % paste   Topical PRN  
 vancomycin (VANCOCIN) 1250 mg in  ml infusion  1,250 mg IntraVENous Q24H  chlorhexidine (ORAL CARE KIT) 0.12 % mouthwash 15 mL  15 mL Oral Q12H  clindamycin (CLEOCIN) 900mg D5W 50mL IVPB (premix)  900 mg IntraVENous Q8H  
 cefepime (MAXIPIME) 1 g in 0.9% sodium chloride (MBP/ADV) 50 mL  1 g IntraVENous Q8H  
 hydrocortisone Sod Succ (PF) (SOLU-CORTEF) injection 100 mg  100 mg IntraVENous Q6H  
 potassium chloride 20 mEq in 50 ml IVPB  20 mEq IntraVENous PRN  
 calcium chloride injection 2 g  2 g IntraVENous PRN  
 famotidine (PF) (PEPCID) 20 mg in 0.9% sodium chloride 10 mL injection  20 mg IntraVENous DAILY  acetaminophen (TYLENOL) solution 650 mg  650 mg Oral Q6H PRN  
 PHARMACY INFORMATION NOTE  1 Each Other BID  DOBUTamine (DOBUTREX) 500 mg/250 mL (2,000 mcg/mL) infusion  0-10 mcg/kg/min IntraVENous TITRATE  propofol (DIPRIVAN) 10 mg/mL infusion  0-50 mcg/kg/min IntraVENous TITRATE  heparin (porcine) injection 2,000 Units  2,000 Units IntraVENous PRN Or  
 heparin (porcine) injection 4,000 Units  4,000 Units IntraVENous PRN  
 heparin 25,000 units in D5W 250 ml infusion  8-25 Units/kg/hr IntraVENous TITRATE  PHENYLephrine (JACLYN-SYNEPHRINE) 100 mg in 0.9% sodium chloride 250 mL infusion   mcg/min IntraVENous TITRATE  
 NOREPINephrine (LEVOPHED) 32 mg in 5% dextrose 250 mL infusion  2-200 mcg/min IntraVENous TITRATE  vasopressin (VASOSTRICT) 20 Units in 0.9% sodium chloride 100 mL infusion  0.04 Units/min IntraVENous CONTINUOUS  
 insulin glargine (LANTUS) injection 25 Units  25 Units SubCUTAneous DAILY  sodium chloride (NS) flush 5-40 mL  5-40 mL IntraVENous Q8H  
 sodium chloride (NS) flush 5-40 mL  5-40 mL IntraVENous PRN  
 ondansetron (ZOFRAN) injection 4 mg  4 mg IntraVENous Q8H PRN  
 alcohol 62% (NOZIN) nasal  1 Ampule  1 Ampule Topical Q12H  
 insulin lispro (HUMALOG) injection   SubCUTAneous Q6H  
 glucose chewable tablet 16 g  4 Tab Oral PRN  
 dextrose (D50W) injection syrg 12.5-25 g  12.5-25 g IntraVENous PRN  
 glucagon (GLUCAGEN) injection 1 mg  1 mg IntraMUSCular PRN  
 acetaminophen (TYLENOL) tablet 650 mg  650 mg Oral Q6H PRN Or  
 acetaminophen (TYLENOL) suppository 650 mg  650 mg Rectal Q6H PRN  
 influenza vaccine 2019-20 (6 mos+)(PF) (FLUARIX/FLULAVAL/FLUZONE QUAD) injection 0.5 mL  0.5 mL IntraMUSCular PRIOR TO DISCHARGE  sodium chloride (NS) flush 5-10 mL  5-10 mL IntraVENous PRN Courtney Foreman III, DO

## 2020-05-26 NOTE — PROGRESS NOTES
NAME: Kaylie Perkins :  1955 MRN:  [id number] Assessment :    Plan: 
--LAQUITA Shock Sepsis DM type 2 Systolic HF (EF 65%) COVID + Initiated CVVHD ; tolerating UF (factor 150); up ~ 30 liters--alternating 2k, 4k baths as pharmacy is low on certain dialysates Lewis line  Note pt is still a full code. Poor prognosis Palliative following Subjective: Chief Complaint:  In order to limit the exposure risk from COVID 19 and to preserve the hospital's limited supply of PPEs, seen through ICU window. Intubated. Remains critically ill On CVVH Review of Systems:  
 
Symptom Y/N Comments  Symptom Y/N Comments Fever/Chills    Chest Pain Poor Appetite    Edema Cough    Abdominal Pain Sputum    Joint Pain SOB/GROVES    Pruritis/Rash Nausea/vomit    Tolerating PT/OT Diarrhea    Tolerating Diet Constipation    Other Could not obtain due to: See above Objective: VITALS:  
Last 24hrs VS reviewed since prior progress note. Most recent are: 
Visit Vitals /71 Pulse 70 Temp (!) 96.5 °F (35.8 °C) Resp 20 Ht 6' 3\" (1.905 m) Wt 124.7 kg (274 lb 14.6 oz) SpO2 97% BMI 34.36 kg/m² Intake/Output Summary (Last 24 hours) at 2020 1207 Last data filed at 2020 1100 Gross per 24 hour Intake 3632.7 ml Output 5427 ml Net -1794.3 ml Telemetry Reviewed: PHYSICAL EXAM: 
General: NAD, ett in place Lebron Edema  -+in UE/LE Lab Data Reviewed: (see below) Medications Reviewed: (see below) PMH/SH reviewed - no change compared to H&P 
________________________________________________________________________ Care Plan discussed with: 
Patient Family RN Care Manager Consultant:     
 
  Comments >50% of visit spent in counseling and coordination of care ________________________________________________________________________ Sanjuana Claude, MD  
 
Procedures: see electronic medical records for all procedures/Xrays and details which 
were not copied into this note but were reviewed prior to creation of Plan. LABS: 
Recent Labs  
  05/26/20 0419 05/24/20 0408 WBC 27.2* 30.5* HGB 9.7* 9.9*  
HCT 28.4* 30.1*  
 140* Recent Labs  
  05/26/20 0419 05/25/20 1511 05/25/20 0409  138 137  
K 3.8 3.7 3.7  104 104 CO2 25 26 27 BUN 41* 38* 35* CREA 2.28* 2.27* 2.36* * 200* 186* CA 7.8* 7.7* 7.6*  
MG 2.5* 2.3 2.4 PHOS 3.4 3.1 3.1 Recent Labs  
  05/26/20 0419 05/25/20 1511 05/25/20 0409 05/24/20 
0408 SGOT 102*  --   --   --  111* *  --   --   --  144* TP 6.2*  --   --   --  6.0* ALB 2.0* 2.0* 2.1*   < > 2.2*  
GLOB 4.2*  --   --   --  3.8  
 < > = values in this interval not displayed. Recent Labs  
  05/26/20 0419 05/25/20 0409 05/24/20 
1709 05/24/20 
0221 INR 1.7* 1.6*  --   --  1.7* PTP 16.6* 16.0*  --   --  17.1* APTT 84.9* 67.2* 76.7*   < > 60.3*  
 < > = values in this interval not displayed. MEDICATIONS: 
Current Facility-Administered Medications Medication Dose Route Frequency  0.9% sodium chloride infusion  100 mL/hr IntraVENous CONTINUOUS  
 insulin glargine (LANTUS) injection 30 Units  30 Units SubCUTAneous DAILY  hydrocortisone Sod Succ (PF) (SOLU-CORTEF) injection 50 mg  50 mg IntraVENous Q6H  
 vancomycin (VANCOCIN) 1,000 mg in 0.9% sodium chloride (MBP/ADV) 250 mL  1,000 mg IntraVENous Q24H  polyethylene glycol (MIRALAX) packet 17 g  17 g Per NG tube DAILY PRN  
 bicarbonate dialysis (PRISMASOL) BG K 2/Ca 3.5 5000 ml solution   Extracorporeal DIALYSIS CONTINUOUS  And  
 bicarbonate dialysis (PRISMASOL) BG K 4/Ca 2.5 5000 ml solution   Extracorporeal DIALYSIS CONTINUOUS  
 zinc oxide-cod liver oil (DESITIN) 40 % paste   Topical PRN  
  chlorhexidine (ORAL CARE KIT) 0.12 % mouthwash 15 mL  15 mL Oral Q12H  clindamycin (CLEOCIN) 900mg D5W 50mL IVPB (premix)  900 mg IntraVENous Q8H  
 cefepime (MAXIPIME) 1 g in 0.9% sodium chloride (MBP/ADV) 50 mL  1 g IntraVENous Q8H  potassium chloride 20 mEq in 50 ml IVPB  20 mEq IntraVENous PRN  
 calcium chloride injection 2 g  2 g IntraVENous PRN  
 famotidine (PF) (PEPCID) 20 mg in 0.9% sodium chloride 10 mL injection  20 mg IntraVENous DAILY  acetaminophen (TYLENOL) solution 650 mg  650 mg Oral Q6H PRN  
 PHARMACY INFORMATION NOTE  1 Each Other BID  propofol (DIPRIVAN) 10 mg/mL infusion  0-50 mcg/kg/min IntraVENous TITRATE  heparin (porcine) injection 2,000 Units  2,000 Units IntraVENous PRN Or  
 heparin (porcine) injection 4,000 Units  4,000 Units IntraVENous PRN  
 heparin 25,000 units in D5W 250 ml infusion  8-25 Units/kg/hr IntraVENous TITRATE  
 NOREPINephrine (LEVOPHED) 32 mg in 5% dextrose 250 mL infusion  2-200 mcg/min IntraVENous TITRATE  vasopressin (VASOSTRICT) 20 Units in 0.9% sodium chloride 100 mL infusion  0.04 Units/min IntraVENous CONTINUOUS  
 sodium chloride (NS) flush 5-40 mL  5-40 mL IntraVENous Q8H  
 sodium chloride (NS) flush 5-40 mL  5-40 mL IntraVENous PRN  
 ondansetron (ZOFRAN) injection 4 mg  4 mg IntraVENous Q8H PRN  
 alcohol 62% (NOZIN) nasal  1 Ampule  1 Ampule Topical Q12H  
 insulin lispro (HUMALOG) injection   SubCUTAneous Q6H  
 glucose chewable tablet 16 g  4 Tab Oral PRN  
 dextrose (D50W) injection syrg 12.5-25 g  12.5-25 g IntraVENous PRN  
 glucagon (GLUCAGEN) injection 1 mg  1 mg IntraMUSCular PRN  
 acetaminophen (TYLENOL) tablet 650 mg  650 mg Oral Q6H PRN  Or  
 acetaminophen (TYLENOL) suppository 650 mg  650 mg Rectal Q6H PRN  
 influenza vaccine 2019-20 (6 mos+)(PF) (FLUARIX/FLULAVAL/FLUZONE QUAD) injection 0.5 mL  0.5 mL IntraMUSCular PRIOR TO DISCHARGE  
  sodium chloride (NS) flush 5-10 mL  5-10 mL IntraVENous PRN

## 2020-05-26 NOTE — PROGRESS NOTES
05/26/20 0789 ABCDEF Bundle SBT Safety Screen Passed No  
SBT Screen Reason for Failure Vasopressor use

## 2020-05-26 NOTE — DIALYSIS
[facility]       [phone] Orders Mode: CVVHD Factor: 150 ml/hr Dialysate: 2000 ml/hr Blood Flow Rate: 200 ml/min Metrics BP / HR: 122/71, 83 Blood Flow Rate: 200 ml/min AP:                         -107 RP: 58 TMP: 35  
PD: 33  
FP: 130 Dialysate: 2000 ml/hr Comments / Plan:  
[id number] filter running well with no indication for change at this time. Consents, patient, code status, labs and orders verified. +COVID-19: Observed pt & CRRT machine outside room with primary RN inside pt room to reduce exposure & use of PPE. RIJ temporary CVC. Unable to observe CVC dressing, primary RN to change if needed per policy & procedure. No signs of redness, drainage, or infection visualized by primary RN. Lines visible and connections secure with blood warmer to return line at 37*C. Education, pre and post to FRANCISCO Ferreira, primary RN.

## 2020-05-26 NOTE — PROGRESS NOTES
Progress Note 5/26/2020 11:43 AM 
NAME: Beau Bennett MRN:  [id number] Admit Diagnosis: Sepsis ([address] [A41.9] Problem List: 1. Shock; septic/cardiogenic 2. Severe sepsis 3. COVID-19 + 4. NSTEMI 5. Hyponatremia 6. Lactic acidosis 7. Acute liver injury 8. Acute on chronic systolic heart failure; Class IV 9. Acute kidney injury; anuric 10. Coagulopathy 11. Nonischemic dilated cardiomyopathy s/p remote ICD 12. Chronic atrial fibrillation 13. Sleep apnea 14. Hypertensive heart disease w/ CKD and HF Assessment/Plan:  
Long discussion with the family multiple times. He is gravely ill and most likely will not survive this. This has been relayed and they understand this. Intake/Output Summary (Last 24 hours) at 5/26/2020 4693 Last data filed at 5/26/2020 0800 Gross per 24 hour Intake 3844.72 ml Output 5395 ml Net -1550.28 ml Ricky off Vaso @ 0.04 Norepi @ 15 Dobut off Epi off 30% FiO2 Responds to pain, otherwise not much else. 1. Continue supportive care 2. Heparin gtt. 3. CVVH ongoing; tolerating full factor 4. Remains critically ill but I am hopeful 5. Discussed w/ family   
 
  
 [x]       High complexity decision making was performed in this patient at high risk for decompensation with multiple organ involvement. Subjective:  
 
Beau Bennett is intubated and sedated. Discussed with RN events overnight. Review of Systems: 
 
Symptom Y/N Comments  Symptom Y/N Comments Fever/Chills N   Chest Pain N Poor Appetite N   Edema N   
Cough N   Abdominal Pain N Sputum N   Joint Pain N   
SOB/GROVES N   Pruritis/Rash N   
Nausea/vomit N   Tolerating PT/OT Y Diarrhea N   Tolerating Diet Y Constipation N   Other Could NOT obtain due to: sedated Objective:  
  
Physical Exam: 
 
Last 24hrs VS reviewed since prior progress note. Most recent are: 
 
Visit Vitals /64 Pulse 71 Temp 98 °F (36.7 °C) Resp 16  
 Ht 6' 3\" (1.905 m) Wt 124.7 kg (274 lb 14.6 oz) SpO2 97% BMI 34.36 kg/m² Intake/Output Summary (Last 24 hours) at 5/26/2020 6935 Last data filed at 5/26/2020 0800 Gross per 24 hour Intake 3844.72 ml Output 5395 ml Net -1550.28 ml General Appearance: Well developed, well nourished, intubated/sedated Ears/Nose/Mouth/Throat: Hearing grossly normal. 
Neck: Supple. Chest: Lungs decreased diffusely Cardiovascular: Regular rate and rhythm, S1S2 normal, no murmur. Abdomen: Soft, non-tender, bowel sounds are active. Extremities: No edema bilaterally. Skin: Warm and dry. []         Post-cath site without hematoma, bruit, tenderness, or thrill. Distal pulses intact. PMH/SH reviewed - no change compared to H&P Data Review Telemetry: paced/AF 
 
EKG:  
[x]  No new EKG for review Lab Data Personally Reviewed: 
 
Recent Labs  
  05/26/20 
0419 05/24/20 
0408 WBC 27.2* 30.5* HGB 9.7* 9.9*  
HCT 28.4* 30.1*  
 140* Recent Labs  
  05/26/20 
0419 05/25/20 
0409 05/24/20 
1709  05/24/20 
0221 INR 1.7* 1.6*  --   --  1.7* PTP 16.6* 16.0*  --   --  17.1* APTT 84.9* 67.2* 76.7*   < > 60.3*  
 < > = values in this interval not displayed. Recent Labs  
  05/26/20 
0419 05/25/20 
1511 05/25/20 
0409  138 137  
K 3.8 3.7 3.7  104 104 CO2 25 26 27 BUN 41* 38* 35* CREA 2.28* 2.27* 2.36* * 200* 186* CA 7.8* 7.7* 7.6*  
MG 2.5* 2.3 2.4 No results for input(s): CPK, CKNDX, TROIQ in the last 72 hours. No lab exists for component: CPKMB Lab Results Component Value Date/Time Cholesterol, total 92 03/03/2009 09:40 PM  
 HDL Cholesterol 44 03/03/2009 09:40 PM  
 LDL, calculated 38.4 03/03/2009 09:40 PM  
 Triglyceride <15 05/22/2020 05:13 AM  
 CHOL/HDL Ratio 2.1 03/03/2009 09:40 PM  
 
 
Recent Labs  
  05/26/20 
0419 05/25/20 
1511 05/25/20 
0409  05/24/20 
0408 SGOT 102*  --   --   --  111* *  --   --   --  144* TP 6.2*  --   --   --  6.0* ALB 2.0* 2.0* 2.1*   < > 2.2*  
GLOB 4.2*  --   --   --  3.8  
 < > = values in this interval not displayed. No results for input(s): PH, PCO2, PO2 in the last 72 hours. Medications Personally Reviewed: 
 
Current Facility-Administered Medications Medication Dose Route Frequency  hydrocortisone Sod Succ (PF) (SOLU-CORTEF) injection 50 mg  50 mg IntraVENous Q6H  
 vancomycin (VANCOCIN) 1,000 mg in 0.9% sodium chloride (MBP/ADV) 250 mL  1,000 mg IntraVENous Q24H  polyethylene glycol (MIRALAX) packet 17 g  17 g Per NG tube DAILY PRN  
 dextrose 5% and 0.9% NaCl infusion  100 mL/hr IntraVENous CONTINUOUS  
 bicarbonate dialysis (PRISMASOL) BG K 2/Ca 3.5 5000 ml solution   Extracorporeal DIALYSIS CONTINUOUS And  
 bicarbonate dialysis (PRISMASOL) BG K 4/Ca 2.5 5000 ml solution   Extracorporeal DIALYSIS CONTINUOUS  
 zinc oxide-cod liver oil (DESITIN) 40 % paste   Topical PRN  chlorhexidine (ORAL CARE KIT) 0.12 % mouthwash 15 mL  15 mL Oral Q12H  clindamycin (CLEOCIN) 900mg D5W 50mL IVPB (premix)  900 mg IntraVENous Q8H  
 cefepime (MAXIPIME) 1 g in 0.9% sodium chloride (MBP/ADV) 50 mL  1 g IntraVENous Q8H  potassium chloride 20 mEq in 50 ml IVPB  20 mEq IntraVENous PRN  
 calcium chloride injection 2 g  2 g IntraVENous PRN  
 famotidine (PF) (PEPCID) 20 mg in 0.9% sodium chloride 10 mL injection  20 mg IntraVENous DAILY  acetaminophen (TYLENOL) solution 650 mg  650 mg Oral Q6H PRN  
 PHARMACY INFORMATION NOTE  1 Each Other BID  propofol (DIPRIVAN) 10 mg/mL infusion  0-50 mcg/kg/min IntraVENous TITRATE  heparin (porcine) injection 2,000 Units  2,000 Units IntraVENous PRN Or  
 heparin (porcine) injection 4,000 Units  4,000 Units IntraVENous PRN  
 heparin 25,000 units in D5W 250 ml infusion  8-25 Units/kg/hr IntraVENous TITRATE  
 NOREPINephrine (LEVOPHED) 32 mg in 5% dextrose 250 mL infusion  2-200 mcg/min IntraVENous TITRATE  vasopressin (VASOSTRICT) 20 Units in 0.9% sodium chloride 100 mL infusion  0.04 Units/min IntraVENous CONTINUOUS  
 insulin glargine (LANTUS) injection 25 Units  25 Units SubCUTAneous DAILY  sodium chloride (NS) flush 5-40 mL  5-40 mL IntraVENous Q8H  
 sodium chloride (NS) flush 5-40 mL  5-40 mL IntraVENous PRN  
 ondansetron (ZOFRAN) injection 4 mg  4 mg IntraVENous Q8H PRN  
 alcohol 62% (NOZIN) nasal  1 Ampule  1 Ampule Topical Q12H  
 insulin lispro (HUMALOG) injection   SubCUTAneous Q6H  
 glucose chewable tablet 16 g  4 Tab Oral PRN  
 dextrose (D50W) injection syrg 12.5-25 g  12.5-25 g IntraVENous PRN  
 glucagon (GLUCAGEN) injection 1 mg  1 mg IntraMUSCular PRN  
 acetaminophen (TYLENOL) tablet 650 mg  650 mg Oral Q6H PRN Or  
 acetaminophen (TYLENOL) suppository 650 mg  650 mg Rectal Q6H PRN  
 influenza vaccine 2019-20 (6 mos+)(PF) (FLUARIX/FLULAVAL/FLUZONE QUAD) injection 0.5 mL  0.5 mL IntraMUSCular PRIOR TO DISCHARGE  sodium chloride (NS) flush 5-10 mL  5-10 mL IntraVENous PRN Tonna Dryer III, DO

## 2020-05-26 NOTE — PROGRESS NOTES
0700 - Received report from Farshad [email or web address].[email or web address] 
 
0800 - Assessment completed. Patient grimaces to pain and flexes left foot only with painful stimuli. On ventilator for respiratory support. A-fib paced with demand pacemaker. Currently on CRRT alternating 2k and 4k dialysate bags per order. Not making any urine in bright. OGT infusing 10 mL/hr of Nepro at goal. Morrie Batty being used for patient temperature of 97F on rectal probe. 1030 - Received verbal order from Dr. Radha Leach to wean patient off vasopressin. 1200 - Reassessment completed. No changes noted. 1312 - Vasopressin off. 
 
1600 - Reassessment completed. No changes noted. [phone] - Nurse helped facilitate video call for patient family to see patient. 1930 - Report given to Alida Morfin.[email or web address].

## 2020-05-26 NOTE — PROGRESS NOTES
[facility] INTENSIVIST Consult Service Note Pulmonary, Critical Care, and Sleep Medicine Name: Alison Rosario MRN: [id number] : 1955 Hospital: [address] Date: 2020  Admission date: 5/15/2020 Hospital Day: 15 Subjective/Interval History:  
 
20: off sedation but only grimaces to noxious stimuli. Remains critically ill on mechanical ventilation/pressors IMPRESSION:  
1. Acute hypoxic respiratory failure, intubated  2. Profound septic shock on high level of pressors. 3. COVID 19 pneumonia s/p treatment with convalescent plasma and 
4. Alpha strep septicemia- elevated Procalcitonin > 80 
5. Chronic combined systolic/diastolic heart failure 6. Hypertensive heart disease with CHF and CKD 7. LAQUITA/CKD, CVVH to start 8. Chronic anticoagulation at home- eliquis 9. Diabetes mellitus type 2 uncontrolled 10. Hyperlipidemia 11. 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 Chronic atrial fibrillation 13. Status post SJM BIV-ICD implant in 2016 
14. Sleep apnea, undiagnosed/untreated 15. Obesity with recent weight gain RECOMMENDATIONS/PLAN:  
1. Ventilator support - mental status and relatively high pressor doses make attempts at weaning/extubation difficult currently 2. Continue pressors, wean as able 3. Minimize sedation 4. Monitor mental status, has been off sedation for ~24 hours 5. Continue antibiotics 6. Stress dose steroids 7. RRT per nephrology 8. Heparin drip 9. Cardiology following 10. Glycemic monitoring and control 11. Replete electrolytes PRN 12. Check abdominal ultrasound, bili rising 13. DVT, SUP prophylaxis 14. Remains critically ill Subjective/Initial History:  
I have reviewed the flowsheet and previous days notes. Seen earlier today on rounds. I was asked by Venita Monte MD to see Alison Rosario a 71 y.o.    American male  in consultation for a chief complaint of shock. Excerpts from admission 5/15/2020 and consult notes reviewed as follows:  
 
\"Mr. Sharri Wick is a 57 y.o. morbidly obese male with Chronic nonischemic dilated cardiomyopathy EF 53%, Chronic systolic congestive heart failure class, H/o atrial fibrillation, On chronic warfarin, Iatrogenic left bundle branch block with predominant RV pacing, 64-70%, Hypertension, Hyperlipidemia and diabetes presents to ED [facility] ED C/O Worsening exertional shortness of breath and around 20 pounds weight gain in last 3 weeks. \" 
 
Pt now in CCU. Poor historian. On room air. No fever. No cough. No diarrhea. Not very active. Saw PCP three weeks ago. Some edema. Chart notes ED [facility] admission in 2017 with decompensation. Patient PCP: Fabricio Staples MD 
PMH:  has a past medical history of A-fib ([address] Arrhythmia, Diabetes ([address] Endocrine disease, Hypertension, and Irregular heart beat. He also has no past medical history of Difficult intubation, Malignant hyperthermia due to anesthesia, Nausea & vomiting, or Pseudocholinesterase deficiency. PSH:   has a past surgical history that includes pr cardiac surg procedure unlist; insert emergency endotrach airway (5/18/2020); and ir insert non tunl cvc over 5 yrs (5/18/2020). FHX: family history includes Diabetes in his father; Heart Disease in his father and mother. SHX:  reports that he quit smoking about 26 years ago. He has never used smokeless tobacco. He reports that he does not drink alcohol or use drugs. ROS:A comprehensive review of systems was negative except for that written in the HPI. No Known Allergies MEDS:  
Current Facility-Administered Medications Medication  hydrocortisone Sod Succ (PF) (SOLU-CORTEF) injection 50 mg  
 vancomycin (VANCOCIN) 1,000 mg in 0.9% sodium chloride (MBP/ADV) 250 mL  polyethylene glycol (MIRALAX) packet 17 g  
 dextrose 5% and 0.9% NaCl infusion  bicarbonate dialysis (PRISMASOL) BG K 2/Ca 3.5 5000 ml solution And  
 bicarbonate dialysis (PRISMASOL) BG K 4/Ca 2.5 5000 ml solution  zinc oxide-cod liver oil (DESITIN) 40 % paste  chlorhexidine (ORAL CARE KIT) 0.12 % mouthwash 15 mL  clindamycin (CLEOCIN) 900mg D5W 50mL IVPB (premix)  cefepime (MAXIPIME) 1 g in 0.9% sodium chloride (MBP/ADV) 50 mL  potassium chloride 20 mEq in 50 ml IVPB  calcium chloride injection 2 g  
 famotidine (PF) (PEPCID) 20 mg in 0.9% sodium chloride 10 mL injection  acetaminophen (TYLENOL) solution 650 mg  PHARMACY INFORMATION NOTE  propofol (DIPRIVAN) 10 mg/mL infusion  heparin (porcine) injection 2,000 Units Or  heparin (porcine) injection 4,000 Units  heparin 25,000 units in D5W 250 ml infusion  NOREPINephrine (LEVOPHED) 32 mg in 5% dextrose 250 mL infusion  vasopressin (VASOSTRICT) 20 Units in 0.9% sodium chloride 100 mL infusion  insulin glargine (LANTUS) injection 25 Units  sodium chloride (NS) flush 5-40 mL  sodium chloride (NS) flush 5-40 mL  ondansetron (ZOFRAN) injection 4 mg  alcohol 62% (NOZIN) nasal  1 Ampule  insulin lispro (HUMALOG) injection  glucose chewable tablet 16 g  
 dextrose (D50W) injection syrg 12.5-25 g  
 glucagon (GLUCAGEN) injection 1 mg  acetaminophen (TYLENOL) tablet 650 mg Or  acetaminophen (TYLENOL) suppository 650 mg  
 influenza vaccine 2019-20 (6 mos+)(PF) (FLUARIX/FLULAVAL/FLUZONE QUAD) injection 0.5 mL  sodium chloride (NS) flush 5-10 mL Current Facility-Administered Medications:  
  hydrocortisone Sod Succ (PF) (SOLU-CORTEF) injection 50 mg, 50 mg, IntraVENous, Q6H, 50 mg at 05/26/20 0519 **AND** [DISCONTINUED] insulin regular (NOVOLIN R, HUMULIN R) injection 6 Units, 6 Units, SubCUTAneous, Q6H, Tacy Severance C, MD, Stopped at 05/21/20 1800 
  vancomycin (VANCOCIN) 1,000 mg in 0.9% sodium chloride (MBP/ADV) 250 mL, 1,000 mg, IntraVENous, Q24H, Jaydon Dillard MD 
  polyethylene glycol [facility]) packet 17 g, 17 g, Per NG tube, DAILY PRN, Shaunna Abad MD, 17 g at 05/24/20 1703   dextrose 5% and 0.9% NaCl infusion, 100 mL/hr, IntraVENous, CONTINUOUS, Devi Sands MD, Last Rate: 100 mL/hr at 05/26/20 0221, 100 mL/hr at 05/26/20 0221 
  bicarbonate dialysis (PRISMASOL) BG K 2/Ca 3.5 5000 ml solution, , Extracorporeal, DIALYSIS CONTINUOUS, Last Rate: 2,000 mL/hr at 05/26/20 0432, 2,000 mL/hr at 05/26/20 0432 **AND** bicarbonate dialysis (PRISMASOL) BG K 4/Ca 2.5 5000 ml solution, , Extracorporeal, DIALYSIS CONTINUOUS, Slava Montenegro MD, Last Rate: 2,000 mL/hr at 05/26/20 0701, 2,000 mL/hr at 05/26/20 0701   zinc oxide-cod liver oil (DESITIN) 40 % paste, , Topical, PRN, Shaunna Abad MD 
  chlorhexidine (ORAL CARE KIT) 0.12 % mouthwash 15 mL, 15 mL, Oral, Q12H, Marci Gustafson DO, 15 mL at 05/26/20 2047   clindamycin (CLEOCIN) 900mg D5W 50mL IVPB (premix), 900 mg, IntraVENous, Q8H, Itz Lomax MD, Last Rate: 100 mL/hr at 05/26/20 0302, 900 mg at 05/26/20 0302   cefepime (MAXIPIME) 1 g in 0.9% sodium chloride (MBP/ADV) 50 mL, 1 g, IntraVENous, Q8H, Marci Gustafson DO, Last Rate: 50 mL/hr at 05/26/20 0412, 1 g at 05/26/20 8759   potassium chloride 20 mEq in 50 ml IVPB, 20 mEq, IntraVENous, PRN, Devi Sands MD 
  calcium chloride injection 2 g, 2 g, IntraVENous, PRN, Slava Montenegro MD 
  famotidine (PF) (PEPCID) 20 mg in 0.9% sodium chloride 10 mL injection, 20 mg, IntraVENous, DAILY, Marci Gustafson DO, 20 mg at 05/26/20 1950   acetaminophen (TYLENOL) solution 650 mg, 650 mg, Oral, Q6H PRN, Arsh Andres MD, 650 mg at 05/20/20 2132   PHARMACY INFORMATION NOTE, 1 Each, Other, BID, Marci Gustafson DO, 1 Each at 05/26/20 0900   propofol (DIPRIVAN) 10 mg/mL infusion, 0-50 mcg/kg/min, IntraVENous, TITRATE, Florentino Romero MD, Stopped at 05/25/20 1156   heparin (porcine) injection 2,000 Units, 2,000 Units, IntraVENous, PRN **OR** heparin (porcine) injection 4,000 Units, 4,000 Units, IntraVENous, PRN, Marci Gustafson, DO, 2,000 Units at 05/19/20 0130 
  heparin 25,000 units in D5W 250 ml infusion, 8-25 Units/kg/hr, IntraVENous, TITRATE, Marci Gustafson, , Last Rate: 10.9 mL/hr at 05/26/20 0520, 9 Units/kg/hr at 05/26/20 0520 
  NOREPINephrine (LEVOPHED) 32 mg in 5% dextrose 250 mL infusion, 2-200 mcg/min, IntraVENous, TITRATE, Marci Gustafson, , Last Rate: 5.6 mL/hr at 05/26/20 0104, 12 mcg/min at 05/26/20 0104   vasopressin (VASOSTRICT) 20 Units in 0.9% sodium chloride 100 mL infusion, 0.04 Units/min, IntraVENous, CONTINUOUS, Huber HILLS MD, Last Rate: 12 mL/hr at 05/26/20 0305, 0.04 Units/min at 05/26/20 0305   insulin glargine (LANTUS) injection 25 Units, 25 Units, SubCUTAneous, DAILY, Jannet Noguera MD, 25 Units at 05/25/20 1005   sodium chloride (NS) flush 5-40 mL, 5-40 mL, IntraVENous, Q8H, Patricia Lowe MD, 10 mL at 05/26/20 0519 
  sodium chloride (NS) flush 5-40 mL, 5-40 mL, IntraVENous, PRN, Patricia Lowe MD 
  ondansetron [facility]) injection 4 mg, 4 mg, IntraVENous, Q8H PRN, Patricia Lowe MD, 4 mg at 05/16/20 0137 
  alcohol 62% (NOZIN) nasal  1 Ampule, 1 Ampule, Topical, Q12H, Patricia Lowe MD, 1 Ampule at 05/26/20 0809 
  insulin lispro (HUMALOG) injection, , SubCUTAneous, Q6H, Huber HILLS MD, 3 Units at 05/26/20 0518 
  glucose chewable tablet 16 g, 4 Tab, Oral, PRN, Jannet Noguera MD 
  dextrose (D50W) injection syrg 12.5-25 g, 12.5-25 g, IntraVENous, PRN, Huber HILLS MD, 12.5 g at 05/22/20 0112 
  glucagon (GLUCAGEN) injection 1 mg, 1 mg, IntraMUSCular, PRN, Jannet Noguera MD 
  acetaminophen (TYLENOL) tablet 650 mg, 650 mg, Oral, Q6H PRN, 650 mg at 05/20/20 0808 **OR** acetaminophen (TYLENOL) suppository 650 mg, 650 mg, Rectal, Q6H PRN, Jannet Noguera MD, 650 mg at 05/20/20 1444   influenza vaccine  (6 mos+)(PF) (FLUARIX/FLULAVAL/FLUZONE QUAD) injection 0.5 mL, 0.5 mL, IntraMUSCular, PRIOR TO DISCHARGE, José Quiñonez DO 
  sodium chloride (NS) flush 5-10 mL, 5-10 mL, IntraVENous, PRN, Meri March MD 
 
 
Objective:  
 
Vital Signs: Telemetry:    AFIB Intake/Output:  
Visit Vitals /71 Pulse 83 Temp 98 °F (36.7 °C) Resp 16 Ht 6' 3\" (1.905 m) Wt 124.7 kg (274 lb 14.6 oz) SpO2 97% BMI 34.36 kg/m² Temp (24hrs), Av.7 °F (36.5 °C), Min:96.6 °F (35.9 °C), Max:98.6 °F (37 °C) O2 Device: Endotracheal tube O2 Flow Rate (L/min): 4 l/min Body mass index is 34.36 kg/m². Wt Readings from Last 4 Encounters:  
20 124.7 kg (274 lb 14.6 oz) 19 123.8 kg (273 lb) 17 129.3 kg (285 lb)  
17 107.5 kg (237 lb) Intake/Output Summary (Last 24 hours) at 2020 1243 Last data filed at 2020 0800 Gross per 24 hour Intake 3844.72 ml Output 5675 ml Net -1830.28 ml Last shift:      701 - 1900 In: 130.6 [I.V.:130.6] Out: 280 Last 3 shifts: 1901 -  07 In: 7032.3 [I.V.:6492.3] Out: 4090 Hemodynamics:   
CO:   
CI:   
CVP: CVP (mmHg): 9 mmHg (20 0800) SVR:   PAP Systolic:   
PAP Diastolic:   
PVR:   
XY07:    
 
Ventilator Settings:     
Mode Rate TV Press PEEP FiO2 PIP Min. Vent Assist control    500 ml    6 cm H20 30 %  28 cm H2O  10.2 l/min Physical Exam: 
 
General: intubated/sedated HEENT: NCAT, poor dentition, lips and mucosa dry Eyes: anicteric; conjunctiva clear Neck: no nodes, no cuff leak, no accessory MM use. Chest: no deformity,  
Cardiac: IR regular; no murmur Lungs: no distress, intubated Abd: soft, NT, hypoactive BS Ext: no edema; no joint swelling; No clubbing Neuro: sedated Data:  
 
Current Facility-Administered Medications Medication Dose Route Frequency  hydrocortisone Sod Succ (PF) (SOLU-CORTEF) injection 50 mg  50 mg IntraVENous Q6H  
 vancomycin (VANCOCIN) 1,000 mg in 0.9% sodium chloride (MBP/ADV) 250 mL  1,000 mg IntraVENous Q24H  
 dextrose 5% and 0.9% NaCl infusion  100 mL/hr IntraVENous CONTINUOUS  
 bicarbonate dialysis (PRISMASOL) BG K 2/Ca 3.5 5000 ml solution   Extracorporeal DIALYSIS CONTINUOUS And  
 bicarbonate dialysis (PRISMASOL) BG K 4/Ca 2.5 5000 ml solution   Extracorporeal DIALYSIS CONTINUOUS  chlorhexidine (ORAL CARE KIT) 0.12 % mouthwash 15 mL  15 mL Oral Q12H  clindamycin (CLEOCIN) 900mg D5W 50mL IVPB (premix)  900 mg IntraVENous Q8H  
 cefepime (MAXIPIME) 1 g in 0.9% sodium chloride (MBP/ADV) 50 mL  1 g IntraVENous Q8H  
 famotidine (PF) (PEPCID) 20 mg in 0.9% sodium chloride 10 mL injection  20 mg IntraVENous DAILY  PHARMACY INFORMATION NOTE  1 Each Other BID  propofol (DIPRIVAN) 10 mg/mL infusion  0-50 mcg/kg/min IntraVENous TITRATE  heparin 25,000 units in D5W 250 ml infusion  8-25 Units/kg/hr IntraVENous TITRATE  
 NOREPINephrine (LEVOPHED) 32 mg in 5% dextrose 250 mL infusion  2-200 mcg/min IntraVENous TITRATE  vasopressin (VASOSTRICT) 20 Units in 0.9% sodium chloride 100 mL infusion  0.04 Units/min IntraVENous CONTINUOUS  
 insulin glargine (LANTUS) injection 25 Units  25 Units SubCUTAneous DAILY  sodium chloride (NS) flush 5-40 mL  5-40 mL IntraVENous Q8H  
 alcohol 62% (NOZIN) nasal  1 Ampule  1 Ampule Topical Q12H  
 insulin lispro (HUMALOG) injection   SubCUTAneous Q6H  
 influenza vaccine 2019-20 (6 mos+)(PF) (FLUARIX/FLULAVAL/FLUZONE QUAD) injection 0.5 mL  0.5 mL IntraMUSCular PRIOR TO DISCHARGE Labs: 
Recent Labs  
  05/26/20 
0419 05/24/20 
0408 WBC 27.2* 30.5* HGB 9.7* 9.9*  
HCT 28.4* 30.1*  
 140* Recent Labs  
  05/26/20 
0419 05/25/20 
1511 05/25/20 
0409  05/24/20 
0408 05/24/20 
0221  138 137   < > 136  --   
 K 3.8 3.7 3.7   < > 3.6  --   
 104 104   < > 104  --   
CO2 25 26 27   < > 25  --   
* 200* 186*   < > 166*  --   
BUN 41* 38* 35*   < > 36*  --   
CREA 2.28* 2.27* 2.36*   < > 2.58*  --   
CA 7.8* 7.7* 7.6*   < > 7.9*  --   
MG 2.5* 2.3 2.4   < > 2.4  --   
PHOS 3.4 3.1 3.1   < > 3.0  --   
ALB 2.0* 2.0* 2.1*   < > 2.2*  --   
TBILI 10.2*  --   --   --  9.2*  --   
SGOT 102*  --   --   --  111*  --   
*  --   --   --  358*  --   
INR 1.7*  --  1.6*  --   --  1.7*  
 < > = values in this interval not displayed. Recent Labs  
  05/26/20 
0440 05/24/20 
0529 PHI 7.377 7.359 PCO2I 38.9 43.3 PO2I 102* 128* HCO3I 22.9 24.4 FIO2I 30 40 Imaging: 
I have personally reviewed the patients radiographs and have reviewed the reports: 
No change Total critical care time exclusive of procedures: 35 minutes Beatris Young MD

## 2020-05-26 NOTE — PROGRESS NOTES
Hospitalist Progress Note NAME: Sheryle Sera :  1955 MRN:  [id number] Assessment / Plan: 
Septic vs cardiogenic shock 2/2 COVID Positive with viral prodrome Hyponatremia Bacteremia Multiorgan failure 
-remains critically ill 
-requiring multiple pressors, on 2 pressors now down from 5 
-appreciate intensivist management 
-worsening renal function, CRRT initiated  
-palliative on board 
-at high risk for further rapid decline, death 
-palliative team on board has communicated with wife/daughter. Full code for now.  
-cont pressor support, empiric abx (vanco,cefepime and clindamycin) 
-has been enrolled in convalescent plasma study 
-cont zinc 
-Blood cx 5/15 alpha streptococcus, repeat blood cx obtained and NGTd 
-On solucortef 
-Ferritin>[id number] 
-Liver Enzymes elevated, trending down 
-Bilirubin trending up 
-Leucocytosis worsening Acute renal failure 
-initiated CRRT  
-trend bmp nephrology consultation appreciated Avoid nephrotoxic medication Hx of nonischemic CM 
-recent weight gain 
-EF 20% 
--s/p BIV-ICD 
 
DM2 
-cont insulin w lantus, SSI 
  
NSTEMI 
-cardiology following 
-cont supportive care 
  
Atrial fibrillation Eliquis on hold for now 
  
Hx of Hypertension 
-currently in cardiogenic vs septic shock as above and requiring pressors 
-hold home antiHTNsives 
  
Code Status: Full Surrogate Decision Maker: 
  
DVT Prophylaxis: heparin GI Prophylaxis: not indicated Subjective: Chief Complaint / Reason for Physician Visit Unresponsive on pressor support. Seen through CCU room window and discussed with nursing. Did not enter room to avoid exposure/transmission of COVID-19 Discussed with RN events overnight. Review of Systems: 
Symptom Y/N Comments  Symptom Y/N Comments Fever/Chills    Chest Pain Poor Appetite    Edema Cough    Abdominal Pain Sputum    Joint Pain SOB/GROVES    Pruritis/Rash Nausea/vomit    Tolerating PT/OT Diarrhea    Tolerating Diet Constipation    Other Could NOT obtain due to: Intubated and sedated Objective: VITALS:  
Last 24hrs VS reviewed since prior progress note. Most recent are: 
Patient Vitals for the past 24 hrs: 
 Temp Pulse Resp BP SpO2  
05/26/20 1700 98.3 °F (36.8 °C) 72 15 95/51 99 % 05/26/20 1600 97.8 °F (36.6 °C) 72 16 (!) 85/48 98 % 05/26/20 1548  73 20  99 % 05/26/20 1500 97.2 °F (36.2 °C) 70 15 (!) 88/47 98 % 05/26/20 1400 (!) 96.5 °F (35.8 °C) 75 14 93/43 98 % 05/26/20 1300 (!) 96.2 °F (35.7 °C) 73 13 117/62 97 % 05/26/20 1200 (!) 96.4 °F (35.8 °C) 71 14 132/66 98 % 05/26/20 1148  70 20  97 % 05/26/20 1100 (!) 96.5 °F (35.8 °C) 71 16 135/71   
05/26/20 1000 (!) 96.6 °F (35.9 °C) 71 16 129/66 97 % 05/26/20 0905  70 23  98 % 05/26/20 0900 97.9 °F (36.6 °C) 72 22 137/54 98 % 05/26/20 0850  83  122/71   
05/26/20 0800 98 °F (36.7 °C) 71 16 104/64 97 % 05/26/20 0700 98.1 °F (36.7 °C) 70 18 107/61 97 % 05/26/20 0600 98.1 °F (36.7 °C) 70 17 113/62 97 % 05/26/20 0500 98.2 °F (36.8 °C) 70 16 105/62 96 % 05/26/20 0400 98.4 °F (36.9 °C) 70 18 109/61 97 % 05/26/20 0323  69 19  97 % 05/26/20 0300 98.6 °F (37 °C) 70 20 94/51 96 % 05/26/20 0200 98.4 °F (36.9 °C) 75 18 99/58 97 % 05/26/20 0100 98.5 °F (36.9 °C) 72 20 113/64 97 % 05/26/20 0000 98.6 °F (37 °C) 71 20 108/61 96 % 05/25/20 2359  71 21  96 % 05/25/20 2300 97.7 °F (36.5 °C) 73 30 116/67 96 % 05/25/20 2200 97.6 °F (36.4 °C) 70 29 116/67 97 % 05/25/20 2100 97.7 °F (36.5 °C) 70 18 129/64 96 % 05/25/20 2047  72 19  97 % 05/25/20 2000 97.5 °F (36.4 °C) 70 19 122/63 96 % 05/25/20 1932     96 % 05/25/20 1900 97.4 °F (36.3 °C) 71 17 114/58 97 % 05/25/20 1800 97.1 °F (36.2 °C) 71 24 98/57 97 % Intake/Output Summary (Last 24 hours) at 5/26/2020 1749 Last data filed at 5/26/2020 1700 Gross per 24 hour Intake 3927.22 ml Output 5795 ml Net -1867.78 ml PHYSICAL EXAM: 
GEN: AA male, NAD On Ventilator Intubated and sedated 
anasarca noticed, improving edema 
jaudiced Seen through ICU window to minimize exposure/transmission to/of COVID-19 Reviewed most current lab test results and cultures  YES Reviewed most current radiology test results   YES Review and summation of old records today    NO Reviewed patient's current orders and MAR    YES 
PMH/SH reviewed - no change compared to H&P 
________________________________________________________________________ Care Plan discussed with: 
  Comments Patient Family RN x Care Manager Consultant Multidiciplinary team rounds were held today with , nursing, pharmacist and clinical coordinator. Patient's plan of care was discussed; medications were reviewed and discharge planning was addressed. ________________________________________________________________________ Total NON critical care TIME:  25  Minutes Total CRITICAL CARE TIME Spent:   Minutes non procedure based Comments >50% of visit spent in counseling and coordination of care    
________________________________________________________________________ Luis Acevedo MD  
 
Procedures: see electronic medical records for all procedures/Xrays and details which were not copied into this note but were reviewed prior to creation of Plan. LABS: 
I reviewed today's most current labs and imaging studies. Pertinent labs include: 
Recent Labs  
  05/26/20 
0419 05/24/20 
0408 WBC 27.2* 30.5* HGB 9.7* 9.9*  
HCT 28.4* 30.1*  
 140* Recent Labs  
  05/26/20 
1435 05/26/20 
0419 05/25/20 
1511 05/25/20 
0409  05/24/20 
0408 05/24/20 
0221  137 138 137   < > 136  --   
K 3.5 3.8 3.7 3.7   < > 3.6  --   
 105 104 104   < > 104  --   
CO2 26 25 26 27   < > 25  --   
* 252* 200* 186*   < > 166*  --   
 BUN 42* 41* 38* 35*   < > 36*  --   
CREA 2.07* 2.28* 2.27* 2.36*   < > 2.58*  --   
CA 8.0* 7.8* 7.7* 7.6*   < > 7.9*  --   
MG 2.4 2.5* 2.3 2.4   < > 2.4  --   
PHOS 3.1 3.4 3.1 3.1   < > 3.0  --   
ALB 2.0* 2.0* 2.0* 2.1*   < > 2.2*  --   
TBILI  --  10.2*  --   --   --  9.2*  --   
ALT  --  238*  --   --   --  358*  --   
INR  --  1.7*  --  1.6*  --   --  1.7*  
 < > = values in this interval not displayed.   
 
 
Signed: Luis Starks MD

## 2020-05-26 NOTE — PROGRESS NOTES
Nutrition:  Chart reviewed, case discussed during CCU rounds. Pt remains intubated, MAP in the 70's overnight. Trophic TF running with no residuals. Levo at 12 and vaso at 0.04. As MAP has been maintained >65, okay to advance TF towards Goal Rate per Dr. Drew Nagel: 
 
Advance to Goal Rate of Nepro @ 25mL/h + Prosource 8 x day/split in to 4 doses + 50mL H2O flush q 6h (provides 1560kcals/169gPro/97gCHO/636mL) Would hold for any signs/sx of MOE (abdominal pain/distension, increasing lactic acid). Will continue to monitor pt's case closely. Thank you! Dwayne Haddad RD, [facility] Pager [phone]

## 2020-05-26 NOTE — DIABETES MGMT
15[address] [facility] PROGRAM FOR DIABETES HEALTH 
 
FOLLOW-UP NOTE Presentation Sebastien Aquino a 71 y. o. male admitted from the ER 5/15/20 with complaints of weakness. No COVID-19 symptoms or contacts noted. Fever. CXR, CT Head & CT ABd negative. Blood cultures positive for alpha streptococcus. Markedly elevated ferritin DX: [id number]. Septic shock. Multiorgan failure TX: Anti-COVID therapies. Pacer. Palliative care Recent diagnostics: 
5/21/20 Hepatitis B surface Ab Reactive 5/22/20 WBC 22.9. Albumin 2.8. Bili 6.8. . . Procalcitonin 203.42. Triglyceride <15. Serum creatinine 4.73/GFR 15. CXR: Stable bibasilar opacities and pleural effusions 5/26/20 WBC 27.2. CXR: Persistent pleural effusions with bibasilar atelectasis. Atelectasis at the right lung base has increased Critical care measures: 
            AC Vent via ET 
            BP management via levo & vasopressin ABx Heparin Insulin Steroids Dialysis Diabetes: Patient has known Type 2 diabetes, treated with Trulicity, Onglyza & Amaryl PTA (per PTA med list). Family history positive for diabetes in father.  
 
Subjective NA Objective Physical exam 
General Grimaces per nursing Vital Signs Afebrile. Visit Vitals /66 Pulse 71 Temp (!) 96.6 °F (35.9 °C) Resp 16 Ht 6' 3\" (1.905 m) Wt 124.7 kg (274 lb 14.6 oz) SpO2 97% BMI 34.36 kg/m² Laboratory Lab Results Component Value Date/Time Hemoglobin A1c 9.8 (H) 02/09/2017 05:42 AM  
 Hemoglobin A1c, External 9.0 03/02/2016 Lab Results Component Value Date/Time LDL, calculated 38.4 03/03/2009 09:40 PM  
 
Lab Results Component Value Date/Time Creatinine 2.28 (H) 05/26/2020 04:19 AM  
 
Lab Results Component Value Date/Time  Sodium 137 05/26/2020 04:19 AM  
 Potassium 3.8 05/26/2020 04:19 AM  
 Chloride 105 05/26/2020 04:19 AM  
 CO2 25 05/26/2020 04:19 AM  
 Anion gap 7 05/26/2020 04:19 AM  
 Glucose 252 (H) 05/26/2020 04:19 AM  
 BUN 41 (H) 05/26/2020 04:19 AM  
 Creatinine 2.28 (H) 05/26/2020 04:19 AM  
 BUN/Creatinine ratio 18 05/26/2020 04:19 AM  
 GFR est AA 35 (L) 05/26/2020 04:19 AM  
 GFR est non-AA 29 (L) 05/26/2020 04:19 AM  
 Calcium 7.8 (L) 05/26/2020 04:19 AM  
 Bilirubin, total 10.2 (H) 05/26/2020 04:19 AM  
 AST (SGOT) 102 (H) 05/26/2020 04:19 AM  
 Alk. phosphatase 219 (H) 05/26/2020 04:19 AM  
 Protein, total 6.2 (L) 05/26/2020 04:19 AM  
 Albumin 2.0 (L) 05/26/2020 04:19 AM  
 Globulin 4.2 (H) 05/26/2020 04:19 AM  
 A-G Ratio 0.5 (L) 05/26/2020 04:19 AM  
 ALT (SGPT) 238 (H) 05/26/2020 04:19 AM  
 
Lab Results Component Value Date/Time ALT (SGPT) 238 (H) 05/26/2020 04:19 AM  
 
Factors affecting BG pattern Factor Dose Comments Nutrition: 
Tube feeding via OG  
97 grams/24 hrs Nepro Drugs: 
Steroids Solucortef 50mg Q6 hrs Pain  Grimacing per nursing Infection: COVID-19 positive Cefepime & Clindamycin WBC 27.2. CXR: Persistent pleural effusions with bibasilar atelectasis. Atelectasis at the right lung base has increased Other: CKD CVVH Blood glucose pattern Assessment and Plan Nursing Diagnosis Risk for unstable blood glucose pattern Nursing Intervention Domain 1408 Decision-making Support Nursing Interventions Examined current inpatient diabetes control Explored factors facilitating and impeding inpatient management Evaluation This gentleman with known Type 2 diabetes has rising BGs despite lowering of steroid dosing since yesterday and TFs continuing at trickle rate. Would increase basal insulin. Recommendations Recommend: 
 
Increasing Lantus insulin to 30 units D Billing Code(s) [x] 76206 IP subsequent hospital care - 15 minutes Alina Hind, CNS [phone]

## 2020-05-27 NOTE — ROUTINE PROCESS
0700-Bedside shift change report given to Herbie Greer (oncoming nurse) by  Marii Liao nurse). Report included the following information SBAR, Kardex and MAR.  
5852- Patient assessed. See flowsheet. 56- Patient's wife called. Updated her on the patient's status and plan of care. She would like to meet with palliative care today for a virtual a meeting. 0930-  Spoke with Patricia Mayo NP from palliative care who will schedule a meeting with the family later today. 1015- Ultrasound department called to follow up on the ultrasound ordered yesterday. No answer from both of the department numbers. Voice message left encouraging a phone call back. 1020- Rounds completed. Will discontinue CVP and bright as ordered. Patient will have a CT and neuro evaluation tomorrow if his mental status does not improve (per Dr. Valencia Poon). 1040-OGT removed due to leakage from an area of the tube. Will insert another. Bright and CVP discontinued. 1110- NGT inserted. 1228- Reassessed. No changed. KUB taken. 1350- Patient bathed. Linens changed. 1516- Ultrasound tech at bedside. 1520-Reassessed. No changes. Tube feedings restarted at 20 cc/hr. 1612- CVVH labs drawn and sent. [phone]- Virtual meeting with palliative care and the patient's family in progress. 0081 [facility] with Dr. Angela Rea regarding K 3.3. Orders received to discontinue the alternation of 2 K and 4 K bags. The patient will remain solely on 4 K bags. 1900- Report given to Rosa Corbin RN.

## 2020-05-27 NOTE — PROGRESS NOTES
Palliative Medicine Consult Jevon: [phone]) Patient Name: Diane House YOB: 1955 Date of Initial Consult: 5/18/2020 Reason for Consult: goals of care Requesting Provider: Lyn Diamond MD 
Primary Care Physician: Libertad Whittaker MD 
 
 SUMMARY:  
Diane House is a 71 y.o. with a past history of A. fib, CHF, diabetes, hypertension, status post SJM BIV-ICD 2016, who was admitted on 5/15/2020 from home with a diagnosis of COVID-19. Current medical issues leading to Palliative Medicine involvement include: maxing out  2 vasopressors, fever 103.2 on admission, intubated this am, will need CVVH, likelihood of recovery is poor. 5/27: remains intubated, CVVHD, on levo, no sedating medications for 48 hours, night shift documented pt opened eyes to voice but no tracking or commands. Concern at this time is his neurological status. PALLIATIVE DIAGNOSES:  
1. Weakness 2. Hypotension 3. Acute renal failure 4. Septic shock 5. COVID-19 POS 6. Goals of care PLAN:  
1. Prior to visit, I completed a  review of patient's medical records, including medical documentation, vital signs, MARs, and results of various labs and other diagnostics. I also spoke with patient's nurse, Marcelina Uribe and pulmonologist/intensivist Dr. Mae Oviedo. 2. Plan is to have neuro eval tomorrow if no changes in his neuro status. 3. Virtual visit via Zoom today with family at 36.  
4. Initial consult note routed to primary continuity provider and/or primary health care team members 5. Communicated plan of care with: Palliative [facility] LEDEZMA 192 Team 
 
 GOALS OF CARE / TREATMENT PREFERENCES:  
 
GOALS OF CARE: 
Patient/Health Care Proxy Stated Goals: Prolong life TREATMENT PREFERENCES:  
Code Status: Full Code Advance Care Planning: 
[x] [facility] Interdisciplinary Team has updated the ACP Navigator with [address] and Patient Capacity Primary Decision Maker (Active): Madalyn Hodge Spouse - [phone] Advance Care Planning 5/18/2020 Patient's Healthcare Decision Maker is: Legal Next of Kin Primary Decision Maker Name -  
Primary Decision Maker Phone Number -  
Primary Decision Maker Relationship to Patient -  
Confirm Advance Directive None Patient Would Like to Complete Advance Directive Unable Medical Interventions: Full interventions Other Instructions: Other: As far as possible, the palliative care team has discussed with patient / health care proxy about goals of care / treatment preferences for patient. HISTORY:  
 
History obtained from: chart, RN, family CHIEF COMPLAINT: severe weakness HPI/SUBJECTIVE: The patient is:  
[] Verbal and participatory [x] Non-participatory due to: intubation 5/15: presented to ED with c/o weakness that started 5/14. He was seen at another healthcare facility and diagnosed with low magnesium, discharged home, however, today was still feeling weak with malaise so he came to the ED for evaluation. ABN LAB: lactic acid 2.3, , Na 130, glu 189, Bun/Cr 46/3.24, wbc 14.2. CXR and head CT show no acute abnormality. Clinical Pain Assessment (nonverbal scale for severity on nonverbal patients):  
Clinical Pain Assessment Severity: 0 Activity (Movement): Lying quietly, normal position Duration: for how long has pt been experiencing pain (e.g., 2 days, 1 month, years) Frequency: how often pain is an issue (e.g., several times per day, once every few days, constant) FUNCTIONAL ASSESSMENT:  
 
Palliative Performance Scale (PPS): PPS: 10 PSYCHOSOCIAL/SPIRITUAL SCREENING:  
 
Palliative IDT has assessed this patient for cultural preferences / practices and a referral made as appropriate to needs (Cultural Services, Patient Advocacy, Ethics, etc.) Any spiritual / Church concerns: 
[] Yes /  [x] No 
 
Caregiver Burnout: 
 [] Yes /  [x] No /  [] No Caregiver Present Anticipatory grief assessment:  
[x] Normal  / [] Maladaptive ESAS Anxiety: Anxiety: 0 
 
ESAS Depression:   unable to assess due to pt factors REVIEW OF SYSTEMS:  
 
Positive and pertinent negative findings in ROS are noted above in HPI. The following systems were [x] reviewed / [] unable to be reviewed as noted in HPI Other findings are noted below. Systems: constitutional, ears/nose/mouth/throat, respiratory, gastrointestinal, genitourinary, musculoskeletal, integumentary, neurologic, psychiatric, endocrine. Positive findings noted below. Modified ESAS Completed by: provider Pain: 0 Anxiety: 0 Dyspnea: 0 Stool Occurrence(s): 1 PHYSICAL EXAM:  
 
From RN flowsheet: 
Wt Readings from Last 3 Encounters:  
05/26/20 274 lb 14.6 oz (124.7 kg) 01/19/19 273 lb (123.8 kg) 08/04/17 285 lb (129.3 kg) Blood pressure 132/75, pulse (!) 125, temperature 97.7 °F (36.5 °C), resp. rate 17, height 6' 3\" (1.905 m), weight 274 lb 14.6 oz (124.7 kg), SpO2 100 %. Pain Scale 1: Behavioral Pain Scale (BPS) Pain Intensity 1: 3 Last bowel movement, if known: EXAM:   In order to limit the exposure risk from COVID 19 and to preserve the hospital's limited supply of PPEs, seen through ICU window. Intubated. Discussed with his icu nurse. HISTORY:  
 
Active Problems: 
  Sepsis ([address] (5/15/2020) Weakness generalized (5/18/2020) Septic shock ([address] (5/18/2020) Acute renal failure with tubular necrosis ([address] (5/18/2020) COVID-19 (5/21/2020) Past Medical History:  
Diagnosis Date  A-fib ([address]  Arrhythmia   
 atrial fibrillation 2013  Diabetes ([address]  Endocrine disease   
 diabetes  Hypertension  Irregular heart beat Past Surgical History:  
Procedure Laterality Date  CARDIAC SURG PROCEDURE UNLIST    
 defib placed in left chest  
  INSERT EMERGENCY ENDOTRACH AIRWAY  2020  IR INSERT NON TUNL CVC OVER 5 YRS  2020 Family History Problem Relation Age of Onset  Heart Disease Mother  Diabetes Father  Heart Disease Father History reviewed, no pertinent family history. Social History Tobacco Use  Smoking status: Former Smoker Last attempt to quit: 1993 Years since quittin.8  Smokeless tobacco: Never Used Substance Use Topics  Alcohol use: No  
 
No Known Allergies Current Facility-Administered Medications Medication Dose Route Frequency  insulin glargine (LANTUS) injection 40 Units  40 Units SubCUTAneous DAILY  vasopressin (VASOSTRICT) 20 Units in 0.9% sodium chloride 100 mL infusion  0-0.04 Units/min IntraVENous CONTINUOUS  
 hydrocortisone Sod Succ (PF) (SOLU-CORTEF) injection 50 mg  50 mg IntraVENous Q6H  
 vancomycin (VANCOCIN) 1,000 mg in 0.9% sodium chloride (MBP/ADV) 250 mL  1,000 mg IntraVENous Q24H  polyethylene glycol (MIRALAX) packet 17 g  17 g Per NG tube DAILY PRN  
 bicarbonate dialysis (PRISMASOL) BG K 2/Ca 3.5 5000 ml solution   Extracorporeal DIALYSIS CONTINUOUS And  
 bicarbonate dialysis (PRISMASOL) BG K 4/Ca 2.5 5000 ml solution   Extracorporeal DIALYSIS CONTINUOUS  
 zinc oxide-cod liver oil (DESITIN) 40 % paste   Topical PRN  chlorhexidine (ORAL CARE KIT) 0.12 % mouthwash 15 mL  15 mL Oral Q12H  clindamycin (CLEOCIN) 900mg D5W 50mL IVPB (premix)  900 mg IntraVENous Q8H  
 cefepime (MAXIPIME) 1 g in 0.9% sodium chloride (MBP/ADV) 50 mL  1 g IntraVENous Q8H  potassium chloride 20 mEq in 50 ml IVPB  20 mEq IntraVENous PRN  
 calcium chloride injection 2 g  2 g IntraVENous PRN  
 famotidine (PF) (PEPCID) 20 mg in 0.9% sodium chloride 10 mL injection  20 mg IntraVENous DAILY  acetaminophen (TYLENOL) solution 650 mg  650 mg Oral Q6H PRN  
 PHARMACY INFORMATION NOTE  1 Each Other BID  
  propofol (DIPRIVAN) 10 mg/mL infusion  0-50 mcg/kg/min IntraVENous TITRATE  heparin (porcine) injection 2,000 Units  2,000 Units IntraVENous PRN Or  
 heparin (porcine) injection 4,000 Units  4,000 Units IntraVENous PRN  
 heparin 25,000 units in D5W 250 ml infusion  8-25 Units/kg/hr IntraVENous TITRATE  
 NOREPINephrine (LEVOPHED) 32 mg in 5% dextrose 250 mL infusion  2-200 mcg/min IntraVENous TITRATE  sodium chloride (NS) flush 5-40 mL  5-40 mL IntraVENous Q8H  
 sodium chloride (NS) flush 5-40 mL  5-40 mL IntraVENous PRN  
 ondansetron (ZOFRAN) injection 4 mg  4 mg IntraVENous Q8H PRN  
 alcohol 62% (NOZIN) nasal  1 Ampule  1 Ampule Topical Q12H  
 insulin lispro (HUMALOG) injection   SubCUTAneous Q6H  
 glucose chewable tablet 16 g  4 Tab Oral PRN  
 dextrose (D50W) injection syrg 12.5-25 g  12.5-25 g IntraVENous PRN  
 glucagon (GLUCAGEN) injection 1 mg  1 mg IntraMUSCular PRN  
 acetaminophen (TYLENOL) tablet 650 mg  650 mg Oral Q6H PRN Or  
 acetaminophen (TYLENOL) suppository 650 mg  650 mg Rectal Q6H PRN  
 influenza vaccine 2019-20 (6 mos+)(PF) (FLUARIX/FLULAVAL/FLUZONE QUAD) injection 0.5 mL  0.5 mL IntraMUSCular PRIOR TO DISCHARGE  sodium chloride (NS) flush 5-10 mL  5-10 mL IntraVENous PRN  
 
 
 
 LAB AND IMAGING FINDINGS:  
 
Lab Results Component Value Date/Time WBC 27.2 (H) 05/26/2020 04:19 AM  
 HGB 9.7 (L) 05/26/2020 04:19 AM  
 PLATELET 950 98/27/4681 04:19 AM  
 
Lab Results Component Value Date/Time Sodium 137 05/27/2020 04:35 AM  
 Potassium 3.7 05/27/2020 04:35 AM  
 Chloride 106 05/27/2020 04:35 AM  
 CO2 27 05/27/2020 04:35 AM  
 BUN 41 (H) 05/27/2020 04:35 AM  
 Creatinine 1.92 (H) 05/27/2020 04:35 AM  
 Calcium 8.0 (L) 05/27/2020 04:35 AM  
 Magnesium 2.4 05/27/2020 04:35 AM  
 Phosphorus 3.6 05/27/2020 04:35 AM  
  
Lab Results Component Value Date/Time Alk.  phosphatase 219 (H) 05/26/2020 04:19 AM  
 Protein, total 6.2 (L) 05/26/2020 04:19 AM  
 Albumin 2.1 (L) 05/27/2020 04:35 AM  
 Globulin 4.2 (H) 05/26/2020 04:19 AM  
 
Lab Results Component Value Date/Time INR 1.6 (H) 05/27/2020 04:35 AM  
 Prothrombin time 15.7 (H) 05/27/2020 04:35 AM  
 aPTT 64.9 (H) 05/27/2020 04:35 AM  
  
Lab Results Component Value Date/Time Iron 26 (L) 02/09/2010 05:00 AM  
 TIBC 198 (L) 02/09/2010 05:00 AM  
 Iron % saturation 13 (L) 02/09/2010 05:00 AM  
 Ferritin 1,682 (H) 05/26/2020 04:19 AM  
  
No results found for: PH, PCO2, PO2 No components found for: [place] Lab Results Component Value Date/Time  (H) 05/18/2020 03:20 AM  
 CK - MB 0.1 02/06/2010 02:26 PM  
  
 
 
   
 
Total time: 
Counseling / coordination time, spent as noted above:  
> 50% counseling / coordination?: n 
 
Prolonged service was provided for  []30 min   []75 min in face to face time in the presence of the patient, spent as noted above. Time Start:  
Time End:  
Note: this can only be billed with 81366 (initial) or 77533 (follow up). If multiple start / stop times, list each separately.

## 2020-05-27 NOTE — PROGRESS NOTES
Hospitalist Progress Note NAME: Beua Bennett :  1955 MRN:  [id number] Assessment / Plan: 
Septic vs cardiogenic shock 2/2 COVID Positive with viral prodrome Hyponatremia Bacteremia Multiorgan failure 
-remains critically ill 
-requiring multiple pressors support -appreciate intensivist management 
-worsening renal function, CRRT initiated  
-palliative on board 
-at high risk for further rapid decline, death 
-palliative team on board has communicated with wife/daughter. Full code for now.  
-cont pressor support, empiric abx (vanco,cefepime and clindamycin) 
-has been enrolled in convalescent plasma study 
-cont zinc 
-Blood cx 5/15 alpha streptococcus, repeat blood cx obtained and NGTd 
-On solucortef 
-Ferritin>92410 
-Liver Enzymes elevated, trending down 
-Bilirubin trending up 
-Leucocytosis worsening Acute renal failure 
-initiated CRRT  
-trend bmp nephrology consultation appreciated Avoid nephrotoxic medication Hx of nonischemic CM 
-recent weight gain 
-EF 20% 
--s/p BIV-ICD 
 
DM2 
-cont insulin w lantus, SSI 
  
NSTEMI 
-cardiology following 
-cont supportive care 
  
Atrial fibrillation Eliquis on hold for now 
  
Hx of Hypertension 
-currently in cardiogenic vs septic shock as above and requiring pressors 
-hold home antiHTNsives 
  
Code Status: Full Surrogate Decision Maker: 
  
DVT Prophylaxis: heparin GI Prophylaxis: not indicated Subjective: Chief Complaint / Reason for Physician Visit: following sepsis Unresponsive on pressor support. Not improving Pt was seen through CCU room window and discussed with nursing. Did not enter room to avoid exposure/transmission of COVID-19 Discussed with RN events overnight. Review of Systems: 
Symptom Y/N Comments  Symptom Y/N Comments Fever/Chills    Chest Pain Poor Appetite    Edema Cough    Abdominal Pain Sputum    Joint Pain SOB/GROVES    Pruritis/Rash Nausea/vomit    Tolerating PT/OT Diarrhea    Tolerating Diet Constipation    Other Could NOT obtain due to: Intubated and sedated Objective: VITALS:  
Last 24hrs VS reviewed since prior progress note. Most recent are: 
Patient Vitals for the past 24 hrs: 
 Temp Pulse Resp BP SpO2  
05/27/20 1539  70 21  100 % 05/27/20 1500 97.7 °F (36.5 °C) 72 18 125/71 98 % 05/27/20 1430  70 16 (!) 86/47 99 % 05/27/20 1400  71 15 107/61   
05/27/20 1330  70 15 95/59   
05/27/20 1300  72 16 106/65 100 % 05/27/20 1230  93 20 112/70 99 % 05/27/20 1200 97.7 °F (36.5 °C) (!) 125 17 132/75 100 % 05/27/20 1148  77 17  100 % 05/27/20 1130   (!) 7 128/73 100 % 05/27/20 1100  73 18 108/72 97 % 05/27/20 1030 97.7 °F (36.5 °C) 76 17 103/67 99 % 05/27/20 1000  82 15 105/68 99 % 05/27/20 0930  71 15 115/70 98 % 05/27/20 0900  71 17 120/66 98 % 05/27/20 0830 97.1 °F (36.2 °C) 71 14 122/66 98 % 05/27/20 0800 97.1 °F (36.2 °C) 76 19 117/62 98 % 05/27/20 0745 97.2 °F (36.2 °C) 77 12 115/62 97 % 05/27/20 0730 97.3 °F (36.3 °C) 75 19 107/61 97 % 05/27/20 0715 97.3 °F (36.3 °C) 71 18 104/53 98 % 05/27/20 0700 97.4 °F (36.3 °C) 72 18 96/55 97 % 05/27/20 0645 97.4 °F (36.3 °C) 72 18 (!) 88/50 97 % 05/27/20 0630 97.4 °F (36.3 °C) 70 15 95/53 97 % 05/27/20 0615 97.5 °F (36.4 °C) 70 15 97/55 97 % 05/27/20 0600 97.4 °F (36.3 °C) 71 17 101/57 98 % 05/27/20 0545 97.3 °F (36.3 °C) 72 15 100/54 97 % 05/27/20 0530 97.2 °F (36.2 °C) 71 13 101/56 98 % 05/27/20 0515 97.1 °F (36.2 °C) 72 13 106/57 98 % 05/27/20 0509   20  98 % 05/27/20 0500 97 °F (36.1 °C) 74 12 110/59 98 % 05/27/20 0445 96.9 °F (36.1 °C) 71 16 116/61 98 % 05/27/20 0430 96.8 °F (36 °C) 70 13 113/60 98 % 05/27/20 0415 96.8 °F (36 °C) 71 16 111/59 98 % 05/27/20 0400 96.8 °F (36 °C) 70 18 114/62 98 % 05/27/20 0345 (!) 96.7 °F (35.9 °C) 71 15 119/65 98 % 05/27/20 0330 (!) 96.6 °F (35.9 °C) 70 13 123/66 97 % 05/27/20 0315 (!) 96.6 °F (35.9 °C) 71 16 126/68 98 % 05/27/20 0300 (!) 96.5 °F (35.8 °C) 76 17 125/70 98 % 05/27/20 0245 (!) 96.6 °F (35.9 °C) 71 12 126/70 98 % 05/27/20 0230 (!) 96.6 °F (35.9 °C) 72 16 131/69 98 % 05/27/20 0215 (!) 96.7 °F (35.9 °C) 71 13 133/68 98 % 05/27/20 0200 (!) 96.7 °F (35.9 °C) 72 15 136/70 98 % 05/27/20 0145 96.8 °F (36 °C) 71 13 133/69 98 % 05/27/20 0130 96.9 °F (36.1 °C) 72 13 129/69 98 % 05/27/20 0115 96.9 °F (36.1 °C) 71 15 127/66 97 % 05/27/20 0102   17  97 % 05/27/20 0100 97 °F (36.1 °C) 76 14 130/67 97 % 05/27/20 0045 97.1 °F (36.2 °C) 71 15 132/68 98 % 05/27/20 0030 97 °F (36.1 °C) 70 16 124/65 97 % 05/27/20 0015 96.8 °F (36 °C) 73 19 129/66 96 % 05/27/20 0000 96.9 °F (36.1 °C) 70 13  98 % 05/26/20 2345 96.9 °F (36.1 °C) 70 13 128/66 98 % 05/26/20 2330 97 °F (36.1 °C) 72 12 132/70 98 % 05/26/20 2315 97 °F (36.1 °C) 70 15 129/69 98 % 05/26/20 2300 97.2 °F (36.2 °C) 71 14 125/66 98 % 05/26/20 2245 97.3 °F (36.3 °C) 70 14 125/64 98 % 05/26/20 2230 97.4 °F (36.3 °C) 71 13 126/65 98 % 05/26/20 2215 97.5 °F (36.4 °C) 72 15 128/65 98 % 05/26/20 2200 97.6 °F (36.4 °C) 71 15 93/52 98 % 05/26/20 2145 97.7 °F (36.5 °C) 71 14 (!) 86/48 98 % 05/26/20 2130 97.7 °F (36.5 °C) 72 14 113/58 99 % 05/26/20 2115 97.8 °F (36.6 °C) 72 15 112/59 98 % 05/26/20 2100 97.8 °F (36.6 °C) 72 14 114/59 98 % 05/26/20 2045 97.9 °F (36.6 °C) 73 15 (!) 79/47 99 % 05/26/20 2030 97.9 °F (36.6 °C) 71 16 108/56 99 % 05/26/20 2015 98.2 °F (36.8 °C) 71 18 112/61 99 % 05/26/20 2007   19  99 % 05/26/20 2000 98 °F (36.7 °C) 70 14 103/57 99 % 05/26/20 1945 98.1 °F (36.7 °C) 72 15 116/52 99 % 05/26/20 1930 98.1 °F (36.7 °C) 71 16 104/52 99 % 05/26/20 1915 98.2 °F (36.8 °C) 70 14 98/54 99 % 05/26/20 1900 98.2 °F (36.8 °C) 71 15 99/56 99 % 05/26/20 1800 98.2 °F (36.8 °C) 74 16 96/54 99 % 05/26/20 1700 98.3 °F (36.8 °C) 72 15 95/51 99 % Intake/Output Summary (Last 24 hours) at 5/27/2020 1606 Last data filed at 5/27/2020 1539 Gross per 24 hour Intake 3178.31 ml Output 5053 ml Net -1874.69 ml PHYSICAL EXAM: 
GEN: AA male, NAD On Ventilator Intubated and sedated 
anasarca noticed, improving edema 
jaudiced Seen through ICU window to minimize exposure/transmission to/of COVID-19 Reviewed most current lab test results and cultures  YES Reviewed most current radiology test results   YES Review and summation of old records today    NO Reviewed patient's current orders and MAR    YES 
PMH/ reviewed - no change compared to H&P 
________________________________________________________________________ Care Plan discussed with: 
  Comments Patient Family RN x Care Manager Consultant Multidiciplinary team rounds were held today with , nursing, pharmacist and clinical coordinator. Patient's plan of care was discussed; medications were reviewed and discharge planning was addressed. ________________________________________________________________________ Total NON critical care TIME:  25  Minutes Total CRITICAL CARE TIME Spent:   Minutes non procedure based Comments >50% of visit spent in counseling and coordination of care    
________________________________________________________________________ Luis Acevedo MD  
 
Procedures: see electronic medical records for all procedures/Xrays and details which were not copied into this note but were reviewed prior to creation of Plan. LABS: 
I reviewed today's most current labs and imaging studies. Pertinent labs include: 
Recent Labs  
  05/26/20 0419 WBC 27.2* HGB 9.7* HCT 28.4*  
 Recent Labs  
  05/27/20 
0435 05/26/20 
1435 05/26/20 
0419  05/25/20 
0409  136 137   < > 137  
K 3.7 3.5 3.8   < > 3.7  105 105   < > 104 CO2 27 26 25   < > 27 * 248* 252*   < > 186* BUN 41* 42* 41*   < > 35* CREA 1.92* 2.07* 2.28*   < > 2.36* CA 8.0* 8.0* 7.8*   < > 7.6*  
MG 2.4 2.4 2.5*   < > 2.4 PHOS 3.6 3.1 3.4   < > 3.1 ALB 2.1* 2.0* 2.0*   < > 2.1* TBILI  --   --  10.2*  --   --   
ALT  --   --  238*  --   --   
INR 1.6*  --  1.7*  --  1.6*  
 < > = values in this interval not displayed.   
 
 
Signed: Obdulio Alarcon MD

## 2020-05-27 NOTE — DIABETES MGMT
[address] [facility] PROGRAM FOR DIABETES HEALTH 
 
FOLLOW-UP NOTE Presentation Pooja Marin a 71 y. o. male admitted from the ER 5/15/20 with complaints of weakness. No COVID-19 symptoms or contacts noted. Fever. CXR, CT Head & CT ABd negative. Blood cultures positive for alpha streptococcus. Markedly elevated ferritin DX: [id number]. Septic shock. Multiorgan failure TX: Anti-COVID therapies. Pacer. Palliative care. US ABd planned today. CT Head next step Recent diagnostics: 
5/21/20 Hepatitis B surface Ab Reactive 5/22/20 WBC 22.9. Albumin 2.8. Bili 6.8. . . Procalcitonin 203.42. Triglyceride <15. Serum creatinine 4.73/GFR 15. CXR: Stable bibasilar opacities and pleural effusions 5/26/20 WBC 27.2. CXR: Persistent pleural effusions with bibasilar atelectasis. Atelectasis at the right lung base has increased. Ferritin 1682. CRP 5.09. Procalcitonin 55.12 
5/27/20 D-Dimer 5.97 Critical care measures: 
            AC Vent via ET 
            BP management via norepinephrine (levophed)  
            ABx 
 TF via OG 
            Heparin infusion 
            Insulin 
            Steroids             Dialysis via Inocencio Sieas GI prophylaxis 
  
Diabetes: Patient has known Type 2 diabetes, treated with Trulicity, Onglyza & Amaryl PTA (per PTA med list). Family history positive for diabetes in father.  
 
Subjective NA Objective Physical exam 
General Responds to pain Vital Signs Afebrile. Paced rhythm/AFib Visit Vitals /66 Pulse 71 Temp 97.1 °F (36.2 °C) Resp 17 Ht 6' 3\" (1.905 m) Wt 124.7 kg (274 lb 14.6 oz) SpO2 98% BMI 34.36 kg/m² Laboratory Lab Results Component Value Date/Time Hemoglobin A1c 9.8 (H) 02/09/2017 05:42 AM  
 Hemoglobin A1c, External 9.0 03/02/2016 Lab Results Component Value Date/Time LDL, calculated 38.4 03/03/2009 09:40 PM  
 
Lab Results Component Value Date/Time Creatinine 1.92 (H) 05/27/2020 04:35 AM  
 
Lab Results Component Value Date/Time Sodium 137 05/27/2020 04:35 AM  
 Potassium 3.7 05/27/2020 04:35 AM  
 Chloride 106 05/27/2020 04:35 AM  
 CO2 27 05/27/2020 04:35 AM  
 Anion gap 4 (L) 05/27/2020 04:35 AM  
 Glucose 206 (H) 05/27/2020 04:35 AM  
 BUN 41 (H) 05/27/2020 04:35 AM  
 Creatinine 1.92 (H) 05/27/2020 04:35 AM  
 BUN/Creatinine ratio 21 (H) 05/27/2020 04:35 AM  
 GFR est AA 43 (L) 05/27/2020 04:35 AM  
 GFR est non-AA 35 (L) 05/27/2020 04:35 AM  
 Calcium 8.0 (L) 05/27/2020 04:35 AM  
 Bilirubin, total 10.2 (H) 05/26/2020 04:19 AM  
 Alk. phosphatase 219 (H) 05/26/2020 04:19 AM  
 Protein, total 6.2 (L) 05/26/2020 04:19 AM  
 Albumin 2.1 (L) 05/27/2020 04:35 AM  
 Globulin 4.2 (H) 05/26/2020 04:19 AM  
 A-G Ratio 0.5 (L) 05/26/2020 04:19 AM  
 ALT (SGPT) 238 (H) 05/26/2020 04:19 AM  
 
Lab Results Component Value Date/Time ALT (SGPT) 238 (H) 05/26/2020 04:19 AM  
 
Factors affecting BG pattern Factor Dose Comments Nutrition: 
Tube feeding via OG  
97 grams/24 hrs Running at 10 cc/hr; expect gradual rate increase today Drugs: 
Steroids Hydrocortisone 50mg Q6 hrs Pain  BPS 3 Infection: COVID-19 positive Cefepime, clindamycin & vancomycin WBC 27.2 Other: CKD Dialysis Serum creatinine 1.92/GFR 43 Blood glucose pattern Assessment and Plan Nursing Diagnosis Risk for unstable blood glucose pattern Nursing Intervention Domain 4084 Decision-making Support Nursing Interventions Examined current inpatient diabetes control Explored factors facilitating and impeding inpatient management Evaluation This gentleman with known Type 2 diabetes has BGs in the 200s despite lowering of steroid dosing. TFs continuing at trickle rate, but will be gradually increased today. Has required 12 units of corrective insulin yesterday. Would further increase basal insulin. Recommendations Recommend: Increasing Lantus insulin to 40 units D (0.3 units/kg/D) Billing Code(s) [x] [id number] IP subsequent hospital care - 30 minutes COLLETTE Casas Access via [employer] [place] [phone]

## 2020-05-27 NOTE — DIALYSIS
[id number] [facility]          [phone] 
  
     
Orders Mode: CVVHD restarted @ 0050 Factor: 150 ml/hr Dialysate: 2,000 ml/hr Blood Flow Rate: 200 ml/min  
  
     
Metrics BP / HR: 124/65 // 71 Blood Flow Rate: 200 ml/min AP:                         -111 RP: 68 TMP: 28  
PD: 43  
FP: 166 DFR: 2,000 ml/hr 
(PBP: 0 ml)  
  
Comments / Plan:  
  Consents, orders, code status, labs and notes verified. Time out done. COVID-19 Positive: all proper PPE & hospital P&P followed. Filter changed due to max filter run time length 72 hrs; all possible blood returned 165 mls. Systemic Heparin infusing per primary RN. Old set discarded in biohazard bin. New [id number] filter set-up, primed 1L NS, tested, and running well. RIJ temp. CVC: tegaderm dressing  with biopatch C/D/I & dated 5/23/20. Site prepped per hospital P&P: catheter limbs disinfected for 60 seconds per limb with alcohol swabs & hubs scrubbed with Prevantics for 5 seconds, followed by a 5 second dry time; +asp/+flush x 2 ports with no resistance. All lines visible, connections secured with blood warmer stabilized & on return line set @ 37*C. Education & pre/post with Abhishek Castanon, [address] [facility].

## 2020-05-27 NOTE — PROGRESS NOTES
1900 - Verbal shift change report given to Rimma Kumar (oncoming nurse) by Hunter Grady (offgoing nurse). Report included the following information SBAR, Kardex, Intake/Output, MAR, Recent Results, and Cardiac Rhythm Bi-V paced with underlying atrial fib 70's. 
 
2000 - Assessment complete. Receiving oxygen via Ventilator ETT. Oral care complete. Pt is not restrained. Warming blanket in place, on standby. Vent: mode: ACVC 16/500/30/6, Vmax 60; Peak 28, Mean 11, , MEv 9.91, RR 17, Sat 100. Lines: ETT # 7.5/25 cm at lip, R IJ triple lumen CVC, R IJ temporary dialysis line, PIV 20g LA, OGT 76 cm lip, 14F [place], rectal temp probe. Dialysis: CRRT. Mode CVVHD; Blood flow 200, PBP 0, Dialysate 2000, Effluent 2000, Effluent dose 17 ml/kg/h, UFR dose 3 ml/kg/h, Filtration fraction 5 %; Access - 101, Filter 134, Effluent 52, Return 67, Pressure drop 46, TMP 23. 
 
IV infusions: Levophed 20 mcg/min,. Heparin 9 units/kg/hr,. ,. Nepro 1.8 10 ml/hr, Goal 25 ml/hr Free H2O: 50 ml q 6 h,. 
 
0000 - Reassessment complete. VSS, Bi-V paced with PVCs. Pt with eyes open. Blinking and tracking with eyes. Will weakly attempt to  with both hands, no purposeful LE movement. Oral care complete, coughing. Vianey Kong RN in to changed CRRT filter. 0100 - New filter pressures: Access - 120, Filter 145, Effluent 65, Return 74, Pressure drop 39, TMP 33. 
 
0200 - Temp 96. 7. warmer on. 
 
0400 - Reassessment complete. VSS, Bi-V paced 70's. Oral care complete. 9586 - Labs sent. 0600 - Poorly responsive this am. Will slowly open eyes to voice. No tracking, No commands. 0700 - Verbal shift change report given to Benja [address] (oncoming nurse) by Dm Yusuf RN (offgoing nurse). Report included the following information SBAR, Kardex, Intake/Output, MAR, Recent Results, and Cardiac Rhythm Bi-V paced and PVC's 70's .

## 2020-05-27 NOTE — PROGRESS NOTES
05/27/20 7351 ABCDEF Bundle SBT Safety Screen Passed No  
SBT Screen Reason for Failure Vasopressor use

## 2020-05-27 NOTE — PROGRESS NOTES
Progress Note 5/27/2020 11:43 AM 
NAME: Kem Kern MRN:  [id number] Admit Diagnosis: Sepsis ([facility] [address] [A41.9] Problem List: 1. Shock; septic/cardiogenic 2. Severe sepsis 3. COVID-19 + 4. NSTEMI 5. Hyponatremia 6. Lactic acidosis 7. Acute liver injury 8. Acute on chronic systolic heart failure; Class IV 9. Acute kidney injury; anuric 10. Coagulopathy 11. Nonischemic dilated cardiomyopathy s/p remote ICD 12. Chronic atrial fibrillation 13. Sleep apnea 14. Hypertensive heart disease w/ CKD and HF Assessment/Plan:  
Long discussion with the family multiple times. He is gravely ill and most likely will not survive this. This has been relayed and they understand this. Intake/Output Summary (Last 24 hours) at 5/27/2020 5979 Last data filed at 5/27/2020 0700 Gross per 24 hour Intake 3865.81 ml Output 5992 ml Net -2126.19 ml Ricky off Vaso off Norepi @ 21 Dobut off Epi off 30% FiO2 Responds to pain, otherwise not much else. Off sedation for > 48 hours. 1. Continue supportive care 2. Heparin gtt. 3. CVVH ongoing; tolerating full factor 4. Remains critically ill but I am hopeful 5. Discussions ongoing w/ family   
 
  
 [x]       High complexity decision making was performed in this patient at high risk for decompensation with multiple organ involvement. Subjective:  
 
Kem Kern is intubated and sedated. Discussed with RN events overnight. Review of Systems: 
 
Symptom Y/N Comments  Symptom Y/N Comments Fever/Chills N   Chest Pain N Poor Appetite N   Edema N   
Cough N   Abdominal Pain N Sputum N   Joint Pain N   
SOB/GROVES N   Pruritis/Rash N   
Nausea/vomit N   Tolerating PT/OT Y Diarrhea N   Tolerating Diet Y Constipation N   Other Could NOT obtain due to: sedated Objective:  
  
Physical Exam: 
 
Last 24hrs VS reviewed since prior progress note. Most recent are: 
 
Visit Vitals BP (!) 88/50 Pulse 72 Temp 97.4 °F (36.3 °C) Resp 18 Ht 6' 3\" (1.905 m) Wt 124.7 kg (274 lb 14.6 oz) SpO2 97% BMI 34.36 kg/m² Intake/Output Summary (Last 24 hours) at 5/27/2020 5975 Last data filed at 5/27/2020 0700 Gross per 24 hour Intake 3865.81 ml Output 5992 ml Net -2126.19 ml  
  
 
General Appearance: Well developed, well nourished, intubated/sedated Ears/Nose/Mouth/Throat: Hearing grossly normal. 
Neck: Supple. Chest: Lungs decreased diffusely Cardiovascular: Regular rate and rhythm, S1S2 normal, no murmur. Abdomen: Soft, non-tender, bowel sounds are active. Extremities: No edema bilaterally. Skin: Warm and dry. []         Post-cath site without hematoma, bruit, tenderness, or thrill. Distal pulses intact. PMH/ reviewed - no change compared to H&P Data Review Telemetry: paced/AF 
 
EKG:  
[x]  No new EKG for review Lab Data Personally Reviewed: 
 
Recent Labs  
  05/26/20 
5314 WBC 27.2* HGB 9.7* HCT 28.4*  
 Recent Labs  
  05/27/20 
0435 05/26/20 
2338 05/26/20 
1435 05/26/20 
0419 05/25/20 
0409 INR 1.6*  --   --  1.7* 1.6* PTP 15.7*  --   --  16.6* 16.0*  
APTT 64.9* 61.2* 61.2* 84.9* 67.2* Recent Labs  
  05/27/20 
0435 05/26/20 
1435 05/26/20 
0419  136 137  
K 3.7 3.5 3.8  105 105 CO2 27 26 25 BUN 41* 42* 41* CREA 1.92* 2.07* 2.28* * 248* 252* CA 8.0* 8.0* 7.8*  
MG 2.4 2.4 2.5* No results for input(s): CPK, CKNDX, TROIQ in the last 72 hours. No lab exists for component: CPKMB Lab Results Component Value Date/Time Cholesterol, total 92 03/03/2009 09:40 PM  
 HDL Cholesterol 44 03/03/2009 09:40 PM  
 LDL, calculated 38.4 03/03/2009 09:40 PM  
 Triglyceride <15 05/22/2020 05:13 AM  
 CHOL/HDL Ratio 2.1 03/03/2009 09:40 PM  
 
 
Recent Labs  
  05/27/20 
0435 05/26/20 
1435 05/26/20 
0419 AP  --   --  219* TP  --   --  6.2* ALB 2.1* 2.0* 2.0*  
GLOB  --   --  4.2*  
 
 No results for input(s): PH, PCO2, PO2 in the last 72 hours. Medications Personally Reviewed: 
 
Current Facility-Administered Medications Medication Dose Route Frequency  0.9% sodium chloride infusion  100 mL/hr IntraVENous CONTINUOUS  
 insulin glargine (LANTUS) injection 30 Units  30 Units SubCUTAneous DAILY  vasopressin (VASOSTRICT) 20 Units in 0.9% sodium chloride 100 mL infusion  0-0.04 Units/min IntraVENous CONTINUOUS  
 hydrocortisone Sod Succ (PF) (SOLU-CORTEF) injection 50 mg  50 mg IntraVENous Q6H  
 vancomycin (VANCOCIN) 1,000 mg in 0.9% sodium chloride (MBP/ADV) 250 mL  1,000 mg IntraVENous Q24H  polyethylene glycol (MIRALAX) packet 17 g  17 g Per NG tube DAILY PRN  
 bicarbonate dialysis (PRISMASOL) BG K 2/Ca 3.5 5000 ml solution   Extracorporeal DIALYSIS CONTINUOUS And  
 bicarbonate dialysis (PRISMASOL) BG K 4/Ca 2.5 5000 ml solution   Extracorporeal DIALYSIS CONTINUOUS  
 zinc oxide-cod liver oil (DESITIN) 40 % paste   Topical PRN  chlorhexidine (ORAL CARE KIT) 0.12 % mouthwash 15 mL  15 mL Oral Q12H  clindamycin (CLEOCIN) 900mg D5W 50mL IVPB (premix)  900 mg IntraVENous Q8H  
 cefepime (MAXIPIME) 1 g in 0.9% sodium chloride (MBP/ADV) 50 mL  1 g IntraVENous Q8H  potassium chloride 20 mEq in 50 ml IVPB  20 mEq IntraVENous PRN  
 calcium chloride injection 2 g  2 g IntraVENous PRN  
 famotidine (PF) (PEPCID) 20 mg in 0.9% sodium chloride 10 mL injection  20 mg IntraVENous DAILY  acetaminophen (TYLENOL) solution 650 mg  650 mg Oral Q6H PRN  
 PHARMACY INFORMATION NOTE  1 Each Other BID  propofol (DIPRIVAN) 10 mg/mL infusion  0-50 mcg/kg/min IntraVENous TITRATE  heparin (porcine) injection 2,000 Units  2,000 Units IntraVENous PRN Or  
 heparin (porcine) injection 4,000 Units  4,000 Units IntraVENous PRN  
 heparin 25,000 units in D5W 250 ml infusion  8-25 Units/kg/hr IntraVENous TITRATE  NOREPINephrine (LEVOPHED) 32 mg in 5% dextrose 250 mL infusion  2-200 mcg/min IntraVENous TITRATE  sodium chloride (NS) flush 5-40 mL  5-40 mL IntraVENous Q8H  
 sodium chloride (NS) flush 5-40 mL  5-40 mL IntraVENous PRN  
 ondansetron (ZOFRAN) injection 4 mg  4 mg IntraVENous Q8H PRN  
 alcohol 62% (NOZIN) nasal  1 Ampule  1 Ampule Topical Q12H  
 insulin lispro (HUMALOG) injection   SubCUTAneous Q6H  
 glucose chewable tablet 16 g  4 Tab Oral PRN  
 dextrose (D50W) injection syrg 12.5-25 g  12.5-25 g IntraVENous PRN  
 glucagon (GLUCAGEN) injection 1 mg  1 mg IntraMUSCular PRN  
 acetaminophen (TYLENOL) tablet 650 mg  650 mg Oral Q6H PRN Or  
 acetaminophen (TYLENOL) suppository 650 mg  650 mg Rectal Q6H PRN  
 influenza vaccine 2019-20 (6 mos+)(PF) (FLUARIX/FLULAVAL/FLUZONE QUAD) injection 0.5 mL  0.5 mL IntraMUSCular PRIOR TO DISCHARGE  sodium chloride (NS) flush 5-10 mL  5-10 mL IntraVENous PRN [facility] III, DO

## 2020-05-27 NOTE — PROGRESS NOTES
NAME: Kaylie Perkins :  1955 MRN:  [id number] Assessment :    Plan: 
--LAQUITA Shock Sepsis DM type 2 Systolic HF (EF 01%) COVID + Initiated CVVHD ; tolerating UF (factor 100); up ~ 30 liters--alternating 2k, 4k baths as pharmacy is low on certain dialysates-tolerating cvvh Lewis line  Note pt is still a full code. Poor prognosis Palliative following Subjective: Chief Complaint:  In order to limit the exposure risk from COVID 19 and to preserve the hospital's limited supply of PPEs, seen through ICU window. Intubated. Remains critically ill On CVVH Review of Systems:  
 
Symptom Y/N Comments  Symptom Y/N Comments Fever/Chills    Chest Pain Poor Appetite    Edema Cough    Abdominal Pain Sputum    Joint Pain SOB/GROVES    Pruritis/Rash Nausea/vomit    Tolerating PT/OT Diarrhea    Tolerating Diet Constipation    Other Could not obtain due to: See above Objective: VITALS:  
Last 24hrs VS reviewed since prior progress note. Most recent are: 
Visit Vitals /61 Pulse 71 Temp 97.7 °F (36.5 °C) Resp 15 Ht 6' 3\" (1.905 m) Wt 124.7 kg (274 lb 14.6 oz) SpO2 100% BMI 34.36 kg/m² Intake/Output Summary (Last 24 hours) at 2020 1446 Last data filed at 2020 1420 Gross per 24 hour Intake 3336.13 ml Output 5467 ml Net -2130.87 ml Telemetry Reviewed: PHYSICAL EXAM: 
General: NAD, ett in place Lebron Edema  -+in UE/LE Lab Data Reviewed: (see below) Medications Reviewed: (see below) PMH/SH reviewed - no change compared to H&P 
________________________________________________________________________ Care Plan discussed with: 
Patient Family RN Care Manager Consultant:     
 
  Comments >50% of visit spent in counseling and coordination of care ________________________________________________________________________ Manan Gamez MD  
 
Procedures: see electronic medical records for all procedures/Xrays and details which 
were not copied into this note but were reviewed prior to creation of Plan. LABS: 
Recent Labs  
  05/26/20 0419 WBC 27.2* HGB 9.7* HCT 28.4*  
 Recent Labs  
  05/27/20 0435 05/26/20 
1435 05/26/20 0419  136 137  
K 3.7 3.5 3.8  105 105 CO2 27 26 25 BUN 41* 42* 41* CREA 1.92* 2.07* 2.28* * 248* 252* CA 8.0* 8.0* 7.8*  
MG 2.4 2.4 2.5* PHOS 3.6 3.1 3.4 Recent Labs  
  05/27/20 0435 05/26/20 
1435 05/26/20 0419 AP  --   --  219* TP  --   --  6.2* ALB 2.1* 2.0* 2.0*  
GLOB  --   --  4.2* Recent Labs  
  05/27/20 0435 05/26/20 2338 05/26/20 1435 05/26/20 0419 05/25/20 
0409 INR 1.6*  --   --  1.7* 1.6* PTP 15.7*  --   --  16.6* 16.0*  
APTT 64.9* 61.2* 61.2* 84.9* 67.2*  
  
 
MEDICATIONS: 
Current Facility-Administered Medications Medication Dose Route Frequency  insulin glargine (LANTUS) injection 40 Units  40 Units SubCUTAneous DAILY  vasopressin (VASOSTRICT) 20 Units in 0.9% sodium chloride 100 mL infusion  0-0.04 Units/min IntraVENous CONTINUOUS  
 hydrocortisone Sod Succ (PF) (SOLU-CORTEF) injection 50 mg  50 mg IntraVENous Q6H  
 vancomycin (VANCOCIN) 1,000 mg in 0.9% sodium chloride (MBP/ADV) 250 mL  1,000 mg IntraVENous Q24H  polyethylene glycol (MIRALAX) packet 17 g  17 g Per NG tube DAILY PRN  
 bicarbonate dialysis (PRISMASOL) BG K 2/Ca 3.5 5000 ml solution   Extracorporeal DIALYSIS CONTINUOUS And  
 bicarbonate dialysis (PRISMASOL) BG K 4/Ca 2.5 5000 ml solution   Extracorporeal DIALYSIS CONTINUOUS  
 zinc oxide-cod liver oil (DESITIN) 40 % paste   Topical PRN  chlorhexidine (ORAL CARE KIT) 0.12 % mouthwash 15 mL  15 mL Oral Q12H  clindamycin (CLEOCIN) 900mg D5W 50mL IVPB (premix)  900 mg IntraVENous Q8H  
  cefepime (MAXIPIME) 1 g in 0.9% sodium chloride (MBP/ADV) 50 mL  1 g IntraVENous Q8H  potassium chloride 20 mEq in 50 ml IVPB  20 mEq IntraVENous PRN  
 calcium chloride injection 2 g  2 g IntraVENous PRN  
 famotidine (PF) (PEPCID) 20 mg in 0.9% sodium chloride 10 mL injection  20 mg IntraVENous DAILY  acetaminophen (TYLENOL) solution 650 mg  650 mg Oral Q6H PRN  
 PHARMACY INFORMATION NOTE  1 Each Other BID  propofol (DIPRIVAN) 10 mg/mL infusion  0-50 mcg/kg/min IntraVENous TITRATE  heparin (porcine) injection 2,000 Units  2,000 Units IntraVENous PRN Or  
 heparin (porcine) injection 4,000 Units  4,000 Units IntraVENous PRN  
 heparin 25,000 units in D5W 250 ml infusion  8-25 Units/kg/hr IntraVENous TITRATE  
 NOREPINephrine (LEVOPHED) 32 mg in 5% dextrose 250 mL infusion  2-200 mcg/min IntraVENous TITRATE  sodium chloride (NS) flush 5-40 mL  5-40 mL IntraVENous Q8H  
 sodium chloride (NS) flush 5-40 mL  5-40 mL IntraVENous PRN  
 ondansetron (ZOFRAN) injection 4 mg  4 mg IntraVENous Q8H PRN  
 alcohol 62% (NOZIN) nasal  1 Ampule  1 Ampule Topical Q12H  
 insulin lispro (HUMALOG) injection   SubCUTAneous Q6H  
 glucose chewable tablet 16 g  4 Tab Oral PRN  
 dextrose (D50W) injection syrg 12.5-25 g  12.5-25 g IntraVENous PRN  
 glucagon (GLUCAGEN) injection 1 mg  1 mg IntraMUSCular PRN  
 acetaminophen (TYLENOL) tablet 650 mg  650 mg Oral Q6H PRN Or  
 acetaminophen (TYLENOL) suppository 650 mg  650 mg Rectal Q6H PRN  
 influenza vaccine 2019-20 (6 mos+)(PF) (FLUARIX/FLULAVAL/FLUZONE QUAD) injection 0.5 mL  0.5 mL IntraMUSCular PRIOR TO DISCHARGE  sodium chloride (NS) flush 5-10 mL  5-10 mL IntraVENous PRN

## 2020-05-27 NOTE — PROGRESS NOTES
[facility] Psychiatric INTENSIVIST Consult Service Note Pulmonary, Critical Care, and Sleep Medicine Name: Lang Degroot MRN: [id number] : 1955 Hospital: [facility] Date: 2020  Admission date: 5/15/2020 Hospital Day: 15 Subjective/Interval History:  
 
20: off sedation but only grimaces to noxious stimuli. Remains critically ill on mechanical ventilation/pressors 20: remains critically ill on mechanical ventilation/pressors. Off sedation for about 48 hours. Minimal response thus far. IMPRESSION:  
1. Acute hypoxic respiratory failure, intubated - 2. Profound septic shock on high level of pressors. 3. COVID 19 pneumonia 4. Alpha strep septicemia- elevated Procalcitonin > 80 
5. Chronic combined systolic/diastolic heart failure 6. Hypertensive heart disease with CHF and CKD 7. LAQUITA/CKD, CVVH to start 8. Chronic anticoagulation at home- eliquis 9. Diabetes mellitus type 2 uncontrolled 10. Hyperlipidemia 11. 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 Chronic atrial fibrillation 13. Status post SJ BIV-ICD implant in 2016 
14. Sleep apnea, undiagnosed/untreated 15. Obesity with recent weight gain RECOMMENDATIONS/PLAN:  
1. Ventilator support - mental status and relatively high pressor doses make attempts at weaning/extubation difficult currently 2. Continue pressors, wean as able 3. Holding sedation 4. Monitor mental status, has been off sedation for ~48 hours without significant response. If still unresponsive by tomorrow, will need head CT and likely neuro consult 5. Continue antibiotics 6. Stress dose steroids 7. RRT per nephrology 8. Heparin drip 9. Cardiology following 10. Glycemic monitoring and control 11. Replete electrolytes PRN 12. Abdominal ultrasound ordered yesterday, not done??, bili rising 13. DVT, SUP prophylaxis 14. Remains critically ill Subjective/Initial History:  
I have reviewed the flowsheet and previous days notes. Seen earlier today on rounds. I was asked by Israel Morrow MD to see Carlene Tamayo a 71 y.o.  male  in consultation for a chief complaint of shock. Excerpts from admission 5/15/2020 and consult notes reviewed as follows:  
 
\"Mr. Susana Guerrero is a 57 y.o. morbidly obese male with Chronic nonischemic dilated cardiomyopathy EF 31%, Chronic systolic congestive heart failure class, H/o atrial fibrillation, On chronic warfarin, Iatrogenic left bundle branch block with predominant RV pacing, 64-70%, Hypertension, Hyperlipidemia and diabetes presents to ED [facility] ED C/O Worsening exertional shortness of breath and around 20 pounds weight gain in last 3 weeks. \" 
 
Pt now in CCU. Poor historian. On room air. No fever. No cough. No diarrhea. Not very active. Saw PCP three weeks ago. Some edema. Chart notes ED [facility] admission in 2017 with decompensation. Patient PCP: Dominga Brandon MD 
PMH:  has a past medical history of A-fib ([facility] [address] Arrhythmia, Diabetes ([facility] [address] Endocrine disease, Hypertension, and Irregular heart beat. He also has no past medical history of Difficult intubation, Malignant hyperthermia due to anesthesia, Nausea & vomiting, or Pseudocholinesterase deficiency. PSH:   has a past surgical history that includes pr cardiac surg procedure unlist; insert emergency endotrach airway (5/18/2020); and ir insert non tunl cvc over 5 yrs (5/18/2020). FHX: family history includes Diabetes in his father; Heart Disease in his father and mother. SHX:  reports that he quit smoking about 26 years ago. He has never used smokeless tobacco. He reports that he does not drink alcohol or use drugs. ROS:A comprehensive review of systems was negative except for that written in the HPI. No Known Allergies MEDS:  
Current Facility-Administered Medications Medication  0.9% sodium chloride infusion  insulin glargine (LANTUS) injection 30 Units  vasopressin (VASOSTRICT) 20 Units in 0.9% sodium chloride 100 mL infusion  hydrocortisone Sod Succ (PF) (SOLU-CORTEF) injection 50 mg  
 vancomycin (VANCOCIN) 1,000 mg in 0.9% sodium chloride (MBP/ADV) 250 mL  polyethylene glycol (MIRALAX) packet 17 g  
 bicarbonate dialysis (PRISMASOL) BG K 2/Ca 3.5 5000 ml solution And  
 bicarbonate dialysis (PRISMASOL) BG K 4/Ca 2.5 5000 ml solution  zinc oxide-cod liver oil (DESITIN) 40 % paste  chlorhexidine (ORAL CARE KIT) 0.12 % mouthwash 15 mL  clindamycin (CLEOCIN) 900mg D5W 50mL IVPB (premix)  cefepime (MAXIPIME) 1 g in 0.9% sodium chloride (MBP/ADV) 50 mL  potassium chloride 20 mEq in 50 ml IVPB  calcium chloride injection 2 g  
 famotidine (PF) (PEPCID) 20 mg in 0.9% sodium chloride 10 mL injection  acetaminophen (TYLENOL) solution 650 mg  PHARMACY INFORMATION NOTE  propofol (DIPRIVAN) 10 mg/mL infusion  heparin (porcine) injection 2,000 Units Or  heparin (porcine) injection 4,000 Units  heparin 25,000 units in D5W 250 ml infusion  NOREPINephrine (LEVOPHED) 32 mg in 5% dextrose 250 mL infusion  sodium chloride (NS) flush 5-40 mL  sodium chloride (NS) flush 5-40 mL  ondansetron (ZOFRAN) injection 4 mg  alcohol 62% (NOZIN) nasal  1 Ampule  insulin lispro (HUMALOG) injection  glucose chewable tablet 16 g  
 dextrose (D50W) injection syrg 12.5-25 g  
 glucagon (GLUCAGEN) injection 1 mg  acetaminophen (TYLENOL) tablet 650 mg Or  acetaminophen (TYLENOL) suppository 650 mg  
 influenza vaccine 2019-20 (6 mos+)(PF) (FLUARIX/FLULAVAL/FLUZONE QUAD) injection 0.5 mL  sodium chloride (NS) flush 5-10 mL Current Facility-Administered Medications:  
  0.9% sodium chloride infusion, 100 mL/hr, IntraVENous, CONTINUOUS, Layla Lomax MD, Last Rate: 100 mL/hr at 05/27/20 0257, 100 mL/hr at 05/27/20 0257   insulin glargine (LANTUS) injection 30 Units, 30 Units, SubCUTAneous, DAILY, Felicia Lomax MD, 30 Units at 05/26/20 1124 
  vasopressin (VASOSTRICT) 20 Units in 0.9% sodium chloride 100 mL infusion, 0-0.04 Units/min, IntraVENous, CONTINUOUS, Felicia Lomax MD, Stopped at 05/26/20 1312   hydrocortisone Sod Succ (PF) (SOLU-CORTEF) injection 50 mg, 50 mg, IntraVENous, Q6H, 50 mg at 05/27/20 0554 **AND** [DISCONTINUED] insulin regular (NOVOLIN R, HUMULIN R) injection 6 Units, 6 Units, SubCUTAneous, Q6H, Jaja HILLS MD, Stopped at 05/21/20 1800 
  vancomycin (VANCOCIN) 1,000 mg in 0.9% sodium chloride (MBP/ADV) 250 mL, 1,000 mg, IntraVENous, Q24H, Vivien UREÑA MD, Last Rate: 125 mL/hr at 05/26/20 1822, 1,000 mg at 05/26/20 9587   polyethylene glycol (MIRALAX) packet 17 g, 17 g, Per NG tube, DAILY PRN, Zina Reed MD, 17 g at 05/24/20 1703   bicarbonate dialysis (PRISMASOL) BG K 2/Ca 3.5 5000 ml solution, , Extracorporeal, DIALYSIS CONTINUOUS, Last Rate: 2,000 mL/hr at 05/27/20 0553, 2,000 mL/hr at 05/27/20 0553 **AND** bicarbonate dialysis (PRISMASOL) BG K 4/Ca 2.5 5000 ml solution, , Extracorporeal, DIALYSIS CONTINUOUS, Darrin Montenegro MD, Last Rate: 2,000 mL/hr at 05/27/20 0805, 2,000 mL/hr at 05/27/20 4028   zinc oxide-cod liver oil (DESITIN) 40 % paste, , Topical, PRN, Zina Reed MD 
  chlorhexidine (ORAL CARE KIT) 0.12 % mouthwash 15 mL, 15 mL, Oral, Q12H, Marci Gustafson DO, 15 mL at 05/27/20 9529   clindamycin (CLEOCIN) 900mg D5W 50mL IVPB (premix), 900 mg, IntraVENous, Q8H, Felicia Lomax MD, Last Rate: 100 mL/hr at 05/27/20 0205, 900 mg at 05/27/20 0205   cefepime (MAXIPIME) 1 g in 0.9% sodium chloride (MBP/ADV) 50 mL, 1 g, IntraVENous, Q8H, Marci Gustafson DO, Last Rate: 50 mL/hr at 05/27/20 0554, 1 g at 05/27/20 0554   potassium chloride 20 mEq in 50 ml IVPB, 20 mEq, IntraVENous, PRN, Sanjuanita Burris MD 
   calcium chloride injection 2 g, 2 g, IntraVENous, PRN, Irving Montenegro MD 
  famotidine (PF) (PEPCID) 20 mg in 0.9% sodium chloride 10 mL injection, 20 mg, IntraVENous, DAILY, Marci Gustafson DO, 20 mg at 05/26/20 7224   acetaminophen (TYLENOL) solution 650 mg, 650 mg, Oral, Q6H PRN, Loli Houston MD, 650 mg at 05/20/20 2132   PHARMACY INFORMATION NOTE, 1 Each, Other, BID, Marci Gustafson, DO, 1 Each at 05/27/20 0900   propofol (DIPRIVAN) 10 mg/mL infusion, 0-50 mcg/kg/min, IntraVENous, TITRATE, Macarena Walker MD, Stopped at 05/25/20 1156   heparin (porcine) injection 2,000 Units, 2,000 Units, IntraVENous, PRN **OR** heparin (porcine) injection 4,000 Units, 4,000 Units, IntraVENous, PRN, Marci Gustafson DO, 2,000 Units at 05/19/20 0130 
  heparin 25,000 units in D5W 250 ml infusion, 8-25 Units/kg/hr, IntraVENous, TITRATE, Marci Gustafson DO, Last Rate: 10.9 mL/hr at 05/26/20 1546, 9 Units/kg/hr at 05/26/20 1546 
  NOREPINephrine (LEVOPHED) 32 mg in 5% dextrose 250 mL infusion, 2-200 mcg/min, IntraVENous, TITRATE, Marci Gustafson DO, Last Rate: 9.8 mL/hr at 05/27/20 0452, 21 mcg/min at 05/27/20 5453   sodium chloride (NS) flush 5-40 mL, 5-40 mL, IntraVENous, Q8H, Loli Houston MD, 10 mL at 05/27/20 4272   sodium chloride (NS) flush 5-40 mL, 5-40 mL, IntraVENous, PRN, Loli Houston MD 
  ondansetron [facility]) injection 4 mg, 4 mg, IntraVENous, Q8H PRN, Loli Houston MD, 4 mg at 05/16/20 0137 
  alcohol 62% (NOZIN) nasal  1 Ampule, 1 Ampule, Topical, Q12H, Loli Houston MD, 1 Ampule at 05/27/20 1645   insulin lispro (HUMALOG) injection, , SubCUTAneous, Q6H, Macarena Walker MD, 3 Units at 05/27/20 5274 
  glucose chewable tablet 16 g, 4 Tab, Oral, PRN, Macarena Walker MD 
  dextrose (D50W) injection syrg 12.5-25 g, 12.5-25 g, IntraVENous, PRN, Shalom HILLS MD, 12.5 g at 05/22/20 0112   glucagon (GLUCAGEN) injection 1 mg, 1 mg, IntraMUSCular, PRN, Clementina Del Rosario MD 
  acetaminophen (TYLENOL) tablet 650 mg, 650 mg, Oral, Q6H PRN, 650 mg at 20 0808 **OR** acetaminophen (TYLENOL) suppository 650 mg, 650 mg, Rectal, Q6H PRN, Clementina Del Rosario MD, 650 mg at 20 1449   influenza vaccine - (6 mos+)(PF) (FLUARIX/FLULAVAL/FLUZONE QUAD) injection 0.5 mL, 0.5 mL, IntraMUSCular, PRIOR TO DISCHARGE, José Quiñonez,  
  sodium chloride (NS) flush 5-10 mL, 5-10 mL, IntraVENous, PRN, Sonu March MD 
 
 
Objective:  
 
Vital Signs: Telemetry:    AFIB Intake/Output:  
Visit Vitals /62 Pulse 77 Temp 97.2 °F (36.2 °C) Resp 12 Ht 6' 3\" (1.905 m) Wt 124.7 kg (274 lb 14.6 oz) SpO2 97% BMI 34.36 kg/m² Temp (24hrs), Av.2 °F (36.2 °C), Min:96.2 °F (35.7 °C), Max:98.3 °F (36.8 °C) O2 Device: Endotracheal tube, Ventilator O2 Flow Rate (L/min): 60 l/min Body mass index is 34.36 kg/m². Wt Readings from Last 4 Encounters:  
20 124.7 kg (274 lb 14.6 oz) 19 123.8 kg (273 lb) 17 129.3 kg (285 lb)  
17 107.5 kg (237 lb) Intake/Output Summary (Last 24 hours) at 2020 0717 Last data filed at 2020 1430 Gross per 24 hour Intake 3791.79 ml Output 5942 ml Net -2150.21 ml Last shift:       07 -  190 In: 116.6 [I.V.:106.6] Out: 230 Last 3 shifts: 1901 -  0700 In: 1352 [I.V.:5109] Out: 8556 [Urine:1] Hemodynamics:   
CO:   
CI:   
CVP: CVP (mmHg): 8 mmHg (20 0745) SVR:   PAP Systolic:   
PAP Diastolic:   
PVR:   
ZG12:    
 
Ventilator Settings:     
Mode Rate TV Press PEEP FiO2 PIP Min. Vent Assist control    500 ml    6 cm H20 30 %  29 cm H2O  10.3 l/min Physical Exam: 
 
General: intubated/sedated HEENT: NCAT, poor dentition, lips and mucosa dry Eyes: anicteric; conjunctiva clear Neck: no nodes, no cuff leak, no accessory MM use. Chest: no deformity,  
Cardiac: IR regular; no murmur Lungs: no distress, intubated Abd: soft, NT, hypoactive BS Ext: no edema; no joint swelling; No clubbing Neuro: sedated Data:  
 
Current Facility-Administered Medications Medication Dose Route Frequency  0.9% sodium chloride infusion  100 mL/hr IntraVENous CONTINUOUS  
 insulin glargine (LANTUS) injection 30 Units  30 Units SubCUTAneous DAILY  vasopressin (VASOSTRICT) 20 Units in 0.9% sodium chloride 100 mL infusion  0-0.04 Units/min IntraVENous CONTINUOUS  
 hydrocortisone Sod Succ (PF) (SOLU-CORTEF) injection 50 mg  50 mg IntraVENous Q6H  
 vancomycin (VANCOCIN) 1,000 mg in 0.9% sodium chloride (MBP/ADV) 250 mL  1,000 mg IntraVENous Q24H  
 bicarbonate dialysis (PRISMASOL) BG K 2/Ca 3.5 5000 ml solution   Extracorporeal DIALYSIS CONTINUOUS And  
 bicarbonate dialysis (PRISMASOL) BG K 4/Ca 2.5 5000 ml solution   Extracorporeal DIALYSIS CONTINUOUS  chlorhexidine (ORAL CARE KIT) 0.12 % mouthwash 15 mL  15 mL Oral Q12H  clindamycin (CLEOCIN) 900mg D5W 50mL IVPB (premix)  900 mg IntraVENous Q8H  
 cefepime (MAXIPIME) 1 g in 0.9% sodium chloride (MBP/ADV) 50 mL  1 g IntraVENous Q8H  
 famotidine (PF) (PEPCID) 20 mg in 0.9% sodium chloride 10 mL injection  20 mg IntraVENous DAILY  PHARMACY INFORMATION NOTE  1 Each Other BID  propofol (DIPRIVAN) 10 mg/mL infusion  0-50 mcg/kg/min IntraVENous TITRATE  heparin 25,000 units in D5W 250 ml infusion  8-25 Units/kg/hr IntraVENous TITRATE  
 NOREPINephrine (LEVOPHED) 32 mg in 5% dextrose 250 mL infusion  2-200 mcg/min IntraVENous TITRATE  sodium chloride (NS) flush 5-40 mL  5-40 mL IntraVENous Q8H  
 alcohol 62% (NOZIN) nasal  1 Ampule  1 Ampule Topical Q12H  
 insulin lispro (HUMALOG) injection   SubCUTAneous Q6H  
 influenza vaccine 2019-20 (6 mos+)(PF) (FLUARIX/FLULAVAL/FLUZONE QUAD) injection 0.5 mL  0.5 mL IntraMUSCular PRIOR TO DISCHARGE Labs: Recent Labs  
  05/26/20 
3520 WBC 27.2* HGB 9.7* HCT 28.4*  
 Recent Labs  
  05/27/20 
0435 05/26/20 
1435 05/26/20 
0419  05/25/20 
0409  136 137   < > 137  
K 3.7 3.5 3.8   < > 3.7  105 105   < > 104 CO2 27 26 25   < > 27 * 248* 252*   < > 186* BUN 41* 42* 41*   < > 35* CREA 1.92* 2.07* 2.28*   < > 2.36* CA 8.0* 8.0* 7.8*   < > 7.6*  
MG 2.4 2.4 2.5*   < > 2.4 PHOS 3.6 3.1 3.4   < > 3.1 ALB 2.1* 2.0* 2.0*   < > 2.1* TBILI  --   --  10.2*  --   --   
ALT  --   --  238*  --   --   
INR 1.6*  --  1.7*  --  1.6*  
 < > = values in this interval not displayed. Recent Labs  
  05/26/20 
0440 PHI 7.377 PCO2I 38.9 PO2I 102* HCO3I 22.9 FIO2I 30 Imaging: 
I have personally reviewed the patients radiographs and have reviewed the reports: 
None today Total critical care time exclusive of procedures: 35 minutes Adela Catherine MD

## 2020-05-27 NOTE — PROGRESS NOTES
DEREK PLAN:   TBD depending on medical status COVID positive PNA. Patient remains vented and critical in the ICU on pressors. CVVH initiated on 5/21. Off sedation for 48 plus hours - no tracking or command following. Neuro to evaluate. Poor prognosis.  
  
Palliative consulted - conducting Zoom session w/ family this afternoon. CM is following to assist w/ appropriate dc planning. Gay Litten, MARCO

## 2020-05-27 NOTE — PROGRESS NOTES
Interdisciplinary team rounds were held 5/27/2020 with the following team members:Care Management, Diabetes Treatment Specialist, Nursing, Nutrition, Palliative Care, Pharmacy, Physical Therapy, Physician, Respiratory Therapy and Clinical Coordinator. Plan of care discussed. Goal: See MD orders and progress notes for further  interventions and desired outcomes.

## 2020-05-28 NOTE — PROGRESS NOTES
1920  Verbal shift change report given to Martin Mcqueen (oncoming nurse) by Kaylee Evans RN (offgoing nurse). Report included the following information SBAR, Kardex, Intake/Output, MAR, Recent Results and Cardiac Rhythm a-fib/flutter with pacing (biphasic pacer). 2015  Patient tolerating CVVH well. Noted that patient fluttered eyes when name was called. When RN grasped his hand and directed him to squeeze, it is unclear if he twitched with his hand being held. Not a consistent flicker to attempt to grasp my hand. Also noted that when repositioning patient, when his left hand was relaxing downward, he seemed to have some resistance to gravity and slowly lowered his arm.  
 
2120  Called patient's wife and she sple to him for a few minutes. Noted that patient's eyes fluttered while she spoke with him. 2215  Performing basic PROM with patient's upper extremities. Patient with some resistance both in flexion and extension. 0015  Only change in assessment is that patient appears to be able to focus on RN talking with him and tactile stimulus. Patient shook his head \"no\" when asked if he had any pain. Continue titrating Levo for SBP >/= to 90. 
 
0230  Discussed with RT that we will try SBT this morning. 0310  Labs drawn and sent. 0056 Notified by lab that PTT is 87.9. Will hold for 1 hour and restart at 6 units/kg/hr. 0520  Restarted heparin at 6 units/kg/hr. Patient incontinent on small amount of gelatinous brown stool. 3262  Patient tolerating SBT well with TV's 700-900 and rate 10-13. Patient able to shake head \"no\" when asked if he felt SOB and also mouthed \"no\". 0725  Verbal shift change report given to Alexa Ricci RN (oncoming nurse) by Kimani Brown RN (offgoing nurse). Report included the following information Kardex, Intake/Output, Recent Results and Cardiac Rhythm a-fib/flutter; paced; occ. PVC's.

## 2020-05-28 NOTE — PROGRESS NOTES
1900 - Verbal shift change report given to [address] [place] [address] (oncoming nurse) by Guillermo Josue (offgoing nurse). Report included the following information SBAR, Kardex, Intake/Output, MAR, Recent Results, and Cardiac Rhythm underlying atrial fib, Bi-V paced and PVC's 70's. Removed by dayshift - OGT, Lebron, Rectal probe, warming blanket, PIV, CVP. 2000 - Assessment complete. Receiving oxygen via Ventilator ETT. Oral care complete. Not restrained, not sedated. Vent: mode: ACVC 16/500/30/6, Vmax 60; Peak 23, Mean 11, , MEv 10.7, RR 20, Sat 97. Lines: ETT # 7.5/24 cm at lip,. PIV:  , RIJ triple lumen CVC, RIJ Temporary dialysis line,. R NGT 68 cm nare,. Dialysis: CRRT. Mode CVVHD; Blood flow 200, PBP 0, Dialysate 2000, Effluent 2000, Effluent dose 15 ml/kg/h, UFR dose 1 ml/kg/h, Filtration fraction 2 %; Access - 125, Filter 135, Effluent 55, Return 63, Pressure drop 55, TMP 20. 
IV infusions: Levophed 9,. Heparin 9,. NS KVO for IVPB,. Nepro 20, Goal 25 Free H2O: 50 ml q 6 h,. 
 
2300 - Unable to get oral or ax temp. Eso probe placed, Temp 94.3. Warming blanket resumed. 0000 - Reassessment complete. VSS, Bi-V paced. Oral care complete. 0400 - Reassessment complete. VSS, Bi-V paced. Oral care complete.  
 
0700 - Verbal shift change report given to [address] [place] [address] (oncoming nurse) by Martita Laurent RN (offgoing nurse). Report included the following information SBAR, Kardex, Intake/Output, MAR, Recent Results, and Cardiac Rhythm paced 70s .

## 2020-05-28 NOTE — PROGRESS NOTES
Hospitalist Progress Note NAME: Óscra Zhou :  1955 MRN:  [id number] Assessment / Plan: 
Septic vs cardiogenic shock 2/2 COVID Positive with viral prodrome Hyponatremia Bacteremia Multiorgan failure 
-remains critically ill 
-requiring multiple pressors support -appreciate intensivist management 
-worsening renal function, CRRT initiated  
-palliative on board 
-at high risk for further rapid decline, death 
-palliative team on board has communicated with wife/daughter. Full code for now.  
-cont pressor support, empiric abx (vanco,cefepime and clindamycin) 
-has been enrolled in convalescent plasma study 
-cont zinc 
-Blood cx 5/15 alpha streptococcus, repeat blood cx obtained and NGTd 
-On solucortef 
-Ferritin>[id number] 
-Liver Enzymes elevated, trending down 
-Bilirubin trending up 
-Leucocytosis worsening Acute renal failure 
-initiated CRRT  
-trend bmp nephrology consultation appreciated Avoid nephrotoxic medication Hx of nonischemic CM 
-recent weight gain 
-EF 20% 
--s/p BIV-ICD 
 
DM2 
-cont insulin w lantus, SSI 
  
NSTEMI 
-cardiology following 
-cont supportive care 
  
Atrial fibrillation Eliquis on hold for now 
  
Hx of Hypertension 
-currently in cardiogenic vs septic shock as above and requiring pressors 
-hold home antiHTNsives 
  
Code Status: Full Surrogate Decision Maker: 
  
DVT Prophylaxis: heparin GI Prophylaxis: not indicated Subjective: Chief Complaint / Reason for Physician Visit: following sepsis Per RN he was responding minimally last night On pressors Pt was seen through CCU room window and discussed with nursing. Did not enter room to avoid exposure/transmission of COVID-19 Discussed with RN events overnight. Review of Systems: 
Symptom Y/N Comments  Symptom Y/N Comments Fever/Chills    Chest Pain Poor Appetite    Edema Cough    Abdominal Pain Sputum    Joint Pain SOB/GROVES    Pruritis/Rash Nausea/vomit    Tolerating PT/OT Diarrhea    Tolerating Diet Constipation    Other Could NOT obtain due to: Intubated and sedated Objective: VITALS:  
Last 24hrs VS reviewed since prior progress note. Most recent are: 
Patient Vitals for the past 24 hrs: 
 Temp Pulse Resp BP SpO2  
05/28/20 1700 97.5 °F (36.4 °C) 76 17 107/55 99 % 05/28/20 1600 97.4 °F (36.3 °C) 74 16 99/49 99 % 05/28/20 1511  82 22  100 % 05/28/20 1500 97.6 °F (36.4 °C) 71 16 99/42 100 % 05/28/20 1400 97.6 °F (36.4 °C) 72 16 92/44 100 % 05/28/20 1300 97.8 °F (36.6 °C) 71 16 101/40 100 % 05/28/20 1200 97.4 °F (36.3 °C) 72 16 (!) 82/40 100 % 05/28/20 1117  71 19  100 % 05/28/20 1108  72  103/54   
05/28/20 1100 96.8 °F (36 °C) 73 14 103/54 100 % 05/28/20 1000 (!) 96.7 °F (35.9 °C) 71 12 93/54 100 % 05/28/20 0900 97.3 °F (36.3 °C) 71 16 (!) 89/46 97 % 05/28/20 0800 97.3 °F (36.3 °C) 74 17 97/54 99 % 05/28/20 0745  70 20  99 % 05/28/20 0700  70 15 101/53 99 % 05/28/20 0600 97.3 °F (36.3 °C) 71 17 101/54 99 % 05/28/20 0000 (!) 94.8 °F (34.9 °C) 73 19  99 % 05/27/20 2346   17  99 % 05/27/20 2345 (!) 94.5 °F (34.7 °C) 71 21  99 % 05/27/20 2330 (!) 94.3 °F (34.6 °C) 71 19 97/54 99 % 05/27/20 2315 (!) 94.2 °F (34.6 °C) 73 20  99 % 05/27/20 2300  73 18 (!) 87/52 99 % 05/27/20 2245  73 19  99 % 05/27/20 2230  71 20 96/55 99 % 05/27/20 2215  71 21  98 % 05/27/20 2200  71 25 (!) 89/51 94 % 05/27/20 2145  72 20  98 % 05/27/20 2130  73 22 94/55 99 % 05/27/20 2115  72 20  98 % 05/27/20 2100 96.8 °F (36 °C) 71 19 90/56 97 % 05/27/20 2045  70 19  97 % 05/27/20 2030  72 19 90/57 97 % 05/27/20 2015  73 20  96 % 05/27/20 2000  71 16 92/58 97 % 05/27/20 1945  71 15  97 % 05/27/20 1944   23  97 % 05/27/20 1930  76 19 92/55 97 % 05/27/20 1900  74 17 94/52 98 % 05/27/20 1830  70 19 100/65 98 % 05/27/20 1800  77 21 104/59 98 % Intake/Output Summary (Last 24 hours) at 5/28/2020 1737 Last data filed at 5/28/2020 1700 Gross per 24 hour Intake 1767.36 ml Output 4193 ml Net -2425.64 ml PHYSICAL EXAM: 
GEN: AA male, NAD On Ventilator Intubated and sedated 
anasarca noticed, improving edema 
jaudiced Seen through ICU window to minimize exposure/transmission to/of COVID-19 Reviewed most current lab test results and cultures  YES Reviewed most current radiology test results   YES Review and summation of old records today    NO Reviewed patient's current orders and MAR    YES 
PMH/SH reviewed - no change compared to H&P 
________________________________________________________________________ Care Plan discussed with: 
  Comments Patient Family RN x Care Manager Consultant  x Pulmonary Multidiciplinary team rounds were held today with , nursing, pharmacist and clinical coordinator. Patient's plan of care was discussed; medications were reviewed and discharge planning was addressed. ________________________________________________________________________ Total NON critical care TIME:  25  Minutes Total CRITICAL CARE TIME Spent:   Minutes non procedure based Comments >50% of visit spent in counseling and coordination of care    
________________________________________________________________________ Carol Espinoza MD  
 
Procedures: see electronic medical records for all procedures/Xrays and details which were not copied into this note but were reviewed prior to creation of Plan. LABS: 
I reviewed today's most current labs and imaging studies. Pertinent labs include: 
Recent Labs  
  05/28/20 
0847 05/26/20 
0419 WBC 24.4* 27.2* HGB 10.8* 9.7* HCT 31.5* 28.4*  
 165 Recent Labs  
  05/28/20 
1522 05/28/20 
9957 05/28/20 
0445  05/27/20 
0435  05/26/20 
8206  136 137   < > 137   < > 137 K 3.8 3.7 3.7   < > 3.7   < > 3.8  105 105   < > 106   < > 105 CO2 28 26 25   < > 27   < > 25 * 120* 144*   < > 206*   < > 252* BUN 42* 39* 39*   < > 41*   < > 41* CREA 1.97* 2.04* 1.99*   < > 1.92*   < > 2.28* CA 7.8* 7.8* 7.6*   < > 8.0*   < > 7.8*  
MG 2.4 2.6* 2.4   < > 2.4   < > 2.5* PHOS 2.9 2.6 2.8   < > 3.6   < > 3.4 ALB 2.0* 2.0* 2.1*   < > 2.1*   < > 2.0*  
TBILI  --   --  12.3*  --   --   --  10.2* ALT  --   --  201*  --   --   --  238* INR  --   --  1.6*  --  1.6*  --  1.7*  
 < > = values in this interval not displayed.   
 
 
Signed: Floyd North MD

## 2020-05-28 NOTE — PROGRESS NOTES
1115 - Received report from Anup Harrison RN. 
 
1200 - Assessment completed. Patient eyes open and move when name is called and, grimaces to pain, no command following. On ventilator for respiratory support. A-fib paced with demand pacemaker, Levophed at 27 mcg/min (See MAR for continued titrations). Currently on CRRT  4k dialysate bags per order. Not making any urine. NGT infusing 25 mL/hr of Nepro at goal. Naaman Hymen being used for patient temperature of 97F on esophageal probe. 1600 - Reassessment completed. No changes noted. 36 - Family video chatted with patient in room. 1900 - Reassessment completed. No changes noted.

## 2020-05-28 NOTE — PROGRESS NOTES
[facility] INTENSIVIST Consult Service Note Pulmonary, Critical Care, and Sleep Medicine Name: Sheryle Sera MRN: [id number] : 1955 Hospital: [facility] Date: 2020  Admission date: 5/15/2020 Hospital Day: 15 Subjective/Interval History:  
 
20: off sedation but only grimaces to noxious stimuli. Remains critically ill on mechanical ventilation/pressors 20: remains critically ill on mechanical ventilation/pressors. Off sedation for about 48 hours. Minimal response thus far.  
20: no events. Remains intubated and on pressors. Followed a few commands intermittently overnight per nursing. No data available as EMR has been down until a few minutes ago. IMPRESSION:  
1. Acute hypoxic respiratory failure, intubated - 2. Profound septic shock on high level of pressors. 3. COVID 19 pneumonia 4. Alpha strep septicemia- elevated Procalcitonin > 80 
5. Chronic combined systolic/diastolic heart failure 6. Hypertensive heart disease with CHF and CKD 7. LAQUITA/CKD, CVVH to start 8. Chronic anticoagulation at home- eliquis 9. Diabetes mellitus type 2 uncontrolled 10. Hyperlipidemia 11. 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 Chronic atrial fibrillation 13. Status post SJM BIV-ICD implant in 2016 
14. Sleep apnea, undiagnosed/untreated 15. Obesity with recent weight gain RECOMMENDATIONS/PLAN:  
1. Ventilator support - mental status and relatively high pressor doses make attempts at weaning/extubation difficult currently 2. Follow up AM labs 3. Monitor bilirubin - US unremarkable, ?hemolysis 4. Continue pressors, wean as able 5. Holding sedation 6. Monitor mental status, has been off sedation for ~48 hours without significant response. If still unresponsive by tomorrow, will need head CT and likely neuro consult 7. Continue antibiotics 8. Stress dose steroids 9. RRT per nephrology 10. Heparin drip 11. Cardiology following 12. Glycemic monitoring and control 13. Replete electrolytes PRN 14. DVT, SUP prophylaxis 15. Remains critically ill Subjective/Initial History:  
I have reviewed the flowsheet and previous days notes. Seen earlier today on rounds. I was asked by Apolonia Dudley MD to see Hola Weber a 71 y.o.  male  in consultation for a chief complaint of shock. Excerpts from admission 5/15/2020 and consult notes reviewed as follows:  
 
\"Mr. Alix Melgar is a 57 y.o. morbidly obese male with Chronic nonischemic dilated cardiomyopathy EF 93%, Chronic systolic congestive heart failure class, H/o atrial fibrillation, On chronic warfarin, Iatrogenic left bundle branch block with predominant RV pacing, 64-70%, Hypertension, Hyperlipidemia and diabetes presents to [facility] ED C/O Worsening exertional shortness of breath and around 20 pounds weight gain in last 3 weeks. \" 
 
Pt now in CCU. Poor historian. On room air. No fever. No cough. No diarrhea. Not very active. Saw PCP three weeks ago. Some edema. Chart notes ED [facility] admission in 2017 with decompensation. Patient PCP: Radha Hernandez MD 
PMH:  has a past medical history of A-fib ([address] Arrhythmia, Diabetes ([address] Endocrine disease, Hypertension, and Irregular heart beat. He also has no past medical history of Difficult intubation, Malignant hyperthermia due to anesthesia, Nausea & vomiting, or Pseudocholinesterase deficiency. PSH:   has a past surgical history that includes pr cardiac surg procedure unlist; insert emergency endotrach airway (5/18/2020); and ir insert non tunl cvc over 5 yrs (5/18/2020). FHX: family history includes Diabetes in his father; Heart Disease in his father and mother. SHX:  reports that he quit smoking about 26 years ago. He has never used smokeless tobacco. He reports that he does not drink alcohol or use drugs. ROS:A comprehensive review of systems was negative except for that written in the HPI. No Known Allergies MEDS:  
Current Facility-Administered Medications Medication  insulin glargine (LANTUS) injection 40 Units  vasopressin (VASOSTRICT) 20 Units in 0.9% sodium chloride 100 mL infusion  hydrocortisone Sod Succ (PF) (SOLU-CORTEF) injection 50 mg  
 vancomycin (VANCOCIN) 1,000 mg in 0.9% sodium chloride (MBP/ADV) 250 mL  polyethylene glycol (MIRALAX) packet 17 g  
 bicarbonate dialysis (PRISMASOL) BG K 4/Ca 2.5 5000 ml solution  zinc oxide-cod liver oil (DESITIN) 40 % paste  chlorhexidine (ORAL CARE KIT) 0.12 % mouthwash 15 mL  clindamycin (CLEOCIN) 900mg D5W 50mL IVPB (premix)  cefepime (MAXIPIME) 1 g in 0.9% sodium chloride (MBP/ADV) 50 mL  potassium chloride 20 mEq in 50 ml IVPB  calcium chloride injection 2 g  
 famotidine (PF) (PEPCID) 20 mg in 0.9% sodium chloride 10 mL injection  acetaminophen (TYLENOL) solution 650 mg  PHARMACY INFORMATION NOTE  propofol (DIPRIVAN) 10 mg/mL infusion  heparin (porcine) injection 2,000 Units Or  heparin (porcine) injection 4,000 Units  heparin 25,000 units in D5W 250 ml infusion  NOREPINephrine (LEVOPHED) 32 mg in 5% dextrose 250 mL infusion  sodium chloride (NS) flush 5-40 mL  sodium chloride (NS) flush 5-40 mL  ondansetron (ZOFRAN) injection 4 mg  alcohol 62% (NOZIN) nasal  1 Ampule  insulin lispro (HUMALOG) injection  glucose chewable tablet 16 g  
 dextrose (D50W) injection syrg 12.5-25 g  
 glucagon (GLUCAGEN) injection 1 mg  acetaminophen (TYLENOL) tablet 650 mg Or  acetaminophen (TYLENOL) suppository 650 mg  
 influenza vaccine 2019-20 (6 mos+)(PF) (FLUARIX/FLULAVAL/FLUZONE QUAD) injection 0.5 mL  sodium chloride (NS) flush 5-10 mL Current Facility-Administered Medications:  
  insulin glargine (LANTUS) injection 40 Units, 40 Units, SubCUTAneous, DAILY, Tova Lomax MD, 40 Units at 05/27/20 1111 
  vasopressin (VASOSTRICT) 20 Units in 0.9% sodium chloride 100 mL infusion, 0-0.04 Units/min, IntraVENous, CONTINUOUS, Tova Lomax MD, Stopped at 05/26/20 1312   hydrocortisone Sod Succ (PF) (SOLU-CORTEF) injection 50 mg, 50 mg, IntraVENous, Q6H, 50 mg at 05/27/20 1807 **AND** [DISCONTINUED] insulin regular (NOVOLIN R, HUMULIN R) injection 6 Units, 6 Units, SubCUTAneous, Q6H, Jasmina HILLS MD, Stopped at 05/21/20 1800 
  vancomycin (VANCOCIN) 1,000 mg in 0.9% sodium chloride (MBP/ADV) 250 mL, 1,000 mg, IntraVENous, Q24H, Lashell UREÑA MD, Last Rate: 125 mL/hr at 05/27/20 1807, 1,000 mg at 05/27/20 1807   polyethylene glycol (MIRALAX) packet 17 g, 17 g, Per NG tube, DAILY PRN, Poly Charles MD, 17 g at 05/24/20 1703   [DISCONTINUED] bicarbonate dialysis (PRISMASOL) BG K 2/Ca 3.5 5000 ml solution, , Extracorporeal, DIALYSIS CONTINUOUS, Stopped at 05/27/20 1900 **AND** bicarbonate dialysis (PRISMASOL) BG K 4/Ca 2.5 5000 ml solution, , Extracorporeal, DIALYSIS CONTINUOUS, McNeer, Dail Gaucher, MD, Last Rate: 2,000 mL/hr at 05/27/20 2308, 2,000 mL/hr at 05/27/20 2308   zinc oxide-cod liver oil (DESITIN) 40 % paste, , Topical, PRN, Poly Charles MD 
  chlorhexidine (ORAL CARE KIT) 0.12 % mouthwash 15 mL, 15 mL, Oral, Q12H, Marci Gustafson DO, 15 mL at 05/27/20 2045   clindamycin (CLEOCIN) 900mg D5W 50mL IVPB (premix), 900 mg, IntraVENous, Q8H, Tova Lomax MD, Last Rate: 100 mL/hr at 05/27/20 2000, 900 mg at 05/27/20 2000   cefepime (MAXIPIME) 1 g in 0.9% sodium chloride (MBP/ADV) 50 mL, 1 g, IntraVENous, Q8H, Marci Gustafson DO, Last Rate: 50 mL/hr at 05/27/20 2047, 1 g at 05/27/20 2047   potassium chloride 20 mEq in 50 ml IVPB, 20 mEq, IntraVENous, PRN, Kathleen Butler MD 
  calcium chloride injection 2 g, 2 g, IntraVENous, PRN, Kathleen Butler MD 
   famotidine (PF) (PEPCID) 20 mg in 0.9% sodium chloride 10 mL injection, 20 mg, IntraVENous, DAILY, Marci Gustafson, , 20 mg at 05/27/20 9989   acetaminophen (TYLENOL) solution 650 mg, 650 mg, Oral, Q6H PRN, Anita Jo MD, 650 mg at 05/20/20 2132   PHARMACY INFORMATION NOTE, 1 Each, Other, BID, Marci Gustafson, DO, 1 Each at 05/27/20 1400   propofol (DIPRIVAN) 10 mg/mL infusion, 0-50 mcg/kg/min, IntraVENous, TITRATE, Luis Carlos Moreira MD, Stopped at 05/25/20 1156   heparin (porcine) injection 2,000 Units, 2,000 Units, IntraVENous, PRN **OR** heparin (porcine) injection 4,000 Units, 4,000 Units, IntraVENous, PRN, Marci Gustafson, DO, 2,000 Units at 05/19/20 0130 
  heparin 25,000 units in D5W 250 ml infusion, 8-25 Units/kg/hr, IntraVENous, TITRATE, Marci Gustafson, DO, Last Rate: 10.9 mL/hr at 05/27/20 1431, 9 Units/kg/hr at 05/27/20 1431 
  NOREPINephrine (LEVOPHED) 32 mg in 5% dextrose 250 mL infusion, 2-200 mcg/min, IntraVENous, TITRATE, Marci Gustafson, DO, Last Rate: 5.6 mL/hr at 05/27/20 2322, 12 mcg/min at 05/27/20 2322   sodium chloride (NS) flush 5-40 mL, 5-40 mL, IntraVENous, Q8H, Anita Jo MD, 10 mL at 05/27/20 2046   sodium chloride (NS) flush 5-40 mL, 5-40 mL, IntraVENous, PRN, Anita Jo MD 
  ondansetron [facility]) injection 4 mg, 4 mg, IntraVENous, Q8H PRN, Anita Jo MD, 4 mg at 05/16/20 0137 
  alcohol 62% (NOZIN) nasal  1 Ampule, 1 Ampule, Topical, Q12H, Anita Jo MD, 1 Ampule at 05/27/20 2046 
  insulin lispro (HUMALOG) injection, , SubCUTAneous, Q6H, Carmenza HILLS MD, 2 Units at 05/27/20 1800 
  glucose chewable tablet 16 g, 4 Tab, Oral, PRN, Luis Carlos Moreira MD 
  dextrose (D50W) injection syrg 12.5-25 g, 12.5-25 g, IntraVENous, PRN, Carmenza HILLS MD, 12.5 g at 05/22/20 0112 
  glucagon (GLUCAGEN) injection 1 mg, 1 mg, IntraMUSCular, PRN, Luis Carlos Moreira MD 
   acetaminophen (TYLENOL) tablet 650 mg, 650 mg, Oral, Q6H PRN, 650 mg at 20 0808 **OR** acetaminophen (TYLENOL) suppository 650 mg, 650 mg, Rectal, Q6H PRN, Jes Greene MD, 650 mg at 20 1449   influenza vaccine - (6 mos+)(PF) (FLUARIX/FLULAVAL/FLUZONE QUAD) injection 0.5 mL, 0.5 mL, IntraMUSCular, PRIOR TO DISCHARGE, José Quiñonez,  
  sodium chloride (NS) flush 5-10 mL, 5-10 mL, IntraVENous, PRN, Michael March MD 
 
 
Objective:  
 
Vital Signs: Telemetry:    AFIB Intake/Output:  
Visit Vitals /53 Pulse 70 Temp 96.8 °F (36 °C) Resp 20 Ht 6' 3\" (1.905 m) Wt 124.4 kg (274 lb 4 oz) SpO2 99% BMI 34.28 kg/m² Temp (24hrs), Av.4 °F (36.3 °C), Min:96.8 °F (36 °C), Max:97.7 °F (36.5 °C) O2 Device: Endotracheal tube, Ventilator O2 Flow Rate (L/min): 60 l/min Body mass index is 34.28 kg/m². Wt Readings from Last 4 Encounters:  
20 124.4 kg (274 lb 4 oz) 19 123.8 kg (273 lb) 17 129.3 kg (285 lb)  
17 107.5 kg (237 lb) Intake/Output Summary (Last 24 hours) at 2020 9805 Last data filed at 2020 2300 Gross per 24 hour Intake 1447.58 ml Output 2778 ml Net -1330.42 ml Last shift:      No intake/output data recorded. Last 3 shifts:  1901 -  0700 In: 3298.8 [I.V.:2708.8] Out: 4055 [Urine:1] Hemodynamics:   
CO:   
CI:   
CVP: CVP (mmHg): 8 mmHg (20 1030) SVR:   PAP Systolic:   
PAP Diastolic:   
PVR:   
LU42:    
 
Ventilator Settings:     
Mode Rate TV Press PEEP FiO2 PIP Min. Vent Assist control, Volume control    500 ml    6 cm H20 30 %  18 cm H2O  10.6 l/min Physical Exam: 
 
General: intubated/sedated HEENT: NCAT, poor dentition, lips and mucosa dry Eyes: anicteric; conjunctiva clear Neck: no nodes, no cuff leak, no accessory MM use. Chest: no deformity,  
Cardiac: IR regular; no murmur Lungs: no distress, intubated Abd: soft, NT, hypoactive BS Ext: no edema; no joint swelling; No clubbing Neuro: sedated Data:  
 
Current Facility-Administered Medications Medication Dose Route Frequency  insulin glargine (LANTUS) injection 40 Units  40 Units SubCUTAneous DAILY  vasopressin (VASOSTRICT) 20 Units in 0.9% sodium chloride 100 mL infusion  0-0.04 Units/min IntraVENous CONTINUOUS  
 hydrocortisone Sod Succ (PF) (SOLU-CORTEF) injection 50 mg  50 mg IntraVENous Q6H  
 vancomycin (VANCOCIN) 1,000 mg in 0.9% sodium chloride (MBP/ADV) 250 mL  1,000 mg IntraVENous Q24H  
 bicarbonate dialysis (PRISMASOL) BG K 4/Ca 2.5 5000 ml solution   Extracorporeal DIALYSIS CONTINUOUS  chlorhexidine (ORAL CARE KIT) 0.12 % mouthwash 15 mL  15 mL Oral Q12H  clindamycin (CLEOCIN) 900mg D5W 50mL IVPB (premix)  900 mg IntraVENous Q8H  
 cefepime (MAXIPIME) 1 g in 0.9% sodium chloride (MBP/ADV) 50 mL  1 g IntraVENous Q8H  
 famotidine (PF) (PEPCID) 20 mg in 0.9% sodium chloride 10 mL injection  20 mg IntraVENous DAILY  PHARMACY INFORMATION NOTE  1 Each Other BID  propofol (DIPRIVAN) 10 mg/mL infusion  0-50 mcg/kg/min IntraVENous TITRATE  heparin 25,000 units in D5W 250 ml infusion  8-25 Units/kg/hr IntraVENous TITRATE  
 NOREPINephrine (LEVOPHED) 32 mg in 5% dextrose 250 mL infusion  2-200 mcg/min IntraVENous TITRATE  sodium chloride (NS) flush 5-40 mL  5-40 mL IntraVENous Q8H  
 alcohol 62% (NOZIN) nasal  1 Ampule  1 Ampule Topical Q12H  
 insulin lispro (HUMALOG) injection   SubCUTAneous Q6H  
 influenza vaccine 2019-20 (6 mos+)(PF) (FLUARIX/FLULAVAL/FLUZONE QUAD) injection 0.5 mL  0.5 mL IntraMUSCular PRIOR TO DISCHARGE Labs: 
Recent Labs  
  05/26/20 
7502 WBC 27.2* HGB 9.7* HCT 28.4*  
 Recent Labs  
  05/27/20 
1612 05/27/20 
0435 05/26/20 
1435 05/26/20 
0419  137 136 137  
K 3.3* 3.7 3.5 3.8  106 105 105 CO2 26 27 26 25 * 206* 248* 252* BUN 38* 41* 42* 41* CREA 1.95* 1.92* 2.07* 2.28* CA 8.2* 8.0* 8.0* 7.8*  
MG 2.3 2.4 2.4 2.5* PHOS 3.0 3.6 3.1 3.4 ALB 2.0* 2.1* 2.0* 2.0*  
TBILI  --   --   --  10.2* ALT  --   --   --  238* INR  --  1.6*  --  1.7* Recent Labs  
  05/26/20 
0440 PHI 7.377 PCO2I 38.9 PO2I 102* HCO3I 22.9 FIO2I 30 Imaging: 
I have personally reviewed the patients radiographs and have reviewed the reports: 
Pending Total critical care time exclusive of procedures: 35 minutes Geoffrey Guzman MD

## 2020-05-28 NOTE — DIALYSIS
[facility]       590-2500 Orders Mode: CVVHD Factor: 100 ml/hr Dialysate: 2000 ml/hr Blood Flow Rate: 200 ml/min Metrics BP / HR: 103/54, 72 Blood Flow Rate: 200 ml/min AP:                         -123 RP: 58 TMP: 27  
PD: 39  
FP: 131 Dialysate: 2000 ml/hr Comments / Plan:  
   [id number] filter running well with no indication for change at this time. Consents, patient, code status, labs and orders verified. +COVID-19: Observed pt & CRRT machine outside room with primary RN inside pt room to reduce exposure & use of PPE. RIJ temporary CVC. Unable to observe CVC dressing, primary RN to change if needed per policy & procedure. No signs of redness, drainage, or infection visualized by primary RN. Lines visible and connections secure with blood warmer to return line at 37*C. Education, pre and post to [facility].

## 2020-05-28 NOTE — PROGRESS NOTES
Progress Note 5/28/2020 11:43 AM 
NAME: Beau Bennett MRN:  [id number] Admit Diagnosis: Sepsis ([facility] [address] [A41.9] Problem List: 1. Shock; septic/cardiogenic 2. Severe sepsis 3. COVID-19 + 4. NSTEMI 5. Hyponatremia 6. Lactic acidosis 7. Acute liver injury 8. Acute on chronic systolic heart failure; Class IV 9. Acute kidney injury; anuric 10. Coagulopathy 11. Nonischemic dilated cardiomyopathy s/p remote ICD 12. Chronic atrial fibrillation 13. Sleep apnea 14. Hypertensive heart disease w/ CKD and HF Assessment/Plan:  
Long discussion with the family multiple times. He is gravely ill and most likely will not survive this. This has been relayed and they understand this. Intake/Output Summary (Last 24 hours) at 5/28/2020 2860 Last data filed at 5/28/2020 0900 Gross per 24 hour Intake 1741.54 ml Output 3868 ml Net -2126.46 ml Ricky off Vaso off Norepi @ 30 Dobut off Epi off 30% FiO2 Responds to pain and followed a few commands this AM. Off sedation for a while. 1. Continue supportive care 2. Heparin gtt. 3. CVVH ongoing; tolerating full factor 4. Remains critically ill 5. Discussions ongoing w/ family daily 6. Imaging of head and neuro consult on hold for now. [x]       High complexity decision making was performed in this patient at high risk for decompensation with multiple organ involvement. Subjective:  
 
Beau Bennett is intubated and sedated. Discussed with RN events overnight. Review of Systems: 
 
Symptom Y/N Comments  Symptom Y/N Comments Fever/Chills N   Chest Pain N Poor Appetite N   Edema N   
Cough N   Abdominal Pain N Sputum N   Joint Pain N   
SOB/GROVES N   Pruritis/Rash N   
Nausea/vomit N   Tolerating PT/OT Y Diarrhea N   Tolerating Diet Y Constipation N   Other Could NOT obtain due to: sedated Objective:  
  
Physical Exam: Last 24hrs VS reviewed since prior progress note. Most recent are: 
 
Visit Vitals BP (!) 89/46 Pulse 71 Temp 97.3 °F (36.3 °C) Resp 16 Ht 6' 3\" (1.905 m) Wt 124.4 kg (274 lb 4 oz) SpO2 97% BMI 34.28 kg/m² Intake/Output Summary (Last 24 hours) at 5/28/2020 4536 Last data filed at 5/28/2020 0900 Gross per 24 hour Intake 1741.54 ml Output 3868 ml Net -2126.46 ml General Appearance: Well developed, well nourished, intubated/sedated Ears/Nose/Mouth/Throat: Hearing grossly normal. 
Neck: Supple. Chest: Lungs decreased diffusely Cardiovascular: Regular rate and rhythm, S1S2 normal, no murmur. Abdomen: Soft, non-tender, bowel sounds are active. Extremities: No edema bilaterally. Skin: Warm and dry. []         Post-cath site without hematoma, bruit, tenderness, or thrill. Distal pulses intact. PMH/ reviewed - no change compared to H&P Data Review Telemetry: paced/AF 
 
EKG:  
[x]  No new EKG for review Lab Data Personally Reviewed: 
 
Recent Labs  
  05/26/20 
2556 WBC 27.2* HGB 9.7* HCT 28.4*  
 Recent Labs  
  05/27/20 
0435 05/26/20 
2338 05/26/20 
1435 05/26/20 
0419 INR 1.6*  --   --  1.7* PTP 15.7*  --   --  16.6* APTT 64.9* 61.2* 61.2* 84.9* Recent Labs  
  05/28/20 
0445 05/27/20 
1612 05/27/20 
0435  137 137  
K 3.7 3.3* 3.7  105 106 CO2 25 26 27 BUN 39* 38* 41* CREA 1.99* 1.95* 1.92* * 158* 206* CA 7.6* 8.2* 8.0*  
MG 2.4 2.3 2.4 No results for input(s): CPK, CKNDX, TROIQ in the last 72 hours. No lab exists for component: CPKMB Lab Results Component Value Date/Time Cholesterol, total 92 03/03/2009 09:40 PM  
 HDL Cholesterol 44 03/03/2009 09:40 PM  
 LDL, calculated 38.4 03/03/2009 09:40 PM  
 Triglyceride <15 05/22/2020 05:13 AM  
 CHOL/HDL Ratio 2.1 03/03/2009 09:40 PM  
 
 
Recent Labs  
  05/28/20 
0445 05/27/20 
1612 05/27/20 
0435  05/26/20 
0419 *  --   --   --  219* TP 6.5  --   --   --  6.2* ALB 2.1* 2.0* 2.1*   < > 2.0*  
GLOB 4.4*  --   --   --  4.2*  
 < > = values in this interval not displayed. No results for input(s): PH, PCO2, PO2 in the last 72 hours. Medications Personally Reviewed: 
 
Current Facility-Administered Medications Medication Dose Route Frequency  insulin glargine (LANTUS) injection 40 Units  40 Units SubCUTAneous DAILY  vasopressin (VASOSTRICT) 20 Units in 0.9% sodium chloride 100 mL infusion  0-0.04 Units/min IntraVENous CONTINUOUS  
 hydrocortisone Sod Succ (PF) (SOLU-CORTEF) injection 50 mg  50 mg IntraVENous Q6H  
 vancomycin (VANCOCIN) 1,000 mg in 0.9% sodium chloride (MBP/ADV) 250 mL  1,000 mg IntraVENous Q24H  polyethylene glycol (MIRALAX) packet 17 g  17 g Per NG tube DAILY PRN  
 bicarbonate dialysis (PRISMASOL) BG K 4/Ca 2.5 5000 ml solution   Extracorporeal DIALYSIS CONTINUOUS  
 zinc oxide-cod liver oil (DESITIN) 40 % paste   Topical PRN  chlorhexidine (ORAL CARE KIT) 0.12 % mouthwash 15 mL  15 mL Oral Q12H  cefepime (MAXIPIME) 1 g in 0.9% sodium chloride (MBP/ADV) 50 mL  1 g IntraVENous Q8H  potassium chloride 20 mEq in 50 ml IVPB  20 mEq IntraVENous PRN  
 calcium chloride injection 2 g  2 g IntraVENous PRN  
 famotidine (PF) (PEPCID) 20 mg in 0.9% sodium chloride 10 mL injection  20 mg IntraVENous DAILY  acetaminophen (TYLENOL) solution 650 mg  650 mg Oral Q6H PRN  
 PHARMACY INFORMATION NOTE  1 Each Other BID  propofol (DIPRIVAN) 10 mg/mL infusion  0-50 mcg/kg/min IntraVENous TITRATE  heparin (porcine) injection 2,000 Units  2,000 Units IntraVENous PRN Or  
 heparin (porcine) injection 4,000 Units  4,000 Units IntraVENous PRN  
 heparin 25,000 units in D5W 250 ml infusion  8-25 Units/kg/hr IntraVENous TITRATE  
 NOREPINephrine (LEVOPHED) 32 mg in 5% dextrose 250 mL infusion  2-200 mcg/min IntraVENous TITRATE  sodium chloride (NS) flush 5-40 mL  5-40 mL IntraVENous Q8H  
 sodium chloride (NS) flush 5-40 mL  5-40 mL IntraVENous PRN  
 ondansetron (ZOFRAN) injection 4 mg  4 mg IntraVENous Q8H PRN  
 alcohol 62% (NOZIN) nasal  1 Ampule  1 Ampule Topical Q12H  
 insulin lispro (HUMALOG) injection   SubCUTAneous Q6H  
 glucose chewable tablet 16 g  4 Tab Oral PRN  
 dextrose (D50W) injection syrg 12.5-25 g  12.5-25 g IntraVENous PRN  
 glucagon (GLUCAGEN) injection 1 mg  1 mg IntraMUSCular PRN  
 acetaminophen (TYLENOL) tablet 650 mg  650 mg Oral Q6H PRN Or  
 acetaminophen (TYLENOL) suppository 650 mg  650 mg Rectal Q6H PRN  
 influenza vaccine 2019-20 (6 mos+)(PF) (FLUARIX/FLULAVAL/FLUZONE QUAD) injection 0.5 mL  0.5 mL IntraMUSCular PRIOR TO DISCHARGE  sodium chloride (NS) flush 5-10 mL  5-10 mL IntraVENous PRN Clary Miranda III, DO

## 2020-05-28 NOTE — PROGRESS NOTES
Nutrition Assessment: 
 
RECOMMENDATIONS:  
Continue TF as ordered Recommend adding bowel regimen if no BM in 24h ASSESSMENT:  
Chart reviewed, case discussed during CCU rounds. Pt remains intubated, needs re-estimated. He is off of propofol. TF at goal rate with no residuals. TF meets 70% kcal and 100% protein needs. He remains on CVVH. Electrolytes WNL. -171. Bili up, 12.3, unsure of etiology. Levo at 27, MAP in the 60's. Dietitians Intervention(s)/Plan(s): Continue TF (hold for MAP <65), add bowel regimen SUBJECTIVE/OBJECTIVE:  
Pt intubated Diet Order: NPO, Other (comment)(TF via OGT: Nepro @ 25mL/h + Prosource 8 x day + 50mL H2O flush q 6h (provides 1560kcals/169gPro/97gCHO/636ml) ) 
% Eaten:  No data found. Nepro  at 25 mL/hr flush with 50 mL  Q6H  via OG Tube   Residuals: 0 mL Pertinent Medications:cefepime, cleocin, pepcid, solucortef, lantus, humalog, vancomycin; Drips: levo. I/O's:+17.6L Chemistries: 
Lab Results Component Value Date/Time Sodium 136 05/28/2020 08:47 AM  
 Potassium 3.7 05/28/2020 08:47 AM  
 Chloride 105 05/28/2020 08:47 AM  
 CO2 26 05/28/2020 08:47 AM  
 Anion gap 5 05/28/2020 08:47 AM  
 Glucose 120 (H) 05/28/2020 08:47 AM  
 BUN 39 (H) 05/28/2020 08:47 AM  
 Creatinine 2.04 (H) 05/28/2020 08:47 AM  
 BUN/Creatinine ratio 19 05/28/2020 08:47 AM  
 GFR est AA 40 (L) 05/28/2020 08:47 AM  
 GFR est non-AA 33 (L) 05/28/2020 08:47 AM  
 Calcium 7.8 (L) 05/28/2020 08:47 AM  
 Albumin 2.0 (L) 05/28/2020 08:47 AM  
  
Anthropometrics: Height: 6' 3\" (190.5 cm) Weight: 124.4 kg (274 lb 4 oz)  [] standing scale    [x]bed scale    []stated   []unknown IBW (%IBW):   ( ) UBW (%UBW):   (  %) BMI: Body mass index is 34.28 kg/m². This BMI is indicative of: 
[]Underweight   []Normal   []Overweight   [x] Obesity   [] Extreme Obesity (BMI>40) Estimated Nutrition Needs (Based on): 2242 Kcals/day(PSU (MSJ 2768)) , 134 g(-178 (1.5-2gPro/kg) ) Protein Carbohydrate: At Least 130 g/day  Fluids: per MD mL/day Last BM: 5/25   []Active     []Hyperactive  [x]Hypoactive       [] Absent   BS Skin:    [] Intact   [] Incision  [] Breakdown   [] DTI   [] Tears/Excoriation/Abrasion  [x]Edema(+1 nonpitting-generalized; trace-all extremities) [] Other: Wt Readings from Last 30 Encounters:  
05/27/20 124.4 kg (274 lb 4 oz) 01/19/19 123.8 kg (273 lb) 08/04/17 129.3 kg (285 lb)  
03/20/17 107.5 kg (237 lb) 02/14/17 149.2 kg (328 lb 14.8 oz) 06/21/16 138.3 kg (305 lb)  
03/16/16 151.4 kg (333 lb 11.2 oz) 08/02/13 136.1 kg (300 lb) 10/16/10 133.8 kg (295 lb)  
07/14/10 133.8 kg (295 lb) 05/14/10 133.4 kg (294 lb) Dx of Malnutrition since admission: UTD NUTRITION DIAGNOSES:  
Problem:  Inadequate protein-energy intake Etiology: related to pt NPO 2' vent Signs/Symptoms: as evidenced by NPO + propofol meets <15% kcal and 0% protein needs. Previous dx resolving. NUTRITION INTERVENTIONS: 
  Enteral/Parenteral Nutrition: Other(Continue TF as ordered) GOAL:  
Pt will tolerate TF @ goal rate with residuals <500mL and a BM in 2-4 days NUTRITION MONITORING AND EVALUATION Previous Goal: Pt will tolerate TF advancement with residuals <500mL in 2-4 days Previous Goal Met: Yes Previous Recommendations Implemented: Yes Cultural, Mormonism, or Ethnic Dietary Needs: None LEARNING NEEDS (Diet, Food/Nutrient-Drug Interaction):  
 [x] None Identified 
 [] Identified and Education Provided/Documented 
 [] Identified and Pt declined/was not appropriate [x] Interdisciplinary Care Plan Reviewed/Documented  
 [x] Participated in Discharge Planning: UTD [x] Interdisciplinary Rounds NUTRITION RISK:  
 [x] High              [] Moderate           []  Low  []  Minimal/Uncompromised Bryon Jeffers RD, [facility] Pager [phone] Weekend Pager [phone]

## 2020-05-28 NOTE — PROGRESS NOTES
Pharmacy Automatic Renal Dosing Protocol - Antimicrobials Indication for Antimicrobials: sepsis, bacteremia COVID - POSITIVE Current Regimen of Each Antimicrobial: 
Vancomycin 1000 mg q24h Start Date ; Day # 11 of  Cefepime 1g q8 hours; started ; day # 11 of 14 Previous Antimicrobial Therapy: CTX  -  Azithromycin 500 mg every day ( - ) Clindamycin 900 mg IV q8h; started  Goal Level: VANCOMYCIN TROUGH GOAL RANGE Vancomycin Trough: 15 - 20 mcg/mL  (AUC: 400 - 600 mg/hr/Liter/day) Date Dose & Interval Measured (mcg/mL) Extrapolated (mcg/mL)  AM N/a 15.6 N/a  
 @1511 1500mg q 24h 21.3 N/A  
 1250mg q24h 22.7   
20   @ 15:22 1000 mg IV q 24h 19.9 18.76 Date & time of next level:  Significant Cultures:  
5/15: Blood cx #1: NGTD, final 
5/15: Blood cx #2: ALPHA STREPTOCOCCUS - final 
5/15: Urine cx: NG - Final 
5/15: Respiratory panel: Not detected 5/15: sars-cov-2 positive  blood cx- NGTD - final 
 
Radiology / Imaging results: (X-ray, CT scan or MRI):  
5/15: CXR - No Acute Disease  CXR IMPRESSION: Small pleural effusions and bibasilar opacities are stable on the 
left and mildly increased on the right. Paralysis, amputations, malnutrition: no 
 
Labs: 
Recent Labs  
  20 
1522 20 
0847 20 
0445  20 
0419 CREA 1.97* 2.04* 1.99*   < > 2.28* BUN 42* 39* 39*   < > 41* WBC  --  24.4*  --   --  27.2*  
 < > = values in this interval not displayed. Temp (24hrs), Av.6 °F (35.9 °C), Min:94.2 °F (34.6 °C), Max:97.8 °F (36.6 °C) Creatinine Clearance (mL/min) or Dialysis: CVVHD  
DFR 2000 mL/hr Impression/Plan: · CVVHD continues · Continue current dose of abx; appropriate for indication/renal function · Vancomycin  trough = 18.76 calculated, within desired treatment range, will continue present dose and schedule above. · No further Vancomycin level are needed unles renal function changes · Antimicrobial stop date:   14 days for cefepime and Vancomycin = 5/31/20 Pharmacy will follow daily and adjust medications as appropriate for renal function and/or serum levels. Thank you, Aleyda Collins, [facility]

## 2020-05-28 NOTE — PROGRESS NOTES
NAME: Tangela Harp :  1955 MRN:  [id number] Assessment :    Plan: 
--LAQUITA Shock Sepsis DM type 2 Systolic HF (EF 04%) COVID + Initiated CVVHD ; tolerating UF (factor 100); up ~ 30 liters--alternating 2k, 4k baths as pharmacy is low on certain dialysates-tolerating cvvh Electrolytes stable Lewis line  Note pt is still a full code. Poor prognosis Palliative following Subjective: Chief Complaint:  In order to limit the exposure risk from COVID 19 and to preserve the hospital's limited supply of PPEs, seen through ICU window. Intubated. Remains critically ill On CVVH Review of Systems:  
 
Symptom Y/N Comments  Symptom Y/N Comments Fever/Chills    Chest Pain Poor Appetite    Edema Cough    Abdominal Pain Sputum    Joint Pain SOB/GROVES    Pruritis/Rash Nausea/vomit    Tolerating PT/OT Diarrhea    Tolerating Diet Constipation    Other Could not obtain due to: See above Objective: VITALS:  
Last 24hrs VS reviewed since prior progress note. Most recent are: 
Visit Vitals BP 93/54 Pulse 71 Temp (!) 96.7 °F (35.9 °C) Resp 12 Ht 6' 3\" (1.905 m) Wt 124.4 kg (274 lb 4 oz) SpO2 100% BMI 34.28 kg/m² Intake/Output Summary (Last 24 hours) at 2020 1025 Last data filed at 2020 1000 Gross per 24 hour Intake 1887.34 ml Output 3664 ml Net -1776.66 ml Telemetry Reviewed: PHYSICAL EXAM: 
General: NAD, ett in place Le[place] Edema  -+in UE/LE Lab Data Reviewed: (see below) Medications Reviewed: (see below) PMH/SH reviewed - no change compared to H&P 
________________________________________________________________________ Care Plan discussed with: 
Patient Family RN Care Manager Consultant:     
 
  Comments >50% of visit spent in counseling and coordination of care    
 
________________________________________________________________________ Alyson Drake MD  
 
Procedures: see electronic medical records for all procedures/Xrays and details which 
were not copied into this note but were reviewed prior to creation of Plan. LABS: 
Recent Labs  
  05/28/20 
0847 05/26/20 0419 WBC 24.4* 27.2* HGB 10.8* 9.7* HCT 31.5* 28.4*  
 165 Recent Labs  
  05/28/20 
0847 05/28/20 0445 05/27/20 1612  137 137  
K 3.7 3.7 3.3*  
 105 105 CO2 26 25 26 BUN 39* 39* 38* CREA 2.04* 1.99* 1.95* * 144* 158* CA 7.8* 7.6* 8.2* MG 2.6* 2.4 2.3 PHOS 2.6 2.8 3.0 Recent Labs  
  05/28/20 
0847 05/28/20 0445 05/27/20 
1612 05/26/20 0419 AP  --  293*  --   --  219* TP  --  6.5  --   --  6.2* ALB 2.0* 2.1* 2.0*   < > 2.0*  
GLOB  --  4.4*  --   --  4.2*  
 < > = values in this interval not displayed. Recent Labs  
  05/28/20 
1268 05/27/20 
0435 05/26/20 
2338  05/26/20 0419 INR  --  1.6*  --   --  1.7* PTP  --  15.7*  --   --  16.6* APTT 60.4* 64.9* 61.2*   < > 84.9*  
 < > = values in this interval not displayed. MEDICATIONS: 
Current Facility-Administered Medications Medication Dose Route Frequency  hydrocortisone Sod Succ (PF) (SOLU-CORTEF) injection 25 mg  25 mg IntraVENous Q6H  
 insulin glargine (LANTUS) injection 40 Units  40 Units SubCUTAneous DAILY  vasopressin (VASOSTRICT) 20 Units in 0.9% sodium chloride 100 mL infusion  0-0.04 Units/min IntraVENous CONTINUOUS  
 vancomycin (VANCOCIN) 1,000 mg in 0.9% sodium chloride (MBP/ADV) 250 mL  1,000 mg IntraVENous Q24H  polyethylene glycol (MIRALAX) packet 17 g  17 g Per NG tube DAILY PRN  
 bicarbonate dialysis (PRISMASOL) BG K 4/Ca 2.5 5000 ml solution   Extracorporeal DIALYSIS CONTINUOUS  
 zinc oxide-cod liver oil (DESITIN) 40 % paste   Topical PRN  
  chlorhexidine (ORAL CARE KIT) 0.12 % mouthwash 15 mL  15 mL Oral Q12H  cefepime (MAXIPIME) 1 g in 0.9% sodium chloride (MBP/ADV) 50 mL  1 g IntraVENous Q8H  potassium chloride 20 mEq in 50 ml IVPB  20 mEq IntraVENous PRN  
 calcium chloride injection 2 g  2 g IntraVENous PRN  
 famotidine (PF) (PEPCID) 20 mg in 0.9% sodium chloride 10 mL injection  20 mg IntraVENous DAILY  acetaminophen (TYLENOL) solution 650 mg  650 mg Oral Q6H PRN  
 PHARMACY INFORMATION NOTE  1 Each Other BID  propofol (DIPRIVAN) 10 mg/mL infusion  0-50 mcg/kg/min IntraVENous TITRATE  heparin (porcine) injection 2,000 Units  2,000 Units IntraVENous PRN Or  
 heparin (porcine) injection 4,000 Units  4,000 Units IntraVENous PRN  
 heparin 25,000 units in D5W 250 ml infusion  8-25 Units/kg/hr IntraVENous TITRATE  
 NOREPINephrine (LEVOPHED) 32 mg in 5% dextrose 250 mL infusion  2-200 mcg/min IntraVENous TITRATE  sodium chloride (NS) flush 5-40 mL  5-40 mL IntraVENous Q8H  
 sodium chloride (NS) flush 5-40 mL  5-40 mL IntraVENous PRN  
 ondansetron (ZOFRAN) injection 4 mg  4 mg IntraVENous Q8H PRN  
 alcohol 62% (NOZIN) nasal  1 Ampule  1 Ampule Topical Q12H  
 insulin lispro (HUMALOG) injection   SubCUTAneous Q6H  
 glucose chewable tablet 16 g  4 Tab Oral PRN  
 dextrose (D50W) injection syrg 12.5-25 g  12.5-25 g IntraVENous PRN  
 glucagon (GLUCAGEN) injection 1 mg  1 mg IntraMUSCular PRN  
 acetaminophen (TYLENOL) tablet 650 mg  650 mg Oral Q6H PRN Or  
 acetaminophen (TYLENOL) suppository 650 mg  650 mg Rectal Q6H PRN  
 influenza vaccine 2019-20 (6 mos+)(PF) (FLUARIX/FLULAVAL/FLUZONE QUAD) injection 0.5 mL  0.5 mL IntraMUSCular PRIOR TO DISCHARGE  sodium chloride (NS) flush 5-10 mL  5-10 mL IntraVENous PRN

## 2020-05-28 NOTE — PROGRESS NOTES
0745 - Bedside verbal report received from off going shift. 0800 - Assessment complete, see summary for details. Eyes open spontaneously, some tracking noted with stimulation, questionable slight movement of fingers noted to commands (very slight), questionable mouthing words intermittently. Suctioned for thick bloody secretions. Sa02 100%. Levophed gtt infusing at 27 with SBP 's. Tube feeding accidentally disconected and infusing into bed questionable length of time, residual = 25 ml. Dr. Nely Centeno at bedside, updated on condition, orders noted. 0915 - Bath and linin change complete, tolerated well. SBP <90 with re-positioning but rebounded without intervention. Re-positioned for comfort. Will continue to closely monitor. 1119 - Report given to FRANCISCO Hopkins

## 2020-05-28 NOTE — DIABETES MGMT
1545 [facility] PROGRAM FOR DIABETES HEALTH 
 
FOLLOW-UP NOTE Presentation Luis Daniel Washington a 71 y. o. male admitted from the ER 5/15/20 with complaints of weakness. No COVID-19 symptoms or contacts noted. Fever. CXR, CT Head & CT ABd negative. Blood cultures positive for alpha streptococcus. Markedly elevated ferritin DX: [id number]. Septic shock. Multiorgan failure TX: Anti-COVID therapies. Pacer. Palliative care. CT Head next step Recent diagnostics: 
5/21/20 Hepatitis B surface Ab Reactive 5/22/20 WBC 22.9. Albumin 2.8. Bili 6.8. . . Procalcitonin 203.42. Triglyceride <15. Serum creatinine 4.73/GFR 15. CXR: Stable bibasilar opacities and pleural effusions 5/26/20 WBC 27.2. CXR: Persistent pleural effusions with bibasilar atelectasis. Atelectasis at the right lung base has increased. Ferritin 1682. CRP 5.09. Procalcitonin 55.12 
5/27/20 D-Dimer 5.97. US ABd: Negative 5/28/20 D-Dimer 4.92. Procalcitonin 32.01 
Critical care measures: 
            C Vent via ET 
            BP management via norepinephrine (levophed)  
            ABx 
            TF via NG 
            Insulin 
            Steroids             Dialysis via Selena Monte GI prophylaxis Heparin infusion 
  
Diabetes: Patient has known Type 2 diabetes, treated with Trulicity, Onglyza & Amaryl PTA (per PTA med list). Family history positive for diabetes in father.  
 
Subjective NA Objective Physical exam 
General Eyes open per nursing Vital Signs Afebrile Visit Vitals BP 93/54 Pulse 71 Temp (!) 96.7 °F (35.9 °C) Resp 12 Ht 6' 3\" (1.905 m) Wt 124.4 kg (274 lb 4 oz) SpO2 100% BMI 34.28 kg/m² Laboratory Lab Results Component Value Date/Time Hemoglobin A1c 9.8 (H) 02/09/2017 05:42 AM  
 Hemoglobin A1c, External 9.0 03/02/2016 Lab Results Component Value Date/Time LDL, calculated 38.4 03/03/2009 09:40 PM  
 
Lab Results Component Value Date/Time Creatinine 2.04 (H) 05/28/2020 08:47 AM  
 
Lab Results Component Value Date/Time Sodium 136 05/28/2020 08:47 AM  
 Potassium 3.7 05/28/2020 08:47 AM  
 Chloride 105 05/28/2020 08:47 AM  
 CO2 26 05/28/2020 08:47 AM  
 Anion gap 5 05/28/2020 08:47 AM  
 Glucose 120 (H) 05/28/2020 08:47 AM  
 BUN 39 (H) 05/28/2020 08:47 AM  
 Creatinine 2.04 (H) 05/28/2020 08:47 AM  
 BUN/Creatinine ratio 19 05/28/2020 08:47 AM  
 GFR est AA 40 (L) 05/28/2020 08:47 AM  
 GFR est non-AA 33 (L) 05/28/2020 08:47 AM  
 Calcium 7.8 (L) 05/28/2020 08:47 AM  
 Bilirubin, total 12.3 (H) 05/28/2020 04:45 AM  
 Alk. phosphatase 293 (H) 05/28/2020 04:45 AM  
 Protein, total 6.5 05/28/2020 04:45 AM  
 Albumin 2.0 (L) 05/28/2020 08:47 AM  
 Globulin 4.4 (H) 05/28/2020 04:45 AM  
 A-G Ratio 0.5 (L) 05/28/2020 04:45 AM  
 ALT (SGPT) 201 (H) 05/28/2020 04:45 AM  
 
Lab Results Component Value Date/Time ALT (SGPT) 201 (H) 05/28/2020 04:45 AM  
 
Factors affecting BG pattern Factor Dose Comments Nutrition: 
Tube feeding via NG  
97 grams/24 hrs Running at higher rate today Drugs: 
Steroids Hydrocortisone 25mg Q6 hrs Steroid dose reduced by half today Pain  BPS 3 Infection: COVID-19 Cefepime, clindamycin & vancomycin Other: CKD Dialysis Blood glucose pattern Assessment and Plan Nursing Diagnosis Risk for unstable blood glucose pattern Nursing Intervention Domain 0939 Decision-making Support Nursing Interventions Examined current inpatient diabetes control Explored factors facilitating and impeding inpatient management Evaluation This gentleman, with known Type 2 diabetes. has BGs in the 100s with the increase in his basal insulin dose yesterday. His steroid dose has been cut in half today, but his TFs are running now at higher rate (25 cc/hr). Suspect he won't require corrective insulin.  
 
Recommendations Recommend: 
 
No change in insulin approach today Billing Code(s) [x] [id number] IP subsequent hospital care - 30 minutes Josiane Rodriguez, CNS Access via R Ahmet [place]l [phone]

## 2020-05-29 NOTE — PROGRESS NOTES
Spiritual Care Assessment/Progress Note [address] 
 
 
NAME: Beau Bennett      MRN: [id number] AGE: 71 y.o. SEX: male Anabaptist Affiliation: Restorationist  
Language: [place] 5/29/2020     Total Time (in minutes): 12 Spiritual Assessment begun in [facility] 2 CRITICAL CARE 3 through conversation with: 
  
    []Patient        [] Family    [] Friend(s) Reason for Consult: Palliative Care, Family Care Spiritual beliefs: (Please include comment if needed) 
   [] Identifies with a adriana tradition:     
   [] Supported by a adriana community:        
   [] Claims no spiritual orientation:       
   [] Seeking spiritual identity:            
   [] Adheres to an individual form of spirituality:       
   [x] Not able to assess:                   
 
    
Identified resources for coping:  
   [] Prayer                           
   [] Music                  [] Guided Imagery 
   [] Family/friends                 [] Pet visits [] Devotional reading                         [] Unknown 
   [] Other Interventions offered during this visit: (See comments for more details) Patient Interventions: Initial visit Family/Friend(s): Other (comment) Plan of Care: 
 
 [] Support spiritual and/or cultural needs  
 [] Support AMD and/or advance care planning process    
 [] Support grieving process 
 [] Coordinate Rites and/or Rituals  
 [] Coordination with community clergy [] No spiritual needs identified at this time 
 [] Detailed Plan of Care below (See Comments)  [] Make referral to Music Therapy 
[] Make referral to Pet Therapy    
[] Make referral to Addiction services 
[] Make referral to [facility] 
[] Make referral to Spiritual Care Partner 
[] No future visits requested       
[x] Follow up visits as needed Comments: Pt's case discussed today with Palliative IDT.   Reached out to pt's wife by phone to offer support and explore any spiritual needs. Left return number for wife if she desired to return call, making it clear that there was no emergency. Chaplains are available for support as needed. Arti Mueller, Palliative

## 2020-05-29 NOTE — PROGRESS NOTES
Progress Note 5/29/2020 11:43 AM 
NAME: Raul Foster MRN:  [id number] Admit Diagnosis: Sepsis ([facility] [address] [A41.9] Problem List: 1. Shock; septic/cardiogenic 2. Severe sepsis 3. COVID-19 + 4. NSTEMI 5. Hyponatremia 6. Lactic acidosis 7. Acute liver injury 8. Acute on chronic systolic heart failure; Class IV 9. Acute kidney injury; anuric 10. Coagulopathy 11. Nonischemic dilated cardiomyopathy s/p remote ICD 12. Chronic atrial fibrillation 13. Sleep apnea 14. Hypertensive heart disease w/ CKD and HF Assessment/Plan:  
 
Intake/Output Summary (Last 24 hours) at 5/29/2020 1020 Last data filed at 5/29/2020 0900 Gross per 24 hour Intake 1764.93 ml Output 4170 ml Net -2405.07 ml Ricky off Vaso off Norepi @ 9 Dobut off Epi off 30% FiO2. Passed SBT. Following commands. Off sedation for a while. 1. Continue supportive care 2. Heparin gtt. 3. CVVH ongoing; tolerating full factor 4. Remains critically ill 5. Discussions ongoing w/ family daily 6. Imaging of head and neuro consult on hold for now given improvement in neuro status. 7. Dr. Kay Rondon will be rounding this wknd. 8. I will return on Monday. [x]       High complexity decision making was performed in this patient at high risk for decompensation with multiple organ involvement. Subjective:  
 
Raul Foster is intubated and sedated. Discussed with RN events overnight. Review of Systems: 
 
Symptom Y/N Comments  Symptom Y/N Comments Fever/Chills N   Chest Pain N Poor Appetite N   Edema N   
Cough N   Abdominal Pain N Sputum N   Joint Pain N   
SOB/GROVES N   Pruritis/Rash N   
Nausea/vomit N   Tolerating PT/OT Y Diarrhea N   Tolerating Diet Y Constipation N   Other Could NOT obtain due to: sedated Objective:  
  
Physical Exam: 
 
Last 24hrs VS reviewed since prior progress note. Most recent are: 
 
Visit Vitals BP (!) 107/16 Pulse 73 Temp (!) 94.7 °F (34.8 °C) Resp 17 Ht 6' 3\" (1.905 m) Wt 119.3 kg (263 lb 0.1 oz) SpO2 98% BMI 32.87 kg/m² Intake/Output Summary (Last 24 hours) at 5/29/2020 1020 Last data filed at 5/29/2020 0900 Gross per 24 hour Intake 1764.93 ml Output 4170 ml Net -2405.07 ml General Appearance: Well developed, well nourished, intubated/sedated Ears/Nose/Mouth/Throat: Hearing grossly normal. 
Neck: Supple. Chest: Lungs decreased diffusely Cardiovascular: Regular rate and rhythm, S1S2 normal, no murmur. Abdomen: Soft, non-tender, bowel sounds are active. Extremities: No edema bilaterally. Skin: Warm and dry. []         Post-cath site without hematoma, bruit, tenderness, or thrill. Distal pulses intact. PMH/ reviewed - no change compared to H&P Data Review Telemetry: paced/AF 
 
EKG:  
[x]  No new EKG for review Lab Data Personally Reviewed: 
 
Recent Labs  
  05/29/20 
0308 05/28/20 
8260 WBC 20.4* 24.4* HGB 10.4* 10.8* HCT 29.9* 31.5*  230 Recent Labs  
  05/29/20 
0308 05/28/20 
7144 05/28/20 
0445 05/27/20 
0435 INR  --   --  1.6* 1.6* PTP  --   --  15.9* 15.7* APTT 87.9* 60.4* 59.0* 64.9* Recent Labs  
  05/29/20 
0308 05/28/20 
1522 05/28/20 
9813  136 136  
K 3.7 3.8 3.7  105 105 CO2 27 28 26 BUN 41* 42* 39* CREA 2.07* 1.97* 2.04* * 158* 120* CA 7.5* 7.8* 7.8*  
MG 2.6* 2.4 2.6* No results for input(s): CPK, CKNDX, TROIQ in the last 72 hours. No lab exists for component: CPKMB Lab Results Component Value Date/Time Cholesterol, total 92 03/03/2009 09:40 PM  
 HDL Cholesterol 44 03/03/2009 09:40 PM  
 LDL, calculated 38.4 03/03/2009 09:40 PM  
 Triglyceride <15 05/22/2020 05:13 AM  
 CHOL/HDL Ratio 2.1 03/03/2009 09:40 PM  
 
 
Recent Labs  
  05/29/20 
0308 05/28/20 
1522 05/28/20 
0847 05/28/20 
0445 *  --   --  293* TP 6.6  --   --  6.5 ALB 1.9* 2.0* 2.0* 2.1*  
 GLOB 4.7*  --   --  4.4* No results for input(s): PH, PCO2, PO2 in the last 72 hours. Medications Personally Reviewed: 
 
Current Facility-Administered Medications Medication Dose Route Frequency  white petrolatum-mineral oiL (AKWA TEARS) 83-15 % ophthalmic ointment   Both Eyes Q12H  hydrocortisone Sod Succ (PF) (SOLU-CORTEF) injection 25 mg  25 mg IntraVENous Q6H  
 insulin glargine (LANTUS) injection 40 Units  40 Units SubCUTAneous DAILY  vancomycin (VANCOCIN) 1,000 mg in 0.9% sodium chloride (MBP/ADV) 250 mL  1,000 mg IntraVENous Q24H  polyethylene glycol (MIRALAX) packet 17 g  17 g Per NG tube DAILY PRN  
 bicarbonate dialysis (PRISMASOL) BG K 4/Ca 2.5 5000 ml solution   Extracorporeal DIALYSIS CONTINUOUS  
 zinc oxide-cod liver oil (DESITIN) 40 % paste   Topical PRN  chlorhexidine (ORAL CARE KIT) 0.12 % mouthwash 15 mL  15 mL Oral Q12H  cefepime (MAXIPIME) 1 g in 0.9% sodium chloride (MBP/ADV) 50 mL  1 g IntraVENous Q8H  potassium chloride 20 mEq in 50 ml IVPB  20 mEq IntraVENous PRN  
 calcium chloride injection 2 g  2 g IntraVENous PRN  
 famotidine (PF) (PEPCID) 20 mg in 0.9% sodium chloride 10 mL injection  20 mg IntraVENous DAILY  acetaminophen (TYLENOL) solution 650 mg  650 mg Oral Q6H PRN  
 PHARMACY INFORMATION NOTE  1 Each Other BID  heparin (porcine) injection 2,000 Units  2,000 Units IntraVENous PRN Or  
 heparin (porcine) injection 4,000 Units  4,000 Units IntraVENous PRN  
 heparin 25,000 units in D5W 250 ml infusion  8-25 Units/kg/hr IntraVENous TITRATE  
 NOREPINephrine (LEVOPHED) 32 mg in 5% dextrose 250 mL infusion  2-200 mcg/min IntraVENous TITRATE  sodium chloride (NS) flush 5-40 mL  5-40 mL IntraVENous Q8H  
 sodium chloride (NS) flush 5-40 mL  5-40 mL IntraVENous PRN  
 ondansetron (ZOFRAN) injection 4 mg  4 mg IntraVENous Q8H PRN  
 alcohol 62% (NOZIN) nasal  1 Ampule  1 Ampule Topical Q12H  insulin lispro (HUMALOG) injection   SubCUTAneous Q6H  
 glucose chewable tablet 16 g  4 Tab Oral PRN  
 dextrose (D50W) injection syrg 12.5-25 g  12.5-25 g IntraVENous PRN  
 glucagon (GLUCAGEN) injection 1 mg  1 mg IntraMUSCular PRN  
 acetaminophen (TYLENOL) tablet 650 mg  650 mg Oral Q6H PRN Or  
 acetaminophen (TYLENOL) suppository 650 mg  650 mg Rectal Q6H PRN  
 influenza vaccine 2019-20 (6 mos+)(PF) (FLUARIX/FLULAVAL/FLUZONE QUAD) injection 0.5 mL  0.5 mL IntraMUSCular PRIOR TO DISCHARGE  sodium chloride (NS) flush 5-10 mL  5-10 mL IntraVENous PRN Elinor Pascual III, DO

## 2020-05-29 NOTE — DIALYSIS
[facility]       [phone] Orders Mode: CVVHD Prismasol Bath: 4K/2.5Ca Blood Flow Rate: 200ml/min Prismasol Dose: 2000ml/hr Factor: 75ml/hr Metrics BP/HR: 88/68 76 Access Pressure: -196 Filter Pressure: 105 Return Pressure: 62 TMP: 29 PD: 48 Dialysate Flow Rate: 2,000ml/hr Comments / Plan:  
Patient, orders, line and consent verified. Labs, notes and code status reviewed. [id number] filter running well with no indication for change at this time. RIJ non-tunneled CVC CDI with transparent dressing and biopatch in place. Lines visible/secure with blood warmer attached to the return line and set to 37*C. Education and Pre/post with primary RN. Rounding completed from outside of the room with the help of the primary RN due to covid+ status/reduction in PPE usage.

## 2020-05-29 NOTE — PROGRESS NOTES
05/29/20 7586 05/29/20 8570 05/29/20 4961 ABCDEF Bundle SBT Safety Screen Passed Yes  --   --   
SBT Trial Passed  --   --   -- Weaning Parameters Spontaneous Breathing Trial Complete  --   --   -- Resp Rate Observed  --  12 10 Ve  --  7.7 8.2 VT  --  180 259 RSBI  --  15 13  
 
 05/29/20 0557 05/29/20 8359 ABCDEF Bundle SBT Safety Screen Passed  --   --   
SBT Trial Passed  --  Yes Weaning Parameters Spontaneous Breathing Trial Complete  --  Yes Resp Rate Observed 13 11 Ve 7.6 7.3  495 RSBI 15 18

## 2020-05-29 NOTE — INTERDISCIPLINARY ROUNDS
Critical care interdisciplinary rounds held on 05/29/2020. Following members present, Pharmacy, Diabetes Treatment, Case Management, Respiratory Therapy, Physical Therapy Palliative Care and Nutrition. Led by PINEDA Sy RN and Dr. Trini Vides. Plan of care discussed. See clinical pathway for plan of care and interventions and desired outcomes.

## 2020-05-29 NOTE — PROGRESS NOTES
0715 Bedside and Verbal shift change report given to Miracle Ricardo (oncoming nurse) by Mendel Benton (offgoing nurse). Report included the following information SBAR, Kardex, Procedure Summary, Intake/Output, MAR, Recent Results and Cardiac Rhythm paced. 0800 pt assessed per flowsheet. Continue CVVH Wean Levo as tolerated 1000 pt awake and following commands. IDR complete with Dr Jaimie Cottrell and team. Since pt passed SBT, will plan to extubate in the am if pt remains stable 
1200 pt reassessed per flowsheet. 1400 
1525 CVVHD completed, blood returned after bag completed, heparin to pack ports; 1.1 ml to red, 1.4 ml to blue 1600 pt reassessed per flowsheet 1800 pt remains alert and responsive, Zoom call made to family previously 1900 Bedside and Verbal shift change report given to Jhon Quiroga (oncoming nurse) by Miracle Ricardo (offgoing nurse). Report included the following information SBAR, Kardex, ED Summary, Intake/Output, MAR, Recent Results and Cardiac Rhythm paced.

## 2020-05-29 NOTE — PROGRESS NOTES
Care Management: 
 
DEREK PLAN:   TBD , anticipated Acute rehab versus home with family and HH.  
  
COVID positive PNA. Patient remains vented but showing signs of improvement . Palliative has been involved in this case. I spoke with wife this afternoon and she is very excited he is showing signs of improvement. 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 He was independent with ADL's PTA including driving. Home is two stories but they do have a lift chair on the steps. He is active with his PCP . His cardiologist is Dr Shirin Givens. 
  
CM is following to assist w/ appropriate dc planning.  
 
Orquidea Sawyer RN [facility] [id number]

## 2020-05-29 NOTE — PROGRESS NOTES
Hospitalist Progress Note NAME: Hola Weber :  1955 MRN:  [id number] Assessment / Plan: 
Septic/ cardiogenic shock 2/2 COVID Positive with viral prodrome Hyponatremia Bacteremia Multiorgan failure Acute renal failure, initiated CRRT , Lewis  Acute live injury Coagulopathy  
-remains critically ill, remain on vent and pressors but showing some signs of improvement since  Blood cx 5/15 alpha streptococcus, repeat blood cx obtained and NGTd Liver Enzymes elevated, trending down; T.bili remain high  
ferritin 1751 ( remain high) Persistent leukocytosis US RUQ: Minimal gallbladder sludge. Slightly prominent common bile duct of uncertain 
significance. - renal considering switching from CVVH to HD  
- on stress dose steroids Heparin gtt  
empiric abx (vanco,cefepime and clindamycin), last dose   
-palliative on board 
-has been enrolled in convalescent plasma study  all consultants help was greatly appreciated Acute on chronic systolic HF EF 75% Nonischemic dilated cardiomyopathy s/p remote ICD Chronic atrial fibrillation Sleep apnea Hypertensive heart disease w/ CKD and HF 
- initially on 5 pressors, down to 1 now - cardiology help appreciated: cont supportive care  
-Eliquis on hold for now DM2 
- BS stable  
-cont insulin w lantus, SSI 
  
  
 
Code Status: Full Surrogate Decision Maker: wife DVT Prophylaxis: heparin Disposition: remain critically ill Subjective: Chief Complaint / Reason for Physician Visit: following sepsis / respiratory failure/ covid Remain on vent and pressors Showing some signs of improvement: more alert and following commands today. On trial of SBT this am 
Pt was seen through CCU room window and discussed with nursing. Did not enter room to avoid exposure/transmission of COVID-19 Discussed with RN events overnight. Review of Systems: 
Symptom Y/N Comments  Symptom Y/N Comments Fever/Chills    Chest Pain Poor Appetite    Edema Cough    Abdominal Pain Sputum    Joint Pain SOB/GROVES    Pruritis/Rash Nausea/vomit    Tolerating PT/OT Diarrhea    Tolerating Diet Constipation    Other Could NOT obtain due to: Intubated and sedated Objective: VITALS:  
Last 24hrs VS reviewed since prior progress note. Most recent are: 
Patient Vitals for the past 24 hrs: 
 Temp Pulse Resp BP SpO2  
05/29/20 1536   20  98 % 05/29/20 1530    94/57 98 % 05/29/20 1515  71  112/40 99 % 05/29/20 1500   12 103/57 99 % 05/29/20 1445  76 11 90/72 98 % 05/29/20 1430  72 14 100/45 98 % 05/29/20 1415   14 (!) 88/54 98 % 05/29/20 1400  82 9 98/55 98 % 05/29/20 1345  81 12 (!) 86/51 98 % 05/29/20 1330  77 12 93/53 98 % 05/29/20 1315  90 16 95/62 99 % 05/29/20 1300  79 12 97/57 98 % 05/29/20 1245  74 14 (!) 84/57 99 % 05/29/20 1230  74 12 101/66 98 % 05/29/20 1215  80 21  98 % 05/29/20 1200 (!) 96.6 °F (35.9 °C) 72 12 (!) 88/68 98 % 05/29/20 1145  78 10 107/55 98 % 05/29/20 1130  75 12 96/55 98 % 05/29/20 1115  74 17 96/48 99 % 05/29/20 1111  72 24  99 % 05/29/20 1100  75 23 103/47 99 % 05/29/20 1045  74 20 (!) 88/51 99 % 05/29/20 1030  72 18 95/62 98 % 05/29/20 1020  74 10 (!) 86/50 98 % 05/29/20 1000  74  98/48   
05/29/20 0920 (!) 94.7 °F (34.8 °C) 73 17 (!) 107/16   
05/29/20 0916 (!) 94.8 °F (34.9 °C) 73 14 (!) 71/46   
05/29/20 0905 (!) 94.7 °F (34.8 °C) 73 14 (!) 85/57 98 % 05/29/20 0900 (!) 94.7 °F (34.8 °C) 76 17 (!) 89/59 95 % 05/29/20 0848  79 21  100 % 05/29/20 0825  75 10  100 % 05/29/20 0800 96.8 °F (36 °C) 72 8 100/61 100 % 05/29/20 0745 (!) 94.4 °F (34.7 °C) 72 11  100 % 05/29/20 0730 (!) 94.3 °F (34.6 °C) 73 9  100 % 05/29/20 0715 (!) 94.4 °F (34.7 °C) 70 10  100 % 05/29/20 0700 (!) 94.5 °F (34.7 °C) 73 11 92/57 98 % 05/29/20 0634  81 11  100 % 05/29/20 0600 (!) 95.1 °F (35.1 °C) 73 11 106/59   
05/29/20 0500 (!) 95 °F (35 °C) 73 18 105/55   
05/29/20 0400 97.5 °F (36.4 °C) 76 27 94/53 99 % 05/29/20 0313  74 16  100 % 05/29/20 0300 (!) 95.4 °F (35.2 °C) 72 19 101/59   
05/29/20 0200 97.9 °F (36.6 °C) 70 (!) 4 96/57 100 % 05/29/20 0112 (!) 95.6 °F (35.3 °C) 76 (!) 0 94/56 99 % 05/29/20 0104 (!) 95.7 °F (35.4 °C) 76 18 (!) 87/49 98 % 05/29/20 0103 (!) 96 °F (35.6 °C) 72 19 (!) 78/51 100 % 05/29/20 0100 (!) 95.7 °F (35.4 °C) 71 18 (!) 81/50   
05/29/20 0000 97.7 °F (36.5 °C) 73 21 109/60   
05/28/20 2318  75 18  100 % 05/28/20 2300 98.7 °F (37.1 °C) 73 19 107/59 100 % 05/28/20 2200 96.9 °F (36.1 °C) 71 19 122/51 99 % 05/28/20 2100 97.1 °F (36.2 °C) 75 19 110/52 99 % 05/28/20 2019  73 19  98 % 05/28/20 2004 97.2 °F (36.2 °C) 75 18 113/49 99 % 05/28/20 2000 99.1 °F (37.3 °C) 78 18 92/47 98 % 05/28/20 1900 97.4 °F (36.3 °C) 74 18 109/56 99 % 05/28/20 1800 97.5 °F (36.4 °C) 73 15 104/57 99 % 05/28/20 1700 97.5 °F (36.4 °C) 76 17 107/55 99 % Intake/Output Summary (Last 24 hours) at 5/29/2020 1605 Last data filed at 5/29/2020 1400 Gross per 24 hour Intake 1338.43 ml Output 3632 ml Net -2293.57 ml PHYSICAL EXAM: 
GEN: AA male, NAD On Ventilator Intubated and sedated 
anasarca noticed, improving edema 
jaudiced Seen through ICU window to minimize exposure/transmission to/of COVID-19 Reviewed most current lab test results and cultures  YES Reviewed most current radiology test results   YES Review and summation of old records today    NO Reviewed patient's current orders and MAR    YES 
PMH/SH reviewed - no change compared to H&P 
________________________________________________________________________ Care Plan discussed with: 
  Comments Patient Family RN x Care Manager Consultant  x Pulmonary                   Multidiciplinary team rounds were held today with case manager, nursing, pharmacist and clinical coordinator. Patient's plan of care was discussed; medications were reviewed and discharge planning was addressed. ________________________________________________________________________ Total NON critical care TIME:  25  Minutes Total CRITICAL CARE TIME Spent:   Minutes non procedure based Comments >50% of visit spent in counseling and coordination of care    
________________________________________________________________________ Bull Díaz MD  
 
Procedures: see electronic medical records for all procedures/Xrays and details which were not copied into this note but were reviewed prior to creation of Plan. LABS: 
I reviewed today's most current labs and imaging studies. Pertinent labs include: 
Recent Labs  
  05/29/20 
0308 05/28/20 
8936 WBC 20.4* 24.4* HGB 10.4* 10.8* HCT 29.9* 31.5*  230 Recent Labs  
  05/29/20 
0308 05/28/20 
1522 05/28/20 
7743 05/28/20 
0445  05/27/20 
0435  136 136 137   < > 137  
K 3.7 3.8 3.7 3.7   < > 3.7  105 105 105   < > 106 CO2 27 28 26 25   < > 27 * 158* 120* 144*   < > 206* BUN 41* 42* 39* 39*   < > 41* CREA 2.07* 1.97* 2.04* 1.99*   < > 1.92* CA 7.5* 7.8* 7.8* 7.6*   < > 8.0*  
MG 2.6* 2.4 2.6* 2.4   < > 2.4 PHOS 2.8 2.9 2.6 2.8   < > 3.6 ALB 1.9* 2.0* 2.0* 2.1*   < > 2.1* TBILI 12.8*  --   --  12.3*  --   --   
*  --   --  201*  --   --   
INR  --   --   --  1.6*  --  1.6*  
 < > = values in this interval not displayed.   
 
 
Signed: Bull Díaz MD

## 2020-05-29 NOTE — DIABETES MGMT
1545 [facility] 
 
FOLLOW-UP NOTE Presentation Rubio Parra a 71 y. o. male admitted from the ER 5/15/20 with complaints of weakness. No COVID-19 symptoms or contacts noted. Fever. CXR, CT Head & CT ABd negative. Blood cultures positive for alpha streptococcus. Markedly elevated ferritin DX: [id number]. Septic shock. Multiorgan failure TX: Anti-COVID therapies. Pacer. Palliative care. Recent diagnostics: 
5/21/20 Hepatitis B surface Ab Reactive 5/22/20 WBC 22.9. Albumin 2.8. Bili 6.8. . . Procalcitonin 203.42. Triglyceride <15. Serum creatinine 4.73/GFR 15. CXR: Stable bibasilar opacities and pleural effusions 5/26/20 WBC 27.2. CXR: Persistent pleural effusions with bibasilar atelectasis. Atelectasis at the right lung base has increased. Ferritin 1682. CRP 5.09. Procalcitonin 55.12 
5/27/20 D-Dimer 5.97. US ABd: Negative 5/28/20 D-Dimer 4.92. Procalcitonin 32.01. CXR: Presence of bibasilar hazy density (left greater than right) possibly representing developing infiltration. Improved aeration of the right lung base 5/29/20 Ferritin 1606. CRP 3.38. D-Dimer 5. 13. Blood cultures without growth Critical care measures: 
            AC Vent with PEEP via ET (Off sedation) 
            BP management via norepinephrine (levophed)  
            ABx             TF via NG 
            Insulin 
            Steroids             Dialysis via [facility] 
            GI prophylaxis Heparin infusion 
  
Diabetes: Patient has known Type 2 diabetes, treated with Trulicity, Onglyza & Amaryl PTA (per PTA med list). Family history positive for diabetes in father.  
 
Subjective COVID-19 precautions remain Objective Physical exam 
General Alert, oriented and following commands per nursing. Remains intubated but is off sedation. Vital Signs Afebrile. AFib Visit Vitals /61 Pulse 79 Temp (!) 94.4 °F (34.7 °C) Resp 21  
 Ht 6' 3\" (1.905 m) Wt 119.3 kg (263 lb 0.1 oz) SpO2 100% BMI 32.87 kg/m² Laboratory Lab Results Component Value Date/Time Hemoglobin A1c 9.8 (H) 02/09/2017 05:42 AM  
 Hemoglobin A1c, External 9.0 03/02/2016 Lab Results Component Value Date/Time LDL, calculated 38.4 03/03/2009 09:40 PM  
 
Lab Results Component Value Date/Time Creatinine 2.07 (H) 05/29/2020 03:08 AM  
 
Lab Results Component Value Date/Time Sodium 136 05/29/2020 03:08 AM  
 Potassium 3.7 05/29/2020 03:08 AM  
 Chloride 105 05/29/2020 03:08 AM  
 CO2 27 05/29/2020 03:08 AM  
 Anion gap 4 (L) 05/29/2020 03:08 AM  
 Glucose 192 (H) 05/29/2020 03:08 AM  
 BUN 41 (H) 05/29/2020 03:08 AM  
 Creatinine 2.07 (H) 05/29/2020 03:08 AM  
 BUN/Creatinine ratio 20 05/29/2020 03:08 AM  
 GFR est AA 39 (L) 05/29/2020 03:08 AM  
 GFR est non-AA 32 (L) 05/29/2020 03:08 AM  
 Calcium 7.5 (L) 05/29/2020 03:08 AM  
 Bilirubin, total 12.8 (H) 05/29/2020 03:08 AM  
 Alk. phosphatase 293 (H) 05/29/2020 03:08 AM  
 Protein, total 6.6 05/29/2020 03:08 AM  
 Albumin 1.9 (L) 05/29/2020 03:08 AM  
 Globulin 4.7 (H) 05/29/2020 03:08 AM  
 A-G Ratio 0.4 (L) 05/29/2020 03:08 AM  
 ALT (SGPT) 166 (H) 05/29/2020 03:08 AM  
 
Lab Results Component Value Date/Time ALT (SGPT) 166 (H) 05/29/2020 03:08 AM  
 
Factors affecting BG pattern Factor Dose Comments Nutrition: 
Tube feeding via NG  
97 grams/24 hrs Running at 25 cc/hr Drugs: 
Steroids Hydrocortisone 25 mg Q6 hrs Infection: COVID-19  ABx WBC 20.4. Ferritin 1606. CRP 3.38. D-Dimer 5. 13. Bl culture negative Other: CKD Dialysis Blood glucose pattern Assessment and Plan Nursing Diagnosis Risk for unstable blood glucose pattern Nursing Intervention Domain 1043 Decision-making Support Nursing Interventions Examined current inpatient diabetes control Explored factors facilitating and impeding inpatient management Evaluation This gentleman with known Type 2 diabetes has achieved inpatient BG targets. With the reduction in steroid dosing, BG readings are coming down into the mid-100s on basal insulin at 0.3 units/kg/D dosing with abit of corrective insulin. The plan is to continue steroid. Hence, would recommend continuing current course of action. Recommendations Recommend: 
 
Continuing current insulin strategy Billing Code(s) [x] [id number] IP subsequent hospital care - 30 minutes William June, CNS Access via R [place] [phone]

## 2020-05-30 NOTE — PROGRESS NOTES
[facility] INTENSIVIST Consult Service Note Pulmonary, Critical Care, and Sleep Medicine Name: Bharat Mcintosh MRN: [id number] : 1955 Hospital: [place] [facility] Date: 2020  Admission date: 5/15/2020 Hospital Day: 12 Subjective/Interval History:  
 
20: off sedation but only grimaces to noxious stimuli. Remains critically ill on mechanical ventilation/pressors 20: remains critically ill on mechanical ventilation/pressors. Off sedation for about 48 hours. Minimal response thus far.  
20: no events. Remains intubated and on pressors. Followed a few commands intermittently overnight per nursing. No data available as EMR has been down until a few minutes ago. 20: more alert and following commands today. Still very weak. Actually doing well on SBT. 
- Intubated, off sedation. Bloody secretions through ETT and NGT per RN. On heparin drip for cardiac issues. Increased pressor requirement today. Increased secretions- SBT held. Weak IMPRESSION:  
1. Acute hypoxic respiratory failure, intubated  2. Profound septic shock on high level of pressors. 3. COVID 19 pneumonia 4. Alpha strep septicemia- elevated Procalcitonin > 80 
5. Chronic combined systolic/diastolic heart failure 6. Hypertensive heart disease with CHF and CKD 7. LAQUITA/CKD, CVVH to start 8. Chronic anticoagulation at home- eliquis 9. Diabetes mellitus type 2 uncontrolled 10. Hyperlipidemia 11. 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 Chronic atrial fibrillation 13. Status post SJM BIV-ICD implant in 2016 
14. Sleep apnea, undiagnosed/untreated 15. Obesity with recent weight gain RECOMMENDATIONS/PLAN:  
1. Ventilator support - doing quite well on SBT but still quite weak. Hopefully can be extubated in next day or two as alertness hopefully continues to improve 2. Secretion management- add scopolamine 3. Monitor bilirubin - US unremarkable, ?hemolysis though hemoglobin stable and fibrinogen normal, consider HIDA scan 4. Continue pressors, wean as able 5. Holding sedation-- no indication of non-verbal pain per RN 
6. Continue antibiotics 7. Stress dose steroids- increase dose again to see if it helps with weaning pressors 8. RRT per nephrology 9. Heparin drip 10. Cardiology following 11. Glycemic monitoring and control 12. Replete electrolytes PRN 13. DVT, SUP prophylaxis- changed to protonix in view in GI bleed on heparin 14. Remains critically ill, at high risk for decline. CCT 35'. D/W RN Subjective/Initial History:  
I have reviewed the flowsheet and previous days notes. Seen earlier today on rounds. I was asked by Aide Kahn MD to see Lang Degroot a 71 y.o.  male  in consultation for a chief complaint of shock. Excerpts from admission 5/15/2020 and consult notes reviewed as follows:  
 
\"Mr. Neisha Camara is a 57 y.o. morbidly obese male with Chronic nonischemic dilated cardiomyopathy EF 29%, Chronic systolic congestive heart failure class, H/o atrial fibrillation, On chronic warfarin, Iatrogenic left bundle branch block with predominant RV pacing, 64-70%, Hypertension, Hyperlipidemia and diabetes [facility] [address] [facility] C/O Worsening exertional shortness of breath and around 20 pounds weight gain in last 3 weeks. \" 
 
Pt now in CCU. Poor historian. On room air. No fever. No cough. No diarrhea. Not very active. Saw PCP three weeks ago. Some edema. [facility] [address] [facility] with decompensation. Patient PCP: Nathan Mora MD 
PMH:  has a past medical history of A-fib ([facility] [address] Arrhythmia, Diabetes ([facility] [address] Endocrine disease, Hypertension, and Irregular heart beat. He also has no past medical history of Difficult intubation, Malignant hyperthermia due to anesthesia, Nausea & vomiting, or Pseudocholinesterase deficiency. PSH:   has a past surgical history that includes pr cardiac surg procedure unlist; insert emergency endotrach airway (5/18/2020); and ir insert non tunl cvc over 5 yrs (5/18/2020). FHX: family history includes Diabetes in his father; Heart Disease in his father and mother. SHX:  reports that he quit smoking about 26 years ago. He has never used smokeless tobacco. He reports that he does not drink alcohol or use drugs. ROS:A comprehensive review of systems was negative except for that written in the HPI. No Known Allergies MEDS:  
Current Facility-Administered Medications Medication  hydrocortisone Sod Succ (PF) (SOLU-CORTEF) injection 50 mg  
 pantoprazole (PROTONIX) 40 mg in 0.9% sodium chloride 10 mL injection  scopolamine (TRANSDERM-SCOP) 1 mg over 3 days 1 Patch  white petrolatum-mineral oiL (AKWA TEARS) 83-15 % ophthalmic ointment  insulin glargine (LANTUS) injection 40 Units  vancomycin (VANCOCIN) 1,000 mg in 0.9% sodium chloride (MBP/ADV) 250 mL  polyethylene glycol (MIRALAX) packet 17 g  
 zinc oxide-cod liver oil (DESITIN) 40 % paste  chlorhexidine (ORAL CARE KIT) 0.12 % mouthwash 15 mL  cefepime (MAXIPIME) 1 g in 0.9% sodium chloride (MBP/ADV) 50 mL  potassium chloride 20 mEq in 50 ml IVPB  calcium chloride injection 2 g  
 acetaminophen (TYLENOL) solution 650 mg  PHARMACY INFORMATION NOTE  heparin (porcine) injection 2,000 Units Or  heparin (porcine) injection 4,000 Units  heparin 25,000 units in D5W 250 ml infusion  NOREPINephrine (LEVOPHED) 32 mg in 5% dextrose 250 mL infusion  sodium chloride (NS) flush 5-40 mL  sodium chloride (NS) flush 5-40 mL  ondansetron (ZOFRAN) injection 4 mg  alcohol 62% (NOZIN) nasal  1 Ampule  insulin lispro (HUMALOG) injection  glucose chewable tablet 16 g  
 dextrose (D50W) injection syrg 12.5-25 g  
 glucagon (GLUCAGEN) injection 1 mg  acetaminophen (TYLENOL) tablet 650 mg  
 Or  
 acetaminophen (TYLENOL) suppository 650 mg  
 influenza vaccine 2019-20 (6 mos+)(PF) (FLUARIX/FLULAVAL/FLUZONE QUAD) injection 0.5 mL  sodium chloride (NS) flush 5-10 mL Current Facility-Administered Medications:  
  hydrocortisone Sod Succ (PF) (SOLU-CORTEF) injection 50 mg, 50 mg, IntraVENous, Q8H **AND** [DISCONTINUED] insulin regular (NOVOLIN R, HUMULIN R) injection 6 Units, 6 Units, SubCUTAneous, Q6H, Jorge Najera MD, Stopped at 05/21/20 1800   pantoprazole (PROTONIX) 40 mg in 0.9% sodium chloride 10 mL injection, 40 mg, IntraVENous, Q12H, Karis Arrieta MD 
  scopolamine (TRANSDERM-SCOP) 1 mg over 3 days 1 Patch, 1 Patch, TransDERmal, Q72H, Foreign Mack MD 
  white petrolatum-mineral oiL (AKWA TEARS) 83-15 % ophthalmic ointment, , Both Eyes, Q12H, Aida Franco MD 
  insulin glargine (LANTUS) injection 40 Units, 40 Units, SubCUTAneous, DAILY, Felicia Lmoax MD, 40 Units at 05/30/20 1236 
  vancomycin (VANCOCIN) 1,000 mg in 0.9% sodium chloride (MBP/ADV) 250 mL, 1,000 mg, IntraVENous, Q24H, Kayla Hagan MD, Last Rate: 125 mL/hr at 05/29/20 1737, 1,000 mg at 05/29/20 1737   polyethylene glycol (MIRALAX) packet 17 g, 17 g, Per NG tube, DAILY PRN, Zina Reed MD, 17 g at 05/24/20 1703   zinc oxide-cod liver oil (DESITIN) 40 % paste, , Topical, PRN, Zina Reed MD 
  chlorhexidine (ORAL CARE KIT) 0.12 % mouthwash 15 mL, 15 mL, Oral, Q12H, Marci Gustafson DO, 15 mL at 05/30/20 [facility]   cefepime (MAXIPIME) 1 g in 0.9% sodium chloride (MBP/ADV) 50 mL, 1 g, IntraVENous, Q8H, Kayla Hagan MD, Last Rate: 50 mL/hr at 05/30/20 1202, 1 g at 05/30/20 1202   potassium chloride 20 mEq in 50 ml IVPB, 20 mEq, IntraVENous, PRN, Sanjuanita Burris MD 
  calcium chloride injection 2 g, 2 g, IntraVENous, PRN, Darrin Montenegro MD 
  acetaminophen (TYLENOL) solution 650 mg, 650 mg, Oral, Q6H PRN, Kavitha Irwin MD, 650 mg at 05/20/20 2130   PHARMACY INFORMATION NOTE, 1 Each, Other, BID, Marci Gustafson, DO, 1 Each at 05/30/20 0900 
  heparin (porcine) injection 2,000 Units, 2,000 Units, IntraVENous, PRN, 2,000 Units at 05/30/20 0048 **OR** heparin (porcine) injection 4,000 Units, 4,000 Units, IntraVENous, PRN, Marci Gustafson, DO, 2,000 Units at 05/19/20 0130 
  heparin 25,000 units in D5W 250 ml infusion, 8-25 Units/kg/hr, IntraVENous, TITRATE, Marci Gustafson, DO, Last Rate: 9.7 mL/hr at 05/30/20 0049, 8 Units/kg/hr at 05/30/20 0049 
  NOREPINephrine (LEVOPHED) 32 mg in 5% dextrose 250 mL infusion, 2-200 mcg/min, IntraVENous, TITRATE, Marci Gustafson, DO, Last Rate: 7.5 mL/hr at 05/30/20 0830, 16 mcg/min at 05/30/20 0830 
  sodium chloride (NS) flush 5-40 mL, 5-40 mL, IntraVENous, Q8H, Arsh Andres MD, 10 mL at 05/30/20 0621   sodium chloride (NS) flush 5-40 mL, 5-40 mL, IntraVENous, PRN, Arsh Andres MD, 40 mL at 05/30/20 1203   ondansetron (ZOFRAN) injection 4 mg, 4 mg, IntraVENous, Q8H PRN, Arsh Andres MD, 4 mg at 05/16/20 0137 
  alcohol 62% (NOZIN) nasal  1 Ampule, 1 Ampule, Topical, Q12H, Arsh Andres MD, 1 Ampule at 05/30/20 2289   insulin lispro (HUMALOG) injection, , SubCUTAneous, Q6H, Colleen HILLS MD, 2 Units at 05/30/20 1236 
  glucose chewable tablet 16 g, 4 Tab, Oral, PRN, Florentino Romero MD 
  dextrose (D50W) injection syrg 12.5-25 g, 12.5-25 g, IntraVENous, PRN, Colleen HILLS MD, 12.5 g at 05/22/20 0112 
  glucagon (GLUCAGEN) injection 1 mg, 1 mg, IntraMUSCular, PRN, Florentino Romero MD 
  acetaminophen (TYLENOL) tablet 650 mg, 650 mg, Oral, Q6H PRN, 650 mg at 05/20/20 0808 **OR** acetaminophen (TYLENOL) suppository 650 mg, 650 mg, Rectal, Q6H PRN, Florentino Romero MD, 650 mg at 05/20/20 1449   influenza vaccine 2019-20 (6 mos+)(PF) (FLUARIX/FLULAVAL/FLUZONE QUAD) injection 0.5 mL, 0.5 mL, IntraMUSCular, PRIOR TO DISCHARGE, Chanel Quiñonez, DO 
   sodium chloride (NS) flush 5-10 mL, 5-10 mL, IntraVENous, PRN, JoaquimCortney riddle MD 
 
 
Objective:  
 
Vital Signs: Telemetry:    AFIB Intake/Output:  
Visit Vitals BP 94/51 Pulse 86 Temp 98.1 °F (36.7 °C) Resp 16 Ht 6' 3\" (1.905 m) Wt 119.7 kg (263 lb 14.3 oz) SpO2 98% BMI 32.98 kg/m² Temp (24hrs), Av.2 °F (36.8 °C), Min:97.8 °F (36.6 °C), Max:98.8 °F (37.1 °C) O2 Device: Endotracheal tube, Ventilator O2 Flow Rate (L/min): 60 l/min Body mass index is 32.98 kg/m². Wt Readings from Last 4 Encounters:  
20 119.7 kg (263 lb 14.3 oz) 19 123.8 kg (273 lb) 17 129.3 kg (285 lb)  
17 107.5 kg (237 lb) Intake/Output Summary (Last 24 hours) at 2020 1320 Last data filed at 2020 0700 Gross per 24 hour Intake 733.85 ml Output 100 ml Net 633.85 ml Last shift:      No intake/output data recorded. Last 3 shifts:  1901 -  0700 In: 1797.3 [I.V.:1222.3] Out: 7823 Hemodynamics:   
CO:   
CI:   
CVP: CVP (mmHg): 8 mmHg (20 1030) SVR:   PAP Systolic:   
PAP Diastolic:   
PVR:   
EO32:    
 
Ventilator Settings:     
Mode Rate TV Press PEEP FiO2 PIP Min. Vent Assist control    500 ml 6 cm H2O  6 cm H20 30 %  42 cm H2O  8.44 l/min Physical Exam: 
 
General: intubated, lethargic but more alert HEENT: NCAT, poor dentition, lips and mucosa dry Eyes: anicteric; conjunctiva clear Neck: no nodes, no cuff leak, no accessory MM use. Chest: no deformity,  
Cardiac: regular; no murmur Lungs: no distress, intubated Abd: soft, NT, hypoactive BS Ext: no edema; no joint swelling; No clubbing Neuro: no localizing findings Data:  
 
Current Facility-Administered Medications Medication Dose Route Frequency  hydrocortisone Sod Succ (PF) (SOLU-CORTEF) injection 50 mg  50 mg IntraVENous Q8H  
 pantoprazole (PROTONIX) 40 mg in 0.9% sodium chloride 10 mL injection  40 mg IntraVENous Q12H  scopolamine (TRANSDERM-SCOP) 1 mg over 3 days 1 Patch  1 Patch TransDERmal Q72H  white petrolatum-mineral oiL (AKWA TEARS) 83-15 % ophthalmic ointment   Both Eyes Q12H  
 insulin glargine (LANTUS) injection 40 Units  40 Units SubCUTAneous DAILY  vancomycin (VANCOCIN) 1,000 mg in 0.9% sodium chloride (MBP/ADV) 250 mL  1,000 mg IntraVENous Q24H  chlorhexidine (ORAL CARE KIT) 0.12 % mouthwash 15 mL  15 mL Oral Q12H  cefepime (MAXIPIME) 1 g in 0.9% sodium chloride (MBP/ADV) 50 mL  1 g IntraVENous Q8H  
 PHARMACY INFORMATION NOTE  1 Each Other BID  heparin 25,000 units in D5W 250 ml infusion  8-25 Units/kg/hr IntraVENous TITRATE  
 NOREPINephrine (LEVOPHED) 32 mg in 5% dextrose 250 mL infusion  2-200 mcg/min IntraVENous TITRATE  sodium chloride (NS) flush 5-40 mL  5-40 mL IntraVENous Q8H  
 alcohol 62% (NOZIN) nasal  1 Ampule  1 Ampule Topical Q12H  
 insulin lispro (HUMALOG) injection   SubCUTAneous Q6H  
 influenza vaccine 2019-20 (6 mos+)(PF) (FLUARIX/FLULAVAL/FLUZONE QUAD) injection 0.5 mL  0.5 mL IntraMUSCular PRIOR TO DISCHARGE Labs: 
Recent Labs 05/30/20 
3872 05/29/20 
0308 05/28/20 
6584 WBC 20.5* 20.4* 24.4* HGB 9.9* 10.4* 10.8* HCT 28.5* 29.9* 31.5*  192 230 Recent Labs 05/30/20 
8447 05/29/20 
0308 05/28/20 
1522  05/28/20 
0445  136 136   < > 137  
K 3.9 3.7 3.8   < > 3.7  105 105   < > 105 CO2 24 27 28   < > 25 * 192* 158*   < > 144* BUN 58* 41* 42*   < > 39* CREA 2.76* 2.07* 1.97*   < > 1.99* CA 7.7* 7.5* 7.8*   < > 7.6*  
MG 2.6* 2.6* 2.4   < > 2.4 PHOS 3.6 2.8 2.9   < > 2.8 ALB 1.8* 1.9* 2.0*   < > 2.1* TBILI 13.0* 12.8*  --   --  12.3* * 166*  --   --  201* INR  --   --   --   --  1.6*  
 < > = values in this interval not displayed. Recent Labs 05/30/20 
6721 05/29/20 
7104 05/28/20 
7583 PHI 7.392 7.412 7.416 PCO2I 40.4 40.0 36.8 PO2I 106* 120* 136* HCO3I 24.5 25.5 23.7 FIO2I 30 30 30 Imaging: 
I have personally reviewed the patients radiographs and have reviewed the reports: 
No change Total critical care time exclusive of procedures: 35 minutes Gail Verde MD

## 2020-05-30 NOTE — PROGRESS NOTES
Hospitalist Progress Note NAME: Pascale Vargas :  1955 MRN:  [id number] Assessment / Plan: 
Septic/ cardiogenic shock 2/2 COVID Positive with viral prodrome Hyponatremia Strep Bacteremia Multiorgan failure Acute renal failure, initiated CRRT , Lewis  Acute live injury Coagulopathy  
-remains critically ill, remain on vent and pressors b Blood cx 5/15 alpha streptococcus, repeat blood cx obtained and NGTd Persistent leukocytosis US RUQ: Minimal gallbladder sludge. Slightly prominent common bile duct of uncertain 
significance. - on stress dose steroids Heparin gtt  
empiric abx (vanco,cefepime ), last dose   
-palliative on board 
-has been enrolled in convalescent plasma study  all consultants help was greatly appreciated Acute on chronic systolic HF EF 23% Nonischemic dilated cardiomyopathy s/p remote ICD Chronic atrial fibrillation Sleep apnea Hypertensive heart disease w/ CKD and HF 
- requiring Vasopressor support 
- cardiology help appreciated: cont supportive care  
-Eliquis on hold for now DM2 
- BS stable  
-cont insulin w lantus, SSI 
  
  
 
Code Status: Full Surrogate Decision Maker: wife DVT Prophylaxis: heparin Disposition: remain critically ill Subjective: Chief Complaint / Reason for Physician Visit: following sepsis / respiratory failure/ covid Remain on vent and pressors Pt was seen through CCU room window and discussed with nursing. Discussed with RN events overnight. Review of Systems: 
Symptom Y/N Comments  Symptom Y/N Comments Fever/Chills    Chest Pain Poor Appetite    Edema Cough    Abdominal Pain Sputum    Joint Pain SOB/GROVES    Pruritis/Rash Nausea/vomit    Tolerating PT/OT Diarrhea    Tolerating Diet Constipation    Other Could NOT obtain due to: Intubated and sedated Objective: VITALS:  
 Last 24hrs VS reviewed since prior progress note. Most recent are: 
Patient Vitals for the past 24 hrs: 
 Temp Pulse Resp BP SpO2  
05/30/20 1315  86 16 94/51 98 % 05/30/20 1310  87 19 102/47 97 % 05/30/20 1305  93 16 101/49 98 % 05/30/20 1300  78 17 94/47 98 % 05/30/20 1255  82 18 102/46 99 % 05/30/20 1250  78 16 102/47 98 % 05/30/20 1245  83 18 92/47 99 % 05/30/20 1240  88 15 105/54 99 % 05/30/20 1235  80 18 101/49 97 % 05/30/20 1230  85 16 101/49 93 % 05/30/20 1225  81 16 102/46 97 % 05/30/20 1220  81 16 98/52 100 % 05/30/20 1215  92 17 102/52 99 % 05/30/20 1210  92 16 94/50 98 % 05/30/20 1209  84 16  100 % 05/30/20 1205  84 16 101/51 100 % 05/30/20 1200  86 18  100 % 05/30/20 1155  89 17 98/41 100 % 05/30/20 1150  83 18 97/44 96 % 05/30/20 1145  80 17 96/45 97 % 05/30/20 1140  79 18 99/47 98 % 05/30/20 1135  78 17 (!) 85/42 99 % 05/30/20 1130  80 17 95/41 98 % 05/30/20 1125  99 16 113/43 99 % 05/30/20 1120  84 17 100/40 97 % 05/30/20 1115  80 17 94/43 95 % 05/30/20 1110  85 17 93/48 (!) 84 % 05/30/20 1105  82 16 101/44 95 % 05/30/20 1100  77 17 102/49 97 % 05/30/20 1055  82 16 95/41 94 % 05/30/20 1050  81 17 101/42 97 % 05/30/20 1045  79 16 107/42 94 % 05/30/20 1040  77 15 99/44 91 % 05/30/20 1035  78 16 110/48 97 % 05/30/20 1030  83 16 107/44 90 % 05/30/20 1025  78 16 102/45 98 % 05/30/20 1020  78 15 98/45 94 % 05/30/20 1015  78 16 101/50 97 % 05/30/20 1010  80 16 99/47 96 % 05/30/20 1005  85 17 100/44 96 % 05/30/20 1000  75 15 96/42 95 % 05/30/20 0955  72 16 100/43 99 % 05/30/20 0950  75 16 97/41 99 % 05/30/20 0945  75 16 98/43 97 % 05/30/20 0940  77 18 99/49 99 % 05/30/20 0935  76 25 94/45 97 % 05/30/20 0930  77 15 93/70 98 % 05/30/20 0925  74 17 103/49 98 % 05/30/20 0920  71 16 91/47 98 % 05/30/20 0915  70 15 91/49 98 % 05/30/20 0910  72 16 92/50 98 % 05/30/20 0905  70 16 97/50 98 % 05/30/20 0900  75 15 103/47 99 % 05/30/20 0855  77 16 100/49 98 % 05/30/20 0850  72 16 99/50 97 % 05/30/20 0845  75 15 100/56 97 % 05/30/20 0844  85 22  98 % 05/30/20 0830  76 22 121/58 100 % 05/30/20 0820  73 16 106/52 99 % 05/30/20 0815  72 16 97/54 99 % 05/30/20 0814  77 17 100/50 97 % 05/30/20 0800 98.1 °F (36.7 °C) 71 16 (!) 84/44 99 % 05/30/20 0745  71 14 (!) 89/48 99 % 05/30/20 0730  73 16 106/45 98 % 05/30/20 0715  72 16 (!) 89/48 98 % 05/30/20 0710  72 16 93/46 98 % 05/30/20 0700  75 16 (!) 76/40   
05/30/20 0630  71 16 92/45 95 % 05/30/20 0600  78 16 93/51 100 % 05/30/20 0500  72 16 (!) 105/37 98 % 05/30/20 0436  72 16  96 % 05/30/20 0400 98.3 °F (36.8 °C) 70 16 107/51 97 % 05/30/20 0300  71 15 102/50 97 % 05/30/20 0200  71 16 95/57 100 % 05/30/20 0100  71 16 97/54 98 % 05/30/20 0054  83 15  95 % 05/30/20 0000 97.8 °F (36.6 °C)   101/55   
05/29/20 2300  78 19 95/53 98 % 05/29/20 2200  73 14 91/54 98 % 05/29/20 2100  73 13 92/54 98 % 05/29/20 2000 98.1 °F (36.7 °C) 73 19 97/56 98 % 05/29/20 1933  72 20  98 % 05/29/20 1915  72 13 100/54 99 % 05/29/20 1900  70 (!) 0 99/52 98 % 05/29/20 1845  71 10 99/56 98 % 05/29/20 1830  72 10 96/54 99 % 05/29/20 1815  70 11 102/57 98 % 05/29/20 1800  71 11 (!) 82/48 98 % 05/29/20 1745  74 18  99 % 05/29/20 1730  73 16 100/58 99 % 05/29/20 1715  77 20 104/53 99 % 05/29/20 1700  72 15 109/53 99 % 05/29/20 1645  79 17 96/54 99 % 05/29/20 1630  71 14 102/55 99 % 05/29/20 1615  73 15 99/60 99 % 05/29/20 1600 98.8 °F (37.1 °C) 77 11 103/52 99 % 05/29/20 1545  81 11 92/58 99 % 05/29/20 1536   20  98 % 05/29/20 1530    94/57 98 % 05/29/20 1515  71  112/40 99 % 05/29/20 1500   12 103/57 99 % 05/29/20 1445  76 11 90/72 98 % 05/29/20 1430  72 14 100/45 98 % 05/29/20 1415   14 (!) 88/54 98 % 05/29/20 1400  82 9 98/55 98 % 05/29/20 1345  81 12 (!) 86/51 98 % Intake/Output Summary (Last 24 hours) at 5/30/2020 1332 Last data filed at 5/30/2020 0700 Gross per 24 hour Intake 733.85 ml Output 100 ml Net 633.85 ml PHYSICAL EXAM: 
GEN: AA male, NAD On Ventilator Intubated and sedated 
jaudiced Seen through ICU window to minimize exposure/transmission to/of COVID-19 Reviewed most current lab test results and cultures  YES Reviewed most current radiology test results   YES Review and summation of old records today    NO Reviewed patient's current orders and MAR    YES 
PMH/SH reviewed - no change compared to H&P 
________________________________________________________________________ Care Plan discussed with: 
  Comments Patient Family RN x Care Manager Consultant  x Multidiciplinary team rounds were held today with , nursing, pharmacist and clinical coordinator. Patient's plan of care was discussed; medications were reviewed and discharge planning was addressed. ________________________________________________________________________ Total NON critical care TIME:  25  Minutes Total CRITICAL CARE TIME Spent:   Minutes non procedure based Comments >50% of visit spent in counseling and coordination of care    
________________________________________________________________________ Gaby Mccollum MD  
 
Procedures: see electronic medical records for all procedures/Xrays and details which were not copied into this note but were reviewed prior to creation of Plan. LABS: 
I reviewed today's most current labs and imaging studies. Pertinent labs include: 
Recent Labs 05/30/20 
8798 05/29/20 
0308 05/28/20 
4769 WBC 20.5* 20.4* 24.4* HGB 9.9* 10.4* 10.8* HCT 28.5* 29.9* 31.5*  192 230 Recent Labs 05/30/20 
0557 05/29/20 
0308 05/28/20 5540  05/28/20 
0445  136 136   < > 137  
K 3.9 3.7 3.8   < > 3.7  105 105   < > 105 CO2 24 27 28   < > 25 * 192* 158*   < > 144* BUN 58* 41* 42*   < > 39* CREA 2.76* 2.07* 1.97*   < > 1.99* CA 7.7* 7.5* 7.8*   < > 7.6*  
MG 2.6* 2.6* 2.4   < > 2.4 PHOS 3.6 2.8 2.9   < > 2.8 ALB 1.8* 1.9* 2.0*   < > 2.1* TBILI 13.0* 12.8*  --   --  12.3* * 166*  --   --  201* INR  --   --   --   --  1.6*  
 < > = values in this interval not displayed.   
 
 
Signed: Fady Mohan MD

## 2020-05-30 NOTE — PROGRESS NOTES
05/30/20 5922 ABCDEF Bundle SBT Safety Screen Passed No  
SBT Screen Reason for Failure Vasopressor use;Agitation Patient has increased thin tan/brown secretions causing him to cough and be agitated. He has been suctioned multiple times but continues to flood the HME. Due to this and vasopressor usage, no SBT was performed at this time.

## 2020-05-30 NOTE — PROGRESS NOTES
0720 Bedside and Verbal shift change report given to Ever Simpson (oncoming nurse) by Jefferson Bustamante (offgoing nurse). Report included the following information SBAR, Kardex, ED Summary, Procedure Summary, Intake/Output, MAR, Recent Results and Cardiac Rhythm paced. 0800 pt assessed per flowsheet. Pt incont of loose stool, incont care complete. Continue to wean Levo as tolerated. Oral and EET secretions bloody with clots. Blood is dark- does not appear to be currently, activily bleeding 
0915 Dr Mehran Lucero in to assess pt, reviewed plan of care, labs and meds 80 wife has called for updates. She is requesting to have another Zoom call today. This has been arranged for 1530 today. 1200 pt reassessed per flowsheet. Pt remains with bloody secretions, no further BM's, NGT residual with tan and black, <50ml. Labs collected and sent 1305 PTT therapeutic x2, no change. [address] call set up with family 1600 pt reassessed per flowsheet, bloody drainage appears to be subsiding. 1700 pt incont of large stool, incont care complete. Pt requiring increase of Levo after turning. 1900 Bedside and Verbal shift change report given to Jefferson Bustamante (oncoming nurse) by Ever Simpson (offgoing nurse). Report included the following information SBAR, Kardex, Intake/Output, MAR, Recent Results and Cardiac Rhythm paced.

## 2020-05-30 NOTE — PROGRESS NOTES
NAME: Bethel Loza :  1955 MRN:  [id number] Assessment :    Plan: 
--LAQUITA Shock Sepsis DM type 2 Systolic HF (EF 80%) COVID + Initiated CVVHD  
 
cxr (-) pulm edema; subsegmental atelectasis or Pneumonia Electrolytes stable Lewis line  Off CVVH on ; no HD needed today (will review need through the day and tomorrow) On pressor Subjective: Chief Complaint:  In order to limit the exposure risk from COVID 19 and to preserve the hospital's limited supply of PPEs, seen through ICU window. Intubated. On levo 16. I spoke with his ICU nurse. Remains critically ill Review of Systems:  
 
Symptom Y/N Comments  Symptom Y/N Comments Fever/Chills    Chest Pain Poor Appetite    Edema Cough    Abdominal Pain Sputum    Joint Pain SOB/GROVES    Pruritis/Rash Nausea/vomit    Tolerating PT/OT Diarrhea    Tolerating Diet Constipation    Other Could not obtain due to: See above Objective: VITALS:  
Last 24hrs VS reviewed since prior progress note. Most recent are: 
Visit Vitals BP 93/51 Pulse 78 Temp 98.3 °F (36.8 °C) Resp 16 Ht 6' 3\" (1.905 m) Wt 119.3 kg (263 lb 0.1 oz) SpO2 100% BMI 32.87 kg/m² Intake/Output Summary (Last 24 hours) at 2020 2708 Last data filed at 2020 0600 Gross per 24 hour Intake 713.17 ml Output 1167 ml Net -453.83 ml Telemetry Reviewed: PHYSICAL EXAM: 
General: NAD, ett in place Little edema Lab Data Reviewed: (see below) Medications Reviewed: (see below) PMH/SH reviewed - no change compared to H&P 
________________________________________________________________________ Care Plan discussed with: 
Patient Family RN y   
Care Manager Consultant:     
 
  Comments >50% of visit spent in counseling and coordination of care ________________________________________________________________________ Lady Carly MD  
 
Procedures: see electronic medical records for all procedures/Xrays and details which 
were not copied into this note but were reviewed prior to creation of Plan. LABS: 
Recent Labs 05/30/20 0447 05/29/20 0308 WBC 20.5* 20.4* HGB 9.9* 10.4* HCT 28.5* 29.9*  
 192 Recent Labs  
  05/29/20 
0308 05/28/20 
1522 05/28/20 
2593  136 136  
K 3.7 3.8 3.7  105 105 CO2 27 28 26 BUN 41* 42* 39* CREA 2.07* 1.97* 2.04* * 158* 120* CA 7.5* 7.8* 7.8*  
MG 2.6* 2.4 2.6* PHOS 2.8 2.9 2.6 Recent Labs  
  05/29/20 
0308 05/28/20 
1522 05/28/20 
0847 05/28/20 0445 *  --   --  293* TP 6.6  --   --  6.5 ALB 1.9* 2.0* 2.0* 2.1*  
GLOB 4.7*  --   --  4.4* Recent Labs  
  05/29/20 
2305 05/29/20 
1754 05/29/20 
1120  05/28/20 
0445 INR  --   --   --   --  1.6* PTP  --   --   --   --  15.9* APTT 42.0* 82.6* 49.5*   < > 59.0*  
 < > = values in this interval not displayed. MEDICATIONS: 
Current Facility-Administered Medications Medication Dose Route Frequency  white petrolatum-mineral oiL (AKWA TEARS) 83-15 % ophthalmic ointment   Both Eyes Q12H  hydrocortisone Sod Succ (PF) (SOLU-CORTEF) injection 25 mg  25 mg IntraVENous Q6H  
 insulin glargine (LANTUS) injection 40 Units  40 Units SubCUTAneous DAILY  vancomycin (VANCOCIN) 1,000 mg in 0.9% sodium chloride (MBP/ADV) 250 mL  1,000 mg IntraVENous Q24H  polyethylene glycol (MIRALAX) packet 17 g  17 g Per NG tube DAILY PRN  
 zinc oxide-cod liver oil (DESITIN) 40 % paste   Topical PRN  chlorhexidine (ORAL CARE KIT) 0.12 % mouthwash 15 mL  15 mL Oral Q12H  cefepime (MAXIPIME) 1 g in 0.9% sodium chloride (MBP/ADV) 50 mL  1 g IntraVENous Q8H  potassium chloride 20 mEq in 50 ml IVPB  20 mEq IntraVENous PRN  
 calcium chloride injection 2 g  2 g IntraVENous PRN  
  famotidine (PF) (PEPCID) 20 mg in 0.9% sodium chloride 10 mL injection  20 mg IntraVENous DAILY  acetaminophen (TYLENOL) solution 650 mg  650 mg Oral Q6H PRN  
 PHARMACY INFORMATION NOTE  1 Each Other BID  heparin (porcine) injection 2,000 Units  2,000 Units IntraVENous PRN Or  
 heparin (porcine) injection 4,000 Units  4,000 Units IntraVENous PRN  
 heparin 25,000 units in D5W 250 ml infusion  8-25 Units/kg/hr IntraVENous TITRATE  
 NOREPINephrine (LEVOPHED) 32 mg in 5% dextrose 250 mL infusion  2-200 mcg/min IntraVENous TITRATE  sodium chloride (NS) flush 5-40 mL  5-40 mL IntraVENous Q8H  
 sodium chloride (NS) flush 5-40 mL  5-40 mL IntraVENous PRN  
 ondansetron (ZOFRAN) injection 4 mg  4 mg IntraVENous Q8H PRN  
 alcohol 62% (NOZIN) nasal  1 Ampule  1 Ampule Topical Q12H  
 insulin lispro (HUMALOG) injection   SubCUTAneous Q6H  
 glucose chewable tablet 16 g  4 Tab Oral PRN  
 dextrose (D50W) injection syrg 12.5-25 g  12.5-25 g IntraVENous PRN  
 glucagon (GLUCAGEN) injection 1 mg  1 mg IntraMUSCular PRN  
 acetaminophen (TYLENOL) tablet 650 mg  650 mg Oral Q6H PRN Or  
 acetaminophen (TYLENOL) suppository 650 mg  650 mg Rectal Q6H PRN  
 influenza vaccine 2019-20 (6 mos+)(PF) (FLUARIX/FLULAVAL/FLUZONE QUAD) injection 0.5 mL  0.5 mL IntraMUSCular PRIOR TO DISCHARGE  sodium chloride (NS) flush 5-10 mL  5-10 mL IntraVENous PRN

## 2020-05-31 NOTE — PROGRESS NOTES
Hospitalist Progress Note NAME: Carlene Tamayo :  1955 MRN:  [id number] Assessment / Plan: 
Septic/ cardiogenic shock 2/2 COVID Positive with viral prodrome Hyponatremia Strep Bacteremia Multiorgan failure Acute renal failure, initiated CRRT , Lewis  Acute live injury Coagulopathy  
 
-remains critically ill, remain on vent and pressors. Significant improvement until , now requiring more vasopressors 
-on Levophed, dobutamine added today 
-on 30 % FIO2, vent management as per Pulmonary-  
-on stress dose steroids -Heparin gtt, continue 
-Get ECHO Blood cx 5/15 alpha streptococcus, repeat blood cx obtained and NGTd Persistent leukocytosis , with worsening pressor requirement  
 
empiric abx vanco,cefepime until . Received clindamycin until . Will add anaerobic coverage with rising bilirubin, will change to Zosyn 
-evaluate again tomorrow regarding further need for abx 
 
-Repeat Bcx today, Ucx. 
-watch for diarrhea Jaundice Elevated LFTs Concern for ?cholecystitis/cholangitis US RUQ: Minimal gallbladder sludge. Slightly prominent common bile duct of uncertain 
significance. -Improving LFTs, but worsening bilirubin 
-get Fractionated bilirubin 
-add anaerobic coverage  all consultants help was greatly appreciated Acute on chronic systolic HF EF 38% Nonischemic dilated cardiomyopathy s/p remote ICD Chronic atrial fibrillation Sleep apnea Hypertensive heart disease w/ CKD and HF 
- requiring Vasopressor support 
- cardiology help appreciated: cont supportive care  
-Eliquis on hold for now DM2 
- BS stable  
-cont insulin w lantus, SSI 
  
  
 
Code Status: Full Surrogate Decision Maker: wife DVT Prophylaxis: heparin Disposition: remain critically ill Subjective: Chief Complaint / Reason for Physician Visit: following sepsis / respiratory failure/ covid Remain on vent and pressors Pt was seen through CCU room window and discussed with nursing. On levo, dobutamine added today Discussed with RN events overnight. Review of Systems: 
Symptom Y/N Comments  Symptom Y/N Comments Fever/Chills    Chest Pain Poor Appetite    Edema Cough    Abdominal Pain Sputum    Joint Pain SOB/GROVES    Pruritis/Rash Nausea/vomit    Tolerating PT/OT Diarrhea    Tolerating Diet Constipation    Other Could NOT obtain due to: Intubated and sedated Objective: VITALS:  
Last 24hrs VS reviewed since prior progress note. Most recent are: 
Patient Vitals for the past 24 hrs: 
 Temp Pulse Resp BP SpO2  
05/31/20 1441  99  93/43   
05/31/20 1234  91 17 (!) 82/39 100 % 05/31/20 1230  84 15 (!) 76/62 100 % 05/31/20 1229  89 16 (!) 89/41 100 % 05/31/20 1200  98 20 90/45 98 % 05/31/20 1154  (!) 115 17  99 % 05/31/20 1130  98 16 101/45 99 % 05/31/20 1100  97 17 93/49 99 % 05/31/20 1043  88 17 97/43 99 % 05/31/20 1030  73 16 106/51 98 % 05/31/20 1000  79 16 112/50 98 % 05/31/20 0930  78 14 110/56 99 % 05/31/20 0900  78 13 108/56 100 % 05/31/20 0830  73 15 114/59 99 % 05/31/20 0829  77 17  98 % 05/31/20 0800 98.9 °F (37.2 °C) 73 17 113/57 98 % 05/31/20 0730  85 16 127/60 100 % 05/31/20 0700  75 13 114/63 98 % 05/31/20 0600  76 17 116/57 98 % 05/31/20 0500  77 16 98/43 98 % 05/31/20 0428  71 18  100 % 05/31/20 0400 98.4 °F (36.9 °C) 78 16 115/59 99 % 05/31/20 0300  75 15 121/57 98 % 05/31/20 0200  74 15 123/56 100 % 05/31/20 0100  73 15 107/54 100 % 05/31/20 0020  80 16  99 % 05/31/20 0000 98.1 °F (36.7 °C) 85 17 100/51 98 % 05/30/20 2300  83 15 104/59 97 % 05/30/20 2200  80 15 123/50 98 % 05/30/20 2100  84 16 110/55 100 % 05/30/20 2058  82 19  100 % 05/30/20 2000 98.9 °F (37.2 °C) 80 16 101/46 99 % 05/30/20 1945  75 9 94/43 99 % 05/30/20 1930  72 12 (!) 87/43 99 % 05/30/20 1915  79 12 (!) 87/46 99 % 05/30/20 1900  79 (!) 2 (!) 88/43 99 % 05/30/20 1845  82 12 92/43 96 % 05/30/20 1830  73 (!) 7 (!) 93/36 97 % 05/30/20 1815  76 14 (!) 86/46 100 % 05/30/20 1800  80 17 (!) 88/44 100 % 05/30/20 1745  82 16 92/44 100 % 05/30/20 1730  78 17 90/44 97 % 05/30/20 1715  76 15 100/42 100 % 05/30/20 1708  80 18  100 % 05/30/20 1700  73 16 93/49 100 % 05/30/20 1645  76 16 (!) 87/46 100 % 05/30/20 1630  78 14 (!) 89/30 98 % 05/30/20 1615  79 17 94/45 100 % 05/30/20 1600 98.4 °F (36.9 °C) 81 16 93/43 97 % 05/30/20 1545  79 17 (!) 86/43 99 % 05/30/20 1530  75 (!) 1 97/47 98 % 05/30/20 1515  85 (!) 6 94/50 98 % Intake/Output Summary (Last 24 hours) at 5/31/2020 1504 Last data filed at 5/31/2020 0600 Gross per 24 hour Intake 862.14 ml Output 150 ml Net 712.14 ml PHYSICAL EXAM: 
GEN: AA male, NAD On Ventilator Intubated and sedated Has kaila Seen through ICU window to minimize exposure/transmission to/of COVID-19 Reviewed most current lab test results and cultures  YES Reviewed most current radiology test results   YES Review and summation of old records today    NO Reviewed patient's current orders and MAR    YES 
PMH/SH reviewed - no change compared to H&P 
________________________________________________________________________ Care Plan discussed with: 
  Comments Patient Family RN x Care Manager Consultant  x Multidiciplinary team rounds were held today with , nursing, pharmacist and clinical coordinator. Patient's plan of care was discussed; medications were reviewed and discharge planning was addressed. ________________________________________________________________________ Total NON critical care TIME:  15  Minutes Total CRITICAL CARE TIME Spent:   Minutes non procedure based Comments >50% of visit spent in counseling and coordination of care    
________________________________________________________________________ Kezia Saldivar MD  
 
Procedures: see electronic medical records for all procedures/Xrays and details which were not copied into this note but were reviewed prior to creation of Plan. LABS: 
I reviewed today's most current labs and imaging studies. Pertinent labs include: 
Recent Labs 05/31/20 
5487 05/30/20 
9392 05/29/20 
0308 WBC 20.0* 20.5* 20.4* HGB 9.4* 9.9* 10.4* HCT 27.6* 28.5* 29.9*  
 195 192 Recent Labs 05/31/20 
1210 05/31/20 
4426 05/30/20 
6137 05/29/20 
0308 * 135* 136 136  
K 4.4 4.2 3.9 3.7  104 106 105 CO2 22 21 24 27 * 159* 132* 192* BUN 99* 84* 58* 41* CREA 4.23* 3.80* 2.76* 2.07* CA 7.4* 8.0* 7.7* 7.5* MG  --  2.6* 2.6* 2.6* PHOS 7.4* 6.7* 3.6 2.8 ALB 1.8* 1.8* 1.8* 1.9* TBILI  --  14.0* 13.0* 12.8* ALT  --  128* 144* 166* INR 1.5*  --   --   --   
 
 
Signed: Kezia Saldivar MD

## 2020-05-31 NOTE — PROGRESS NOTES
0730 Bedside and Verbal shift change report given to Fadi Guerra (oncoming nurse) by Seferino Farias (offgoing nurse). Report included the following information SBAR, Kardex, Intake/Output, MAR, Recent Results and Cardiac Rhythm paced. 0800 pt assessed per flowsheet. Pt alert and following commands well this am, he was able to slightly shrug his shoulders when asked if he was feeling any better. Pt repositioned for comfort, small open area on sacrum, has been treated with Desitin, added Venelex. Site is open with small amount of blood, remains blanchable. FMS was also added over night which should help with moisture. Right upper lip also treated with Venelex. Yellow based ulcerated area, no drainage, appears quite tender. 9499 Dr Vladimir Campbell in to assess pt with review of plan of care. Discussed adding Dobutamine, will clarify with cardiology prior to starting- per Dr Vladimir Campbell. 1010 spoke with Erin Deluca from his standpoint to start Dobutamine at 3 as ordered, then advance to 5 as tolerated- as per Dr Lopez Pollo 
1200 pt reassessed per flowsheetDr Wilber Barton in to assess pt,plans to restart CVVHD today 1500 CVVHD has been restarted. Levo was increased prior to initiation of CVVHD to attempt to maintain BP WNL 
1600 pt reassessed per flowsheet, estelle CVVHD well thus far. Echo tech at bedside 1800 bath complete, paired blood cultures collected and sent. After clarifying with Dr Martha Naranjo, pt straight cathed for 3 ml of dark brown urine. 1930 Bedside and Verbal shift change report given to Arti (oncoming nurse) by Fadi Guerra (offgoing nurse). Report included the following information SBAR, Kardex, Procedure Summary, Intake/Output, MAR, Recent Results and Cardiac Rhythm Paced, afib.

## 2020-05-31 NOTE — PROGRESS NOTES
NAME: Bethel Loza :  1955 MRN:  [id number] Assessment :    Plan: 
--LAQUITA Shock Sepsis DM type 2 Systolic HF (EF 39%) COVID + Initiated CVVHD ; Off CVVH on ; still on pressor, needs volume off, I don't think he will tolerate iHD; restart CVVHD today (vibha aware) On heparin gtt already Lewis line  On pressor Subjective: Chief Complaint:  In order to limit the exposure risk from COVID 19 and to preserve the hospital's limited supply of PPEs, seen through ICU window. Intubated. On levo. I spoke with his ICU nurse. Remains critically ill Review of Systems:  
 
Symptom Y/N Comments  Symptom Y/N Comments Fever/Chills    Chest Pain Poor Appetite    Edema Cough    Abdominal Pain Sputum    Joint Pain SOB/GROVES    Pruritis/Rash Nausea/vomit    Tolerating PT/OT Diarrhea    Tolerating Diet Constipation    Other Could not obtain due to: See above Objective: VITALS:  
Last 24hrs VS reviewed since prior progress note. Most recent are: 
Visit Vitals /57 Pulse 76 Temp 98.4 °F (36.9 °C) Resp 17 Ht 6' 3\" (1.905 m) Wt 119.7 kg (263 lb 14.3 oz) SpO2 98% BMI 32.98 kg/m² Intake/Output Summary (Last 24 hours) at 2020 6443 Last data filed at 2020 0600 Gross per 24 hour Intake 1400.94 ml Output 150 ml Net 1250.94 ml Telemetry Reviewed: PHYSICAL EXAM: 
General: NAD, ett in place 
some edema Lab Data Reviewed: (see below) Medications Reviewed: (see below) PMH/SH reviewed - no change compared to H&P 
________________________________________________________________________ Care Plan discussed with: 
Patient Family RN y   
Care Manager Consultant:     
 
  Comments >50% of visit spent in counseling and coordination of care ________________________________________________________________________ Fidencio Sullivan MD  
 
Procedures: see electronic medical records for all procedures/Xrays and details which 
were not copied into this note but were reviewed prior to creation of Plan. LABS: 
Recent Labs 05/31/20 
7787 05/30/20 
2793 WBC 20.0* 20.5* HGB 9.4* 9.9*  
HCT 27.6* 28.5*  195 Recent Labs 05/31/20 
4006 05/30/20 
2830 05/29/20 
0308 * 136 136  
K 4.2 3.9 3.7  106 105 CO2 21 24 27 BUN 84* 58* 41* CREA 3.80* 2.76* 2.07* * 132* 192* CA 8.0* 7.7* 7.5* MG 2.6* 2.6* 2.6* PHOS 6.7* 3.6 2.8 Recent Labs 05/31/20 
9665 05/30/20 
4721 05/29/20 
0308 * 282* 293* TP 6.4 6.4 6.6 ALB 1.8* 1.8* 1.9*  
GLOB 4.6* 4.6* 4.7* Recent Labs 05/31/20 
2186 05/30/20 
1211 05/30/20 
2849 APTT 57.8* 65.2* 63.5* MEDICATIONS: 
Current Facility-Administered Medications Medication Dose Route Frequency  hydrocortisone Sod Succ (PF) (SOLU-CORTEF) injection 50 mg  50 mg IntraVENous Q8H  
 pantoprazole (PROTONIX) 40 mg in 0.9% sodium chloride 10 mL injection  40 mg IntraVENous Q12H  
 scopolamine (TRANSDERM-SCOP) 1 mg over 3 days 1 Patch  1 Patch TransDERmal Q72H  balsam peru-castor oiL (VENELEX) ointment   Topical TID  white petrolatum-mineral oiL (AKWA TEARS) 83-15 % ophthalmic ointment   Both Eyes Q12H  
 insulin glargine (LANTUS) injection 40 Units  40 Units SubCUTAneous DAILY  vancomycin (VANCOCIN) 1,000 mg in 0.9% sodium chloride (MBP/ADV) 250 mL  1,000 mg IntraVENous Q24H  polyethylene glycol (MIRALAX) packet 17 g  17 g Per NG tube DAILY PRN  
 zinc oxide-cod liver oil (DESITIN) 40 % paste   Topical PRN  chlorhexidine (ORAL CARE KIT) 0.12 % mouthwash 15 mL  15 mL Oral Q12H  cefepime (MAXIPIME) 1 g in 0.9% sodium chloride (MBP/ADV) 50 mL  1 g IntraVENous Q8H  potassium chloride 20 mEq in 50 ml IVPB  20 mEq IntraVENous PRN  
  calcium chloride injection 2 g  2 g IntraVENous PRN  
 acetaminophen (TYLENOL) solution 650 mg  650 mg Oral Q6H PRN  
 PHARMACY INFORMATION NOTE  1 Each Other BID  heparin (porcine) injection 2,000 Units  2,000 Units IntraVENous PRN Or  
 heparin (porcine) injection 4,000 Units  4,000 Units IntraVENous PRN  
 heparin 25,000 units in D5W 250 ml infusion  8-25 Units/kg/hr IntraVENous TITRATE  
 NOREPINephrine (LEVOPHED) 32 mg in 5% dextrose 250 mL infusion  2-200 mcg/min IntraVENous TITRATE  sodium chloride (NS) flush 5-40 mL  5-40 mL IntraVENous Q8H  
 sodium chloride (NS) flush 5-40 mL  5-40 mL IntraVENous PRN  
 ondansetron (ZOFRAN) injection 4 mg  4 mg IntraVENous Q8H PRN  
 alcohol 62% (NOZIN) nasal  1 Ampule  1 Ampule Topical Q12H  
 insulin lispro (HUMALOG) injection   SubCUTAneous Q6H  
 glucose chewable tablet 16 g  4 Tab Oral PRN  
 dextrose (D50W) injection syrg 12.5-25 g  12.5-25 g IntraVENous PRN  
 glucagon (GLUCAGEN) injection 1 mg  1 mg IntraMUSCular PRN  
 acetaminophen (TYLENOL) tablet 650 mg  650 mg Oral Q6H PRN Or  
 acetaminophen (TYLENOL) suppository 650 mg  650 mg Rectal Q6H PRN  
 influenza vaccine 2019-20 (6 mos+)(PF) (FLUARIX/FLULAVAL/FLUZONE QUAD) injection 0.5 mL  0.5 mL IntraMUSCular PRIOR TO DISCHARGE  sodium chloride (NS) flush 5-10 mL  5-10 mL IntraVENous PRN

## 2020-05-31 NOTE — DIALYSIS
[facility]       [phone] Orders Mode: CVVHD started @ 8164 Factor: 50 ml/hr Prismasol Dose: 2,000 ml/hr Prismasol Bath: 4K/2.5Ca Blood Flow Rate: 200 ml/min Metrics BP / HR: 93/43 // 99 Blood Flow Rate: 200 ml/min AP:                         -82 RP: 98 TMP: 32  
PD: 32  
FP: 162 UFR: 2,000 ml/hr Comments / Plan: CRRT restarted after being on hold per MD order. Patient, code status, labs, consents and orders verified. Filter set-up, primed, tested and running well. RIJ temporary non-tunneled CVC, dressing CDI with bio-patch dated 5/27/20. No signs of redness, drainage, or infection visualized. Each catheter limb disinfected for 60 seconds per limb with alcohol swabs. Caps removed, dialysis CVC hub scrubbed with Prevantics for 5 seconds, followed by a 5 second dry time per Hospital P&P. +asp/+flush x 2 ports. Lines reversed, visible and connections secure with blood warmer to return line at 37*C. Education, pre and post to primary RN. Systemic heparin infusing to patient.

## 2020-05-31 NOTE — PROGRESS NOTES
PULMONARY ASSOCIATES [facility] INTENSIVIST Consult Service Note Pulmonary, Critical Care, and Sleep Medicine Name: Rosanna Mendoza MRN: [id number] : 1955 Hospital: [place] [address] Date: 2020  Admission date: 5/15/2020 Hospital Day: 16 Subjective/Interval History:  
 
20: off sedation but only grimaces to noxious stimuli. Remains critically ill on mechanical ventilation/pressors 20: remains critically ill on mechanical ventilation/pressors. Off sedation for about 48 hours. Minimal response thus far.  
20: no events. Remains intubated and on pressors. Followed a few commands intermittently overnight per nursing. No data available as EMR has been down until a few minutes ago. 20: more alert and following commands today. Still very weak. Actually doing well on SBT. 
- Intubated, off sedation. Bloody secretions through ETT and NGT per RN. On heparin drip for cardiac issues. Increased pressor requirement today. Increased secretions- SBT held. Weak : Intubated, off sedation, follows commands per RN. Still on high dose NE at 17 mcg/min. Increased bilirubin. Last LVEF in  was 20%-- still covid positive, no recent reassessment. On heparin drip-- bleeding from mucosal sites a little better today. Very weak IMPRESSION:  
1. Acute hypoxic respiratory failure, intubated - 2. Profound septic shock on high level of pressors. 3. COVID 19 pneumonia 4. Worsening hyperbil, ? etiology 5. Alpha strep septicemia- elevated Procalcitonin > 80 
6. Chronic combined systolic/diastolic heart failure 7. Hypertensive heart disease with CHF and CKD, previous severe LV function 8. LAQUITA/CKD, CVVH to start 9. Chronic anticoagulation at home- eliquis 10. Diabetes mellitus type 2 uncontrolled 11. Hyperlipidemia 12. 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 Chronic atrial fibrillation 14. Status post SJM BIV-ICD implant in 2016 
15. Sleep apnea, undiagnosed/untreated 16. Obesity with recent weight gain 17. Possible critical illness polyneuropathy/myopathy RECOMMENDATIONS/PLAN:  
1. Ventilator support - doing quite well on SBT but still quite weak. Hopefully can be extubated in next day or two as alertness hopefully continues to improve-- may need trach 2. Secretion management- add scopolamine 3. Monitor bilirubin - US unremarkable, ?hemolysis though hemoglobin stable and fibrinogen normal, consider HIDA scan 4. Check PT/IRN, if coagulopathy, would hold heparin 5. Add low dose dobutamine at 3 if OK with cardiology and increase to 5 as tolerated-- hoping to see stabilization of renal and hepatic function 6. Continue pressors, wean as able 7. Holding sedation-- no indication of non-verbal pain per RN 
8. Continue antibiotics 9. Stress dose steroids- increase dose again to see if it helps with weaning pressors 10. RRT per nephrology 11. Heparin drip 12. Cardiology following 13. Glycemic monitoring and control 14. Replete electrolytes PRN 15. DVT, SUP prophylaxis- changed to protonix in view in GI bleed on heparin 16. Remains critically ill, at high risk for decline. CCT 45'. D/W RN Subjective/Initial History:  
I have reviewed the flowsheet and previous days notes. Seen earlier today on rounds. I was asked by Krystle Murray MD to see Bethany Franks a 71 y.o.  male  in consultation for a chief complaint of shock. Excerpts from admission 5/15/2020 and consult notes reviewed as follows:  
 
\"Mr. Shiva Adames is a 57 y.o. morbidly obese male with Chronic nonischemic dilated cardiomyopathy EF 62%, Chronic systolic congestive heart failure class, H/o atrial fibrillation, On chronic warfarin, Iatrogenic left bundle branch block with predominant RV pacing, 64-70%, Hypertension, Hyperlipidemia and diabetes presents to ED [facility] ED C/O Worsening exertional shortness of breath and around 20 pounds weight gain in last 3 weeks. \" 
 
Pt now in CCU. Poor historian. On room air. No fever. No cough. No diarrhea. Not very active. Saw PCP three weeks ago. Some edema. Chart notes ED [facility] admission in 2017 with decompensation. Patient PCP: Tessie Garza MD 
PMH:  has a past medical history of A-fib ([address] Arrhythmia, Diabetes ([address] Endocrine disease, Hypertension, and Irregular heart beat. He also has no past medical history of Difficult intubation, Malignant hyperthermia due to anesthesia, Nausea & vomiting, or Pseudocholinesterase deficiency. PSH:   has a past surgical history that includes pr cardiac surg procedure unlist; insert emergency endotrach airway (5/18/2020); and ir insert non tunl cvc over 5 yrs (5/18/2020). FHX: family history includes Diabetes in his father; Heart Disease in his father and mother. SHX:  reports that he quit smoking about 26 years ago. He has never used smokeless tobacco. He reports that he does not drink alcohol or use drugs. ROS:A comprehensive review of systems was negative except for that written in the HPI. No Known Allergies MEDS:  
Current Facility-Administered Medications Medication  DOBUTamine (DOBUTREX) 500 mg/250 mL (2,000 mcg/mL) infusion  bicarbonate dialysis (PRISMASOL) BG K 4/Ca 2.5 5000 ml solution  hydrocortisone Sod Succ (PF) (SOLU-CORTEF) injection 50 mg  
 pantoprazole (PROTONIX) 40 mg in 0.9% sodium chloride 10 mL injection  scopolamine (TRANSDERM-SCOP) 1 mg over 3 days 1 Patch  balsam peru-castor oiL (VENELEX) ointment  white petrolatum-mineral oiL (AKWA TEARS) 83-15 % ophthalmic ointment  insulin glargine (LANTUS) injection 40 Units  vancomycin (VANCOCIN) 1,000 mg in 0.9% sodium chloride (MBP/ADV) 250 mL  polyethylene glycol (MIRALAX) packet 17 g  
 zinc oxide-cod liver oil (DESITIN) 40 % paste  chlorhexidine (ORAL CARE KIT) 0.12 % mouthwash 15 mL  cefepime (MAXIPIME) 1 g in 0.9% sodium chloride (MBP/ADV) 50 mL  potassium chloride 20 mEq in 50 ml IVPB  calcium chloride injection 2 g  
 acetaminophen (TYLENOL) solution 650 mg  PHARMACY INFORMATION NOTE  heparin (porcine) injection 2,000 Units Or  heparin (porcine) injection 4,000 Units  heparin 25,000 units in D5W 250 ml infusion  NOREPINephrine (LEVOPHED) 32 mg in 5% dextrose 250 mL infusion  sodium chloride (NS) flush 5-40 mL  sodium chloride (NS) flush 5-40 mL  ondansetron (ZOFRAN) injection 4 mg  alcohol 62% (NOZIN) nasal  1 Ampule  insulin lispro (HUMALOG) injection  glucose chewable tablet 16 g  
 dextrose (D50W) injection syrg 12.5-25 g  
 glucagon (GLUCAGEN) injection 1 mg  acetaminophen (TYLENOL) tablet 650 mg Or  acetaminophen (TYLENOL) suppository 650 mg  
 influenza vaccine 2019-20 (6 mos+)(PF) (FLUARIX/FLULAVAL/FLUZONE QUAD) injection 0.5 mL  sodium chloride (NS) flush 5-10 mL Current Facility-Administered Medications:  
  DOBUTamine (DOBUTREX) 500 mg/250 mL (2,000 mcg/mL) infusion, 3 mcg/kg/min, IntraVENous, CONTINUOUS, Karis Arrieta MD, Last Rate: 10.8 mL/hr at 05/31/20 1024, 3 mcg/kg/min at 05/31/20 1024 
  bicarbonate dialysis (PRISMASOL) BG K 4/Ca 2.5 5000 ml solution, , Extracorporeal, DIALYSIS CONTINUOUS, Robbie Montenegro MD 
  hydrocortisone Sod Succ (PF) (SOLU-CORTEF) injection 50 mg, 50 mg, IntraVENous, Q8H, 50 mg at 05/31/20 0556 **AND** [DISCONTINUED] insulin regular (NOVOLIN R, HUMULIN R) injection 6 Units, 6 Units, SubCUTAneous, Q6H, Erna Holguin MD, Stopped at 05/21/20 1800   pantoprazole (PROTONIX) 40 mg in 0.9% sodium chloride 10 mL injection, 40 mg, IntraVENous, Q12H, Karis Arrieta MD, 40 mg at 05/31/20 1024   scopolamine (TRANSDERM-SCOP) 1 mg over 3 days 1 Patch, 1 Patch, TransDERmal, Q72H, Matthew Beckford MD, 1 Patch at 05/30/20 1409   balsam peru-castor oiL (VENELEX) ointment, , Topical, TID, Pinto, Coreen Pérez MD 
  white petrolatum-mineral oiL (AKWA TEARS) 83-15 % ophthalmic ointment, , Both Eyes, Q12H, Aida Franco MD 
  insulin glargine (LANTUS) injection 40 Units, 40 Units, SubCUTAneous, DAILY, Schenkein, Romy Isbell MD, 40 Units at 05/30/20 1236 
  vancomycin (VANCOCIN) 1,000 mg in 0.9% sodium chloride (MBP/ADV) 250 mL, 1,000 mg, IntraVENous, Q24H, Marylen Neighbors, MD, Last Rate: 125 mL/hr at 05/30/20 1825, 1,000 mg at 05/30/20 1825   polyethylene glycol (MIRALAX) packet 17 g, 17 g, Per NG tube, DAILY PRN, Robert White MD, 17 g at 05/24/20 1703   zinc oxide-cod liver oil (DESITIN) 40 % paste, , Topical, PRN, Robert White MD 
  chlorhexidine (ORAL CARE KIT) 0.12 % mouthwash 15 mL, 15 mL, Oral, Q12H, Marci Gustafson DO, 15 mL at 05/31/20 0464   cefepime (MAXIPIME) 1 g in 0.9% sodium chloride (MBP/ADV) 50 mL, 1 g, IntraVENous, Q8H, Marylen Neighbors, MD, Last Rate: 50 mL/hr at 05/31/20 0420, 1 g at 05/31/20 4243   potassium chloride 20 mEq in 50 ml IVPB, 20 mEq, IntraVENous, PRN, Yassine Mccollum MD 
  calcium chloride injection 2 g, 2 g, IntraVENous, PRN, Cuauhtemoc Montenegro MD 
  acetaminophen (TYLENOL) solution 650 mg, 650 mg, Oral, Q6H PRN, Claire Ball MD, 650 mg at 05/20/20 2132   PHARMACY INFORMATION NOTE, 1 Each, Other, BID, Marci Gustafson DO, 1 Each at 05/31/20 0900 
  heparin (porcine) injection 2,000 Units, 2,000 Units, IntraVENous, PRN, 2,000 Units at 05/30/20 0048 **OR** heparin (porcine) injection 4,000 Units, 4,000 Units, IntraVENous, PRN, Marci Gustafson, DO, 2,000 Units at 05/19/20 0130 
  heparin 25,000 units in D5W 250 ml infusion, 8-25 Units/kg/hr, IntraVENous, TITRATE, Marci Gustafson, DO, Last Rate: 10.9 mL/hr at 05/31/20 0608, 9 Units/kg/hr at 05/31/20 0608   NOREPINephrine (LEVOPHED) 32 mg in 5% dextrose 250 mL infusion, 2-200 mcg/min, IntraVENous, TITRATE, Marci Gustafson DO, Last Rate: 10.8 mL/hr at 20 1235, 23 mcg/min at 20 1235   sodium chloride (NS) flush 5-40 mL, 5-40 mL, IntraVENous, Q8H, Petey Herron MD, 10 mL at 20 0540 
  sodium chloride (NS) flush 5-40 mL, 5-40 mL, IntraVENous, PRN, Petey Herron MD, 40 mL at 20 1203   ondansetron (ZOFRAN) injection 4 mg, 4 mg, IntraVENous, Q8H PRN, Petey Herron MD, 4 mg at 20 0137 
  alcohol 62% (NOZIN) nasal  1 Ampule, 1 Ampule, Topical, Q12H, Petey Herron MD, 1 Ampule at 20 0814 
  insulin lispro (HUMALOG) injection, , SubCUTAneous, Q6H, Emelina Hampton MD, 2 Units at 20 3006   glucose chewable tablet 16 g, 4 Tab, Oral, PRN, Emelina Hampton MD 
  dextrose (D50W) injection syrg 12.5-25 g, 12.5-25 g, IntraVENous, PRN, Avel HILLS MD, 12.5 g at 20 0112 
  glucagon (GLUCAGEN) injection 1 mg, 1 mg, IntraMUSCular, PRN, Emelina Hampton MD 
  acetaminophen (TYLENOL) tablet 650 mg, 650 mg, Oral, Q6H PRN, 650 mg at 20 0808 **OR** acetaminophen (TYLENOL) suppository 650 mg, 650 mg, Rectal, Q6H PRN, Emelina Hampton MD, 650 mg at 20 1449   influenza vaccine - (6 mos+)(PF) (FLUARIX/FLULAVAL/FLUZONE QUAD) injection 0.5 mL, 0.5 mL, IntraMUSCular, PRIOR TO DISCHARGE, José Quiñonez DO 
  sodium chloride (NS) flush 5-10 mL, 5-10 mL, IntraVENous, PRN, Vandana March MD 
 
 
Objective:  
 
Vital Signs: Telemetry:    AFIB Intake/Output:  
Visit Vitals BP (!) 82/39 Pulse 91 Temp 98.9 °F (37.2 °C) Resp 17 Ht 6' 3\" (1.905 m) Wt 121.6 kg (268 lb 1.3 oz) SpO2 100% BMI 33.51 kg/m² Temp (24hrs), Av.5 °F (36.9 °C), Min:98.1 °F (36.7 °C), Max:98.9 °F (37.2 °C) O2 Device: Endotracheal tube, Ventilator O2 Flow Rate (L/min): 60 l/min Body mass index is 33.51 kg/m². Wt Readings from Last 4 Encounters:  
20 121.6 kg (268 lb 1.3 oz) 01/19/19 123.8 kg (273 lb) 08/04/17 129.3 kg (285 lb)  
03/20/17 107.5 kg (237 lb) Intake/Output Summary (Last 24 hours) at 5/31/2020 1248 Last data filed at 5/31/2020 0600 Gross per 24 hour Intake 1071.54 ml Output 150 ml Net 921.54 ml Last shift:      No intake/output data recorded. Last 3 shifts: 05/29 1901 - 05/31 0700 In: 2092.6 [I.V.:1017.6] Out: 150 [Drains:150] Hemodynamics:   
CO:   
CI:   
CVP: CVP (mmHg): 8 mmHg (05/27/20 1030) SVR:   PAP Systolic:   
PAP Diastolic:   
PVR:   
CK62:    
 
Ventilator Settings:     
Mode Rate TV Press PEEP FiO2 PIP Min. Vent Assist control    500 ml 6 cm H2O  6 cm H20 30 %  27 cm H2O  8.64 l/min Physical Exam: 
 
General: intubated, lethargic but more alert HEENT: NCAT, poor dentition, lips and mucosa dry Eyes: anicteric; conjunctiva clear Neck: no nodes, no cuff leak, no accessory MM use. Chest: no deformity,  
Cardiac: regular; no murmur Lungs: no distress, intubated Abd: soft, NT, hypoactive BS Ext: no edema; no joint swelling; No clubbing Neuro: no localizing findings Data:  
 
Current Facility-Administered Medications Medication Dose Route Frequency  DOBUTamine (DOBUTREX) 500 mg/250 mL (2,000 mcg/mL) infusion  3 mcg/kg/min IntraVENous CONTINUOUS  
 bicarbonate dialysis (PRISMASOL) BG K 4/Ca 2.5 5000 ml solution   Extracorporeal DIALYSIS CONTINUOUS  
 hydrocortisone Sod Succ (PF) (SOLU-CORTEF) injection 50 mg  50 mg IntraVENous Q8H  
 pantoprazole (PROTONIX) 40 mg in 0.9% sodium chloride 10 mL injection  40 mg IntraVENous Q12H  
 scopolamine (TRANSDERM-SCOP) 1 mg over 3 days 1 Patch  1 Patch TransDERmal Q72H  balsam peru-castor oiL (VENELEX) ointment   Topical TID  white petrolatum-mineral oiL (AKWA TEARS) 83-15 % ophthalmic ointment   Both Eyes Q12H  
 insulin glargine (LANTUS) injection 40 Units  40 Units SubCUTAneous DAILY  vancomycin (VANCOCIN) 1,000 mg in 0.9% sodium chloride (MBP/ADV) 250 mL 1,000 mg IntraVENous Q24H  chlorhexidine (ORAL CARE KIT) 0.12 % mouthwash 15 mL  15 mL Oral Q12H  cefepime (MAXIPIME) 1 g in 0.9% sodium chloride (MBP/ADV) 50 mL  1 g IntraVENous Q8H  
 PHARMACY INFORMATION NOTE  1 Each Other BID  heparin 25,000 units in D5W 250 ml infusion  8-25 Units/kg/hr IntraVENous TITRATE  
 NOREPINephrine (LEVOPHED) 32 mg in 5% dextrose 250 mL infusion  2-200 mcg/min IntraVENous TITRATE  sodium chloride (NS) flush 5-40 mL  5-40 mL IntraVENous Q8H  
 alcohol 62% (NOZIN) nasal  1 Ampule  1 Ampule Topical Q12H  
 insulin lispro (HUMALOG) injection   SubCUTAneous Q6H  
 influenza vaccine 2019-20 (6 mos+)(PF) (FLUARIX/FLULAVAL/FLUZONE QUAD) injection 0.5 mL  0.5 mL IntraMUSCular PRIOR TO DISCHARGE Labs: 
Recent Labs 05/31/20 
2379 05/30/20 
1603 05/29/20 
0308 WBC 20.0* 20.5* 20.4* HGB 9.4* 9.9* 10.4* HCT 27.6* 28.5* 29.9*  
 195 192 Recent Labs 05/31/20 
5075 05/30/20 
9613 05/29/20 
0308 * 136 136  
K 4.2 3.9 3.7  106 105 CO2 21 24 27 * 132* 192* BUN 84* 58* 41* CREA 3.80* 2.76* 2.07* CA 8.0* 7.7* 7.5* MG 2.6* 2.6* 2.6* PHOS 6.7* 3.6 2.8 ALB 1.8* 1.8* 1.9* TBILI 14.0* 13.0* 12.8* * 144* 166* Recent Labs 05/31/20 
1457 05/30/20 
2531 05/29/20 
4400 PHI 7.337* 7.392 7.412 PCO2I 40.0 40.4 40.0 PO2I 160* 106* 120* HCO3I 21.5* 24.5 25.5 FIO2I 0.30 30 30 Imaging: 
I have personally reviewed the patients radiographs and have reviewed the reports: 
No change Total critical care time exclusive of procedures: 35 minutes Irma Martinez MD

## 2020-05-31 NOTE — PROGRESS NOTES
05/31/20 5580 ABCDEF Bundle SBT Safety Screen Passed No  
SBT Screen Reason for Failure Vasopressor use

## 2020-06-01 NOTE — PROGRESS NOTES
Nutrition Assessment: 
 
RECOMMENDATIONS:  
Increase TF Goal Rate to Nepro @ 35mL/h + Prosource 8 x day + 50mL H2O flush q 6h (provides 1992kcals/188gPro/135gCHO/810mL) ASSESSMENT:  
Chart reviewed, case discussed during CCU rounds. Pt remains intubated and is off of sedation, needs re-estimated. TF at goal rate with minimal residuals. Levo at 28, MAP in the 60-70's. Pt is on CVVH with factor of 100. Baldomero Bolster now in place for diarrhea. Electrolytes WNL. TF only meets 69% kcal needs, will bump up to meet 88% kcal needs and provide 1.5gPro/kg. Dietitians Intervention(s)/Plan(s): Increase TF, monitor tolerance SUBJECTIVE/OBJECTIVE:  
Pt intubated Diet Order: NPO, Other (comment)(TF via OGT: Nepro @ 25mL/h + Prosource 8 x day + 50mL H2O flush q 6h (provides 1560kcals/169gPro/97gCHO/636mL) ) 
% Eaten:  No data found. Nepro  at 25 mL/hr flush with 50 mL  Q6H  via OG Tube   Residuals: 40 mL Pertinent Medications:solucortef, lantus, humalog, protonix; Drips: levo, dobutamine. I/O's:+9.6L Chemistries: 
Lab Results Component Value Date/Time Sodium 134 (L) 06/01/2020 02:11 AM  
 Potassium 3.8 06/01/2020 02:11 AM  
 Chloride 104 06/01/2020 02:11 AM  
 CO2 24 06/01/2020 02:11 AM  
 Anion gap 6 06/01/2020 02:11 AM  
 Glucose 169 (H) 06/01/2020 02:11 AM  
 BUN 73 (H) 06/01/2020 02:11 AM  
 Creatinine 3.14 (H) 06/01/2020 02:11 AM  
 BUN/Creatinine ratio 23 (H) 06/01/2020 02:11 AM  
 GFR est AA 24 (L) 06/01/2020 02:11 AM  
 GFR est non-AA 20 (L) 06/01/2020 02:11 AM  
 Calcium 7.6 (L) 06/01/2020 02:11 AM  
 Albumin 1.8 (L) 06/01/2020 02:11 AM  
  
Anthropometrics: Height: 6' 3\" (190.5 cm) Weight: 121.6 kg (268 lb 1.3 oz)  [] standing scale    [x]bed scale    []stated   []unknown IBW (%IBW):   ( ) UBW (%UBW):   (  %) BMI: Body mass index is 33.51 kg/m². This BMI is indicative of: 
[]Underweight   []Normal   []Overweight   [x] Obesity   [] Extreme Obesity (BMI>40) Estimated Nutrition Needs (Based on): 8251 Kcals/day(PSU (MSJ 2086)) , 134 g(-178 (1.5-2gPro/kg) ) Protein Carbohydrate: At Least 130 g/day  Fluids: per MD mL/day Last BM: Flexiseal   [x]Active     []Hyperactive  []Hypoactive       [] Absent   BS Skin:    [x] Intact   [] Incision  [] Breakdown   [] DTI   [x] Tears/Excoriation/Abrasion  [x]Edema(pitting-generalized; +1 pitting-BUE;+1-BLE) [] Other: Wt Readings from Last 30 Encounters:  
05/31/20 121.6 kg (268 lb 1.3 oz) 01/19/19 123.8 kg (273 lb) 08/04/17 129.3 kg (285 lb)  
03/20/17 107.5 kg (237 lb) 02/14/17 149.2 kg (328 lb 14.8 oz) 06/21/16 138.3 kg (305 lb)  
03/16/16 151.4 kg (333 lb 11.2 oz) 08/02/13 136.1 kg (300 lb) 10/16/10 133.8 kg (295 lb)  
07/14/10 133.8 kg (295 lb) 05/14/10 133.4 kg (294 lb) Dx of Malnutrition since admission: UTD NUTRITION DIAGNOSES:  
Problem:  Inadequate protein-energy intake Etiology: related to pt NPO 2' vent Signs/Symptoms: as evidenced by NPO + propofol meets <15% kcal and 0% protein needs. Previous dx resolving NUTRITION INTERVENTIONS: 
  Enteral/Parenteral Nutrition: Modify rate, concentration, composition, and schedule GOAL:  
Pt will tolerate TF @ new goal rate with residuals <500mL in 2-4 days. NUTRITION MONITORING AND EVALUATION Previous Goal: Pt will tolerate TF @ goal rate with residuals <500mL and a BM in 2-4 days Previous Goal Met: Yes Previous Recommendations Implemented: Yes Cultural, Yazdanism, or Ethnic Dietary Needs: None LEARNING NEEDS (Diet, Food/Nutrient-Drug Interaction):  
 [x] None Identified 
 [] Identified and Education Provided/Documented 
 [] Identified and Pt declined/was not appropriate [x] Interdisciplinary Care Plan Reviewed/Documented  
 [x] Participated in Discharge Planning: UTD [x] Interdisciplinary Rounds NUTRITION RISK:  
 [x] High              [] Moderate           []  Low  []  Minimal/Uncompromised Rigo Almanza RD, [facility] Pager [phone] Weekend Pager [phone]

## 2020-06-01 NOTE — DIABETES MGMT
1545 [facility] PROGRAM FOR DIABETES HEALTH 
 
FOLLOW-UP NOTE Presentation Ivana Vasquez a 71 y. o. male admitted from the ER 5/15/20 with complaints of weakness. No COVID-19 symptoms or contacts noted. Fever. CXR, CT Head & CT ABd negative. Blood cultures positive for alpha streptococcus. Markedly elevated ferritin DX: [id number]. Septic shock. Multiorgan failure TX: Anti-COVID therapies. Pacer. Palliative care. Recent diagnostics: 
5/21/20 Hepatitis B surface Ab Reactive 5/22/20 WBC 22.9. Albumin 2.8. Bili 6.8. . . Procalcitonin 203.42. Triglyceride <15. Serum creatinine 4.73/GFR 15. CXR: Stable bibasilar opacities and pleural effusions 5/26/20 WBC 27.2. CXR: Persistent pleural effusions with bibasilar atelectasis. Atelectasis at the right lung base has increased. Ferritin 1682. CRP 5.09. Procalcitonin 55.12 
5/27/20 D-Dimer 5.97. US ABd: Negative 5/28/20 D-Dimer 4.92. Procalcitonin 32.01. CXR: Presence of bibasilar hazy density (left greater than right) possibly representing developing infiltration. Improved aeration of the right lung base 5/29/20 Ferritin 1606. CRP 3.38. D-Dimer 5. 13. Blood cultures without growth 6/1/20 WBC 17.2. CRP 3.97. Ferritin 1509. BNP 33, 805. D-Dimer. Bili 13.8. CXR: Acute process Critical care measures: 
            AC Vent with PEEP via ET (Off sedation) 
            BP management via norepinephrine (levophed) & Dobutamine  
            TF via NG 
            Insulin 
            Steroids             Dialysis via [facility] 
            GI prophylaxis             [id number] infusion 
  
Diabetes: Patient has known Type 2 diabetes, treated with Trulicity, Onglyza & Amaryl PTA (per PTA med list). Family history positive for diabetes in father.  
 
Subjective Continues no room access due to COVID-19 precautions Objective Physical exam 
General Responds to voice Vital Signs Afebrile. AFib Visit Vitals /52 Pulse 93 Temp 97.4 °F (36.3 °C) Resp 22 Ht 6' 3\" (1.905 m) Wt 121.6 kg (268 lb 1.3 oz) SpO2 98% BMI 33.51 kg/m² Laboratory Lab Results Component Value Date/Time Hemoglobin A1c 9.8 (H) 02/09/2017 05:42 AM  
 Hemoglobin A1c, External 9.0 03/02/2016 Lab Results Component Value Date/Time LDL, calculated 38.4 03/03/2009 09:40 PM  
 
Lab Results Component Value Date/Time Creatinine 3.14 (H) 06/01/2020 02:11 AM  
 
Lab Results Component Value Date/Time Sodium 134 (L) 06/01/2020 02:11 AM  
 Potassium 3.8 06/01/2020 02:11 AM  
 Chloride 104 06/01/2020 02:11 AM  
 CO2 24 06/01/2020 02:11 AM  
 Anion gap 6 06/01/2020 02:11 AM  
 Glucose 169 (H) 06/01/2020 02:11 AM  
 BUN 73 (H) 06/01/2020 02:11 AM  
 Creatinine 3.14 (H) 06/01/2020 02:11 AM  
 BUN/Creatinine ratio 23 (H) 06/01/2020 02:11 AM  
 GFR est AA 24 (L) 06/01/2020 02:11 AM  
 GFR est non-AA 20 (L) 06/01/2020 02:11 AM  
 Calcium 7.6 (L) 06/01/2020 02:11 AM  
 Bilirubin, total 13.8 (H) 06/01/2020 02:11 AM  
 Bilirubin, total 13.8 (H) 06/01/2020 02:11 AM  
 Alk. phosphatase 286 (H) 06/01/2020 02:11 AM  
 Protein, total 6.5 06/01/2020 02:11 AM  
 Albumin 1.8 (L) 06/01/2020 02:11 AM  
 Globulin 4.7 (H) 06/01/2020 02:11 AM  
 A-G Ratio 0.4 (L) 06/01/2020 02:11 AM  
 ALT (SGPT) 126 (H) 06/01/2020 02:11 AM  
 
Lab Results Component Value Date/Time ALT (SGPT) 126 (H) 06/01/2020 02:11 AM  
 
Factors affecting BG pattern Factor Dose Comments Nutrition: 
Tube feeding via NG  
97 grams/24 hrs Drugs: 
Steroids Hydrocortisone 50mg Q8 hrs Pain Infection: COVID-19   Remains positive Other: CKD Dialysis Blood glucose pattern Assessment and Plan Nursing Diagnosis Risk for unstable blood glucose pattern Nursing Intervention Domain 3599 Decision-making Support Nursing Interventions Examined current inpatient diabetes control Explored factors facilitating and impeding inpatient management Evaluation This gentleman with known Type 2 diabetes has achieved inpatient BG targets. Steroid dosing could be reduced tomorrow. BG readings are in the mid-100s on basal insulin at 0.3 units/kg/D dosing with corrective insulin. No need to change action plan at this time. Recommendations Recommend: 
 
Continuing same insulin approach; will revisit dosing as steroid dose is changed/reduced Billing Code(s) [x] 45591 IP subsequent hospital care - 15 minutes COLLETTE Orta Access via [employer] [phone]

## 2020-06-01 NOTE — PROGRESS NOTES
PULMONARY ASSOCIATES [facility] INTENSIVIST Consult Service Note Pulmonary, Critical Care, and Sleep Medicine Name: Beau Bennett MRN: [id number] : 1955 Hospital: [facility], [facility] Date: 2020  Admission date: 5/15/2020 Hospital Day: 25 Subjective/Interval History:  
 
20: off sedation but only grimaces to noxious stimuli. Remains critically ill on mechanical ventilation/pressors 20: remains critically ill on mechanical ventilation/pressors. Off sedation for about 48 hours. Minimal response thus far.  
20: no events. Remains intubated and on pressors. Followed a few commands intermittently overnight per nursing. No data available as EMR has been down until a few minutes ago. 20: more alert and following commands today. Still very weak. Actually doing well on SBT. 
- Intubated, off sedation. Bloody secretions through ETT and NGT per RN. On heparin drip for cardiac issues. Increased pressor requirement today. Increased secretions- SBT held. Weak : Intubated, off sedation, follows commands per RN. Still on high dose NE at 17 mcg/min. Increased bilirubin. Last LVEF in  was 20%-- still covid positive, no recent reassessment. On heparin drip-- bleeding from mucosal sites a little better today. Very weak  on CRRT, On Vent. Iv levo 28. IV  3; IV heparin. Bleeding from gums. LVEF 35% on IV inotrope, pressors. Alert. Follows commands. Able to move hands and forearms easily but feet and ankles very weak. D/w dr. Becky Witt IMPRESSION:  
1. Acute hypoxic respiratory failure, intubated -18; at risk for diaphragm atrophy 2. Profound septic shock on high level of pressors. 3. COVID 19 pneumonia- good gas exchange 4. Worsening hyperbil, ? etiology 5. Alpha strep septicemia- elevated Procalcitonin > 80 
6. Chronic combined systolic/diastolic heart failure; echo /31 LVEF 35% on IV , Iv levo 7. Hypertensive heart disease with CHF and CKD, previous severe LV function 8. LAQUITA/CKD, CVVH to start 9. Chronic anticoagulation at home- eliquis 10. Diabetes mellitus type 2 uncontrolled 11. Hyperlipidemia 12. 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 Chronic atrial fibrillation 14. Status post SJM BIV-ICD implant in 2016 
15. Sleep apnea, undiagnosed/untreated 16. Obesity with recent weight gain 17. Possible critical illness polyneuropathy/myopathy RECOMMENDATIONS/PLAN:  
1. Ventilator support - doing quite well on SBT but still quite weak. Hopefully can be extubated this week as alertness hopefully continues to improve-- may need trach 2. Secretion management- add scopolamine 3. Monitor bilirubin - US unremarkable, ?hemolysis though hemoglobin stable and fibrinogen normal, consider HIDA scan 4. Check PT/IRN, if coagulopathy, would hold heparin 5. low dose dobutamine - hoping to see stabilization of renal and hepatic function 6. Continue pressors, wean as able 7. Holding sedation-- no indication of non-verbal pain per RN 
8. Continue antibiotics 9. Stress dose steroids- was increased to see if it helped with weaning pressors? 10. RRT per nephrology 11. Heparin drip but no bolus 12. Cardiology following 13. Glycemic monitoring and control 14. Replete electrolytes PRN 15. DVT, SUP prophylaxis- changed to protonix in view in GI bleed on heparin 16. Remains critically ill, at high risk for decline. CCT 35'. D/W RN Subjective/Initial History:  
I have reviewed the flowsheet and previous days notes. Seen earlier today on rounds. I was asked by Alinda Peabody, MD to see Diane House a 71 y.o.  male  in consultation for a chief complaint of shock. Excerpts from admission 5/15/2020 and consult notes reviewed as follows:  
 
\"Mr. Magi Mann is a 57 y.o. morbidly obese male with Chronic nonischemic dilated cardiomyopathy EF 50%, Chronic systolic congestive heart failure class, H/o atrial fibrillation, On chronic warfarin, Iatrogenic left bundle branch block with predominant RV pacing, 64-70%, Hypertension, Hyperlipidemia and diabetes presents to [address] [facility] ED C/O Worsening exertional shortness of breath and around 20 pounds weight gain in last 3 weeks. \" 
 
Pt now in CCU. Poor historian. On room air. No fever. No cough. No diarrhea. Not very active. Saw PCP three weeks ago. Some edema. Chart notes [id number] [facility] admission in 2017 with decompensation. Patient PCP: Nathan Mora MD 
PMH:  has a past medical history of A-fib ([facility] [address] Arrhythmia, Diabetes ([facility] [address] Endocrine disease, Hypertension, and Irregular heart beat. He also has no past medical history of Difficult intubation, Malignant hyperthermia due to anesthesia, Nausea & vomiting, or Pseudocholinesterase deficiency. PSH:   has a past surgical history that includes pr cardiac surg procedure unlist; insert emergency endotrach airway (5/18/2020); and ir insert non tunl cvc over 5 yrs (5/18/2020). FHX: family history includes Diabetes in his father; Heart Disease in his father and mother. SHX:  reports that he quit smoking about 26 years ago. He has never used smokeless tobacco. He reports that he does not drink alcohol or use drugs. ROS:A comprehensive review of systems was negative except for that written in the HPI. No Known Allergies MEDS:  
Current Facility-Administered Medications Medication  DOBUTamine (DOBUTREX) 500 mg/250 mL (2,000 mcg/mL) infusion  bicarbonate dialysis (PRISMASOL) BG K 4/Ca 2.5 5000 ml solution  hydrocortisone Sod Succ (PF) (SOLU-CORTEF) injection 50 mg  
 pantoprazole (PROTONIX) 40 mg in 0.9% sodium chloride 10 mL injection  scopolamine (TRANSDERM-SCOP) 1 mg over 3 days 1 Patch  balsam peru-castor oiL (VENELEX) ointment  white petrolatum-mineral oiL (AKWA TEARS) 83-15 % ophthalmic ointment  insulin glargine (LANTUS) injection 40 Units  polyethylene glycol (MIRALAX) packet 17 g  
 zinc oxide-cod liver oil (DESITIN) 40 % paste  chlorhexidine (ORAL CARE KIT) 0.12 % mouthwash 15 mL  potassium chloride 20 mEq in 50 ml IVPB  calcium chloride injection 2 g  
 acetaminophen (TYLENOL) solution 650 mg  PHARMACY INFORMATION NOTE  heparin (porcine) injection 2,000 Units Or  heparin (porcine) injection 4,000 Units  heparin 25,000 units in D5W 250 ml infusion  NOREPINephrine (LEVOPHED) 32 mg in 5% dextrose 250 mL infusion  sodium chloride (NS) flush 5-40 mL  sodium chloride (NS) flush 5-40 mL  ondansetron (ZOFRAN) injection 4 mg  alcohol 62% (NOZIN) nasal  1 Ampule  insulin lispro (HUMALOG) injection  glucose chewable tablet 16 g  
 dextrose (D50W) injection syrg 12.5-25 g  
 glucagon (GLUCAGEN) injection 1 mg  acetaminophen (TYLENOL) tablet 650 mg Or  acetaminophen (TYLENOL) suppository 650 mg  
 influenza vaccine - (6 mos+)(PF) (FLUARIX/FLULAVAL/FLUZONE QUAD) injection 0.5 mL  sodium chloride (NS) flush 5-10 mL Objective:  
 
Vital Signs: Telemetry:    AFIB Intake/Output:  
Visit Vitals BP 99/46 Pulse 79 Temp 96.9 °F (36.1 °C) Resp 17 Ht 6' 3\" (1.905 m) Wt 121.6 kg (268 lb 1.3 oz) SpO2 99% BMI 33.51 kg/m² Temp (24hrs), Av.4 °F (36.3 °C), Min:96.9 °F (36.1 °C), Max:97.6 °F (36.4 °C) O2 Device: Endotracheal tube O2 Flow Rate (L/min): 60 l/min Body mass index is 33.51 kg/m². Wt Readings from Last 4 Encounters:  
20 121.6 kg (268 lb 1.3 oz) 19 123.8 kg (273 lb) 17 129.3 kg (285 lb)  
17 107.5 kg (237 lb) Intake/Output Summary (Last 24 hours) at 2020 1315 Last data filed at 2020 1300 Gross per 24 hour Intake 1764.06 ml Output 3761 ml Net -1996.94 ml Last shift:       0701 -  1900 In: 100 Out: 985 Last 3 shifts: 05/30 1901 - 06/01 0700 In: 2649.4 [I.V.:1469.4] Out: 2926 [Drains:400] Hemodynamics:   
CO:   
CI:   
CVP: CVP (mmHg): 8 mmHg (05/27/20 1030) SVR:   PAP Systolic:   
PAP Diastolic:   
PVR:   
XZ88:    
 
Ventilator Settings:     
Mode Rate TV Press PEEP FiO2 PIP Min. Vent Assist control    500 ml 6 cm H2O  6 cm H20 30 %  27 cm H2O  11.5 l/min Physical Exam: 
 
General: intubated, lethargic but more alert HEENT: NCAT, poor dentition, lips and mucosa dry Eyes: anicteric; conjunctiva clear Neck: no nodes, no cuff leak, no accessory MM use. Chest: no deformity,  
Cardiac: regular; no murmur Lungs: no distress, intubated Abd: soft, NT, hypoactive BS Ext: no edema; no joint swelling; No clubbing Neuro: no localizing findings Data:  
            
Labs: 
Recent Labs  
  06/01/20 
0211 05/31/20 
5244 05/30/20 
6475 WBC 17.2* 20.0* 20.5* HGB 8.9* 9.4* 9.9*  
HCT 25.6* 27.6* 28.5*  
 210 195 Recent Labs  
  06/01/20 
0211 05/31/20 
1210 05/31/20 
0427 05/30/20 
1264 * 134* 135* 136  
K 3.8 4.4 4.2 3.9  104 104 106 CO2 24 22 21 24 * 220* 159* 132* BUN 73* 99* 84* 58* CREA 3.14* 4.23* 3.80* 2.76* CA 7.6* 7.4* 8.0* 7.7* MG 2.7*  --  2.6* 2.6* PHOS 3.9 7.4* 6.7* 3.6 ALB 1.8* 1.8* 1.8* 1.8* TBILI 13.8*  13.8*  --  14.0* 13.0* *  --  128* 144* INR  --  1.5*  --   --   
 
Recent Labs  
  06/01/20 
5762 05/31/20 
8574 05/30/20 
9806 PHI 7.385 7.337* 7.392 PCO2I 36.6 40.0 40.4 PO2I 166* 160* 106* HCO3I 21.9* 21.5* 24.5 FIO2I 30 0.30 30 Imaging: 
I have personally reviewed the patients radiographs and have reviewed the reports: 
No change Total critical care time exclusive of procedures: 35 minutes Kvng Kingsley MD

## 2020-06-01 NOTE — INTERDISCIPLINARY ROUNDS
Critical care interdisciplinary rounds held on 06/01/2020. Following members present, Pharmacy, Diabetes Treatment, Case Management, Respiratory Therapy, Physical Therapy, Palliative Care and Nutrition. Led by JEAN Lind RN, RN and Dr. Marlene Raphael. Plan of care discussed. See clinical pathway for plan of care and interventions and desired outcomes.

## 2020-06-01 NOTE — PROGRESS NOTES
06/01/20 0920 ABCDEF Bundle SBT Safety Screen Passed No  
SBT Screen Reason for Failure Vasopressor use

## 2020-06-01 NOTE — PROGRESS NOTES
Progress Note 6/1/2020 11:43 AM 
NAME: Roz Harden MRN:  [id number] Admit Diagnosis: Sepsis ([facility] [address] [A41.9] Problem List: 1. Shock; septic/cardiogenic 2. Severe sepsis 3. COVID-19 + 4. NSTEMI 5. Hyponatremia 6. Lactic acidosis 7. Acute liver injury 8. Acute on chronic systolic heart failure; Class IV 9. Acute kidney injury; anuric 10. Coagulopathy 11. Nonischemic dilated cardiomyopathy s/p remote ICD 12. Chronic atrial fibrillation 13. Sleep apnea 14. Hypertensive heart disease w/ CKD and HF Assessment/Plan:  
 
Intake/Output Summary (Last 24 hours) at 6/1/2020 5435 Last data filed at 6/1/2020 0577 Gross per 24 hour Intake 1914.06 ml Output 2776 ml Net -861.94 ml Ricky off Vaso off Norepi @ 20 Dobut off Epi off 30% FiO2. Following commands. Off sedation for a while. Bloody secretions. HgB 9s. 
 
1. Continue supportive care 2. Heparin gtt. 3. CVVH ongoing; tolerating full factor 4. Remains critically ill 5. Discussions ongoing w/ family daily [x]       High complexity decision making was performed in this patient at high risk for decompensation with multiple organ involvement. Subjective:  
 
Roz Harden is intubated and sedated. Discussed with RN events overnight. Review of Systems: 
 
Symptom Y/N Comments  Symptom Y/N Comments Fever/Chills N   Chest Pain N Poor Appetite N   Edema N   
Cough N   Abdominal Pain N Sputum N   Joint Pain N   
SOB/GROVES N   Pruritis/Rash N   
Nausea/vomit N   Tolerating PT/OT Y Diarrhea N   Tolerating Diet Y Constipation N   Other Could NOT obtain due to: sedated Objective:  
  
Physical Exam: 
 
Last 24hrs VS reviewed since prior progress note. Most recent are: 
 
Visit Vitals /50 Pulse 71 Temp 97.5 °F (36.4 °C) Resp 14 Ht 6' 3\" (1.905 m) Wt 121.6 kg (268 lb 1.3 oz) SpO2 99% BMI 33.51 kg/m² Intake/Output Summary (Last 24 hours) at 6/1/2020 5299 Last data filed at 6/1/2020 9758 Gross per 24 hour Intake 1914.06 ml Output 2776 ml Net -861.94 ml General Appearance: Well developed, well nourished, intubated. Ears/Nose/Mouth/Throat: Hearing grossly normal. 
Neck: Supple. Chest: Lungs decreased diffusely Cardiovascular: Irregular rate and rhythm, S1S2 normal, no murmur. Abdomen: Soft, non-tender, bowel sounds are active. Extremities: No edema bilaterally. Skin: Warm and dry. []         Post-cath site without hematoma, bruit, tenderness, or thrill. Distal pulses intact. PMH/SH reviewed - no change compared to H&P Data Review Telemetry: paced/AF 
 
EKG:  
[x]  No new EKG for review Lab Data Personally Reviewed: 
 
Recent Labs  
  06/01/20 0211 05/31/20 
9536 WBC 17.2* 20.0* HGB 8.9* 9.4* HCT 25.6* 27.6*  
 210 Recent Labs  
  06/01/20 0211 05/31/20 
1835 05/31/20 
1210 INR  --   --  1.5* PTP  --   --  15.3* APTT 41.6* 79.7* 81.4* Recent Labs  
  06/01/20 
0211 05/31/20 
1210 05/31/20 
0427 05/30/20 
5770 * 134* 135* 136  
K 3.8 4.4 4.2 3.9  104 104 106 CO2 24 22 21 24 BUN 73* 99* 84* 58* CREA 3.14* 4.23* 3.80* 2.76* * 220* 159* 132* CA 7.6* 7.4* 8.0* 7.7* MG 2.7*  --  2.6* 2.6* No results for input(s): CPK, CKNDX, TROIQ in the last 72 hours. No lab exists for component: CPKMB Lab Results Component Value Date/Time Cholesterol, total 92 03/03/2009 09:40 PM  
 HDL Cholesterol 44 03/03/2009 09:40 PM  
 LDL, calculated 38.4 03/03/2009 09:40 PM  
 Triglyceride <15 05/22/2020 05:13 AM  
 CHOL/HDL Ratio 2.1 03/03/2009 09:40 PM  
 
 
Recent Labs  
  06/01/20 
0211 05/31/20 
1210 05/31/20 
0427 05/30/20 
0557 *  --  246* 282* TP 6.5  --  6.4 6.4 ALB 1.8* 1.8* 1.8* 1.8*  
GLOB 4.7*  --  4.6* 4.6* No results for input(s): PH, PCO2, PO2 in the last 72 hours. Medications Personally Reviewed: 
 
Current Facility-Administered Medications Medication Dose Route Frequency  DOBUTamine (DOBUTREX) 500 mg/250 mL (2,000 mcg/mL) infusion  3 mcg/kg/min IntraVENous CONTINUOUS  
 bicarbonate dialysis (PRISMASOL) BG K 4/Ca 2.5 5000 ml solution   Extracorporeal DIALYSIS CONTINUOUS  
 hydrocortisone Sod Succ (PF) (SOLU-CORTEF) injection 50 mg  50 mg IntraVENous Q8H  
 pantoprazole (PROTONIX) 40 mg in 0.9% sodium chloride 10 mL injection  40 mg IntraVENous Q12H  
 scopolamine (TRANSDERM-SCOP) 1 mg over 3 days 1 Patch  1 Patch TransDERmal Q72H  balsam peru-castor oiL (VENELEX) ointment   Topical TID  white petrolatum-mineral oiL (AKWA TEARS) 83-15 % ophthalmic ointment   Both Eyes Q12H  
 insulin glargine (LANTUS) injection 40 Units  40 Units SubCUTAneous DAILY  polyethylene glycol (MIRALAX) packet 17 g  17 g Per NG tube DAILY PRN  
 zinc oxide-cod liver oil (DESITIN) 40 % paste   Topical PRN  chlorhexidine (ORAL CARE KIT) 0.12 % mouthwash 15 mL  15 mL Oral Q12H  potassium chloride 20 mEq in 50 ml IVPB  20 mEq IntraVENous PRN  
 calcium chloride injection 2 g  2 g IntraVENous PRN  
 acetaminophen (TYLENOL) solution 650 mg  650 mg Oral Q6H PRN  
 PHARMACY INFORMATION NOTE  1 Each Other BID  heparin (porcine) injection 2,000 Units  2,000 Units IntraVENous PRN Or  
 heparin (porcine) injection 4,000 Units  4,000 Units IntraVENous PRN  
 heparin 25,000 units in D5W 250 ml infusion  8-25 Units/kg/hr IntraVENous TITRATE  
 NOREPINephrine (LEVOPHED) 32 mg in 5% dextrose 250 mL infusion  2-200 mcg/min IntraVENous TITRATE  sodium chloride (NS) flush 5-40 mL  5-40 mL IntraVENous Q8H  
 sodium chloride (NS) flush 5-40 mL  5-40 mL IntraVENous PRN  
 ondansetron (ZOFRAN) injection 4 mg  4 mg IntraVENous Q8H PRN  
 alcohol 62% (NOZIN) nasal  1 Ampule  1 Ampule Topical Q12H  
 insulin lispro (HUMALOG) injection   SubCUTAneous Q6H  
 glucose chewable tablet 16 g  4 Tab Oral PRN  
  dextrose (D50W) injection syrg 12.5-25 g  12.5-25 g IntraVENous PRN  
 glucagon (GLUCAGEN) injection 1 mg  1 mg IntraMUSCular PRN  
 acetaminophen (TYLENOL) tablet 650 mg  650 mg Oral Q6H PRN Or  
 acetaminophen (TYLENOL) suppository 650 mg  650 mg Rectal Q6H PRN  
 influenza vaccine 2019-20 (6 mos+)(PF) (FLUARIX/FLULAVAL/FLUZONE QUAD) injection 0.5 mL  0.5 mL IntraMUSCular PRIOR TO DISCHARGE  sodium chloride (NS) flush 5-10 mL  5-10 mL IntraVENous PRN Iman Decree III, DO

## 2020-06-01 NOTE — PROGRESS NOTES
0700  Report from [address] [place] [address] [address] 
 
0800  Assessment complete. Patient awake alert follows commands able to nod yes/no. Able to move upper ext's  weak but equal.  Not able to move lower ext's to weak. Lungs coarse and diminished in all fields. On vent tolerating settings ETT at 26 cm. Patient has bloody oral secretions that needed suctioning. Heart IRR AICD in place. Abdomen semi-soft hypoactive sounds Renal tube feedings at 25 ml/hr at goal  OG at 68 cm. Right IJ in place with Levo at 28 mcg Dobutamine at 3 mcg Heparin at 7 units and a KVO. Patient is anuric. Jasmyn Vazquez 281 running in right jacinta 4k bags with a factor at 100 pressures holding steady. 1000  Patient resting no signs of acute distress 1130  PTT 44.7 no bolus due to bleeding gums  Increase drip to 9 units verified with Mary Ellen GARCIA 
 
1200  Assessment complete no change  VSS  CVVH pressures holding steady  Oral suction bloody secretions 1400  No signs of distress noted 1600  Assessment complete no changes VSS 
 
1620  Zoom conference with family 1723  PTT 63.5 no change verified with Lara Watson [facility] 
 
1800  No distress noted 1900  Report given to [facility] VIVI SANDOVAL

## 2020-06-01 NOTE — DIALYSIS
[facility] [place]       [phone] Orders Mode: CVVHD Factor: 100 ml/hr Dialysate: 2000 ml/hr Blood Flow Rate: 200 ml/min Metrics BP / HR: 121/56, 77 Blood Flow Rate: 200 ml/min AP:                         -72 RP: 109 TMP: 8  
PD: 63  
FP: 166 Dialysate: 2000 ml/hr Comments / Plan:  
   [id number] filter running well with no indication for change at this time. Consents, patient, code status, labs and orders verified. +COVID-19: Observed pt & CRRT machine outside room with primary RN inside pt room to reduce exposure & use of PPE. RIJ temporary CVC. Unable to observe CVC dressing, primary RN to change if needed per policy & procedure. No signs of redness, drainage, or infection visualized by primary RN. Lines visible and connections secure with blood warmer to return line at 37*C. Education, pre and post to primary RN.

## 2020-06-01 NOTE — PROGRESS NOTES
Pt's case discussed this morning with Palliative IDT. This  left message for wife on Friday to offer support and to assess for additional needs.  has not received a return call. Chaplains remain available for support as needed. Arti Mas, Palliative

## 2020-06-01 NOTE — PROGRESS NOTES
NAME: Kaylie Perkins :  1955 MRN:  [id number] Assessment :    Plan: 
--LAQUITA Shock Sepsis DM type 2 Systolic HF (EF 55%) AHRF High Bili-?etio COVID + Initiated CVVHD ; Off CVVH on ; still on pressor, needs volume off, I don't think he will tolerate iHD; restart CVVHD on  Using Lewis, 200 QB, Factor of 100, 4k dialysate BP stable on CVVH On heparin gtt already Lewis line  On pressor Subjective: Chief Complaint:  In order to limit the exposure risk from COVID 19 and to preserve the hospital's limited supply of PPEs, seen through ICU window. Intubated. On levo. I spoke with his ICU nurse. Remains critically ill; tolerating CVVH Review of Systems:  
 
Symptom Y/N Comments  Symptom Y/N Comments Fever/Chills    Chest Pain Poor Appetite    Edema Cough    Abdominal Pain Sputum    Joint Pain SOB/GROVES    Pruritis/Rash Nausea/vomit    Tolerating PT/OT Diarrhea    Tolerating Diet Constipation    Other Could not obtain due to: See above Objective: VITALS:  
Last 24hrs VS reviewed since prior progress note. Most recent are: 
Visit Vitals /50 Pulse 74 Temp 97.4 °F (36.3 °C) Resp 21 Ht 6' 3\" (1.905 m) Wt 121.6 kg (268 lb 1.3 oz) SpO2 97% BMI 33.51 kg/m² Intake/Output Summary (Last 24 hours) at 2020 1015 Last data filed at 2020 0900 Gross per 24 hour Intake 1814.06 ml Output 3106 ml Net -1291.94 ml Telemetry Reviewed: PHYSICAL EXAM: 
General: NAD, ett in place +edema Lab Data Reviewed: (see below) Medications Reviewed: (see below) PMH/SH reviewed - no change compared to H&P 
________________________________________________________________________ Care Plan discussed with: 
Patient Family RN y   
Care Manager Consultant:     
 
  Diana >50% of visit spent in counseling and coordination of care    
 
________________________________________________________________________ Winifred Rush MD  
 
Procedures: see electronic medical records for all procedures/Xrays and details which 
were not copied into this note but were reviewed prior to creation of Plan. LABS: 
Recent Labs  
  06/01/20 0211 05/31/20 
2923 WBC 17.2* 20.0* HGB 8.9* 9.4* HCT 25.6* 27.6*  
 210 Recent Labs  
  06/01/20 0211 05/31/20 1210 05/31/20 
0427 05/30/20 
9488 * 134* 135* 136  
K 3.8 4.4 4.2 3.9  104 104 106 CO2 24 22 21 24 BUN 73* 99* 84* 58* CREA 3.14* 4.23* 3.80* 2.76* * 220* 159* 132* CA 7.6* 7.4* 8.0* 7.7* MG 2.7*  --  2.6* 2.6* PHOS 3.9 7.4* 6.7* 3.6 Recent Labs  
  06/01/20 0211 05/31/20 
1210 05/31/20 
0427 05/30/20 
1936 *  --  246* 282* TP 6.5  --  6.4 6.4 ALB 1.8* 1.8* 1.8* 1.8*  
GLOB 4.7*  --  4.6* 4.6* Recent Labs  
  06/01/20 
0927 06/01/20 0211 05/31/20 
1835 05/31/20 
1210 INR  --   --   --  1.5* PTP  --   --   --  15.3* APTT 44.7* 41.6* 79.7* 81.4* MEDICATIONS: 
Current Facility-Administered Medications Medication Dose Route Frequency  DOBUTamine (DOBUTREX) 500 mg/250 mL (2,000 mcg/mL) infusion  3 mcg/kg/min IntraVENous CONTINUOUS  
 bicarbonate dialysis (PRISMASOL) BG K 4/Ca 2.5 5000 ml solution   Extracorporeal DIALYSIS CONTINUOUS  
 hydrocortisone Sod Succ (PF) (SOLU-CORTEF) injection 50 mg  50 mg IntraVENous Q8H  
 pantoprazole (PROTONIX) 40 mg in 0.9% sodium chloride 10 mL injection  40 mg IntraVENous Q12H  
 scopolamine (TRANSDERM-SCOP) 1 mg over 3 days 1 Patch  1 Patch TransDERmal Q72H  balsam peru-castor oiL (VENELEX) ointment   Topical TID  white petrolatum-mineral oiL (AKWA TEARS) 83-15 % ophthalmic ointment   Both Eyes Q12H  
 insulin glargine (LANTUS) injection 40 Units  40 Units SubCUTAneous DAILY  polyethylene glycol (MIRALAX) packet 17 g  17 g Per NG tube DAILY PRN  
 zinc oxide-cod liver oil (DESITIN) 40 % paste   Topical PRN  chlorhexidine (ORAL CARE KIT) 0.12 % mouthwash 15 mL  15 mL Oral Q12H  potassium chloride 20 mEq in 50 ml IVPB  20 mEq IntraVENous PRN  
 calcium chloride injection 2 g  2 g IntraVENous PRN  
 acetaminophen (TYLENOL) solution 650 mg  650 mg Oral Q6H PRN  
 PHARMACY INFORMATION NOTE  1 Each Other BID  heparin (porcine) injection 2,000 Units  2,000 Units IntraVENous PRN Or  
 heparin (porcine) injection 4,000 Units  4,000 Units IntraVENous PRN  
 heparin 25,000 units in D5W 250 ml infusion  8-25 Units/kg/hr IntraVENous TITRATE  
 NOREPINephrine (LEVOPHED) 32 mg in 5% dextrose 250 mL infusion  2-200 mcg/min IntraVENous TITRATE  sodium chloride (NS) flush 5-40 mL  5-40 mL IntraVENous Q8H  
 sodium chloride (NS) flush 5-40 mL  5-40 mL IntraVENous PRN  
 ondansetron (ZOFRAN) injection 4 mg  4 mg IntraVENous Q8H PRN  
 alcohol 62% (NOZIN) nasal  1 Ampule  1 Ampule Topical Q12H  
 insulin lispro (HUMALOG) injection   SubCUTAneous Q6H  
 glucose chewable tablet 16 g  4 Tab Oral PRN  
 dextrose (D50W) injection syrg 12.5-25 g  12.5-25 g IntraVENous PRN  
 glucagon (GLUCAGEN) injection 1 mg  1 mg IntraMUSCular PRN  
 acetaminophen (TYLENOL) tablet 650 mg  650 mg Oral Q6H PRN Or  
 acetaminophen (TYLENOL) suppository 650 mg  650 mg Rectal Q6H PRN  
 influenza vaccine 2019-20 (6 mos+)(PF) (FLUARIX/FLULAVAL/FLUZONE QUAD) injection 0.5 mL  0.5 mL IntraMUSCular PRIOR TO DISCHARGE  sodium chloride (NS) flush 5-10 mL  5-10 mL IntraVENous PRN

## 2020-06-01 NOTE — PROGRESS NOTES
Hospitalist Progress Note NAME: Bharat Mcintosh :  1955 MRN:  [id number] Assessment / Plan: 
Septic and Cardiogenic shock Multiorgan failure (respiratory failure, acute renal failure and liver failure) Due to COVID19, confirmed Positive Hyponatremia Strep Bacteremia Coagulopathy Remains critically ill Managed in ICU 
remained on vent and pressors Titrate Levophed, dobutamine drip Appreciate nephrology help with HD Appreciate Pulmonary Help with critical care management and manage ventilator On Stress dose IV steroids (Solu-Cortef) 2D echo with EF 35%, 25 % in the past 
 
Blood cx 5/15 alpha streptococcus, repeat blood cx obtained and NGTd Persistent leukocytosis S/p > 10 day of Clindamycin stopped , Cefepime stopped  and then later IV vancomycin last dose , now seems to be stopped in ICU Prognosis seems grim, palliative care was involved. Pt remain full code Jaundice Elevated LFTs Due to shock liver Initially AST/ALT was elevated, now bilirubin getting worse and other LFTs improving consistent with severe liver injury US RUQ: Minimal gallbladder sludge. Slightly prominent common bile duct of uncertain 
significance. Acute on chronic systolic HF EF 90%, now 49% (but on Dobutamine) Nonischemic dilated cardiomyopathy s/p remote ICD Chronic atrial fibrillation Sleep apnea Hypertensive heart disease w/ CKD and HF Requiring Vasopressor support Cardiology help appreciated: cont supportive care Eliquis on hold for now, on heparin ggt DM2 BS stable Cont insulin w lantus, SSI 
  
Code Status: Full Surrogate Decision Maker: wife DVT Prophylaxis: heparin Disposition: remain critically ill Subjective: Pt seen and examined at bedside. Intubated and sedated. Overnight events d/w RN Chief Complaint / Reason for Physician Visit: following sepsis / respiratory failure/ covid Review of Systems: Symptom Y/N Comments  Symptom Y/N Comments Fever/Chills    Chest Pain Poor Appetite    Edema Cough    Abdominal Pain Sputum    Joint Pain SOB/GROVES    Pruritis/Rash Nausea/vomit    Tolerating PT/OT Diarrhea    Tolerating Diet Constipation    Other Could NOT obtain due to: Intubated and sedated Objective: VITALS:  
Last 24hrs VS reviewed since prior progress note. Most recent are: 
Patient Vitals for the past 24 hrs: 
 Temp Pulse Resp BP SpO2  
06/01/20 1300  79 17 99/46 99 % 06/01/20 1230  80 14 101/49 100 % 06/01/20 1200 96.9 °F (36.1 °C) 79 17 109/53 99 % 06/01/20 1100  95 19 145/47 99 % 06/01/20 1055  93 22  98 % 06/01/20 1030  91 15 126/52 99 % 06/01/20 1000  77 16 125/51 97 % 06/01/20 0900  74 21 127/50 97 % 06/01/20 0855  74 20  98 % 06/01/20 0816  77  121/56   
06/01/20 0800 97.4 °F (36.3 °C) 80 16 121/56 99 % 06/01/20 0700  71 14 125/50 99 % 06/01/20 0600  86 24 105/58 98 % 06/01/20 0530  71 18 105/54 98 % 06/01/20 0500  79 19 112/51 98 % 06/01/20 0430  73 17 104/51 98 % 06/01/20 0400  82 18 111/55 99 % 06/01/20 0330 97.5 °F (36.4 °C) 84 24 107/50 99 % 06/01/20 0323  80 20  98 % 06/01/20 0300  79 16 96/49 98 % 06/01/20 0230  81 19 99/56 98 % 06/01/20 0200  87 13 101/62 98 % 06/01/20 0130  94 21 92/51 97 % 06/01/20 0100  75 11 92/48 99 % 06/01/20 0030  88 14 95/40 100 % 06/01/20 0027  69 21  100 % 06/01/20 0000 97.5 °F (36.4 °C)  14  100 % 05/31/20 2330  98 20 99/50 96 % 05/31/20 2300  98 14 (!) 107/28 99 % 05/31/20 2230  89 19 102/45 98 % 05/31/20 2200  89 14 101/63 97 % 05/31/20 2130  91 21 116/52 98 % 05/31/20 2100  80 12 103/58 99 % 05/31/20 2039  93 25  98 % 05/31/20 2030  86 13 118/59 100 % 05/31/20 2000 97.5 °F (36.4 °C) 86 16 101/53 98 % 05/31/20 1930  79 16 103/53 99 % 05/31/20 1900  87 14 110/52 (!) 84 % 05/31/20 1800  82 14 106/58  05/31/20 1732  96 26 126/57   
05/31/20 1700  (!) 103 15 103/49   
05/31/20 1630 97.6 °F (36.4 °C) 82 17 96/48 100 % 05/31/20 1615  89 18  100 % 05/31/20 1600 97.6 °F (36.4 °C) 89 19 101/41 100 % 05/31/20 1530  93 19 (!) 86/49  Intake/Output Summary (Last 24 hours) at 6/1/2020 1517 Last data filed at 6/1/2020 1400 Gross per 24 hour Intake 1942.06 ml Output 3914 ml Net -1971.94 ml PHYSICAL EXAM: 
General:          WD, WN. Intubated and sedated, mechanically ventilated   
EENT:              EOMI. Anicteric sclerae. MMM Resp:               CTA bilaterally, no wheezing or rales. mechanically ventilated CV:                  Regular  rhythm,  No edema GI:                   Soft, Non distended, Non tender.  +Bowel sounds Neurologic:     exam limited due to intubation and sedation Psych:            exam limited due to intubation and sedation Skin:                No rashes. No jaundice Reviewed most current lab test results and cultures  YES Reviewed most current radiology test results   YES Review and summation of old records today    NO Reviewed patient's current orders and MAR    YES 
PMH/ reviewed - no change compared to H&P 
________________________________________________________________________ Care Plan discussed with: 
  Comments Patient Family RN x Care Manager Consultant  x Multidiciplinary team rounds were held today with , nursing, pharmacist and clinical coordinator. Patient's plan of care was discussed; medications were reviewed and discharge planning was addressed. ________________________________________________________________________ Total NON critical care TIME:  35 Minutes Total CRITICAL CARE TIME Spent:   Minutes non procedure based Comments >50% of visit spent in counseling and coordination of care x Chart review in complex ICU patient ________________________________________________________________________ Bandar Bernstein MD  
 
Procedures: see electronic medical records for all procedures/Xrays and details which were not copied into this note but were reviewed prior to creation of Plan. LABS: 
I reviewed today's most current labs and imaging studies. Pertinent labs include: 
Recent Labs  
  06/01/20 0211 05/31/20 0427 05/30/20 
0557 WBC 17.2* 20.0* 20.5* HGB 8.9* 9.4* 9.9*  
HCT 25.6* 27.6* 28.5*  
 210 195 Recent Labs  
  06/01/20 0211 05/31/20 
1210 05/31/20 0427 05/30/20 
6160 * 134* 135* 136  
K 3.8 4.4 4.2 3.9  104 104 106 CO2 24 22 21 24 * 220* 159* 132* BUN 73* 99* 84* 58* CREA 3.14* 4.23* 3.80* 2.76* CA 7.6* 7.4* 8.0* 7.7* MG 2.7*  --  2.6* 2.6* PHOS 3.9 7.4* 6.7* 3.6 ALB 1.8* 1.8* 1.8* 1.8* TBILI 13.8*  13.8*  --  14.0* 13.0* *  --  128* 144* INR  --  1.5*  --   --   
 
 
Signed: Bandar Bernstein MD

## 2020-06-02 NOTE — PROGRESS NOTES
Progress Note 6/2/2020 11:43 AM 
NAME: Bharat Mcintosh MRN:  [id number] Admit Diagnosis: Sepsis ([address] [A41.9] Problem List: 1. Shock; septic/cardiogenic 2. Severe sepsis 3. COVID-19 + 4. NSTEMI 5. Hyponatremia 6. Lactic acidosis 7. Acute liver injury 8. Acute on chronic systolic heart failure; Class IV 9. Acute kidney injury; anuric 10. Coagulopathy 11. Nonischemic dilated cardiomyopathy s/p remote ICD 12. Chronic atrial fibrillation 13. Sleep apnea 14. Hypertensive heart disease w/ CKD and HF Assessment/Plan:  
 
Intake/Output Summary (Last 24 hours) at 6/2/2020 0759 Last data filed at 6/2/2020 0700 Gross per 24 hour Intake 2025.79 ml Output 4297 ml Net -2271.21 ml Ricky off Vaso off Norepi @ 31 Dobut @ 3 Epi off 30% FiO2. Following commands. Off sedation for a while. Bloody secretions. HgB 8s. Echo w/ EF 35-40% 1. Continue supportive care 2. Heparin gtt. 3. CVVH ongoing; tolerating full factor 4. Remains critically ill 5. Can add midodrine in an effort to wean of pressors 6. Not sure dobutamine is adding much 7. Discussions ongoing w/ family daily 8. Discussed w/ Dr. Allison Mtz; can likely extubate when off pressors. > 14 days intubated now. [x]       High complexity decision making was performed in this patient at high risk for decompensation with multiple organ involvement. Subjective:  
 
Bharat Mcintosh is intubated and sedated. Discussed with RN events overnight. Review of Systems: 
 
Symptom Y/N Comments  Symptom Y/N Comments Fever/Chills N   Chest Pain N Poor Appetite N   Edema N   
Cough N   Abdominal Pain N Sputum N   Joint Pain N   
SOB/GROVES N   Pruritis/Rash N   
Nausea/vomit N   Tolerating PT/OT Y Diarrhea N   Tolerating Diet Y Constipation N   Other Could NOT obtain due to: sedated Objective:  
  
Physical Exam: 
 
Last 24hrs VS reviewed since prior progress note. Most recent are: Visit Vitals /58 Pulse 89 Temp 97.3 °F (36.3 °C) Resp 10 Ht 6' 3\" (1.905 m) Wt 111.3 kg (245 lb 6 oz) SpO2 100% BMI 30.67 kg/m² Intake/Output Summary (Last 24 hours) at 6/2/2020 6826 Last data filed at 6/2/2020 0700 Gross per 24 hour Intake 2025.79 ml Output 4297 ml Net -2271.21 ml General Appearance: Well developed, well nourished, intubated. Ears/Nose/Mouth/Throat: Hearing grossly normal. 
Neck: Supple. Chest: Lungs decreased diffusely Cardiovascular: Irregular rate and rhythm, S1S2 normal, no murmur. Abdomen: Soft, non-tender, bowel sounds are active. Extremities: No edema bilaterally. Skin: Warm and dry. []         Post-cath site without hematoma, bruit, tenderness, or thrill. Distal pulses intact. PMH/ reviewed - no change compared to H&P Data Review Telemetry: paced/AF 
 
EKG:  
[x]  No new EKG for review Lab Data Personally Reviewed: 
 
Recent Labs  
  06/02/20 0417 06/01/20 
7770 WBC 15.0* 17.2* HGB 8.5* 8.9* HCT 25.1* 25.6*  
 181 Recent Labs  
  06/02/20 0417 06/01/20 
2245 06/01/20 
1641  05/31/20 
1210 INR  --   --   --   --  1.5* PTP  --   --   --   --  15.3* APTT 57.2* 59.0* 63.5*   < > 81.4*  
 < > = values in this interval not displayed. Recent Labs  
  06/02/20 0417 06/01/20 
1641 06/01/20 
0211 * 135* 134* K 3.4* 3.6 3.8  103 104 CO2 26 27 24 BUN 56* 58* 73* CREA 2.20* 2.44* 3.14* * 188* 169* CA 7.8* 7.6* 7.6*  
MG 2.7* 2.5* 2.7* No results for input(s): CPK, CKNDX, TROIQ in the last 72 hours. No lab exists for component: CPKMB Lab Results Component Value Date/Time Cholesterol, total 92 03/03/2009 09:40 PM  
 HDL Cholesterol 44 03/03/2009 09:40 PM  
 LDL, calculated 38.4 03/03/2009 09:40 PM  
 Triglyceride <15 05/22/2020 05:13 AM  
 CHOL/HDL Ratio 2.1 03/03/2009 09:40 PM  
 
 
Recent Labs  
  06/02/20 
0417 06/01/20 
1641 06/01/20 
0211  05/31/20 
0427 AP  --   --  286*  --  246* TP  --   --  6.5  --  6.4 ALB 1.8* 1.8* 1.8*   < > 1.8*  
GLOB  --   --  4.7*  --  4.6*  
 < > = values in this interval not displayed. No results for input(s): PH, PCO2, PO2 in the last 72 hours. Medications Personally Reviewed: 
 
Current Facility-Administered Medications Medication Dose Route Frequency  DOBUTamine (DOBUTREX) 500 mg/250 mL (2,000 mcg/mL) infusion  3 mcg/kg/min IntraVENous CONTINUOUS  
 bicarbonate dialysis (PRISMASOL) BG K 4/Ca 2.5 5000 ml solution   Extracorporeal DIALYSIS CONTINUOUS  
 hydrocortisone Sod Succ (PF) (SOLU-CORTEF) injection 50 mg  50 mg IntraVENous Q8H  
 pantoprazole (PROTONIX) 40 mg in 0.9% sodium chloride 10 mL injection  40 mg IntraVENous Q12H  
 scopolamine (TRANSDERM-SCOP) 1 mg over 3 days 1 Patch  1 Patch TransDERmal Q72H  balsam peru-castor oiL (VENELEX) ointment   Topical TID  white petrolatum-mineral oiL (AKWA TEARS) 83-15 % ophthalmic ointment   Both Eyes Q12H  
 insulin glargine (LANTUS) injection 40 Units  40 Units SubCUTAneous DAILY  polyethylene glycol (MIRALAX) packet 17 g  17 g Per NG tube DAILY PRN  
 zinc oxide-cod liver oil (DESITIN) 40 % paste   Topical PRN  chlorhexidine (ORAL CARE KIT) 0.12 % mouthwash 15 mL  15 mL Oral Q12H  potassium chloride 20 mEq in 50 ml IVPB  20 mEq IntraVENous PRN  
 calcium chloride injection 2 g  2 g IntraVENous PRN  
 acetaminophen (TYLENOL) solution 650 mg  650 mg Oral Q6H PRN  
 PHARMACY INFORMATION NOTE  1 Each Other BID  heparin (porcine) injection 2,000 Units  2,000 Units IntraVENous PRN Or  
 heparin (porcine) injection 4,000 Units  4,000 Units IntraVENous PRN  
 heparin 25,000 units in D5W 250 ml infusion  8-25 Units/kg/hr IntraVENous TITRATE  
 NOREPINephrine (LEVOPHED) 32 mg in 5% dextrose 250 mL infusion  2-200 mcg/min IntraVENous TITRATE  sodium chloride (NS) flush 5-40 mL  5-40 mL IntraVENous Q8H  
  sodium chloride (NS) flush 5-40 mL  5-40 mL IntraVENous PRN  
 ondansetron (ZOFRAN) injection 4 mg  4 mg IntraVENous Q8H PRN  
 alcohol 62% (NOZIN) nasal  1 Ampule  1 Ampule Topical Q12H  
 insulin lispro (HUMALOG) injection   SubCUTAneous Q6H  
 glucose chewable tablet 16 g  4 Tab Oral PRN  
 dextrose (D50W) injection syrg 12.5-25 g  12.5-25 g IntraVENous PRN  
 glucagon (GLUCAGEN) injection 1 mg  1 mg IntraMUSCular PRN  
 acetaminophen (TYLENOL) tablet 650 mg  650 mg Oral Q6H PRN Or  
 acetaminophen (TYLENOL) suppository 650 mg  650 mg Rectal Q6H PRN  
 influenza vaccine 2019-20 (6 mos+)(PF) (FLUARIX/FLULAVAL/FLUZONE QUAD) injection 0.5 mL  0.5 mL IntraMUSCular PRIOR TO DISCHARGE  sodium chloride (NS) flush 5-10 mL  5-10 mL IntraVENous PRN Clary Miranda III, DO

## 2020-06-02 NOTE — PROGRESS NOTES
2000, Bedside and Verbal shift change report given to LORI Chan (oncoming nurse) by Antoinette Fenton RN (offgoing nurse). Report included the following information SBAR, Kardex, Intake/Output, MAR, Accordion, Recent Results, Med Rec Status and Cardiac Rhythm A Fib, Pacing. Temp;  Afebrile Neuro;  slight drowsy, follow command, eyes contact, No sedation, denies pain, pupils   GCS;  Eye; 3-4, verbal; 1 for ETT, motor; 6 > both arms Very weak > Legs nearly move the toes. Reassessment; No changes Respiratory; Sat 98% on Ventilator A/C mode, , RR 16, Peep 6, FiO2 30%, ETT 26 cm at lips, secretion; scant, thick bloody, more bloody secretion at mouth > seems he swallow them. Reassessment; Passed SBT Cardiac; A Fib/Flutter Pacer( AICD at left side of the chest) with PVCs, 79-99 B/min, NIBP 90/53 mmHg, on Levophed at 28 daniela/min. Dobutamine at fix rate 3 daniela/kg/min. Reassessment;   
2015 SBP 85 mmHg > Levophed increased to 30 daniela/min. 5659-0345 Soft BP 80s > Levophed increased to 32 daniela/min, Factor Drop to Zero, to follow up. 
0300 BP ok now > Facto increased to 50 ml/hr 
 
0500 Levophed 31 daniela/kg GI; NPO with NG tube ( 66 cm at Rt Nare) 35 ml/h, free water 50 ml Q 6 hr, Prosource 8 x day, residual; 40 ml Tan more dark bloody > seem he swallow oozing blood at throat, Abd obese semi soft, Flexiseal > loose brown. Reassessment; No changes Renal; CRRT 4K Factor 100 ml/hr as tolerated. Reassessment; Factor down to Zero for 2 hours then increased to 50 ml/hr Intake/Output Summary (Last 24 hours) at 6/2/2020 0850 Last data filed at 6/2/2020 0600 Gross per 24 hour Intake 1903.39 ml Output 4402 ml Net -2498.61 ml Weight change: -10.3 kg (-22 lb 11.3 oz) Skin; DTI at Big toe > Venelex, upper lip with slight laceration, excoriation at pannus and groin > Desitin applied . Endo; SSI every 6 hours, Lantus 40 units OD Lines; ETT, right IJ CVP & Lewis, NGT,Flexiseal. 
Code; Full.  
Lab;  
 DVT; SCD both legs. Heparin infusion at 9 units/Kg/hr > PTT at 2245 was 59.0 > no changes > at 0417 was 57.2 > increased by 1 units to be 10 unnits/kg/hr to repeat PTT after 6 hr at 1144, all discussed with Pharmacist 
Plan; orient the patient, ventilator management > SBT, titrate Levophed as per order, pain and agitation management, follow lab tomorrow,  
0700 Bedside and Verbal shift change report [facility], RN (oncoming nurse) by Juana Mejia RN (offgoing nurse). Report included the following information SBAR, Kardex, Intake/Output, MAR, Accordion, Recent Results, Med Rec Status and Cardiac Rhythm A Fib/Flutter Pacing.

## 2020-06-02 NOTE — DIABETES MGMT
1545 [facility] 
 
FOLLOW-UP NOTE Presentation Gale Rodriguez a 71 y. o. male admitted from the ER 5/15/20 with complaints of weakness. No COVID-19 symptoms or contacts noted. Fever. CXR, CT Head & CT ABd negative. Blood cultures positive for alpha streptococcus. Markedly elevated ferritin DX: [id number]. Septic shock. Multiorgan failure TX: Anti-COVID therapies. Pacer. Palliative care. Recent diagnostics: 
5/21/20 Hepatitis B surface Ab Reactive 5/22/20 WBC 22.9. Albumin 2.8. Bili 6.8. . . Procalcitonin 203.42. Triglyceride <15. Serum creatinine 4.73/GFR 15. CXR: Stable bibasilar opacities and pleural effusions 5/26/20 WBC 27.2. CXR: Persistent pleural effusions with bibasilar atelectasis. Atelectasis at the right lung base has increased. Ferritin 1682. CRP 5.09. Procalcitonin 55.12 
5/27/20 D-Dimer 5.97. US ABd: Negative 5/28/20 D-Dimer 4.92. Procalcitonin 32.01. CXR: Presence of bibasilar hazy density (left greater than right) possibly representing developing infiltration. Improved aeration of the right lung base 5/29/20 Ferritin 1606. CRP 3.38. D-Dimer 5. 13. Blood cultures without growth 6/1/20 WBC 17.2. CRP 3.97. Ferritin 1509. BNP 33, 805. D-Dimer 4.4. Bili 13.8. CXR: No acute process. ECHO: Normal cavity size and wall thickness. Moderate global systolic function. Estimated left ventricular ejection fraction is 35 - 40%. Hypokinetic left ventricular wall motion. Mildly reduced systolic function 6/2/20 WBC 15. D-Dimer 3.94. CRP 3.72. Ferritin 1331. Bl culture No growth Clinical care includes: 
            KUPIT Vent with PEEP via ET 
            BP management via norepinephrine (levophed) & Dobutamine  
            TF via NG 
            Insulin 
            Steroids             Dialysis via [place] 
            GI prophylaxis             Heparin infusion 
  
 Diabetes: Patient has known Type 2 diabetes, treated with Trulicity, Onglyza & Amaryl PTA (per PTA med list). Family history positive for diabetes in father.  
 
Subjective No room access due to COVID-19 precautions Objective Physical exam 
General Responds to voice per nursing Vital Signs Afebrile. Paced AFib Visit Vitals BP 97/48 Pulse (!) 102 Temp 97.7 °F (36.5 °C) Resp 16 Ht 6' 3\" (1.905 m) Wt 111.3 kg (245 lb 6 oz) SpO2 97% BMI 30.67 kg/m² Laboratory Lab Results Component Value Date/Time Hemoglobin A1c 9.8 (H) 02/09/2017 05:42 AM  
 Hemoglobin A1c, External 9.0 03/02/2016 Lab Results Component Value Date/Time LDL, calculated 38.4 03/03/2009 09:40 PM  
 
Lab Results Component Value Date/Time Creatinine 2.20 (H) 06/02/2020 04:17 AM  
 
Lab Results Component Value Date/Time Sodium 135 (L) 06/02/2020 04:17 AM  
 Potassium 3.4 (L) 06/02/2020 04:17 AM  
 Chloride 103 06/02/2020 04:17 AM  
 CO2 26 06/02/2020 04:17 AM  
 Anion gap 6 06/02/2020 04:17 AM  
 Glucose 212 (H) 06/02/2020 04:17 AM  
 BUN 56 (H) 06/02/2020 04:17 AM  
 Creatinine 2.20 (H) 06/02/2020 04:17 AM  
 BUN/Creatinine ratio 25 (H) 06/02/2020 04:17 AM  
 GFR est AA 37 (L) 06/02/2020 04:17 AM  
 GFR est non-AA 30 (L) 06/02/2020 04:17 AM  
 Calcium 7.8 (L) 06/02/2020 04:17 AM  
 Bilirubin, total 13.8 (H) 06/01/2020 02:11 AM  
 Bilirubin, total 13.8 (H) 06/01/2020 02:11 AM  
 Alk. phosphatase 286 (H) 06/01/2020 02:11 AM  
 Protein, total 6.5 06/01/2020 02:11 AM  
 Albumin 1.8 (L) 06/02/2020 04:17 AM  
 Globulin 4.7 (H) 06/01/2020 02:11 AM  
 A-G Ratio 0.4 (L) 06/01/2020 02:11 AM  
 ALT (SGPT) 126 (H) 06/01/2020 02:11 AM  
 
Lab Results Component Value Date/Time ALT (SGPT) 126 (H) 06/01/2020 02:11 AM  
 
Factors affecting BG pattern Factor Dose Comments Nutrition: 
Tube feeding via NG  
135 grams/24 hrs Drugs: 
Steroids Hydrocortisone 50mg Q8hrs Infection: COVID-19   WBC 15. Afebrile Other: CKD Blood glucose pattern Assessment and Plan Nursing Diagnosis Risk for unstable blood glucose pattern Nursing Intervention Domain 2486 Decision-making Support Nursing Interventions Examined current inpatient diabetes control Explored factors facilitating and impeding inpatient management Evaluation This gentleman with known Type 2 diabetes had achieved inpatient BG targets, but TF formula has changed with more CHO. Will need to advance insulin dosing to accommodate. Steroid dosing will not be reduced today. Recommendations Recommend: 
 
Increasing Lantus insulin to 46 units D Billing Code(s) [x] [id number] IP subsequent hospital care - 30 minutes COLLETTE Mandujano Access via [employer] [phone]

## 2020-06-02 NOTE — CONSULTS
GI Consultation Note [facility]) NAME: Pascale Vargas : 1955 MRN: [id number] PCP: Oscar Coronado MD 
Date/Time:  2020 11:58 AM 
Subjective:  
REASON FOR CONSULT:    Jaundice Lara Muse is a 71 y.o.   male who I was asked to see for above. Pt has been admitted since 5/15 after presenting with shock, combination of Cardiogenic and Septic shock. Pt is +COVID19 and is intubated and on vasopressor support. Hospital course has been complicated by Acute Liver injury felt secondary to shock liver, NSEMI, Lactic Acidosis. Patient also has a h/o AFIB on anticoagulation Past Medical History:  
Diagnosis Date  A-fib ([facility]  Arrhythmia   
 atrial fibrillation   Diabetes ([facility] [address]  Endocrine disease   
 diabetes  Hypertension  Irregular heart beat Past Surgical History:  
Procedure Laterality Date  CARDIAC SURG PROCEDURE UNLIST    
 defib placed in left chest  
 INSERT EMERGENCY ENDOTRACH AIRWAY  2020  IR INSERT NON TUNL CVC OVER 5 YRS  2020 Social History Tobacco Use  Smoking status: Former Smoker Last attempt to quit: 1993 Years since quittin.8  Smokeless tobacco: Never Used Substance Use Topics  Alcohol use: No  
  
Family History Problem Relation Age of Onset  Heart Disease Mother  Diabetes Father  Heart Disease Father No Known Allergies Home Medications: 
Prior to Admission Medications Prescriptions Last Dose Informant Patient Reported? Taking? apixaban (ELIQUIS PO)   Yes No  
Sig: Take  by mouth. dulaglutide (TRULICITY) 1.5 HL/0.5 mL sub-q pen   Yes No  
Si.5 mg by SubCUTAneous route every seven (7) days. furosemide (LASIX) 80 mg tablet   No No  
Sig: Take 1 Tab by mouth two (2) times a day. glimepiride (AMARYL) 4 mg tablet  Self Yes No  
Sig: Take 4 mg by mouth every morning.  
magnesium chloride (MAG-SR) 64 mg tablet  Self Yes No  
Sig: Take 64 mg by mouth two (2) times a day. nebivolol HCl (BYSTOLIC PO)   Yes No  
Sig: Take  by mouth daily. potassium chloride SR (K-TAB) 20 mEq tablet   No No  
Sig: Take 1 Tab by mouth daily. sAXagliptin (ONGLYZA) 2.5 mg tablet   No No  
Sig: Take 1 Tab by mouth daily. simvastatin (ZOCOR) 20 mg tablet  Self Yes No  
Sig: Take 20 mg by mouth nightly. Facility-Administered Medications: None Hospital medications: 
Current Facility-Administered Medications Medication Dose Route Frequency  albumin human 25% (BUMINATE) solution 12.5 g  12.5 g IntraVENous Q6H  
 insulin glargine (LANTUS) injection 46 Units  46 Units SubCUTAneous DAILY  DOBUTamine (DOBUTREX) 500 mg/250 mL (2,000 mcg/mL) infusion  3 mcg/kg/min IntraVENous CONTINUOUS  
 bicarbonate dialysis (PRISMASOL) BG K 4/Ca 2.5 5000 ml solution   Extracorporeal DIALYSIS CONTINUOUS  
 hydrocortisone Sod Succ (PF) (SOLU-CORTEF) injection 50 mg  50 mg IntraVENous Q8H  
 pantoprazole (PROTONIX) 40 mg in 0.9% sodium chloride 10 mL injection  40 mg IntraVENous Q12H  
 scopolamine (TRANSDERM-SCOP) 1 mg over 3 days 1 Patch  1 Patch TransDERmal Q72H  balsam peru-castor oiL (VENELEX) ointment   Topical TID  white petrolatum-mineral oiL (AKWA TEARS) 83-15 % ophthalmic ointment   Both Eyes Q12H  polyethylene glycol (MIRALAX) packet 17 g  17 g Per NG tube DAILY PRN  
 zinc oxide-cod liver oil (DESITIN) 40 % paste   Topical PRN  chlorhexidine (ORAL CARE KIT) 0.12 % mouthwash 15 mL  15 mL Oral Q12H  potassium chloride 20 mEq in 50 ml IVPB  20 mEq IntraVENous PRN  
 calcium chloride injection 2 g  2 g IntraVENous PRN  
 acetaminophen (TYLENOL) solution 650 mg  650 mg Oral Q6H PRN  
 PHARMACY INFORMATION NOTE  1 Each Other BID  heparin (porcine) injection 2,000 Units  2,000 Units IntraVENous PRN Or  
 heparin (porcine) injection 4,000 Units  4,000 Units IntraVENous PRN  
 heparin 25,000 units in D5W 250 ml infusion  8-25 Units/kg/hr IntraVENous TITRATE  NOREPINephrine (LEVOPHED) 32 mg in 5% dextrose 250 mL infusion  2-200 mcg/min IntraVENous TITRATE  sodium chloride (NS) flush 5-40 mL  5-40 mL IntraVENous Q8H  
 sodium chloride (NS) flush 5-40 mL  5-40 mL IntraVENous PRN  
 ondansetron (ZOFRAN) injection 4 mg  4 mg IntraVENous Q8H PRN  
 alcohol 62% (NOZIN) nasal  1 Ampule  1 Ampule Topical Q12H  
 insulin lispro (HUMALOG) injection   SubCUTAneous Q6H  
 glucose chewable tablet 16 g  4 Tab Oral PRN  
 dextrose (D50W) injection syrg 12.5-25 g  12.5-25 g IntraVENous PRN  
 glucagon (GLUCAGEN) injection 1 mg  1 mg IntraMUSCular PRN  
 acetaminophen (TYLENOL) tablet 650 mg  650 mg Oral Q6H PRN Or  
 acetaminophen (TYLENOL) suppository 650 mg  650 mg Rectal Q6H PRN  
 influenza vaccine 2019-20 (6 mos+)(PF) (FLUARIX/FLULAVAL/FLUZONE QUAD) injection 0.5 mL  0.5 mL IntraMUSCular PRIOR TO DISCHARGE  sodium chloride (NS) flush 5-10 mL  5-10 mL IntraVENous PRN REVIEW OF SYSTEMS:   
 [x]     Unable to obtain  ROS due to  []    mental status change  []    sedated   [x]    intubated 
 []    Total of 11 systems reviewed as follows: 
Const:  negative fever, negative chills, negative weight loss Eyes:   negative diplopia or visual changes, negative eye pain ENT:   negative coryza, negative sore throat Resp:   negative cough, hemoptysis, dyspnea Cards:  negative for chest pain, palpitations, lower extremity edema :  negative for frequency, dysuria and hematuria Skin:   negative for rash and pruritus Heme:  negative for easy bruising and gum/nose bleeding MS:  negative for myalgias, arthralgias, back pain and muscle weakness Neurolo:  negative for headaches, dizziness, vertigo, memory problems Psych:  negative for feelings of anxiety, depression Pertinent Positives include : 
 
Objective: VITALS:   
Visit Vitals /47 Pulse (!) 101 Temp 97.7 °F (36.5 °C) Resp 13 Ht 6' 3\" (1.905 m) Wt 111.3 kg (245 lb 6 oz) SpO2 100% BMI 30.67 kg/m² Temp (24hrs), Av.5 °F (36.4 °C), Min:96.9 °F (36.1 °C), Max:97.7 °F (36.5 °C) PHYSICAL EXAM:  
Given +COVID and thus to limit exposure as well as use of PPE patient was seen from the door Gen: +Intubated/resting LAB DATA REVIEWED:   
Recent Results (from the past 48 hour(s)) PTT Collection Time: 20 12:10 PM  
Result Value Ref Range aPTT 81.4 (H) 22.1 - 32.0 sec  
 aPTT, therapeutic range     58.0 - 77.0 SECS RENAL FUNCTION PANEL Collection Time: 20 12:10 PM  
Result Value Ref Range Sodium 134 (L) 136 - 145 mmol/L Potassium 4.4 3.5 - 5.1 mmol/L Chloride 104 97 - 108 mmol/L  
 CO2 22 21 - 32 mmol/L Anion gap 8 5 - 15 mmol/L Glucose 220 (H) 65 - 100 mg/dL BUN 99 (H) 6 - 20 MG/DL Creatinine 4.23 (H) 0.70 - 1.30 MG/DL  
 BUN/Creatinine ratio 23 (H) 12 - 20 GFR est AA 17 (L) >60 ml/min/1.73m2 GFR est non-AA 14 (L) >60 ml/min/1.73m2 Calcium 7.4 (L) 8.5 - 10.1 MG/DL Phosphorus 7.4 (H) 2.6 - 4.7 MG/DL Albumin 1.8 (L) 3.5 - 5.0 g/dL PROTHROMBIN TIME + INR Collection Time: 20 12:10 PM  
Result Value Ref Range INR 1.5 (H) 0.9 - 1.1 Prothrombin time 15.3 (H) 9.0 - 11.1 sec GLUCOSE, POC Collection Time: 20 12:15 PM  
Result Value Ref Range Glucose (POC) 141 (H) 65 - 100 mg/dL Performed by Yaneli Orozco ECHO ADULT COMPLETE Collection Time: 20  5:31 PM  
Result Value Ref Range LA Volume 70.85 18 - 58 mL Right Atrial Area 4C 25.20 cm2 Ao Root D 3.01 cm Aortic Valve Systolic Peak Velocity 288.24 cm/s Aortic Valve Area by Continuity of Peak Velocity 1.9 cm2 AoV PG 8.1 mmHg LVIDd 5.03 4.2 - 5.9 cm  
 LVPWd 1.41 (A) 0.6 - 1.0 cm LVIDs 4.33 cm IVSd 1.44 (A) 0.6 - 1.0 cm IVSs 2.30 cm LVOT d 2.07 cm  
 LVOT Peak Velocity 81.00 cm/s LVOT Peak Gradient 2.6 mmHg MVA (PHT) 5.0 cm2  
 MV E Yoni 73.45 cm/s  Left Atrium to Aortic Root Ratio 1.31   
 RVIDd 4.00 cm LA Vol 4C 48.41 18 - 58 mL  
 LA Vol 2C 92.53 (A) 18 - 58 mL  
 LA Area 4C 20.1 cm2 LV Mass .3 (A) 88 - 224 g LV Mass AL Index 146.6 (A) 49 - 115 g/m2 LVPWs 1.11 cm Mitral Regurgitant Peak Velocity 464.87 cm/s Mitral Valve E Wave Deceleration Time 166.3 ms  
 Mitral Valve Pressure Half-time 44.3 ms Left Atrium Major Axis 3.95 cm Triscuspid Valve Regurgitation Peak Gradient 35.9 mmHg Pulmonic Valve Max Velocity 91.35 cm/s  
 TR Max Velocity 299.65 cm/s  
 LA Vol Index 28.51 16 - 28 ml/m2 LA Vol Index 37.23 16 - 28 ml/m2 LA Vol Index 19.48 16 - 28 ml/m2 MR Peak Gradient 86.4 mmHg ASHLEY/BSA Pk Yoni 0.8 cm2/m2 AV Cusp 2.01 cm Left Ventricular Fractional Shortening by 2D 89.533087546 % Mitral Valve Deceleration [place] 1.0464189609216 AV Velocity Ratio 0.57 Left Ventricular End Diastolic Volume by Teichholz Method 9.72930765173 mL Left Ventricular End Systolic Volume by Teichholz Method 6.76257630348 mL Left Ventricular Stroke Volume by Teichholz Method 16.233098524 mL  
 PV peak gradient 3.3 mmHg GLUCOSE, POC Collection Time: 05/31/20  6:33 PM  
Result Value Ref Range Glucose (POC) 246 (H) 65 - 100 mg/dL Performed by Jeremy Ferreira PTT Collection Time: 05/31/20  6:35 PM  
Result Value Ref Range aPTT 79.7 (H) 22.1 - 32.0 sec  
 aPTT, therapeutic range     58.0 - 77.0 SECS  
CULTURE, BLOOD, PAIRED Collection Time: 05/31/20  6:35 PM  
Result Value Ref Range Special Requests: NO SPECIAL REQUESTS Culture result: NO GROWTH 2 DAYS    
GLUCOSE, POC Collection Time: 05/31/20 11:05 PM  
Result Value Ref Range Glucose (POC) 198 (H) 65 - 100 mg/dL Performed by Morgan Clark PTT Collection Time: 06/01/20  2:11 AM  
Result Value Ref Range aPTT 41.6 (H) 22.1 - 32.0 sec  
 aPTT, therapeutic range     58.0 - 77.0 SECS FERRITIN Collection Time: 06/01/20  2:11 AM  
Result Value Ref Range Ferritin 1,509 (H) 26 - 388 NG/ML  
CBC WITH AUTOMATED DIFF Collection Time: 06/01/20  2:11 AM  
Result Value Ref Range WBC 17.2 (H) 4.1 - 11.1 K/uL  
 RBC 3.11 (L) 4.10 - 5.70 M/uL HGB 8.9 (L) 12.1 - 17.0 g/dL HCT 25.6 (L) 36.6 - 50.3 % MCV 82.3 80.0 - 99.0 FL  
 MCH 28.6 26.0 - 34.0 PG  
 MCHC 34.8 30.0 - 36.5 g/dL RDW 25.4 (H) 11.5 - 14.5 % PLATELET 046 487 - 495 K/uL MPV 11.1 8.9 - 12.9 FL  
 NRBC 0.2 (H) 0  WBC ABSOLUTE NRBC 0.03 (H) 0.00 - 0.01 K/uL NEUTROPHILS 90 (H) 32 - 75 % LYMPHOCYTES 3 (L) 12 - 49 % MONOCYTES 5 5 - 13 % EOSINOPHILS 0 0 - 7 % BASOPHILS 0 0 - 1 % IMMATURE GRANULOCYTES 2 (H) 0.0 - 0.5 % ABS. NEUTROPHILS 15.5 (H) 1.8 - 8.0 K/UL  
 ABS. LYMPHOCYTES 0.5 (L) 0.8 - 3.5 K/UL  
 ABS. MONOCYTES 0.9 0.0 - 1.0 K/UL  
 ABS. EOSINOPHILS 0.0 0.0 - 0.4 K/UL  
 ABS. BASOPHILS 0.0 0.0 - 0.1 K/UL  
 ABS. IMM. GRANS. 0.3 (H) 0.00 - 0.04 K/UL  
 DF SMEAR SCANNED    
 RBC COMMENTS ANISOCYTOSIS 2+ 
    
 RBC COMMENTS HYPOCHROMIA 2+ 
    
 RBC COMMENTS MACROCYTOSIS 1+ 
    
 RBC COMMENTS MICROCYTOSIS 1+ 
    
 RBC COMMENTS YEHUDA CELLS 1+ RBC COMMENTS TARGET CELLS 
1+ METABOLIC PANEL, COMPREHENSIVE Collection Time: 06/01/20  2:11 AM  
Result Value Ref Range Sodium 134 (L) 136 - 145 mmol/L Potassium 3.8 3.5 - 5.1 mmol/L Chloride 104 97 - 108 mmol/L  
 CO2 24 21 - 32 mmol/L Anion gap 6 5 - 15 mmol/L Glucose 169 (H) 65 - 100 mg/dL BUN 73 (H) 6 - 20 MG/DL Creatinine 3.14 (H) 0.70 - 1.30 MG/DL  
 BUN/Creatinine ratio 23 (H) 12 - 20 GFR est AA 24 (L) >60 ml/min/1.73m2 GFR est non-AA 20 (L) >60 ml/min/1.73m2 Calcium 7.6 (L) 8.5 - 10.1 MG/DL Bilirubin, total 13.8 (H) 0.2 - 1.0 MG/DL  
 ALT (SGPT) 126 (H) 12 - 78 U/L  
 AST (SGOT) 84 (H) 15 - 37 U/L Alk. phosphatase 286 (H) 45 - 117 U/L Protein, total 6.5 6.4 - 8.2 g/dL Albumin 1.8 (L) 3.5 - 5.0 g/dL Globulin 4.7 (H) 2.0 - 4.0 g/dL A-G Ratio 0.4 (L) 1.1 - 2.2 MAGNESIUM Collection Time: 06/01/20  2:11 AM  
Result Value Ref Range Magnesium 2.7 (H) 1.6 - 2.4 mg/dL PHOSPHORUS Collection Time: 06/01/20  2:11 AM  
Result Value Ref Range Phosphorus 3.9 2.6 - 4.7 MG/DL  
C REACTIVE PROTEIN, QT Collection Time: 06/01/20  2:11 AM  
Result Value Ref Range C-Reactive protein 3.97 (H) 0.00 - 0.60 mg/dL NT-PRO BNP Collection Time: 06/01/20  2:11 AM  
Result Value Ref Range NT pro-BNP 33,805 (H) <125 PG/ML  
BILIRUBIN, FRACTIONATED Collection Time: 06/01/20  2:11 AM  
Result Value Ref Range Bilirubin, total 13.8 (H) 0.2 - 1.0 MG/DL Bilirubin, direct 11.0 (H) 0.0 - 0.2 MG/DL Bilirubin, indirect 2.8 (H) 0.0 - 1.1 MG/DL  
D DIMER Collection Time: 06/01/20  2:11 AM  
Result Value Ref Range D-dimer 4.40 (H) 0.00 - 0.65 mg/L FEU FIBRINOGEN Collection Time: 06/01/20  2:11 AM  
Result Value Ref Range Fibrinogen 238 200 - 475 mg/dL SAMPLES BEING HELD Collection Time: 06/01/20  2:11 AM  
Result Value Ref Range SAMPLES BEING HELD 1BLUE   
 COMMENT Add-on orders for these samples will be processed based on acceptable specimen integrity and analyte stability, which may vary by analyte. GLUCOSE, POC Collection Time: 06/01/20  5:35 AM  
Result Value Ref Range Glucose (POC) 171 (H) 65 - 100 mg/dL Performed by Carley Mesa POC EG7 Collection Time: 06/01/20  6:05 AM  
Result Value Ref Range Calcium, ionized (POC) 1.11 (L) 1.12 - 1.32 mmol/L  
 FIO2 (POC) 30 % pH (POC) 7.385 7.35 - 7.45    
 pCO2 (POC) 36.6 35.0 - 45.0 MMHG  
 pO2 (POC) 166 (H) 80 - 100 MMHG  
 HCO3 (POC) 21.9 (L) 22 - 26 MMOL/L Base deficit (POC) 3 mmol/L  
 sO2 (POC) 99 (H) 92 - 97 % Site RIGHT RADIAL Device: VENT Mode ASSIST CONTROL Tidal volume 500 ml Set Rate 16 bpm  
 PEEP/CPAP (POC) 6 cmH2O Allens test (POC) N/A Specimen type (POC) ARTERIAL Total resp. rate 22 Volume control YES    
PTT Collection Time: 06/01/20  9:27 AM  
Result Value Ref Range aPTT 44.7 (H) 22.1 - 32.0 sec  
 aPTT, therapeutic range     58.0 - 77.0 SECS  
GLUCOSE, POC Collection Time: 06/01/20 12:18 PM  
Result Value Ref Range Glucose (POC) 167 (H) 65 - 100 mg/dL Performed by Ricardo Delgado RN   
SARS-COV-2 Collection Time: 06/01/20  1:12 PM  
Result Value Ref Range Specimen source Nasopharyngeal    
 SARS-CoV-2 PENDING   
PTT Collection Time: 06/01/20  4:41 PM  
Result Value Ref Range aPTT 63.5 (H) 22.1 - 32.0 sec  
 aPTT, therapeutic range     58.0 - 77.0 SECS RENAL FUNCTION PANEL Collection Time: 06/01/20  4:41 PM  
Result Value Ref Range Sodium 135 (L) 136 - 145 mmol/L Potassium 3.6 3.5 - 5.1 mmol/L Chloride 103 97 - 108 mmol/L  
 CO2 27 21 - 32 mmol/L Anion gap 5 5 - 15 mmol/L Glucose 188 (H) 65 - 100 mg/dL BUN 58 (H) 6 - 20 MG/DL Creatinine 2.44 (H) 0.70 - 1.30 MG/DL  
 BUN/Creatinine ratio 24 (H) 12 - 20 GFR est AA 32 (L) >60 ml/min/1.73m2 GFR est non-AA 27 (L) >60 ml/min/1.73m2 Calcium 7.6 (L) 8.5 - 10.1 MG/DL Phosphorus 3.6 2.6 - 4.7 MG/DL Albumin 1.8 (L) 3.5 - 5.0 g/dL MAGNESIUM Collection Time: 06/01/20  4:41 PM  
Result Value Ref Range Magnesium 2.5 (H) 1.6 - 2.4 mg/dL GLUCOSE, POC Collection Time: 06/01/20  6:28 PM  
Result Value Ref Range Glucose (POC) 178 (H) 65 - 100 mg/dL Performed by Ricardo Delgado RN   
PTT Collection Time: 06/01/20 10:45 PM  
Result Value Ref Range aPTT 59.0 (H) 22.1 - 32.0 sec  
 aPTT, therapeutic range     58.0 - 77.0 SECS  
GLUCOSE, POC Collection Time: 06/01/20 11:39 PM  
Result Value Ref Range Glucose (POC) 217 (H) 65 - 100 mg/dL Performed by Cecy Porter PTT Collection Time: 06/02/20  4:17 AM  
Result Value Ref Range aPTT 57.2 (H) 22.1 - 32.0 sec  
 aPTT, therapeutic range     58.0 - 77.0 SECS  
D DIMER  Collection Time: 06/02/20  4:17 AM  
 Result Value Ref Range D-dimer 3.94 (H) 0.00 - 0.65 mg/L FEU  
C REACTIVE PROTEIN, QT Collection Time: 06/02/20  4:17 AM  
Result Value Ref Range C-Reactive protein 3.72 (H) 0.00 - 0.60 mg/dL FERRITIN Collection Time: 06/02/20  4:17 AM  
Result Value Ref Range Ferritin 1,331 (H) 26 - 388 NG/ML  
RENAL FUNCTION PANEL Collection Time: 06/02/20  4:17 AM  
Result Value Ref Range Sodium 135 (L) 136 - 145 mmol/L Potassium 3.4 (L) 3.5 - 5.1 mmol/L Chloride 103 97 - 108 mmol/L  
 CO2 26 21 - 32 mmol/L Anion gap 6 5 - 15 mmol/L Glucose 212 (H) 65 - 100 mg/dL BUN 56 (H) 6 - 20 MG/DL Creatinine 2.20 (H) 0.70 - 1.30 MG/DL  
 BUN/Creatinine ratio 25 (H) 12 - 20 GFR est AA 37 (L) >60 ml/min/1.73m2 GFR est non-AA 30 (L) >60 ml/min/1.73m2 Calcium 7.8 (L) 8.5 - 10.1 MG/DL Phosphorus 3.6 2.6 - 4.7 MG/DL Albumin 1.8 (L) 3.5 - 5.0 g/dL MAGNESIUM Collection Time: 06/02/20  4:17 AM  
Result Value Ref Range Magnesium 2.7 (H) 1.6 - 2.4 mg/dL CBC WITH AUTOMATED DIFF Collection Time: 06/02/20  4:17 AM  
Result Value Ref Range WBC 15.0 (H) 4.1 - 11.1 K/uL  
 RBC 2.97 (L) 4.10 - 5.70 M/uL HGB 8.5 (L) 12.1 - 17.0 g/dL HCT 25.1 (L) 36.6 - 50.3 % MCV 84.5 80.0 - 99.0 FL  
 MCH 28.6 26.0 - 34.0 PG  
 MCHC 33.9 30.0 - 36.5 g/dL RDW 28.0 (H) 11.5 - 14.5 % PLATELET 837 319 - 945 K/uL MPV 12.2 8.9 - 12.9 FL  
 NRBC 0.3 (H) 0  WBC ABSOLUTE NRBC 0.04 (H) 0.00 - 0.01 K/uL NEUTROPHILS 90 (H) 32 - 75 % LYMPHOCYTES 3 (L) 12 - 49 % MONOCYTES 7 5 - 13 % EOSINOPHILS 0 0 - 7 % BASOPHILS 0 0 - 1 % IMMATURE GRANULOCYTES 0 0.0 - 0.5 % ABS. NEUTROPHILS 13.4 (H) 1.8 - 8.0 K/UL  
 ABS. LYMPHOCYTES 0.5 (L) 0.8 - 3.5 K/UL  
 ABS. MONOCYTES 1.1 (H) 0.0 - 1.0 K/UL  
 ABS. EOSINOPHILS 0.0 0.0 - 0.4 K/UL  
 ABS. BASOPHILS 0.0 0.0 - 0.1 K/UL  
 ABS. IMM. GRANS. 0.0 0.00 - 0.04 K/UL  
 DF AUTOMATED    
 RBC COMMENTS ANISOCYTOSIS 3+ 
    
 RBC COMMENTS MACROCYTOSIS 3+ 
    
 RBC COMMENTS HYPOCHROMIA 3+ 
    
 RBC COMMENTS TARGET CELLS 
PRESENT 
    
GLUCOSE, POC Collection Time: 06/02/20  5:05 AM  
Result Value Ref Range Glucose (POC) 209 (H) 65 - 100 mg/dL Performed by Jian Squires IMAGING RESULTS: 
 []      I have personally reviewed the actual   []    CXR  []    CT  []     US Assessment/Plan: Active Problems: 
  Sepsis ([address] (5/15/2020) Weakness generalized (5/18/2020) Septic shock ([address] (5/18/2020) Acute renal failure with tubular necrosis ([address] (5/18/2020) COVID-19 (5/21/2020) 1. Shock 2/2 Sepsis (+COVID 19) and Cardiogenic 2. Intubated on Vasopressors 3. LAQUITA on CVVH 4. AFIB on Heparin gtt 5. Acute liver injury, clearly initially consistent with shock liver 6. NSTEMI 
___________________________________________________ RECOMMENDATIONS:   
GI/Hepatology consulted for persistent jaundice. Clearly patient's initial presentation was consistent with shock liver for numerous reasons stated above. Liver transaminases have much improved but TB has worsened. RUQ U/S in 5/26 showed a slightly prominent CBD of 8 mm which very likely can be within normal limits for a 71year old. DDx of patient's persistent jaundice is: Viral hepatitis/AIH, biliary obstruction, sepsis induced (COVID19) cholestasis, Cardiogenic, lag in TB from severe shock liver - Repeat RUQ U/S to assess for any increase in CBD size 
- avoid hepatoxic agents - Serologic workup (will order) Discussed Code Status:    []    Full Code      []    DNR   
___________________________________________________ Care Plan discussed with: 
  []    Patient   []    Family   []    Nursing   []    Attending 
 
 ___________________________________________________ GI: Jason Hernandez MD

## 2020-06-02 NOTE — DIALYSIS
[facility]       [phone] Orders Mode: CVVHD Factor: 50 ml/hr Dialysate: 2000 ml/hr Blood Flow Rate: 200 ml/min Metrics BP / HR: 99/45, 96 Blood Flow Rate: 200 ml/min AP:                         -71 RP: 718 TMP: 31  
PD: 38  
FP: 173 Dialysate: 2000 ml/hr Comments / Plan:  
   [id number] filter running well with no indication for change at this time. Pt will reach MAX filter on 6/3/20. Consents, patient, code status, labs and orders verified. +COVID-19: Observed pt & CRRT machine outside room with primary RN inside pt room to reduce exposure & use of PPE. RIJ temporary CVC. Unable to observe CVC dressing, primary RN to change if needed per policy & procedure. No signs of redness, drainage, or infection visualized by primary RN. Lines visible and connections secure with blood warmer to return line at 37*C. Education, pre and post [address]. Tamika Naranjo.

## 2020-06-02 NOTE — PROGRESS NOTES
06/02/20 0530 ABCDEF Bundle SBT Safety Screen Passed Yes SBT Trial Passed Yes Weaning Parameters Spontaneous Breathing Trial Complete Yes Resp Rate Observed 16 Ve 10.6   
RSBI 16 Patient completed SBT on PSV 6/6/30%. Patient is awake and has passed. Patient remains on PSV 6/6/30%.

## 2020-06-02 NOTE — PROGRESS NOTES
Care Management: 
  
DEREK PLAN:   TBD , anticipated Acute rehab versus home with family and HH.  
  
COVID positive PNA.  
Patient remains vented but showing signs of improvement . Still requiring pressors. May need trach if unable to liberate from vent. Requiring CVVH.  
  
Palliative has been involved in this case.  
  
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  
  
He was independent with ADL's PTA including driving. 
  
Home is two stories but they do have a lift chair on the steps. 
  
He is active with his PCP . His cardiologist is Dr Maren Ramirez. 
  
CM is following to assist w/ appropriate dc planning. 
  
Shawn Castro RN ACM [id number]

## 2020-06-02 NOTE — PROGRESS NOTES
NAME: Marly Gaines :  1955 MRN:  [id number] Assessment :    Plan: 
--LAQUITA Shock Sepsis DM type 2 Systolic HF (EF 82%) AHRF High Bili-?etio COVID + Initiated CVVHD ; Off CVVH on ; didnt tolerate iHD; restarted CVVHD on  Using Lewis, 200 QB, Factor of 100>>down to 50 this am due to soft BP (on dobutamine), 4k dialysate On heparin gtt already Lewis line  On levo/dobutamine Subjective: Chief Complaint:  In order to limit the exposure risk from COVID 19 and to preserve the hospital's limited supply of PPEs, seen through ICU window. Intubated. On levo. d/w ICU nurse Remains critically ill; tolerating CVVH Back on pressor. Factor reduced Review of Systems: uto Symptom Y/N Comments  Symptom Y/N Comments Fever/Chills    Chest Pain Poor Appetite    Edema Cough    Abdominal Pain Sputum    Joint Pain SOB/GROVES    Pruritis/Rash Nausea/vomit    Tolerating PT/OT Diarrhea    Tolerating Diet Constipation    Other Could not obtain due to: See above Objective: VITALS:  
Last 24hrs VS reviewed since prior progress note. Most recent are: 
Visit Vitals /61 Pulse (!) 101 Temp 97.7 °F (36.5 °C) Resp 16 Ht 6' 3\" (1.905 m) Wt 111.3 kg (245 lb 6 oz) SpO2 97% BMI 30.67 kg/m² Intake/Output Summary (Last 24 hours) at 2020 1026 Last data filed at 2020 0900 Gross per 24 hour Intake 3.19 ml Output 4424 ml Net -2400.81 ml Telemetry Reviewed: PHYSICAL EXAM: 
General: NAD 
+ett in place +edema Lab Data Reviewed: (see below) Medications Reviewed: (see below) PMH/SH reviewed - no change compared to H&P 
________________________________________________________________________ Care Plan discussed with: 
Patient Family RN y   
Care Manager Consultant:     
 
  Comments >50% of visit spent in counseling and coordination of care    
 
________________________________________________________________________ Karin Polk MD  
 
Procedures: see electronic medical records for all procedures/Xrays and details which 
were not copied into this note but were reviewed prior to creation of Plan. LABS: 
Recent Labs  
  06/02/20 0417 06/01/20 0211 WBC 15.0* 17.2* HGB 8.5* 8.9* HCT 25.1* 25.6*  
 181 Recent Labs  
  06/02/20 0417 06/01/20 1641 06/01/20 
0211 * 135* 134* K 3.4* 3.6 3.8  103 104 CO2 26 27 24 BUN 56* 58* 73* CREA 2.20* 2.44* 3.14* * 188* 169* CA 7.8* 7.6* 7.6*  
MG 2.7* 2.5* 2.7* PHOS 3.6 3.6 3.9 Recent Labs  
  06/02/20 0417 06/01/20 1641 06/01/20 0211 05/31/20 
0822 AP  --   --  286*  --  246* TP  --   --  6.5  --  6.4 ALB 1.8* 1.8* 1.8*   < > 1.8*  
GLOB  --   --  4.7*  --  4.6*  
 < > = values in this interval not displayed. Recent Labs  
  06/02/20 0417 06/01/20 2245 06/01/20 1641 05/31/20 
1210 INR  --   --   --   --  1.5* PTP  --   --   --   --  15.3* APTT 57.2* 59.0* 63.5*   < > 81.4*  
 < > = values in this interval not displayed. MEDICATIONS: 
Current Facility-Administered Medications Medication Dose Route Frequency  DOBUTamine (DOBUTREX) 500 mg/250 mL (2,000 mcg/mL) infusion  3 mcg/kg/min IntraVENous CONTINUOUS  
 bicarbonate dialysis (PRISMASOL) BG K 4/Ca 2.5 5000 ml solution   Extracorporeal DIALYSIS CONTINUOUS  
 hydrocortisone Sod Succ (PF) (SOLU-CORTEF) injection 50 mg  50 mg IntraVENous Q8H  
 pantoprazole (PROTONIX) 40 mg in 0.9% sodium chloride 10 mL injection  40 mg IntraVENous Q12H  
 scopolamine (TRANSDERM-SCOP) 1 mg over 3 days 1 Patch  1 Patch TransDERmal Q72H  balsam peru-castor oiL (VENELEX) ointment   Topical TID  white petrolatum-mineral oiL (AKWA TEARS) 83-15 % ophthalmic ointment Both Eyes Q12H  
 insulin glargine (LANTUS) injection 40 Units  40 Units SubCUTAneous DAILY  polyethylene glycol (MIRALAX) packet 17 g  17 g Per NG tube DAILY PRN  
 zinc oxide-cod liver oil (DESITIN) 40 % paste   Topical PRN  chlorhexidine (ORAL CARE KIT) 0.12 % mouthwash 15 mL  15 mL Oral Q12H  potassium chloride 20 mEq in 50 ml IVPB  20 mEq IntraVENous PRN  
 calcium chloride injection 2 g  2 g IntraVENous PRN  
 acetaminophen (TYLENOL) solution 650 mg  650 mg Oral Q6H PRN  
 PHARMACY INFORMATION NOTE  1 Each Other BID  heparin (porcine) injection 2,000 Units  2,000 Units IntraVENous PRN Or  
 heparin (porcine) injection 4,000 Units  4,000 Units IntraVENous PRN  
 heparin 25,000 units in D5W 250 ml infusion  8-25 Units/kg/hr IntraVENous TITRATE  
 NOREPINephrine (LEVOPHED) 32 mg in 5% dextrose 250 mL infusion  2-200 mcg/min IntraVENous TITRATE  sodium chloride (NS) flush 5-40 mL  5-40 mL IntraVENous Q8H  
 sodium chloride (NS) flush 5-40 mL  5-40 mL IntraVENous PRN  
 ondansetron (ZOFRAN) injection 4 mg  4 mg IntraVENous Q8H PRN  
 alcohol 62% (NOZIN) nasal  1 Ampule  1 Ampule Topical Q12H  
 insulin lispro (HUMALOG) injection   SubCUTAneous Q6H  
 glucose chewable tablet 16 g  4 Tab Oral PRN  
 dextrose (D50W) injection syrg 12.5-25 g  12.5-25 g IntraVENous PRN  
 glucagon (GLUCAGEN) injection 1 mg  1 mg IntraMUSCular PRN  
 acetaminophen (TYLENOL) tablet 650 mg  650 mg Oral Q6H PRN Or  
 acetaminophen (TYLENOL) suppository 650 mg  650 mg Rectal Q6H PRN  
 influenza vaccine 2019-20 (6 mos+)(PF) (FLUARIX/FLULAVAL/FLUZONE QUAD) injection 0.5 mL  0.5 mL IntraMUSCular PRIOR TO DISCHARGE  sodium chloride (NS) flush 5-10 mL  5-10 mL IntraVENous PRN

## 2020-06-02 NOTE — PROGRESS NOTES
0700  Report from [facility] SANDRA SANDOVAL 
 
0800  Assessment complete  Patient awake alert follows commands nods yes/no. Able to move upper ext's  weak but equal.  Able to moves toes on command. Patient on spontaneous breathing at this time tolerating well. Oral secretions remain bloody but less amount than yesterday. NGT in place with little amount of blood at end of residual.  Right jacinta and Right IJ infusing. On CVVH factor 50 as BP is soft at this time, will try to go back to 100 continue to monitor. Hear IRR  Lungs diminished Pulses in all ext's. 
 
1030  Per Dr Vero Biswas ok to half the dobutamine. Dose to 1.5 mcg/kg/min now see MAR 
 
1200  Assessment complete no changes 1400  No signs of distress noted 1500  Resp. Therapy put patient back on a rate. Changed factor back to a rate of 100 will monitor BP 
 
1600  Assessment complete  No changes from previous.   VSS 
 
1800  VSS with CVVH factor at 100 
 
1900  Report to [employer]

## 2020-06-02 NOTE — PROGRESS NOTES
[facility] INTENSIVIST Consult Service Note Pulmonary, Critical Care, and Sleep Medicine Name: Raul Foster MRN: [id number] : 1955 Hospital: [place] [address] Date: 2020  Admission date: 5/15/2020 Hospital Day: 23 Subjective/Interval History:  
 
20: off sedation but only grimaces to noxious stimuli. Remains critically ill on mechanical ventilation/pressors 20: remains critically ill on mechanical ventilation/pressors. Off sedation for about 48 hours. Minimal response thus far.  
20: no events. Remains intubated and on pressors. Followed a few commands intermittently overnight per nursing. No data available as EMR has been down until a few minutes ago. 20: more alert and following commands today. Still very weak. Actually doing well on SBT. 
- Intubated, off sedation. Bloody secretions through ETT and NGT per RN. On heparin drip for cardiac issues. Increased pressor requirement today. Increased secretions- SBT held. Weak : Intubated, off sedation, follows commands per RN. Still on high dose NE at 17 mcg/min. Increased bilirubin. Last LVEF in  was 20%-- still covid positive, no recent reassessment. On heparin drip-- bleeding from mucosal sites a little better today. Very weak  on CRRT, On Vent. Iv levo 28. IV  3; IV heparin. Bleeding from gums. LVEF 35% on IV inotrope, pressors. Alert. Follows commands. Able to move hands and forearms easily but feet and ankles very weak. D/w dr. Linda Sorensen.  unable to wean IV levo now at 30 mcg. No fever. On same drips. CRRT Factor reduced. Still critically ill. Passing SBT. Inflammatory markers still high Called Mrs. Myers to discuss pt management, Reviewed potentiall need for trach but timing and safety must be thought through.  If he continues to rally and get stronger, also possible that he could avoid trach but cannot guarantee. Still needing lots of pressor support. Still needs CRRT. Allowed her to ask questions. Fortunately she and family have not been infected by COVID. IMPRESSION:  
1. Acute hypoxic respiratory failure, intubated 5-18; at risk for diaphragm atrophy 2. Profound septic shock on high level of pressors. 3. COVID 19 pneumonia- good gas exchange 4. Worsening hyperbil, ? etiology 5. Alpha strep septicemia- elevated Procalcitonin > 80 
6. Chronic combined systolic/diastolic heart failure; echo / LVEF 35% on IV , Iv levo 7. Hypertensive heart disease with CHF and CKD, previous severe LV function 8. LAQUITA/CKD, CVVH to start 9. Chronic anticoagulation at home- eliquis 10. Diabetes mellitus type 2 uncontrolled 11. Hyperlipidemia 12. 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 Chronic atrial fibrillation 14. Status post SJM BIV-ICD implant in 2016 
15. Sleep apnea, undiagnosed/untreated 16. Obesity with recent weight gain 17. Possible critical illness polyneuropathy/myopathy RECOMMENDATIONS/PLAN:  
1. Ventilator support - doing quite well on SBT but still quite weak. Hopefully can be extubated this week as alertness hopefully continues to improve-- may need trach 2. Wean off IV dobutamine 3. SARs COv 2 pending 4. Hoping to avoid trach 5. SBT with prolonged CPAP for conditioning 6. If extubated, will use NIV post extubation 7. Add IV albumin- intravascular volume may be down and may be on wrong side of starling curve now 8. Continue pressors, wean as able 9. Watch WBC-lines look good 10. Stress dose steroids- was increased to see if it helped with weaning pressors? 11. RRT per nephrology 12. Heparin drip but no bolus 13. Cardiology following 14. Glycemic monitoring and control 15. Replete electrolytes PRN 16. DVT, SUP prophylaxis- changed to protonix in view in GI bleed on heparin 16. Remains critically ill, at high risk for decline. CCT 35'. D/W RN Subjective/Initial History:  
I have reviewed the flowsheet and previous days notes. Seen earlier today on rounds. I was asked by Breonna Calhoun MD to see Garett Gonsalez a 71 y.o.  male  in consultation for a chief complaint of shock. Excerpts from admission 5/15/2020 and consult notes reviewed as follows:  
 
\"Mr. Harlan Muniz is a 57 y.o. morbidly obese male with Chronic nonischemic dilated cardiomyopathy EF 01%, Chronic systolic congestive heart failure class, H/o atrial fibrillation, On chronic warfarin, Iatrogenic left bundle branch block with predominant RV pacing, 64-70%, Hypertension, Hyperlipidemia and diabetes [facility] [id number] [facility] C/O Worsening exertional shortness of breath and around 20 pounds weight gain in last 3 weeks. \" 
 
Pt now in CCU. Poor historian. On room air. No fever. No cough. No diarrhea. Not very active. Saw PCP three weeks ago. Some edema. [facility] [id number] with [facility]. Patient PCP: Vanessa Paez MD 
PMH:  has a past medical history of A-fib ([address] Arrhythmia, Diabetes ([address] Endocrine disease, Hypertension, and Irregular heart beat. He also has no past medical history of Difficult intubation, Malignant hyperthermia due to anesthesia, Nausea & vomiting, or Pseudocholinesterase deficiency. PSH:   has a past surgical history that includes pr cardiac surg procedure unlist; insert emergency endotrach airway (5/18/2020); and ir insert non tunl cvc over 5 yrs (5/18/2020). FHX: family history includes Diabetes in his father; Heart Disease in his father and mother. SHX:  reports that he quit smoking about 26 years ago. He has never used smokeless tobacco. He reports that he does not drink alcohol or use drugs. ROS:A comprehensive review of systems was negative except for that written in the HPI. No Known Allergies MEDS:  
 Current Facility-Administered Medications Medication  DOBUTamine (DOBUTREX) 500 mg/250 mL (2,000 mcg/mL) infusion  bicarbonate dialysis (PRISMASOL) BG K 4/Ca 2.5 5000 ml solution  hydrocortisone Sod Succ (PF) (SOLU-CORTEF) injection 50 mg  
 pantoprazole (PROTONIX) 40 mg in 0.9% sodium chloride 10 mL injection  scopolamine (TRANSDERM-SCOP) 1 mg over 3 days 1 Patch  balsam peru-castor oiL (VENELEX) ointment  white petrolatum-mineral oiL (AKWA TEARS) 83-15 % ophthalmic ointment  insulin glargine (LANTUS) injection 40 Units  polyethylene glycol (MIRALAX) packet 17 g  
 zinc oxide-cod liver oil (DESITIN) 40 % paste  chlorhexidine (ORAL CARE KIT) 0.12 % mouthwash 15 mL  potassium chloride 20 mEq in 50 ml IVPB  calcium chloride injection 2 g  
 acetaminophen (TYLENOL) solution 650 mg  PHARMACY INFORMATION NOTE  heparin (porcine) injection 2,000 Units Or  heparin (porcine) injection 4,000 Units  heparin 25,000 units in D5W 250 ml infusion  NOREPINephrine (LEVOPHED) 32 mg in 5% dextrose 250 mL infusion  sodium chloride (NS) flush 5-40 mL  sodium chloride (NS) flush 5-40 mL  ondansetron (ZOFRAN) injection 4 mg  alcohol 62% (NOZIN) nasal  1 Ampule  insulin lispro (HUMALOG) injection  glucose chewable tablet 16 g  
 dextrose (D50W) injection syrg 12.5-25 g  
 glucagon (GLUCAGEN) injection 1 mg  acetaminophen (TYLENOL) tablet 650 mg Or  acetaminophen (TYLENOL) suppository 650 mg  
 influenza vaccine 2019-20 (6 mos+)(PF) (FLUARIX/FLULAVAL/FLUZONE QUAD) injection 0.5 mL  sodium chloride (NS) flush 5-10 mL Objective:  
 
Vital Signs: Telemetry:    AFIB Intake/Output:  
Visit Vitals /79 (BP 1 Location: Right arm, BP Patient Position: At rest) Pulse (!) 106 Temp 97.7 °F (36.5 °C) Resp 12 Ht 6' 3\" (1.905 m) Wt 111.3 kg (245 lb 6 oz) SpO2 98% BMI 30.67 kg/m² Temp (24hrs), Av.5 °F (36.4 °C), Min:96.9 °F (36.1 °C), Max:97.7 °F (36.5 °C) O2 Device: Endotracheal tube, Ventilator O2 Flow Rate (L/min): 60 l/min Body mass index is 30.67 kg/m². Wt Readings from Last 4 Encounters:  
20 111.3 kg (245 lb 6 oz) 19 123.8 kg (273 lb) 17 129.3 kg (285 lb)  
17 107.5 kg (237 lb) Intake/Output Summary (Last 24 hours) at 2020 4995 Last data filed at 2020 0800 Gross per 24 hour Intake 1975.79 ml Output 4144 ml Net -2168.21 ml Last shift:       07 -  1900 In: -  
Out: 177 Last 3 shifts:  190 -  0700 In: 2835.7 [I.V.:1435.7] Out: 6621 [Drains:850] Hemodynamics:   
CO:   
CI:   
CVP: CVP (mmHg): 8 mmHg (20 1030) SVR:   PAP Systolic:   
PAP Diastolic:   
PVR:   
OY32:    
 
Ventilator Settings:     
Mode Rate TV Press PEEP FiO2 PIP Min. Vent Spontaneous    500 ml 6 cm H2O  6 cm H20 30 %  12 cm H2O  8.5 l/min Physical Exam: 
 
General: intubated, lethargic but more alert HEENT: NCAT, poor dentition, lips and mucosa dry Eyes: anicteric; conjunctiva clear Neck: no nodes, no cuff leak, no accessory MM use. Chest: no deformity,  
Cardiac: regular; no murmur Lungs: no distress, intubated Abd: soft, NT, hypoactive BS Ext: no edema; no joint swelling; No clubbing Neuro: no localizing findings Data:  
            
Labs: 
Recent Labs  
  20 
0417 20 
0211 20 
5618 WBC 15.0* 17.2* 20.0* HGB 8.5* 8.9* 9.4* HCT 25.1* 25.6* 27.6*  
 181 210 Recent Labs  
  20 
0417 20 
1641 20 
0211 20 
1210 20 
6657 * 135* 134* 134* 135* K 3.4* 3.6 3.8 4.4 4.2  103 104 104 104 CO2 26 27 24 22 21 * 188* 169* 220* 159* BUN 56* 58* 73* 99* 84* CREA 2.20* 2.44* 3.14* 4.23* 3.80* CA 7.8* 7.6* 7.6* 7.4* 8.0*  
MG 2.7* 2.5* 2.7*  --  2.6* PHOS 3.6 3.6 3.9 7.4* 6.7*  
 ALB 1.8* 1.8* 1.8* 1.8* 1.8* TBILI  --   --  13.8*  13.8*  --  14.0* ALT  --   --  126*  --  128* INR  --   --   --  1.5*  --   
 
Recent Labs  
  06/01/20 
0605 05/31/20 
5277 PHI 7.385 7.337* PCO2I 36.6 40.0 PO2I 166* 160* HCO3I 21.9* 21.5*  
FIO2I 30 0.30 Imaging: 
I have personally reviewed the patients radiographs and have reviewed the reports: 
No change Total critical care time exclusive of procedures: 35 minutes Abhishek Liu MD

## 2020-06-02 NOTE — PROGRESS NOTES
Pharmacy  Heparin Monitoring Indication: afib Labs: 
Recent Labs  
  06/02/20 
1314 06/02/20 
0417 06/01/20 
2245  05/31/20 
1210 APTT 70.0* 57.2* 59.0*   < > 81.4* HGB  --  8.5*  --    < >  --   
PLT  --  190  --    < >  --   
INR  --   --   --   --  1.5*  
 < > = values in this interval not displayed. Current rate:  10 unit/kg/hr Impression/Plan:  
New rate: 10 unit/kg/hr - no change PRN bolus dose (Yes/No): no, not indicated Hold all boluses at this time per MD (Dr. Adolfo Messer) due to bleeding Infusion rate, PRN bolus dose and aPTT results were discussed with the nurse: (Yes/No): yes, Ender Sotelo Thanks, Abdirashid Alba, PHARMD 
 
[email or web address]/[email or web address]. pdf

## 2020-06-03 NOTE — PROGRESS NOTES
Progress Note 6/3/2020 11:43 AM 
NAME: Kaylie Perkins MRN:  [id number] Admit Diagnosis: Sepsis ([facility] [address] [A41.9] Problem List: 1. Shock; septic/cardiogenic; resolved 2. Severe sepsis; resolved 3. COVID-19 + 4. NSTEMI 5. Hyponatremia; resolved. 6. Lactic acidosis; resolved. 7. Acute liver injury; resolving 8. Acute on chronic systolic heart failure; Class IV 9. Acute kidney injury; anuric. Now ESRD on dialysis. 10. Coagulopathy 11. Nonischemic dilated cardiomyopathy s/p remote ICD 12. Chronic atrial fibrillation 13. Sleep apnea 14. Hypertensive heart disease w/ CKD and HF Assessment/Plan:  
 
Intake/Output Summary (Last 24 hours) at 6/3/2020 1440 Last data filed at 6/3/2020 7899 Gross per 24 hour Intake 2116.46 ml Output 4902 ml Net -2785.54 ml Ricky off Vaso off Norepi @ 7 Dobut off Epi off EXTUBATED!!!  
 
Bloody secretions. HgB 7s. Echo w/ EF 35-40% COVID-19 from 6/1 negative; will be repeated. 1. Continue supportive care 2. Heparin gtt on hold 3. CVVH ongoing; tolerating full factor 4. Remains ill but shockingly improving 5. Add midodrine 5mg TID in an effort to wean of pressors 6. Discussions ongoing w/ family daily [x]       High complexity decision making was performed in this patient at high risk for decompensation with multiple organ involvement. Subjective:  
 
Kaylie Perkins is sleepy but follows commands. Discussed with RN events overnight. Review of Systems: 
 
Symptom Y/N Comments  Symptom Y/N Comments Fever/Chills N   Chest Pain N Poor Appetite N   Edema N   
Cough N   Abdominal Pain N Sputum N   Joint Pain N   
SOB/GROVES N   Pruritis/Rash N   
Nausea/vomit N   Tolerating PT/OT Y Diarrhea N   Tolerating Diet Y Constipation N   Other Could NOT obtain due to:   
 
Objective:  
  
Physical Exam: 
 
Last 24hrs VS reviewed since prior progress note. Most recent are: 
 
Visit Vitals BP (!) 80/54 Pulse 85 Temp 98 °F (36.7 °C) Resp 22 Ht 6' 3\" (1.905 m) Wt 110 kg (242 lb 8.1 oz) SpO2 100% BMI 30.31 kg/m² Intake/Output Summary (Last 24 hours) at 6/3/2020 1440 Last data filed at 6/3/2020 6709 Gross per 24 hour Intake 2116.46 ml Output 4902 ml Net -2785.54 ml General Appearance: Well developed, well nourished, intubated. Ears/Nose/Mouth/Throat: Hearing grossly normal. 
Neck: Supple. Chest: Lungs decreased diffusely Cardiovascular: Irregular rate and rhythm, S1S2 normal, no murmur. Abdomen: Soft, non-tender, bowel sounds are active. Extremities: No edema bilaterally. Skin: Warm and dry. []         Post-cath site without hematoma, bruit, tenderness, or thrill. Distal pulses intact. PMH/SH reviewed - no change compared to H&P Data Review Telemetry: paced/AF 
 
EKG:  
[x]  No new EKG for review Lab Data Personally Reviewed: 
 
Recent Labs  
  06/03/20 
0344 06/02/20 
5050 WBC 10.8 15.0* HGB 7.6* 8.5* HCT 22.4* 25.1*  
 190 Recent Labs  
  06/03/20 
0344 06/02/20 
1934 06/02/20 
1314 INR 1.4*  --   --   
PTP 14.0*  --   --   
APTT 62.6* 64.0* 70.0* Recent Labs  
  06/03/20 
0344 06/02/20 
1630 06/02/20 
0417 * 135* 135* K 3.3* 3.6 3.4*  
 102 103 CO2 25 26 26 BUN 49* 50* 56* CREA 1.89* 2.07* 2.20* * 217* 212* CA 8.0* 7.7* 7.8*  
MG 2.6* 2.7* 2.7* No results for input(s): CPK, CKNDX, TROIQ in the last 72 hours. No lab exists for component: CPKMB Lab Results Component Value Date/Time Cholesterol, total 92 03/03/2009 09:40 PM  
 HDL Cholesterol 44 03/03/2009 09:40 PM  
 LDL, calculated 38.4 03/03/2009 09:40 PM  
 Triglyceride <15 05/22/2020 05:13 AM  
 CHOL/HDL Ratio 2.1 03/03/2009 09:40 PM  
 
 
Recent Labs  
  06/03/20 
0344 06/02/20 
1630 06/02/20 
0417  06/01/20 
0211 *  --   --   --  286* TP 6.8  --   --   --  6.5 ALB 2.7* 2.1* 1.8*   < > 1.8*  
GLOB 4.1*  --   --   --  4.7*  
 < > = values in this interval not displayed. No results for input(s): PH, PCO2, PO2 in the last 72 hours. Medications Personally Reviewed: 
 
Current Facility-Administered Medications Medication Dose Route Frequency  hydrocortisone Sod Succ (PF) (SOLU-CORTEF) injection 25 mg  25 mg IntraVENous Q8H  
 epoetin socrates-epbx (RETACRIT) injection 10,000 Units  10,000 Units SubCUTAneous Q MON, WED & FRI  midodrine (PROAMATINE) tablet 5 mg  5 mg Per NG tube TID PRN  
 insulin glargine (LANTUS) injection 46 Units  46 Units SubCUTAneous DAILY  albumin human 25% (BUMINATE) solution 25 g  25 g IntraVENous Q6H  
 DOBUTamine (DOBUTREX) 500 mg/250 mL (2,000 mcg/mL) infusion  3 mcg/kg/min IntraVENous CONTINUOUS  
 bicarbonate dialysis (PRISMASOL) BG K 4/Ca 2.5 5000 ml solution   Extracorporeal DIALYSIS CONTINUOUS  
 pantoprazole (PROTONIX) 40 mg in 0.9% sodium chloride 10 mL injection  40 mg IntraVENous Q12H  
 balsam peru-castor oiL (VENELEX) ointment   Topical TID  polyethylene glycol (MIRALAX) packet 17 g  17 g Per NG tube DAILY PRN  
 zinc oxide-cod liver oil (DESITIN) 40 % paste   Topical PRN  chlorhexidine (ORAL CARE KIT) 0.12 % mouthwash 15 mL  15 mL Oral Q12H  potassium chloride 20 mEq in 50 ml IVPB  20 mEq IntraVENous PRN  
 calcium chloride injection 2 g  2 g IntraVENous PRN  
 acetaminophen (TYLENOL) solution 650 mg  650 mg Oral Q6H PRN  
 PHARMACY INFORMATION NOTE  1 Each Other BID  heparin 25,000 units in D5W 250 ml infusion  8-25 Units/kg/hr IntraVENous TITRATE  
 NOREPINephrine (LEVOPHED) 32 mg in 5% dextrose 250 mL infusion  2-200 mcg/min IntraVENous TITRATE  sodium chloride (NS) flush 5-40 mL  5-40 mL IntraVENous Q8H  
 sodium chloride (NS) flush 5-40 mL  5-40 mL IntraVENous PRN  
 ondansetron (ZOFRAN) injection 4 mg  4 mg IntraVENous Q8H PRN  
 alcohol 62% (NOZIN) nasal  1 Ampule  1 Ampule Topical Q12H  insulin lispro (HUMALOG) injection   SubCUTAneous Q6H  
 glucose chewable tablet 16 g  4 Tab Oral PRN  
 dextrose (D50W) injection syrg 12.5-25 g  12.5-25 g IntraVENous PRN  
 glucagon (GLUCAGEN) injection 1 mg  1 mg IntraMUSCular PRN  
 acetaminophen (TYLENOL) tablet 650 mg  650 mg Oral Q6H PRN Or  
 acetaminophen (TYLENOL) suppository 650 mg  650 mg Rectal Q6H PRN  
 influenza vaccine 2019-20 (6 mos+)(PF) (FLUARIX/FLULAVAL/FLUZONE QUAD) injection 0.5 mL  0.5 mL IntraMUSCular PRIOR TO DISCHARGE  sodium chloride (NS) flush 5-10 mL  5-10 mL IntraVENous PRN Tonna Dryer III, DO

## 2020-06-03 NOTE — PROGRESS NOTES
GI Progress Note Johnna Learn) Nahed Miles PFY:[birth year] IOY:[id number] PCP: Anju Alvarez MD 
Date/Time:  6/3/2020 9:54 AM  
Assessment:  
1. Shock 2/2 Sepsis (+COVID 19) and Cardiogenic 2. Intubated on Vasopressors 3. LAQUITA on CVVH 4. AFIB on Heparin gtt 5. Acute liver injury, clearly initially consistent with shock liver 6. NSTEMI Plan:  
GI/Hepatology consulted for persistent jaundice. Clearly patient's initial presentation was consistent with shock liver for numerous reasons stated above. Liver transaminases have much improved but TB has worsened. RUQ U/S in 5/26 showed a slightly prominent CBD of 8 mm which very likely can be within normal limits for a 71year old. DDx of patient's persistent jaundice is: Viral hepatitis/AIH, biliary obstruction, sepsis induced (COVID19) cholestasis, Cardiogenic, lag in TB from severe shock liver (I suspect this as most likely cause) - Repeat RUQ U/S with no evidence of biliary dilation/obstruction (CBD of 6 mm clearly wnl) 
- avoid hepatoxic agents 
- continue supportive care - Serologic workup ordered and pending I will follow patient peripherally as we await serologic testing Subjective:  
Remains intubated and sedated Complaint Y/N Description Abdominal Pain Hematemesis Hematochezia Melena Constipation Diarrhea Dyspepsia Dysphagia Jaundiced Nausea/vomiting Review of Systems: 
Symptom Y/N Comments  Symptom Y/N Comments Fever/Chills    Chest Pain Cough    Headaches Sputum    Joint Pain SOB/GROVES    Pruritis/Rash Tolerating Diet    Other Could NOT obtain due to:   
 
Objective: VITALS:  
Last 24hrs VS reviewed since prior progress note. Most recent are: 
Visit Vitals /42 Pulse 69 Temp 98.1 °F (36.7 °C) Resp 17 Ht 6' 3\" (1.905 m) Wt 110 kg (242 lb 8.1 oz) SpO2 97% BMI 30.31 kg/m² Intake/Output Summary (Last 24 hours) at 6/3/2020 1586 Last data filed at 6/3/2020 3715 Gross per 24 hour Intake 2285.86 ml Output 4336 ml Net -2050.14 ml PHYSICAL EXAM: 
Given +COVID and thus to limit exposure as well as use of PPE patient was seen from the door General: +Intubated and sedated Lab and Radiology Data Reviewed: (see below) Medications Reviewed: (see below) PMH/SH reviewed - no change compared to H&P 
________________________________________________________________________ Care Plan discussed with: 
Patient Family RN Consultant:    
 
Therese Olsen MD  
 
Procedures: see electronic medical records for all procedures/Xrays and details which were not copied into this note but were reviewed prior to creation of Plan. LABS: 
Recent Labs  
  06/03/20 0344 06/02/20 0417 WBC 10.8 15.0* HGB 7.6* 8.5* HCT 22.4* 25.1*  
 190 Recent Labs  
  06/03/20 0344 06/02/20 
1630 06/02/20 
0417 * 135* 135* K 3.3* 3.6 3.4*  
 102 103 CO2 25 26 26 BUN 49* 50* 56* CREA 1.89* 2.07* 2.20* * 217* 212* CA 8.0* 7.7* 7.8*  
MG 2.6* 2.7* 2.7* PHOS 2.6 2.6 3.6 Recent Labs  
  06/03/20 0344 06/02/20 
1630 06/02/20 
0417  06/01/20 
0211 *  --   --   --  286* TP 6.8  --   --   --  6.5 ALB 2.7* 2.1* 1.8*   < > 1.8*  
GLOB 4.1*  --   --   --  4.7*  
 < > = values in this interval not displayed. Recent Labs  
  06/03/20 0344 06/02/20 
1934 06/02/20 
1314  05/31/20 
1210 INR 1.4*  --   --   --  1.5* PTP 14.0*  --   --   --  15.3* APTT 62.6* 64.0* 70.0*   < > 81.4*  
 < > = values in this interval not displayed. Recent Labs  
  06/03/20 
0344 06/02/20 
0417 FERR 1,095* 1,331* No results found for: FOL, RBCF No results for input(s): PH, PCO2, PO2 in the last 72 hours. No results for input(s): CPK, CKMB in the last 72 hours. No lab exists for component: TROPONINI Lab Results Component Value Date/Time  Color YELLOW/STRAW 05/15/2020 05:34 PM  
 Appearance CLOUDY (A) 05/15/2020 05:34 PM  
 Specific gravity 1.025 05/15/2020 05:34 PM  
 pH (UA) 5.0 05/15/2020 05:34 PM  
 Protein 30 (A) 05/15/2020 05:34 PM  
 Glucose >1,000 (A) 05/15/2020 05:34 PM  
 Ketone TRACE (A) 05/15/2020 05:34 PM  
 Bilirubin Negative 05/15/2020 05:34 PM  
 Urobilinogen 0.2 05/15/2020 05:34 PM  
 Nitrites Negative 05/15/2020 05:34 PM  
 Leukocyte Esterase Negative 05/15/2020 05:34 PM  
 Epithelial cells FEW 05/15/2020 05:34 PM  
 Bacteria Negative 05/15/2020 05:34 PM  
 WBC 0-4 05/15/2020 05:34 PM  
 RBC 0-5 05/15/2020 05:34 PM  
 
 
MEDICATIONS: 
Current Facility-Administered Medications Medication Dose Route Frequency  insulin glargine (LANTUS) injection 46 Units  46 Units SubCUTAneous DAILY  albumin human 25% (BUMINATE) solution 25 g  25 g IntraVENous Q6H  
 DOBUTamine (DOBUTREX) 500 mg/250 mL (2,000 mcg/mL) infusion  3 mcg/kg/min IntraVENous CONTINUOUS  
 bicarbonate dialysis (PRISMASOL) BG K 4/Ca 2.5 5000 ml solution   Extracorporeal DIALYSIS CONTINUOUS  
 hydrocortisone Sod Succ (PF) (SOLU-CORTEF) injection 50 mg  50 mg IntraVENous Q8H  
 pantoprazole (PROTONIX) 40 mg in 0.9% sodium chloride 10 mL injection  40 mg IntraVENous Q12H  
 scopolamine (TRANSDERM-SCOP) 1 mg over 3 days 1 Patch  1 Patch TransDERmal Q72H  balsam peru-castor oiL (VENELEX) ointment   Topical TID  white petrolatum-mineral oiL (AKWA TEARS) 83-15 % ophthalmic ointment   Both Eyes Q12H  polyethylene glycol (MIRALAX) packet 17 g  17 g Per NG tube DAILY PRN  
 zinc oxide-cod liver oil (DESITIN) 40 % paste   Topical PRN  chlorhexidine (ORAL CARE KIT) 0.12 % mouthwash 15 mL  15 mL Oral Q12H  potassium chloride 20 mEq in 50 ml IVPB  20 mEq IntraVENous PRN  
 calcium chloride injection 2 g  2 g IntraVENous PRN  
 acetaminophen (TYLENOL) solution 650 mg  650 mg Oral Q6H PRN  
 PHARMACY INFORMATION NOTE  1 Each Other BID  heparin 25,000 units in D5W 250 ml infusion  8-25 Units/kg/hr IntraVENous TITRATE  
 NOREPINephrine (LEVOPHED) 32 mg in 5% dextrose 250 mL infusion  2-200 mcg/min IntraVENous TITRATE  sodium chloride (NS) flush 5-40 mL  5-40 mL IntraVENous Q8H  
 sodium chloride (NS) flush 5-40 mL  5-40 mL IntraVENous PRN  
 ondansetron (ZOFRAN) injection 4 mg  4 mg IntraVENous Q8H PRN  
 alcohol 62% (NOZIN) nasal  1 Ampule  1 Ampule Topical Q12H  
 insulin lispro (HUMALOG) injection   SubCUTAneous Q6H  
 glucose chewable tablet 16 g  4 Tab Oral PRN  
 dextrose (D50W) injection syrg 12.5-25 g  12.5-25 g IntraVENous PRN  
 glucagon (GLUCAGEN) injection 1 mg  1 mg IntraMUSCular PRN  
 acetaminophen (TYLENOL) tablet 650 mg  650 mg Oral Q6H PRN Or  
 acetaminophen (TYLENOL) suppository 650 mg  650 mg Rectal Q6H PRN  
 influenza vaccine 2019-20 (6 mos+)(PF) (FLUARIX/FLULAVAL/FLUZONE QUAD) injection 0.5 mL  0.5 mL IntraMUSCular PRIOR TO DISCHARGE  sodium chloride (NS) flush 5-10 mL  5-10 mL IntraVENous PRN

## 2020-06-03 NOTE — PROGRESS NOTES
Interdisciplinary team rounds were held 6/3/2020 with the following team members:Care Management, Diabetes Treatment Specialist, Nursing, Pharmacy, Physical Therapy, Physician, Respiratory therapy  and Clinical Coordinator. Plan of care discussed. Goal: reassess SBT , ABG pending  adjust medications, continue to monitor and support. See MD orders and progress notes for further  interventions and desired outcomes.

## 2020-06-03 NOTE — ROUTINE PROCESS
TF and Heparin turned off per Dr. Gisell Freedman request.  Will hold Heparin for 1 hour prior to planned extubation. 1340 extubated to 4L, Pat expectrating mod amount of bloody secretions, aprox 3-400 cc's.   Spoke w/ Dr. Gisell Freedman will resume Heparin at [address] to hold labs this afternoon per Renal MD.

## 2020-06-03 NOTE — PROGRESS NOTES
2000, Bedside and Verbal shift change report given to LORI Chan (oncoming nurse) by Kassandra Gottron, RN (offgoing nurse). Report included the following information SBAR, Kardex, Intake/Output, MAR, Accordion, Recent Results, Med Rec Status and Cardiac Rhythm A Fib, Pacing. Temp;  Afebrile Neuro;  slight drowsy, follow command, eyes contact, No sedation, denies pain, pupils   GCS;  Eye; 3-4, verbal; 1 for ETT, motor; 6 > both arms Very weak > Legs nearly move the toes > more strong tonight. Reassessment; No changes Respiratory; Sat 98% on Ventilator A/C mode, , RR 16, Peep 6, FiO2 30%, ETT 26 cm at lips, secretion; small to large, thick bloody, bloody secretion at mouth > seems he swallow them. Reassessment; Passed SBT > kept on Spontaneous Cardiac; A Fib/Flutter Pacer( AICD at left side of the chest) with PVCs, 79-99 B/min, NIBP 113/69 mmHg, on Levophed at 31 daniela/min. Dobutamine at rate 1.5 daniela/kg/min > Albumin added Reassessment;   
2200 Dobutamine stopped,  
  
2450-0520 After stopped Dobutamine and with the Help of Albumin > Levophed titrated down to 8 daniela/min > BP up to 152/51 mmHg, Factor increased this hour to 150 ml/hr, 
 
2950-5405 BP soft 94/39 mmHg > schedule Albumin dose started > Levphed kept at 8 daniela/min for now, GI; NPO with NG tube ( 66 cm at Rt Nare) 35 ml/h, free water 50 ml Q 6 hr, Prosource 8 x day, residual; 40 ml Tan slight dark bloody > seem he swallow oozing blood at throat, Abd obese semi soft, Flexiseal > loose brown. Reassessment;  Flexiseal total 0.8 L, slight blood stained around [facility] done at Day time, no residual  
 
Renal; CRRT 4K Factor 100 ml/hr as tolerated. Reassessment; Intake/Output Summary (Last 24 hours) at 6/3/2020 7649 Last data filed at 6/3/2020 0600 Gross per 24 hour Intake 2374.76 ml Output 4737 ml Net -2362.24 ml Weight change: -0.2 kg (-7.1 oz) Skin; DTI at Big toe > Venelex, upper lip with slight laceration, excoriation at pannus and groin > Desitin applied > stage 2 at sacral and right buttock > Venelex and Desitin applied Endo; SSI every 6 hours, Lantus 40 units OD Lines; ETT, right IJ CVP & Lewis, NGT,Flexiseal. 
Code; Full. Lab;  
DVT; SCD both legs. Heparin infusion at 10 units/Kg/hr > PTT at 1934 was 64 > no changes > at 0344 was 62.6 > no changes > PTT Q 24 hrs, all discussed with Pharmacist > no PRN boluses as Per Dr Lopez Patient > order adjust accordingly by Pharmacist  
Plan; orient the patient, ventilator management > SBT, titrate Levophed as per order, titrate Dobutamine off as per MD order, pain and agitation management, follow lab tomorrow,  
0700 Bedside and Verbal shift change report given to Aly N [address], RN (oncoming nurse) by Abdelrahman Porter RN (offgoing nurse). Report included the following information SBAR, Kardex, Intake/Output, MAR, Accordion, Recent Results, Med Rec Status and Cardiac Rhythm A Fib/Flutter Pacing.

## 2020-06-03 NOTE — PROGRESS NOTES
NAME: Garett Gonsalez :  1955 MRN:  [id number] Assessment :    Plan: 
--LAQUITA Shock Sepsis DM type 2 Systolic HF (EF 72%) AHRF High Bili-?etio; RUQ US with nl CBD Anemia COVID + Initiated CVVHD ; Off CVVH on ; didnt tolerate iHD; restarted CVVHD on  Using Lewis, 200 QB, Factor of 100 ml/hr this am 
 4k dialysate D/C IV Albumin On heparin gtt already Lewis line  Will start Retacrit Pressor Subjective: Chief Complaint:  In order to limit the exposure risk from COVID 19 and to preserve the hospital's limited supply of PPEs, seen through ICU window. Intubated. On levo. d/w ICU nurse Remains critically ill; tolerating CVVH Review of Systems: uto Symptom Y/N Comments  Symptom Y/N Comments Fever/Chills    Chest Pain Poor Appetite    Edema Cough    Abdominal Pain Sputum    Joint Pain SOB/GROVES    Pruritis/Rash Nausea/vomit    Tolerating PT/OT Diarrhea    Tolerating Diet Constipation    Other Could not obtain due to: See above Objective: VITALS:  
Last 24hrs VS reviewed since prior progress note. Most recent are: 
Visit Vitals BP (!) 49/48 Pulse 81 Temp 98 °F (36.7 °C) Resp 27 Ht 6' 3\" (1.905 m) Wt 110 kg (242 lb 8.1 oz) SpO2 97% BMI 30.31 kg/m² Intake/Output Summary (Last 24 hours) at 6/3/2020 1258 Last data filed at 6/3/2020 1203 Gross per 24 hour Intake 2159.77 ml Output 4479 ml Net -2319.23 ml Telemetry Reviewed: PHYSICAL EXAM: 
General: NAD 
+ett in place Lab Data Reviewed: (see below) Medications Reviewed: (see below) PMH/SH reviewed - no change compared to H&P 
________________________________________________________________________ Care Plan discussed with: 
Patient Family RN y   
Care Manager Consultant:     
 
  Diana >50% of visit spent in counseling and coordination of care    
 
________________________________________________________________________ Jennifer Hoover MD  
 
Procedures: see electronic medical records for all procedures/Xrays and details which 
were not copied into this note but were reviewed prior to creation of Plan. LABS: 
Recent Labs  
  06/03/20 0344 06/02/20 0417 WBC 10.8 15.0* HGB 7.6* 8.5* HCT 22.4* 25.1*  
 190 Recent Labs  
  06/03/20 0344 06/02/20 1630 06/02/20 0417 * 135* 135* K 3.3* 3.6 3.4*  
 102 103 CO2 25 26 26 BUN 49* 50* 56* CREA 1.89* 2.07* 2.20* * 217* 212* CA 8.0* 7.7* 7.8*  
MG 2.6* 2.7* 2.7* PHOS 2.6 2.6 3.6 Recent Labs  
  06/03/20 0344 06/02/20 
1630 06/02/20 0417 06/01/20 
0211 *  --   --   --  286* TP 6.8  --   --   --  6.5 ALB 2.7* 2.1* 1.8*   < > 1.8*  
GLOB 4.1*  --   --   --  4.7*  
 < > = values in this interval not displayed. Recent Labs  
  06/03/20 0344 06/02/20 
1934 06/02/20 
1314 INR 1.4*  --   --   
PTP 14.0*  --   --   
APTT 62.6* 64.0* 70.0* MEDICATIONS: 
Current Facility-Administered Medications Medication Dose Route Frequency  hydrocortisone Sod Succ (PF) (SOLU-CORTEF) injection 25 mg  25 mg IntraVENous Q8H  
 insulin glargine (LANTUS) injection 46 Units  46 Units SubCUTAneous DAILY  albumin human 25% (BUMINATE) solution 25 g  25 g IntraVENous Q6H  
 DOBUTamine (DOBUTREX) 500 mg/250 mL (2,000 mcg/mL) infusion  3 mcg/kg/min IntraVENous CONTINUOUS  
 bicarbonate dialysis (PRISMASOL) BG K 4/Ca 2.5 5000 ml solution   Extracorporeal DIALYSIS CONTINUOUS  
 pantoprazole (PROTONIX) 40 mg in 0.9% sodium chloride 10 mL injection  40 mg IntraVENous Q12H  
 balsam peru-castor oiL (VENELEX) ointment   Topical TID  polyethylene glycol (MIRALAX) packet 17 g  17 g Per NG tube DAILY PRN  
 zinc oxide-cod liver oil (DESITIN) 40 % paste   Topical PRN  
  chlorhexidine (ORAL CARE KIT) 0.12 % mouthwash 15 mL  15 mL Oral Q12H  potassium chloride 20 mEq in 50 ml IVPB  20 mEq IntraVENous PRN  
 calcium chloride injection 2 g  2 g IntraVENous PRN  
 acetaminophen (TYLENOL) solution 650 mg  650 mg Oral Q6H PRN  
 PHARMACY INFORMATION NOTE  1 Each Other BID  heparin 25,000 units in D5W 250 ml infusion  8-25 Units/kg/hr IntraVENous TITRATE  
 NOREPINephrine (LEVOPHED) 32 mg in 5% dextrose 250 mL infusion  2-200 mcg/min IntraVENous TITRATE  sodium chloride (NS) flush 5-40 mL  5-40 mL IntraVENous Q8H  
 sodium chloride (NS) flush 5-40 mL  5-40 mL IntraVENous PRN  
 ondansetron (ZOFRAN) injection 4 mg  4 mg IntraVENous Q8H PRN  
 alcohol 62% (NOZIN) nasal  1 Ampule  1 Ampule Topical Q12H  
 insulin lispro (HUMALOG) injection   SubCUTAneous Q6H  
 glucose chewable tablet 16 g  4 Tab Oral PRN  
 dextrose (D50W) injection syrg 12.5-25 g  12.5-25 g IntraVENous PRN  
 glucagon (GLUCAGEN) injection 1 mg  1 mg IntraMUSCular PRN  
 acetaminophen (TYLENOL) tablet 650 mg  650 mg Oral Q6H PRN Or  
 acetaminophen (TYLENOL) suppository 650 mg  650 mg Rectal Q6H PRN  
 influenza vaccine 2019-20 (6 mos+)(PF) (FLUARIX/FLULAVAL/FLUZONE QUAD) injection 0.5 mL  0.5 mL IntraMUSCular PRIOR TO DISCHARGE  sodium chloride (NS) flush 5-10 mL  5-10 mL IntraVENous PRN

## 2020-06-03 NOTE — PROGRESS NOTES
: Report from [address]: Shift assessment: Pt is A&O x 3, with mild confusion. Pt can tell me his name, , the year, and the president. He did not know why he was here, until the AM RN told him so. The pt was extubated today, now on 4L O2 via NC. Pt is still very lethargic but is able to move and has purposeful movements. Pt has greater movement in BUE vs. BLE. All  and movements are equal. Good pulses in all. Pt is noted to be in [address] with occasional PVC's and pacer spikes. Pt SBP ranges from 's on 2mcg of Levo. Pt is coarse bilaterally and has a very productive cough. He is able to expectorate secretions but is too weak to suction himself. Pt has NGT in R nare with Nepro going at 35 mL with Q6 H2O flushes at 50. Pt also has FMS which is putting out brown watery stool. Pt is anuric. *Heparin restarted at 10U (Held for 6-8 hours per Dr Maddie Dawson) : CVVH stopped. Return access pressure too high. *Lines already noted to be switched* Unable to rinse back. Ada RN already on site. : Ada RN at bedside. O2 decreased to 2L. : CVVH restarted. Pt is more confused now. 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 He asked if we had a play today. I spent some time reorienting him. 2334: BG 98, Humalog held. Gave PRN dose of Midodrine d/t SBP in the 80's.  
 
2400: Reassessment: no changes. Spent time reorienting pt.  
 
0015: 2 Prosources given. 0245: Levo increased to 4 mcg. SBP remains in 80's. 0315: Labs drawn. 0400: Reassessment: no changes. 26: Spoke with Jasmyn Gimenez in the Rx, PTT 47.58, increase by 2 units. Now at 12 units. Redraw at 1015 HRS. 0510: . Humalog held.  
 
0600: CHG bath done. Changed tape on NG to Steri strips. Oriented pt and let the shade up. Pt seemed to be more confused over night. Attempting to reorient him to the TOD. 0700: Report given to Colton Gamino RN this AM.

## 2020-06-03 NOTE — DIABETES MGMT
[facility] [facility] 
 
FOLLOW-UP NOTE Presentation Angelia Velasquez a 71 y. o. male admitted from the ER 5/15/20 with complaints of weakness. No COVID-19 symptoms or contacts noted. Fever. CXR, CT Head & CT ABd negative. Blood cultures positive for alpha streptococcus. Markedly elevated ferritin DX: [id number]. Septic shock. Multiorgan failure TX: Anti-COVID therapies. Pacer. Palliative care. Recent diagnostics: 
5/21/20 Hepatitis B surface Ab Reactive 5/22/20 WBC 22.9. Albumin 2.8. Bili 6.8. . . Procalcitonin 203.42. Triglyceride <15. Serum creatinine 4.73/GFR 15. CXR: Stable bibasilar opacities and pleural effusions 5/26/20 WBC 27.2. CXR: Persistent pleural effusions with bibasilar atelectasis. Atelectasis at the right lung base has increased. Ferritin 1682. CRP 5.09. Procalcitonin 55.12 
5/27/20 D-Dimer 5.97. US ABd: Negative 5/28/20 D-Dimer 4.92. Procalcitonin 32.01. CXR: Presence of bibasilar hazy density (left greater than right) possibly representing developing infiltration. Improved aeration of the right lung base 5/29/20 Ferritin 1606. CRP 3.38. D-Dimer 5. 13. Blood cultures without growth 6/1/20 WBC 17.2. CRP 3.97. Ferritin 1509. BNP 33, 805. D-Dimer 4.4. Bili 13.8. CXR: No acute process. ECHO: Normal cavity size and wall thickness. Moderate global systolic function. Estimated left ventricular ejection fraction is 35 - 40%. Hypokinetic left ventricular wall motion. Mildly reduced systolic function 6/2/20 WBC 15. D-Dimer 3.94. CRP 3.72. Ferritin 1331. Bl culture No growth 6/3/20 COVID negative for first time. WBC 10.8. D-Dimer 3.32. CRP 4.23. Ferritin 1095. IgG in normal range. Bili 12.3 
  
Clinical care includes: 
            WGBXP Vent with PEEP via ET => will be extubated today 
            BP management via norepinephrine (levophed)  
            TF via NG => will be stopped prior to extubation 
            Insulin Albumin ABx             Steroids => will be reduced in half today 
            Dialysis via [address] prophylaxis             [id number] infusion 
  
Diabetes: Patient has known Type 2 diabetes, treated with Trulicity, Onglyza & Amaryl PTA (per PTA med list). Family history positive for diabetes in father.  
 
Subjective NA Objective Physical exam 
General Alert, responds to voice per nursing Vital Signs Afebrile. AFib Visit Vitals /42 Pulse 69 Temp 98.1 °F (36.7 °C) Resp 17 Ht 6' 3\" (1.905 m) Wt 110 kg (242 lb 8.1 oz) SpO2 97% BMI 30.31 kg/m² Laboratory Lab Results Component Value Date/Time Hemoglobin A1c 9.8 (H) 02/09/2017 05:42 AM  
 Hemoglobin A1c, External 9.0 03/02/2016 Lab Results Component Value Date/Time LDL, calculated 38.4 03/03/2009 09:40 PM  
 
Lab Results Component Value Date/Time Creatinine 1.89 (H) 06/03/2020 03:44 AM  
 
Lab Results Component Value Date/Time Sodium 133 (L) 06/03/2020 03:44 AM  
 Potassium 3.3 (L) 06/03/2020 03:44 AM  
 Chloride 102 06/03/2020 03:44 AM  
 CO2 25 06/03/2020 03:44 AM  
 Anion gap 6 06/03/2020 03:44 AM  
 Glucose 178 (H) 06/03/2020 03:44 AM  
 BUN 49 (H) 06/03/2020 03:44 AM  
 Creatinine 1.89 (H) 06/03/2020 03:44 AM  
 BUN/Creatinine ratio 26 (H) 06/03/2020 03:44 AM  
 GFR est AA 44 (L) 06/03/2020 03:44 AM  
 GFR est non-AA 36 (L) 06/03/2020 03:44 AM  
 Calcium 8.0 (L) 06/03/2020 03:44 AM  
 Bilirubin, total 12.3 (H) 06/03/2020 03:44 AM  
 Alk. phosphatase 271 (H) 06/03/2020 03:44 AM  
 Protein, total 6.8 06/03/2020 03:44 AM  
 Albumin 2.7 (L) 06/03/2020 03:44 AM  
 Globulin 4.1 (H) 06/03/2020 03:44 AM  
 A-G Ratio 0.7 (L) 06/03/2020 03:44 AM  
 ALT (SGPT) 103 (H) 06/03/2020 03:44 AM  
 
Lab Results Component Value Date/Time ALT (SGPT) 103 (H) 06/03/2020 03:44 AM  
 
Factors affecting BG pattern Factor Dose Comments Nutrition: 
Tube feeding via OG  
135 grams/24 hrs Will be stopped in preparation for extubation Drugs: 
Steroids Hydrocortisone 25mg Q8 hrs Infection: COVID   First COVID negative test noted, but markers remain elevated Other: CKD Blood glucose pattern Assessment and Plan Nursing Diagnosis Risk for unstable blood glucose pattern Nursing Intervention Domain 5253 Decision-making Support Nursing Interventions Examined current inpatient diabetes control Explored factors facilitating and impeding inpatient management Evaluation This gentleman with known Type 2 diabetes has achieved inpatient BG targets today. The plans for this gentleman are extubation with stoppage of TF. Additionally, TF will be stopped. The patient requires basal insulin; since he required 10 extra insulin units yesterday and TF CHO load is 135 (with at least 10 units of insulin to override its effect), would stay at current insulin dosing. Recommendations Recommend: 
 
Continuing current insulin dosing Billing Code(s) [x] [id number] IP subsequent hospital care - 30 minutes COLLETTE Lanza Access via R [place] [phone]

## 2020-06-03 NOTE — PROGRESS NOTES
Hospitalist Progress Note NAME: Rosanna Mendoza :  1955 MRN:  [id number] Assessment / Plan: 
COVID-19 pneumonia with multiorgan failure Acute hypoxic respiratory failure due to above status post mechanical ventilation Septic shock profound Hyponatremia Strep Bacteremia Coagulopathy Continue mechanical ventilation. Daily SBT's. Pulmonary is following. Continue Levophed and dobutamine and wean as tolerated. Continue CRRT per renal 
We will continue stress dose of steroids Status post completion of IV antibiotics for pneumonia and bacteremia Appreciate nephrology help with HD Continue albumin Acute transaminitis likely due to shock liver Hyperbilirubinemia due to above US RUQ: Minimal gallbladder sludge. Slightly prominent common bile duct of uncertain 
significance. Recheck CMP in a.m. Acute on chronic systolic HF EF 91%, now 04% (but on Dobutamine) Nonischemic dilated cardiomyopathy s/p remote ICD Chronic atrial fibrillation Sleep apnea Hypertensive heart disease w/ CKD and HF Requiring Vasopressor support Cardiology help appreciated: cont supportive care Eliquis on hold for now, on heparin ggt DM2 BS stable Cont insulin w lantus, SSI 
  
Code Status: Full Surrogate Decision Maker: wife DVT Prophylaxis: heparin Disposition: remain critically ill Subjective:  
  
Remains on Levophed at 30 mcg. No fever. Remains on CRRT On dobutamine as well. On heparin drip. Overnight events noted Chief Complaint / Reason for Physician Visit: following sepsis / respiratory failure/ covid Review of Systems: 
Symptom Y/N Comments  Symptom Y/N Comments Fever/Chills    Chest Pain Poor Appetite    Edema Cough    Abdominal Pain Sputum    Joint Pain SOB/GROVES    Pruritis/Rash Nausea/vomit    Tolerating PT/OT Diarrhea    Tolerating Diet Constipation    Other Could NOT obtain due to: Intubated and sedated Objective: VITALS:  
Last 24hrs VS reviewed since prior progress note. Most recent are: 
Patient Vitals for the past 24 hrs: 
 Temp Pulse Resp BP SpO2  
06/02/20 2200  78 19 95/56 99 % 06/02/20 2100  86 16 116/67 97 % 06/02/20 2000  98 14 113/69 98 % 06/02/20 1952  88 20  98 % 06/02/20 1930 98.6 °F (37 °C) 80 17 105/48 98 % 06/02/20 1900  84 22 111/42 97 % 06/02/20 1800  95 17 110/49 100 % 06/02/20 1700  80 22 112/47 99 % 06/02/20 1600 98.6 °F (37 °C) 89 19 109/53 98 % 06/02/20 1508  90 12  98 % 06/02/20 1500  88 14 112/55 99 % 06/02/20 1400  (!) 106 14 100/53 96 % 06/02/20 1313  96  99/45   
06/02/20 1300  96 13 104/49 99 % 06/02/20 1200 98.1 °F (36.7 °C) 84 12 95/43 99 % 06/02/20 1110  (!) 101 13  100 % 06/02/20 1100  89 14 108/47 99 % 06/02/20 1000  (!) 102 16 97/48   
06/02/20 0900  (!) 101 16 123/61 97 % 06/02/20 0800 97.7 °F (36.5 °C) (!) 106 12 103/79 98 % 06/02/20 0724  89 10  100 % 06/02/20 0700  94 12 103/58 100 % 06/02/20 0600  82 10 101/51 100 % 06/02/20 0530  95 18  97 % 06/02/20 0500  95 17 106/77 97 % 06/02/20 0400 97.3 °F (36.3 °C) 84 13 111/47 98 % 06/02/20 0357  91 19  100 % 06/02/20 0300  (!) 107 16 112/90 94 % 06/02/20 0200  86 17 96/45 98 % 06/02/20 0100  84 12 98/46 98 % 06/02/20 0047  (!) 104 18 116/43 100 % 06/02/20 0045  (!) 114 19  97 % 06/02/20 0030  85 19 (!) 76/42 97 % 06/02/20 0015  93 19 (!) 93/32 100 % 06/02/20 0000 97.7 °F (36.5 °C) 95 16 (!) 81/44 97 % 06/01/20 2300  (!) 104 18 (!) 94/39 98 % 06/01/20 2230  97 16 110/56 99 % Intake/Output Summary (Last 24 hours) at 6/2/2020 2202 Last data filed at 6/2/2020 2100 Gross per 24 hour Intake 2015.73 ml Output 4019 ml Net -2003.27 ml PHYSICAL EXAM: 
General:          WD, WN. Intubated and sedated, mechanically ventilated   
EENT:              EOMI. Anicteric sclerae. MMM Resp: CTA bilaterally, no wheezing or rales. mechanically ventilated CV:                  Regular  rhythm,  No edema GI:                   Soft, Non distended, Non tender.  +Bowel sounds Neurologic:     exam limited due to intubation and sedation Psych:            exam limited due to intubation and sedation Skin:                No rashes. No jaundice Reviewed most current lab test results and cultures  YES Reviewed most current radiology test results   YES Review and summation of old records today    NO Reviewed patient's current orders and MAR    YES 
PMH/SH reviewed - no change compared to H&P 
________________________________________________________________________ Care Plan discussed with: 
  Comments Patient Family RN x Care Manager Consultant  x Multidiciplinary team rounds were held today with , nursing, pharmacist and clinical coordinator. Patient's plan of care was discussed; medications were reviewed and discharge planning was addressed. ________________________________________________________________________ Total NON critical care TIME:  35 Minutes Total CRITICAL CARE TIME Spent:   Minutes non procedure based Comments >50% of visit spent in counseling and coordination of care x Chart review in complex ICU patient  
________________________________________________________________________ Zi Mays MD  
 
Procedures: see electronic medical records for all procedures/Xrays and details which were not copied into this note but were reviewed prior to creation of Plan. LABS: 
I reviewed today's most current labs and imaging studies. Pertinent labs include: 
Recent Labs  
  06/02/20 
0417 06/01/20 
0211 05/31/20 
0427 WBC 15.0* 17.2* 20.0* HGB 8.5* 8.9* 9.4* HCT 25.1* 25.6* 27.6*  
 181 210 Recent Labs  
  06/02/20 
1630 06/02/20 
0417 06/01/20 
1641 06/01/20 
0211 05/31/20 
1210 05/31/20 0427  
* 135* 135* 134* 134* 135* K 3.6 3.4* 3.6 3.8 4.4 4.2  103 103 104 104 104 CO2 26 26 27 24 22 21 * 212* 188* 169* 220* 159* BUN 50* 56* 58* 73* 99* 84* CREA 2.07* 2.20* 2.44* 3.14* 4.23* 3.80* CA 7.7* 7.8* 7.6* 7.6* 7.4* 8.0*  
MG 2.7* 2.7* 2.5* 2.7*  --  2.6* PHOS 2.6 3.6 3.6 3.9 7.4* 6.7* ALB 2.1* 1.8* 1.8* 1.8* 1.8* 1.8* TBILI  --   --   --  13.8*  13.8*  --  14.0* ALT  --   --   --  126*  --  128* INR  --   --   --   --  1.5*  --   
 
 
Signed: Pam Smart MD

## 2020-06-03 NOTE — PROGRESS NOTES
Hospitalist Progress Note NAME: John Goss :  1955 MRN:  [id number] Assessment / Plan: 
COVID-19 pneumonia with multiorgan failure Acute hypoxic respiratory failure due to above status post mechanical ventilation Septic shock profound Hyponatremia Strep Bacteremia Coagulopathy Possible extubation today. Start bipap if extubated prn. Pulmonary is following. Continue Levophed and dobutamine and wean as tolerated. Continue CRRT per renal 
We will continue stress dose of steroids Status post completion of IV antibiotics for pneumonia and bacteremia Appreciate nephrology help with HD 
S/p iv albumin Acute transaminitis likely due to shock liver Hyperbilirubinemia due to above US RUQ: Minimal gallbladder sludge. Slightly prominent common bile duct of uncertain 
significance. Recheck CMP in a.m. Acute on chronic systolic HF EF 93%, now 99% (but on Dobutamine) Nonischemic dilated cardiomyopathy s/p remote ICD Chronic atrial fibrillation Sleep apnea Hypertensive heart disease w/ CKD and HF Requiring Vasopressor support Cardiology help appreciated: cont supportive care Eliquis on hold for now, on heparin ggt but holding for 6 hours DM2 BS stable Cont insulin w lantus, SSI Hypokalemia 
-to be adjusted during CRRT 
  
Code Status: Full Surrogate Decision Maker: wife DVT Prophylaxis: heparin Disposition: remain critically ill Subjective:  
  
Has blood tinged sputum from ETT. On iv heparin drip On cpap trial this am 
 
 
Chief Complaint / Reason for Physician Visit: following sepsis / respiratory failure/ covid Review of Systems: 
Symptom Y/N Comments  Symptom Y/N Comments Fever/Chills    Chest Pain Poor Appetite    Edema Cough    Abdominal Pain Sputum    Joint Pain SOB/GROVES    Pruritis/Rash Nausea/vomit    Tolerating PT/OT Diarrhea    Tolerating Diet Constipation    Other Could NOT obtain due to: Intubated and sedated Objective: VITALS:  
Last 24hrs VS reviewed since prior progress note. Most recent are: 
Patient Vitals for the past 24 hrs: 
 Temp Pulse Resp BP SpO2  
06/03/20 1600  79 13 103/50 94 % 06/03/20 1530  78 15 101/50 100 % 06/03/20 1500  74 21 116/52 100 % 06/03/20 1430  70 15 124/46 97 % 06/03/20 1400  72 16 96/43 100 % 06/03/20 1330  85 22 (!) 80/54 100 % 06/03/20 1320  72 22  100 % 06/03/20 1315  84 16 105/46 99 % 06/03/20 1300  85 18 100/43 98 % 06/03/20 1230  81 27 (!) 86/43 97 % 06/03/20 1216 98 °F (36.7 °C) 78 21 (!) 82/37 100 % 06/03/20 1200  87 15 (!) 77/39 97 % 06/03/20 1130  93 19 94/40 100 % 06/03/20 1100  90 18 108/46 100 % 06/03/20 1030  88 17 (!) 81/41 100 % 06/03/20 1000  (!) 56 12 (!) 105/38 100 % 06/03/20 0930  63 17 97/41 100 % 06/03/20 0900  (!) 57 13 97/43 100 % 06/03/20 0854  69  109/42   
06/03/20 0830  89 17 102/57 97 % 06/03/20 0825  83 18  100 % 06/03/20 0800  75 19 101/49 99 % 06/03/20 0730 98.4 °F (36.9 °C) 75 16 117/52 100 % 06/03/20 0700  76 15 109/51 100 % 06/03/20 0630  73 17 102/50 100 % 06/03/20 0600  88 17 101/51 99 % 06/03/20 0500  79 13 (!) 94/39   
06/03/20 0400  80 17 113/43 99 % 06/03/20 0330 98.1 °F (36.7 °C) 93 15 119/49 95 % 06/03/20 0322  75 20  94 % 06/03/20 0300  93 20 118/61 94 % 06/03/20 0200  76 13 152/51 100 % 06/03/20 0103  80 20  100 % 06/03/20 0100  89 17 138/50 100 % 06/03/20 0000 97.8 °F (36.6 °C) 83 15 126/76 100 % 06/02/20 2347  87 15 149/54 99 % 06/02/20 2300  72 18 120/56 99 % 06/02/20 2230  81 15 105/55 98 % 06/02/20 2200  78 19 95/56 99 % 06/02/20 2100  86 16 116/67 97 % 06/02/20 2000  98 14 113/69 98 % 06/02/20 1952  88 20  98 % 06/02/20 1930 98.6 °F (37 °C) 80 17 105/48 98 % 06/02/20 1900  84 22 111/42 97 % 06/02/20 1800  95 17 110/49 100 % 06/02/20 1700  80 22 112/47 99 % Intake/Output Summary (Last 24 hours) at 6/3/2020 1635 Last data filed at 6/3/2020 1622 Gross per 24 hour Intake 1948.43 ml Output 4753 ml Net -2804.57 ml PHYSICAL EXAM: 
General:          WD, WN. Intubated and sedated, mechanically ventilated   
EENT:              EOMI. Anicteric sclerae. MMM Resp:               CTA bilaterally, no wheezing or rales. mechanically ventilated CV:                  Regular  rhythm,  No edema GI:                   Soft, Non distended, Non tender.  +Bowel sounds Neurologic:     exam limited due to intubation and sedation Psych:            exam limited due to intubation and sedation Skin:                No rashes. No jaundice Reviewed most current lab test results and cultures  YES Reviewed most current radiology test results   YES Review and summation of old records today    NO Reviewed patient's current orders and MAR    YES 
PMH/ reviewed - no change compared to H&P 
________________________________________________________________________ Care Plan discussed with: 
  Comments Patient Family RN x Care Manager Consultant  x Multidiciplinary team rounds were held today with , nursing, pharmacist and clinical coordinator. Patient's plan of care was discussed; medications were reviewed and discharge planning was addressed. ________________________________________________________________________ Total NON critical care TIME:  35 Minutes Total CRITICAL CARE TIME Spent:   Minutes non procedure based Comments >50% of visit spent in counseling and coordination of care x Chart review in complex ICU patient  
________________________________________________________________________ Roxy Perone, MD  
 
Procedures: see electronic medical records for all procedures/Xrays and details which were not copied into this note but were reviewed prior to creation of Plan. LABS: 
I reviewed today's most current labs and imaging studies. Pertinent labs include: 
Recent Labs  
  06/03/20 0344 06/02/20 0417 06/01/20 0211 WBC 10.8 15.0* 17.2* HGB 7.6* 8.5* 8.9* HCT 22.4* 25.1* 25.6*  
 190 181 Recent Labs  
  06/03/20 0344 06/02/20 
1630 06/02/20 0417 06/01/20 0211 * 135* 135*   < > 134* K 3.3* 3.6 3.4*   < > 3.8  102 103   < > 104 CO2 25 26 26   < > 24 * 217* 212*   < > 169* BUN 49* 50* 56*   < > 73* CREA 1.89* 2.07* 2.20*   < > 3.14* CA 8.0* 7.7* 7.8*   < > 7.6*  
MG 2.6* 2.7* 2.7*   < > 2.7* PHOS 2.6 2.6 3.6   < > 3.9 ALB 2.7* 2.1* 1.8*   < > 1.8* TBILI 12.3*  --   --   --  13.8*  13.8* *  --   --   --  126* INR 1.4*  --   --   --   --   
 < > = values in this interval not displayed.   
 
 
Signed: Lulu Grey MD

## 2020-06-03 NOTE — PROGRESS NOTES
[facility] INTENSIVIST Consult Service Note Pulmonary, Critical Care, and Sleep Medicine Name: Belinda Ortiz MRN: [id number] : 1955 Hospital: [place] [address] Date: 6/3/2020  Admission date: 5/15/2020 Hospital Day: 20 Subjective/Interval History:  
 
20: off sedation but only grimaces to noxious stimuli. Remains critically ill on mechanical ventilation/pressors 20: remains critically ill on mechanical ventilation/pressors. Off sedation for about 48 hours. Minimal response thus far.  
20: no events. Remains intubated and on pressors. Followed a few commands intermittently overnight per nursing. No data available as EMR has been down until a few minutes ago. 20: more alert and following commands today. Still very weak. Actually doing well on SBT. 
- Intubated, off sedation. Bloody secretions through ETT and NGT per RN. On heparin drip for cardiac issues. Increased pressor requirement today. Increased secretions- SBT held. Weak : Intubated, off sedation, follows commands per RN. Still on high dose NE at 17 mcg/min. Increased bilirubin. Last LVEF in  was 20%-- still covid positive, no recent reassessment. On heparin drip-- bleeding from mucosal sites a little better today. Very weak  on CRRT, On Vent. Iv levo 28. IV  3; IV heparin. Bleeding from gums. LVEF 35% on IV inotrope, pressors. Alert. Follows commands. Able to move hands and forearms easily but feet and ankles very weak. D/w dr. Parul Ann.  unable to wean IV levo now at 30 mcg. No fever. On same drips. CRRT Factor reduced. Still critically ill. Passing SBT. Inflammatory markers still high Called Mrs. Myers to discuss pt management, Reviewed potentiall need for trach but timing and safety must be thought through.  If he continues to rally and get stronger, also possible that he could avoid trach but cannot guarantee. Still needing lots of pressor support. Still needs CRRT. Allowed her to ask questions. Fortunately she and family have not been infected by COVID. 6/3 blood tinged sputum and ETT secretions. No fever. On IV heparin. Doing well with prolonged CPAP trial . IV levophed 5 mcg. IMPRESSION:  
1. Acute hypoxic respiratory failure, intubated 5-18; at risk for diaphragm atrophy 2. Profound septic shock on high level of pressors. 3. COVID 19 pneumonia- good gas exchange; SARS Cov 2 - 20 not detected 4. hemoptysis 5. Worsening hyperbil, ? etiology 6. Alpha strep septicemia- elevated Procalcitonin > 80 
7. Chronic combined systolic/diastolic heart failure; echo / LVEF 35% on IV , Iv levo 8. Hypertensive heart disease with CHF and CKD, previous severe LV function 9. LAQUITA/CKD, CVVH to start 10. Chronic anticoagulation at home- eliquis 11. Diabetes mellitus type 2 uncontrolled 12. Hyperlipidemia 13. Nonischemic dilated cardiomyopathy EF 20% with mild-mod MR but echo  on IV , levo EF 35-40% 14. Chronic atrial fibrillation 15. Status post [facility] BIV-ICD implant in 2016 
16. Sleep apnea, undiagnosed/untreated 17. Obesity with recent weight gain 18. Possible critical illness polyneuropathy/myopathy RECOMMENDATIONS/PLAN:  
1. Ventilator support - doing quite well on SBT but still quite weak. Hopefully can be extubated 2. Wean off IV dobutamine 3. Hold heparin for 6-8 hurs then resume, no bolus 4. CXR in AM 
5. Prn NIV post extubation 6. May stop albumin 7. wean as able 8. Watch WBC-lines look good 9. Stress dose steroids- may wean but would give another 7-14 days more depending on inflammatory markers 10. RRT per nephrology 11. Cardiology following 12. Glycemic monitoring and control 13. Replete electrolytes PRN 14. DVT, SUP prophylaxis- changed to protonix in view in GI bleed on heparin 15. Remains critically ill, at high risk for decline. CCT 35'.  D/W LORI  
 
 
 
 Subjective/Initial History:  
I have reviewed the flowsheet and previous days notes. Seen earlier today on rounds. I was asked by Benjamin García MD to see Maryjane Lawson a 71 y.o.  male  in consultation for a chief complaint of shock. Excerpts from admission 5/15/2020 and consult notes reviewed as follows:  
 
\"Mr. Ronald Reyes is a 57 y.o. morbidly obese male with Chronic nonischemic dilated cardiomyopathy EF 79%, Chronic systolic congestive heart failure class, H/o atrial fibrillation, On chronic warfarin, Iatrogenic left bundle branch block with predominant RV pacing, 64-70%, Hypertension, Hyperlipidemia and diabetes presents to ED [facility] ED C/O Worsening exertional shortness of breath and around 20 pounds weight gain in last 3 weeks. \" 
 
Pt now in CCU. Poor historian. On room air. No fever. No cough. No diarrhea. Not very active. Saw PCP three weeks ago. Some edema. Chart notes [facility] ER admission in 2017 with decompensation. Patient PCP: Herminio Velarde MD 
PMH:  has a past medical history of A-fib ([facility] [address] Arrhythmia, Diabetes ([facility] [address] Endocrine disease, Hypertension, and Irregular heart beat. He also has no past medical history of Difficult intubation, Malignant hyperthermia due to anesthesia, Nausea & vomiting, or Pseudocholinesterase deficiency. PSH:   has a past surgical history that includes pr cardiac surg procedure unlist; insert emergency endotrach airway (5/18/2020); and ir insert non tunl cvc over 5 yrs (5/18/2020). FHX: family history includes Diabetes in his father; Heart Disease in his father and mother. SHX:  reports that he quit smoking about 26 years ago. He has never used smokeless tobacco. He reports that he does not drink alcohol or use drugs. ROS:A comprehensive review of systems was negative except for that written in the HPI. No Known Allergies MEDS:  
Current Facility-Administered Medications Medication  hydrocortisone Sod Succ (PF) (SOLU-CORTEF) injection 25 mg  epoetin socrates-epbx (RETACRIT) injection 10,000 Units  insulin glargine (LANTUS) injection 46 Units  albumin human 25% (BUMINATE) solution 25 g  
 DOBUTamine (DOBUTREX) 500 mg/250 mL (2,000 mcg/mL) infusion  bicarbonate dialysis (PRISMASOL) BG K 4/Ca 2.5 5000 ml solution  pantoprazole (PROTONIX) 40 mg in 0.9% sodium chloride 10 mL injection  balsam peru-castor oiL (VENELEX) ointment  polyethylene glycol (MIRALAX) packet 17 g  
 zinc oxide-cod liver oil (DESITIN) 40 % paste  chlorhexidine (ORAL CARE KIT) 0.12 % mouthwash 15 mL  potassium chloride 20 mEq in 50 ml IVPB  calcium chloride injection 2 g  
 acetaminophen (TYLENOL) solution 650 mg  PHARMACY INFORMATION NOTE  heparin 25,000 units in D5W 250 ml infusion  NOREPINephrine (LEVOPHED) 32 mg in 5% dextrose 250 mL infusion  sodium chloride (NS) flush 5-40 mL  sodium chloride (NS) flush 5-40 mL  ondansetron (ZOFRAN) injection 4 mg  alcohol 62% (NOZIN) nasal  1 Ampule  insulin lispro (HUMALOG) injection  glucose chewable tablet 16 g  
 dextrose (D50W) injection syrg 12.5-25 g  
 glucagon (GLUCAGEN) injection 1 mg  acetaminophen (TYLENOL) tablet 650 mg Or  acetaminophen (TYLENOL) suppository 650 mg  
 influenza vaccine 2019- (6 mos+)(PF) (FLUARIX/FLULAVAL/FLUZONE QUAD) injection 0.5 mL  sodium chloride (NS) flush 5-10 mL Objective:  
 
Vital Signs: Telemetry:    AFIB Intake/Output:  
Visit Vitals /43 Pulse 85 Temp 98 °F (36.7 °C) Resp 18 Ht 6' 3\" (1.905 m) Wt 110 kg (242 lb 8.1 oz) SpO2 98% BMI 30.31 kg/m² Temp (24hrs), Av.3 °F (36.8 °C), Min:97.8 °F (36.6 °C), Max:98.6 °F (37 °C) O2 Device: Endotracheal tube, Ventilator O2 Flow Rate (L/min): 60 l/min Body mass index is 30.31 kg/m². Wt Readings from Last 4 Encounters:  
20 110 kg (242 lb 8.1 oz) 01/19/19 123.8 kg (273 lb) 08/04/17 129.3 kg (285 lb)  
03/20/17 107.5 kg (237 lb) Intake/Output Summary (Last 24 hours) at 6/3/2020 1314 Last data filed at 6/3/2020 1259 Gross per 24 hour Intake 2124.77 ml Output 4839 ml Net -2714.23 ml Last shift:      06/03 0701 - 06/03 1900 In: 263.2 [I.V.:102.2] Out: 1076 Last 3 shifts: 06/01 1901 - 06/03 0700 In: 3457.5 [I.V.:1517.5] Out: 3214 [Drains:1700] Hemodynamics:   
CO:   
CI:   
CVP: CVP (mmHg): 8 mmHg (05/27/20 1030) SVR:   PAP Systolic:   
PAP Diastolic:   
PVR:   
KH54:    
 
Ventilator Settings:     
Mode Rate TV Press PEEP FiO2 PIP Min. Vent Spontaneous    500 ml 6 cm H2O  6 cm H20 30 %  30 cm H2O  9 l/min Physical Exam: 
 
General: intubated, lethargic but more alert HEENT: NCAT, poor dentition, lips and mucosa dry Eyes: anicteric; conjunctiva clear Neck: no nodes, no cuff leak, no accessory MM use. Chest: no deformity,  
Cardiac: regular; no murmur Lungs: no distress, intubated Abd: soft, NT, hypoactive BS Ext: no edema; no joint swelling; No clubbing Neuro: no localizing findings Data:  
            
Labs: 
Recent Labs  
  06/03/20 
0344 06/02/20 
0417 06/01/20 
0211 WBC 10.8 15.0* 17.2* HGB 7.6* 8.5* 8.9* HCT 22.4* 25.1* 25.6*  
 190 181 Recent Labs  
  06/03/20 
0344 06/02/20 
1630 06/02/20 
0417  06/01/20 
0211 * 135* 135*   < > 134* K 3.3* 3.6 3.4*   < > 3.8  102 103   < > 104 CO2 25 26 26   < > 24 * 217* 212*   < > 169* BUN 49* 50* 56*   < > 73* CREA 1.89* 2.07* 2.20*   < > 3.14* CA 8.0* 7.7* 7.8*   < > 7.6*  
MG 2.6* 2.7* 2.7*   < > 2.7* PHOS 2.6 2.6 3.6   < > 3.9 ALB 2.7* 2.1* 1.8*   < > 1.8* TBILI 12.3*  --   --   --  13.8*  13.8* *  --   --   --  126* INR 1.4*  --   --   --   --   
 < > = values in this interval not displayed. Recent Labs  
  06/01/20 
1412 PHI 7.385 PCO2I 36.6 PO2I 166* HCO3I 21.9*  
FIO2I 30  
 
 Imaging: 
I have personally reviewed the patients radiographs and have reviewed the reports: 
No change Total critical care time exclusive of procedures: 35 minutes Delphine Monroe MD

## 2020-06-03 NOTE — DIALYSIS
[facility]       [phone] Orders Mode: CVVHD restarted @ 7724 Factor: 100 ml/hr Dialysate: 2000 ml/hr Blood Flow Rate: 200 ml/min Metrics BP / HR: 109/42, 69 Blood Flow Rate: 200 ml/min AP:                         -64  
RP: 94 TMP: 17 PD: 23  
FP: 148 Dialysate: 2000 ml/hr Comments / Plan:  
   Filter changed secondary to reaching MAX filter of 72 hrs. All possible blood (165 ml) returned by dialysis RN. Consents, patient, code status, labs and orders verified. +COVID-19: N95, surgical mask, face shield, cap, shoe covers, gown & gloves worn. Pt intubated. Old set discarded in red bio-hazard bag. New [id number] filter set-up, primed, tested and running well at this time. RIJ temporary CVC, dressing CDI with bio-patch dated 5/31/20. No signs of redness, drainage, or infection visualized. Each catheter limb disinfected for 60 seconds per limb with alcohol swabs. Caps removed, dialysis CVC hub scrubbed with Prevantics for 5 seconds, followed by a 5 second dry time per Hospital P&P. +aspiration/+flush x 2 ports with no resistance. Lines reversed, visible and connections secure with blood warmer to return line at 37*C. Education, pre and post to ATNWAN Herrera, primary RN.

## 2020-06-03 NOTE — PROGRESS NOTES
06/03/20 9890 ABCDEF Bundle SBT Safety Screen Passed Yes SBT Trial Passed Yes Weaning Parameters Spontaneous Breathing Trial Complete Yes Resp Rate Observed 16 Ve 7.8  RSBI 25

## 2020-06-04 NOTE — DIABETES MGMT
[facility] [facility] 
 
FOLLOW-UP NOTE Presentation Vesna Castillo a 71 y. o. male admitted from the ER 5/15/20 with complaints of weakness. No COVID-19 symptoms or contacts noted. Fever. CXR, CT Head & CT ABd negative. Blood cultures positive for alpha streptococcus. Markedly elevated ferritin DX: [id number]-06. Septic shock. Multiorgan failure TX: Anti-COVID therapies. Pacer. Palliative care. Recent diagnostics: 
5/21/20 Hepatitis B surface Ab Reactive 5/22/20 WBC 22.9. Albumin 2.8. Bili 6.8. . . Procalcitonin 203.42. Triglyceride <15. Serum creatinine 4.73/GFR 15. CXR: Stable bibasilar opacities and pleural effusions 5/26/20 WBC 27.2. CXR: Persistent pleural effusions with bibasilar atelectasis. Atelectasis at the right lung base has increased. Ferritin 1682. CRP 5.09. Procalcitonin 55.12 
5/27/20 D-Dimer 5.97. US ABd: Negative 5/28/20 D-Dimer 4.92. Procalcitonin 32.01. CXR: Presence of bibasilar hazy density (left greater than right) possibly representing developing infiltration. Improved aeration of the right lung base 5/29/20 Ferritin 1606. CRP 3.38. D-Dimer 5. 13. Blood cultures without growth 6/1/20 WBC 17.2. CRP 3.97. Ferritin 1509. BNP 33, 805. D-Dimer 4.4. Bili 13.8. CXR: No acute process. ECHO: Normal cavity size and wall thickness. Moderate global systolic function. Estimated left ventricular ejection fraction is 35 - 40%. Hypokinetic left ventricular wall motion. Mildly reduced systolic function 6/2/20 WBC 15. D-Dimer 3.94. CRP 3.72. Ferritin 1331. Bl culture No growth 6/3/20 COVID negative for first time. WBC 10.8. D-Dimer 3.32. CRP 4.23. Ferritin 1095. IgG in normal range. Bili 12.3. Hepatitis panel negative 6/4/20 Ferritin 988. CRP 4.81. D-Dimer 3.97. Clinical care includes: 
            02 via NC  
            BP management via norepinephrine (levophed)  
            TF via NG  
            Insulin             Steroids             Dialysis & EPO via Lewis 
            GI prophylaxis             [id number] infusion 
  
Diabetes: Patient has known Type 2 diabetes, treated with Trulicity, Onglyza & Amaryl PTA (per PTA med list). Family history positive for diabetes in father.  
 
Subjective COVID precautions Objective Physical exam 
General Alert, disoriented this morning per nursing Vital Signs Afebrile. AFib Visit Vitals BP 98/50 Pulse 78 Temp 97 °F (36.1 °C) Resp 15 Ht 6' 3\" (1.905 m) Wt 110 kg (242 lb 8.1 oz) SpO2 100% BMI 30.31 kg/m² Laboratory Lab Results Component Value Date/Time Hemoglobin A1c 9.8 (H) 02/09/2017 05:42 AM  
 Hemoglobin A1c, External 9.0 03/02/2016 Lab Results Component Value Date/Time LDL, calculated 38.4 03/03/2009 09:40 PM  
 
Lab Results Component Value Date/Time Creatinine 1.91 (H) 06/04/2020 03:29 AM  
 
Lab Results Component Value Date/Time Sodium 136 06/04/2020 03:29 AM  
 Potassium 3.6 06/04/2020 03:29 AM  
 Chloride 102 06/04/2020 03:29 AM  
 CO2 28 06/04/2020 03:29 AM  
 Anion gap 6 06/04/2020 03:29 AM  
 Glucose 121 (H) 06/04/2020 03:29 AM  
 BUN 42 (H) 06/04/2020 03:29 AM  
 Creatinine 1.91 (H) 06/04/2020 03:29 AM  
 BUN/Creatinine ratio 22 (H) 06/04/2020 03:29 AM  
 GFR est AA 43 (L) 06/04/2020 03:29 AM  
 GFR est non-AA 36 (L) 06/04/2020 03:29 AM  
 Calcium 8.4 (L) 06/04/2020 03:29 AM  
 Bilirubin, total 12.3 (H) 06/03/2020 03:44 AM  
 Alk. phosphatase 271 (H) 06/03/2020 03:44 AM  
 Protein, total 6.8 06/03/2020 03:44 AM  
 Albumin 3.0 (L) 06/04/2020 03:29 AM  
 Globulin 4.1 (H) 06/03/2020 03:44 AM  
 A-G Ratio 0.7 (L) 06/03/2020 03:44 AM  
 ALT (SGPT) 103 (H) 06/03/2020 03:44 AM  
 
Lab Results Component Value Date/Time ALT (SGPT) 103 (H) 06/03/2020 03:44 AM  
 
Factors affecting BG pattern Factor Dose Comments Nutrition: 
Tube feeding via OG/NG/PEG  
135 grams/24 hrs Drugs: 
Steroids Hydrocortisone 25mg Q8 hours Infection: COVID   First negative test  
Other: CKD Blood glucose pattern Assessment and Plan Nursing Diagnosis Risk for unstable blood glucose pattern Nursing Intervention Domain 8368 Decision-making Support Nursing Interventions Examined current inpatient diabetes control Explored factors facilitating and impeding inpatient management Evaluation This gentleman with known Type 2 diabetes has achieved inpatient BG targets. Patient was extubated successfully yesterday, but still receiving TF at goal rate. Plans for getting speech, OT and PT involved, and progress care. BGs in the low 100s. Would reduce basal insulin dose today. Recommendations Recommend: 
 
Decreasing Lantus insulin to 40 units D Billing Code(s) [x] [id number] IP subsequent hospital care - 30 minutes COLLETTE Orta Access via [employer] [phone]

## 2020-06-04 NOTE — PROGRESS NOTES
NAME: Lang Degroot :  1955 MRN:  [id number] Assessment :    Plan: 
--LAQUITA Shock Sepsis DM type 2 Systolic HF (EF 80%) AHRF- extubated High Bili-?etio; RUQ US with nl CBD Anemia COVID + Initiated CVVHD ; Off CVVH on ; didnt tolerate iHD; restarted CVVHD on  Using Lewis, 200 QB, Factor of 100 ml/hr this am 
4k dialysate Extubated. On heparin gtt already Lewis line - will need new line soon Retacrit Pressor as needed Subjective: Chief Complaint:  In order to limit the exposure risk from COVID 19 and to preserve the hospital's limited supply of PPEs, seen through ICU window. Extubated overnight. On levo. d/w ICU nurse 
tolerating CVVH Review of Systems: uto Symptom Y/N Comments  Symptom Y/N Comments Fever/Chills    Chest Pain Poor Appetite    Edema Cough    Abdominal Pain Sputum    Joint Pain SOB/GROVES    Pruritis/Rash Nausea/vomit    Tolerating PT/OT Diarrhea    Tolerating Diet Constipation    Other Could not obtain due to: See above Objective: VITALS:  
Last 24hrs VS reviewed since prior progress note. Most recent are: 
Visit Vitals BP 98/50 Pulse 78 Temp 97 °F (36.1 °C) Resp 15 Ht 6' 3\" (1.905 m) Wt 110 kg (242 lb 8.1 oz) SpO2 100% BMI 30.31 kg/m² Intake/Output Summary (Last 24 hours) at 2020 1011 Last data filed at 2020 8863 Gross per 24 hour Intake 1679.73 ml Output 2981 ml Net -1301.27 ml Telemetry Reviewed: PHYSICAL EXAM: 
General: NAD Alert awake 
resting Lab Data Reviewed: (see below) Medications Reviewed: (see below) PMH/SH reviewed - no change compared to H&P 
________________________________________________________________________ Care Plan discussed with: 
Patient Family RN y   
Care Manager Consultant:     
 
  Comments >50% of visit spent in counseling and coordination of care    
 
________________________________________________________________________ Sangeeta Grullon MD  
 
Procedures: see electronic medical records for all procedures/Xrays and details which 
were not copied into this note but were reviewed prior to creation of Plan. LABS: 
Recent Labs  
  06/04/20 0329 06/03/20 0344 WBC 9.3 10.8 HGB 8.1* 7.6* HCT 24.4* 22.4*  
* 161 Recent Labs  
  06/04/20 0329 06/03/20 0344 06/02/20 
1630  133* 135* K 3.6 3.3* 3.6  102 102 CO2 28 25 26 BUN 42* 49* 50* CREA 1.91* 1.89* 2.07* * 178* 217* CA 8.4* 8.0* 7.7* MG 2.6* 2.6* 2.7* PHOS 2.1* 2.6 2.6 Recent Labs  
  06/04/20 0329 06/03/20 0344 06/02/20 
1630 AP  --  271*  --   
TP  --  6.8  --   
ALB 3.0* 2.7* 2.1*  
GLOB  --  4.1*  --   
 
Recent Labs  
  06/04/20 0329 06/03/20 0344 06/02/20 
1934 INR  --  1.4*  --   
PTP  --  14.0*  --   
APTT 47.8* 62.6* 64.0*  
  
 
MEDICATIONS: 
Current Facility-Administered Medications Medication Dose Route Frequency  hydrocortisone Sod Succ (PF) (SOLU-CORTEF) injection 25 mg  25 mg IntraVENous Q8H  
 epoetin socrates-epbx (RETACRIT) injection 10,000 Units  10,000 Units SubCUTAneous Q MON, WED & FRI  midodrine (PROAMATINE) tablet 5 mg  5 mg Per NG tube TID PRN  
 insulin glargine (LANTUS) injection 46 Units  46 Units SubCUTAneous DAILY  bicarbonate dialysis (PRISMASOL) BG K 4/Ca 2.5 5000 ml solution   Extracorporeal DIALYSIS CONTINUOUS  
 pantoprazole (PROTONIX) 40 mg in 0.9% sodium chloride 10 mL injection  40 mg IntraVENous Q12H  
 balsam peru-castor oiL (VENELEX) ointment   Topical TID  polyethylene glycol (MIRALAX) packet 17 g  17 g Per NG tube DAILY PRN  
 zinc oxide-cod liver oil (DESITIN) 40 % paste   Topical PRN  chlorhexidine (ORAL CARE KIT) 0.12 % mouthwash 15 mL  15 mL Oral Q12H  potassium chloride 20 mEq in 50 ml IVPB  20 mEq IntraVENous PRN  
 calcium chloride injection 2 g  2 g IntraVENous PRN  
 acetaminophen (TYLENOL) solution 650 mg  650 mg Oral Q6H PRN  
 PHARMACY INFORMATION NOTE  1 Each Other BID  heparin 25,000 units in D5W 250 ml infusion  8-25 Units/kg/hr IntraVENous TITRATE  
 NOREPINephrine (LEVOPHED) 32 mg in 5% dextrose 250 mL infusion  2-200 mcg/min IntraVENous TITRATE  sodium chloride (NS) flush 5-40 mL  5-40 mL IntraVENous Q8H  
 sodium chloride (NS) flush 5-40 mL  5-40 mL IntraVENous PRN  
 ondansetron (ZOFRAN) injection 4 mg  4 mg IntraVENous Q8H PRN  
 alcohol 62% (NOZIN) nasal  1 Ampule  1 Ampule Topical Q12H  
 insulin lispro (HUMALOG) injection   SubCUTAneous Q6H  
 glucose chewable tablet 16 g  4 Tab Oral PRN  
 dextrose (D50W) injection syrg 12.5-25 g  12.5-25 g IntraVENous PRN  
 glucagon (GLUCAGEN) injection 1 mg  1 mg IntraMUSCular PRN  
 acetaminophen (TYLENOL) tablet 650 mg  650 mg Oral Q6H PRN Or  
 acetaminophen (TYLENOL) suppository 650 mg  650 mg Rectal Q6H PRN  
 influenza vaccine 2019-20 (6 mos+)(PF) (FLUARIX/FLULAVAL/FLUZONE QUAD) injection 0.5 mL  0.5 mL IntraMUSCular PRIOR TO DISCHARGE  sodium chloride (NS) flush 5-10 mL  5-10 mL IntraVENous PRN

## 2020-06-04 NOTE — PROGRESS NOTES
Nutrition Assessment: 
 
RECOMMENDATIONS:  
Increase TF goal rate to Nepro @ 45mL/h + Prosource 6 x day + 50mL H2O flush q 6h (provides 2300kcals/177gPro/174gCHO/985mL) ASSESSMENT:  
Chart reviewed, case discussed during CCU rounds. Pt extubated yesterday! Needs re-estimated. He remains on CVVH with a factor of 100. NGT remains in place with TF at goal rate. As pt has been here nearly 3 weeks and has had significant wt loss, will switch away from permissively underfeeding to providing 20kcals/kg. See TF orders above. Noted K has been borderline low this week, phos having to be repleted today. If this trend continues, can consider switching to TwoCal HN instead. Levo at 3. Flexiseal remain in place for diarrhea. Pt with confusion overnight. Dietitians Intervention(s)/Plan(s): Increase TF goal rate, monitor lytes SUBJECTIVE/OBJECTIVE:  
Pt confused Diet Order: NPO, Other (comment)(TF via NGT: Nepro @ 35mL/h + Prosource 8 x day + 50mL H2O flush q 6h (provides 1992kcals/188gPro/135gCHO/810mL) ) 
% Eaten:  No data found. Nepro  at 35 mL/hr flush with 50 mL  Q6H  via NG Tube   Residuals: 3 mL Pertinent Medications:solucortef, lantus, humalog, prevacid; Drips: levo I/O's:-20L Chemistries: 
Lab Results Component Value Date/Time Sodium 136 06/04/2020 03:29 AM  
 Potassium 3.6 06/04/2020 03:29 AM  
 Chloride 102 06/04/2020 03:29 AM  
 CO2 28 06/04/2020 03:29 AM  
 Anion gap 6 06/04/2020 03:29 AM  
 Glucose 121 (H) 06/04/2020 03:29 AM  
 BUN 42 (H) 06/04/2020 03:29 AM  
 Creatinine 1.91 (H) 06/04/2020 03:29 AM  
 BUN/Creatinine ratio 22 (H) 06/04/2020 03:29 AM  
 GFR est AA 43 (L) 06/04/2020 03:29 AM  
 GFR est non-AA 36 (L) 06/04/2020 03:29 AM  
 Calcium 8.4 (L) 06/04/2020 03:29 AM  
 Albumin 3.0 (L) 06/04/2020 03:29 AM  
  
Anthropometrics: Height: 6' 3\" (190.5 cm) Weight: 110 kg (242 lb 8.1 oz)  [] standing scale    [x]bed scale    []stated   []unknown IBW (%IBW):   ( ) UBW (%UBW):   (  %) BMI: Body mass index is 30.31 kg/m². This BMI is indicative of: 
[]Underweight   []Normal   []Overweight   [x] Obesity   [] Extreme Obesity (BMI>40) Estimated Nutrition Needs (Based on): 2200 Kcals/day(MSJ 2086 x 1.2 (-300 for obesity) ) , 134 g(-178 (1.5-2gPro/kg) ) Protein Carbohydrate: At Least 130 g/day  Fluids: per MD mL/day Last BM: Flexiseal   [x]Active     []Hyperactive  []Hypoactive       [] Absent   BS Skin:    [x] Intact   [] Incision  [] Breakdown   [] DTI   [x] Tears/Excoriation/Abrasion  [x]Edema(trace-generalized, all extremities) [] Other: Wt Readings from Last 30 Encounters:  
06/03/20 110 kg (242 lb 8.1 oz) 01/19/19 123.8 kg (273 lb) 08/04/17 129.3 kg (285 lb)  
03/20/17 107.5 kg (237 lb) 02/14/17 149.2 kg (328 lb 14.8 oz) 06/21/16 138.3 kg (305 lb)  
03/16/16 151.4 kg (333 lb 11.2 oz) 08/02/13 136.1 kg (300 lb) 10/16/10 133.8 kg (295 lb)  
07/14/10 133.8 kg (295 lb) 05/14/10 133.4 kg (294 lb) Dx of Malnutrition since admission: UTD NUTRITION DIAGNOSES:  
Problem:  Inadequate protein-energy intake Etiology: related to pt NPO 2' vent Signs/Symptoms: as evidenced by NPO + propofol meets <15% kcal and 0% protein needs. Previous dx continues to resolve. NUTRITION INTERVENTIONS: 
  Enteral/Parenteral Nutrition: Modify rate, concentration, composition, and schedule GOAL:  
Pt will tolerate TF @ new goal rate with residuals <500mL and K/Phos WNL in 2-4 days. NUTRITION MONITORING AND EVALUATION Previous Goal: Pt will tolerate TF @ new goal rate with residuals <500mL in 2-4 days Previous Goal Met: Yes Previous Recommendations Implemented: Yes Cultural, Scientologist, or Ethnic Dietary Needs: None LEARNING NEEDS (Diet, Food/Nutrient-Drug Interaction):  
 [x] None Identified 
 [] Identified and Education Provided/Documented 
 [] Identified and Pt declined/was not appropriate [x] Interdisciplinary Care Plan Reviewed/Documented  
 [x] Participated in Discharge Planning: UTD [x] Interdisciplinary Rounds NUTRITION RISK:  
 [x] High              [] Moderate           []  Low  []  Minimal/Uncompromised Kay Mendoza RD, [facility] Pager [phone] Weekend Pager [phone]

## 2020-06-04 NOTE — PROGRESS NOTES
Progress Note 6/4/2020 11:43 AM 
NAME: John Goss MRN:  [id number] Admit Diagnosis: Sepsis ([facility] [address] [A41.9] Problem List: 1. Shock; septic/cardiogenic; resolved 2. Severe sepsis; resolved 3. COVID-19 + 4. NSTEMI 5. Hyponatremia; resolved. 6. Lactic acidosis; resolved. 7. Acute liver injury; resolving 8. Acute on chronic systolic heart failure; Class IV 9. Acute kidney injury; anuric. Now ESRD on dialysis. 10. Coagulopathy 11. Nonischemic dilated cardiomyopathy s/p remote ICD 12. Chronic atrial fibrillation 13. Sleep apnea 14. Hypertensive heart disease w/ CKD and HF Assessment/Plan:  
 
Intake/Output Summary (Last 24 hours) at 6/4/2020 7278 Last data filed at 6/4/2020 4981 Gross per 24 hour Intake 1637.73 ml Output 3127 ml Net -1489.27 ml Ricky off Vaso off Norepi @ 7 Dobut off Epi off EXTUBATED!!!  
 
HgB 8s. Echo w/ EF 35-40% COVID-19 from 6/1 negative; will be repeated. Sundowning last night. 1. Continue supportive care 2. Heparin gtt on hold 3. CVVH ongoing; tolerating full factor 4. Remains ill but shockingly improving 5. Continue midodrine 5mg TID in an effort to wean of pressors; increase to 10mg if needed. 6. Discussions ongoing w/ family daily [x]       High complexity decision making was performed in this patient at high risk for decompensation with multiple organ involvement. Subjective:  
 
John Goss is sleepy but follows commands. Discussed with RN events overnight. Review of Systems: 
 
Symptom Y/N Comments  Symptom Y/N Comments Fever/Chills N   Chest Pain N Poor Appetite N   Edema N   
Cough N   Abdominal Pain N Sputum N   Joint Pain N   
SOB/GROVES N   Pruritis/Rash N   
Nausea/vomit N   Tolerating PT/OT Y Diarrhea N   Tolerating Diet Y Constipation N   Other Could NOT obtain due to:   
 
Objective:  
  
Physical Exam: Last 24hrs VS reviewed since prior progress note. Most recent are: 
 
Visit Vitals BP 98/50 Pulse 78 Temp 97 °F (36.1 °C) Resp 15 Ht 6' 3\" (1.905 m) Wt 110 kg (242 lb 8.1 oz) SpO2 100% BMI 30.31 kg/m² Intake/Output Summary (Last 24 hours) at 6/4/2020 0950 Last data filed at 6/4/2020 2965 Gross per 24 hour Intake 1637.73 ml Output 3127 ml Net -1489.27 ml General Appearance: Well developed, well nourished, AnO. Ears/Nose/Mouth/Throat: Hearing grossly normal. 
Neck: Supple. Chest: Lungs decreased diffusely Cardiovascular: Irregular rate and rhythm, S1S2 normal, no murmur. Abdomen: Soft, non-tender, bowel sounds are active. Extremities: No edema bilaterally. Skin: Warm and dry. []         Post-cath site without hematoma, bruit, tenderness, or thrill. Distal pulses intact. PMH/SH reviewed - no change compared to H&P Data Review Telemetry: paced/AF 
 
EKG:  
[x]  No new EKG for review Lab Data Personally Reviewed: 
 
Recent Labs  
  06/04/20 0329 06/03/20 0344 WBC 9.3 10.8 HGB 8.1* 7.6* HCT 24.4* 22.4*  
* 161 Recent Labs  
  06/04/20 0329 06/03/20 
0344 06/02/20 
1934 INR  --  1.4*  --   
PTP  --  14.0*  --   
APTT 47.8* 62.6* 64.0* Recent Labs  
  06/04/20 0329 06/03/20 
0344 06/02/20 
1630  133* 135* K 3.6 3.3* 3.6  102 102 CO2 28 25 26 BUN 42* 49* 50* CREA 1.91* 1.89* 2.07* * 178* 217* CA 8.4* 8.0* 7.7* MG 2.6* 2.6* 2.7* No results for input(s): CPK, CKNDX, TROIQ in the last 72 hours. No lab exists for component: CPKMB Lab Results Component Value Date/Time Cholesterol, total 92 03/03/2009 09:40 PM  
 HDL Cholesterol 44 03/03/2009 09:40 PM  
 LDL, calculated 38.4 03/03/2009 09:40 PM  
 Triglyceride <15 05/22/2020 05:13 AM  
 CHOL/HDL Ratio 2.1 03/03/2009 09:40 PM  
 
 
Recent Labs  
  06/04/20 
0329 06/03/20 
0344 06/02/20 
1630 AP  --  271*  --   
TP  --  6.8  --   
 ALB 3.0* 2.7* 2.1*  
GLOB  --  4.1*  -- No results for input(s): PH, PCO2, PO2 in the last 72 hours. Medications Personally Reviewed: 
 
Current Facility-Administered Medications Medication Dose Route Frequency  hydrocortisone Sod Succ (PF) (SOLU-CORTEF) injection 25 mg  25 mg IntraVENous Q8H  
 epoetin socrates-epbx (RETACRIT) injection 10,000 Units  10,000 Units SubCUTAneous Q MON, WED & FRI  midodrine (PROAMATINE) tablet 5 mg  5 mg Per NG tube TID PRN  
 insulin glargine (LANTUS) injection 46 Units  46 Units SubCUTAneous DAILY  bicarbonate dialysis (PRISMASOL) BG K 4/Ca 2.5 5000 ml solution   Extracorporeal DIALYSIS CONTINUOUS  
 pantoprazole (PROTONIX) 40 mg in 0.9% sodium chloride 10 mL injection  40 mg IntraVENous Q12H  
 balsam peru-castor oiL (VENELEX) ointment   Topical TID  polyethylene glycol (MIRALAX) packet 17 g  17 g Per NG tube DAILY PRN  
 zinc oxide-cod liver oil (DESITIN) 40 % paste   Topical PRN  chlorhexidine (ORAL CARE KIT) 0.12 % mouthwash 15 mL  15 mL Oral Q12H  potassium chloride 20 mEq in 50 ml IVPB  20 mEq IntraVENous PRN  
 calcium chloride injection 2 g  2 g IntraVENous PRN  
 acetaminophen (TYLENOL) solution 650 mg  650 mg Oral Q6H PRN  
 PHARMACY INFORMATION NOTE  1 Each Other BID  heparin 25,000 units in D5W 250 ml infusion  8-25 Units/kg/hr IntraVENous TITRATE  
 NOREPINephrine (LEVOPHED) 32 mg in 5% dextrose 250 mL infusion  2-200 mcg/min IntraVENous TITRATE  sodium chloride (NS) flush 5-40 mL  5-40 mL IntraVENous Q8H  
 sodium chloride (NS) flush 5-40 mL  5-40 mL IntraVENous PRN  
 ondansetron (ZOFRAN) injection 4 mg  4 mg IntraVENous Q8H PRN  
 alcohol 62% (NOZIN) nasal  1 Ampule  1 Ampule Topical Q12H  
 insulin lispro (HUMALOG) injection   SubCUTAneous Q6H  
 glucose chewable tablet 16 g  4 Tab Oral PRN  
 dextrose (D50W) injection syrg 12.5-25 g  12.5-25 g IntraVENous PRN  
  glucagon (GLUCAGEN) injection 1 mg  1 mg IntraMUSCular PRN  
 acetaminophen (TYLENOL) tablet 650 mg  650 mg Oral Q6H PRN Or  
 acetaminophen (TYLENOL) suppository 650 mg  650 mg Rectal Q6H PRN  
 influenza vaccine 2019-20 (6 mos+)(PF) (FLUARIX/FLULAVAL/FLUZONE QUAD) injection 0.5 mL  0.5 mL IntraMUSCular PRIOR TO DISCHARGE  sodium chloride (NS) flush 5-10 mL  5-10 mL IntraVENous PRN Roosevelt Cowart III, DO

## 2020-06-04 NOTE — PROGRESS NOTES
1900: Report from [facility] [employer].  
 
2000: Shift assessment: Pt is sitting up in bed, almost in the chair position. Pt is alert and asking to go home. Pt is still a little confused. He is alert to himself and the year. Not alert to the situation, have to keep reorienting him, especially at night time. BP's are very soft. Lungs are still coarse and pt has a productive cough. He is able to expectorate his secretions, just too weak to suction himself. Pt is still doing well on 2L NC. Pt still has NGT, that clogged today but was able to be unclogged. Pt still has FMS with productive output. Still anuric. CVVH factor was decreased to 50, d/t pt's BP. Pt's Heparin was therapeutic x 2 today. Will recheck with AM labs, currently at 12 units. 2400: Reassessment: no changes. Pt sleeping and resting comfortably, which is needed d/t his confusion. ; no correctional. Increased Levo 15 mcg. 0040: Increased Levo 30 mcg. 
 
0400: Reassessment: No changes. Pt still sleeping & resting comfortably. 0746: Rx called in re: to PTT of 79.8. Heparin decreased by 2u to 10 (12.1 mL). Next PTT due at *1248* 
 
0650: FMS at 600 mL  
 
0700: Report given to Zac Adam, charge RN. Total intake for 0800: 66.2 Output: 0 Factor 50

## 2020-06-04 NOTE — ROUTINE PROCESS
BP discussed w/ Dr. Patience Herrera. Pat BP ok when awake, but low when asleep. No change in mentation. Per Dr. Patience Herrera decrease factor by 50 and assess vs increasing Levo. 1800 New orders recd from Patience Herrera.    
1920 Report to oncoming RN

## 2020-06-04 NOTE — INTERDISCIPLINARY ROUNDS
Interdisciplinary team rounds were held 6/4/2020 with the following team members:Care Management, Diabetes Treatment Specialist, Nursing, Nutrition, Palliative Care, Pharmacy, Physical Therapy, Physician, Respiratory Therapy and Clinical Coordinator. Plan of care discussed. See clinical pathway and/or care plan for interventions and desired outcomes.

## 2020-06-04 NOTE — PROGRESS NOTES
Hospitalist Progress Note NAME: Kaylie Perkins :  1955 MRN:  [id number] Assessment / Plan: 
COVID-19 pneumonia with multiorgan failure Acute hypoxic respiratory failure due to above status post mechanical ventilation Septic shock profound Hyponatremia Strep Bacteremia Coagulopathy S/p Extubation on 6/3. On nasal cannula    Pulmonary is following. Continue Levophed  wean as tolerated. Continue CRRT per renal 
continue stress dose of steroids Status post completion of IV antibiotics for pneumonia and bacteremia Appreciate nephrology help with HD 
S/p iv albumin ST consult, CXR in am. PT/OT. Cont added midodrine. Acute transaminitis likely due to shock liver Hyperbilirubinemia due to above US RUQ: Minimal gallbladder sludge. Slightly prominent common bile duct of uncertain 
significance. Recheck CMP in a.m. Acute on chronic systolic HF EF 71%, now 06% (but on Dobutamine) Nonischemic dilated cardiomyopathy s/p remote ICD Chronic atrial fibrillation Sleep apnea Hypertensive heart disease w/ CKD and HF Requiring Vasopressor support Cardiology help appreciated: cont supportive care Eliquis on hold for now, on heparin ggt but holding for 6 hours DM2 BS stable Cont insulin w lantus, SSI Hypokalemia 
-to be adjusted during CRRT Hemoptysis 
-improved Hypophosphatemia 
-replaced 
  
Code Status: Full Surrogate Decision Maker: wife DVT Prophylaxis: heparin drip Disposition: remain critically ill Subjective:  
  
Extubated yesterday. No more hemoptysis. On low dose of levophed. On midodrine Chief Complaint / Reason for Physician Visit: following sepsis / respiratory failure/ covid Review of Systems: 
Symptom Y/N Comments  Symptom Y/N Comments Fever/Chills    Chest Pain Poor Appetite    Edema Cough    Abdominal Pain Sputum    Joint Pain SOB/GROVES    Pruritis/Rash Nausea/vomit    Tolerating PT/OT Diarrhea    Tolerating Diet Constipation    Other Could NOT obtain due to:   
 
Objective: VITALS:  
Last 24hrs VS reviewed since prior progress note. Most recent are: 
Patient Vitals for the past 24 hrs: 
 Temp Pulse Resp BP SpO2  
06/04/20 1508 97.5 °F (36.4 °C)      
06/04/20 1449  81 9 92/66 100 % 06/04/20 1430  81 14 (!) 85/42 99 % 06/04/20 1330  70 10 101/46 99 % 06/04/20 1300  89 15 (!) 87/47 99 % 06/04/20 1230  84 16 98/48 100 % 06/04/20 1119  76 13 96/42 99 % 06/04/20 1100  78 14 (!) 87/44 99 % 06/04/20 1036  77 14 101/47 99 % 06/04/20 0917  75 19 91/49 95 % 06/04/20 0808 97 °F (36.1 °C)      
06/04/20 0801  78  98/50   
06/04/20 0800  80 13 92/52 100 % 06/04/20 0730  82 13 98/50 98 % 06/04/20 0700  82 15 99/47 100 % 06/04/20 0600  92 18 (!) 170/139 100 % 06/04/20 0500  80 21 117/49 95 % 06/04/20 0400 97.1 °F (36.2 °C) 79 14 113/52 97 % 06/04/20 0300  85 20 (!) 84/46 97 % 06/04/20 0200  82 21 (!) 88/50 100 % 06/04/20 0100  82 14 102/53 100 % 06/04/20 0000 97.6 °F (36.4 °C) 80 16 97/52 100 % 06/03/20 2300  73 14 (!) 85/43 100 % 06/03/20 2220  73  108/49   
06/03/20 2200  70 15 (!) 89/43 97 % 06/03/20 2100  82 16 102/41 97 % 06/03/20 2000 97.7 °F (36.5 °C) 78 17 93/50 99 % 06/03/20 1900  72 10 93/48 100 % 06/03/20 1830  89 14 100/50 99 % 06/03/20 1800  72 18 105/48 99 % 06/03/20 1730  70 16 105/46   
06/03/20 1714 98.1 °F (36.7 °C)      
06/03/20 1700  81 12 111/49 (!) 66 %  
06/03/20 1630  83 17 100/53 100 % 06/03/20 1600  79 13 103/50 94 % Intake/Output Summary (Last 24 hours) at 6/4/2020 1554 Last data filed at 6/4/2020 1505 Gross per 24 hour Intake 1738.36 ml Output 2947 ml Net -1208.64 ml PHYSICAL EXAM: 
General:           Drowsy but wakes up to commands EENT:              EOMI. Anicteric sclerae. MMM Resp: CTA bilaterally, no wheezing or rales. mechanically ventilated CV:                  Regular  rhythm,  No edema GI:                   Soft, Non distended, Non tender.  +Bowel sounds Neurologic:     drowsy but wakes to commands Psych:            confused Skin:                No rashes. No jaundice Reviewed most current lab test results and cultures  YES Reviewed most current radiology test results   YES Review and summation of old records today    NO Reviewed patient's current orders and MAR    YES 
PMH/SH reviewed - no change compared to H&P 
________________________________________________________________________ Care Plan discussed with: 
  Comments Patient Family RN x Care Manager Consultant  x Multidiciplinary team rounds were held today with , nursing, pharmacist and clinical coordinator. Patient's plan of care was discussed; medications were reviewed and discharge planning was addressed. ________________________________________________________________________ Total NON critical care TIME:  35 Minutes Total CRITICAL CARE TIME Spent:   Minutes non procedure based Comments >50% of visit spent in counseling and coordination of care x Chart review in complex ICU patient  
________________________________________________________________________ Mervin Schirmer, MD  
 
Procedures: see electronic medical records for all procedures/Xrays and details which were not copied into this note but were reviewed prior to creation of Plan. LABS: 
I reviewed today's most current labs and imaging studies. Pertinent labs include: 
Recent Labs  
  06/04/20 
0329 06/03/20 
0344 06/02/20 
0417 WBC 9.3 10.8 15.0* HGB 8.1* 7.6* 8.5* HCT 24.4* 22.4* 25.1*  
* 161 190 Recent Labs  
  06/04/20 
0329 06/03/20 
0344 06/02/20 
1630  133* 135* K 3.6 3.3* 3.6  102 102 CO2 28 25 26 * 178* 217* BUN 42* 49* 50* CREA 1.91* 1.89* 2.07* CA 8.4* 8.0* 7.7* MG 2.6* 2.6* 2.7* PHOS 2.1* 2.6 2.6 ALB 3.0* 2.7* 2.1* TBILI  --  12.3*  --   
ALT  --  103*  --   
INR  --  1.4*  --   
 
 
Signed: Corey Vargas MD

## 2020-06-04 NOTE — ACP (ADVANCE CARE PLANNING)
Primary Decision Maker (Active): Scotty De La Fuente Spouse - [phone] Advance Care Planning 5/18/2020 Patient's Healthcare Decision Maker is: Legal Next of Kin Primary Decision Maker Name -  
Primary Decision Maker Phone Number -  
Primary Decision Maker Relationship to Patient -  
Confirm Advance Directive None Patient Would Like to Complete Advance Directive Unable Patient does not have an AMD, his wife, noted above, is his LNOK. Patient is Full Code

## 2020-06-04 NOTE — DIALYSIS
[facility]       [phone] Orders Mode: CVVHD Factor: 100ml/hr UFR: 2000ml/hr Blood Flow Rate: 200ml/min Metrics BP / HR: 78; 98/50 Blood Flow Rate: 200ml/min AP:                         -79 RP: 68 TMP: 34  
PD: 29 FP: 136 DFR: 2000ml/hr Comments / Plan:  
   Pt orders, notes, labs, code status and consents reviewed. [id number] running well, no indication for change at this time. RIJ CVC remains CDI with no s/sx infection. Education pre/post with primary RN.

## 2020-06-04 NOTE — DIALYSIS
[facility]          [phone] 
  
   
Orders Mode: CVVHD restarted @ 2220 Factor: 100 ml/hr Dialysate: 2000 ml/hr Blood Flow Rate: 200 ml/min  
  
   
Metrics BP / HR: 91/44  // 78 Blood Flow Rate: 200 ml/min AP:                         -75 RP: 80 TMP: 23  
PD: 37  
FP: 136 Dialysate: 2000 ml/hr  
  
Comments / Plan:  
   Filter changed secondary to . All possible blood (165 ml) returned by dialysis RN. Consents, patient, code status, labs and orders verified. +COVID-19: N95, surgical mask, face shield, cap, shoe covers, gown & gloves worn. Pt intubated. Old set discarded in red bio-hazard bag. New [id number] filter set-up, primed, tested and running well at this time. RIJ temporary CVC, dressing CDI with bio-patch dated 5/31/20. No signs of redness, drainage, or infection visualized. Each catheter limb disinfected for 60 seconds per limb with alcohol swabs. Caps removed, dialysis CVC hub scrubbed with Prevantics for 5 seconds, followed by a 5 second dry time per Hospital P&P. +aspiration/+flush x 2 ports with no resistance. Lines reversed, visible and connections secure with blood warmer to return line at 37*C. Education, pre and post to primary RN.

## 2020-06-04 NOTE — PROGRESS NOTES
[facility] INTENSIVIST Consult Service Note Pulmonary, Critical Care, and Sleep Medicine Name: Anuja Carrera MRN: [id number] : 1955 Hospital: [place] [facility] Date: 2020  Admission date: 5/15/2020 Hospital Day: 24 Subjective/Interval History:  
 
20: off sedation but only grimaces to noxious stimuli. Remains critically ill on mechanical ventilation/pressors 20: remains critically ill on mechanical ventilation/pressors. Off sedation for about 48 hours. Minimal response thus far.  
20: no events. Remains intubated and on pressors. Followed a few commands intermittently overnight per nursing. No data available as EMR has been down until a few minutes ago. 20: more alert and following commands today. Still very weak. Actually doing well on SBT. 
- Intubated, off sedation. Bloody secretions through ETT and NGT per RN. On heparin drip for cardiac issues. Increased pressor requirement today. Increased secretions- SBT held. Weak : Intubated, off sedation, follows commands per RN. Still on high dose NE at 17 mcg/min. Increased bilirubin. Last LVEF in  was 20%-- still covid positive, no recent reassessment. On heparin drip-- bleeding from mucosal sites a little better today. Very weak  on CRRT, On Vent. Iv levo 28. IV  3; IV heparin. Bleeding from gums. LVEF 35% on IV inotrope, pressors. Alert. Follows commands. Able to move hands and forearms easily but feet and ankles very weak. D/w dr. Key Guillory.  unable to wean IV levo now at 30 mcg. No fever. On same drips. CRRT Factor reduced. Still critically ill. Passing SBT. Inflammatory markers still high Called Mrs. Myers to discuss pt management, Reviewed potentiall need for trach but timing and safety must be thought through.  If he continues to rally and get stronger, also possible that he could avoid trach but cannot guarantee. Still needing lots of pressor support. Still needs CRRT. Allowed her to ask questions. Fortunately she and family have not been infected by COVID. 6/3 blood tinged sputum and ETT secretions. No fever. On IV heparin. Doing well with prolonged CPAP trial . IV levophed 5 mcg. Extubated  no more hemoptysis. Low dose IV levophed. On midodrine. Voice stronger IMPRESSION:  
1. Acute hypoxic respiratory failure, intubated -18; at risk for diaphragm atrophy; extubated 6/3 2. Profound septic shock on high level of pressors. 3. COVID 19 pneumonia- good gas exchange; SARS Cov 2 - 20 not detected 4. Hemoptysis- better 5. Alpha strep septicemia- POA 6. Chronic combined systolic/diastolic heart failure; echo / LVEF 35% on IV , Iv levo 7. Hypertensive heart disease with CHF and CKD, previous severe LV function 8. LAQUITA/CKD, CVVH  
9. Chronic anticoagulation at home- eliquis 10. Diabetes mellitus type 2 uncontrolled and not helped by steroids 11. Hyperlipidemia 12. 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 Chronic atrial fibrillation 14. Status post SJM BIV-ICD implant in 2016 
15. Sleep apnea, undiagnosed/untreated 16. Obesity with recent weight gain 17. Possible critical illness polyneuropathy/myopathy RECOMMENDATIONS/PLAN:  
1. Speech therapy consult 2. IV heparin 3. Hope to mobilize when BP better 4. CXR in AM 
5. Pt, OT 
6. Midodrine 7. May cut back on factor- - no more peripheral edema 8. Prn NIV post extubation 9. May stop albumin 10. wean as able 11. Watch WBC-lines look good 12. Stress dose steroids- may wean but would give another 7-14 days more depending on inflammatory markers 13. RRT per nephrology 14. Cardiology following 15. Glycemic monitoring and control 16. Replete electrolytes PRN 17. DVT, SUP prophylaxis- changed to protonix in view in GI bleed on heparin 25. Remains critically ill, at high risk for decline. CCT 35'. D/W RN Subjective/Initial History:  
I have reviewed the flowsheet and previous days notes. Seen earlier today on rounds. I was asked by Ranjeet Menon MD to see Marly Gaines a 71 y.o.  male  in consultation for a chief complaint of shock. Excerpts from admission 5/15/2020 and consult notes reviewed as follows:  
 
\"Mr. Lilliam Zheng is a 57 y.o. morbidly obese male with Chronic nonischemic dilated cardiomyopathy EF 01%, Chronic systolic congestive heart failure class, H/o atrial fibrillation, On chronic warfarin, Iatrogenic left bundle branch block with predominant RV pacing, 64-70%, Hypertension, Hyperlipidemia and diabetes presents to ED [facility] ED C/O Worsening exertional shortness of breath and around 20 pounds weight gain in last 3 weeks. \" 
 
Pt now in CCU. Poor historian. On room air. No fever. No cough. No diarrhea. Not very active. Saw PCP three weeks ago. Some edema. Chart notes ED [facility] admission in 2017 with decompensation. Patient PCP: Sim Umaña MD 
PMH:  has a past medical history of A-fib ([address] Arrhythmia, Diabetes ([address] Endocrine disease, Hypertension, and Irregular heart beat. He also has no past medical history of Difficult intubation, Malignant hyperthermia due to anesthesia, Nausea & vomiting, or Pseudocholinesterase deficiency. PSH:   has a past surgical history that includes pr cardiac surg procedure unlist; insert emergency endotrach airway (5/18/2020); and ir insert non tunl cvc over 5 yrs (5/18/2020). FHX: family history includes Diabetes in his father; Heart Disease in his father and mother. SHX:  reports that he quit smoking about 26 years ago. He has never used smokeless tobacco. He reports that he does not drink alcohol or use drugs. ROS:A comprehensive review of systems was negative except for that written in the HPI. No Known Allergies MEDS:  
 Current Facility-Administered Medications Medication  hydrocortisone Sod Succ (PF) (SOLU-CORTEF) injection 25 mg  epoetin socrates-epbx (RETACRIT) injection 10,000 Units  midodrine (PROAMATINE) tablet 5 mg  insulin glargine (LANTUS) injection 46 Units  bicarbonate dialysis (PRISMASOL) BG K 4/Ca 2.5 5000 ml solution  pantoprazole (PROTONIX) 40 mg in 0.9% sodium chloride 10 mL injection  balsam peru-castor oiL (VENELEX) ointment  polyethylene glycol (MIRALAX) packet 17 g  
 zinc oxide-cod liver oil (DESITIN) 40 % paste  chlorhexidine (ORAL CARE KIT) 0.12 % mouthwash 15 mL  potassium chloride 20 mEq in 50 ml IVPB  calcium chloride injection 2 g  
 acetaminophen (TYLENOL) solution 650 mg  PHARMACY INFORMATION NOTE  heparin 25,000 units in D5W 250 ml infusion  NOREPINephrine (LEVOPHED) 32 mg in 5% dextrose 250 mL infusion  sodium chloride (NS) flush 5-40 mL  sodium chloride (NS) flush 5-40 mL  ondansetron (ZOFRAN) injection 4 mg  alcohol 62% (NOZIN) nasal  1 Ampule  insulin lispro (HUMALOG) injection  glucose chewable tablet 16 g  
 dextrose (D50W) injection syrg 12.5-25 g  
 glucagon (GLUCAGEN) injection 1 mg  acetaminophen (TYLENOL) tablet 650 mg Or  acetaminophen (TYLENOL) suppository 650 mg  
 influenza vaccine - (6 mos+)(PF) (FLUARIX/FLULAVAL/FLUZONE QUAD) injection 0.5 mL  sodium chloride (NS) flush 5-10 mL Objective:  
 
Vital Signs: Telemetry:    AFIB Intake/Output:  
Visit Vitals BP 98/50 Pulse 78 Temp 97 °F (36.1 °C) Resp 15 Ht 6' 3\" (1.905 m) Wt 110 kg (242 lb 8.1 oz) SpO2 100% BMI 30.31 kg/m² Temp (24hrs), Av.6 °F (36.4 °C), Min:97 °F (36.1 °C), Max:98.1 °F (36.7 °C) O2 Device: Nasal cannula O2 Flow Rate (L/min): 2 l/min Body mass index is 30.31 kg/m². Wt Readings from Last 4 Encounters:  
20 110 kg (242 lb 8.1 oz) 19 123.8 kg (273 lb) 08/04/17 129.3 kg (285 lb)  
03/20/17 107.5 kg (237 lb) Intake/Output Summary (Last 24 hours) at 6/4/2020 0445 Last data filed at 6/4/2020 3510 Gross per 24 hour Intake 1637.73 ml Output 2972 ml Net -1334.27 ml Last shift:      06/04 0701 - 06/04 1900 In: 153.3 [I.V.:18.3] Out: 159 Last 3 shifts: 06/02 1901 - 06/04 0700 In: 2802 [I.V.:776] Out: 7851 [Drains:945] Hemodynamics:   
CO:   
CI:   
CVP: CVP (mmHg): 8 mmHg (05/27/20 1030) SVR:   PAP Systolic:   
PAP Diastolic:   
PVR:   
BL74:    
 
Ventilator Settings:     
Mode Rate TV Press PEEP FiO2 PIP Min. Vent Spontaneous    500 ml 6 cm H2O  6 cm H20 30 %  30 cm H2O  9 l/min Physical Exam: 
 
General: intubated, lethargic but more alert HEENT: NCAT, poor dentition, lips and mucosa dry Eyes: anicteric; conjunctiva clear Neck: no nodes, no cuff leak, no accessory MM use. Chest: no deformity,  
Cardiac: regular; no murmur Lungs: no distress, intubated Abd: soft, NT, hypoactive BS Ext: no edema; no joint swelling; No clubbing Neuro: no localizing findings Data:  
            
Labs: 
Recent Labs  
  06/04/20 
0329 06/03/20 
0344 06/02/20 
0417 WBC 9.3 10.8 15.0* HGB 8.1* 7.6* 8.5* HCT 24.4* 22.4* 25.1*  
* 161 190 Recent Labs  
  06/04/20 
0329 06/03/20 
0344 06/02/20 
1630  133* 135* K 3.6 3.3* 3.6  102 102 CO2 28 25 26 * 178* 217* BUN 42* 49* 50* CREA 1.91* 1.89* 2.07* CA 8.4* 8.0* 7.7* MG 2.6* 2.6* 2.7* PHOS 2.1* 2.6 2.6 ALB 3.0* 2.7* 2.1* TBILI  --  12.3*  --   
ALT  --  103*  --   
INR  --  1.4*  -- No results for input(s): PHI, PCO2I, PO2I, HCO3I, FIO2I in the last 72 hours. Imaging: 
I have personally reviewed the patients radiographs and have reviewed the reports: 
No change Total critical care time exclusive of procedures: 35 minutes Claude Snyder MD

## 2020-06-05 NOTE — PROGRESS NOTES
Attempted to see pt for OT services. Pt is unable to stay awake or follow commands. Will continue to follow and have OT follow up on Monday. Orders received, chart reviewed and patient is currently under investigation for COVID-19. In attempts to have only essential personnel enter the room and conserve PPE, we will confer with nursing and/or the referring provider to determine the most appropriate timing of our therapy intervention. Until this time, we will follow the patient peripherally to support nursing staff on an appropriate care plan. Thank you for your assistance.

## 2020-06-05 NOTE — PROGRESS NOTES
Physical Therapy Orders received, chart reviewed and patient is currently under droplet plus precautions due to prior Covid positive test.  In attempts to have only essential personnel enter the room and conserve PPE, we will confer with nursing and/or the referring provider to determine the most appropriate timing of our therapy intervention. Until this time, we will follow the patient peripherally to support nursing staff on an appropriate care plan. Thank you for your assistance. Chart reviewed and discussed with RN, patient is not following commands for active  movement and too lethargic for therapy today. Plan to defer and continue to follow on Monday for eval. 
 
Carry Skeans

## 2020-06-05 NOTE — PROGRESS NOTES
GI Progress Note Kaila Ahn Gretchen Najjar PDB:7/9/5369 TWB:[id number] ATTG: [unfilled]  PCP: Farzaneh Gilman MD 
Date/Time:  6/5/2020 1:16 PM  
Assessment:  
1. Shock 2/2 Sepsis (+COVID 19) and Cardiogenic 2. Intubated on Vasopressors 3. LAQUITA on CVVH 4. AFIB on Heparin gtt 5. Acute liver injury, clearly initially consistent with shock liver 6. NSTEMI Plan:  
GI/Hepatology consulted for persistent jaundice. Clearly patient's initial presentation was consistent with shock liver for numerous reasons stated above. Liver transaminases have much improved but TB has worsened. RUQ U/S in 5/26 showed a slightly prominent CBD of 8 mm which very likely can be within normal limits for a 71year old. DDx of patient's persistent jaundice is: Viral hepatitis/AIH, biliary obstruction, sepsis induced (COVID19) cholestasis, Cardiogenic, lag in TB from severe shock liver (I suspect this as most likely cause) - Repeat RUQ U/S with no evidence of biliary dilation/obstruction (CBD of 6 mm clearly wnl) 
- avoid hepatoxic agents 
- continue supportive care - Serologic workup with negative VENTURA, Immunoglobulins, Viral hepatitis panel. Again I suspect persistent jaundice to be combination of recovery from shock liver and sepsis induced cholestasis and will likely take a long time to improve and will likely eventually have an improved TB with a new elevated baseline Patient should follow up with myself as outpatient after hopeful continued recovery and eventual discharge GI will sign off. Feel free to call with any questions Subjective:  
Pt now extubated. Remains on vasopressor support Complaint Y/N Description Abdominal Pain Hematemesis Hematochezia Melena Constipation Diarrhea Dyspepsia Dysphagia Jaundiced Nausea/vomiting Review of Systems: 
Symptom Y/N Comments  Symptom Y/N Comments Fever/Chills    Chest Pain Cough    Headaches Sputum    Joint Pain SOB/GROVES    Pruritis/Rash Tolerating Diet    Other Could NOT obtain due to:   
 
Objective: VITALS:  
Last 24hrs VS reviewed since prior progress note. Most recent are: 
Visit Vitals BP 92/59 (BP 1 Location: Right arm, BP Patient Position: At rest) Pulse 92 Temp 97.7 °F (36.5 °C) Resp 15 Ht 6' 3\" (1.905 m) Wt 107.4 kg (236 lb 12.4 oz) SpO2 96% BMI 29.59 kg/m² Intake/Output Summary (Last 24 hours) at 6/5/2020 1316 Last data filed at 6/5/2020 1200 Gross per 24 hour Intake 1632.42 ml Output 2791 ml Net -1158.58 ml PHYSICAL EXAM: 
Given +COVID and thus to limit exposure as well as use of PPE patient was seen from the door Gen: Awake, extubated, resting Lab and Radiology Data Reviewed: (see below) Medications Reviewed: (see below) PMH/SH reviewed - no change compared to H&P 
________________________________________________________________________ Care Plan discussed with: 
Patient Family RN Consultant:    
 
Kathya Loyloa MD  
 
Procedures: see electronic medical records for all procedures/Xrays and details which were not copied into this note but were reviewed prior to creation of Plan. LABS: 
Recent Labs  
  06/05/20 0430 06/04/20 
0329 WBC 9.7 9.3 HGB 9.2* 8.1* HCT 27.8* 24.4*  
 143* Recent Labs  
  06/05/20 
0430 06/04/20 
1825 06/04/20 
0329 06/03/20 
0344 * 136 136 133* K 3.8 3.4* 3.6 3.3*  
CL 98 102 102 102 CO2 24 27 28 25 BUN 39* 40* 42* 49* CREA 1.47* 1.53* 1.91* 1.89* * 107* 121* 178* CA 8.9 8.9 8.4* 8.0*  
MG 2.9*  --  2.6* 2.6* PHOS 3.1 2.6 2.1* 2.6 Recent Labs  
  06/05/20 0430 06/04/20 1825 06/04/20 0329 06/03/20 0344 AP  --   --   --  271* TP  --   --   --  6.8 ALB 3.1* 3.0* 3.0* 2.7*  
GLOB  --   --   --  4.1* Recent Labs  
  06/05/20 
1227 06/05/20 0430 06/04/20 1825 06/03/20 0344 INR  --   --   --   --  1.4* PTP  --   --   --   --  14.0*  
 APTT 63.0* 79.8* 70.1*   < > 62.6*  
 < > = values in this interval not displayed. Recent Labs  
  06/05/20 
0430 06/04/20 
0329 FERR 975* 988* No results found for: FOL, RBCF No results for input(s): PH, PCO2, PO2 in the last 72 hours. No results for input(s): CPK, CKMB in the last 72 hours. No lab exists for component: TROPONINI Lab Results Component Value Date/Time Color YELLOW/STRAW 05/15/2020 05:34 PM  
 Appearance CLOUDY (A) 05/15/2020 05:34 PM  
 Specific gravity 1.025 05/15/2020 05:34 PM  
 pH (UA) 5.0 05/15/2020 05:34 PM  
 Protein 30 (A) 05/15/2020 05:34 PM  
 Glucose >1,000 (A) 05/15/2020 05:34 PM  
 Ketone TRACE (A) 05/15/2020 05:34 PM  
 Bilirubin Negative 05/15/2020 05:34 PM  
 Urobilinogen 0.2 05/15/2020 05:34 PM  
 Nitrites Negative 05/15/2020 05:34 PM  
 Leukocyte Esterase Negative 05/15/2020 05:34 PM  
 Epithelial cells FEW 05/15/2020 05:34 PM  
 Bacteria Negative 05/15/2020 05:34 PM  
 WBC 0-4 05/15/2020 05:34 PM  
 RBC 0-5 05/15/2020 05:34 PM  
 
 
MEDICATIONS: 
Current Facility-Administered Medications Medication Dose Route Frequency  midodrine (PROAMATINE) tablet 10 mg  10 mg Per NG tube TID PRN  
 midodrine (PROAMATINE) tablet 10 mg  10 mg Oral Q6H  
 predniSONE (DELTASONE) tablet 10 mg  10 mg Oral DAILY WITH BREAKFAST  insulin glargine (LANTUS) injection 46 Units  46 Units SubCUTAneous DAILY  melatonin tablet 6 mg  6 mg Oral QHS  lansoprazole (PREVACID) 3 mg/mL oral suspension 30 mg  30 mg Per NG tube ACB  epoetin socrates-epbx (RETACRIT) injection 10,000 Units  10,000 Units SubCUTAneous Q MON, WED & FRI  
 bicarbonate dialysis (PRISMASOL) BG K 4/Ca 2.5 5000 ml solution   Extracorporeal DIALYSIS CONTINUOUS  
 balsam peru-castor oiL (VENELEX) ointment   Topical TID  polyethylene glycol (MIRALAX) packet 17 g  17 g Per NG tube DAILY PRN  
 zinc oxide-cod liver oil (DESITIN) 40 % paste   Topical PRN  
  chlorhexidine (ORAL CARE KIT) 0.12 % mouthwash 15 mL  15 mL Oral Q12H  potassium chloride 20 mEq in 50 ml IVPB  20 mEq IntraVENous PRN  
 calcium chloride injection 2 g  2 g IntraVENous PRN  
 acetaminophen (TYLENOL) solution 650 mg  650 mg Oral Q6H PRN  
 PHARMACY INFORMATION NOTE  1 Each Other BID  heparin 25,000 units in D5W 250 ml infusion  8-25 Units/kg/hr IntraVENous TITRATE  
 NOREPINephrine (LEVOPHED) 32 mg in 5% dextrose 250 mL infusion  2-200 mcg/min IntraVENous TITRATE  sodium chloride (NS) flush 5-40 mL  5-40 mL IntraVENous Q8H  
 sodium chloride (NS) flush 5-40 mL  5-40 mL IntraVENous PRN  
 ondansetron (ZOFRAN) injection 4 mg  4 mg IntraVENous Q8H PRN  
 alcohol 62% (NOZIN) nasal  1 Ampule  1 Ampule Topical Q12H  
 insulin lispro (HUMALOG) injection   SubCUTAneous Q6H  
 glucose chewable tablet 16 g  4 Tab Oral PRN  
 dextrose (D50W) injection syrg 12.5-25 g  12.5-25 g IntraVENous PRN  
 glucagon (GLUCAGEN) injection 1 mg  1 mg IntraMUSCular PRN  
 acetaminophen (TYLENOL) tablet 650 mg  650 mg Oral Q6H PRN Or  
 acetaminophen (TYLENOL) suppository 650 mg  650 mg Rectal Q6H PRN  
 influenza vaccine 2019-20 (6 mos+)(PF) (FLUARIX/FLULAVAL/FLUZONE QUAD) injection 0.5 mL  0.5 mL IntraMUSCular PRIOR TO DISCHARGE  sodium chloride (NS) flush 5-10 mL  5-10 mL IntraVENous PRN

## 2020-06-05 NOTE — PROGRESS NOTES
DEREK PLAN:  TBD - IP Rehab vs. Home w/ famiy and HH 
 
COVID PNA - last test negative (6/1) Extubated 6/3 - now on 2 lpm via NC LAQUITA - CVVH now on HD - will pt need Outpt HD at dc? PT/OT  evals ordered - pt too lethargic for eval 
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 Wife Geoff is [employer]. Anabela Parker, MSW

## 2020-06-05 NOTE — PROGRESS NOTES
[facility] INTENSIVIST Consult Service Note Pulmonary, Critical Care, and Sleep Medicine Name: Garett Gonsalez MRN: [id number] : 1955 Hospital: [place] [address] Date: 2020  Admission date: 5/15/2020 Hospital Day: 25 Subjective/Interval History:  
 
20: off sedation but only grimaces to noxious stimuli. Remains critically ill on mechanical ventilation/pressors 20: remains critically ill on mechanical ventilation/pressors. Off sedation for about 48 hours. Minimal response thus far.  
20: no events. Remains intubated and on pressors. Followed a few commands intermittently overnight per nursing. No data available as EMR has been down until a few minutes ago. 20: more alert and following commands today. Still very weak. Actually doing well on SBT. 
- Intubated, off sedation. Bloody secretions through ETT and NGT per RN. On heparin drip for cardiac issues. Increased pressor requirement today. Increased secretions- SBT held. Weak : Intubated, off sedation, follows commands per RN. Still on high dose NE at 17 mcg/min. Increased bilirubin. Last LVEF in  was 20%-- still covid positive, no recent reassessment. On heparin drip-- bleeding from mucosal sites a little better today. Very weak  on CRRT, On Vent. Iv levo 28. IV  3; IV heparin. Bleeding from gums. LVEF 35% on IV inotrope, pressors. Alert. Follows commands. Able to move hands and forearms easily but feet and ankles very weak. D/w dr. Zaragoza Congress.  unable to wean IV levo now at 30 mcg. No fever. On same drips. CRRT Factor reduced. Still critically ill. Passing SBT. Inflammatory markers still high Called Mrs. Myers to discuss pt management, Reviewed potentiall need for trach but timing and safety must be thought through.  If he continues to rally and get stronger, also possible that he could avoid trach but cannot guarantee. Still needing lots of pressor support. Still needs CRRT. Allowed her to ask questions. Fortunately she and family have not been infected by COVID. 6/3 blood tinged sputum and ETT secretions. No fever. On IV heparin. Doing well with prolonged CPAP trial . IV levophed 5 mcg. Extubated  no more hemoptysis. Low dose IV levophed. On midodrine. Voice stronger  on midodrine. On room air. On TF IMPRESSION:  
1. Acute hypoxic respiratory failure, intubated -18; at risk for diaphragm atrophy; extubated 6/3 2. Profound septic shock on high level of pressors. 3. COVID 19 pneumonia- good gas exchange; SARS Cov 2 - 20 not detected 4. Hemoptysis- better 5. Alpha strep septicemia- POA 6. Chronic combined systolic/diastolic heart failure; echo /31 LVEF 35% on IV , Iv levo 7. Hypertensive heart disease with CHF and CKD, previous severe LV function 8. LAQUITA/CKD, CVVH  
9. Chronic anticoagulation at home- eliquis 10. Diabetes mellitus type 2 uncontrolled and not helped by steroids 11. Hyperlipidemia 12. 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 Chronic atrial fibrillation 14. Status post SJM BIV-ICD implant in 2016 
15. Sleep apnea, undiagnosed/untreated 16. Obesity with recent weight gain 17. Possible critical illness polyneuropathy/myopathy RECOMMENDATIONS/PLAN:  
1. Speech therapy consult 2. IV heparin 3. Hope to mobilize when BP better 4. Pt, OT 
5. Midodrine scheduled 6. May cut back on factor- - no more peripheral edema 7. Prn NIV post extubation 8. May stop albumin 9. wean as able 10. Watch WBC-lines look good 11. Change to Po prednisone for 7 days 12. RRT per nephrology 13. Cardiology following 14. Glycemic monitoring and control 15. Replete electrolytes PRN 16. DVT, SUP prophylaxis- changed to protonix in view in GI bleed on heparin 16. Remains critically ill, at high risk for decline. CCT 35'. D/W RN Subjective/Initial History:  
I have reviewed the flowsheet and previous days notes. Seen earlier today on rounds. I was asked by Alinda Peabody, MD to see Diane House a 71 y.o.  male  in consultation for a chief complaint of shock. Excerpts from admission 5/15/2020 and consult notes reviewed as follows:  
 
\"Mr. Magi Mann is a 57 y.o. morbidly obese male with Chronic nonischemic dilated cardiomyopathy EF 10%, Chronic systolic congestive heart failure class, H/o atrial fibrillation, On chronic warfarin, Iatrogenic left bundle branch block with predominant RV pacing, 64-70%, Hypertension, Hyperlipidemia and diabetes presents to [address] [facility] ED C/O Worsening exertional shortness of breath and around 20 pounds weight gain in last 3 weeks. \" 
 
Pt now in CCU. Poor historian. On room air. No fever. No cough. No diarrhea. Not very active. Saw PCP three weeks ago. Some edema. Chart notes [id number] [facility] admission in 2017 with decompensation. Patient PCP: Libertad Whittaker MD 
PMH:  has a past medical history of A-fib ([facility] [address] Arrhythmia, Diabetes ([address] Endocrine disease, Hypertension, and Irregular heart beat. He also has no past medical history of Difficult intubation, Malignant hyperthermia due to anesthesia, Nausea & vomiting, or Pseudocholinesterase deficiency. PSH:   has a past surgical history that includes pr cardiac surg procedure unlist; insert emergency endotrach airway (5/18/2020); and ir insert non tunl cvc over 5 yrs (5/18/2020). FHX: family history includes Diabetes in his father; Heart Disease in his father and mother. SHX:  reports that he quit smoking about 26 years ago. He has never used smokeless tobacco. He reports that he does not drink alcohol or use drugs. ROS:A comprehensive review of systems was negative except for that written in the HPI. No Known Allergies MEDS:  
 Current Facility-Administered Medications Medication  insulin glargine (LANTUS) injection 40 Units  lansoprazole (PREVACID) 3 mg/mL oral suspension 30 mg  
 midodrine (PROAMATINE) tablet 10 mg  
 hydrocortisone Sod Succ (PF) (SOLU-CORTEF) injection 25 mg  epoetin socrates-epbx (RETACRIT) injection 10,000 Units  midodrine (PROAMATINE) tablet 5 mg  bicarbonate dialysis (PRISMASOL) BG K 4/Ca 2.5 5000 ml solution  balsam peru-castor oiL (VENELEX) ointment  polyethylene glycol (MIRALAX) packet 17 g  
 zinc oxide-cod liver oil (DESITIN) 40 % paste  chlorhexidine (ORAL CARE KIT) 0.12 % mouthwash 15 mL  potassium chloride 20 mEq in 50 ml IVPB  calcium chloride injection 2 g  
 acetaminophen (TYLENOL) solution 650 mg  PHARMACY INFORMATION NOTE  heparin 25,000 units in D5W 250 ml infusion  NOREPINephrine (LEVOPHED) 32 mg in 5% dextrose 250 mL infusion  sodium chloride (NS) flush 5-40 mL  sodium chloride (NS) flush 5-40 mL  ondansetron (ZOFRAN) injection 4 mg  alcohol 62% (NOZIN) nasal  1 Ampule  insulin lispro (HUMALOG) injection  glucose chewable tablet 16 g  
 dextrose (D50W) injection syrg 12.5-25 g  
 glucagon (GLUCAGEN) injection 1 mg  acetaminophen (TYLENOL) tablet 650 mg Or  acetaminophen (TYLENOL) suppository 650 mg  
 influenza vaccine 2019- (6 mos+)(PF) (FLUARIX/FLULAVAL/FLUZONE QUAD) injection 0.5 mL  sodium chloride (NS) flush 5-10 mL Objective:  
 
Vital Signs: Telemetry:    AFIB Intake/Output:  
Visit Vitals /52 Pulse 87 Temp 97.5 °F (36.4 °C) Resp 13 Ht 6' 3\" (1.905 m) Wt 107.4 kg (236 lb 12.4 oz) SpO2 95% BMI 29.59 kg/m² Temp (24hrs), Av.6 °F (36.4 °C), Min:97.5 °F (36.4 °C), Max:97.9 °F (36.6 °C) O2 Device: Nasal cannula O2 Flow Rate (L/min): 2 l/min Body mass index is 29.59 kg/m². Wt Readings from Last 4 Encounters:  
20 107.4 kg (236 lb 12.4 oz) 01/19/19 123.8 kg (273 lb) 08/04/17 129.3 kg (285 lb)  
03/20/17 107.5 kg (237 lb) Intake/Output Summary (Last 24 hours) at 6/5/2020 7804 Last data filed at 6/5/2020 0800 Gross per 24 hour Intake 1777.42 ml Output 3038 ml Net -1260.58 ml Last shift:      06/05 0701 - 06/05 1900 In: 66.2 [I.V.:31.2] Out: - Last 3 shifts: 06/03 1901 - 06/05 0700 In: 3021.4 [I.V.:711.4] Out: 4779 [Drains:500] Hemodynamics:   
CO:   
CI:   
CVP: CVP (mmHg): 8 mmHg (05/27/20 1030) SVR:   PAP Systolic:   
PAP Diastolic:   
PVR:   
JQ45:    
 
Ventilator Settings:     
Mode Rate TV Press PEEP FiO2 PIP Min. Vent Spontaneous    500 ml 6 cm H2O  6 cm H20 30 %  30 cm H2O  9 l/min Physical Exam: 
 
General: intubated, lethargic but more alert HEENT: NCAT, poor dentition, lips and mucosa dry Eyes: anicteric; conjunctiva clear Neck: no nodes, no cuff leak, no accessory MM use. Chest: no deformity,  
Cardiac: regular; no murmur Lungs: no distress, intubated Abd: soft, NT, hypoactive BS Ext: no edema; no joint swelling; No clubbing Neuro: no localizing findings Data:  
            
Labs: 
Recent Labs  
  06/05/20 
0430 06/04/20 
0329 06/03/20 
0344 WBC 9.7 9.3 10.8 HGB 9.2* 8.1* 7.6* HCT 27.8* 24.4* 22.4*  
 143* 161 Recent Labs  
  06/05/20 
0430 06/04/20 
1825 06/04/20 
0329 06/03/20 
0344 * 136 136 133* K 3.8 3.4* 3.6 3.3*  
CL 98 102 102 102 CO2 24 27 28 25 * 107* 121* 178* BUN 39* 40* 42* 49* CREA 1.47* 1.53* 1.91* 1.89* CA 8.9 8.9 8.4* 8.0*  
MG 2.9*  --  2.6* 2.6* PHOS 3.1 2.6 2.1* 2.6 ALB 3.1* 3.0* 3.0* 2.7* TBILI  --   --   --  12.3* ALT  --   --   --  103* INR  --   --   --  1.4* No results for input(s): PHI, PCO2I, PO2I, HCO3I, FIO2I in the last 72 hours. Imaging: 
I have personally reviewed the patients radiographs and have reviewed the reports: 
No change Total critical care time exclusive of procedures: 35 minutes Benjamin Vinson MD

## 2020-06-05 NOTE — DIABETES MGMT
[address] [facility] 
 
FOLLOW-UP NOTE Presentation Rafi Heard a 71 y. o. male admitted from the ER 5/15/20 with complaints of weakness. No COVID-19 symptoms or contacts noted. Fever. CXR, CT Head & CT ABd negative. Blood cultures positive for alpha streptococcus. Markedly elevated ferritin DX: [id number]. Septic shock. Multiorgan failure TX: Anti-COVID therapies. Pacer. Palliative care. Recent diagnostics: 
5/21/20 Hepatitis B surface Ab Reactive 5/22/20 WBC 22.9. Albumin 2.8. Bili 6.8. . . Procalcitonin 203.42. Triglyceride <15. Serum creatinine 4.73/GFR 15. CXR: Stable bibasilar opacities and pleural effusions 5/26/20 WBC 27.2. CXR: Persistent pleural effusions with bibasilar atelectasis. Atelectasis at the right lung base has increased. Ferritin 1682. CRP 5.09. Procalcitonin 55.12 
5/27/20 D-Dimer 5.97. US ABd: Negative 5/28/20 D-Dimer 4.92. Procalcitonin 32.01. CXR: Presence of bibasilar hazy density (left greater than right) possibly representing developing infiltration. Improved aeration of the right lung base 5/29/20 Ferritin 1606. CRP 3.38. D-Dimer 5. 13. Blood cultures without growth 6/1/20 WBC 17.2. CRP 3.97. Ferritin 1509. BNP 33, 805. D-Dimer 4.4. Bili 13.8. CXR: No acute process. ECHO: Normal cavity size and wall thickness. Moderate global systolic function. Estimated left ventricular ejection fraction is 35 - 40%. Hypokinetic left ventricular wall motion. Mildly reduced systolic function 6/2/20 WBC 15. D-Dimer 3.94. CRP 3.72. Ferritin 1331. Bl culture No growth 6/3/20 COVID negative for first time. WBC 10.8. D-Dimer 3.32. CRP 4.23. Ferritin 1095. IgG in normal range. Bili 12.3. Hepatitis panel negative 6/4/20 Ferritin 988. CRP 4.81. D-Dimer 3.97 
6/5/20 Ferritin 975. CRP 6. 18. D-Dimer 2.77 Clinical care includes: 
            02 via NC  
            BP management via norepinephrine (levophed)             FO via NG  
            Insulin 
            Steroids (type changed)  
            Dialysis & EPO via [place] 
            GI prophylaxis             RWPANLU infusion 
  
Diabetes: Patient has known Type 2 diabetes, treated with Trulicity, Onglyza & Amaryl PTA (per PTA med list). Family history positive for diabetes in father.  
 
Subjective COVID precautions in place Objective Physical exam 
General Alert, but weak. Vital Signs Visit Vitals /52 Pulse 87 Temp 97.5 °F (36.4 °C) Resp 13 Ht 6' 3\" (1.905 m) Wt 107.4 kg (236 lb 12.4 oz) SpO2 95% BMI 29.59 kg/m² Laboratory Lab Results Component Value Date/Time Hemoglobin A1c 9.8 (H) 02/09/2017 05:42 AM  
 Hemoglobin A1c, External 9.0 03/02/2016 Lab Results Component Value Date/Time LDL, calculated 38.4 03/03/2009 09:40 PM  
 
Lab Results Component Value Date/Time Creatinine 1.47 (H) 06/05/2020 04:30 AM  
 
Lab Results Component Value Date/Time Sodium 132 (L) 06/05/2020 04:30 AM  
 Potassium 3.8 06/05/2020 04:30 AM  
 Chloride 98 06/05/2020 04:30 AM  
 CO2 24 06/05/2020 04:30 AM  
 Anion gap 10 06/05/2020 04:30 AM  
 Glucose 221 (H) 06/05/2020 04:30 AM  
 BUN 39 (H) 06/05/2020 04:30 AM  
 Creatinine 1.47 (H) 06/05/2020 04:30 AM  
 BUN/Creatinine ratio 27 (H) 06/05/2020 04:30 AM  
 GFR est AA 58 (L) 06/05/2020 04:30 AM  
 GFR est non-AA 48 (L) 06/05/2020 04:30 AM  
 Calcium 8.9 06/05/2020 04:30 AM  
 Bilirubin, total 12.3 (H) 06/03/2020 03:44 AM  
 Alk. phosphatase 271 (H) 06/03/2020 03:44 AM  
 Protein, total 6.8 06/03/2020 03:44 AM  
 Albumin 3.1 (L) 06/05/2020 04:30 AM  
 Globulin 4.1 (H) 06/03/2020 03:44 AM  
 A-G Ratio 0.7 (L) 06/03/2020 03:44 AM  
 ALT (SGPT) 103 (H) 06/03/2020 03:44 AM  
 
Lab Results Component Value Date/Time ALT (SGPT) 103 (H) 06/03/2020 03:44 AM  
 
Factors affecting BG pattern Factor Dose Comments Nutrition: 
Tube feeding via NG  
174 grams/24 hrs When running at goal rate of 45 cc/hr Drugs: 
Steroids Prednisone 20 mg D Steroid changed today Infection: COVID  At least test negative Other: LAQUITA CVVH Blood glucose pattern Assessment and Plan Nursing Diagnosis Risk for unstable blood glucose pattern Nursing Intervention Domain 5258 Decision-making Support Nursing Interventions Examined current inpatient diabetes control Explored factors facilitating and impeding inpatient management Evaluation This gentleman with known Type 2 diabetes had achieved inpatient BG targets. Patient was extubated successfully two days ago, and plans to begin exploring transitioning to oral feeding noted. At this point, would provide insulin to cover metabolic (0.3 units/kg/D) and steroid needs. BGs meggan with reduction in basal insulin dosing. Recommend increasing basal insulin dose (as discussed during interprofessional rounds). Recommendations Recommend: 
 
Increasing insulin to 46 units D Billing Code(s) [x] [id number] IP subsequent hospital care - 30 minutes Alina Sim, CNS Access via R [place] [phone]

## 2020-06-05 NOTE — DIALYSIS
[facility]       [phone] Orders Mode: CVVHD Blood Flow Rate: 200ml/min UFR: 2000ml/hr Factor: 100ml/hr Metrics BP/HR: 84/51  /  76 Access Pressure: -75 Filter Pressure: 184 Return Pressure: 118 TMP: 37  
PD: 37 DFR: 2000ml/hr Comments / Plan:  
[id number] filter running well with no indication for change at this time. Consents, patient, code status, labs and orders verified. PT COVID+ - rounding performed outside of room in efforts to conserve PPE and minimize exposure. RIJ temporary CVC, dressing CDI with bio-patch. Lines visible and connections secure with blood warmer to return line at 37*C. Education, pre and post to unique Boykin RN.

## 2020-06-05 NOTE — PROGRESS NOTES
0840  Report from [facility] 
 
0900  Assessment complete  See flow sheet. Patient in sitting position in bed drowsy but easy to awake oriented to person and place. NC O2 at 2 lpm.  CVVH running. Right IJ infusing with heparin at 10 units and Levo at 30 mcg. Flexi-seal in place. VSS 
 
1200  Assessment complete no changes from previous  VSS 
 
1230  PTT 63 no change to Heparin 1400  Patient remains drowsy but easy to awake 
 
1600  Assessment complete no changes from previous 1630  Patient on tele conference with family  CVVH labs sent 1830  PTT sent 
 
1900  Report to [facility]

## 2020-06-05 NOTE — PROGRESS NOTES
Hospitalist Progress Note NAME: Marly Gaines :  1955 MRN:  [id number] Assessment / Plan: 
COVID-19 pneumonia with multiorgan failure improving Acute hypoxic respiratory failure due to above status post mechanical ventilation and extubation on 6/3 Septic shock profound resolved Hyponatremia Strep Bacteremia s/p completion of antibiotics Coagulopathy S/p Extubation on 6/3. On room air now. Continue CRRT per renal 
S/p stress dose of steroids and now on prednisone Status post completion of IV antibiotics for pneumonia and bacteremia S/p iv albumin Cont  PT/OT. Cont added midodrine. CXR stable Acute transaminitis likely due to shock liver Hyperbilirubinemia due to above US RUQ: Minimal gallbladder sludge. Slightly prominent common bile duct of uncertain 
significance. Recheck CMP in few days . Acute on chronic systolic HF EF 29%, now 31% (but on Dobutamine) Nonischemic dilated cardiomyopathy s/p remote ICD Chronic atrial fibrillation Sleep apnea Hypertensive heart disease w/ CKD and HF Cardiology help appreciated: cont supportive care Eliquis on hold for now, on heparin ggt . Hb is stable DM2 BS stable Cont insulin w lantus, SSI Hypokalemia 
-to be adjusted during CRRT Hemoptysis 
-improved Hypophosphatemia 
-replaced 
  
Code Status: Full Surrogate Decision Maker: wife DVT Prophylaxis: heparin drip Disposition: remain critically ill Subjective: On room air. No fever. Feels better Over nite events noted Chief Complaint / Reason for Physician Visit: following sepsis / respiratory failure/ covid Review of Systems: 
Symptom Y/N Comments  Symptom Y/N Comments Fever/Chills    Chest Pain Poor Appetite    Edema Cough    Abdominal Pain Sputum    Joint Pain SOB/GROVES    Pruritis/Rash Nausea/vomit    Tolerating PT/OT Diarrhea    Tolerating Diet Constipation    Other Could NOT obtain due to: Objective: VITALS:  
Last 24hrs VS reviewed since prior progress note. Most recent are: 
Patient Vitals for the past 24 hrs: 
 Temp Pulse Resp BP SpO2  
06/05/20 1515  98 21 93/49   
06/05/20 1500  93 20 96/59   
06/05/20 1445  94 21 117/58 93 % 06/05/20 1430  95 22 108/44 94 % 06/05/20 1423  98 18 92/55 96 % 06/05/20 1419  (!) 112 17 (!) 83/45 98 % 06/05/20 1415  (!) 106 18 (!) 82/41   
06/05/20 1400  (!) 108 18 95/47 94 % 06/05/20 1300  97 16 109/64   
06/05/20 1200 97.7 °F (36.5 °C) 92 15 92/59 96 % 06/05/20 1100  92 16 101/49 95 % 06/05/20 1000  95 14 98/49 95 % 06/05/20 0900  87 13 101/52 95 % 06/05/20 0800 97.5 °F (36.4 °C) 90 15 107/52 96 % 06/05/20 0700  (!) 104 20 106/56   
06/05/20 0600  93 13 (!) 80/59 96 % 06/05/20 0500  88 13 96/60 96 % 06/05/20 0400 97.5 °F (36.4 °C) 80 11 98/53 97 % 06/05/20 0300  80 11 116/49 96 % 06/05/20 0200  98 14 (!) 129/39 95 % 06/05/20 0100  81 13 105/46 96 % 06/05/20 0000 97.8 °F (36.6 °C) 87 22 (!) 78/51 95 % 06/04/20 2300  89 11 (!) 211/157 96 % 06/04/20 2200  94 17 (!) 65/46 94 % 06/04/20 2100  82 12 98/51 96 % 06/04/20 2000 97.9 °F (36.6 °C) 95 15 (!) 81/51 96 % 06/04/20 1900  89 16 90/72 97 % 06/04/20 1830  80 14 105/62 98 % 06/04/20 1800  96 20 112/86   
06/04/20 1730  91 13 (!) 110/99 98 % 06/04/20 1700  84 21 108/62 98 % 06/04/20 1630  86 19 115/61 100 % Intake/Output Summary (Last 24 hours) at 6/5/2020 1608 Last data filed at 6/5/2020 1500 Gross per 24 hour Intake 1580.1 ml Output 3026 ml Net -1445.9 ml PHYSICAL EXAM: 
General:           Drowsy but wakes up to commands EENT:              EOMI. Anicteric sclerae. MMM Resp:               CTA bilaterally, no wheezing or rales. mechanically ventilated CV:                  Regular  rhythm,  No edema GI:                   Soft, Non distended, Non tender.  +Bowel sounds Neurologic:     drowsy but wakes to commands Psych:            confused Skin:                No rashes. No jaundice Reviewed most current lab test results and cultures  YES Reviewed most current radiology test results   YES Review and summation of old records today    NO Reviewed patient's current orders and MAR    YES 
PMH/SH reviewed - no change compared to H&P 
________________________________________________________________________ Care Plan discussed with: 
  Comments Patient Family RN x Care Manager Consultant  x Multidiciplinary team rounds were held today with , nursing, pharmacist and clinical coordinator. Patient's plan of care was discussed; medications were reviewed and discharge planning was addressed. ________________________________________________________________________ Total NON critical care TIME:  35 Minutes Total CRITICAL CARE TIME Spent:   Minutes non procedure based Comments >50% of visit spent in counseling and coordination of care x Chart review in complex ICU patient  
________________________________________________________________________ Eduardo Radford MD  
 
Procedures: see electronic medical records for all procedures/Xrays and details which were not copied into this note but were reviewed prior to creation of Plan. LABS: 
I reviewed today's most current labs and imaging studies. Pertinent labs include: 
Recent Labs  
  06/05/20 
0430 06/04/20 
0329 06/03/20 
0344 WBC 9.7 9.3 10.8 HGB 9.2* 8.1* 7.6* HCT 27.8* 24.4* 22.4*  
 143* 161 Recent Labs  
  06/05/20 
0430 06/04/20 
1825 06/04/20 
0329 06/03/20 
0344 * 136 136 133* K 3.8 3.4* 3.6 3.3*  
CL 98 102 102 102 CO2 24 27 28 25 * 107* 121* 178* BUN 39* 40* 42* 49* CREA 1.47* 1.53* 1.91* 1.89* CA 8.9 8.9 8.4* 8.0*  
MG 2.9*  --  2.6* 2.6* PHOS 3.1 2.6 2.1* 2.6 ALB 3.1* 3.0* 3.0* 2.7*  
 TBILI  --   --   --  12.3* ALT  --   --   --  103* INR  --   --   --  1.4* Signed: Elie Faye MD

## 2020-06-05 NOTE — PROGRESS NOTES
PALLIATIVE MEDICINE Palliative Medicine was consulted for goals of care, pt is recovering, goals are clear, he wants to continue aggressive medical care and remain FULL CODE. Will sign off. please re-consult if Palliative Medicine can be of further assistance. Thank you for including Palliative Medicine in this patient's care.   
Nick Lloyd, LACEYC

## 2020-06-05 NOTE — PROGRESS NOTES
Progress Note 6/5/2020 11:43 AM 
NAME: Alison Rosario MRN:  [id number] Admit Diagnosis: Sepsis ([facility] [address] [A41.9] Problem List: 1. Shock; septic/cardiogenic; resolved 2. Severe sepsis; resolved 3. COVID-19 + 4. NSTEMI 5. Hyponatremia; resolved. 6. Lactic acidosis; resolved. 7. Acute liver injury; resolving 8. Acute on chronic systolic heart failure; Class IV 9. Acute kidney injury; anuric. Now ESRD on dialysis. 10. Coagulopathy 11. Nonischemic dilated cardiomyopathy s/p remote ICD 12. Chronic atrial fibrillation 13. Sleep apnea 14. Hypertensive heart disease w/ CKD and HF Assessment/Plan:  
 
Intake/Output Summary (Last 24 hours) at 6/5/2020 6947 Last data filed at 6/5/2020 0800 Gross per 24 hour Intake 1819.42 ml Output 3280 ml Net -1460.58 ml Ricky off Vaso off Norepi @ 30 Dobut off Epi off EXTUBATED!!!  
 
HgB 8s. Echo w/ EF 35-40% COVID-19 from 6/1 negative; will be repeated. Sundowning an issue. 1. Continue supportive care 2. Heparin gtt per protocol 3. CVVH ongoing; tolerating full factor 4. Remains ill but shockingly improving 5. Increase midodrine 10mg TID in an effort to wean of pressors 6. Discussions ongoing w/ family daily [x]       High complexity decision making was performed in this patient at high risk for decompensation with multiple organ involvement. Subjective:  
 
Alison Rosario is sleepy but follows commands. Discussed with RN events overnight. Review of Systems: 
 
Symptom Y/N Comments  Symptom Y/N Comments Fever/Chills N   Chest Pain N Poor Appetite N   Edema N   
Cough N   Abdominal Pain N Sputum N   Joint Pain N   
SOB/GROVES N   Pruritis/Rash N   
Nausea/vomit N   Tolerating PT/OT Y Diarrhea N   Tolerating Diet Y Constipation N   Other Could NOT obtain due to:   
 
Objective:  
  
Physical Exam: 
 
Last 24hrs VS reviewed since prior progress note. Most recent are: 
 
Visit Vitals /52 (BP 1 Location: Right arm, BP Patient Position: At rest) Pulse 90 Temp 97.5 °F (36.4 °C) Resp 15 Ht 6' 3\" (1.905 m) Wt 107.4 kg (236 lb 12.4 oz) SpO2 96% BMI 29.59 kg/m² Intake/Output Summary (Last 24 hours) at 6/5/2020 0589 Last data filed at 6/5/2020 0800 Gross per 24 hour Intake 1819.42 ml Output 3280 ml Net -1460.58 ml General Appearance: Well developed, well nourished, AnO. Ears/Nose/Mouth/Throat: Hearing grossly normal. 
Neck: Supple. Chest: Lungs decreased diffusely Cardiovascular: Irregular rate and rhythm, S1S2 normal, no murmur. Abdomen: Soft, non-tender, bowel sounds are active. Extremities: No edema bilaterally. Skin: Warm and dry. []         Post-cath site without hematoma, bruit, tenderness, or thrill. Distal pulses intact. PMH/SH reviewed - no change compared to H&P Data Review Telemetry: paced/AF 
 
EKG:  
[x]  No new EKG for review Lab Data Personally Reviewed: 
 
Recent Labs  
  06/05/20 
0430 06/04/20 
1282 WBC 9.7 9.3 HGB 9.2* 8.1* HCT 27.8* 24.4*  
 143* Recent Labs  
  06/05/20 
0430 06/04/20 
1825 06/04/20 
1052  06/03/20 
0344 INR  --   --   --   --  1.4* PTP  --   --   --   --  14.0* APTT 79.8* 70.1* 70.0*   < > 62.6*  
 < > = values in this interval not displayed. Recent Labs  
  06/05/20 
0430 06/04/20 
1825 06/04/20 
0329 06/03/20 
0344 * 136 136 133* K 3.8 3.4* 3.6 3.3*  
CL 98 102 102 102 CO2 24 27 28 25 BUN 39* 40* 42* 49* CREA 1.47* 1.53* 1.91* 1.89* * 107* 121* 178* CA 8.9 8.9 8.4* 8.0*  
MG 2.9*  --  2.6* 2.6* No results for input(s): CPK, CKNDX, TROIQ in the last 72 hours. No lab exists for component: CPKMB Lab Results Component Value Date/Time  Cholesterol, total 92 03/03/2009 09:40 PM  
 HDL Cholesterol 44 03/03/2009 09:40 PM  
 LDL, calculated 38.4 03/03/2009 09:40 PM  
 Triglyceride <15 05/22/2020 05:13 AM  
 CHOL/HDL Ratio 2.1 03/03/2009 09:40 PM  
 
 
Recent Labs  
  06/05/20 
0430 06/04/20 
1825 06/04/20 
0329 06/03/20 
0344 AP  --   --   --  271* TP  --   --   --  6.8 ALB 3.1* 3.0* 3.0* 2.7*  
GLOB  --   --   --  4.1* No results for input(s): PH, PCO2, PO2 in the last 72 hours. Medications Personally Reviewed: 
 
Current Facility-Administered Medications Medication Dose Route Frequency  insulin glargine (LANTUS) injection 40 Units  40 Units SubCUTAneous DAILY  lansoprazole (PREVACID) 3 mg/mL oral suspension 30 mg  30 mg Per NG tube ACB  midodrine (PROAMATINE) tablet 10 mg  10 mg Oral TID WITH MEALS  
 hydrocortisone Sod Succ (PF) (SOLU-CORTEF) injection 25 mg  25 mg IntraVENous Q8H  
 epoetin socrates-epbx (RETACRIT) injection 10,000 Units  10,000 Units SubCUTAneous Q MON, WED & FRI  midodrine (PROAMATINE) tablet 5 mg  5 mg Per NG tube TID PRN  
 bicarbonate dialysis (PRISMASOL) BG K 4/Ca 2.5 5000 ml solution   Extracorporeal DIALYSIS CONTINUOUS  
 balsam peru-castor oiL (VENELEX) ointment   Topical TID  polyethylene glycol (MIRALAX) packet 17 g  17 g Per NG tube DAILY PRN  
 zinc oxide-cod liver oil (DESITIN) 40 % paste   Topical PRN  chlorhexidine (ORAL CARE KIT) 0.12 % mouthwash 15 mL  15 mL Oral Q12H  potassium chloride 20 mEq in 50 ml IVPB  20 mEq IntraVENous PRN  
 calcium chloride injection 2 g  2 g IntraVENous PRN  
 acetaminophen (TYLENOL) solution 650 mg  650 mg Oral Q6H PRN  
 PHARMACY INFORMATION NOTE  1 Each Other BID  heparin 25,000 units in D5W 250 ml infusion  8-25 Units/kg/hr IntraVENous TITRATE  
 NOREPINephrine (LEVOPHED) 32 mg in 5% dextrose 250 mL infusion  2-200 mcg/min IntraVENous TITRATE  sodium chloride (NS) flush 5-40 mL  5-40 mL IntraVENous Q8H  
 sodium chloride (NS) flush 5-40 mL  5-40 mL IntraVENous PRN  
 ondansetron (ZOFRAN) injection 4 mg  4 mg IntraVENous Q8H PRN  
  alcohol 62% (NOZIN) nasal  1 Ampule  1 Ampule Topical Q12H  
 insulin lispro (HUMALOG) injection   SubCUTAneous Q6H  
 glucose chewable tablet 16 g  4 Tab Oral PRN  
 dextrose (D50W) injection syrg 12.5-25 g  12.5-25 g IntraVENous PRN  
 glucagon (GLUCAGEN) injection 1 mg  1 mg IntraMUSCular PRN  
 acetaminophen (TYLENOL) tablet 650 mg  650 mg Oral Q6H PRN Or  
 acetaminophen (TYLENOL) suppository 650 mg  650 mg Rectal Q6H PRN  
 influenza vaccine 2019-20 (6 mos+)(PF) (FLUARIX/FLULAVAL/FLUZONE QUAD) injection 0.5 mL  0.5 mL IntraMUSCular PRIOR TO DISCHARGE  sodium chloride (NS) flush 5-10 mL  5-10 mL IntraVENous PRN Richard Naik III, DO

## 2020-06-05 NOTE — PROGRESS NOTES
NAME: Garett Gonsalez :  1955 MRN:  [id number] Assessment :    Plan: 
--LAQUITA Shock-resolved Strep bacteremia/Sepsis-resolved DM type 2 Systolic HF (EF 20%)-XBBMTY 35% AHRF- resolved. extubated  High Bili/shock liver RUQ US with nl CBD Anemia COVID-19 + Initiated CVVHD ; Off CVVH on ; didnt tolerate iHD; restarted CVVHD on  Using Lewis, 200 QB, Factor of 100 ml/hr 4k dialysate Once he is off pressors, we can gradually transition him to [facility] over next 2 days if feasible On heparin gtt Lewis line - will need new line soon Retacrit Wean off pressors as tolerated Subjective: Chief Complaint:  Remains extubated. Alert awake Seen thru glass door to reduce community transmission and PPE usage On CVVH Review of Systems: uto Symptom Y/N Comments  Symptom Y/N Comments Fever/Chills    Chest Pain Poor Appetite    Edema Cough    Abdominal Pain Sputum    Joint Pain SOB/GROVES    Pruritis/Rash Nausea/vomit    Tolerating PT/OT Diarrhea    Tolerating Diet Constipation    Other Could not obtain due to: See above Objective: VITALS:  
Last 24hrs VS reviewed since prior progress note. Most recent are: 
Visit Vitals /52 Pulse 87 Temp 97.5 °F (36.4 °C) Resp 13 Ht 6' 3\" (1.905 m) Wt 107.4 kg (236 lb 12.4 oz) SpO2 95% BMI 29.59 kg/m² Intake/Output Summary (Last 24 hours) at 2020 1113 Last data filed at 2020 0900 Gross per 24 hour Intake 1777.42 ml Output 2922 ml Net -1144.58 ml Telemetry Reviewed: PHYSICAL EXAM: 
General: NAD Alert awake No resp distress No leg edema Lab Data Reviewed: (see below) Medications Reviewed: (see below) PMH/SH reviewed - no change compared to H&P 
________________________________________________________________________ Care Plan discussed with: 
Patient Family RN Care Manager Consultant:     
 
  Comments >50% of visit spent in counseling and coordination of care    
 
________________________________________________________________________ Ting Ruff MD  
 
Procedures: see electronic medical records for all procedures/Xrays and details which 
were not copied into this note but were reviewed prior to creation of Plan. LABS: 
Recent Labs  
  06/05/20 0430 06/04/20 0329 WBC 9.7 9.3 HGB 9.2* 8.1* HCT 27.8* 24.4*  
 143* Recent Labs  
  06/05/20 0430 06/04/20 1825 06/04/20 0329 06/03/20 0344 * 136 136 133* K 3.8 3.4* 3.6 3.3*  
CL 98 102 102 102 CO2 24 27 28 25 BUN 39* 40* 42* 49* CREA 1.47* 1.53* 1.91* 1.89* * 107* 121* 178* CA 8.9 8.9 8.4* 8.0*  
MG 2.9*  --  2.6* 2.6* PHOS 3.1 2.6 2.1* 2.6 Recent Labs  
  06/05/20 0430 06/04/20 1825 06/04/20 0329 06/03/20 0344 AP  --   --   --  271* TP  --   --   --  6.8 ALB 3.1* 3.0* 3.0* 2.7*  
GLOB  --   --   --  4.1* Recent Labs  
  06/05/20 0430 06/04/20 1825 06/04/20 
1052 06/03/20 
0344 INR  --   --   --   --  1.4* PTP  --   --   --   --  14.0* APTT 79.8* 70.1* 70.0*   < > 62.6*  
 < > = values in this interval not displayed. MEDICATIONS: 
Current Facility-Administered Medications Medication Dose Route Frequency  midodrine (PROAMATINE) tablet 10 mg  10 mg Per NG tube TID PRN  
 midodrine (PROAMATINE) tablet 10 mg  10 mg Oral Q6H  
 predniSONE (DELTASONE) tablet 10 mg  10 mg Oral DAILY WITH BREAKFAST  insulin glargine (LANTUS) injection 46 Units  46 Units SubCUTAneous DAILY  melatonin tablet 6 mg  6 mg Oral QHS  lansoprazole (PREVACID) 3 mg/mL oral suspension 30 mg  30 mg Per NG tube ACB  epoetin socrates-epbx (RETACRIT) injection 10,000 Units  10,000 Units SubCUTAneous Q MON, WED & FRI  
  bicarbonate dialysis (PRISMASOL) BG K 4/Ca 2.5 5000 ml solution   Extracorporeal DIALYSIS CONTINUOUS  
 balsam peru-castor oiL (VENELEX) ointment   Topical TID  polyethylene glycol (MIRALAX) packet 17 g  17 g Per NG tube DAILY PRN  
 zinc oxide-cod liver oil (DESITIN) 40 % paste   Topical PRN  chlorhexidine (ORAL CARE KIT) 0.12 % mouthwash 15 mL  15 mL Oral Q12H  potassium chloride 20 mEq in 50 ml IVPB  20 mEq IntraVENous PRN  
 calcium chloride injection 2 g  2 g IntraVENous PRN  
 acetaminophen (TYLENOL) solution 650 mg  650 mg Oral Q6H PRN  
 PHARMACY INFORMATION NOTE  1 Each Other BID  heparin 25,000 units in D5W 250 ml infusion  8-25 Units/kg/hr IntraVENous TITRATE  
 NOREPINephrine (LEVOPHED) 32 mg in 5% dextrose 250 mL infusion  2-200 mcg/min IntraVENous TITRATE  sodium chloride (NS) flush 5-40 mL  5-40 mL IntraVENous Q8H  
 sodium chloride (NS) flush 5-40 mL  5-40 mL IntraVENous PRN  
 ondansetron (ZOFRAN) injection 4 mg  4 mg IntraVENous Q8H PRN  
 alcohol 62% (NOZIN) nasal  1 Ampule  1 Ampule Topical Q12H  
 insulin lispro (HUMALOG) injection   SubCUTAneous Q6H  
 glucose chewable tablet 16 g  4 Tab Oral PRN  
 dextrose (D50W) injection syrg 12.5-25 g  12.5-25 g IntraVENous PRN  
 glucagon (GLUCAGEN) injection 1 mg  1 mg IntraMUSCular PRN  
 acetaminophen (TYLENOL) tablet 650 mg  650 mg Oral Q6H PRN Or  
 acetaminophen (TYLENOL) suppository 650 mg  650 mg Rectal Q6H PRN  
 influenza vaccine 2019-20 (6 mos+)(PF) (FLUARIX/FLULAVAL/FLUZONE QUAD) injection 0.5 mL  0.5 mL IntraMUSCular PRIOR TO DISCHARGE  sodium chloride (NS) flush 5-10 mL  5-10 mL IntraVENous PRN

## 2020-06-06 NOTE — PROGRESS NOTES
[facility]ULMONARY [facility]OC[facility] INTENSIVIST Consult Service Note Pulmonary, Critical Care, and Sleep Medicine Name: Alison Rosario MRN: [id number] : 1955 Hospital: [place] [address] Date: 2020  Admission date: 5/15/2020 Hospital Day: 21 Subjective/Interval History:  
 
20: off sedation but only grimaces to noxious stimuli. Remains critically ill on mechanical ventilation/pressors 20: remains critically ill on mechanical ventilation/pressors. Off sedation for about 48 hours. Minimal response thus far.  
20: no events. Remains intubated and on pressors. Followed a few commands intermittently overnight per nursing. No data available as EMR has been down until a few minutes ago. 20: more alert and following commands today. Still very weak. Actually doing well on SBT. 
- Intubated, off sedation. Bloody secretions through ETT and NGT per RN. On heparin drip for cardiac issues. Increased pressor requirement today. Increased secretions- SBT held. Weak : Intubated, off sedation, follows commands per RN. Still on high dose NE at 17 mcg/min. Increased bilirubin. Last LVEF in  was 20%-- still covid positive, no recent reassessment. On heparin drip-- bleeding from mucosal sites a little better today. Very weak  on CRRT, On Vent. Iv levo 28. IV  3; IV heparin. Bleeding from gums. LVEF 35% on IV inotrope, pressors. Alert. Follows commands. Able to move hands and forearms easily but feet and ankles very weak. D/w dr. Keyshawn Giles.  unable to wean IV levo now at 30 mcg. No fever. On same drips. CRRT Factor reduced. Still critically ill. Passing SBT. Inflammatory markers still high Called Mrs. Myers to discuss pt management, Reviewed potentiall need for trach but timing and safety must be thought through.  If he continues to rally and get stronger, also possible that he could avoid trach but cannot guarantee. Still needing lots of pressor support. Still needs CRRT. Allowed her to ask questions. Fortunately she and family have not been infected by COVID. 6/3 blood tinged sputum and ETT secretions. No fever. On IV heparin. Doing well with prolonged CPAP trial . IV levophed 5 mcg. Extubated  no more hemoptysis. Low dose IV levophed. On midodrine. Voice stronger  on midodrine. On room air. On TF 
 
 remains on NC 4L, NGT feeds IMPRESSION:  
1. Acute hypoxic respiratory failure, intubated -; at risk for diaphragm atrophy; extubated 6/3 2. Profound septic shock on high level of pressors. 3. COVID 19 pneumonia- good gas exchange; SARS Cov 2 - 20 not detected 4. Hemoptysis- better 5. Alpha strep septicemia- POA 6. Chronic combined systolic/diastolic heart failure; echo /31 LVEF 35% on IV , Iv levo 7. Hypertensive heart disease with CHF and CKD, previous severe LV function 8. LAQUITA/CKD, CVVH  
9. Chronic anticoagulation at home- eliquis 10. Diabetes mellitus type 2 uncontrolled and not helped by steroids 11. Hyperlipidemia 12. 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 Chronic atrial fibrillation 14. Status post SJM BIV-ICD implant in 2016 
15. Sleep apnea, undiagnosed/untreated 16. Obesity with recent weight gain 17. Possible critical illness polyneuropathy/myopathy RECOMMENDATIONS/PLAN:  
1. Speech following 2. IV heparin 3. Hope to mobilize when BP better 4. Pt, OT 
5. Midodrine scheduled 6. May cut back on factor- - no more peripheral edema 7. Prn NIV post extubation 8. May stop albumin 9. wean as able 10. Watch WBC-lines look good 11. Change to Po prednisone for 7 days 12. RRT per nephrology 13. Cardiology following 14. Glycemic monitoring and control 15. Replete electrolytes PRN 16. DVT, SUP prophylaxis- changed to protonix in view in GI bleed on heparin 17.  Keep in ICU   
 
 
 
 Subjective/Initial History:  
I have reviewed the flowsheet and previous days notes. Seen earlier today on rounds. I was asked by Aide Kahn MD to see Lang Degroot a 71 y.o.  male  in consultation for a chief complaint of shock. Excerpts from admission 5/15/2020 and consult notes reviewed as follows:  
 
\"Mr. Neisha Camara is a 57 y.o. morbidly obese male with Chronic nonischemic dilated cardiomyopathy EF 56%, Chronic systolic congestive heart failure class, H/o atrial fibrillation, On chronic warfarin, Iatrogenic left bundle branch block with predominant RV pacing, 64-70%, Hypertension, Hyperlipidemia and diabetes presents to ED [facility] ED C/O Worsening exertional shortness of breath and around 20 pounds weight gain in last 3 weeks. \" 
 
Pt now in CCU. Poor historian. On room air. No fever. No cough. No diarrhea. Not very active. Saw PCP three weeks ago. Some edema. Chart notes [facility] admission in 2017 with decompensation. Patient PCP: Nathan Mora MD 
PMH:  has a past medical history of A-fib ([address] Arrhythmia, Diabetes ([facility] [address] Endocrine disease, Hypertension, and Irregular heart beat. He also has no past medical history of Difficult intubation, Malignant hyperthermia due to anesthesia, Nausea & vomiting, or Pseudocholinesterase deficiency. PSH:   has a past surgical history that includes pr cardiac surg procedure unlist; insert emergency endotrach airway (5/18/2020); and ir insert non tunl cvc over 5 yrs (5/18/2020). FHX: family history includes Diabetes in his father; Heart Disease in his father and mother. SHX:  reports that he quit smoking about 26 years ago. He has never used smokeless tobacco. He reports that he does not drink alcohol or use drugs. ROS:A comprehensive review of systems was negative except for that written in the HPI. No Known Allergies MEDS:  
Current Facility-Administered Medications Medication  midodrine (PROAMATINE) tablet 10 mg  
  midodrine (PROAMATINE) tablet 10 mg  
 predniSONE (DELTASONE) tablet 10 mg  
 insulin glargine (LANTUS) injection 46 Units  melatonin tablet 6 mg  
 lansoprazole (PREVACID) 3 mg/mL oral suspension 30 mg  
 epoetin socrates-epbx (RETACRIT) injection 10,000 Units  bicarbonate dialysis (PRISMASOL) BG K 4/Ca 2.5 5000 ml solution  balsam peru-castor oiL (VENELEX) ointment  polyethylene glycol (MIRALAX) packet 17 g  
 zinc oxide-cod liver oil (DESITIN) 40 % paste  chlorhexidine (ORAL CARE KIT) 0.12 % mouthwash 15 mL  potassium chloride 20 mEq in 50 ml IVPB  calcium chloride injection 2 g  
 acetaminophen (TYLENOL) solution 650 mg  PHARMACY INFORMATION NOTE  heparin 25,000 units in D5W 250 ml infusion  NOREPINephrine (LEVOPHED) 32 mg in 5% dextrose 250 mL infusion  sodium chloride (NS) flush 5-40 mL  sodium chloride (NS) flush 5-40 mL  ondansetron (ZOFRAN) injection 4 mg  alcohol 62% (NOZIN) nasal  1 Ampule  insulin lispro (HUMALOG) injection  glucose chewable tablet 16 g  
 dextrose (D50W) injection syrg 12.5-25 g  
 glucagon (GLUCAGEN) injection 1 mg  acetaminophen (TYLENOL) tablet 650 mg Or  acetaminophen (TYLENOL) suppository 650 mg  
 influenza vaccine - (6 mos+)(PF) (FLUARIX/FLULAVAL/FLUZONE QUAD) injection 0.5 mL  sodium chloride (NS) flush 5-10 mL Objective:  
 
Vital Signs: Telemetry:    AFIB Intake/Output:  
Visit Vitals /51 Pulse 97 Temp 97.6 °F (36.4 °C) Resp 17 Ht 6' 3\" (1.905 m) Wt 107.4 kg (236 lb 12.4 oz) SpO2 91% BMI 29.59 kg/m² Temp (24hrs), Av.7 °F (36.5 °C), Min:97 °F (36.1 °C), Max:98.2 °F (36.8 °C) O2 Device: Nasal cannula, Humidifier O2 Flow Rate (L/min): 4 l/min Body mass index is 29.59 kg/m². Wt Readings from Last 4 Encounters:  
20 107.4 kg (236 lb 12.4 oz) 19 123.8 kg (273 lb) 17 129.3 kg (285 lb)  
17 107.5 kg (237 lb) Intake/Output Summary (Last 24 hours) at 6/6/2020 4378 Last data filed at 6/6/2020 0700 Gross per 24 hour Intake 1631.65 ml Output 2717 ml Net -1085.35 ml Last shift:      No intake/output data recorded. Last 3 shifts: 06/04 1901 - 06/06 0700 In: 2969 [I.V.:1058] Out: 8834 [Drains:425] Hemodynamics:   
CO:   
CI:   
CVP: CVP (mmHg): 8 mmHg (05/27/20 1030) SVR:   PAP Systolic:   
PAP Diastolic:   
PVR:   
RN85:    
 
Ventilator Settings:     
Mode Rate TV Press PEEP FiO2 PIP Min. Vent Spontaneous    500 ml 6 cm H2O  6 cm H20 30 %  30 cm H2O  9 l/min Physical Exam: 
 
Awake NGT in place 125/86 
100/min 94% RR18/min Data:  
            
Labs: 
Recent Labs  
  06/05/20 
0430 06/04/20 
0329 WBC 9.7 9.3 HGB 9.2* 8.1* HCT 27.8* 24.4*  
 143* Recent Labs  
  06/06/20 
0331 06/05/20 
1651 06/05/20 
0430 * 134* 132* K 3.9 3.7 3.8  100 98 CO2 26 25 24 * 160* 221* BUN 37* 40* 39* CREA 1.55* 1.46* 1.47* CA 8.2* 9.0 8.9 MG 2.5* 2.8* 2.9*  
PHOS 2.7 2.9 3.1 ALB 2.8* 3.0* 3.1* No results for input(s): PHI, PCO2I, PO2I, HCO3I, FIO2I in the last 72 hours. Imaging: 
I have personally reviewed the patients radiographs and have reviewed the reports: 
None today At risk for deterioration Boy Luciano MD

## 2020-06-06 NOTE — PROGRESS NOTES
Hospitalist Progress Note NAME: Daron Fairchild :  1955 MRN:  [id number] Assessment / Plan: 
COVID-19 pneumonia with multiorgan failure Acute hypoxic respiratory failure due to above status post mechanical ventilation Septic shock profound Hyponatremia Strep Bacteremia Coagulopathy S/p Extubation on 6/3. On nasal cannula    Pulmonary is following. Continue Levophed  wean as tolerated. Continue CRRT per renal 
continue prednisone taper s stress dose of steroidsince he was on  
Status post completion of IV antibiotics for pneumonia and bacteremia Appreciate nephrology help with HD 
S/p iv albumin ST consult,  PT/OT. Cont added midodrine. Acute transaminitis likely due to shock liver Hyperbilirubinemia due to above US RUQ: Minimal gallbladder sludge. Slightly prominent common bile duct of uncertain 
significance. Recheck CMP i 
 
Acute on chronic systolic HF EF 65%, now 63% (but on Dobutamine) Nonischemic dilated cardiomyopathy s/p remote ICD Chronic atrial fibrillation Sleep apnea Hypertensive heart disease w/ CKD and HF Nonsustained ventricular tachycardia Cardiology to see again for ventricular tachycardia. Potassium was 3.9 and got KCl this morning. Magnesium was normal. 
Requiring Vasopressor support Cardiology help appreciated: cont supportive care Eliquis on hold for now, on heparin ggt but holding for 6 hours DM2 BS stable Cont insulin w lantus, SSI Hemoptysis 
-improved Hypophosphatemia 
-replaced 
  
Code Status: Full Surrogate Decision Maker: wife DVT Prophylaxis: heparin drip Disposition: remain critically ill Subjective:  
  
Now on 4 L nasal cannula Having. episodes of ventricular tachycardia Chief Complaint / Reason for Physician Visit: following sepsis / respiratory failure/ covid Review of Systems: 
Symptom Y/N Comments  Symptom Y/N Comments Fever/Chills    Chest Pain Poor Appetite    Edema Cough    Abdominal Pain Sputum    Joint Pain SOB/GROVES    Pruritis/Rash Nausea/vomit    Tolerating PT/OT Diarrhea    Tolerating Diet Constipation    Other Could NOT obtain due to:   
 
Objective: VITALS:  
Last 24hrs VS reviewed since prior progress note. Most recent are: 
Patient Vitals for the past 24 hrs: 
 Temp Pulse Resp BP SpO2  
06/06/20 1600 98.1 °F (36.7 °C) 97 16 108/45 94 % 06/06/20 1545  (!) 102 19 152/47 96 % 06/06/20 1530  (!) 128 22 (!) 154/108 97 % 06/06/20 1515  (!) 119 21  97 % 06/06/20 1500  99 16 (!) 67/28 97 % 06/06/20 1445  (!) 102 14 (!) 143/33 97 % 06/06/20 1430  (!) 106 18 124/82 97 % 06/06/20 1415  (!) 111 26  97 % 06/06/20 1400  (!) 106 16  97 % 06/06/20 1345  (!) 112 21 (!) 87/55 97 % 06/06/20 1330  (!) 110 22 (!) 227/190 99 % 06/06/20 1315  97 16  94 % 06/06/20 1300  (!) 109 17 116/67 96 % 06/06/20 1245  (!) 108 17 103/84 96 % 06/06/20 1230  (!) 113 17 117/54 97 % 06/06/20 1215  (!) 115 24 110/56 95 % 06/06/20 1200 97.4 °F (36.3 °C) (!) 115 17 115/58 97 % 06/06/20 1145  (!) 115  108/61   
06/06/20 1135  (!) 102  109/82   
06/06/20 1130 97.9 °F (36.6 °C) (!) 107 18 109/82 96 % 06/06/20 1115  (!) 104  105/57   
06/06/20 1100  (!) 104 16 (!) 69/42 97 % 06/06/20 1045  (!) 104  (!) 110/33   
06/06/20 1030  (!) 106  100/54   
06/06/20 1015  (!) 112  110/53   
06/06/20 1000  (!) 108 17 112/50   
06/06/20 0945  (!) 105  105/49   
06/06/20 0930  (!) 106 17 97/50   
06/06/20 0915  (!) 109 18 93/73   
06/06/20 0900  97 16 102/53   
06/06/20 0845  97 14 109/45 94 % 06/06/20 0830 97.6 °F (36.4 °C) 97 17 117/51 91 % 06/06/20 0815  92 18 108/59   
06/06/20 0800 97.6 °F (36.4 °C) (!) 101 17 (!) 90/18 93 % 06/06/20 0745  (!) 109 24 102/46 95 % 06/06/20 0730  97 15 95/51 93 % 06/06/20 0715  91 20 93/53 97 % 06/06/20 0700  98 21 102/47 92 % 06/06/20 0659  (!) 101 20  94 % 06/06/20 0645  100 22 (!) 134/118 93 % 06/06/20 0640  95 13 111/50 94 % 06/06/20 0630  97 18 (!) 120/22 95 % 06/06/20 0620  97 16 101/54 96 % 06/06/20 0615  96 22  95 % 06/06/20 0600  98 10 92/57 97 % 06/06/20 0545  (!) 108 24 (!) 126/91 97 % 06/06/20 0535  (!) 104 23 153/77 96 % 06/06/20 0530  98 16 98/60 93 % 06/06/20 0520  (!) 105 23 97/63 94 % 06/06/20 0515  (!) 104 14 (!) 89/53   
06/06/20 0510  (!) 111 21 91/57 94 % 06/06/20 0500  (!) 114 21 104/46 93 % 06/06/20 0445  96 16 99/53 94 % 06/06/20 0436  94 17  94 % 06/06/20 0435    (!) 83/43   
06/06/20 0430  99 15 (!) 81/56   
06/06/20 0415  (!) 106 23  93 % 06/06/20 0400  94 19 (!) 134/112 94 % 06/06/20 0350  91 17 98/57 94 % 06/06/20 0345  92 15 (!) 82/64 95 % 06/06/20 0340  99 18 (!) 85/58 94 % 06/06/20 0331  (!) 105 18  96 % 06/06/20 0330 97 °F (36.1 °C) 100 21 (!) 81/54   
06/06/20 0320  96 17 (!) 81/56 95 % 06/06/20 0315  94 18 (!) 80/47 93 % 06/06/20 0305  99 18 116/54 93 % 06/06/20 0300  (!) 103 20 (!) 74/57 95 % 06/06/20 0255  94 18 91/56 96 % 06/06/20 0245  96 17 (!) 77/49 95 % 06/06/20 0230  97 18 (!) 144/97 96 % 06/06/20 0215  99 18 (!) 181/35   
06/06/20 0200  93 18 (!) 223/188   
06/06/20 0145  98 21    
06/06/20 0130  88 16    
06/06/20 0115  90 16 114/45   
06/06/20 0100  88 17 123/44   
06/06/20 0045  89 16 (!) 79/43   
06/06/20 0030  90 20 (!) 226/172   
06/06/20 0015  91 17 95/49   
06/06/20 0001 97.4 °F (36.3 °C) 92 18 (!) 85/45 95 % 06/05/20 2345  88 18 97/66   
06/05/20 2330  97 21 (!) 89/50   
06/05/20 2315  (!) 102 23 98/40   
06/05/20 2300  (!) 102 21 (!) 81/42   
06/05/20 2245  (!) 103 22 90/43   
06/05/20 2230  94 19 93/47   
06/05/20 2215  88 19    
06/05/20 2200  (!) 101 19 147/40 (!) 87 % 06/05/20 2145  (!) 113 20 (!) 69/56 92 % 06/05/20 2130  (!) 103 18 98/42 (!) 82 % 06/05/20 2100 98.1 °F (36.7 °C) 99  100/67 90 % 06/05/20 2030  100 19 93/59 91 % 06/05/20 2000  86 19 102/59 90 % 06/05/20 1930  87 18 96/48 90 % 06/05/20 1915  97 20 101/52 90 % 06/05/20 1900  88 12 (!) 84/26 92 % 06/05/20 1800  88 18 101/57 95 % 06/05/20 1745  97 18 107/55   
06/05/20 1730  (!) 105 20 101/54   
06/05/20 1700  (!) 104 21 90/58 94 % Intake/Output Summary (Last 24 hours) at 6/6/2020 1625 Last data filed at 6/6/2020 1600 Gross per 24 hour Intake 2328.15 ml Output 3196 ml Net -867.85 ml PHYSICAL EXAM: 
General:           Drowsy but wakes up to commands EENT:              EOMI. Anicteric sclerae. MMM Resp:               CTA bilaterally, no wheezing or rales. mechanically ventilated CV:                  Regular  rhythm,  No edema GI:                   Soft, Non distended, Non tender.  +Bowel sounds Neurologic:     drowsy but wakes to commands Psych:            confused Skin:                No rashes. No jaundice Reviewed most current lab test results and cultures  YES Reviewed most current radiology test results   YES Review and summation of old records today    NO Reviewed patient's current orders and MAR    YES 
PMH/ reviewed - no change compared to H&P 
________________________________________________________________________ Care Plan discussed with: 
  Comments Patient Family RN x Care Manager Consultant  x Multidiciplinary team rounds were held today with , nursing, pharmacist and clinical coordinator. Patient's plan of care was discussed; medications were reviewed and discharge planning was addressed. ________________________________________________________________________ Total NON critical care TIME:  35 Minutes Total CRITICAL CARE TIME Spent:   Minutes non procedure based Comments >50% of visit spent in counseling and coordination of care x Chart review in complex ICU patient  
________________________________________________________________________ Aliyah Bennett MD  
 
Procedures: see electronic medical records for all procedures/Xrays and details which were not copied into this note but were reviewed prior to creation of Plan. LABS: 
I reviewed today's most current labs and imaging studies. Pertinent labs include: 
Recent Labs  
  06/06/20 
1045 06/05/20 
0430 06/04/20 
0329 WBC 8.1 9.7 9.3 HGB 9.6* 9.2* 8.1* HCT 29.2* 27.8* 24.4*  
* 180 143* Recent Labs  
  06/06/20 
0331 06/05/20 
1651 06/05/20 
0430 * 134* 132* K 3.9 3.7 3.8  100 98 CO2 26 25 24 * 160* 221* BUN 37* 40* 39* CREA 1.55* 1.46* 1.47* CA 8.2* 9.0 8.9 MG 2.5* 2.8* 2.9*  
PHOS 2.7 2.9 3.1 ALB 2.8* 3.0* 3.1* Signed: Aliyah Bennett MD

## 2020-06-06 NOTE — PROGRESS NOTES
Attempted to see pt for for PT services. Pt remains medically inappropriate for PT services and is drowsy. Will have PT follow up on Monday.

## 2020-06-06 NOTE — PROGRESS NOTES
1095 
Rounding report received from Kala Daniel, [address] [facility] (out going) to self Winthrop Cabot, RN on coming). Discussed related medical and environmental issues involved in caring for this patient. Events and treatments discussed. POC for today discussed. 2850 Dr. Antoine Katz on CCU rounds. Discussed events overnight and swallowing problems (?aspiration). Repeat speech evaluation ordered. Precedex gtt on at 0.6mcg for agitation, RASS 2+, now 1+.

## 2020-06-06 NOTE — PROGRESS NOTES
0000: BSSR received from Shayy Saez RN, for continued care. Full assessment as documented. 7850: NIBP trending down, levophed gtt titrated up per [facility] 
 
0431: Repeat PTT therapeutic. Maintaining heparin gtt at 11 units/kg/hr. 0700: Shift uneventful. Maintaining SpO2 per goal on supplemental O2 at 4lnc, encouraging coughing/deep breathing. Levophed gtt up to 55 mcg/min to maintain goal MAP >65. Continuing to tolerate TF with minimal residuals, (+)loose stool via flexiseal. CVVH progressing without incident. 0715: Bedside shift change report given to Idania Ward RN (oncoming nurse) by self Germán gutierrez). Report included the following information SBAR, Procedure Summary, Intake/Output, MAR, Recent Results and Cardiac Rhythm atrial fibrillation with PVCs.

## 2020-06-06 NOTE — PROGRESS NOTES
4529 
Rounding report received from [facility] (out going) to self Kizzy Flores RN on coming). Discussed related medical and environmental issues involved in caring for this patient. Events and treatments discussed. POC for today discussed. [address] [facility] Dr. Gary Salomon on unit for CCU rounds. Discussed events overnight. Covid-19 last tested on 6/4, results were negative. [address] Covid-19 test ordered for 2nd rule out. [address] Runs of V-tach noted on bedside monitor. Dr. Gary Salomon on unit. MD aware of event, ordered KCL 20meq IV (K+ 3.8). Recommends for cardiology to see again. [address] [facility] [address]. Nick Ballard in unit to see pt. D/W cardiology need for V-TACH runs. [address] [facility] Strikingly Zoom chat with family. Pt not very interactive 2/2 lethargy. [facility] Large amounts of phlegm suctioned with 12F tubing after pt coughed and had apparent mucous plug; SPO2 84-95%, O2 up to [facility]. Oral care rendered. Supplied pt with younker's suction, unable to use 2/2 weakness. Comtimues productive cough, swallowing after. Swatting at suction tubing when trying to clear oral secretions before swallowed. [facility] MD in to assess. Discuss events today. CVVHD cont'd with factor of 50. 
 
 
1545 FMS irrigated with 30 cc's water. Zinc cream applied to anal area. Turned to left side. Mouth care rendered. 1800 Labs drawn for renal, mag and phos. Turned to supine. No factor pulled this hour, BP decreasing.

## 2020-06-06 NOTE — DIALYSIS
[facility]       [phone] Orders Mode: CVVHD Factor: 50 ml/hr DFR: 2000 ml/hr Blood Flow Rate: 200 ml/min Metrics BP / HR: 81/56  99 Blood Flow Rate: 200 ml/min AP:                         -86  
RP: 178 TMP: 245 PD: 36  
FP: 43 DFR: 2000 ml/hr Comments / Plan:  
 assess from outside room due to covid +  At bedside to assess/round on pt, all pressures within normal range, no need to change at this time. Orders, consent, pt and code status confirmed. RIJ non-tunneled CVC infusing well. Bio-patch C/D/I. HF 1000 running well. Lines visible, secure, and connections fastened well. Education and pre/post report given to JORGE Jtet RN

## 2020-06-06 NOTE — PROGRESS NOTES
Attempted to see pt for for OT services. Pt remains medically inappropriate for OT services and is drowsy. Will have OT follow up on Monday.

## 2020-06-06 NOTE — PROGRESS NOTES
0707 
Rounding report received from [facility], [address] [facility] (out going) to self Nitza Rodriguez RN on coming). Discussed related medical and environmental issues involved in caring for this patient. Events and treatments discussed. POC for today discussed. [address] [facility] Dr. Sherri Centeno on unit for CCU rounds. Discussed events overnight. Covid-19 last tested on 6/4, results were negative. [address] Covid-19 test ordered for 2nd rule out. [address] Runs of V-tach noted on bedside monitor. Dr. Sherri Centeno on unit. MD aware of event, ordered KCL 20meq IV (K+ 3.8). Recommends for cardiology to see again. [address] [facility] Dr. Johanne Castle in unit to see pt. D/W cardiology need for V-TACH runs.

## 2020-06-06 NOTE — PROGRESS NOTES
NAME: Carlene Tamayo :  1955 MRN:  [id number] Assessment :    Plan: 
--LAQUITA Shock-resolved Strep bacteremia/Sepsis-resolved DM type 2 Systolic HF (EF 23%)-RYRSEY 35% AHRF- resolved. extubated - High Bili/shock liver RUQ US with nl CBD Anemia COVID-19 + Initiated CVVHD ; Off CVVH on ; didnt tolerate iHD; restarted CVVHD on  Using Lewis, 200 QB, Factor down to 50 ml/hr (no edema, BP lowish) 4k dialysate Once he is off pressors, we can gradually transition him to [facility] over next couple days if feasible On heparin gtt Lewis line - will need new line soon on Monday/tue Retacrit On Levo Subjective: Chief Complaint:  Remains extubated. Alert awake Seen thru glass door to reduce community transmission and PPE usage Remains on CVVH Factor down to 50 Review of Systems: uto Symptom Y/N Comments  Symptom Y/N Comments Fever/Chills    Chest Pain Poor Appetite    Edema Cough    Abdominal Pain Sputum    Joint Pain SOB/GROVES    Pruritis/Rash Nausea/vomit    Tolerating PT/OT Diarrhea    Tolerating Diet Constipation    Other Could not obtain due to: See above Objective: VITALS:  
Last 24hrs VS reviewed since prior progress note. Most recent are: 
Visit Vitals BP (!) 143/33 Pulse (!) 102 Temp 97.4 °F (36.3 °C) Resp 14 Ht 6' 3\" (1.905 m) Wt 107.4 kg (236 lb 12.4 oz) SpO2 97% BMI 29.59 kg/m² Intake/Output Summary (Last 24 hours) at 2020 1548 Last data filed at 2020 1400 Gross per 24 hour Intake 2180.55 ml Output 3169 ml Net -988. 45 ml Telemetry Reviewed: PHYSICAL EXAM: 
General: NAD Alert awake +NGT No resp distress No leg edema Lab Data Reviewed: (see below) Medications Reviewed: (see below) PMH/SH reviewed - no change compared to H&P 
 ________________________________________________________________________ Care Plan discussed with: 
Patient Family RN y   
Care Manager Consultant:     
 
  Comments  
y>50% of visit spent in counseling and coordination of care    
 
________________________________________________________________________ Ting Ruff MD  
 
Procedures: see electronic medical records for all procedures/Xrays and details which 
were not copied into this note but were reviewed prior to creation of Plan. LABS: 
Recent Labs  
  06/06/20 
1045 06/05/20 
0430 WBC 8.1 9.7 HGB 9.6* 9.2* HCT 29.2* 27.8*  
* 180 Recent Labs  
  06/06/20 
0331 06/05/20 
1651 06/05/20 
0430 * 134* 132* K 3.9 3.7 3.8  100 98 CO2 26 25 24 BUN 37* 40* 39* CREA 1.55* 1.46* 1.47* * 160* 221* CA 8.2* 9.0 8.9 MG 2.5* 2.8* 2.9*  
PHOS 2.7 2.9 3.1 Recent Labs  
  06/06/20 
0331 06/05/20 
1651 06/05/20 
0430 ALB 2.8* 3.0* 3.1* Recent Labs  
  06/06/20 
1044 06/06/20 
0331 06/05/20 
1834 APTT 62.5* 66.8* 56.3*  
  
 
MEDICATIONS: 
Current Facility-Administered Medications Medication Dose Route Frequency  midodrine (PROAMATINE) tablet 10 mg  10 mg Per NG tube TID PRN  
 midodrine (PROAMATINE) tablet 10 mg  10 mg Oral Q6H  
 predniSONE (DELTASONE) tablet 10 mg  10 mg Oral DAILY WITH BREAKFAST  insulin glargine (LANTUS) injection 46 Units  46 Units SubCUTAneous DAILY  melatonin tablet 6 mg  6 mg Oral QHS  lansoprazole (PREVACID) 3 mg/mL oral suspension 30 mg  30 mg Per NG tube ACB  epoetin socrates-epbx (RETACRIT) injection 10,000 Units  10,000 Units SubCUTAneous Q MON, WED & FRI  
 bicarbonate dialysis (PRISMASOL) BG K 4/Ca 2.5 5000 ml solution   Extracorporeal DIALYSIS CONTINUOUS  
 balsam peru-castor oiL (VENELEX) ointment   Topical TID  polyethylene glycol (MIRALAX) packet 17 g  17 g Per NG tube DAILY PRN  
  zinc oxide-cod liver oil (DESITIN) 40 % paste   Topical PRN  chlorhexidine (ORAL CARE KIT) 0.12 % mouthwash 15 mL  15 mL Oral Q12H  potassium chloride 20 mEq in 50 ml IVPB  20 mEq IntraVENous PRN  
 calcium chloride injection 2 g  2 g IntraVENous PRN  
 acetaminophen (TYLENOL) solution 650 mg  650 mg Oral Q6H PRN  
 PHARMACY INFORMATION NOTE  1 Each Other BID  heparin 25,000 units in D5W 250 ml infusion  8-25 Units/kg/hr IntraVENous TITRATE  
 NOREPINephrine (LEVOPHED) 32 mg in 5% dextrose 250 mL infusion  2-200 mcg/min IntraVENous TITRATE  sodium chloride (NS) flush 5-40 mL  5-40 mL IntraVENous Q8H  
 sodium chloride (NS) flush 5-40 mL  5-40 mL IntraVENous PRN  
 ondansetron (ZOFRAN) injection 4 mg  4 mg IntraVENous Q8H PRN  
 alcohol 62% (NOZIN) nasal  1 Ampule  1 Ampule Topical Q12H  
 insulin lispro (HUMALOG) injection   SubCUTAneous Q6H  
 glucose chewable tablet 16 g  4 Tab Oral PRN  
 dextrose (D50W) injection syrg 12.5-25 g  12.5-25 g IntraVENous PRN  
 glucagon (GLUCAGEN) injection 1 mg  1 mg IntraMUSCular PRN  
 acetaminophen (TYLENOL) tablet 650 mg  650 mg Oral Q6H PRN Or  
 acetaminophen (TYLENOL) suppository 650 mg  650 mg Rectal Q6H PRN  
 influenza vaccine 2019-20 (6 mos+)(PF) (FLUARIX/FLULAVAL/FLUZONE QUAD) injection 0.5 mL  0.5 mL IntraMUSCular PRIOR TO DISCHARGE  sodium chloride (NS) flush 5-10 mL  5-10 mL IntraVENous PRN

## 2020-06-07 NOTE — PROGRESS NOTES
[facility] INTENSIVIST Consult Service Note Pulmonary, Critical Care, and Sleep Medicine Name: Bharat Mcintosh MRN: [id number] : 1955 Hospital: [place] [address] Date: 2020  Admission date: 5/15/2020 Hospital Day: 25 Subjective/Interval History:  
 
20: off sedation but only grimaces to noxious stimuli. Remains critically ill on mechanical ventilation/pressors 20: remains critically ill on mechanical ventilation/pressors. Off sedation for about 48 hours. Minimal response thus far.  
20: no events. Remains intubated and on pressors. Followed a few commands intermittently overnight per nursing. No data available as EMR has been down until a few minutes ago. 20: more alert and following commands today. Still very weak. Actually doing well on SBT. 
- Intubated, off sedation. Bloody secretions through ETT and NGT per RN. On heparin drip for cardiac issues. Increased pressor requirement today. Increased secretions- SBT held. Weak : Intubated, off sedation, follows commands per RN. Still on high dose NE at 17 mcg/min. Increased bilirubin. Last LVEF in  was 20%-- still covid positive, no recent reassessment. On heparin drip-- bleeding from mucosal sites a little better today. Very weak  on CRRT, On Vent. Iv levo 28. IV  3; IV heparin. Bleeding from gums. LVEF 35% on IV inotrope, pressors. Alert. Follows commands. Able to move hands and forearms easily but feet and ankles very weak. D/w dr. Irving Wagner.  unable to wean IV levo now at 30 mcg. No fever. On same drips. CRRT Factor reduced. Still critically ill. Passing SBT. Inflammatory markers still high Called Mrs. Myers to discuss pt management, Reviewed potentiall need for trach but timing and safety must be thought through.  If he continues to rally and get stronger, also possible that he could avoid trach but cannot guarantee. Still needing lots of pressor support. Still needs CRRT. Allowed her to ask questions. Fortunately she and family have not been infected by COVID. 6/3 blood tinged sputum and ETT secretions. No fever. On IV heparin. Doing well with prolonged CPAP trial . IV levophed 5 mcg. Extubated  no more hemoptysis. Low dose IV levophed. On midodrine. Voice stronger  on midodrine. On room air. On TF 
 
 remains on NC 4L, NGT feeds  remains on 4L O2, pressor dependent. More alert and interactive per nursing. Denies pain or discomfort No further episodes of Vtach after yesterday IMPRESSION:  
1. Acute hypoxic respiratory failure, intubated -18; at risk for diaphragm atrophy; extubated 6/3 2. Profound septic shock on high level of pressors. 3. COVID 19 pneumonia- good gas exchange; SARS Cov 2 - 20 not detected 4. Hemoptysis- better 5. Alpha strep septicemia- POA 6. Chronic combined systolic/diastolic heart failure; echo /31 LVEF 35% on IV , Iv levo 7. Hypertensive heart disease with CHF and CKD, previous severe LV function 8. LAQUITA/CKD, CVVH  
9. Chronic anticoagulation at home- eliquis 10. Diabetes mellitus type 2 uncontrolled and not helped by steroids 11. Hyperlipidemia 12. 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 Chronic atrial fibrillation 14. Status post SJM BIV-ICD implant in 2016 
15. Sleep apnea, undiagnosed/untreated 16. Obesity with recent weight gain 17. Possible critical illness polyneuropathy/myopathy RECOMMENDATIONS/PLAN:  
1. Speech following 2. IV heparin 3. Hope to mobilize when BP better 4. Pt, OT 
5. Midodrine scheduled 6. May cut back on factor- - no more peripheral edema 7. Prn NIV post extubation 8. May stop albumin 9. wean as able 10. Watch WBC-lines look good 11. Change to Po prednisone for 7 days 12. RRT per nephrology 13. Cardiology following 14. Glycemic monitoring and control 15. Replete electrolytes PRN 16. DVT, SUP prophylaxis- changed to protonix in view in GI bleed on heparin 17. Keep in ICU >> on pressors Subjective/Initial History:  
I have reviewed the flowsheet and previous days notes. Seen earlier today on rounds. I was asked by Devonte Carey MD to see Beau Bennett a 71 y.o.  male  in consultation for a chief complaint of shock. Excerpts from admission 5/15/2020 and consult notes reviewed as follows:  
 
\"Mr. Fransico Quarles is a 57 y.o. morbidly obese male with Chronic nonischemic dilated cardiomyopathy EF 98%, Chronic systolic congestive heart failure class, H/o atrial fibrillation, On chronic warfarin, Iatrogenic left bundle branch block with predominant RV pacing, 64-70%, Hypertension, Hyperlipidemia and diabetes presents to [facility] ED C/O Worsening exertional shortness of breath and around 20 pounds weight gain in last 3 weeks. \" 
 
Pt now in CCU. Poor historian. On room air. No fever. No cough. No diarrhea. Not very active. Saw PCP three weeks ago. Some edema. Chart notes [facility] admission in 2017 with decompensation. Patient PCP: Jovanni Vo MD 
PMH:  has a past medical history of A-fib ([address] Arrhythmia, Diabetes ([address] Endocrine disease, Hypertension, and Irregular heart beat. He also has no past medical history of Difficult intubation, Malignant hyperthermia due to anesthesia, Nausea & vomiting, or Pseudocholinesterase deficiency. PSH:   has a past surgical history that includes pr cardiac surg procedure unlist; insert emergency endotrach airway (5/18/2020); and ir insert non tunl cvc over 5 yrs (5/18/2020). FHX: family history includes Diabetes in his father; Heart Disease in his father and mother. SHX:  reports that he quit smoking about 26 years ago. He has never used smokeless tobacco. He reports that he does not drink alcohol or use drugs. ROS:A comprehensive review of systems was negative except for that written in the HPI. No Known Allergies MEDS:  
Current Facility-Administered Medications Medication  midodrine (PROAMATINE) tablet 10 mg  
 midodrine (PROAMATINE) tablet 10 mg  
 predniSONE (DELTASONE) tablet 10 mg  
 insulin glargine (LANTUS) injection 46 Units  melatonin tablet 6 mg  
 lansoprazole (PREVACID) 3 mg/mL oral suspension 30 mg  
 epoetin socrates-epbx (RETACRIT) injection 10,000 Units  bicarbonate dialysis (PRISMASOL) BG K 4/Ca 2.5 5000 ml solution  balsam peru-castor oiL (VENELEX) ointment  polyethylene glycol (MIRALAX) packet 17 g  
 zinc oxide-cod liver oil (DESITIN) 40 % paste  chlorhexidine (ORAL CARE KIT) 0.12 % mouthwash 15 mL  potassium chloride 20 mEq in 50 ml IVPB  calcium chloride injection 2 g  
 acetaminophen (TYLENOL) solution 650 mg  PHARMACY INFORMATION NOTE  heparin 25,000 units in D5W 250 ml infusion  NOREPINephrine (LEVOPHED) 32 mg in 5% dextrose 250 mL infusion  sodium chloride (NS) flush 5-40 mL  sodium chloride (NS) flush 5-40 mL  ondansetron (ZOFRAN) injection 4 mg  alcohol 62% (NOZIN) nasal  1 Ampule  insulin lispro (HUMALOG) injection  glucose chewable tablet 16 g  
 dextrose (D50W) injection syrg 12.5-25 g  
 glucagon (GLUCAGEN) injection 1 mg  acetaminophen (TYLENOL) tablet 650 mg Or  acetaminophen (TYLENOL) suppository 650 mg  
 influenza vaccine 2019- (6 mos+)(PF) (FLUARIX/FLULAVAL/FLUZONE QUAD) injection 0.5 mL  sodium chloride (NS) flush 5-10 mL Objective:  
 
Vital Signs: Telemetry:    AFIB Intake/Output:  
Visit Vitals /61 Pulse 97 Temp 97.5 °F (36.4 °C) Resp 14 Ht 6' 3\" (1.905 m) Wt 105.8 kg (233 lb 4 oz) SpO2 95% BMI 29.15 kg/m² Temp (24hrs), Av.8 °F (36.6 °C), Min:97.4 °F (36.3 °C), Max:98.1 °F (36.7 °C) O2 Device: Nasal cannula O2 Flow Rate (L/min): 4 l/min Body mass index is 29.15 kg/m². Wt Readings from Last 4 Encounters:  
06/07/20 105.8 kg (233 lb 4 oz) 01/19/19 123.8 kg (273 lb) 08/04/17 129.3 kg (285 lb)  
03/20/17 107.5 kg (237 lb) Intake/Output Summary (Last 24 hours) at 6/7/2020 1269 Last data filed at 6/7/2020 0800 Gross per 24 hour Intake 2702.9 ml Output 3606 ml Net -903.1 ml Last shift:      06/07 0701 - 06/07 1900 In: 91.2 [I.V.:46.2] Out: - Last 3 shifts: 06/05 1901 - 06/07 0700 In: 3554.3 [I.V.:1488.3] Out: 9959 [Drains:720] Hemodynamics:   
CO:   
CI:   
CVP: CVP (mmHg): 8 mmHg (05/27/20 1030) SVR:   PAP Systolic:   
PAP Diastolic:   
PVR:   
GH82:    
 
Ventilator Settings:     
Mode Rate TV Press PEEP FiO2 PIP Min. Vent Spontaneous    500 ml 6 cm H2O  6 cm H20 30 %  30 cm H2O  9 l/min Physical Exam: 
 
Awake NGT in place 125/86 
100/min 94% RR18/min Data:  
            
Labs: 
Recent Labs  
  06/07/20 
0434 06/06/20 
1045 06/05/20 
0430 WBC 9.8 8.1 9.7 HGB 9.3* 9.6* 9.2* HCT 28.8* 29.2* 27.8*  
* 148* 180 Recent Labs  
  06/07/20 
0434 06/06/20 
1806 06/06/20 
0331 * 133* 134* K 4.1 4.4 3.9  99 101 CO2 25 27 26 * 256* 161* BUN 42* 39* 37* CREA 1.51* 1.53* 1.55* CA 8.8 8.7 8.2* MG 2.8* 2.8* 2.5* PHOS 3.1 3.3 2.7 ALB 2.5* 2.8* 2.8* No results for input(s): PHI, PCO2I, PO2I, HCO3I, FIO2I in the last 72 hours. Imaging: 
I have personally reviewed the patients radiographs and have reviewed the reports: 
None today D/W Nursing at bedside At risk for deterioration Mona Munoz MD

## 2020-06-07 NOTE — DIALYSIS
[facility]       [phone] Orders Mode: CVVH Factor: 50 ml/hr DFR: 2000 ml/hr Blood Flow Rate: 200 ml/min Metrics BP / HR: 120/80  107 Blood Flow Rate: 200 ml/min AP:                         -93  
RP: 101 TMP: 32  
PD: 30  
FP: 160 DFR: 2000 ml/hr Comments / Plan: At bedside to change filter due to maximum filter age period,all possible blood returned to pt by Maged Montes RN. Orders, consent, pt and code status confirmed. RIJ non-tunnleed CVC + aspiration /+ flush x2. New HF 1000 primed, tested, and running well. Lines visible, secure, connections fastened well and blood warmer on return line set to 37 °C. Lines reversed. Education and pre/post report given to ANTWAN Jett RN

## 2020-06-07 NOTE — DIALYSIS
[facility]       [phone] Orders Mode: CVVHD Factor: 50 ml/hr DFR: 2000 ml/hr Blood Flow Rate: 200 ml/min Metrics BP / HR: 100/51  110 Blood Flow Rate: 200 ml/min AP:                         -89 RP: 115 TMP: 38  
PD: 36  
FP: 187 DFR: 2000 ml/hr Comments / Plan:  
assess from outside room due to covid +  At bedside to assess/round on pt, all pressures within normal range, no need to change at this time. Orders, consent, pt and code status confirmed. RIJ non-tunneled CVC infusing well. Bio-patch C/D/I. HF 1000 running well. Lines visible, secure, and connections fastened well. Education and pre/post report given to JORGE Jett RN

## 2020-06-07 NOTE — PROGRESS NOTES
NAME: Garett Gonsalez :  1955 MRN:  [id number] Assessment :    Plan: 
--LAQUITA Shock-resolved Strep bacteremia/Sepsis-resolved DM type 2 Systolic HF (EF 44%)-MALYQS 35% AHRF- resolved. extubated 6- High Bili/shock liver- no recent LFTs 
RUQ US with nl CBD Anemia COVID-19 +: He is off isolation. F/u tests negative x 2 Initiated CVVHD ; Off CVVH on ; didnt tolerate iHD; restarted CVVHD on  Using Lewis, 200 QB, Factor down to 50 ml/hr (no edema, BP lowish). D/W ICU RN-can stop factor if he is more hypotensive or has more pressor requirements Still pressor depedent 4k dialysate Once he is off pressors, we can gradually transition him to [facility] over next couple days if feasible On heparin gtt Lewis line - will need new line in 1-2 days to replace Retacrit On Levo Subjective: Chief Complaint:  Remains extubated. Alert awake. More interactive Still on pressor and IV Heparin Continues on CVVH Review of Systems: no n/v/cp or sob Objective: VITALS:  
Last 24hrs VS reviewed since prior progress note. Most recent are: 
Visit Vitals BP (!) 84/47 Pulse (!) 102 Temp 97.7 °F (36.5 °C) Resp 17 Ht 6' 3\" (1.905 m) Wt 105.8 kg (233 lb 4 oz) SpO2 (!) 89% BMI 29.15 kg/m² Intake/Output Summary (Last 24 hours) at 2020 1417 Last data filed at 2020 1400 Gross per 24 hour Intake 2621.76 ml Output 3316 ml Net -694.24 ml Telemetry Reviewed: PHYSICAL EXAM: 
General: NAD Alert awake +NGT No resp distress RRR on monitor No leg edema Lab Data Reviewed: (see below) Medications Reviewed: (see below) PMH/SH reviewed - no change compared to H&P 
________________________________________________________________________ Care Plan discussed with: 
Patient y Family RN y   
Care Manager Consultant:     
 
  Comments  
y>50% of visit spent in counseling and coordination of care    
 
________________________________________________________________________ Amber Roberts MD  
 
Procedures: see electronic medical records for all procedures/Xrays and details which 
were not copied into this note but were reviewed prior to creation of Plan. LABS: 
Recent Labs  
  06/07/20 
0434 06/06/20 
1045 WBC 9.8 8.1 HGB 9.3* 9.6* HCT 28.8* 29.2*  
* 148* Recent Labs  
  06/07/20 
0434 06/06/20 
1806 06/06/20 
0331 * 133* 134* K 4.1 4.4 3.9  99 101 CO2 25 27 26 BUN 42* 39* 37* CREA 1.51* 1.53* 1.55* * 256* 161* CA 8.8 8.7 8.2* MG 2.8* 2.8* 2.5* PHOS 3.1 3.3 2.7 Recent Labs  
  06/07/20 
0434 06/06/20 
1806 06/06/20 
0331 ALB 2.5* 2.8* 2.8* Recent Labs  
  06/07/20 
1113 06/07/20 
0435 06/06/20 
1044 APTT 65.3* 57.5* 62.5* MEDICATIONS: 
Current Facility-Administered Medications Medication Dose Route Frequency  midodrine (PROAMATINE) tablet 10 mg  10 mg Per NG tube TID PRN  
 midodrine (PROAMATINE) tablet 10 mg  10 mg Oral Q6H  
 predniSONE (DELTASONE) tablet 10 mg  10 mg Oral DAILY WITH BREAKFAST  insulin glargine (LANTUS) injection 46 Units  46 Units SubCUTAneous DAILY  melatonin tablet 6 mg  6 mg Oral QHS  lansoprazole (PREVACID) 3 mg/mL oral suspension 30 mg  30 mg Per NG tube ACB  epoetin socrates-epbx (RETACRIT) injection 10,000 Units  10,000 Units SubCUTAneous Q MON, WED & FRI  
 bicarbonate dialysis (PRISMASOL) BG K 4/Ca 2.5 5000 ml solution   Extracorporeal DIALYSIS CONTINUOUS  
 balsam peru-castor oiL (VENELEX) ointment   Topical TID  polyethylene glycol (MIRALAX) packet 17 g  17 g Per NG tube DAILY PRN  
 zinc oxide-cod liver oil (DESITIN) 40 % paste   Topical PRN  chlorhexidine (ORAL CARE KIT) 0.12 % mouthwash 15 mL  15 mL Oral Q12H  potassium chloride 20 mEq in 50 ml IVPB  20 mEq IntraVENous PRN  
 calcium chloride injection 2 g  2 g IntraVENous PRN  
 acetaminophen (TYLENOL) solution 650 mg  650 mg Oral Q6H PRN  
 PHARMACY INFORMATION NOTE  1 Each Other BID  heparin 25,000 units in D5W 250 ml infusion  8-25 Units/kg/hr IntraVENous TITRATE  
 NOREPINephrine (LEVOPHED) 32 mg in 5% dextrose 250 mL infusion  2-200 mcg/min IntraVENous TITRATE  sodium chloride (NS) flush 5-40 mL  5-40 mL IntraVENous Q8H  
 sodium chloride (NS) flush 5-40 mL  5-40 mL IntraVENous PRN  
 ondansetron (ZOFRAN) injection 4 mg  4 mg IntraVENous Q8H PRN  
 alcohol 62% (NOZIN) nasal  1 Ampule  1 Ampule Topical Q12H  
 insulin lispro (HUMALOG) injection   SubCUTAneous Q6H  
 glucose chewable tablet 16 g  4 Tab Oral PRN  
 dextrose (D50W) injection syrg 12.5-25 g  12.5-25 g IntraVENous PRN  
 glucagon (GLUCAGEN) injection 1 mg  1 mg IntraMUSCular PRN  
 acetaminophen (TYLENOL) tablet 650 mg  650 mg Oral Q6H PRN Or  
 acetaminophen (TYLENOL) suppository 650 mg  650 mg Rectal Q6H PRN  
 influenza vaccine 2019-20 (6 mos+)(PF) (FLUARIX/FLULAVAL/FLUZONE QUAD) injection 0.5 mL  0.5 mL IntraMUSCular PRIOR TO DISCHARGE  sodium chloride (NS) flush 5-10 mL  5-10 mL IntraVENous PRN

## 2020-06-07 NOTE — PROGRESS NOTES
1930: BSSR received from Meme Wilkes RN, for continued care. Full assessment as documented. 2025: Darrell Whitney RN at bedside to change filter set. 2206: Pt continues with cough productive of moderate amt thick yellow sputum. Continues to require frequent oral care to completely clear secretions. Some improvement noted in strength of cough, however. 0015: Follow-up SARS-CoV-2 test from 6/6 resulted at this time, negative. Pt now with 2 negative tests s/p COVID-19 diagnosis. Orders received from Dr. Suzie Guillermo to discontinue droplet plus precautions. 0149: Only cautious weaning of levophed gtt tolerated d/t ongoing breakthrough hypotension. 0520: SpO2 dropping to 88-91%, weak/wet cough, unable to clear secretions with oral suctioning. ENT suctioning performed via sterile technique productive of moderate amt thick white/yellow secretions. 0700: Shift uneventful. Pt remains minimally interactive and requires repeated reorientation. Some drop in SpO2 to high 80s noted with coughing spasms, but pt recovers in approximately 2 minutes. CRRT netting 50 mL/hr removal per orders, levophed gtt weaned only to 57 mcg/min. Continuing to tolerate TF with scant residuals. Bladder scan performed, 0 mL urine noted. Pt has been wincing with position changes and hygiene care but repeatedly denies pain when asked. 0715: Bedside shift change report given to Meme Wilkes RN (oncoming nurse) by self Latia caceres. Report included the following information SBAR, Procedure Summary, Intake/Output, MAR, Recent Results and Cardiac Rhythm atrial fibrillation.

## 2020-06-07 NOTE — PROGRESS NOTES
[address], Vanessa Six to draw ABG. SPO2 via ABG 96% on NRB (SPO2 via ear 83%). [address] Called Dr. Aden Echeverria with current conditions, B/P 70's/40's with LEVO gtt at 200 mcg/min. SPO2 77 when readable. Changed probe 3x. Quickly titrated up from 4 LM/NC to NRB FiO2 100%. Requested ABG for SPO2 accuracy. Frequent PVC's (previous runs of V-TACH). Amiodarone gtt ordered (No bolus). Latest labs results given. CVVHD, decreased rate to 30 ml/hr to keep blood flow. NO factor pulled at current time. Leaving positive balance. 1 Rounding report given to Carisa Martins RN

## 2020-06-07 NOTE — PROGRESS NOTES
Hospitalist Progress Note NAME: Bethany Franks :  1955 MRN:  [id number] Assessment / Plan: 
COVID-19 pneumonia with multiorgan failure Acute hypoxic respiratory failure due to above status post mechanical ventilation Septic shock profound Hyponatremia Strep Bacteremia Coagulopathy S/p Extubation on 6/3. On nasal cannula    Pulmonary is following. Continue Levophed  wean as tolerated, still requiring and renal adjusting CVVH Continue CRRT per renal 
continue prednisone taper s stress dose of steroidsince he was on  
Status post completion of IV antibiotics for pneumonia and bacteremia Appreciate nephrology help with HD 
S/p iv albumin ST consult,  PT/OT. Cont added midodrine. Acute transaminitis likely due to shock liver Hyperbilirubinemia due to above US RUQ: Minimal gallbladder sludge. Slightly prominent common bile duct of uncertain 
significance. Recheck CMP Acute on chronic systolic HF EF 34%, now 99% (but on Dobutamine) Nonischemic dilated cardiomyopathy s/p remote ICD Chronic atrial fibrillation Sleep apnea Hypertensive heart disease w/ CKD and HF Nonsustained ventricular tachycardia Cardiology to see again for ventricular tachycardia. Potassium was 3.9 and got KCl this morning. Magnesium was normal. 
Requiring Vasopressor support Cardiology help appreciated: cont supportive care Eliquis on hold for now, on heparin ggt but holding for 6 hours DM2 BS stable Cont insulin w lantus, SSI Hemoptysis 
-improved Hypophosphatemia 
-replaced 
  
Code Status: Full Surrogate Decision Maker: wife DVT Prophylaxis: heparin drip Disposition: remain critically ill Subjective:  
  
Now on 4 L nasal cannula Remains on pressors Sars cov 2 negative x 2 No further V tach Chief Complaint / Reason for Physician Visit: following sepsis / respiratory failure/ covid Review of Systems: 
Symptom Y/N Comments  Symptom Y/N Comments Fever/Chills    Chest Pain Poor Appetite    Edema Cough    Abdominal Pain Sputum    Joint Pain SOB/GROVES    Pruritis/Rash Nausea/vomit    Tolerating PT/OT Diarrhea    Tolerating Diet Constipation    Other Could NOT obtain due to:   
 
Objective: VITALS:  
Last 24hrs VS reviewed since prior progress note. Most recent are: 
Patient Vitals for the past 24 hrs: 
 Temp Pulse Resp BP SpO2  
06/07/20 1630  (!) 101  (!) 90/33   
06/07/20 1600  (!) 101  (!) 68/46   
06/07/20 1530  (!) 109 21 96/54   
06/07/20 1500  (!) 109 19 99/50 92 % 06/07/20 1430  (!) 116 19 (!) 87/45   
06/07/20 1400 97.6 °F (36.4 °C) (!) 102 17 (!) 84/47 93 % 06/07/20 1330  97 18 99/55   
06/07/20 1300  (!) 106 16 (!) 88/57 (!) 89 % 06/07/20 1230  (!) 105 16 95/61 92 % 06/07/20 1200  100 16 (!) 101/28   
06/07/20 1130 97.7 °F (36.5 °C) (!) 101 16 100/60 95 % 06/07/20 1100  (!) 105 17 90/61   
06/07/20 1030  97 16  95 % 06/07/20 1000  (!) 102 15 (!) 85/69 95 % 06/07/20 0945  99 14 (!) 93/39   
06/07/20 0930  (!) 101 14 (!) 88/46   
06/07/20 0900  (!) 115 16  93 % 06/07/20 0845  97 14 109/61 95 % 06/07/20 0830  95 14 101/50 96 % 06/07/20 0800 97.5 °F (36.4 °C) 100 16 (!) 182/102 96 % 06/07/20 0745  95 15 105/68 97 % 06/07/20 0730  95 14  92 % 06/07/20 0715  (!) 114 17 97/80 93 % 06/07/20 0700  (!) 109 18 (!) 140/101 (!) 86 % 06/07/20 0645  96 15  96 % 06/07/20 0630  93 14 104/59 93 % 06/07/20 0617  100 16  95 % 06/07/20 0615    128/63   
06/07/20 0604  (!) 107 21 100/42 94 % 06/07/20 0600  (!) 103 18  94 % 06/07/20 0547  (!) 109 23  93 % 06/07/20 0530  94 20 100/51 93 % 06/07/20 0515  (!) 104 18 (!) 86/48   
06/07/20 0500  (!) 102 21    
06/07/20 0445  100 23 (!) 89/56 94 % 06/07/20 0440  (!) 110  100/51   
06/07/20 0430  (!) 104 22  94 % 06/07/20 0415  92 18 100/51 96 % 06/07/20 0400  98  96/57 95 % 06/07/20 0345  (!) 108 15 90/50 94 % 06/07/20 0333  (!) 106 15  96 % 06/07/20 0313    (!) 85/56   
06/07/20 0300 97.4 °F (36.3 °C) (!) 102 12 114/54 98 % 06/07/20 0245  98 13  100 % 06/07/20 0230  (!) 104 14  98 % 06/07/20 0215  97 13 123/58 97 % 06/07/20 0200  96 14 108/62 95 % 06/07/20 0145  (!) 112 16 140/47 (!) 88 % 06/07/20 0130  100 14 (!) 125/94 96 % 06/07/20 0115  (!) 102 14 121/60 97 % 06/07/20 0100  100 13 124/63 96 % 06/07/20 0045  97 11 121/71 97 % 06/07/20 0033  (!) 104 19  95 % 06/07/20 0015  97 15 113/61 96 % 06/07/20 0002  98 19  95 % 06/07/20 0001    121/62   
06/06/20 2345  98  92/66 96 % 06/06/20 2331  98 14  95 % 06/06/20 2317  (!) 104 11  97 % 06/06/20 2300 98.1 °F (36.7 °C) (!) 107 17 97/44 96 % 06/06/20 2245  (!) 103 11  97 % 06/06/20 2230  (!) 104 15 115/83   
06/06/20 2213  (!) 101 15  96 % 06/06/20 2201  (!) 113 17  94 % 06/06/20 2200    90/62   
06/06/20 2146  (!) 110 20  94 % 06/06/20 2131  (!) 102 18  95 % 06/06/20 2117  (!) 113 17  95 % 06/06/20 2100  (!) 109 14 93/76 94 % 06/06/20 2045  100 10 120/69 95 % 06/06/20 2030  (!) 102 11 145/59 95 % 06/06/20 2025  (!) 107  120/80   
06/06/20 2015  97 13 128/80 94 % 06/06/20 2000  (!) 105 15 118/57 94 % 06/06/20 1945  97 12 138/69 95 % 06/06/20 1930 97.6 °F (36.4 °C) (!) 101 13 168/59 94 % 06/06/20 1915  (!) 105 17 125/81 93 % 06/06/20 1900  (!) 106 14 118/58 94 % 06/06/20 1830  96 14 111/81 95 % 06/06/20 1815  (!) 103 15 (!) 86/74 96 % 06/06/20 1800 98 °F (36.7 °C) 99 16 106/87 94 % Intake/Output Summary (Last 24 hours) at 6/7/2020 1747 Last data filed at 6/7/2020 1645 Gross per 24 hour Intake 2435.12 ml Output 3290 ml Net -854.88 ml PHYSICAL EXAM: 
General:           Drowsy but wakes up to commands EENT:              EOMI. Anicteric sclerae. MMM Resp: CTA bilaterally, no wheezing or rales. mechanically ventilated CV:                  Regular  rhythm,  No edema GI:                   Soft, Non distended, Non tender.  +Bowel sounds Neurologic:     drowsy but wakes to commands Psych:            confused Skin:                No rashes. No jaundice Reviewed most current lab test results and cultures  YES Reviewed most current radiology test results   YES Review and summation of old records today    NO Reviewed patient's current orders and MAR    YES 
PMH/SH reviewed - no change compared to H&P 
________________________________________________________________________ Care Plan discussed with: 
  Comments Patient Family RN x Care Manager Consultant  x Multidiciplinary team rounds were held today with , nursing, pharmacist and clinical coordinator. Patient's plan of care was discussed; medications were reviewed and discharge planning was addressed. ________________________________________________________________________ Total NON critical care TIME:  35 Minutes Total CRITICAL CARE TIME Spent:   Minutes non procedure based Comments >50% of visit spent in counseling and coordination of care x Chart review in complex ICU patient  
________________________________________________________________________ Gavino Castillo MD  
 
Procedures: see electronic medical records for all procedures/Xrays and details which were not copied into this note but were reviewed prior to creation of Plan. LABS: 
I reviewed today's most current labs and imaging studies. Pertinent labs include: 
Recent Labs  
  06/07/20 
0434 06/06/20 
1045 06/05/20 
0430 WBC 9.8 8.1 9.7 HGB 9.3* 9.6* 9.2* HCT 28.8* 29.2* 27.8*  
* 148* 180 Recent Labs  
  06/07/20 
1554 06/07/20 
0434 06/06/20 
1806 * 132* 133* K 4.4 4.1 4.4 CL 98 100 99 CO2 21 25 27 * 241* 256* BUN 43* 42* 39* CREA 1.55* 1.51* 1.53* CA 9.1 8.8 8.7 MG 2.8* 2.8* 2.8* PHOS 4.1 3.1 3.3 ALB 2.4* 2.5* 2.8* Signed: Zi Mays MD

## 2020-06-08 NOTE — PROGRESS NOTES
BP 60s CVP 5 Off dobutamine Bolusing IVF, albumin Discussed w/ the family I asked them to come in to visit with the patient Prognosis is grave.

## 2020-06-08 NOTE — PROGRESS NOTES
Spiritual Care Assessment/Progress Note [place] [address] 
 
 
NAME: Diane oHuse      MRN: [id number] AGE: 71 y.o. SEX: male Mormonism Affiliation: Amish  
Language: [place] 6/8/2020     Total Time (in minutes): 44  
 
Spiritual Assessment begun in [facility] CRITICAL CARE 3 through conversation with: 
  
    []Patient        [x] Family    [] Friend(s) Reason for Consult: Palliative Care, Initial/Spiritual Assessment Spiritual beliefs: (Please include comment if needed) [x] Identifies with a adriana tradition:     
   [] Supported by a adriana community:        
   [] Claims no spiritual orientation:       
   [] Seeking spiritual identity:            
   [] Adheres to an individual form of spirituality:       
   [] Not able to assess:                   
 
    
Identified resources for coping:  
   [x] Prayer                           
   [] Music                  [] Guided Imagery [x] Family/friends                 [] Pet visits [x] Devotional reading                         [] Unknown 
   [] Other:                                       
 
 
Interventions offered during this visit: (See comments for more details) Patient Interventions: Prayer (actual), Initial/Spiritual assessment, Critical care, Other (comment)(read scripture) Family/Friend(s): Other (comment) Plan of Care: 
 
 [x] Support spiritual and/or cultural needs  
 [] Support AMD and/or advance care planning process [x] Support grieving process 
 [] Coordinate Rites and/or Rituals  
 [] Coordination with community clergy [] No spiritual needs identified at this time 
 [] Detailed Plan of Care below (See Comments)  [] Make referral to Music Therapy 
[] Make referral to Pet Therapy    
[] Make referral to Addiction services 
[] Make referral to [place] 
[] Make referral to Spiritual Care Partner 
[] No future visits requested       
[x] Follow up visits as needed Comments:   Visit in CCU for spiritual assessment. Patient remains intubated; no family present. Consulted with Rai Booker RN. Dr. Ángel Osorio has spoken with family this morning. This  spoke with patient's son who answered family phone. Provided supportive listening and pastoral presence, inquiring about how best to support patient and family at this time. Patient's wife indicated patient enjoyed reading scripture. She requested a couple of psalms be read to him. Son requested prayer. Scripture reading and spoken prayer offered at bedside. Assured son of ongoing availability of pastoral support as needed. SOHAIL Hough, [facility], Staff  [facility]  Paging Service  [phone])

## 2020-06-08 NOTE — PROGRESS NOTES
Chart reviewed. Pt not doing well medically and prognosis is poor. Will complete order. Can be re-consulted if there is any change in status.

## 2020-06-08 NOTE — PROGRESS NOTES
NAME: Carlene Tamayo :  1955 MRN:  [id number] Assessment :    Plan: 
--LAQUITA Shock- 
Strep bacteremia/Sepsis- 
DM type 2 Systolic HF (EF 45%)-[id number] 35% AHRF- extubated 6-3; re-intubated  High Bili/shock liver- no recent LFTs 
RUQ US with nl CBD Anemia COVID-19 +: He is off isolation. F/u tests negative x 2 Initiated CVVHD ; Off CVVH on ; didn't tolerate iHD; restarted CVVHD on ; off now b/c too unstable (on 4 pressors at max dose, SBP now 70) On heparin gtt; on bicarb gtt (pH 7.002) -- combined acidosis Lewis line  Retacrit Subjective: Chief Complaint:  Intubated. On 4 pressors. Family just arrived b/c he is gravely ill. CVVHD off. Cannot restart b/c hemodynamics will not support. Review of Systems: unable to obtain Objective: VITALS:  
Last 24hrs VS reviewed since prior progress note. Most recent are: 
Visit Vitals BP (!) 70/45 Pulse 91 Temp (!) 96.4 °F (35.8 °C) Resp 30 Ht 6' 3\" (1.905 m) Wt 105.8 kg (233 lb 4 oz) SpO2 (!) 87% BMI 29.15 kg/m² Intake/Output Summary (Last 24 hours) at 2020 1675 Last data filed at 2020 2343 Gross per 24 hour Intake 2745.43 ml Output 1889 ml Net 856.43 ml Telemetry Reviewed: PHYSICAL EXAM: 
General: Critically ill in icu on vent 
+NGT No resp distress RRR on monitor No leg edema Lab Data Reviewed: (see below) Medications Reviewed: (see below) PMH/SH reviewed - no change compared to H&P 
________________________________________________________________________ Care Plan discussed with: 
Patient Family RN y   
Care Manager Consultant:     
 
  Comments  
y>50% of visit spent in counseling and coordination of care    
 
________________________________________________________________________ Kannan Ribeiro MD  
 
 Procedures: see electronic medical records for all procedures/Xrays and details which 
were not copied into this note but were reviewed prior to creation of Plan. LABS: 
Recent Labs  
  06/08/20 
0151 06/07/20 
0434 WBC 3.3* 9.8 HGB 9.5* 9.3* HCT 30.8* 28.8*  
* 110* Recent Labs  
  06/08/20 
0151 06/07/20 
1554 06/07/20 
0434 * 132* 132* K 4.8 4.4 4.1  98 100 CO2 17* 21 25 BUN 34* 43* 42* CREA 1.43* 1.55* 1.51* GLU 65 244* 241* CA 8.7 9.1 8.8 MG 3.2* 2.8* 2.8* PHOS 5.0* 4.1 3.1 Recent Labs  
  06/08/20 
0151 06/07/20 
1554 06/07/20 
0434 *  --   --   
TP 7.1  --   --   
ALB 2.2*  2.2* 2.4* 2.5*  
GLOB 4.9*  --   --   
 
Recent Labs  
  06/08/20 
0443 06/08/20 
0151 06/07/20 
1554 APTT 123.5* >130.0* 72.8* MEDICATIONS: 
Current Facility-Administered Medications Medication Dose Route Frequency  vasopressin (VASOSTRICT) 20 Units in 0.9% sodium chloride 100 mL infusion  0.04 Units/min IntraVENous CONTINUOUS  
 LORazepam (ATIVAN) injection 2 mg  2 mg IntraVENous Q4H PRN  
 EPINEPHrine (ADRENALIN) 5 mg in 0.9% sodium chloride 250 mL infusion  0-20 mcg/min IntraVENous TITRATE  EPINEPHrine (ADRENALIN) 10 mg in 0.9% sodium chloride 250 mL infusion  0-20 mcg/min IntraVENous TITRATE  hydrocortisone Sod Succ (PF) (SOLU-CORTEF) injection 50 mg  50 mg IntraVENous Q6H  
 sodium bicarbonate 150 mEq/1000 mL D5W (premix)   IntraVENous CONTINUOUS  
 amiodarone (CORDARONE) 375 mg/250 mL D5W infusion  0.5 mg/min IntraVENous CONTINUOUS  
 fentaNYL (PF) 1,500 mcg/30 mL (50 mcg/mL) infusion  0-200 mcg/hr IntraVENous TITRATE  propofol (DIPRIVAN) 10 mg/mL infusion  0-50 mcg/kg/min IntraVENous TITRATE  PHENYLephrine (JACLYN-SYNEPHRINE) 100 mg in 0.9% sodium chloride 250 mL infusion   mcg/min IntraVENous TITRATE  midodrine (PROAMATINE) tablet 10 mg  10 mg Per NG tube TID PRN  
 midodrine (PROAMATINE) tablet 10 mg  10 mg Oral Q6H  
  insulin glargine (LANTUS) injection 46 Units  46 Units SubCUTAneous DAILY  melatonin tablet 6 mg  6 mg Oral QHS  lansoprazole (PREVACID) 3 mg/mL oral suspension 30 mg  30 mg Per NG tube ACB  epoetin socrates-epbx (RETACRIT) injection 10,000 Units  10,000 Units SubCUTAneous Q MON, WED & FRI  
 bicarbonate dialysis (PRISMASOL) BG K 4/Ca 2.5 5000 ml solution   Extracorporeal DIALYSIS CONTINUOUS  
 balsam peru-castor oiL (VENELEX) ointment   Topical TID  polyethylene glycol (MIRALAX) packet 17 g  17 g Per NG tube DAILY PRN  
 zinc oxide-cod liver oil (DESITIN) 40 % paste   Topical PRN  chlorhexidine (ORAL CARE KIT) 0.12 % mouthwash 15 mL  15 mL Oral Q12H  potassium chloride 20 mEq in 50 ml IVPB  20 mEq IntraVENous PRN  
 calcium chloride injection 2 g  2 g IntraVENous PRN  
 acetaminophen (TYLENOL) solution 650 mg  650 mg Oral Q6H PRN  
 PHARMACY INFORMATION NOTE  1 Each Other BID  heparin 25,000 units in D5W 250 ml infusion  8-25 Units/kg/hr IntraVENous TITRATE  
 NOREPINephrine (LEVOPHED) 32 mg in 5% dextrose 250 mL infusion  2-200 mcg/min IntraVENous TITRATE  sodium chloride (NS) flush 5-40 mL  5-40 mL IntraVENous Q8H  
 sodium chloride (NS) flush 5-40 mL  5-40 mL IntraVENous PRN  
 ondansetron (ZOFRAN) injection 4 mg  4 mg IntraVENous Q8H PRN  
 alcohol 62% (NOZIN) nasal  1 Ampule  1 Ampule Topical Q12H  
 insulin lispro (HUMALOG) injection   SubCUTAneous Q6H  
 glucose chewable tablet 16 g  4 Tab Oral PRN  
 dextrose (D50W) injection syrg 12.5-25 g  12.5-25 g IntraVENous PRN  
 glucagon (GLUCAGEN) injection 1 mg  1 mg IntraMUSCular PRN  
 acetaminophen (TYLENOL) tablet 650 mg  650 mg Oral Q6H PRN Or  
 acetaminophen (TYLENOL) suppository 650 mg  650 mg Rectal Q6H PRN  
 influenza vaccine 2019-20 (6 mos+)(PF) (FLUARIX/FLULAVAL/FLUZONE QUAD) injection 0.5 mL  0.5 mL IntraMUSCular PRIOR TO DISCHARGE  sodium chloride (NS) flush 5-10 mL  5-10 mL IntraVENous PRN

## 2020-06-08 NOTE — PROGRESS NOTES
Discussed with family. They want to see the patient. They do not want an autopsy. Discussed w/ nursing.

## 2020-06-08 NOTE — PROGRESS NOTES
Nutrition:  Chart reviewed, case discussed during CCU rounds. Pt deteriorated over the past 24 hours, he is now maxed out on several pressors and intubated. TF discontinued for now as he is too unstable for enteral nutrition. Will F/U tomorrow in hopes he will have stabilized. Esthela Severino RD, [facility] Pager [phone]

## 2020-06-08 NOTE — PROGRESS NOTES
[facility] INTENSIVIST Consult Service Note Pulmonary, Critical Care, and Sleep Medicine Name: Hola Weber MRN: [id number] : 1955 Hospital: [facility] Date: 2020  Admission date: 5/15/2020 Hospital Day: 22 Subjective/Interval History:  
 
 
 remains on 4L O2, pressor dependent. More alert and interactive per nursing. Denies pain or discomfort No further episodes of Vtach after yesterday 20: Pt was intubated last pm. Has been on Vasopressin, levophed, Epinephrine. Had more PVCs, V Tach, had worsening respiratory failure. Since Intubation his hemodynamics have been worse. CVVHD was stopped last pm.  
 
IMPRESSION:  
1. Acute hypoxic respiratory failure, intubated ; at risk for diaphragm atrophy; extubated 6/3-Reintubated on 20 pm.  
2. Profound septic shock on high level of pressors. Near maximal pressors. 3. COVID 19 pneumonia- good gas exchange; SARS Cov 2 - 20 not detected 4. WBC decreasing, Plt decreased. 5. Repeat cx.  
6. Hemoptysis- better 7. Alpha strep septicemia- POA 8. Chronic combined systolic/diastolic heart failure; echo / LVEF 35%. 9. Hypertensive heart disease with CHF and CKD, previous severe LV function 10. LAQUITA/CKD, CVVH 11. Chronic anticoagulation at home- eliquis 12. Diabetes mellitus type 2 uncontrolled and not helped by steroids 13. Hyperlipidemia 14. Nonischemic dilated cardiomyopathy EF 20% with mild-mod MR but echo  on IV , levo EF 35-40% 15. Chronic atrial fibrillation 16. Status post SJ BIV-ICD implant in 2016 
17. Sleep apnea, undiagnosed/untreated 18. Obesity with recent weight gain 19. Possible critical illness polyneuropathy/myopathy 20. Critically ill, high risk of multiple organ failure, high risk of death. 35 min CC, EOP. RECOMMENDATIONS/PLAN:  
1. On maximal vent support. 2. Stress steroids, ON Zosyn. Vanc. Will check procalcitonin. 3. Will give some volume. 4. IV heparin per protocol 5. ON increase levels of pressor. Near maximal support. 6. Watch WBC-lines look good 7. Place back on stress steroids. 8. RRT per nephrology 9. Cardiology following 10. Glycemic monitoring and control 11. Replete electrolytes PRN 12. DVT, SUP prophylaxis- changed to protonix in view in GI bleed on heparin 13. Keep in ICU >> on pressors Subjective/Initial History:  
I have reviewed the flowsheet and previous days notes. Seen earlier today on rounds. I was asked by David Anne MD to see Bharat Mcintosh a 71 y.o.  male  in consultation for a chief complaint of shock. Excerpts from admission 5/15/2020 and consult notes reviewed as follows:  
 
\"Mr. Sharri Wick is a 57 y.o. morbidly obese male with Chronic nonischemic dilated cardiomyopathy EF 11%, Chronic systolic congestive heart failure class, H/o atrial fibrillation, On chronic warfarin, Iatrogenic left bundle branch block with predominant RV pacing, 64-70%, Hypertension, Hyperlipidemia and diabetes presents to [facility] ED C/O Worsening exertional shortness of breath and around 20 pounds weight gain in last 3 weeks. \" 
 
Pt now in CCU. Poor historian. On room air. No fever. No cough. No diarrhea. Not very active. Saw PCP three weeks ago. Some edema. Chart notes [facility] admission in 2017 with decompensation. Patient PCP: Fabricio Staples MD 
PMH:  has a past medical history of A-fib ([address] Arrhythmia, Diabetes ([address] Endocrine disease, Hypertension, and Irregular heart beat. He also has no past medical history of Difficult intubation, Malignant hyperthermia due to anesthesia, Nausea & vomiting, or Pseudocholinesterase deficiency. PSH:   has a past surgical history that includes pr cardiac surg procedure unlist; insert emergency endotrach airway (5/18/2020); and ir insert non tunl cvc over 5 yrs (5/18/2020). FHX: family history includes Diabetes in his father; Heart Disease in his father and mother. SHX:  reports that he quit smoking about 26 years ago. He has never used smokeless tobacco. He reports that he does not drink alcohol or use drugs. ROS:A comprehensive review of systems was negative except for that written in the HPI. No Known Allergies MEDS:  
Current Facility-Administered Medications Medication  vasopressin (VASOSTRICT) 20 Units in 0.9% sodium chloride 100 mL infusion  LORazepam (ATIVAN) injection 2 mg  EPINEPHrine (ADRENALIN) 10 mg in 0.9% sodium chloride 250 mL infusion  hydrocortisone Sod Succ (PF) (SOLU-CORTEF) injection 50 mg  
 sodium bicarbonate 150 mEq/1000 mL D5W (premix)  DOBUTamine (DOBUTREX) 500 mg/250 mL (2,000 mcg/mL) infusion  vancomycin (VANCOCIN) 2000 mg in  ml infusion  piperacillin-tazobactam (ZOSYN) 3.375 g in 0.9% sodium chloride (MBP/ADV) 100 mL Followed by  
 piperacillin-tazobactam (ZOSYN) 3.375 g in 0.9% sodium chloride (MBP/ADV) 100 mL  amiodarone (CORDARONE) 375 mg/250 mL D5W infusion  fentaNYL (PF) 1,500 mcg/30 mL (50 mcg/mL) infusion  propofol (DIPRIVAN) 10 mg/mL infusion  PHENYLephrine (JACLYN-SYNEPHRINE) 100 mg in 0.9% sodium chloride 250 mL infusion  midodrine (PROAMATINE) tablet 10 mg  
 insulin glargine (LANTUS) injection 46 Units  melatonin tablet 6 mg  
 lansoprazole (PREVACID) 3 mg/mL oral suspension 30 mg  
 epoetin socrates-epbx (RETACRIT) injection 10,000 Units  bicarbonate dialysis (PRISMASOL) BG K 4/Ca 2.5 5000 ml solution  balsam peru-castor oiL (VENELEX) ointment  polyethylene glycol (MIRALAX) packet 17 g  
 zinc oxide-cod liver oil (DESITIN) 40 % paste  chlorhexidine (ORAL CARE KIT) 0.12 % mouthwash 15 mL  potassium chloride 20 mEq in 50 ml IVPB  calcium chloride injection 2 g  
 acetaminophen (TYLENOL) solution 650 mg  PHARMACY INFORMATION NOTE  heparin 25,000 units in D5W 250 ml infusion  NOREPINephrine (LEVOPHED) 32 mg in 5% dextrose 250 mL infusion  sodium chloride (NS) flush 5-40 mL  sodium chloride (NS) flush 5-40 mL  ondansetron (ZOFRAN) injection 4 mg  alcohol 62% (NOZIN) nasal  1 Ampule  insulin lispro (HUMALOG) injection  glucose chewable tablet 16 g  
 dextrose (D50W) injection syrg 12.5-25 g  
 glucagon (GLUCAGEN) injection 1 mg  acetaminophen (TYLENOL) tablet 650 mg Or  acetaminophen (TYLENOL) suppository 650 mg  
 influenza vaccine - (6 mos+)(PF) (FLUARIX/FLULAVAL/FLUZONE QUAD) injection 0.5 mL  sodium chloride (NS) flush 5-10 mL Objective:  
 
Vital Signs: Telemetry:    AFIB Intake/Output:  
Visit Vitals BP (!) 70/41 Pulse 95 Temp (!) 96.4 °F (35.8 °C) Resp 29 Ht 6' 3\" (1.905 m) Wt 105.8 kg (233 lb 4 oz) SpO2 (!) 87% BMI 29.15 kg/m² Temp (24hrs), Av.2 °F (36.2 °C), Min:96.4 °F (35.8 °C), Max:97.8 °F (36.6 °C) O2 Device: Endotracheal tube, Ventilator O2 Flow Rate (L/min): 80 l/min Body mass index is 29.15 kg/m². Wt Readings from Last 4 Encounters:  
20 105.8 kg (233 lb 4 oz) 19 123.8 kg (273 lb) 17 129.3 kg (285 lb)  
17 107.5 kg (237 lb) Intake/Output Summary (Last 24 hours) at 2020 6138 Last data filed at 2020 0630 Gross per 24 hour Intake 3476.6 ml Output 1482 ml Net 1994.6 ml Last shift:      No intake/output data recorded. Last 3 shifts:  1901 -  0700 In: 4927.7 [I.V.:3617.7] Out: 9879 [Drains:350] Hemodynamics:   
CO:   
CI:   
CVP: CVP (mmHg): 8 mmHg (20 1030) SVR:   PAP Systolic:   
PAP Diastolic:   
PVR:   
HG87:    
 
Ventilator Settings:     
Mode Rate TV Press PEEP FiO2 PIP Min. Vent Assist control    450 ml 6 cm H2O  6 cm H20 100 %  20 cm H2O  16.8 l/min Physical Exam: 
Gen: Intubated, sedated. Neck: supple, Right IJ CVC, HD line Lungs: Pretty clear anterior CV: nl s1.2 Abd: Decreased  BS Extrem: cool BLE. No edema. Neuro: not able to be assessed. Psych:  Not able to assess. Data:  
            
Labs: 
Recent Labs  
  06/08/20 
0151 06/07/20 
0434 06/06/20 
1045 WBC 3.3* 9.8 8.1 HGB 9.5* 9.3* 9.6* HCT 30.8* 28.8* 29.2*  
* 110* 148* Recent Labs  
  06/08/20 
0151 06/07/20 
1554 06/07/20 
0434 * 132* 132* K 4.8 4.4 4.1  98 100 CO2 17* 21 25 GLU 65 244* 241* BUN 34* 43* 42* CREA 1.43* 1.55* 1.51* CA 8.7 9.1 8.8 MG 3.2* 2.8* 2.8* PHOS 5.0* 4.1 3.1 ALB 2.2*  2.2* 2.4* 2.5* TBILI 11.4*  --   --   
*  --   --   
 
Recent Labs  
  06/08/20 
0831 06/08/20 
0351 06/07/20 
1853 PHI 7.134* 7.002* 7.320* PCO2I 30.5* 51.4* 28.6*  
PO2I 171* 56* 87 HCO3I 10.3* 12.7* 14.7*  
FIO2I  --  100 100 Imaging: 
I have personally reviewed the patients radiographs and have reviewed the reports: 
6-8-20: CXR: COMPARISON: Earlier in the day 
  
FINDINGS: A portable AP radiograph of the chest was obtained at 2307 hours. The 
Lewis and central lines are seen in place. Nasogastric tube is seen in place. The endotracheal tube tip is at the thoracic inlet. Subclavian leads are stable. The patient is on a cardiac monitor. There is mild airspace disease in the right 
lower lobe. The cardiac and mediastinal contours and pulmonary vascularity are 
normal.  The bones and soft tissues are grossly within normal limits.  
  
IMPRESSION: Appropriate endotracheal tube placement. Mild airspace disease right 
lower lobe. D/W Nursing at bedside At risk for deterioration Lea Stevens MD

## 2020-06-08 NOTE — PROGRESS NOTES
Family in (wife, sons, dtr, sister, brother in law, gkids) Long discussion w/ them w/ assistance of the . A lot of questions answered. A lot remain that I am unable to answer at this time. Continue w/ full resuscitative measures at this time.

## 2020-06-08 NOTE — PROGRESS NOTES
Code blue. Asystole arrest.  Multiple rounds of epinephrine with refractory asystole other than a <1 minute brief pulse that rapidly deteriorated to PEA and then back to asystole. Time of death 65. Calling to notify family.  
Zoraida Espinoza MD

## 2020-06-08 NOTE — PROGRESS NOTES
0715  Bedside report received. Epi increased to 40mcg for SBP 60's-70's. 
0810  Dr. Parul Ann at bedside. Updated him on events overnight. Orders received. 0830  ABG and blood cultures drawn. SBP 60's-80's 
0836  2 amps bicarb given per Dr. Parul Ann 
6643  1 amp calcium given per Dr. Vidya Smith 0900  2amps bicarb given per Dr. Vidya Smith 0915  500cc bolus 1000  500cc bolus 1024  Epi titrated down to 30mcg and seemed to help BP slightly. HR and rhythm less irritable 1025  1L bolus per discussion with Dr. Parul Ann. He spoke with family and they are coming to see pt due to very poor prognosis. 1130  Albumin given 1145  1L NS bolus given to total 3L so far. CVP 7 
1238  ABG drawn. Results reviewed with Dr. Parul Ann. Orders received. 1300  BS 63. 1 amp D50 given 1330  4 amps bicarb given after last ABG and 1L NS bolus 1400  CVP 10 after 3.5L boluses NS. Dobutamine restarted temporarily, then stopped due to no improvement 1600  SBP 50's. D/w Dr. Gin Gama and Dr. Parul Ann. 2L LR boluses given using pressure bag and albumin given x 2.  RR 17-20 and all day RR has been 30. Pt appears to be tiring out 
1650  BP did not read when cycled. Upon entering room, Pt RR 12, no longer breathing above vent. Checked for pulse and no pulse found. Code blue called and chest compressions started.

## 2020-06-08 NOTE — DIABETES MGMT
1545 [facility] DIABETES HE[facility] 
 
FOLLOW-UP NOTE Presentation Aaliyah Orta a 71 y. o. male admitted from the ER 5/15/20 with complaints of weakness. No COVID-19 symptoms or contacts noted. Fever. CXR, CT Head & CT ABd negative. Blood cultures positive for alpha streptococcus. Markedly elevated ferritin DX: [id number]. Septic shock. Multiorgan failure TX: Anti-COVID therapies. Pacer. Palliative care. Recent diagnostics: 
5/21/20 Hepatitis B surface Ab Reactive 5/22/20 WBC 22.9. Albumin 2.8. Bili 6.8. . . Procalcitonin 203.42. Triglyceride <15. Serum creatinine 4.73/GFR 15. CXR: Stable bibasilar opacities and pleural effusions 5/26/20 WBC 27.2. CXR: Persistent pleural effusions with bibasilar atelectasis. Atelectasis at the right lung base has increased. Ferritin 1682. CRP 5.09. Procalcitonin 55.12 
5/27/20 D-Dimer 5.97. US ABd: Negative 5/28/20 D-Dimer 4.92. Procalcitonin 32.01. CXR: Presence of bibasilar hazy density (left greater than right) possibly representing developing infiltration. Improved aeration of the right lung base 5/29/20 Ferritin 1606. CRP 3.38. D-Dimer 5. 13. Blood cultures without growth 6/1/20 WBC 17.2. CRP 3.97. Ferritin 1509. BNP 33, 805. D-Dimer 4.4. Bili 13.8. CXR: No acute process. ECHO: Normal cavity size and wall thickness. Moderate global systolic function. Estimated left ventricular ejection fraction is 35 - 40%. Hypokinetic left ventricular wall motion. Mildly reduced systolic function 6/2/20 WBC 15. D-Dimer 3.94. CRP 3.72. Ferritin 1331. Bl culture No growth 6/3/20 COVID negative for first time. WBC 10.8. D-Dimer 3.32. CRP 4.23. Ferritin 1095. IgG in normal range. Bili 12.3. Hepatitis panel negative 6/4/20 Ferritin 988. CRP 4.81. D-Dimer 3.97 
6/5/20 Ferritin 975. CRP 6. 18. D-Dimer 2.77 
6/6/20 WBC 8.1. Ferritin 1050. CRP 12.10. 
6/7/20 WBC 9.8. Ferritin 1139. CRP 18.5. D-Dimer 1.99. Albumin 2.5. 6/8/20 WBC 3.3. ABGs abnormal. Procalcitonin 57. Ferritin 1531. CRP 18. D-Dimer 3.74. .   
  
Clinical care includes: 
            AC Vent support via ET  
            BP management via (4) pressors 
            Insulin ABx             Steroids             Dialysis & Mar[facility] (not now) 
            GI prophylaxis             HBBWCNA infusion 
  
Diabetes: Patient has known Type 2 diabetes, treated with Trulicity, Onglyza & Amaryl PTA (per PTA med list). Family history positive for diabetes in father. Subjective NA Objective Physical exam 
General Intubated, not responsive Vital Signs Afebrile Visit Vitals BP (!) 105/21 Pulse 68 Temp 97.8 °F (36.6 °C) Resp 30 Ht 6' 3\" (1.905 m) Wt 105.8 kg (233 lb 4 oz) SpO2 (!) 86% BMI 29.15 kg/m² Laboratory Lab Results Component Value Date/Time Hemoglobin A1c 9.8 (H) 02/09/2017 05:42 AM  
 Hemoglobin A1c, External 9.0 03/02/2016 Lab Results Component Value Date/Time LDL, calculated 38.4 03/03/2009 09:40 PM  
 
Lab Results Component Value Date/Time Creatinine 1.43 (H) 06/08/2020 01:51 AM  
 
Lab Results Component Value Date/Time Sodium 134 (L) 06/08/2020 01:51 AM  
 Potassium 4.8 06/08/2020 01:51 AM  
 Chloride 100 06/08/2020 01:51 AM  
 CO2 17 (L) 06/08/2020 01:51 AM  
 Anion gap 17 (H) 06/08/2020 01:51 AM  
 Glucose 65 06/08/2020 01:51 AM  
 BUN 34 (H) 06/08/2020 01:51 AM  
 Creatinine 1.43 (H) 06/08/2020 01:51 AM  
 BUN/Creatinine ratio 24 (H) 06/08/2020 01:51 AM  
 GFR est AA >60 06/08/2020 01:51 AM  
 GFR est non-AA 50 (L) 06/08/2020 01:51 AM  
 Calcium 8.7 06/08/2020 01:51 AM  
 Bilirubin, total 11.4 (H) 06/08/2020 01:51 AM  
 Alk.  phosphatase 311 (H) 06/08/2020 01:51 AM  
 Protein, total 7.1 06/08/2020 01:51 AM  
 Albumin 2.2 (L) 06/08/2020 01:51 AM  
 Albumin 2.2 (L) 06/08/2020 01:51 AM  
 Globulin 4.9 (H) 06/08/2020 01:51 AM  
 A-G Ratio 0.4 (L) 06/08/2020 01:51 AM  
 ALT (SGPT) 120 (H) 06/08/2020 01:51 AM  
 
Lab Results Component Value Date/Time ALT (SGPT) 120 (H) 06/08/2020 01:51 AM  
 
Factors affecting BG pattern Factor Dose Comments Nutrition: 
NPO Drugs: 
Steroids Hydrocotisone 50mg Q6 hrs Infection: COVID ABx X2 Had (2) negative COVID tests last week Other: LAQUITA CVVH Not in use at this time Blood glucose pattern Assessment and Plan Nursing Diagnosis Risk for unstable blood glucose pattern Nursing Intervention Domain 5255 Decision-making Support Nursing Interventions Examined current inpatient diabetes control Explored factors facilitating and impeding inpatient management Evaluation This critically ill, gentlemen with known Type 2 diabetes, became critically ill again yesterday, requiring re-intubation. On multiple pressors and bicarb. Off CVVH. Steroids restarted during the night as well. Patient has received (3) doses of D50 overnight. Lantus insulin dose should have cleared at this point. Recommendations Recommend: 
 
Stopping Lantus insulin Using corrective insulin approach for now Considering D5 infusion at low rate Billing Code(s) [x] [id number] IP subsequent hospital care - 30 minutes Jassi Stanton CNS Access via R [place] [phone]

## 2020-06-08 NOTE — PROGRESS NOTES
Hospitalist Progress Note NAME: Sheryle Sera :  1955 MRN:  [id number] This is a 62-year-old male with past medical history of diabetes presented to hospital with COVID-19 pneumonia and developed acute hypoxic respiratory failure along with septic shock. He also developed Streptococcus bacteremia along with coagulopathy. He was intubated and mechanically ventilated. He was extubated on 6/3 but unfortunately developed acute respiratory failure on  and was again mechanically ventilated and is currently in multiorgan failure and on multiple pressors. Assessment / Plan: 
COVID-19 pneumonia with multiorgan failure Acute hypoxic respiratory failure due to above status post mechanical ventilation, extubation on 6/3 and reintubation on  Septic shock profound 
-Patient recovered from COVID-19 pneumonia with multiorgan failure and was extubated on 6/3 and was being weaned off Levophed on  
-Patient unfortunately developed acute respiratory failure with severe hypotension on  and required intubation and was started on pressors and is currently on multiple pressors and is very unstable 
-Continue mechanical ventilation. Daily SBT 
-Restarted on stress dose of steroids with hydrocortisone 
-Started on empiric antibiotics with vancomycin and Zosyn again. Culture sent. 
-Continue Levophed, phenylephrine, epinephrine, dobutamine drips 
-Continue CVVH Strep Bacteremia Coagulopathy  
-s/p completion of antibiotics but now on broad spectrum abx due to shock Acute transaminitis likely due to shock liver Hyperbilirubinemia due to above US RUQ: Minimal gallbladder sludge. Slightly prominent common bile duct of uncertain 
significance. Recheck CMP Acute on chronic systolic HF EF 95%, now 08% (but on Dobutamine) Nonischemic dilated cardiomyopathy s/p remote ICD Chronic atrial fibrillation Sleep apnea Hypertensive heart disease w/ CKD and HF 
 Nonsustained ventricular tachycardia Continue heparin drip for now. Continue vasopressors. Cardiology is following DM2 BS stable Cont insulin w lantus, SSI Hemoptysis 
-improved Hypophosphatemia 
 
  
Code Status: Full Surrogate Decision Maker: wife DVT Prophylaxis: heparin drip Disposition: remain critically ill Subjective:  
  
Had a very eventful last night Was intubated and became hemodynamically unstable Now on multiple pressors Chief Complaint / Reason for Physician Visit: following sepsis / respiratory failure/ covid Review of Systems: 
Symptom Y/N Comments  Symptom Y/N Comments Fever/Chills    Chest Pain Poor Appetite    Edema Cough    Abdominal Pain Sputum    Joint Pain SOB/GROVES    Pruritis/Rash Nausea/vomit    Tolerating PT/OT Diarrhea    Tolerating Diet Constipation    Other Could NOT obtain due to:   
 
Objective: VITALS:  
Last 24hrs VS reviewed since prior progress note. Most recent are: 
Patient Vitals for the past 24 hrs: 
 Temp Pulse Resp BP SpO2  
06/08/20 1538  71 19    
06/08/20 1445  68 23 (!) 59/35   
06/08/20 1430  67 27 (!) 66/33 (!) 81 % 06/08/20 1415  73 24 (!) 66/36   
06/08/20 1400  (!) 58 24 (!) 62/33   
06/08/20 1358  65 29 (!) 63/32   
06/08/20 1354  68 27 (!) 70/30 (!) 80 % 06/08/20 1350  65 30 (!) 65/39 (!) 84 % 06/08/20 1330  71 29 (!) 70/39   
06/08/20 1300  68 30 (!) 105/21 (!) 86 % 06/08/20 1250  70 (!) 31 (!) 75/35   
06/08/20 1240  71 (!) 33 (!) 67/15 92 % 06/08/20 1230 97.8 °F (36.6 °C) 79 30 (!) 67/33 94 % 06/08/20 1220  86 (!) 33  (!) 88 % 06/08/20 1210  86 29 (!) 81/34 (!) 86 % 06/08/20 1200  86 (!) 31 (!) 78/24 (!) 86 % 06/08/20 1150  92 (!) 32 112/84 (!) 85 % 06/08/20 1140  83 (!) 32 (!) 143/26 (!) 83 % 06/08/20 1132  97 (!) 34  92 % 06/08/20 1130  98 30 (!) 70/28 (!) 88 % 06/08/20 1120  89 (!) 33 (!) 73/46 (!) 89 % 06/08/20 1110  93 (!) 33  (!) 87 % 06/08/20 1100  89 30 (!) 72/49 (!) 88 % 06/08/20 1050  96 (!) 31 (!) 78/14 (!) 84 % 06/08/20 1040  100 30 (!) 82/24 (!) 83 % 06/08/20 1030  98 30 (!) 72/38 (!) 85 % 06/08/20 1020  96 30 (!) 74/35 (!) 82 % 06/08/20 1010  98 (!) 31 (!) 71/36 (!) 82 % 06/08/20 1000  (!) 103 30 (!) 60/28   
06/08/20 0950  100 30 (!) 69/38 (!) 84 % 06/08/20 0940  (!) 102 29 (!) 66/34 (!) 87 % 06/08/20 0930  93 30 (!) 61/22 (!) 87 % 06/08/20 0920  94 (!) 31 (!) 56/25 (!) 84 % 06/08/20 0910  94 30  (!) 82 % 06/08/20 0900  (!) 102 28  (!) 82 % 06/08/20 0850  100 26 (!) 49/31 95 % 06/08/20 0840  (!) 104 (!) 34 (!) 98/38 (!) 82 % 06/08/20 0830  100 (!) 31 (!) 83/49 (!) 78 % 06/08/20 0820  97 (!) 31 (!) 110/20 (!) 78 % 06/08/20 0810  99 29 (!) 69/32 (!) 80 % 06/08/20 0800  99 26 (!) 71/35 (!) 78 % 06/08/20 0750  98 30 (!) 76/26 (!) 82 % 06/08/20 0740  90 30 (!) 76/45 (!) 82 % 06/08/20 0730 (!) 96.4 °F (35.8 °C) 95 28 (!) 66/47 (!) 82 % 06/08/20 0720  93 29 (!) 77/49 (!) 77 % 06/08/20 0719  97 29    
06/08/20 0710  99 30 (!) 70/41 (!) 80 % 06/08/20 0700  93 30 (!) 66/52   
06/08/20 0650  (!) 104 (!) 32 (!) 76/56   
06/08/20 0640  96 29 (!) 58/36   
06/08/20 0630  91 30    
06/08/20 0620  91 30 (!) 70/45 (!) 87 % 06/08/20 0600  92 29 (!) 99/24   
06/08/20 0550  97 28 (!) 74/40   
06/08/20 0540  92 28 (!) 66/47   
06/08/20 0530 (!) 96.4 °F (35.8 °C) 91 24 (!) 71/17   
06/08/20 0520  95 29 (!) 68/19   
06/08/20 0510  97 29 (!) 70/39   
06/08/20 0500  95 28 (!) 72/16   
06/08/20 0450  100 26 (!) 63/40   
06/08/20 0430  (!) 104 25 (!) 54/32   
06/08/20 0400  87 28 (!) 60/18   
06/08/20 0343  87 24    
06/08/20 0330  96 25 (!) 58/29   
06/08/20 0320  95 23 (!) 55/22   
06/08/20 0250  94 26    
06/08/20 0240  93 25    
06/08/20 0230  98 26    
06/08/20 0210  98 28   06/08/20 0207  98 28    
06/08/20 0200  (!) 101 27    
06/08/20 0147  97 29  (!) 89 % 06/08/20 0145  (!) 109 15    
06/08/20 0143  97 23  100 % 06/08/20 0131  98 28  94 % 06/08/20 0130  (!) 102 27    
06/08/20 0120  100 27  91 % 06/08/20 0110  (!) 104 24 (!) 61/35   
06/08/20 0100  (!) 105 24 (!) 56/36   
06/08/20 0053  (!) 101 25  (!) 87 % 06/08/20 0050  (!) 108 24    
06/08/20 0040  (!) 104 24 (!) 84/62   
06/08/20 0030  (!) 102 21 (!) 85/50   
06/08/20 0024  (!) 118 22 (!) 73/49 (!) 86 % 06/08/20 0020  (!) 104 21    
06/08/20 0013  (!) 107 25 (!) 71/57 (!) 85 % 06/08/20 0010  (!) 122 24    
06/08/20 0001  (!) 109 22 (!) 87/16   
06/07/20 2352  (!) 108 21 (!) 55/25   
06/07/20 2345  (!) 104 22 (!) 81/46   
06/07/20 2330  (!) 132 20 97/54   
06/07/20 2320  (!) 104 21 (!) 82/62   
06/07/20 2315  (!) 122 21 (!) 94/21   
06/07/20 2305  (!) 108 23 103/66 (!) 83 % 06/07/20 2302  (!) 120 28 94/76 (!) 83 % 06/07/20 2255  (!) 132 19 (!) 78/55 91 % 06/07/20 2252  (!) 115 22 90/43   
06/07/20 2240  (!) 115 24 98/45   
06/07/20 2230  (!) 112 23 102/65   
06/07/20 2220  (!) 111 24 (!) 88/65   
06/07/20 2210  (!) 109 23 104/47   
06/07/20 2203  (!) 109 23 (!) 89/51 97 % 06/07/20 2200  (!) 105 23 (!) 67/38 99 % 06/07/20 2150  (!) 106 21 (!) 84/50 98 % 06/07/20 2140  (!) 107 22 104/54 91 % 06/07/20 2130  (!) 103 19 93/45 90 % 06/07/20 2120  (!) 127 24 (!) 83/41   
06/07/20 2110  (!) 114 21 96/42 90 % 06/07/20 2100  98 25 91/50   
06/07/20 2050  (!) 106 25 90/45   
06/07/20 2040  (!) 106 24 (!) 81/44   
06/07/20 2030  100 24    
06/07/20 2020  (!) 110 24 98/55   
06/07/20 2010  (!) 111 23 (!) 66/12   
06/07/20 2000 (!) 96.7 °F (35.9 °C) (!) 108 22 95/78   
06/07/20 1950  (!) 124 26 107/57   
06/07/20 1940  (!) 110 25    
06/07/20 1930  (!) 117 24 100/42   
06/07/20 1925  (!) 109 26 (!) 89/70  06/07/20 1920  (!) 107 23 106/61   
06/07/20 1915  (!) 113 25 98/66   
06/07/20 1910  (!) 113 25 102/55 (!) 81 % 06/07/20 1905  (!) 119 29 102/40   
06/07/20 1900  (!) 112 26 93/55 (!) 84 % 06/07/20 1855  (!) 110 24 102/55 (!) 83 % 06/07/20 1850  (!) 112 22 98/74 (!) 84 % 06/07/20 1845  (!) 110 24 96/72   
06/07/20 1840  (!) 107 23 100/67   
06/07/20 1835  (!) 118 28 97/66   
06/07/20 1830  (!) 108 24 94/54   
06/07/20 1825  (!) 112 23 (!) 96/30   
06/07/20 1820  (!) 108 21 102/79   
06/07/20 1815  (!) 105 21 105/66 (!) 85 % 06/07/20 1810  100 24 143/70 92 % 06/07/20 1805  (!) 106 23 122/67 (!) 80 % 06/07/20 1800  (!) 108 25 (!) 77/45 (!) 89 % 06/07/20 1755  98 20 (!) 76/43 (!) 79 % 06/07/20 1750  (!) 106 22 (!) 84/52 93 % 06/07/20 1745  100 23  (!) 85 % 06/07/20 1740  99 25  (!) 86 % 06/07/20 1735  (!) 103 22  (!) 88 % 06/07/20 1730  (!) 104 22 (!) 92/39 (!) 89 % 06/07/20 1725  (!) 105 24  92 % 06/07/20 1720  100 23  91 % 06/07/20 1715  98 21  93 % 06/07/20 1710  (!) 107 24  90 % 06/07/20 1705  (!) 103 25  97 % 06/07/20 1700  (!) 102 23 90/57 94 % 06/07/20 1655  100 21  97 % 06/07/20 1650  96 22  95 % 06/07/20 1645  (!) 101 17  94 % 06/07/20 1640  99 15  93 % 06/07/20 1635  96 18  (!) 83 % 06/07/20 1630  (!) 101 17 (!) 90/33 90 % 06/07/20 1625  99 19  93 % 06/07/20 1620  93 17  91 % 06/07/20 1615  98 22  (!) 86 % 06/07/20 1610  (!) 101 23  (!) 89 % Intake/Output Summary (Last 24 hours) at 6/8/2020 1608 Last data filed at 6/8/2020 0630 Gross per 24 hour Intake 2755.34 ml Output 608 ml Net 2147.34 ml PHYSICAL EXAM: 
General:          Intubated EENT:              EOMI. Anicteric sclerae. MMM Resp:               CTA bilaterally, no wheezing or rales. mechanically ventilated CV:                  Regular  rhythm,  No edema GI:                   Soft, Non distended, Non tender.  +Bowel sounds Neurologic:      Intubated and sedated Psych:             Intubated Skin:                No rashes. No jaundice Reviewed most current lab test results and cultures  YES Reviewed most current radiology test results   YES Review and summation of old records today    NO Reviewed patient's current orders and MAR    YES 
PMH/ reviewed - no change compared to H&P 
________________________________________________________________________ Care Plan discussed with: 
  Comments Patient Family RN x Care Manager Consultant  x Multidiciplinary team rounds were held today with , nursing, pharmacist and clinical coordinator. Patient's plan of care was discussed; medications were reviewed and discharge planning was addressed. ________________________________________________________________________ Total NON critical care TIME:  35 Minutes Total CRITICAL CARE TIME Spent:   Minutes non procedure based Comments >50% of visit spent in counseling and coordination of care x Chart review in complex ICU patient  
________________________________________________________________________ Mervin Schirmer, MD  
 
Procedures: see electronic medical records for all procedures/Xrays and details which were not copied into this note but were reviewed prior to creation of Plan. LABS: 
I reviewed today's most current labs and imaging studies. Pertinent labs include: 
Recent Labs  
  06/08/20 
0151 06/07/20 
0434 06/06/20 
1045 WBC 3.3* 9.8 8.1 HGB 9.5* 9.3* 9.6* HCT 30.8* 28.8* 29.2*  
* 110* 148* Recent Labs  
  06/08/20 
0151 06/07/20 
1554 06/07/20 
0434 * 132* 132* K 4.8 4.4 4.1  98 100 CO2 17* 21 25 GLU 65 244* 241* BUN 34* 43* 42* CREA 1.43* 1.55* 1.51* CA 8.7 9.1 8.8 MG 3.2* 2.8* 2.8* PHOS 5.0* 4.1 3.1 ALB 2.2*  2.2* 2.4* 2.5*  
 TBILI 11.4*  --   --   
*  --   --   
 
 
Signed: Donnell Bernal MD

## 2020-06-08 NOTE — PROCEDURES
Intubation Note Called to bedside secondary to  respiratory failure. Patient pre-oxygenated with 100% oxygen. Smooth RSI with Etomidate 20 mg +  IV. GlideScope x 1 
 
7.5 ETT taped and secured at 22 cm at the teeth. 
 
+ Bilateral BS, + Chest rise, + ETCO2 CXR pending.  
 
Care turned over to covering Attending MD.

## 2020-06-08 NOTE — PROGRESS NOTES
Called to room 2510 @  [phone] for Code Blue. PEA arrest. CPR in progress. Please see code blue documentation for medications and interventions. Pastoral care, Nursing supervisor, and family all notified. COde ended [phone].

## 2020-06-08 NOTE — DIALYSIS
CRRT Note Francisco Jita CRRT stopped @ 0245 per MD orders, pt unstable. All possible blood (165 ml) returned by primary RN. Orders & consent verified. Old set discarded in red biohazard bag. RIJ temporary CVC, dressing CDI. Lines flushed with normal saline x2 ports & sterile caps applied by primary RN. Pt still unstable at this time, primary RN will page Ada once pt stable & ready to restart CRRT. Education & pre/post report to JAMESON Calderon, primary RN.

## 2020-06-08 NOTE — PROGRESS NOTES
Progress Note 6/8/2020 11:43 AM 
NAME: Anuja Carrera MRN:  [id number] Admit Diagnosis: Sepsis ([facility] [address] [A41.9] Problem List: 1. Shock; septic/cardiogenic; recurrent 2. Severe sepsis; resolved? Recurrent? 3. COVID-19 +; testing negative x 2 
4. NSTEMI 5. Hyponatremia 6. Lactic acidosis; resolved. 7. Acute liver injury; resolving 8. Acute on chronic systolic heart failure; Class IV 9. Acute kidney injury; anuric. Now ESRD on dialysis. 10. Coagulopathy 11. Nonischemic dilated cardiomyopathy s/p remote ICD 12. Chronic atrial fibrillation 13. Sleep apnea 14. Hypertensive heart disease w/ CKD and HF Assessment/Plan:  
 
Intake/Output Summary (Last 24 hours) at 6/8/2020 0827 Last data filed at 6/8/2020 0630 Gross per 24 hour Intake 3567.8 ml Output 1618 ml Net 1949.8 ml Ricky @ 300 Vaso @ 0.04 Norepi @ 200 Dobut off Epi @ 40 Bicarb @ 100 Echo w/ EF 35-40% COVID-19 from 6/1 and 6/6 negative Frequent ectopy yesterday w/ AFib w RVR. Profound hypoxia. pH 7.1 
pO2 171 CXR doesn't look that bad. Re-intubated overnight Vent @ 100% Breathing over the vent Thick brown secretions. Big step backwards unfortunately Afebrile OFF sedation; doesn't respond to his name 1. Continue supportive care 2. Heparin gtt per protocol 3. CVVH off w/ profound hypotension 4. Prognosis remains very poor. Was hopeful last week. 5. Repeat ABG as above 6. Cultures pending 7. Give two amps bicarb now 8. Add dobutamine 9. Repeat a limited echo to eval for pericardial effusion, I doubt it. 10. Does he need to be retested for COVID? Not sure what caused this huge decline last night. 11. Broad-spectrum ABX? 12. Arterial line? 13. Zelda discussed w/ the family (wife and son) this AM 
 
I have spent critical care time involved in lab review, consultations with specialist, family decision-making, and documentation.  During this entire length of time I was immediately available to the patient. The reason for providing this level of medical care for this critically ill patient was due to a critical illness that impaired one or more vital organ systems such that there was a high probability of imminent or life threatening deterioration in the patients condition. This care involved high complexity decision making to assess, manipulate, and support vital system functions, to treat this degree of vital organ system failure and to prevent further life threatening deterioration of the patients condition. Total critical care time spent: 150 mins [x]       High complexity decision making was performed in this patient at high risk for decompensation with multiple organ involvement. Subjective:  
 
Carlene Tamayo is intubated Discussed with RN events overnight. Review of Systems: 
 
Symptom Y/N Comments  Symptom Y/N Comments Fever/Chills N   Chest Pain N Poor Appetite N   Edema N   
Cough N   Abdominal Pain N Sputum N   Joint Pain N   
SOB/GROVES N   Pruritis/Rash N   
Nausea/vomit N   Tolerating PT/OT Y Diarrhea N   Tolerating Diet Y Constipation N   Other Could NOT obtain due to:   
 
Objective:  
  
Physical Exam: 
 
Last 24hrs VS reviewed since prior progress note. Most recent are: 
 
Visit Vitals BP (!) 70/41 Pulse 95 Temp (!) 96.4 °F (35.8 °C) Resp 29 Ht 6' 3\" (1.905 m) Wt 105.8 kg (233 lb 4 oz) SpO2 (!) 87% BMI 29.15 kg/m² Intake/Output Summary (Last 24 hours) at 6/8/2020 0827 Last data filed at 6/8/2020 0630 Gross per 24 hour Intake 3567.8 ml Output 1618 ml Net 1949.8 ml General Appearance: Well developed, well nourished, intubated Ears/Nose/Mouth/Throat: Hearing grossly normal. 
Neck: Supple. Chest: Lungs decreased diffusely Cardiovascular: Irregular rate and rhythm, S1S2 normal, no murmur. Abdomen: Soft, non-tender, bowel sounds are active. Extremities: No edema bilaterally. Skin: Warm and dry. []         Post-cath site without hematoma, bruit, tenderness, or thrill. Distal pulses intact. PMH/SH reviewed - no change compared to H&P Data Review Telemetry: paced/AF 
 
EKG:  
[x]  No new EKG for review Lab Data Personally Reviewed: 
 
Recent Labs  
  06/08/20 
0151 06/07/20 
4384 WBC 3.3* 9.8 HGB 9.5* 9.3* HCT 30.8* 28.8*  
* 110* Recent Labs  
  06/08/20 
0443 06/08/20 
0151 06/07/20 
1554 APTT 123.5* >130.0* 72.8* Recent Labs  
  06/08/20 
0151 06/07/20 
1554 06/07/20 
0434 * 132* 132* K 4.8 4.4 4.1  98 100 CO2 17* 21 25 BUN 34* 43* 42* CREA 1.43* 1.55* 1.51* GLU 65 244* 241* CA 8.7 9.1 8.8 MG 3.2* 2.8* 2.8* No results for input(s): CPK, CKNDX, TROIQ in the last 72 hours. [facility] lab exists [facility] component[facility] Component Value Date/Time Cholesterol, total 92 03/03/2009 09:40 PM  
 HDL Cholesterol 44 03/03/2009 09:40 PM  
 LDL, calculated 38.4 03/03/2009 09:40 PM  
 Triglyceride <15 05/22/2020 05:13 AM  
 CHOL/HDL Ratio 2.1 03/03/2009 09:40 PM  
 
 
Recent Labs  
  06/08/20 0151 06/07/20 
1554 06/07/20 
0434 *  --   --   
TP 7.1  --   --   
ALB 2.2*  2.2* 2.4* 2.5*  
GLOB 4.9*  --   -- No results for input(s): PH, PCO2, PO2 in the last 72 hours. Medications Personally Reviewed: 
 
Current Facility-Administered Medications Medication Dose Route Frequency  vasopressin (VASOSTRICT) 20 Units in 0.9% sodium chloride 100 mL infusion  0.04 Units/min IntraVENous CONTINUOUS  
 LORazepam (ATIVAN) injection 2 mg  2 mg IntraVENous Q4H PRN  
 EPINEPHrine (ADRENALIN) 10 mg in 0.9% sodium chloride 250 mL infusion  0-20 mcg/min IntraVENous TITRATE  hydrocortisone Sod Succ (PF) (SOLU-CORTEF) injection 50 mg  50 mg IntraVENous Q6H  
 sodium bicarbonate 150 mEq/1000 mL D5W (premix)   IntraVENous CONTINUOUS  
  amiodarone (CORDARONE) 375 mg/250 mL D5W infusion  0.5 mg/min IntraVENous CONTINUOUS  
 fentaNYL (PF) 1,500 mcg/30 mL (50 mcg/mL) infusion  0-200 mcg/hr IntraVENous TITRATE  propofol (DIPRIVAN) 10 mg/mL infusion  0-50 mcg/kg/min IntraVENous TITRATE  PHENYLephrine (JACLYN-SYNEPHRINE) 100 mg in 0.9% sodium chloride 250 mL infusion   mcg/min IntraVENous TITRATE  midodrine (PROAMATINE) tablet 10 mg  10 mg Per NG tube TID PRN  
 insulin glargine (LANTUS) injection 46 Units  46 Units SubCUTAneous DAILY  melatonin tablet 6 mg  6 mg Oral QHS  lansoprazole (PREVACID) 3 mg/mL oral suspension 30 mg  30 mg Per NG tube ACB  epoetin socrates-epbx (RETACRIT) injection 10,000 Units  10,000 Units SubCUTAneous Q MON, WED & FRI  
 bicarbonate dialysis (PRISMASOL) BG K 4/Ca 2.5 5000 ml solution   Extracorporeal DIALYSIS CONTINUOUS  
 balsam peru-castor oiL (VENELEX) ointment   Topical TID  polyethylene glycol (MIRALAX) packet 17 g  17 g Per NG tube DAILY PRN  
 zinc oxide-cod liver oil (DESITIN) 40 % paste   Topical PRN  chlorhexidine (ORAL CARE KIT) 0.12 % mouthwash 15 mL  15 mL Oral Q12H  potassium chloride 20 mEq in 50 ml IVPB  20 mEq IntraVENous PRN  
 calcium chloride injection 2 g  2 g IntraVENous PRN  
 acetaminophen (TYLENOL) solution 650 mg  650 mg Oral Q6H PRN  
 PHARMACY INFORMATION NOTE  1 Each Other BID  heparin 25,000 units in D5W 250 ml infusion  8-25 Units/kg/hr IntraVENous TITRATE  
 NOREPINephrine (LEVOPHED) 32 mg in 5% dextrose 250 mL infusion  2-200 mcg/min IntraVENous TITRATE  sodium chloride (NS) flush 5-40 mL  5-40 mL IntraVENous Q8H  
 sodium chloride (NS) flush 5-40 mL  5-40 mL IntraVENous PRN  
 ondansetron (ZOFRAN) injection 4 mg  4 mg IntraVENous Q8H PRN  
 alcohol 62% (NOZIN) nasal  1 Ampule  1 Ampule Topical Q12H  
 insulin lispro (HUMALOG) injection   SubCUTAneous Q6H  
 glucose chewable tablet 16 g  4 Tab Oral PRN  
  dextrose (D50W) injection syrg 12.5-25 g  12.5-25 g IntraVENous PRN  
 glucagon (GLUCAGEN) injection 1 mg  1 mg IntraMUSCular PRN  
 acetaminophen (TYLENOL) tablet 650 mg  650 mg Oral Q6H PRN Or  
 acetaminophen (TYLENOL) suppository 650 mg  650 mg Rectal Q6H PRN  
 influenza vaccine 2019-20 (6 mos+)(PF) (FLUARIX/FLULAVAL/FLUZONE QUAD) injection 0.5 mL  0.5 mL IntraMUSCular PRIOR TO DISCHARGE  sodium chloride (NS) flush 5-10 mL  5-10 mL IntraVENous PRN Bree Patches III, DO

## 2020-06-08 NOTE — PROGRESS NOTES
The purpose of the visit was in response to a CODE BLUE page on the patient. The patient was being attended to by the med team. The  provided the presence of care and support for the staff on the unit. [facility] Laurie Vaca  paging service [phone])

## 2020-06-08 NOTE — PROGRESS NOTES
1900 - Verbal shift change report given to [address] [place] [address] (oncoming nurse) by Benjamin Luis (offgoing nurse). Report included the following information SBAR, Kardex, Intake/Output, MAR, Recent Results, and Cardiac Rhythm atrial fib and BiV paced with frequent PVCs 100-130's. 
 
2000 - Assessment complete. Receiving oxygen via %. Oral care complete. Lines: . RIJ triple lumen CVC, RIJ Temporary dialysis line,. R NGT 65 cm nare,. Dialysis: CRRT. Mode CVVHD; Blood flow 200, Diaysalte 2000, Effluent 2060, Effluent dose 17 ml/kg/h, UFR dose 0 ml/kg/h, Filtration fraction 1 %; Access - 105, Filter 208, Effluent 119, Return 129, Pressure drop 46, TMP 32. 
IV infusions: Amiodarone 1 mcg/min, Levophed 95 mcg/min,. Heparin 12 units/kg/hr,. NS KVO,. Tube feeds on hold. 2040 - Spoke with Dr Andi Suarez about ABG and CXR. Give 2 amp bicarb, repeat abg in 4 hrs. May need to be reintubated in worsens 65 - Becoming more lethargic. Only nods head and mouths few words. Not clearing airway. LS course and upper airway gurgling. Sat 92-93% on NRB. Bp still dropping and levophed now up to 175. Pulmonary paged. Intubate pt and add Ricky-synephrine as next pressor. 2255 - Intubated by anesthesia. ETT  #7.5/25. CXR verified. Vent mode: VC 14/450/100/6, Vmax 80 
 
2355 - BP still unstable. Changed pt fluid removal to 0, running pt positive. Ricky gtt now at 300. Called Dr Andi Suarez, order for Vasopressin. 56 - Called and spoke with wife to update on declining status. Wanting to continue doing everything to save him. Asking if Dr Dari Nuñez could be called in to do any procedure to fix his BP. Explained that there is not a procedure to help improve BP in acute shock. Stents or surgery would not be appropriate at this time. 46 - Spoke with Dr Andi Suarez again about BP. Add epi and solu-cortef. 
 
0200 - Labs sent. 12 - Unable to obtain cuff pressures. Femoral pulse palpable but weak. Stopped CRRT and returned all available blood, 125 ml. 0320 - Detectable cuff pressure. 55/22. 
  
0405 - Called [facility] to Dr. Yovani Ribeiro. Orders for 3 amp HCO3 and start gtt. GL 65, d50w per MAR. 
 
0600 - Lito Grady previously notified and on call renal paged. Dr Tez Byrd notified of CRRT being stopped. 0700 - Verbal shift change report given to [facility] (oncoming nurse) by Sully De La Torre RN (offgoing nurse). Report included the following information SBAR, Kardex, Intake/Output, MAR, Recent Results, and Cardiac Rhythm atrial fib and paced.

## 2020-06-08 NOTE — PROGRESS NOTES
The purpose of the visit was to console and support the patient's family while discussing of patient status with the doctor. The doctor requested the presence of spiritual support for the family while discussion of patient's condition. The patient's family shared tearfully and cathartically. They mentioned that their adriana was in-line with the belief of healing for their loved one. They expressed that their desire and hope was to see God turn the patient's condition around. They shared that they were people of adriana and that they were trusting God to heal the patient. The  facilitated discussion and concerns from the family. The  listened empathically and reflectively. The  provided the presence of spiritual care and pastoral support. [facility] Estee Vaca.  paging service [phone]

## 2020-06-08 NOTE — INTERDISCIPLINARY ROUNDS
Critical care interdisciplinary rounds held on 06/08/2020. Following members present, Pharmacy, Diabetes Treatment, Case Management, Respiratory Therapy, Physical Therapy, Clinical Lead, Palliative Care and Nutrition. Led by HOWIE Hauser RN and Dr. John Coronel. Plan of care discussed. See clinical pathway for plan of care and interventions and desired outcomes.

## 2020-06-09 NOTE — PROGRESS NOTES
Responded to referral from chaplain Robert Che for patient's death on CCU. No family present at bedside. Will follow-up when family arrives. [facility]. [facility] Paging Service: [phone])

## 2020-06-09 NOTE — PROGRESS NOTES
Received family from the main hospital entrance. Large family present. Accompanied them to the room and offered support as they expressed their feelings of grief. Provided supportive listening as one of the sons shared stories about his dad. Family have a strong adriana in God and repeatedly talked about God's will being done. Cardiologist Melani Cedeño also arrived to support family. They appreciated his presence and service towards the patient. Escorted family back to the main entrance.  Home: [facility] [address] Paging Service: [phone])

## 2020-06-10 LAB
BACTERIA SPEC CULT: ABNORMAL
BACTERIA SPEC CULT: ABNORMAL
GRAM STN SPEC: ABNORMAL
SERVICE CMNT-IMP: ABNORMAL

## 2020-06-14 LAB
BACTERIA SPEC CULT: NORMAL
SERVICE CMNT-IMP: NORMAL

## 2020-06-15 NOTE — DISCHARGE SUMMARY
Hospitalist Discharge Summary Patient ID: 
Pravin Hobbs [id number] 
71 y.o. 
1955 PCP on record: Kay Oglesby MD 
 
Admit date: 5/15/2020 Date of Death : 20 DISCHARGE DIAGNOSIS: 
 
Pt  COVID-19 pneumonia with multiorgan failure Acute hypoxic respiratory failure due to above status post mechanical ventilation, extubation on 6/3 and reintubation on  Septic shock profound Strep Bacteremia Coagulopathy Acute transaminitis likely due to shock liver Hyperbilirubinemia due to above Acute on chronic systolic HF EF 23%, now 77% (but on Dobutamine) Nonischemic dilated cardiomyopathy s/p remote ICD Chronic atrial fibrillation Sleep apnea Hypertensive heart disease w/ CKD and HF Nonsustained ventricular tachycardia DM2 Hemoptysis Hypophosphatemia CONSULTATIONS: 
IP CONSULT TO CARDIOLOGY 
IP CONSULT TO NEPHROLOGY 
IP CONSULT TO GASTROENTEROLOGY Excerpted HPI from H&P of Cris Payne MD: 
71years old male from home with past medical history significant for hypertension: CHF, diabetes, hypertension presented to the hospital for evaluation of generalized weakness associated with fever started 2 days ago, patient denies any chest pain denies any productive cough denies any nausea vomiting any abdominal pain any sick contact, patient was recently diagnosed with low magnesium at another facility's and was prescribed oral magnesium and discharged home, blood work in ED was significant for elevated white blood cell count 14.2, chest x-ray show no acute abnormality. We were asked to admit for work up and evaluation of the above problems.  
  
 
______________________________________________________________________ DISCHARGE SUMMARY/HOSPITAL COURSE:  for full details see H&P, daily progress notes, labs, consult notes.   
 
 
 
Patient was admitted to hospital and diagnosed to have acute hypoxic respiratory failure due to COVID-19 pneumonia. Patient was also noted to have other medical problems including alpha Streptococcus bacteremia along with combined systolic and diastolic congestive heart failure. He was also noted to have other medical problems including hypertensive heart disease with chronic kidney was also in atrial fibrillation as well. Patient received maximal support including mechanical ventilation, stress dose of steroids, empiric antibiotics. He also needed vasopressors for blood pressure support. He was also started on IV heparin per protocol for anticoagulation. Patient initially was intubated on  and was extubated on 6/3 and was doing well on nasal cannula but unfortunately had to be reintubated on 2020 and had an acute episode of decompensation including severe acute hypoxic respiratory failure along with hypotension. During hospitalization patient was also continued on CVVH due to acute kidney injury. In spite of maximal treatment patient did not recover from the episode of decompensation on   and  on  at 1700. Cause of death: 
COVID-19 pneumonia Acute hypoxic respiratory failure Septic shock 
 
 
 
 
 
 
_______________________________________________________________________ Signed: 
Tanna Mcmahon MD

## 2020-06-16 LAB
BACTERIA SPEC CULT: ABNORMAL
SERVICE CMNT-IMP: ABNORMAL

## 2021-04-21 NOTE — PROGRESS NOTES
NAME: Kaylie Perkins :  1955 MRN:  [id number] Assessment :    Plan: 
--LAQUITA Shock Sepsis DM type 2 Systolic HF (EF 61%) COVID + Initiated CVVHD ; tolerating UF (factor 100); up ~ 30 liters--alternating 2k, 4k baths as pharmacy is low on certain dialysates-cxr (-) pulm edema Electrolytes stable Lewis line  
 
pulm considering extubation in the next day or two--if successfully extubated would consider transitioning to hd Will stop CVVH today and observe Subjective: Chief Complaint:  In order to limit the exposure risk from COVID 19 and to preserve the hospital's limited supply of PPEs, seen through ICU window. Intubated. Remains critically ill Review of Systems:  
 
Symptom Y/N Comments  Symptom Y/N Comments Fever/Chills    Chest Pain Poor Appetite    Edema Cough    Abdominal Pain Sputum    Joint Pain SOB/GROVES    Pruritis/Rash Nausea/vomit    Tolerating PT/OT Diarrhea    Tolerating Diet Constipation    Other Could not obtain due to: See above Objective: VITALS:  
Last 24hrs VS reviewed since prior progress note. Most recent are: 
Visit Vitals BP (!) 88/68 Pulse 76 Temp (!) 94.7 °F (34.8 °C) Resp (!) 2 Ht 6' 3\" (1.905 m) Wt 119.3 kg (263 lb 0.1 oz) SpO2 98% BMI 32.87 kg/m² Intake/Output Summary (Last 24 hours) at 2020 1234 Last data filed at 2020 1200 Gross per 24 hour Intake 1662.73 ml Output 4308 ml Net -2645.27 ml Telemetry Reviewed: PHYSICAL EXAM: 
General: NAD, ett in place Lebron Edema  -+in UE/LE Lab Data Reviewed: (see below) Medications Reviewed: (see below) PMH/SH reviewed - no change compared to H&P 
________________________________________________________________________ Care Plan discussed with: 
Patient Family RN Care Manager Consultant:     
 
  Comments >50% of visit spent in counseling and coordination of care    
 
________________________________________________________________________ Brandee Chance MD  
 
Procedures: see electronic medical records for all procedures/Xrays and details which 
were not copied into this note but were reviewed prior to creation of Plan. LABS: 
Recent Labs  
  05/29/20 0308 05/28/20 
3660 WBC 20.4* 24.4* HGB 10.4* 10.8* HCT 29.9* 31.5*  230 Recent Labs  
  05/29/20 0308 05/28/20 
1522 05/28/20 
0935  136 136  
K 3.7 3.8 3.7  105 105 CO2 27 28 26 BUN 41* 42* 39* CREA 2.07* 1.97* 2.04* * 158* 120* CA 7.5* 7.8* 7.8*  
MG 2.6* 2.4 2.6* PHOS 2.8 2.9 2.6 Recent Labs  
  05/29/20 
0308 05/28/20 
1522 05/28/20 
0847 05/28/20 
0445 *  --   --  293* TP 6.6  --   --  6.5 ALB 1.9* 2.0* 2.0* 2.1*  
GLOB 4.7*  --   --  4.4* Recent Labs  
  05/29/20 
1120 05/29/20 
0308 05/28/20 
3300 05/28/20 
0445 05/27/20 
0435 INR  --   --   --  1.6* 1.6* PTP  --   --   --  15.9* 15.7* APTT 49.5* 87.9* 60.4* 59.0* 64.9*  
  
 
MEDICATIONS: 
Current Facility-Administered Medications Medication Dose Route Frequency  white petrolatum-mineral oiL (AKWA TEARS) 83-15 % ophthalmic ointment   Both Eyes Q12H  hydrocortisone Sod Succ (PF) (SOLU-CORTEF) injection 25 mg  25 mg IntraVENous Q6H  
 insulin glargine (LANTUS) injection 40 Units  40 Units SubCUTAneous DAILY  vancomycin (VANCOCIN) 1,000 mg in 0.9% sodium chloride (MBP/ADV) 250 mL  1,000 mg IntraVENous Q24H  polyethylene glycol (MIRALAX) packet 17 g  17 g Per NG tube DAILY PRN  
 bicarbonate dialysis (PRISMASOL) BG K 4/Ca 2.5 5000 ml solution   Extracorporeal DIALYSIS CONTINUOUS  
 zinc oxide-cod liver oil (DESITIN) 40 % paste   Topical PRN  chlorhexidine (ORAL CARE KIT) 0.12 % mouthwash 15 mL  15 mL Oral Q12H  cefepime (MAXIPIME) 1 g in 0.9% sodium chloride (MBP/ADV) 50 mL  1 g IntraVENous Q8H  potassium chloride 20 mEq in 50 ml IVPB  20 mEq IntraVENous PRN  
 calcium chloride injection 2 g  2 g IntraVENous PRN  
 famotidine (PF) (PEPCID) 20 mg in 0.9% sodium chloride 10 mL injection  20 mg IntraVENous DAILY  acetaminophen (TYLENOL) solution 650 mg  650 mg Oral Q6H PRN  
 PHARMACY INFORMATION NOTE  1 Each Other BID  heparin (porcine) injection 2,000 Units  2,000 Units IntraVENous PRN Or  
 heparin (porcine) injection 4,000 Units  4,000 Units IntraVENous PRN  
 heparin 25,000 units in D5W 250 ml infusion  8-25 Units/kg/hr IntraVENous TITRATE  
 NOREPINephrine (LEVOPHED) 32 mg in 5% dextrose 250 mL infusion  2-200 mcg/min IntraVENous TITRATE  sodium chloride (NS) flush 5-40 mL  5-40 mL IntraVENous Q8H  
 sodium chloride (NS) flush 5-40 mL  5-40 mL IntraVENous PRN  
 ondansetron (ZOFRAN) injection 4 mg  4 mg IntraVENous Q8H PRN  
 alcohol 62% (NOZIN) nasal  1 Ampule  1 Ampule Topical Q12H  
 insulin lispro (HUMALOG) injection   SubCUTAneous Q6H  
 glucose chewable tablet 16 g  4 Tab Oral PRN  
 dextrose (D50W) injection syrg 12.5-25 g  12.5-25 g IntraVENous PRN  
 glucagon (GLUCAGEN) injection 1 mg  1 mg IntraMUSCular PRN  
 acetaminophen (TYLENOL) tablet 650 mg  650 mg Oral Q6H PRN Or  
 acetaminophen (TYLENOL) suppository 650 mg  650 mg Rectal Q6H PRN  
 influenza vaccine 2019-20 (6 mos+)(PF) (FLUARIX/FLULAVAL/FLUZONE QUAD) injection 0.5 mL  0.5 mL IntraMUSCular PRIOR TO DISCHARGE  sodium chloride (NS) flush 5-10 mL  5-10 mL IntraVENous PRN  
 
 
 Mohs Case Number: [id number] Mohs Case Number: [id number]

## 2024-01-01 NOTE — PROGRESS NOTES
1900- Report received from [facility]. 2000- Assessment complete as documented. Pt awake on the vent, nods appropriately and follows commands. Moderate amounts of bloody secretions suctioned from ETT and mouth. Lungs diminished. 0000- Reassess done, unchanged. NGT residuals tan to dark red. Will monitor. 0335- PTT 41.6, protocol states to increased rip and give bolus. Page to  [facility] re: concerns with bleeding and heparin drip. Orders received to hold bolus and only increase drip per protocol. Next PTT due @ 0937. Reassess unchanged. 6955- Report given to ZAK Portillo. (0) relaxed

## 2024-12-06 NOTE — DISCHARGE INSTRUCTIONS
[employer] Activation    Thank you for requesting access to [employer]. Please follow the instructions below to securely access and download your online medical record. [employer] allows you to send messages to your doctor, view your test results, renew your prescriptions, schedule appointments, and more. How Do I Sign Up? 1. In your internet browser, go to [email or web address]  2. Click on the First Time User? Click Here link in the Sign In box. You will be redirect to the New Member Sign Up page. 3. Enter your [employer] Access Code exactly as it appears below. You will not need to use this code after youve completed the sign-up process. If you do not sign up before the expiration date, you must request a new code. [employer] Access Code: [email or web address]  Expires: 2017  6:14 PM (This is the date your [employer] access code will )    4. Enter the last four digits of your Social Security Number (xxxx) and Date of Birth (mm/dd/yyyy) as indicated and click Submit. You will be taken to the next sign-up page. 5. Create a [employer] ID. This will be your [employer] login ID and cannot be changed, so think of one that is secure and easy to remember. 6. Create a [employer] password. You can change your password at any time. 7. Enter your Password Reset Question and Answer. This can be used at a later time if you forget your password. 8. Enter your e-mail address. You will receive e-mail notification when new information is available in [address]. 9. Click Sign Up. You can now view and download portions of your medical record. 10. Click the Download Summary menu link to download a portable copy of your medical information. Additional Information    If you have questions, please visit the Frequently Asked Questions section of the [employer] website at [email or web address] Remember, [employer] is NOT to be used for urgent needs. For medical emergencies, dial 911.
Detail Level: Detailed
Quality 431: Preventive Care And Screening: Unhealthy Alcohol Use - Screening: Patient not identified as an unhealthy alcohol user when screened for unhealthy alcohol use using a systematic screening method
Quality 226: Preventive Care And Screening: Tobacco Use: Screening And Cessation Intervention: Patient screened for tobacco use and is an ex/non-smoker

## 2025-04-19 NOTE — PROGRESS NOTES
Accepted critical lactic acid lab value of 2.2 and critical troponin lab of 2.24 from Lab, relayed information to primary RN [employer]. Pt admitted for hydronephrosis of R kidney and had cysto with lithotripsy and stent placement on 4/3. Pt urine culture positive for E Coli and receiving Keflex. Pt has not c/o pain all shift. BP did become elevated overnight with SBP up to 160s and writer notified Waterhouse, NP. One time order for IV labetalol ordered and given that did show improvement. CBC and procalcitonin orders were placed by Dr. Fallon for pt being lethargic during shift change- see chart. Pt was lethargic for part of shift but has improved some this morning. No BM overnight but pt passing lots of gas. PANTERA Watt notified this morning and ordered to give another dose of glycolax- see MAR. Cough drops ordered this morning for pt cough and Pantera Watt notified with orders for cough drops. 1400mL urine out overnight. Bed in lowest position, call light within reach, and bed alarm on.     Problem: Genitourinary - Adult  Goal: Absence of urinary retention  Outcome: Progressing     Problem: Infection - Adult  Goal: Absence of infection at discharge  Outcome: Progressing     Problem: Pain  Goal: Verbalizes/displays adequate comfort level or baseline comfort level  Outcome: Progressing      Attending Attestation (For Attendings USE Only)...